# Patient Record
Sex: FEMALE | Race: BLACK OR AFRICAN AMERICAN | NOT HISPANIC OR LATINO | URBAN - METROPOLITAN AREA
[De-identification: names, ages, dates, MRNs, and addresses within clinical notes are randomized per-mention and may not be internally consistent; named-entity substitution may affect disease eponyms.]

---

## 2017-01-01 ENCOUNTER — OUTPATIENT (OUTPATIENT)
Dept: OUTPATIENT SERVICES | Facility: HOSPITAL | Age: 27
LOS: 1 days | End: 2017-01-01
Payer: MEDICAID

## 2017-01-01 DIAGNOSIS — O00.9 ECTOPIC PREGNANCY, UNSPECIFIED: Chronic | ICD-10-CM

## 2017-01-19 ENCOUNTER — APPOINTMENT (OUTPATIENT)
Dept: ENDOCRINOLOGY | Facility: CLINIC | Age: 27
End: 2017-01-19

## 2017-03-08 DIAGNOSIS — R69 ILLNESS, UNSPECIFIED: ICD-10-CM

## 2017-05-25 ENCOUNTER — APPOINTMENT (OUTPATIENT)
Dept: ENDOCRINOLOGY | Facility: CLINIC | Age: 27
End: 2017-05-25

## 2017-07-11 ENCOUNTER — EMERGENCY (EMERGENCY)
Facility: HOSPITAL | Age: 27
LOS: 0 days | Discharge: HOME | End: 2017-07-12
Attending: EMERGENCY MEDICINE
Payer: MEDICAID

## 2017-07-11 VITALS
DIASTOLIC BLOOD PRESSURE: 112 MMHG | HEIGHT: 66 IN | TEMPERATURE: 99 F | WEIGHT: 145.06 LBS | RESPIRATION RATE: 18 BRPM | OXYGEN SATURATION: 100 % | HEART RATE: 84 BPM | SYSTOLIC BLOOD PRESSURE: 189 MMHG

## 2017-07-11 DIAGNOSIS — O00.9 ECTOPIC PREGNANCY, UNSPECIFIED: Chronic | ICD-10-CM

## 2017-07-11 LAB
ALBUMIN SERPL ELPH-MCNC: 3.5 G/DL — SIGNIFICANT CHANGE UP (ref 3.3–5)
ALP SERPL-CCNC: 101 U/L — SIGNIFICANT CHANGE UP (ref 40–120)
ALT FLD-CCNC: 30 U/L — SIGNIFICANT CHANGE UP (ref 12–78)
ANION GAP SERPL CALC-SCNC: 15 MMOL/L — SIGNIFICANT CHANGE UP (ref 5–17)
AST SERPL-CCNC: 25 U/L — SIGNIFICANT CHANGE UP (ref 15–37)
BASE EXCESS BLDA CALC-SCNC: -2.1 MMOL/L — LOW (ref -2–2)
BILIRUB SERPL-MCNC: 0.8 MG/DL — SIGNIFICANT CHANGE UP (ref 0.2–1.2)
BLOOD GAS COMMENTS: SIGNIFICANT CHANGE UP
BLOOD GAS COMMENTS: SIGNIFICANT CHANGE UP
BLOOD GAS SOURCE: SIGNIFICANT CHANGE UP
BUN SERPL-MCNC: 17 MG/DL — SIGNIFICANT CHANGE UP (ref 7–23)
CALCIUM SERPL-MCNC: 8.9 MG/DL — SIGNIFICANT CHANGE UP (ref 8.5–10.1)
CHLORIDE SERPL-SCNC: 103 MMOL/L — SIGNIFICANT CHANGE UP (ref 96–108)
CO2 SERPL-SCNC: 20 MMOL/L — LOW (ref 22–31)
CREAT SERPL-MCNC: 0.75 MG/DL — SIGNIFICANT CHANGE UP (ref 0.5–1.3)
GLUCOSE SERPL-MCNC: 326 MG/DL — HIGH (ref 70–99)
HCG SERPL-ACNC: <1 MIU/ML — SIGNIFICANT CHANGE UP
HCO3 BLDA-SCNC: 21 MMOL/L — SIGNIFICANT CHANGE UP (ref 21–29)
HOROWITZ INDEX BLDA+IHG-RTO: 0.28 — SIGNIFICANT CHANGE UP
LIDOCAIN IGE QN: 60 U/L — LOW (ref 73–393)
PCO2 BLDA: 31 MMHG — LOW (ref 32–46)
PH BLD: 7.44 — SIGNIFICANT CHANGE UP (ref 7.35–7.45)
PO2 BLDA: 111 MMHG — HIGH (ref 74–108)
POTASSIUM SERPL-MCNC: 4.2 MMOL/L — SIGNIFICANT CHANGE UP (ref 3.5–5.3)
POTASSIUM SERPL-SCNC: 4.2 MMOL/L — SIGNIFICANT CHANGE UP (ref 3.5–5.3)
PROT SERPL-MCNC: 7.9 GM/DL — SIGNIFICANT CHANGE UP (ref 6–8.3)
SAO2 % BLDA: 98 % — HIGH (ref 92–96)
SODIUM SERPL-SCNC: 138 MMOL/L — SIGNIFICANT CHANGE UP (ref 135–145)

## 2017-07-11 PROCEDURE — 99285 EMERGENCY DEPT VISIT HI MDM: CPT

## 2017-07-11 RX ORDER — MORPHINE SULFATE 50 MG/1
4 CAPSULE, EXTENDED RELEASE ORAL ONCE
Qty: 0 | Refills: 0 | Status: DISCONTINUED | OUTPATIENT
Start: 2017-07-11 | End: 2017-07-11

## 2017-07-11 RX ORDER — MORPHINE SULFATE 50 MG/1
1 CAPSULE, EXTENDED RELEASE ORAL ONCE
Qty: 0 | Refills: 0 | Status: DISCONTINUED | OUTPATIENT
Start: 2017-07-11 | End: 2017-07-11

## 2017-07-11 RX ORDER — METOCLOPRAMIDE HCL 10 MG
10 TABLET ORAL ONCE
Qty: 0 | Refills: 0 | Status: COMPLETED | OUTPATIENT
Start: 2017-07-11 | End: 2017-07-11

## 2017-07-11 RX ORDER — LIDOCAINE 4 G/100G
10 CREAM TOPICAL ONCE
Qty: 0 | Refills: 0 | Status: COMPLETED | OUTPATIENT
Start: 2017-07-11 | End: 2017-07-11

## 2017-07-11 RX ORDER — ONDANSETRON 8 MG/1
4 TABLET, FILM COATED ORAL ONCE
Qty: 0 | Refills: 0 | Status: COMPLETED | OUTPATIENT
Start: 2017-07-11 | End: 2017-07-11

## 2017-07-11 RX ORDER — SODIUM CHLORIDE 9 MG/ML
1000 INJECTION INTRAMUSCULAR; INTRAVENOUS; SUBCUTANEOUS ONCE
Qty: 0 | Refills: 0 | Status: COMPLETED | OUTPATIENT
Start: 2017-07-11 | End: 2017-07-11

## 2017-07-11 RX ORDER — INSULIN HUMAN 100 [IU]/ML
10 INJECTION, SOLUTION SUBCUTANEOUS ONCE
Qty: 0 | Refills: 0 | Status: COMPLETED | OUTPATIENT
Start: 2017-07-11 | End: 2017-07-11

## 2017-07-11 RX ORDER — IOHEXOL 300 MG/ML
30 INJECTION, SOLUTION INTRAVENOUS ONCE
Qty: 0 | Refills: 0 | Status: COMPLETED | OUTPATIENT
Start: 2017-07-11 | End: 2017-07-12

## 2017-07-11 RX ORDER — DIPHENHYDRAMINE HCL 50 MG
25 CAPSULE ORAL ONCE
Qty: 0 | Refills: 0 | Status: COMPLETED | OUTPATIENT
Start: 2017-07-11 | End: 2017-07-11

## 2017-07-11 RX ORDER — SODIUM CHLORIDE 9 MG/ML
3 INJECTION INTRAMUSCULAR; INTRAVENOUS; SUBCUTANEOUS ONCE
Qty: 0 | Refills: 0 | Status: COMPLETED | OUTPATIENT
Start: 2017-07-11 | End: 2017-07-11

## 2017-07-11 RX ORDER — PANTOPRAZOLE SODIUM 20 MG/1
40 TABLET, DELAYED RELEASE ORAL ONCE
Qty: 0 | Refills: 0 | Status: COMPLETED | OUTPATIENT
Start: 2017-07-11 | End: 2017-07-11

## 2017-07-11 RX ADMIN — LIDOCAINE 10 MILLILITER(S): 4 CREAM TOPICAL at 19:42

## 2017-07-11 RX ADMIN — MORPHINE SULFATE 4 MILLIGRAM(S): 50 CAPSULE, EXTENDED RELEASE ORAL at 21:04

## 2017-07-11 RX ADMIN — Medication 10 MILLIGRAM(S): at 20:34

## 2017-07-11 RX ADMIN — PANTOPRAZOLE SODIUM 40 MILLIGRAM(S): 20 TABLET, DELAYED RELEASE ORAL at 20:34

## 2017-07-11 RX ADMIN — Medication 204 MILLIGRAM(S): at 21:04

## 2017-07-11 RX ADMIN — Medication 30 MILLILITER(S): at 19:43

## 2017-07-11 RX ADMIN — ONDANSETRON 4 MILLIGRAM(S): 8 TABLET, FILM COATED ORAL at 21:07

## 2017-07-11 RX ADMIN — Medication 25 MILLIGRAM(S): at 22:08

## 2017-07-11 RX ADMIN — SODIUM CHLORIDE 3 MILLILITER(S): 9 INJECTION INTRAMUSCULAR; INTRAVENOUS; SUBCUTANEOUS at 21:07

## 2017-07-11 NOTE — ED PROVIDER NOTE - OBJECTIVE STATEMENT
Pertinent PMH/PSH/FHx/SHx and Review of Systems contained within:  28 yo f with PMH of DM and gastroparesis presents in ED c/o abdominal pain associated with nbnb vomitus today. No aggravating or relieving factors, No fever/chills, No photophobia/eye pain/changes in vision, No ear pain/sore throat/dysphagia, No chest pain/palpitations, no SOB/cough/wheeze/stridor, No D, no dysuria/frequency/discharge, No neck/back pain, no rash, no changes in neurological status/function.

## 2017-07-11 NOTE — ED PROVIDER NOTE - MEDICAL DECISION MAKING DETAILS
s/p vomitus. labs and imaging reviewed. Patient received morphine, benadryl, ivfs, reglan, protonix. she currently feels well and wants to go home. patient is to follow up with pmd and gi. Discussed results and outcome of testing with the patient.  Patient advised to please follow up with their primary care doctor within the next 24 hours and return to the Emergency Department for worsening symptoms or any other concerns.  Patient advised that their doctor may call  to follow up on the specific results of the tests performed today in the emergency department.

## 2017-07-12 VITALS
RESPIRATION RATE: 18 BRPM | HEART RATE: 114 BPM | OXYGEN SATURATION: 100 % | TEMPERATURE: 99 F | SYSTOLIC BLOOD PRESSURE: 115 MMHG | DIASTOLIC BLOOD PRESSURE: 70 MMHG

## 2017-07-12 DIAGNOSIS — K31.84 GASTROPARESIS: ICD-10-CM

## 2017-07-12 DIAGNOSIS — R10.9 UNSPECIFIED ABDOMINAL PAIN: ICD-10-CM

## 2017-07-12 DIAGNOSIS — R11.10 VOMITING, UNSPECIFIED: ICD-10-CM

## 2017-07-12 DIAGNOSIS — G40.909 EPILEPSY, UNSPECIFIED, NOT INTRACTABLE, WITHOUT STATUS EPILEPTICUS: ICD-10-CM

## 2017-07-12 DIAGNOSIS — E11.9 TYPE 2 DIABETES MELLITUS WITHOUT COMPLICATIONS: ICD-10-CM

## 2017-07-12 LAB
ANISOCYTOSIS BLD QL: SLIGHT — SIGNIFICANT CHANGE UP
APPEARANCE UR: CLEAR — SIGNIFICANT CHANGE UP
APTT BLD: 29.3 SEC — SIGNIFICANT CHANGE UP (ref 27.5–37.4)
BACTERIA # UR AUTO: ABNORMAL
BASOPHILS NFR BLD AUTO: 1 % — SIGNIFICANT CHANGE UP (ref 0–2)
BILIRUB UR-MCNC: NEGATIVE — SIGNIFICANT CHANGE UP
COLOR SPEC: YELLOW — SIGNIFICANT CHANGE UP
DIFF PNL FLD: ABNORMAL
ELLIPTOCYTES BLD QL SMEAR: SLIGHT — SIGNIFICANT CHANGE UP
EPI CELLS # UR: SIGNIFICANT CHANGE UP
GLUCOSE UR QL: 1000 MG/DL
HCT VFR BLD CALC: 32.4 % — LOW (ref 34.5–45)
HGB BLD-MCNC: 10.6 G/DL — LOW (ref 11.5–15.5)
HYPOCHROMIA BLD QL: SLIGHT — SIGNIFICANT CHANGE UP
INR BLD: 1.01 RATIO — SIGNIFICANT CHANGE UP (ref 0.88–1.16)
KETONES UR-MCNC: ABNORMAL
LEUKOCYTE ESTERASE UR-ACNC: NEGATIVE — SIGNIFICANT CHANGE UP
LYMPHOCYTES # BLD AUTO: 12 % — LOW (ref 13–44)
MACROCYTES BLD QL: SLIGHT — SIGNIFICANT CHANGE UP
MCHC RBC-ENTMCNC: 20.2 PG — LOW (ref 27–34)
MCHC RBC-ENTMCNC: 32.7 GM/DL — SIGNIFICANT CHANGE UP (ref 32–36)
MCV RBC AUTO: 61.9 FL — LOW (ref 80–100)
MICROCYTES BLD QL: SLIGHT — SIGNIFICANT CHANGE UP
MONOCYTES NFR BLD AUTO: 6 % — SIGNIFICANT CHANGE UP (ref 2–14)
NEUTROPHILS NFR BLD AUTO: 80 % — HIGH (ref 43–77)
NEUTS BAND # BLD: 1 % — SIGNIFICANT CHANGE UP (ref 0–8)
NITRITE UR-MCNC: NEGATIVE — SIGNIFICANT CHANGE UP
OVALOCYTES BLD QL SMEAR: SLIGHT — SIGNIFICANT CHANGE UP
PH UR: 6 — SIGNIFICANT CHANGE UP (ref 5–8)
PLAT MORPH BLD: NORMAL — SIGNIFICANT CHANGE UP
PLATELET # BLD AUTO: 392 K/UL — SIGNIFICANT CHANGE UP (ref 150–400)
PROT UR-MCNC: 30 MG/DL
PROTHROM AB SERPL-ACNC: 11 SEC — SIGNIFICANT CHANGE UP (ref 9.8–12.7)
RBC # BLD: 5.24 M/UL — HIGH (ref 3.8–5.2)
RBC # FLD: 15.3 % — HIGH (ref 11–15)
RBC BLD AUTO: ABNORMAL
RBC CASTS # UR COMP ASSIST: ABNORMAL /HPF (ref 0–4)
SP GR SPEC: 1.01 — SIGNIFICANT CHANGE UP (ref 1.01–1.02)
TARGETS BLD QL SMEAR: SLIGHT — SIGNIFICANT CHANGE UP
UROBILINOGEN FLD QL: NEGATIVE MG/DL — SIGNIFICANT CHANGE UP
WBC # BLD: 16 K/UL — HIGH (ref 3.8–10.5)
WBC # FLD AUTO: 16 K/UL — HIGH (ref 3.8–10.5)
WBC UR QL: SIGNIFICANT CHANGE UP

## 2017-07-12 PROCEDURE — 74176 CT ABD & PELVIS W/O CONTRAST: CPT | Mod: 26

## 2017-07-12 RX ORDER — CIPROFLOXACIN LACTATE 400MG/40ML
1 VIAL (ML) INTRAVENOUS
Qty: 10 | Refills: 0
Start: 2017-07-12 | End: 2017-07-22

## 2017-07-12 RX ORDER — MORPHINE SULFATE 50 MG/1
1 CAPSULE, EXTENDED RELEASE ORAL ONCE
Qty: 0 | Refills: 0 | Status: DISCONTINUED | OUTPATIENT
Start: 2017-07-12 | End: 2017-07-12

## 2017-07-12 RX ORDER — DIPHENHYDRAMINE HCL 50 MG
25 CAPSULE ORAL ONCE
Qty: 0 | Refills: 0 | Status: COMPLETED | OUTPATIENT
Start: 2017-07-12 | End: 2017-07-12

## 2017-07-12 RX ADMIN — IOHEXOL 30 MILLILITER(S): 300 INJECTION, SOLUTION INTRAVENOUS at 01:10

## 2017-07-12 RX ADMIN — INSULIN HUMAN 10 UNIT(S): 100 INJECTION, SOLUTION SUBCUTANEOUS at 00:58

## 2017-07-12 RX ADMIN — MORPHINE SULFATE 1 MILLIGRAM(S): 50 CAPSULE, EXTENDED RELEASE ORAL at 00:55

## 2017-07-12 RX ADMIN — Medication 25 MILLIGRAM(S): at 04:09

## 2017-07-12 RX ADMIN — MORPHINE SULFATE 1 MILLIGRAM(S): 50 CAPSULE, EXTENDED RELEASE ORAL at 03:03

## 2017-07-12 RX ADMIN — MORPHINE SULFATE 4 MILLIGRAM(S): 50 CAPSULE, EXTENDED RELEASE ORAL at 01:10

## 2017-07-12 RX ADMIN — ONDANSETRON 4 MILLIGRAM(S): 8 TABLET, FILM COATED ORAL at 00:58

## 2017-07-12 RX ADMIN — MORPHINE SULFATE 1 MILLIGRAM(S): 50 CAPSULE, EXTENDED RELEASE ORAL at 01:44

## 2017-07-12 NOTE — ED ADULT NURSE REASSESSMENT NOTE - NS ED NURSE REASSESS COMMENT FT1
pt is calm and resting well after meds given. pt is now ready for d/c., pt says " I am much better."

## 2017-07-13 LAB
CULTURE RESULTS: SIGNIFICANT CHANGE UP
SPECIMEN SOURCE: SIGNIFICANT CHANGE UP

## 2017-07-21 ENCOUNTER — APPOINTMENT (OUTPATIENT)
Dept: ENDOCRINOLOGY | Facility: CLINIC | Age: 27
End: 2017-07-21

## 2017-07-21 LAB — HBA1C MFR BLD HPLC: 9.2

## 2017-07-21 RX ORDER — GLUCAGON 1 MG
1 KIT INJECTION
Qty: 1 | Refills: 1 | Status: ACTIVE | COMMUNITY
Start: 2017-07-21

## 2017-07-24 ENCOUNTER — APPOINTMENT (OUTPATIENT)
Dept: ENDOCRINOLOGY | Facility: CLINIC | Age: 27
End: 2017-07-24

## 2017-08-01 PROCEDURE — G9001: CPT

## 2017-09-15 ENCOUNTER — MEDICATION RENEWAL (OUTPATIENT)
Age: 27
End: 2017-09-15

## 2017-10-30 ENCOUNTER — RX RENEWAL (OUTPATIENT)
Age: 27
End: 2017-10-30

## 2017-11-17 ENCOUNTER — APPOINTMENT (OUTPATIENT)
Dept: ENDOCRINOLOGY | Facility: CLINIC | Age: 27
End: 2017-11-17

## 2018-01-09 ENCOUNTER — APPOINTMENT (OUTPATIENT)
Dept: ENDOCRINOLOGY | Facility: CLINIC | Age: 28
End: 2018-01-09
Payer: MEDICAID

## 2018-01-09 ENCOUNTER — APPOINTMENT (OUTPATIENT)
Dept: ENDOCRINOLOGY | Facility: CLINIC | Age: 28
End: 2018-01-09

## 2018-01-09 LAB
GLUCOSE BLDC GLUCOMTR-MCNC: 124
HBA1C MFR BLD HPLC: 9.5

## 2018-01-09 PROCEDURE — 83036 HEMOGLOBIN GLYCOSYLATED A1C: CPT | Mod: QW

## 2018-01-09 PROCEDURE — 82962 GLUCOSE BLOOD TEST: CPT

## 2018-01-09 PROCEDURE — G0108 DIAB MANAGE TRN  PER INDIV: CPT

## 2018-01-09 RX ORDER — BLOOD SUGAR DIAGNOSTIC
STRIP MISCELLANEOUS
Qty: 4 | Refills: 0 | Status: DISCONTINUED | COMMUNITY
Start: 2017-10-30 | End: 2018-01-09

## 2018-01-09 RX ORDER — LANCETS
EACH MISCELLANEOUS
Qty: 3 | Refills: 0 | Status: ACTIVE | COMMUNITY
Start: 2018-01-09

## 2018-01-09 RX ORDER — BLOOD-GLUCOSE METER
EACH MISCELLANEOUS
Qty: 1 | Refills: 0 | Status: ACTIVE | COMMUNITY
Start: 2018-01-09

## 2018-01-09 RX ORDER — BLOOD KETONE TEST, STRIPS
STRIP MISCELLANEOUS
Qty: 1 | Refills: 1 | Status: ACTIVE | COMMUNITY
Start: 2018-01-09

## 2018-01-11 ENCOUNTER — CLINICAL ADVICE (OUTPATIENT)
Age: 28
End: 2018-01-11

## 2018-02-16 ENCOUNTER — APPOINTMENT (OUTPATIENT)
Dept: ENDOCRINOLOGY | Facility: CLINIC | Age: 28
End: 2018-02-16

## 2018-03-08 ENCOUNTER — APPOINTMENT (OUTPATIENT)
Dept: ENDOCRINOLOGY | Facility: CLINIC | Age: 28
End: 2018-03-08

## 2018-04-17 ENCOUNTER — MEDICATION RENEWAL (OUTPATIENT)
Age: 28
End: 2018-04-17

## 2018-04-25 ENCOUNTER — MEDICATION RENEWAL (OUTPATIENT)
Age: 28
End: 2018-04-25

## 2018-04-29 ENCOUNTER — MEDICATION RENEWAL (OUTPATIENT)
Age: 28
End: 2018-04-29

## 2018-05-02 ENCOUNTER — APPOINTMENT (OUTPATIENT)
Dept: ENDOCRINOLOGY | Facility: CLINIC | Age: 28
End: 2018-05-02
Payer: MEDICAID

## 2018-05-02 VITALS
SYSTOLIC BLOOD PRESSURE: 120 MMHG | BODY MASS INDEX: 23.49 KG/M2 | WEIGHT: 141 LBS | HEIGHT: 65 IN | DIASTOLIC BLOOD PRESSURE: 70 MMHG

## 2018-05-02 LAB
GLUCOSE BLDC GLUCOMTR-MCNC: 293
HBA1C MFR BLD HPLC: 10

## 2018-05-02 PROCEDURE — 83036 HEMOGLOBIN GLYCOSYLATED A1C: CPT | Mod: QW

## 2018-05-02 PROCEDURE — 82962 GLUCOSE BLOOD TEST: CPT

## 2018-05-02 PROCEDURE — 99213 OFFICE O/P EST LOW 20 MIN: CPT | Mod: 25

## 2018-05-02 RX ORDER — OXYCODONE AND ACETAMINOPHEN 5; 325 MG/1; MG/1
5-325 TABLET ORAL
Qty: 15 | Refills: 0 | Status: ACTIVE | COMMUNITY
Start: 2017-09-18

## 2018-05-03 ENCOUNTER — MEDICATION RENEWAL (OUTPATIENT)
Age: 28
End: 2018-05-03

## 2018-05-03 LAB
25(OH)D3 SERPL-MCNC: 13.3 NG/ML
ALBUMIN SERPL ELPH-MCNC: 4.1 G/DL
ALP BLD-CCNC: 90 U/L
ALT SERPL-CCNC: 9 U/L
ANION GAP SERPL CALC-SCNC: 14 MMOL/L
AST SERPL-CCNC: 11 U/L
BILIRUB SERPL-MCNC: 0.4 MG/DL
BUN SERPL-MCNC: 18 MG/DL
CALCIUM SERPL-MCNC: 8.9 MG/DL
CHLORIDE SERPL-SCNC: 104 MMOL/L
CHOLEST SERPL-MCNC: 280 MG/DL
CHOLEST/HDLC SERPL: 2.6 RATIO
CO2 SERPL-SCNC: 21 MMOL/L
CREAT SERPL-MCNC: 0.79 MG/DL
CREAT SPEC-SCNC: 122 MG/DL
GLUCOSE SERPL-MCNC: 326 MG/DL
HDLC SERPL-MCNC: 109 MG/DL
LDLC SERPL CALC-MCNC: 161 MG/DL
MICROALBUMIN 24H UR DL<=1MG/L-MCNC: 11.4 MG/DL
MICROALBUMIN/CREAT 24H UR-RTO: 93 MG/G
POTASSIUM SERPL-SCNC: 4.7 MMOL/L
PROT SERPL-MCNC: 7.6 G/DL
SODIUM SERPL-SCNC: 139 MMOL/L
T4 FREE SERPL-MCNC: 1 NG/DL
TRIGL SERPL-MCNC: 52 MG/DL
TSH SERPL-ACNC: 0.59 UIU/ML

## 2018-05-10 ENCOUNTER — APPOINTMENT (OUTPATIENT)
Dept: ENDOCRINOLOGY | Facility: CLINIC | Age: 28
End: 2018-05-10
Payer: MEDICAID

## 2018-05-10 ENCOUNTER — MEDICATION RENEWAL (OUTPATIENT)
Age: 28
End: 2018-05-10

## 2018-05-10 PROCEDURE — 95249 CONT GLUC MNTR PT PROV EQP: CPT

## 2018-05-10 PROCEDURE — 95251 CONT GLUC MNTR ANALYSIS I&R: CPT

## 2018-05-17 ENCOUNTER — CLINICAL ADVICE (OUTPATIENT)
Age: 28
End: 2018-05-17

## 2018-05-25 ENCOUNTER — CLINICAL ADVICE (OUTPATIENT)
Age: 28
End: 2018-05-25

## 2018-07-02 ENCOUNTER — MEDICATION RENEWAL (OUTPATIENT)
Age: 28
End: 2018-07-02

## 2018-08-03 ENCOUNTER — RX RENEWAL (OUTPATIENT)
Age: 28
End: 2018-08-03

## 2018-08-04 ENCOUNTER — MEDICATION RENEWAL (OUTPATIENT)
Age: 28
End: 2018-08-04

## 2018-09-01 ENCOUNTER — OUTPATIENT (OUTPATIENT)
Dept: OUTPATIENT SERVICES | Facility: HOSPITAL | Age: 28
LOS: 1 days | End: 2018-09-01
Payer: COMMERCIAL

## 2018-09-01 DIAGNOSIS — O00.9 ECTOPIC PREGNANCY, UNSPECIFIED: Chronic | ICD-10-CM

## 2018-09-01 PROCEDURE — G9001: CPT

## 2018-09-19 DIAGNOSIS — Z71.89 OTHER SPECIFIED COUNSELING: ICD-10-CM

## 2018-11-08 ENCOUNTER — APPOINTMENT (OUTPATIENT)
Dept: ENDOCRINOLOGY | Facility: CLINIC | Age: 28
End: 2018-11-08
Payer: MEDICAID

## 2018-11-08 VITALS
OXYGEN SATURATION: 97 % | SYSTOLIC BLOOD PRESSURE: 118 MMHG | WEIGHT: 143 LBS | BODY MASS INDEX: 23.82 KG/M2 | HEIGHT: 65 IN | HEART RATE: 82 BPM | DIASTOLIC BLOOD PRESSURE: 72 MMHG

## 2018-11-08 PROCEDURE — 99215 OFFICE O/P EST HI 40 MIN: CPT

## 2018-11-08 PROCEDURE — 83036 HEMOGLOBIN GLYCOSYLATED A1C: CPT | Mod: QW

## 2018-11-08 RX ORDER — BLOOD SUGAR DIAGNOSTIC
STRIP MISCELLANEOUS 4 TIMES DAILY
Qty: 3 | Refills: 0 | Status: DISCONTINUED | COMMUNITY
Start: 2018-01-09 | End: 2018-11-08

## 2018-11-09 ENCOUNTER — RX RENEWAL (OUTPATIENT)
Age: 28
End: 2018-11-09

## 2018-11-14 LAB
25(OH)D3 SERPL-MCNC: 12 NG/ML
CHOLEST SERPL-MCNC: 254 MG/DL
CHOLEST/HDLC SERPL: 2.4 RATIO
CREAT SPEC-SCNC: 66 MG/DL
HBA1C MFR BLD HPLC: 10.1
HDLC SERPL-MCNC: 108 MG/DL
LDLC SERPL CALC-MCNC: 131 MG/DL
MICROALBUMIN 24H UR DL<=1MG/L-MCNC: 4.6 MG/DL
MICROALBUMIN/CREAT 24H UR-RTO: 70 MG/G
TRIGL SERPL-MCNC: 73 MG/DL

## 2018-11-26 ENCOUNTER — APPOINTMENT (OUTPATIENT)
Dept: ENDOCRINOLOGY | Facility: CLINIC | Age: 28
End: 2018-11-26

## 2019-02-27 ENCOUNTER — RX RENEWAL (OUTPATIENT)
Age: 29
End: 2019-02-27

## 2019-03-04 ENCOUNTER — APPOINTMENT (OUTPATIENT)
Dept: ENDOCRINOLOGY | Facility: CLINIC | Age: 29
End: 2019-03-04

## 2019-03-31 ENCOUNTER — MEDICATION RENEWAL (OUTPATIENT)
Age: 29
End: 2019-03-31

## 2019-08-15 ENCOUNTER — RX CHANGE (OUTPATIENT)
Age: 29
End: 2019-08-15

## 2019-08-16 ENCOUNTER — MOBILE ON CALL (OUTPATIENT)
Age: 29
End: 2019-08-16

## 2019-08-29 RX ORDER — INSULIN DETEMIR 100 [IU]/ML
100 INJECTION, SOLUTION SUBCUTANEOUS
Qty: 1 | Refills: 2 | Status: DISCONTINUED | COMMUNITY
Start: 2018-01-09 | End: 2019-08-29

## 2019-09-26 ENCOUNTER — APPOINTMENT (OUTPATIENT)
Dept: ENDOCRINOLOGY | Facility: CLINIC | Age: 29
End: 2019-09-26

## 2019-11-01 ENCOUNTER — RX RENEWAL (OUTPATIENT)
Age: 29
End: 2019-11-01

## 2019-12-16 ENCOUNTER — RX RENEWAL (OUTPATIENT)
Age: 29
End: 2019-12-16

## 2020-01-02 RX ORDER — INSULIN ASPART 100 [IU]/ML
100 INJECTION, SOLUTION INTRAVENOUS; SUBCUTANEOUS
Qty: 3 | Refills: 2 | Status: DISCONTINUED | COMMUNITY
Start: 2018-01-09 | End: 2020-01-02

## 2020-01-02 RX ORDER — INSULIN LISPRO 100 U/ML
100 INJECTION, SOLUTION INTRAVENOUS; SUBCUTANEOUS
Qty: 3 | Refills: 1 | Status: DISCONTINUED | COMMUNITY
Start: 2019-08-15 | End: 2020-01-02

## 2020-01-02 RX ORDER — INSULIN LISPRO 100 U/ML
100 INJECTION, SOLUTION SUBCUTANEOUS
Qty: 20 | Refills: 0 | Status: DISCONTINUED | COMMUNITY
Start: 2019-11-01 | End: 2020-01-02

## 2020-01-03 RX ORDER — INSULIN LISPRO 100 [IU]/ML
100 INJECTION, SOLUTION INTRAVENOUS; SUBCUTANEOUS
Qty: 1 | Refills: 1 | Status: DISCONTINUED | COMMUNITY
Start: 2020-01-02 | End: 2020-01-03

## 2020-02-18 ENCOUNTER — APPOINTMENT (OUTPATIENT)
Dept: ENDOCRINOLOGY | Facility: CLINIC | Age: 30
End: 2020-02-18

## 2020-03-03 ENCOUNTER — APPOINTMENT (OUTPATIENT)
Dept: ENDOCRINOLOGY | Facility: CLINIC | Age: 30
End: 2020-03-03
Payer: MEDICAID

## 2020-03-03 VITALS
BODY MASS INDEX: 24.66 KG/M2 | HEIGHT: 65 IN | DIASTOLIC BLOOD PRESSURE: 80 MMHG | WEIGHT: 148 LBS | HEART RATE: 84 BPM | SYSTOLIC BLOOD PRESSURE: 118 MMHG | OXYGEN SATURATION: 97 %

## 2020-03-03 DIAGNOSIS — N92.6 IRREGULAR MENSTRUATION, UNSPECIFIED: ICD-10-CM

## 2020-03-03 LAB
GLUCOSE BLDC GLUCOMTR-MCNC: 198
HBA1C MFR BLD HPLC: 12.3

## 2020-03-03 PROCEDURE — 99215 OFFICE O/P EST HI 40 MIN: CPT | Mod: 25

## 2020-03-03 PROCEDURE — 83036 HEMOGLOBIN GLYCOSYLATED A1C: CPT | Mod: QW

## 2020-03-03 PROCEDURE — 82962 GLUCOSE BLOOD TEST: CPT

## 2020-03-03 RX ORDER — URINE ACETONE TEST STRIPS
STRIP MISCELLANEOUS
Qty: 1 | Refills: 1 | Status: ACTIVE | COMMUNITY
Start: 2020-03-03 | End: 1900-01-01

## 2020-03-03 RX ORDER — GLUCAGON 1 MG
1 KIT INJECTION
Qty: 1 | Refills: 0 | Status: ACTIVE | COMMUNITY
Start: 2020-03-03 | End: 1900-01-01

## 2020-03-09 LAB
17OHP SERPL-MCNC: 47 NG/DL
25(OH)D3 SERPL-MCNC: 13.1 NG/ML
ALBUMIN SERPL ELPH-MCNC: 3.8 G/DL
ALP BLD-CCNC: 100 U/L
ALT SERPL-CCNC: 13 U/L
ANION GAP SERPL CALC-SCNC: 13 MMOL/L
AST SERPL-CCNC: 11 U/L
BASOPHILS # BLD AUTO: 0.07 K/UL
BASOPHILS NFR BLD AUTO: 0.8 %
BILIRUB SERPL-MCNC: 0.4 MG/DL
BUN SERPL-MCNC: 17 MG/DL
CALCIUM SERPL-MCNC: 9 MG/DL
CHLORIDE SERPL-SCNC: 105 MMOL/L
CHOLEST SERPL-MCNC: 262 MG/DL
CHOLEST/HDLC SERPL: 2.4 RATIO
CO2 SERPL-SCNC: 18 MMOL/L
CREAT SERPL-MCNC: 0.58 MG/DL
CREAT SPEC-SCNC: 191 MG/DL
DHEA-S SERPL-MCNC: 399 UG/DL
EOSINOPHIL # BLD AUTO: 0.24 K/UL
EOSINOPHIL NFR BLD AUTO: 2.8 %
ESTRADIOL SERPL-MCNC: 41 PG/ML
FSH SERPL-MCNC: 2.5 IU/L
GLUCOSE SERPL-MCNC: 219 MG/DL
HCT VFR BLD CALC: 37.9 %
HDLC SERPL-MCNC: 111 MG/DL
HGB BLD-MCNC: 11.9 G/DL
IMM GRANULOCYTES NFR BLD AUTO: 0.3 %
LDLC SERPL CALC-MCNC: 124 MG/DL
LH SERPL-ACNC: 2.7 IU/L
LYMPHOCYTES # BLD AUTO: 1.93 K/UL
LYMPHOCYTES NFR BLD AUTO: 22.3 %
MAN DIFF?: NORMAL
MCHC RBC-ENTMCNC: 22.2 PG
MCHC RBC-ENTMCNC: 31.4 GM/DL
MCV RBC AUTO: 70.7 FL
MICROALBUMIN 24H UR DL<=1MG/L-MCNC: 31.3 MG/DL
MICROALBUMIN/CREAT 24H UR-RTO: 164 MG/G
MONOCYTES # BLD AUTO: 0.66 K/UL
MONOCYTES NFR BLD AUTO: 7.6 %
NEUTROPHILS # BLD AUTO: 5.73 K/UL
NEUTROPHILS NFR BLD AUTO: 66.2 %
PLATELET # BLD AUTO: 398 K/UL
POTASSIUM SERPL-SCNC: 4.3 MMOL/L
PROLACTIN SERPL-MCNC: 7.2 NG/ML
PROT SERPL-MCNC: 6.1 G/DL
RBC # BLD: 5.36 M/UL
RBC # FLD: 15.2 %
SODIUM SERPL-SCNC: 137 MMOL/L
T4 FREE SERPL-MCNC: 0.9 NG/DL
TESTOST SERPL-MCNC: 34.8 NG/DL
TRIGL SERPL-MCNC: 139 MG/DL
TSH SERPL-ACNC: 0.33 UIU/ML
TTG IGA SER IA-ACNC: <1.2 U/ML
TTG IGA SER-ACNC: NEGATIVE
TTG IGG SER IA-ACNC: 1.2 U/ML
TTG IGG SER IA-ACNC: NEGATIVE
WBC # FLD AUTO: 8.66 K/UL

## 2020-04-24 ENCOUNTER — RX RENEWAL (OUTPATIENT)
Age: 30
End: 2020-04-24

## 2020-07-01 ENCOUNTER — APPOINTMENT (OUTPATIENT)
Dept: ENDOCRINOLOGY | Facility: CLINIC | Age: 30
End: 2020-07-01

## 2020-10-14 ENCOUNTER — RX RENEWAL (OUTPATIENT)
Age: 30
End: 2020-10-14

## 2020-12-08 ENCOUNTER — APPOINTMENT (OUTPATIENT)
Dept: ENDOCRINOLOGY | Facility: CLINIC | Age: 30
End: 2020-12-08

## 2021-03-22 ENCOUNTER — APPOINTMENT (OUTPATIENT)
Dept: ENDOCRINOLOGY | Facility: CLINIC | Age: 31
End: 2021-03-22
Payer: MEDICAID

## 2021-03-22 VITALS
BODY MASS INDEX: 25.29 KG/M2 | SYSTOLIC BLOOD PRESSURE: 115 MMHG | TEMPERATURE: 97.5 F | HEART RATE: 93 BPM | OXYGEN SATURATION: 99 % | DIASTOLIC BLOOD PRESSURE: 70 MMHG | WEIGHT: 152 LBS

## 2021-03-22 LAB
GLUCOSE BLDC GLUCOMTR-MCNC: 228
HBA1C MFR BLD HPLC: 10.3

## 2021-03-22 PROCEDURE — 99072 ADDL SUPL MATRL&STAF TM PHE: CPT

## 2021-03-22 PROCEDURE — 83036 HEMOGLOBIN GLYCOSYLATED A1C: CPT | Mod: QW

## 2021-03-22 PROCEDURE — 99214 OFFICE O/P EST MOD 30 MIN: CPT | Mod: 25

## 2021-03-22 PROCEDURE — 82962 GLUCOSE BLOOD TEST: CPT

## 2021-03-22 RX ORDER — BLOOD-GLUCOSE METER
EACH MISCELLANEOUS
Qty: 4 | Refills: 3 | Status: ACTIVE | COMMUNITY
Start: 2021-03-22 | End: 1900-01-01

## 2021-03-22 NOTE — REVIEW OF SYSTEMS
[Negative] : Heme/Lymph [All other systems negative] : All other systems negative [Fatigue] : fatigue

## 2021-03-22 NOTE — ASSESSMENT
[Diabetes Foot Care] : diabetes foot care [Long Term Vascular Complications] : long term vascular complications of diabetes [Carbohydrate Consistent Diet] : carbohydrate consistent diet [Importance of Diet and Exercise] : importance of diet and exercise to improve glycemic control, achieve weight loss and improve cardiovascular health [Exercise/Effect on Glucose] : exercise/effect on glucose [Self Monitoring of Blood Glucose] : self monitoring of blood glucose [Retinopathy Screening] : Patient was referred to ophthalmology for retinopathy screening [FreeTextEntry1] : 30-year-old woman with poorly controlled type 1 diabetes and gastroparesis \par \par Type 1 diabetes: HbA1c 10.3%\par - I had a lengthy discussion regarding healthy diet (consistent carbohydrates and low calorie content) with the patient. I also emphasized to maintain routine exercise activity (30 minutes daily or 150 minutes in the week).\par - Recommend reduce Levemir 14 units QHS\par - Recommend increase Admelog to 5/6/7 units TID AC and 2 units for every 50>150\par - Recommend continue Dexcom G6\par - I discussed the importance of avoiding mild hypoglycemia (FS<70 mg/dl) and severe hypoglycemia (FS<55 mg/dl) with the patient. I also discussed hypoglycemia correction with 4 oz of juice or 4 glucose tablets and rechecking FS in 15 minutes. If FS is still low, repeat 4oz juice or 4 glucose tablets and consume 12 grams of carbohydrates.\par - Her blood pressure was normal.\par - Check urine microalbumin today, start lisinopril 2.5 mg QD\par - Instructed to f/u w/ optho. \par - Has ketone strips, knows to use for FS>350\par - Pregnancy counselling done today, can get pregnant via IVF in the future\par \par HTN\par - Continue Amlodipine \par - Start Lisinopril 2.5 mg QD\par \par Vit D def: \par - Not taking Vit D, will recheck levels today.\par \par HLD: \par - Recheck fasting lipids.\par \par Followup in 3 months. \par Chrissy Bose DO\par \par

## 2021-03-22 NOTE — PHYSICAL EXAM
[Alert] : alert [Well Nourished] : well nourished [No Acute Distress] : no acute distress [Well Developed] : well developed [Normal Sclera/Conjunctiva] : normal sclera/conjunctiva [EOMI] : extra ocular movement intact [No Proptosis] : no proptosis [Normal Oropharynx] : the oropharynx was normal [Thyroid Not Enlarged] : the thyroid was not enlarged [No Thyroid Nodules] : no palpable thyroid nodules [No Respiratory Distress] : no respiratory distress [No Accessory Muscle Use] : no accessory muscle use [Clear to Auscultation] : lungs were clear to auscultation bilaterally [Normal S1, S2] : normal S1 and S2 [Normal Rate] : heart rate was normal [Regular Rhythm] : with a regular rhythm [No Edema] : no peripheral edema [Pedal Pulses Normal] : the pedal pulses are present [Normal Bowel Sounds] : normal bowel sounds [Not Tender] : non-tender [Not Distended] : not distended [Soft] : abdomen soft [Normal Posterior Cervical Nodes] : no posterior cervical lymphadenopathy [No Spinal Tenderness] : no spinal tenderness [Spine Straight] : spine straight [No Stigmata of Cushings Syndrome] : no stigmata of Cushings Syndrome [Normal Gait] : normal gait [Normal Strength/Tone] : muscle strength and tone were normal [No Rash] : no rash [No Tremors] : no tremors [Oriented x3] : oriented to person, place, and time [Normal Outer Ear/Nose] : the ears and nose were normal in appearance [Normal Hearing] : hearing was normal [Acanthosis Nigricans] : no acanthosis nigricans [Right Foot Was Examined] : right foot ~C was examined [Left Foot Was Examined] : left foot ~C was examined [Normal] : normal [Full ROM] : with full range of motion [2+] : 2+ in the dorsalis pedis [Diminished Throughout Both Feet] : normal tactile sensation with monofilament testing throughout both feet [Normal Sensation on Monofilament Testing] : normal sensation on monofilament testing of lower extremities [Normal Affect] : the affect was normal [Normal Mood] : the mood was normal

## 2021-03-22 NOTE — HISTORY OF PRESENT ILLNESS
[FreeTextEntry1] : 30 year-old woman here for followup of type 1 diabetes. A1c 10.3% today.  today\par \par She was diagnosed when she was 12 years old. \par Last ophtho visit was possibly in 2018 or 2019. No neuropathy. Admits to gastroparesis. Also has nephropathy. No h/o MI or CVA\par She has a history of opiate abuse and went to outpt facility in 2016.\par She was pregnant 5 times, 2 times low hormone, 1 time terminated, 2 ectopic pregnancies last surgery 2017 both fallopian tubes removed.\par \par She is taking Levemir 18 units qhs and Admelog 4/5/6 before meals and 2 units for 50>150\par She checks FS's BID: (from memory). Did not bring her meter\par AM: 30 or lower (2-3x/week) -- wakes her up at 6-7am and feels lethargic and sweats. Corrects with juice (1-2 cups) or bowl of cereal and FS initially will be 70-90 and then later will be in 100s\par Daytime can be 280-350\par Rarely checks before bedtime\par She had dexcom, last worn months ago. Currently with OneTouch Verio\par \par She doesn't eat 3 meals a day. 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 She lives alone

## 2021-03-24 RX ORDER — INSULIN DETEMIR 100 [IU]/ML
100 INJECTION, SOLUTION SUBCUTANEOUS
Qty: 5 | Refills: 5 | Status: DISCONTINUED | COMMUNITY
Start: 2021-03-22 | End: 2021-03-24

## 2021-03-24 RX ORDER — BLOOD SUGAR DIAGNOSTIC
STRIP MISCELLANEOUS 4 TIMES DAILY
Qty: 4 | Refills: 3 | Status: DISCONTINUED | COMMUNITY
Start: 2021-03-22 | End: 2021-03-24

## 2021-05-04 ENCOUNTER — APPOINTMENT (OUTPATIENT)
Dept: ENDOCRINOLOGY | Facility: CLINIC | Age: 31
End: 2021-05-04

## 2021-05-24 ENCOUNTER — APPOINTMENT (OUTPATIENT)
Dept: ENDOCRINOLOGY | Facility: CLINIC | Age: 31
End: 2021-05-24

## 2021-06-25 ENCOUNTER — APPOINTMENT (OUTPATIENT)
Dept: ENDOCRINOLOGY | Facility: CLINIC | Age: 31
End: 2021-06-25

## 2021-07-26 ENCOUNTER — APPOINTMENT (OUTPATIENT)
Dept: ENDOCRINOLOGY | Facility: CLINIC | Age: 31
End: 2021-07-26

## 2021-09-02 ENCOUNTER — NON-APPOINTMENT (OUTPATIENT)
Age: 31
End: 2021-09-02

## 2021-09-15 ENCOUNTER — APPOINTMENT (OUTPATIENT)
Dept: ENDOCRINOLOGY | Facility: CLINIC | Age: 31
End: 2021-09-15

## 2021-09-23 ENCOUNTER — APPOINTMENT (OUTPATIENT)
Dept: ENDOCRINOLOGY | Facility: CLINIC | Age: 31
End: 2021-09-23

## 2021-10-01 ENCOUNTER — OUTPATIENT (OUTPATIENT)
Dept: OUTPATIENT SERVICES | Facility: HOSPITAL | Age: 31
LOS: 1 days | End: 2021-10-01
Payer: MEDICAID

## 2021-10-01 DIAGNOSIS — O00.9 ECTOPIC PREGNANCY, UNSPECIFIED: Chronic | ICD-10-CM

## 2021-10-13 ENCOUNTER — APPOINTMENT (OUTPATIENT)
Dept: ENDOCRINOLOGY | Facility: CLINIC | Age: 31
End: 2021-10-13

## 2021-10-19 ENCOUNTER — APPOINTMENT (OUTPATIENT)
Dept: ENDOCRINOLOGY | Facility: CLINIC | Age: 31
End: 2021-10-19

## 2021-10-22 ENCOUNTER — EMERGENCY (EMERGENCY)
Facility: HOSPITAL | Age: 31
LOS: 0 days | Discharge: HOME | End: 2021-10-23
Attending: EMERGENCY MEDICINE
Payer: COMMERCIAL

## 2021-10-22 VITALS
TEMPERATURE: 98 F | RESPIRATION RATE: 20 BRPM | HEART RATE: 118 BPM | WEIGHT: 139.99 LBS | DIASTOLIC BLOOD PRESSURE: 95 MMHG | SYSTOLIC BLOOD PRESSURE: 138 MMHG | HEIGHT: 66 IN | OXYGEN SATURATION: 96 %

## 2021-10-22 DIAGNOSIS — Z79.4 LONG TERM (CURRENT) USE OF INSULIN: ICD-10-CM

## 2021-10-22 DIAGNOSIS — R10.9 UNSPECIFIED ABDOMINAL PAIN: ICD-10-CM

## 2021-10-22 DIAGNOSIS — R19.7 DIARRHEA, UNSPECIFIED: ICD-10-CM

## 2021-10-22 DIAGNOSIS — E10.43 TYPE 1 DIABETES MELLITUS WITH DIABETIC AUTONOMIC (POLY)NEUROPATHY: ICD-10-CM

## 2021-10-22 DIAGNOSIS — Z79.84 LONG TERM (CURRENT) USE OF ORAL HYPOGLYCEMIC DRUGS: ICD-10-CM

## 2021-10-22 DIAGNOSIS — R11.2 NAUSEA WITH VOMITING, UNSPECIFIED: ICD-10-CM

## 2021-10-22 DIAGNOSIS — O00.9 ECTOPIC PREGNANCY, UNSPECIFIED: Chronic | ICD-10-CM

## 2021-10-22 LAB
GLUCOSE BLDC GLUCOMTR-MCNC: 51 MG/DL — CRITICAL LOW (ref 70–99)
GLUCOSE BLDC GLUCOMTR-MCNC: 54 MG/DL — CRITICAL LOW (ref 70–99)
GLUCOSE BLDC GLUCOMTR-MCNC: 92 MG/DL — SIGNIFICANT CHANGE UP (ref 70–99)
HCT VFR BLD CALC: 31.3 % — LOW (ref 34.5–45)
HGB BLD-MCNC: 9.5 G/DL — LOW (ref 11.5–15.5)
MCHC RBC-ENTMCNC: 18.7 PG — LOW (ref 27–34)
MCHC RBC-ENTMCNC: 30.4 GM/DL — LOW (ref 32–36)
MCV RBC AUTO: 61.7 FL — LOW (ref 80–100)
PLATELET # BLD AUTO: 409 K/UL — HIGH (ref 150–400)
RBC # BLD: 5.07 M/UL — SIGNIFICANT CHANGE UP (ref 3.8–5.2)
RBC # FLD: 18.4 % — HIGH (ref 10.3–14.5)
WBC # BLD: 9.62 K/UL — SIGNIFICANT CHANGE UP (ref 3.8–10.5)
WBC # FLD AUTO: 9.62 K/UL — SIGNIFICANT CHANGE UP (ref 3.8–10.5)

## 2021-10-22 PROCEDURE — 93010 ELECTROCARDIOGRAM REPORT: CPT

## 2021-10-22 PROCEDURE — 99285 EMERGENCY DEPT VISIT HI MDM: CPT

## 2021-10-22 RX ORDER — MORPHINE SULFATE 50 MG/1
4 CAPSULE, EXTENDED RELEASE ORAL ONCE
Refills: 0 | Status: DISCONTINUED | OUTPATIENT
Start: 2021-10-22 | End: 2021-10-22

## 2021-10-22 RX ORDER — DIPHENHYDRAMINE HCL 50 MG
50 CAPSULE ORAL ONCE
Refills: 0 | Status: COMPLETED | OUTPATIENT
Start: 2021-10-22 | End: 2021-10-22

## 2021-10-22 RX ORDER — ONDANSETRON 8 MG/1
4 TABLET, FILM COATED ORAL ONCE
Refills: 0 | Status: COMPLETED | OUTPATIENT
Start: 2021-10-22 | End: 2021-10-22

## 2021-10-22 RX ORDER — SODIUM CHLORIDE 9 MG/ML
1000 INJECTION INTRAMUSCULAR; INTRAVENOUS; SUBCUTANEOUS ONCE
Refills: 0 | Status: COMPLETED | OUTPATIENT
Start: 2021-10-22 | End: 2021-10-22

## 2021-10-22 RX ADMIN — Medication 50 MILLIGRAM(S): at 23:19

## 2021-10-22 RX ADMIN — MORPHINE SULFATE 4 MILLIGRAM(S): 50 CAPSULE, EXTENDED RELEASE ORAL at 23:19

## 2021-10-22 RX ADMIN — MORPHINE SULFATE 4 MILLIGRAM(S): 50 CAPSULE, EXTENDED RELEASE ORAL at 23:35

## 2021-10-22 RX ADMIN — ONDANSETRON 4 MILLIGRAM(S): 8 TABLET, FILM COATED ORAL at 23:19

## 2021-10-22 NOTE — ED ADULT NURSE NOTE - NSIMPLEMENTINTERV_GEN_ALL_ED
Implemented All Universal Safety Interventions:  [place] to call system. Call bell, personal items and telephone within reach. Instruct patient to call for assistance. Room bathroom lighting operational. Non-slip footwear when patient is off stretcher. Physically safe environment: no spills, clutter or unnecessary equipment. Stretcher in lowest position, wheels locked, appropriate side rails in place.

## 2021-10-22 NOTE — ED ADULT NURSE REASSESSMENT NOTE - NS ED NURSE REASSESS COMMENT FT1
pt is alert and oriented x4. pt denies any chance of pregnancy. pt states both fallopian tubes taken out

## 2021-10-22 NOTE — ED ADULT TRIAGE NOTE - CHIEF COMPLAINT QUOTE
pt complain of abdominal pain LLQ 7/10, pt states she vomited 6x, nauseated. as per EMS pt was given Fentanyl 100 and zofran 4 mg.

## 2021-10-23 VITALS
SYSTOLIC BLOOD PRESSURE: 119 MMHG | OXYGEN SATURATION: 96 % | DIASTOLIC BLOOD PRESSURE: 71 MMHG | HEART RATE: 92 BPM | RESPIRATION RATE: 19 BRPM

## 2021-10-23 LAB
ALBUMIN SERPL ELPH-MCNC: 3.1 G/DL — LOW (ref 3.3–5)
ALP SERPL-CCNC: 82 U/L — SIGNIFICANT CHANGE UP (ref 40–120)
ALT FLD-CCNC: 27 U/L — SIGNIFICANT CHANGE UP (ref 12–78)
AMPHET UR-MCNC: NEGATIVE — SIGNIFICANT CHANGE UP
ANION GAP SERPL CALC-SCNC: 6 MMOL/L — SIGNIFICANT CHANGE UP (ref 5–17)
ANISOCYTOSIS BLD QL: SIGNIFICANT CHANGE UP
APPEARANCE UR: CLEAR — SIGNIFICANT CHANGE UP
AST SERPL-CCNC: 17 U/L — SIGNIFICANT CHANGE UP (ref 15–37)
BARBITURATES UR SCN-MCNC: NEGATIVE — SIGNIFICANT CHANGE UP
BASOPHILS # BLD AUTO: 0.03 K/UL — SIGNIFICANT CHANGE UP (ref 0–0.2)
BASOPHILS NFR BLD AUTO: 0.3 % — SIGNIFICANT CHANGE UP (ref 0–2)
BENZODIAZ UR-MCNC: NEGATIVE — SIGNIFICANT CHANGE UP
BILIRUB SERPL-MCNC: 0.7 MG/DL — SIGNIFICANT CHANGE UP (ref 0.2–1.2)
BILIRUB UR-MCNC: NEGATIVE — SIGNIFICANT CHANGE UP
BUN SERPL-MCNC: 12 MG/DL — SIGNIFICANT CHANGE UP (ref 7–23)
CALCIUM SERPL-MCNC: 9.2 MG/DL — SIGNIFICANT CHANGE UP (ref 8.5–10.1)
CHLORIDE SERPL-SCNC: 106 MMOL/L — SIGNIFICANT CHANGE UP (ref 96–108)
CO2 SERPL-SCNC: 28 MMOL/L — SIGNIFICANT CHANGE UP (ref 22–31)
COCAINE METAB.OTHER UR-MCNC: NEGATIVE — SIGNIFICANT CHANGE UP
COLOR SPEC: YELLOW — SIGNIFICANT CHANGE UP
CREAT SERPL-MCNC: 0.9 MG/DL — SIGNIFICANT CHANGE UP (ref 0.5–1.3)
DACRYOCYTES BLD QL SMEAR: SLIGHT — SIGNIFICANT CHANGE UP
DIFF PNL FLD: ABNORMAL
EOSINOPHIL # BLD AUTO: 0.14 K/UL — SIGNIFICANT CHANGE UP (ref 0–0.5)
EOSINOPHIL NFR BLD AUTO: 1.5 % — SIGNIFICANT CHANGE UP (ref 0–6)
EPI CELLS # UR: SIGNIFICANT CHANGE UP
GLUCOSE BLDC GLUCOMTR-MCNC: 312 MG/DL — HIGH (ref 70–99)
GLUCOSE BLDC GLUCOMTR-MCNC: 68 MG/DL — LOW (ref 70–99)
GLUCOSE SERPL-MCNC: 54 MG/DL — CRITICAL LOW (ref 70–99)
GLUCOSE UR QL: 1000 MG/DL
HCG SERPL-ACNC: <1 MIU/ML — SIGNIFICANT CHANGE UP
HYPOCHROMIA BLD QL: SLIGHT — SIGNIFICANT CHANGE UP
IMM GRANULOCYTES NFR BLD AUTO: 0.3 % — SIGNIFICANT CHANGE UP (ref 0–1.5)
KETONES UR-MCNC: ABNORMAL
LEUKOCYTE ESTERASE UR-ACNC: ABNORMAL
LIDOCAIN IGE QN: 28 U/L — LOW (ref 73–393)
LYMPHOCYTES # BLD AUTO: 1.87 K/UL — SIGNIFICANT CHANGE UP (ref 1–3.3)
LYMPHOCYTES # BLD AUTO: 19.4 % — SIGNIFICANT CHANGE UP (ref 13–44)
MACROCYTES BLD QL: SLIGHT — SIGNIFICANT CHANGE UP
MANUAL SMEAR VERIFICATION: SIGNIFICANT CHANGE UP
METHADONE UR-MCNC: NEGATIVE — SIGNIFICANT CHANGE UP
MICROCYTES BLD QL: SIGNIFICANT CHANGE UP
MONOCYTES # BLD AUTO: 1.2 K/UL — HIGH (ref 0–0.9)
MONOCYTES NFR BLD AUTO: 12.5 % — SIGNIFICANT CHANGE UP (ref 2–14)
NEUTROPHILS # BLD AUTO: 6.35 K/UL — SIGNIFICANT CHANGE UP (ref 1.8–7.4)
NEUTROPHILS NFR BLD AUTO: 66 % — SIGNIFICANT CHANGE UP (ref 43–77)
NITRITE UR-MCNC: NEGATIVE — SIGNIFICANT CHANGE UP
NRBC # BLD: 0 /100 WBCS — SIGNIFICANT CHANGE UP (ref 0–0)
OPIATES UR-MCNC: POSITIVE — SIGNIFICANT CHANGE UP
OVALOCYTES BLD QL SMEAR: SLIGHT — SIGNIFICANT CHANGE UP
PCP SPEC-MCNC: SIGNIFICANT CHANGE UP
PCP UR-MCNC: NEGATIVE — SIGNIFICANT CHANGE UP
PH UR: 7 — SIGNIFICANT CHANGE UP (ref 5–8)
PLAT MORPH BLD: NORMAL — SIGNIFICANT CHANGE UP
PLATELET COUNT - ESTIMATE: ABNORMAL
POIKILOCYTOSIS BLD QL AUTO: SLIGHT — SIGNIFICANT CHANGE UP
POLYCHROMASIA BLD QL SMEAR: SLIGHT — SIGNIFICANT CHANGE UP
POTASSIUM SERPL-MCNC: 3.3 MMOL/L — LOW (ref 3.5–5.3)
POTASSIUM SERPL-SCNC: 3.3 MMOL/L — LOW (ref 3.5–5.3)
PROT SERPL-MCNC: 7.1 GM/DL — SIGNIFICANT CHANGE UP (ref 6–8.3)
PROT UR-MCNC: 100 MG/DL
RBC BLD AUTO: ABNORMAL
RBC CASTS # UR COMP ASSIST: ABNORMAL /HPF (ref 0–4)
SODIUM SERPL-SCNC: 140 MMOL/L — SIGNIFICANT CHANGE UP (ref 135–145)
SP GR SPEC: 1.01 — SIGNIFICANT CHANGE UP (ref 1.01–1.02)
THC UR QL: POSITIVE — SIGNIFICANT CHANGE UP
UROBILINOGEN FLD QL: NEGATIVE MG/DL — SIGNIFICANT CHANGE UP
WBC UR QL: SIGNIFICANT CHANGE UP

## 2021-10-23 PROCEDURE — 74177 CT ABD & PELVIS W/CONTRAST: CPT | Mod: 26,MA

## 2021-10-23 RX ORDER — POTASSIUM CHLORIDE 20 MEQ
10 PACKET (EA) ORAL ONCE
Refills: 0 | Status: COMPLETED | OUTPATIENT
Start: 2021-10-23 | End: 2021-10-23

## 2021-10-23 RX ORDER — DIPHENHYDRAMINE HCL 50 MG
25 CAPSULE ORAL ONCE
Refills: 0 | Status: COMPLETED | OUTPATIENT
Start: 2021-10-23 | End: 2021-10-23

## 2021-10-23 RX ORDER — FAMOTIDINE 10 MG/ML
1 INJECTION INTRAVENOUS
Qty: 20 | Refills: 0
Start: 2021-10-23 | End: 2021-11-01

## 2021-10-23 RX ORDER — HALOPERIDOL DECANOATE 100 MG/ML
2.5 INJECTION INTRAMUSCULAR ONCE
Refills: 0 | Status: COMPLETED | OUTPATIENT
Start: 2021-10-23 | End: 2021-10-23

## 2021-10-23 RX ORDER — ONDANSETRON 8 MG/1
1 TABLET, FILM COATED ORAL
Qty: 6 | Refills: 0
Start: 2021-10-23 | End: 2021-10-25

## 2021-10-23 RX ORDER — HYDROMORPHONE HYDROCHLORIDE 2 MG/ML
0.5 INJECTION INTRAMUSCULAR; INTRAVENOUS; SUBCUTANEOUS ONCE
Refills: 0 | Status: DISCONTINUED | OUTPATIENT
Start: 2021-10-23 | End: 2021-10-23

## 2021-10-23 RX ORDER — DEXTROSE 50 % IN WATER 50 %
50 SYRINGE (ML) INTRAVENOUS ONCE
Refills: 0 | Status: DISCONTINUED | OUTPATIENT
Start: 2021-10-23 | End: 2021-10-23

## 2021-10-23 RX ORDER — POTASSIUM CHLORIDE 20 MEQ
40 PACKET (EA) ORAL ONCE
Refills: 0 | Status: COMPLETED | OUTPATIENT
Start: 2021-10-23 | End: 2021-10-23

## 2021-10-23 RX ORDER — HALOPERIDOL DECANOATE 100 MG/ML
5 INJECTION INTRAMUSCULAR ONCE
Refills: 0 | Status: DISCONTINUED | OUTPATIENT
Start: 2021-10-23 | End: 2021-10-23

## 2021-10-23 RX ORDER — HALOPERIDOL DECANOATE 100 MG/ML
2.5 INJECTION INTRAMUSCULAR ONCE
Refills: 0 | Status: DISCONTINUED | OUTPATIENT
Start: 2021-10-23 | End: 2021-10-23

## 2021-10-23 RX ADMIN — HYDROMORPHONE HYDROCHLORIDE 0.5 MILLIGRAM(S): 2 INJECTION INTRAMUSCULAR; INTRAVENOUS; SUBCUTANEOUS at 02:45

## 2021-10-23 RX ADMIN — Medication 40 MILLIEQUIVALENT(S): at 01:32

## 2021-10-23 RX ADMIN — Medication 100 MILLIEQUIVALENT(S): at 01:32

## 2021-10-23 RX ADMIN — SODIUM CHLORIDE 1000 MILLILITER(S): 9 INJECTION INTRAMUSCULAR; INTRAVENOUS; SUBCUTANEOUS at 00:54

## 2021-10-23 RX ADMIN — Medication 10 MILLIEQUIVALENT(S): at 02:32

## 2021-10-23 RX ADMIN — HYDROMORPHONE HYDROCHLORIDE 0.5 MILLIGRAM(S): 2 INJECTION INTRAMUSCULAR; INTRAVENOUS; SUBCUTANEOUS at 07:20

## 2021-10-23 RX ADMIN — SODIUM CHLORIDE 1000 MILLILITER(S): 9 INJECTION INTRAMUSCULAR; INTRAVENOUS; SUBCUTANEOUS at 02:00

## 2021-10-23 RX ADMIN — HALOPERIDOL DECANOATE 2.5 MILLIGRAM(S): 100 INJECTION INTRAMUSCULAR at 05:03

## 2021-10-23 RX ADMIN — Medication 25 MILLIGRAM(S): at 02:28

## 2021-10-23 RX ADMIN — Medication 25 MILLIGRAM(S): at 07:20

## 2021-10-23 RX ADMIN — HALOPERIDOL DECANOATE 2.5 MILLIGRAM(S): 100 INJECTION INTRAMUSCULAR at 07:20

## 2021-10-23 RX ADMIN — HYDROMORPHONE HYDROCHLORIDE 0.5 MILLIGRAM(S): 2 INJECTION INTRAMUSCULAR; INTRAVENOUS; SUBCUTANEOUS at 02:29

## 2021-10-23 NOTE — ED PROVIDER NOTE - OBJECTIVE STATEMENT
Pt is a 32 yo lady with a pmhx of DM1, gastroparesis who presents to the ED with n/v/d, and abdominal pain. It started today, has had several episodes of emesis and crampy abdominal pain similar to prior episodes. Says pain is improved after hot showers. Does smoke marijuana daily. Has had gallbladder removed. Has had 2 ectopic pregnancies, both fallopian tubes removed.

## 2021-10-23 NOTE — ED PROVIDER NOTE - NSFOLLOWUPINSTRUCTIONS_ED_ALL_ED_FT
Cyclic Vomiting Syndrome    WHAT YOU NEED TO KNOW:    Cyclic vomiting syndrome is a condition that causes you to vomit many times in a row for no known reason. It is important to prevent dehydration and other serious complications from repeated vomiting. Work with your healthcare provider to manage or prevent episodes.    DISCHARGE INSTRUCTIONS:    Return to the emergency department if:   •You see blood in your vomit or your bowel movements.      •You have sudden, severe pain in your chest and upper abdomen after hard vomiting or retching.      •You have swelling in your neck and chest.       •You are dizzy, cold, and thirsty, and your eyes and mouth are dry.      •You are urinating very little or not at all.      •You have muscle weakness, leg cramps, and trouble breathing.      •Your heart is beating much faster than normal.      •You continue to vomit for more than 48 hours.      Contact your healthcare provider if:   •You have frequent dry heaves (vomiting but nothing comes out).      •You have questions or concerns about your condition or care.      Medicines: You may need any of the following:   •Migraine medicine may be used to prevent or stop migraine headaches. This may be given if you have migraines or are at risk because of a family history of migraines. You may need to take this medicine to prevent migraines or to stop a migraine that has started.      •Antinausea medicine may be needed to control your nausea.      •Medicine may be given to control the amount of acid your stomach makes.      •Take your medicine as directed. Contact your healthcare provider if you think your medicine is not helping or if you have side effects. Tell him or her if you are allergic to any medicine. Keep a list of the medicines, vitamins, and herbs you take. Include the amounts, and when and why you take them. Bring the list or the pill bottles to follow-up visits. Carry your medicine list with you in case of an emergency.      Prevent or manage episodes:   •Avoid triggers. Certain foods can trigger episodes, such as chocolate, cheese, and monosodium glutamate (MSG). Caffeine can also trigger an episode. Your healthcare provider or dietitian can help you identify foods that trigger an episode. This will help you create meal plans to avoid those triggers. Other triggers include too much exercise, motion sickness, or being in hot weather too long.      •Drink more liquids as directed. Vomiting can lead to dehydration. It is important to drink more liquids to help replace lost body fluids. Ask your healthcare provider how much liquid to drink each day and which liquids are best for you. Your provider may recommend that you drink an oral rehydration solution (ORS). ORS contains water, salts, and sugar that are needed to replace the lost body fluids. Ask what kind of ORS to use, how much to drink, and where to get it.      •Eat smaller meals, more often. Eat small amounts of food every 2 to 3 hours, even if you are not hungry. Food in your stomach may decrease your nausea.      •Control stress. Stress or anxiety can trigger an episode. Find ways to relax and manage your stress. Get more rest and sleep.       •Do not drink alcohol. Alcohol may upset or irritate your stomach. Too much alcohol can also cause nausea and vomiting.      •Do not use marijuana (cannabis). Repeated use of marijuana over a long period of time (chronic use) can cause episodes. This is called cannabis hyperemesis syndrome. If you have an episode caused by marijuana use, a hot shower may relieve your symptoms. Ask your healthcare provider for information if want to quit using marijuana and need help quitting.      Follow up with your doctor as directed: Write down your questions so you remember to ask them during your visits.

## 2021-10-23 NOTE — ED PROVIDER NOTE - PATIENT PORTAL LINK FT
You can access the FollowMyHealth Patient Portal offered by [facility] by registering at the following website: [email or web address]. By joining [email or web address]’s FollowMyHealth portal, you will also be able to view your health information using other applications (apps) compatible with our system.

## 2021-10-23 NOTE — ED ADULT NURSE REASSESSMENT NOTE - NS ED NURSE REASSESS COMMENT FT1
Pt asleep in stretcher, on cardiac monitor. No acute distress. I have personally performed a face to face diagnostic evaluation on this patient. I have reviewed the ACP note and agree with the history, exam and plan of care, except as noted.

## 2021-10-23 NOTE — ED PROVIDER NOTE - CARE PROVIDERS DIRECT ADDRESSES
[email or web address]

## 2021-10-23 NOTE — ED PROVIDER NOTE - CLINICAL SUMMARY MEDICAL DECISION MAKING FREE TEXT BOX
Ddx: Gastroparesis/ cannabinoid hyperemesis/ no abdominal tenderness or guarding to suggest surgical abdomen.   Plan: Cbc, cmp, lipase, ua, ucx, pain control, ct abd, reassess Ddx: Gastroparesis/ cannabinoid hyperemesis/ no abdominal tenderness or guarding to suggest surgical abdomen.   Plan: Cbc, cmp, lipase, ua, ucx, pain control, ct abd, reassess    Pt doing better after 2nd round of meds - tolerating PO intake at discharge.

## 2021-10-23 NOTE — ED PROVIDER NOTE - CARE PROVIDER_API CALL
Jose J Black  [facility]  [address], [address]  [place] [address]  Phone: [phone]  Fax: [phone]  Follow Up Time: 1-3 Days

## 2021-10-24 ENCOUNTER — EMERGENCY (EMERGENCY)
Facility: HOSPITAL | Age: 31
LOS: 0 days | Discharge: HOME | End: 2021-10-24
Attending: EMERGENCY MEDICINE
Payer: COMMERCIAL

## 2021-10-24 VITALS
OXYGEN SATURATION: 98 % | TEMPERATURE: 98 F | SYSTOLIC BLOOD PRESSURE: 143 MMHG | RESPIRATION RATE: 17 BRPM | HEART RATE: 87 BPM | DIASTOLIC BLOOD PRESSURE: 75 MMHG

## 2021-10-24 VITALS
SYSTOLIC BLOOD PRESSURE: 131 MMHG | WEIGHT: 139.99 LBS | TEMPERATURE: 99 F | DIASTOLIC BLOOD PRESSURE: 70 MMHG | RESPIRATION RATE: 18 BRPM | HEIGHT: 66 IN | HEART RATE: 103 BPM | OXYGEN SATURATION: 98 %

## 2021-10-24 DIAGNOSIS — R11.0 NAUSEA: ICD-10-CM

## 2021-10-24 DIAGNOSIS — E10.10 TYPE 1 DIABETES MELLITUS WITH KETOACIDOSIS WITHOUT COMA: ICD-10-CM

## 2021-10-24 DIAGNOSIS — Z87.19 PERSONAL HISTORY OF OTHER DISEASES OF THE DIGESTIVE SYSTEM: ICD-10-CM

## 2021-10-24 DIAGNOSIS — K31.84 GASTROPARESIS: ICD-10-CM

## 2021-10-24 DIAGNOSIS — O00.9 ECTOPIC PREGNANCY, UNSPECIFIED: Chronic | ICD-10-CM

## 2021-10-24 DIAGNOSIS — R11.15 CYCLICAL VOMITING SYNDROME UNRELATED TO MIGRAINE: ICD-10-CM

## 2021-10-24 DIAGNOSIS — R10.9 UNSPECIFIED ABDOMINAL PAIN: ICD-10-CM

## 2021-10-24 LAB
ACETONE SERPL-MCNC: NEGATIVE — SIGNIFICANT CHANGE UP
ALBUMIN SERPL ELPH-MCNC: 2.8 G/DL — LOW (ref 3.3–5)
ALP SERPL-CCNC: 89 U/L — SIGNIFICANT CHANGE UP (ref 40–120)
ALT FLD-CCNC: 26 U/L — SIGNIFICANT CHANGE UP (ref 12–78)
ANION GAP SERPL CALC-SCNC: 7 MMOL/L — SIGNIFICANT CHANGE UP (ref 5–17)
AST SERPL-CCNC: 18 U/L — SIGNIFICANT CHANGE UP (ref 15–37)
BASOPHILS # BLD AUTO: 0.03 K/UL — SIGNIFICANT CHANGE UP (ref 0–0.2)
BASOPHILS NFR BLD AUTO: 0.3 % — SIGNIFICANT CHANGE UP (ref 0–2)
BILIRUB SERPL-MCNC: 0.4 MG/DL — SIGNIFICANT CHANGE UP (ref 0.2–1.2)
BUN SERPL-MCNC: 10 MG/DL — SIGNIFICANT CHANGE UP (ref 7–23)
CALCIUM SERPL-MCNC: 8.9 MG/DL — SIGNIFICANT CHANGE UP (ref 8.5–10.1)
CHLORIDE SERPL-SCNC: 108 MMOL/L — SIGNIFICANT CHANGE UP (ref 96–108)
CO2 SERPL-SCNC: 26 MMOL/L — SIGNIFICANT CHANGE UP (ref 22–31)
CREAT SERPL-MCNC: 0.7 MG/DL — SIGNIFICANT CHANGE UP (ref 0.5–1.3)
CULTURE RESULTS: SIGNIFICANT CHANGE UP
EOSINOPHIL # BLD AUTO: 0.08 K/UL — SIGNIFICANT CHANGE UP (ref 0–0.5)
EOSINOPHIL NFR BLD AUTO: 0.8 % — SIGNIFICANT CHANGE UP (ref 0–6)
FLUAV AG NPH QL: SIGNIFICANT CHANGE UP
FLUBV AG NPH QL: SIGNIFICANT CHANGE UP
GLUCOSE BLDC GLUCOMTR-MCNC: 125 MG/DL — HIGH (ref 70–99)
GLUCOSE SERPL-MCNC: 75 MG/DL — SIGNIFICANT CHANGE UP (ref 70–99)
HCG SERPL-ACNC: <1 MIU/ML — SIGNIFICANT CHANGE UP
HCT VFR BLD CALC: 31.5 % — LOW (ref 34.5–45)
HGB BLD-MCNC: 9.7 G/DL — LOW (ref 11.5–15.5)
HYPOCHROMIA BLD QL: SLIGHT — SIGNIFICANT CHANGE UP
IMM GRANULOCYTES NFR BLD AUTO: 0.6 % — SIGNIFICANT CHANGE UP (ref 0–1.5)
LACTATE SERPL-SCNC: 1.2 MMOL/L — SIGNIFICANT CHANGE UP (ref 0.7–2)
LIDOCAIN IGE QN: 64 U/L — LOW (ref 73–393)
LYMPHOCYTES # BLD AUTO: 1.71 K/UL — SIGNIFICANT CHANGE UP (ref 1–3.3)
LYMPHOCYTES # BLD AUTO: 18 % — SIGNIFICANT CHANGE UP (ref 13–44)
MACROCYTES BLD QL: SIGNIFICANT CHANGE UP
MANUAL SMEAR VERIFICATION: SIGNIFICANT CHANGE UP
MCHC RBC-ENTMCNC: 19.3 PG — LOW (ref 27–34)
MCHC RBC-ENTMCNC: 30.8 GM/DL — LOW (ref 32–36)
MCV RBC AUTO: 62.7 FL — LOW (ref 80–100)
MONOCYTES # BLD AUTO: 0.89 K/UL — SIGNIFICANT CHANGE UP (ref 0–0.9)
MONOCYTES NFR BLD AUTO: 9.3 % — SIGNIFICANT CHANGE UP (ref 2–14)
NEUTROPHILS # BLD AUTO: 6.75 K/UL — SIGNIFICANT CHANGE UP (ref 1.8–7.4)
NEUTROPHILS NFR BLD AUTO: 71 % — SIGNIFICANT CHANGE UP (ref 43–77)
NRBC # BLD: 0 /100 WBCS — SIGNIFICANT CHANGE UP (ref 0–0)
PLAT MORPH BLD: NORMAL — SIGNIFICANT CHANGE UP
PLATELET # BLD AUTO: 405 K/UL — HIGH (ref 150–400)
POTASSIUM SERPL-MCNC: 3.9 MMOL/L — SIGNIFICANT CHANGE UP (ref 3.5–5.3)
POTASSIUM SERPL-SCNC: 3.9 MMOL/L — SIGNIFICANT CHANGE UP (ref 3.5–5.3)
PROT SERPL-MCNC: 6.8 GM/DL — SIGNIFICANT CHANGE UP (ref 6–8.3)
RBC # BLD: 5.02 M/UL — SIGNIFICANT CHANGE UP (ref 3.8–5.2)
RBC # FLD: 18.6 % — HIGH (ref 10.3–14.5)
RBC BLD AUTO: ABNORMAL
ROULEAUX BLD QL SMEAR: PRESENT
SARS-COV-2 RNA SPEC QL NAA+PROBE: SIGNIFICANT CHANGE UP
SODIUM SERPL-SCNC: 141 MMOL/L — SIGNIFICANT CHANGE UP (ref 135–145)
SPECIMEN SOURCE: SIGNIFICANT CHANGE UP
WBC # BLD: 9.52 K/UL — SIGNIFICANT CHANGE UP (ref 3.8–10.5)
WBC # FLD AUTO: 9.52 K/UL — SIGNIFICANT CHANGE UP (ref 3.8–10.5)

## 2021-10-24 PROCEDURE — 93010 ELECTROCARDIOGRAM REPORT: CPT

## 2021-10-24 PROCEDURE — 99285 EMERGENCY DEPT VISIT HI MDM: CPT

## 2021-10-24 PROCEDURE — 71045 X-RAY EXAM CHEST 1 VIEW: CPT | Mod: 26

## 2021-10-24 RX ORDER — FAMOTIDINE 10 MG/ML
20 INJECTION INTRAVENOUS ONCE
Refills: 0 | Status: COMPLETED | OUTPATIENT
Start: 2021-10-24 | End: 2021-10-24

## 2021-10-24 RX ORDER — ACETAMINOPHEN 500 MG
1000 TABLET ORAL ONCE
Refills: 0 | Status: COMPLETED | OUTPATIENT
Start: 2021-10-24 | End: 2021-10-24

## 2021-10-24 RX ORDER — DIPHENHYDRAMINE HCL 50 MG
25 CAPSULE ORAL ONCE
Refills: 0 | Status: COMPLETED | OUTPATIENT
Start: 2021-10-24 | End: 2021-10-24

## 2021-10-24 RX ORDER — HYDROMORPHONE HYDROCHLORIDE 2 MG/ML
1 INJECTION INTRAMUSCULAR; INTRAVENOUS; SUBCUTANEOUS ONCE
Refills: 0 | Status: DISCONTINUED | OUTPATIENT
Start: 2021-10-24 | End: 2021-10-24

## 2021-10-24 RX ORDER — HALOPERIDOL DECANOATE 100 MG/ML
5 INJECTION INTRAMUSCULAR ONCE
Refills: 0 | Status: COMPLETED | OUTPATIENT
Start: 2021-10-24 | End: 2021-10-24

## 2021-10-24 RX ORDER — SODIUM CHLORIDE 9 MG/ML
2000 INJECTION INTRAMUSCULAR; INTRAVENOUS; SUBCUTANEOUS ONCE
Refills: 0 | Status: COMPLETED | OUTPATIENT
Start: 2021-10-24 | End: 2021-10-24

## 2021-10-24 RX ORDER — ONDANSETRON 8 MG/1
8 TABLET, FILM COATED ORAL ONCE
Refills: 0 | Status: COMPLETED | OUTPATIENT
Start: 2021-10-24 | End: 2021-10-24

## 2021-10-24 RX ADMIN — Medication 400 MILLIGRAM(S): at 13:09

## 2021-10-24 RX ADMIN — Medication 1000 MILLIGRAM(S): at 14:00

## 2021-10-24 RX ADMIN — HYDROMORPHONE HYDROCHLORIDE 1 MILLIGRAM(S): 2 INJECTION INTRAMUSCULAR; INTRAVENOUS; SUBCUTANEOUS at 12:19

## 2021-10-24 RX ADMIN — SODIUM CHLORIDE 2000 MILLILITER(S): 9 INJECTION INTRAMUSCULAR; INTRAVENOUS; SUBCUTANEOUS at 12:18

## 2021-10-24 RX ADMIN — FAMOTIDINE 20 MILLIGRAM(S): 10 INJECTION INTRAVENOUS at 12:23

## 2021-10-24 RX ADMIN — HALOPERIDOL DECANOATE 5 MILLIGRAM(S): 100 INJECTION INTRAMUSCULAR at 13:09

## 2021-10-24 RX ADMIN — Medication 25 MILLIGRAM(S): at 13:10

## 2021-10-24 RX ADMIN — Medication 25 MILLIGRAM(S): at 12:23

## 2021-10-24 RX ADMIN — Medication 25 MILLIGRAM(S): at 16:14

## 2021-10-24 RX ADMIN — HYDROMORPHONE HYDROCHLORIDE 1 MILLIGRAM(S): 2 INJECTION INTRAMUSCULAR; INTRAVENOUS; SUBCUTANEOUS at 16:52

## 2021-10-24 RX ADMIN — ONDANSETRON 8 MILLIGRAM(S): 8 TABLET, FILM COATED ORAL at 13:09

## 2021-10-24 RX ADMIN — HYDROMORPHONE HYDROCHLORIDE 1 MILLIGRAM(S): 2 INJECTION INTRAMUSCULAR; INTRAVENOUS; SUBCUTANEOUS at 13:00

## 2021-10-24 RX ADMIN — HYDROMORPHONE HYDROCHLORIDE 1 MILLIGRAM(S): 2 INJECTION INTRAMUSCULAR; INTRAVENOUS; SUBCUTANEOUS at 16:13

## 2021-10-24 NOTE — ED PROVIDER NOTE - ABDOMINAL EXAM
no specific tenderness noted some spastic abdominal muscles noted but no focal tenderness or distentions, normal bowel sounds

## 2021-10-24 NOTE — ED PROVIDER NOTE - OBJECTIVE STATEMENT
31 year old F with PMHx of Colitis, Gastroparesis, DM (type 1) presents to the ED for abdominal pain cyclic vomiting syndrome since this morning. Pt was recently in the ED for N/V and abdominal pain x2 days ago. Pt reports improvements of syndrome and states she was fine yesterday and when she woke up today she had abdominal discomfort with no change in location or quality from previous symptoms.

## 2021-10-24 NOTE — ED PROVIDER NOTE - NSFOLLOWUPINSTRUCTIONS_ED_ALL_ED_FT
Cannabinoid Hyperemesis Syndrome      Cannabinoid hyperemesis syndrome (CHS) is a condition that causes repeated nausea, vomiting, and abdominal pain after long-term (chronic) use of marijuana (cannabis). People with CHS typically use marijuana 3–5 times a day for many years before they have symptoms, although it is possible to develop CHS with far less daily use.    Symptoms of CHS may be mild at first but can get worse and more frequent. In some cases, CHS may cause severe daily vomiting, which can lead to weight loss and dehydration.      What are the causes?    The exact cause of this condition is not known. Long-term use of marijuana may over-stimulate certain proteins in the brain and gastrointestinal tract that react with chemicals in marijuana (cannabinoid receptors). This over-stimulation may cause CHS.      What are the signs or symptoms?  Symptoms of this condition are often mild during the first few episodes, but they can get worse over time. Symptoms may include:  •Frequent nausea, especially early in the morning.      •Vomiting. This can become severe.      •Abdominal pain.      •Feeling very tired (lethargic).      •Headaches.      CHS may go away and come back many times (recur). People may not have symptoms or may otherwise be healthy in between CHS episodes. Taking hot showers can relieve the symptoms of CHS, so feeling the need to take several hot showers throughout the day can be a sign of this condition.      How is this diagnosed?  This condition may be diagnosed based on:  •Your symptoms and medical history, including any drug use.      •A physical exam.    You may have tests done to rule out other problems that could cause your symptoms. These tests may include:  •Blood tests.      •Urine tests.      •Imaging tests, such as an X-ray or a CT scan.        How is this treated?  Treatment for this condition involves stopping marijuana use. Treatment may include:  •A drug rehabilitation program, if you have trouble stopping marijuana use.      •Medicines for nausea. These may be given at the hospital through an IV inserted into one of your veins, or they may be medicines that you take by mouth (orally).      •Certain creams that contain a substance called capsaicin. These may improve symptoms when applied to the abdomen.      •Hot showers to help relieve symptoms.      In severe cases, you may need treatment at a hospital. You may be given IV fluids to prevent or treat dehydration as well as medicines to treat nausea, vomiting, and pain.      Follow these instructions at home:      During an episode of CHS      •Stay in bed and rest in a dark, quiet room.      •Take anti-nausea medicine as told by your health care provider.      •Try taking hot showers to relieve your symptoms.      After an episode of CHS     •Drink small amounts of clear fluids slowly. Gradually add more if you can keep the fluids down without vomiting.      •Once you are able to eat without vomiting, eat soft foods in small amounts every 3–4 hours.        General instructions      • Do not use any products that contain marijuana.If you need help quitting, ask your health care provider for resources and treatment options.      •Drink enough fluid to keep your urine pale yellow. Avoid drinking fluids that have a lot of sugar or caffeine, such as coffee and soda.      •Take and apply over-the-counter and prescription medicines only as told by your health care provider. Ask your health care provider before starting any new medicines or treatments.      •Keep all follow-up visits as told by your health care provider. This is important. This includes any recommended substance abuse programs.        Contact a health care provider if:    •Your symptoms get worse.      •You cannot drink fluids without vomiting or severe pain.      •You have pain and trouble swallowing after an episode.        Get help right away if you:    •Cannot stop vomiting.      •Have blood in your vomit or your vomit looks like coffee grounds.      •Have severe abdominal pain.      •Have stools that are bloody or black, or stools that look like tar.    •Have symptoms of dehydration, such as:  •Sunken eyes.      •Inability to make tears.      •Cracked lips.      •Dry mouth.      •Decreased urine production.      •Weakness.      •Sleepiness.      •Dizziness, light-headedness, or fainting.          Summary    •Cannabinoid hyperemesis syndrome (CHS) is a condition that causes repeated nausea, vomiting, and abdominal pain after long-term use of marijuana.      •People with CHS typically use marijuana 3–5 times a day for many years before they have symptoms, although it is possible to develop CHS with much less daily use.      •Treatment for this condition involves stopping marijuana use. Hot showers and capsaicin creams may also help relieve symptoms. Ask your health care provider before starting any medicines or other treatments.      •Your health care provider may prescribe medicines to help with nausea.      •Get help right away if you have signs of dehydration, such as dry mouth, decreased urine production, weakness, dizziness, and light-headedness.      This information is not intended to replace advice given to you by your health care provider. Make sure you discuss any questions you have with your health care provider.

## 2021-10-24 NOTE — ED PROVIDER NOTE - PATIENT PORTAL LINK FT
You can access the FollowMyHealth Patient Portal offered by [facility] by registering at the following website: [email or web address]. By joining [employer]’s FollowMyHealth portal, you will also be able to view your health information using other applications (apps) compatible with our system.

## 2021-10-24 NOTE — ED ADULT TRIAGE NOTE - CHIEF COMPLAINT QUOTE
32 y/o female with PMH of T1DM, HTN. BIBA with c/c of LLQ pain ongoing  for the past 2 days, N & 4 episodes of vomiting this morning. pt denies any chest pain, lightheadedness, dizziness.  on personal monitor.

## 2021-10-24 NOTE — ED ADULT NURSE NOTE - NS ED PATIENT SAFETY CONCERN
Refill of gabapentin and lisinopril, Pancho Bahena says he is taking gabapentin 300mg 4x daily, He did try to wean down but the phantom pain was to severe.    No

## 2021-10-24 NOTE — PHARMACOTHERAPY INTERVENTION NOTE - COMMENTS
Spoke to MD regarding Haldol 5mg IM order. MD is treating Cyclic Vomiting Syndrome and will like to continue order as is.

## 2021-10-24 NOTE — ED ADULT NURSE NOTE - NSICDXPASTSURGICALHX_GEN_ALL_CORE_FT
PAST SURGICAL HISTORY:  Ectopic pregnancy 2013, s/p removal of right fallopian tube    No significant past surgical history

## 2021-10-24 NOTE — ED ADULT NURSE REASSESSMENT NOTE - NS ED NURSE REASSESS COMMENT FT1
pt complaining of 8/10 abdominal pain, meds administered. pt a&ox4, ambulatory self, recently ambulated to bathroom, VS stable.

## 2021-10-24 NOTE — ED ADULT NURSE NOTE - NSICDXFAMILYHX_GEN_ALL_CORE_FT
FAMILY HISTORY:  Grandparent  Still living? Unknown  Family history of diabetes mellitus, Age at diagnosis: Age Unknown  Family history of type 1 diabetes mellitus, Age at diagnosis: Age Unknown

## 2021-10-24 NOTE — ED PROVIDER NOTE - CLINICAL SUMMARY MEDICAL DECISION MAKING FREE TEXT BOX
31 year old F presents to the ED for cyclic vomiting syndrome, otherwise no change in quality of pain, will eval with labs and await improvement after medications for pain and nausea given. 31 year old F presents to the ED for cyclic vomiting syndrome, otherwise no change in quality of pain, will eval with labs and await improvement after medications for pain and nausea. After fluids, pain meds - improved statues now tolerating PO intake and will dc with GI follow up.

## 2021-10-24 NOTE — ED ADULT NURSE NOTE - OBJECTIVE STATEMENT
pt 32 y/o female with PMH of T1DM, HTN. BIBA with c/c of bilateral lower quadrant abdominal pain 10/10, constant pain started this morning w/ N & V. pt was here 2 days ago.  pt denies any SOB chest pain, lightheadedness, dizziness.  on personal monitor. pt 32 y/o female with PMH of T1DM, HTN. BIBA with c/c of bilateral lower quadrant abdominal pain 10/10, constant pain started this morning w/ N & V. pt was here 2 days ago.  pt denies any SOB chest pain, lightheadedness, dizziness.  dexcom machine RLQ.

## 2021-10-24 NOTE — ED ADULT NURSE NOTE - NSICDXPASTMEDICALHX_GEN_ALL_CORE_FT
PAST MEDICAL HISTORY:  Colitis     Diabetes mellitus type 1     Gastroparesis     Gastroparesis due to DM     Type 1 diabetes mellitus with ketoacidosis without coma, with long-term current use of insulin

## 2021-10-24 NOTE — ED PROVIDER NOTE - CARE PROVIDER_API CALL
Jonatan Devlin)  Internal Medicine  [address] [place] [place] [address]  Phone: [phone]  Fax: [phone]  Follow Up Time: 1-3 Days

## 2021-10-24 NOTE — ED PROVIDER NOTE - CONSTITUTIONAL, MLM
Well appearing, awake, alert, oriented to person, place, time/situation, moderate distress moderate to pain normal...

## 2021-10-27 ENCOUNTER — INPATIENT (INPATIENT)
Facility: HOSPITAL | Age: 31
LOS: 0 days | Discharge: LEFT BEFORE DISCHARGE | End: 2021-10-28
Attending: STUDENT IN AN ORGANIZED HEALTH CARE EDUCATION/TRAINING PROGRAM | Admitting: STUDENT IN AN ORGANIZED HEALTH CARE EDUCATION/TRAINING PROGRAM
Payer: COMMERCIAL

## 2021-10-27 VITALS
WEIGHT: 139.99 LBS | TEMPERATURE: 98 F | HEIGHT: 66 IN | RESPIRATION RATE: 20 BRPM | HEART RATE: 114 BPM | DIASTOLIC BLOOD PRESSURE: 75 MMHG | SYSTOLIC BLOOD PRESSURE: 147 MMHG | OXYGEN SATURATION: 97 %

## 2021-10-27 DIAGNOSIS — O00.9 ECTOPIC PREGNANCY, UNSPECIFIED: Chronic | ICD-10-CM

## 2021-10-27 DIAGNOSIS — D72.829 ELEVATED WHITE BLOOD CELL COUNT, UNSPECIFIED: ICD-10-CM

## 2021-10-27 DIAGNOSIS — K59.00 CONSTIPATION, UNSPECIFIED: ICD-10-CM

## 2021-10-27 DIAGNOSIS — F12.90 CANNABIS USE, UNSPECIFIED, UNCOMPLICATED: ICD-10-CM

## 2021-10-27 DIAGNOSIS — R11.2 NAUSEA WITH VOMITING, UNSPECIFIED: ICD-10-CM

## 2021-10-27 DIAGNOSIS — E10.43 TYPE 1 DIABETES MELLITUS WITH DIABETIC AUTONOMIC (POLY)NEUROPATHY: ICD-10-CM

## 2021-10-27 DIAGNOSIS — N76.4 ABSCESS OF VULVA: ICD-10-CM

## 2021-10-27 LAB
ALBUMIN SERPL ELPH-MCNC: 2.8 G/DL — LOW (ref 3.3–5)
ALP SERPL-CCNC: 105 U/L — SIGNIFICANT CHANGE UP (ref 40–120)
ALT FLD-CCNC: 20 U/L — SIGNIFICANT CHANGE UP (ref 12–78)
AMPHET UR-MCNC: NEGATIVE — SIGNIFICANT CHANGE UP
AMYLASE P1 CFR SERPL: 65 U/L — SIGNIFICANT CHANGE UP (ref 25–115)
ANION GAP SERPL CALC-SCNC: 9 MMOL/L — SIGNIFICANT CHANGE UP (ref 5–17)
AST SERPL-CCNC: 18 U/L — SIGNIFICANT CHANGE UP (ref 15–37)
BARBITURATES UR SCN-MCNC: NEGATIVE — SIGNIFICANT CHANGE UP
BASOPHILS # BLD AUTO: 0.03 K/UL — SIGNIFICANT CHANGE UP (ref 0–0.2)
BASOPHILS # BLD AUTO: 0.05 K/UL — SIGNIFICANT CHANGE UP (ref 0–0.2)
BASOPHILS NFR BLD AUTO: 0.2 % — SIGNIFICANT CHANGE UP (ref 0–2)
BASOPHILS NFR BLD AUTO: 0.2 % — SIGNIFICANT CHANGE UP (ref 0–2)
BENZODIAZ UR-MCNC: NEGATIVE — SIGNIFICANT CHANGE UP
BILIRUB SERPL-MCNC: 0.3 MG/DL — SIGNIFICANT CHANGE UP (ref 0.2–1.2)
BUN SERPL-MCNC: 17 MG/DL — SIGNIFICANT CHANGE UP (ref 7–23)
CALCIUM SERPL-MCNC: 9.1 MG/DL — SIGNIFICANT CHANGE UP (ref 8.5–10.1)
CHLORIDE SERPL-SCNC: 105 MMOL/L — SIGNIFICANT CHANGE UP (ref 96–108)
CO2 SERPL-SCNC: 24 MMOL/L — SIGNIFICANT CHANGE UP (ref 22–31)
COCAINE METAB.OTHER UR-MCNC: NEGATIVE — SIGNIFICANT CHANGE UP
CREAT SERPL-MCNC: 0.93 MG/DL — SIGNIFICANT CHANGE UP (ref 0.5–1.3)
EOSINOPHIL # BLD AUTO: 0.24 K/UL — SIGNIFICANT CHANGE UP (ref 0–0.5)
EOSINOPHIL # BLD AUTO: 0.32 K/UL — SIGNIFICANT CHANGE UP (ref 0–0.5)
EOSINOPHIL NFR BLD AUTO: 1.2 % — SIGNIFICANT CHANGE UP (ref 0–6)
EOSINOPHIL NFR BLD AUTO: 2.4 % — SIGNIFICANT CHANGE UP (ref 0–6)
FLUAV AG NPH QL: SIGNIFICANT CHANGE UP
FLUBV AG NPH QL: SIGNIFICANT CHANGE UP
GLUCOSE BLDC GLUCOMTR-MCNC: 118 MG/DL — HIGH (ref 70–99)
GLUCOSE BLDC GLUCOMTR-MCNC: 189 MG/DL — HIGH (ref 70–99)
GLUCOSE BLDC GLUCOMTR-MCNC: 242 MG/DL — HIGH (ref 70–99)
GLUCOSE BLDC GLUCOMTR-MCNC: 29 MG/DL — CRITICAL LOW (ref 70–99)
GLUCOSE BLDC GLUCOMTR-MCNC: 33 MG/DL — CRITICAL LOW (ref 70–99)
GLUCOSE BLDC GLUCOMTR-MCNC: 64 MG/DL — LOW (ref 70–99)
GLUCOSE BLDC GLUCOMTR-MCNC: 89 MG/DL — SIGNIFICANT CHANGE UP (ref 70–99)
GLUCOSE SERPL-MCNC: 290 MG/DL — HIGH (ref 70–99)
HCG SERPL-ACNC: <1 MIU/ML — SIGNIFICANT CHANGE UP
HCT VFR BLD CALC: 30.4 % — LOW (ref 34.5–45)
HCT VFR BLD CALC: 31.1 % — LOW (ref 34.5–45)
HGB BLD-MCNC: 9.1 G/DL — LOW (ref 11.5–15.5)
HGB BLD-MCNC: 9.3 G/DL — LOW (ref 11.5–15.5)
IMM GRANULOCYTES NFR BLD AUTO: 0.4 % — SIGNIFICANT CHANGE UP (ref 0–1.5)
IMM GRANULOCYTES NFR BLD AUTO: 0.5 % — SIGNIFICANT CHANGE UP (ref 0–1.5)
LIDOCAIN IGE QN: 59 U/L — LOW (ref 73–393)
LYMPHOCYTES # BLD AUTO: 1.04 K/UL — SIGNIFICANT CHANGE UP (ref 1–3.3)
LYMPHOCYTES # BLD AUTO: 1.53 K/UL — SIGNIFICANT CHANGE UP (ref 1–3.3)
LYMPHOCYTES # BLD AUTO: 7.4 % — LOW (ref 13–44)
LYMPHOCYTES # BLD AUTO: 7.7 % — LOW (ref 13–44)
MCHC RBC-ENTMCNC: 18.9 PG — LOW (ref 27–34)
MCHC RBC-ENTMCNC: 19.1 PG — LOW (ref 27–34)
MCHC RBC-ENTMCNC: 29.9 GM/DL — LOW (ref 32–36)
MCHC RBC-ENTMCNC: 29.9 GM/DL — LOW (ref 32–36)
MCV RBC AUTO: 63.1 FL — LOW (ref 80–100)
MCV RBC AUTO: 63.7 FL — LOW (ref 80–100)
METHADONE UR-MCNC: NEGATIVE — SIGNIFICANT CHANGE UP
MONOCYTES # BLD AUTO: 0.83 K/UL — SIGNIFICANT CHANGE UP (ref 0–0.9)
MONOCYTES # BLD AUTO: 1.15 K/UL — HIGH (ref 0–0.9)
MONOCYTES NFR BLD AUTO: 5.5 % — SIGNIFICANT CHANGE UP (ref 2–14)
MONOCYTES NFR BLD AUTO: 6.1 % — SIGNIFICANT CHANGE UP (ref 2–14)
NEUTROPHILS # BLD AUTO: 11.25 K/UL — HIGH (ref 1.8–7.4)
NEUTROPHILS # BLD AUTO: 17.66 K/UL — HIGH (ref 1.8–7.4)
NEUTROPHILS NFR BLD AUTO: 83.2 % — HIGH (ref 43–77)
NEUTROPHILS NFR BLD AUTO: 85.2 % — HIGH (ref 43–77)
NRBC # BLD: 0 /100 WBCS — SIGNIFICANT CHANGE UP (ref 0–0)
NRBC # BLD: 0 /100 WBCS — SIGNIFICANT CHANGE UP (ref 0–0)
OPIATES UR-MCNC: POSITIVE — SIGNIFICANT CHANGE UP
PCP SPEC-MCNC: SIGNIFICANT CHANGE UP
PCP UR-MCNC: NEGATIVE — SIGNIFICANT CHANGE UP
PLATELET # BLD AUTO: 351 K/UL — SIGNIFICANT CHANGE UP (ref 150–400)
PLATELET # BLD AUTO: 387 K/UL — SIGNIFICANT CHANGE UP (ref 150–400)
POTASSIUM SERPL-MCNC: 3.7 MMOL/L — SIGNIFICANT CHANGE UP (ref 3.5–5.3)
POTASSIUM SERPL-SCNC: 3.7 MMOL/L — SIGNIFICANT CHANGE UP (ref 3.5–5.3)
PROT SERPL-MCNC: 7 GM/DL — SIGNIFICANT CHANGE UP (ref 6–8.3)
RBC # BLD: 4.82 M/UL — SIGNIFICANT CHANGE UP (ref 3.8–5.2)
RBC # BLD: 4.88 M/UL — SIGNIFICANT CHANGE UP (ref 3.8–5.2)
RBC # FLD: 18.3 % — HIGH (ref 10.3–14.5)
RBC # FLD: 18.5 % — HIGH (ref 10.3–14.5)
SARS-COV-2 RNA SPEC QL NAA+PROBE: SIGNIFICANT CHANGE UP
SODIUM SERPL-SCNC: 138 MMOL/L — SIGNIFICANT CHANGE UP (ref 135–145)
THC UR QL: POSITIVE — SIGNIFICANT CHANGE UP
WBC # BLD: 13.52 K/UL — HIGH (ref 3.8–10.5)
WBC # BLD: 20.73 K/UL — HIGH (ref 3.8–10.5)
WBC # FLD AUTO: 13.52 K/UL — HIGH (ref 3.8–10.5)
WBC # FLD AUTO: 20.73 K/UL — HIGH (ref 3.8–10.5)

## 2021-10-27 PROCEDURE — 74177 CT ABD & PELVIS W/CONTRAST: CPT | Mod: 26,MA

## 2021-10-27 PROCEDURE — 56405 I&D VULVA/PERINEAL ABSCESS: CPT

## 2021-10-27 PROCEDURE — 99285 EMERGENCY DEPT VISIT HI MDM: CPT | Mod: 25

## 2021-10-27 PROCEDURE — 99223 1ST HOSP IP/OBS HIGH 75: CPT

## 2021-10-27 RX ORDER — DIPHENHYDRAMINE HCL 50 MG
25 CAPSULE ORAL EVERY 6 HOURS
Refills: 0 | Status: DISCONTINUED | OUTPATIENT
Start: 2021-10-27 | End: 2021-10-28

## 2021-10-27 RX ORDER — DIPHENHYDRAMINE HCL 50 MG
25 CAPSULE ORAL ONCE
Refills: 0 | Status: COMPLETED | OUTPATIENT
Start: 2021-10-27 | End: 2021-10-27

## 2021-10-27 RX ORDER — DEXTROSE 50 % IN WATER 50 %
25 SYRINGE (ML) INTRAVENOUS ONCE
Refills: 0 | Status: DISCONTINUED | OUTPATIENT
Start: 2021-10-27 | End: 2021-10-28

## 2021-10-27 RX ORDER — LANOLIN ALCOHOL/MO/W.PET/CERES
3 CREAM (GRAM) TOPICAL AT BEDTIME
Refills: 0 | Status: DISCONTINUED | OUTPATIENT
Start: 2021-10-27 | End: 2021-10-28

## 2021-10-27 RX ORDER — INSULIN GLARGINE 100 [IU]/ML
9 INJECTION, SOLUTION SUBCUTANEOUS AT BEDTIME
Refills: 0 | Status: DISCONTINUED | OUTPATIENT
Start: 2021-10-27 | End: 2021-10-28

## 2021-10-27 RX ORDER — HYDROMORPHONE HYDROCHLORIDE 2 MG/ML
1 INJECTION INTRAMUSCULAR; INTRAVENOUS; SUBCUTANEOUS ONCE
Refills: 0 | Status: DISCONTINUED | OUTPATIENT
Start: 2021-10-27 | End: 2021-10-27

## 2021-10-27 RX ORDER — LACTULOSE 10 G/15ML
15 SOLUTION ORAL DAILY
Refills: 0 | Status: DISCONTINUED | OUTPATIENT
Start: 2021-10-27 | End: 2021-10-28

## 2021-10-27 RX ORDER — ONDANSETRON 8 MG/1
8 TABLET, FILM COATED ORAL ONCE
Refills: 0 | Status: COMPLETED | OUTPATIENT
Start: 2021-10-27 | End: 2021-10-27

## 2021-10-27 RX ORDER — INSULIN LISPRO 100/ML
VIAL (ML) SUBCUTANEOUS AT BEDTIME
Refills: 0 | Status: DISCONTINUED | OUTPATIENT
Start: 2021-10-27 | End: 2021-10-28

## 2021-10-27 RX ORDER — ONDANSETRON 8 MG/1
4 TABLET, FILM COATED ORAL ONCE
Refills: 0 | Status: COMPLETED | OUTPATIENT
Start: 2021-10-27 | End: 2021-10-27

## 2021-10-27 RX ORDER — MORPHINE SULFATE 50 MG/1
2 CAPSULE, EXTENDED RELEASE ORAL ONCE
Refills: 0 | Status: DISCONTINUED | OUTPATIENT
Start: 2021-10-27 | End: 2021-10-27

## 2021-10-27 RX ORDER — SODIUM CHLORIDE 9 MG/ML
1000 INJECTION INTRAMUSCULAR; INTRAVENOUS; SUBCUTANEOUS ONCE
Refills: 0 | Status: COMPLETED | OUTPATIENT
Start: 2021-10-27 | End: 2021-10-27

## 2021-10-27 RX ORDER — DEXTROSE 50 % IN WATER 50 %
12.5 SYRINGE (ML) INTRAVENOUS ONCE
Refills: 0 | Status: DISCONTINUED | OUTPATIENT
Start: 2021-10-27 | End: 2021-10-28

## 2021-10-27 RX ORDER — INFLUENZA VIRUS VACCINE 15; 15; 15; 15 UG/.5ML; UG/.5ML; UG/.5ML; UG/.5ML
0.5 SUSPENSION INTRAMUSCULAR ONCE
Refills: 0 | Status: DISCONTINUED | OUTPATIENT
Start: 2021-10-27 | End: 2021-10-28

## 2021-10-27 RX ORDER — KETOROLAC TROMETHAMINE 30 MG/ML
15 SYRINGE (ML) INJECTION ONCE
Refills: 0 | Status: DISCONTINUED | OUTPATIENT
Start: 2021-10-27 | End: 2021-10-27

## 2021-10-27 RX ORDER — FAMOTIDINE 10 MG/ML
20 INJECTION INTRAVENOUS
Refills: 0 | Status: DISCONTINUED | OUTPATIENT
Start: 2021-10-27 | End: 2021-10-28

## 2021-10-27 RX ORDER — SODIUM CHLORIDE 9 MG/ML
1000 INJECTION, SOLUTION INTRAVENOUS
Refills: 0 | Status: COMPLETED | OUTPATIENT
Start: 2021-10-27 | End: 2021-10-27

## 2021-10-27 RX ORDER — SENNA PLUS 8.6 MG/1
2 TABLET ORAL AT BEDTIME
Refills: 0 | Status: DISCONTINUED | OUTPATIENT
Start: 2021-10-27 | End: 2021-10-28

## 2021-10-27 RX ORDER — GLUCAGON INJECTION, SOLUTION 0.5 MG/.1ML
1 INJECTION, SOLUTION SUBCUTANEOUS ONCE
Refills: 0 | Status: DISCONTINUED | OUTPATIENT
Start: 2021-10-27 | End: 2021-10-28

## 2021-10-27 RX ORDER — INSULIN GLARGINE 100 [IU]/ML
18 INJECTION, SOLUTION SUBCUTANEOUS AT BEDTIME
Refills: 0 | Status: DISCONTINUED | OUTPATIENT
Start: 2021-10-27 | End: 2021-10-27

## 2021-10-27 RX ORDER — SODIUM CHLORIDE 9 MG/ML
1000 INJECTION, SOLUTION INTRAVENOUS
Refills: 0 | Status: DISCONTINUED | OUTPATIENT
Start: 2021-10-27 | End: 2021-10-28

## 2021-10-27 RX ORDER — PANTOPRAZOLE SODIUM 20 MG/1
40 TABLET, DELAYED RELEASE ORAL ONCE
Refills: 0 | Status: COMPLETED | OUTPATIENT
Start: 2021-10-27 | End: 2021-10-27

## 2021-10-27 RX ORDER — MORPHINE SULFATE 50 MG/1
4 CAPSULE, EXTENDED RELEASE ORAL ONCE
Refills: 0 | Status: DISCONTINUED | OUTPATIENT
Start: 2021-10-27 | End: 2021-10-27

## 2021-10-27 RX ORDER — ACETAMINOPHEN 500 MG
650 TABLET ORAL EVERY 6 HOURS
Refills: 0 | Status: DISCONTINUED | OUTPATIENT
Start: 2021-10-27 | End: 2021-10-28

## 2021-10-27 RX ORDER — BUPIVACAINE HCL/PF 7.5 MG/ML
10 VIAL (ML) INJECTION ONCE
Refills: 0 | Status: COMPLETED | OUTPATIENT
Start: 2021-10-27 | End: 2021-10-27

## 2021-10-27 RX ORDER — METOCLOPRAMIDE HCL 10 MG
10 TABLET ORAL
Refills: 0 | Status: DISCONTINUED | OUTPATIENT
Start: 2021-10-27 | End: 2021-10-28

## 2021-10-27 RX ORDER — MORPHINE SULFATE 50 MG/1
0.5 CAPSULE, EXTENDED RELEASE ORAL ONCE
Refills: 0 | Status: DISCONTINUED | OUTPATIENT
Start: 2021-10-27 | End: 2021-10-27

## 2021-10-27 RX ORDER — INSULIN LISPRO 100/ML
VIAL (ML) SUBCUTANEOUS
Refills: 0 | Status: DISCONTINUED | OUTPATIENT
Start: 2021-10-27 | End: 2021-10-28

## 2021-10-27 RX ORDER — INSULIN HUMAN 100 [IU]/ML
10 INJECTION, SOLUTION SUBCUTANEOUS ONCE
Refills: 0 | Status: DISCONTINUED | OUTPATIENT
Start: 2021-10-27 | End: 2021-10-27

## 2021-10-27 RX ORDER — METOCLOPRAMIDE HCL 10 MG
10 TABLET ORAL ONCE
Refills: 0 | Status: COMPLETED | OUTPATIENT
Start: 2021-10-27 | End: 2021-10-27

## 2021-10-27 RX ORDER — MULTIVIT WITH MIN/MFOLATE/K2 340-15/3 G
1 POWDER (GRAM) ORAL ONCE
Refills: 0 | Status: COMPLETED | OUTPATIENT
Start: 2021-10-27 | End: 2021-10-27

## 2021-10-27 RX ORDER — INSULIN LISPRO 100/ML
5 VIAL (ML) SUBCUTANEOUS
Refills: 0 | Status: DISCONTINUED | OUTPATIENT
Start: 2021-10-27 | End: 2021-10-28

## 2021-10-27 RX ORDER — DEXTROSE 50 % IN WATER 50 %
15 SYRINGE (ML) INTRAVENOUS ONCE
Refills: 0 | Status: DISCONTINUED | OUTPATIENT
Start: 2021-10-27 | End: 2021-10-28

## 2021-10-27 RX ORDER — DIPHENHYDRAMINE HCL 50 MG
25 CAPSULE ORAL ONCE
Refills: 0 | Status: DISCONTINUED | OUTPATIENT
Start: 2021-10-27 | End: 2021-10-27

## 2021-10-27 RX ADMIN — MORPHINE SULFATE 4 MILLIGRAM(S): 50 CAPSULE, EXTENDED RELEASE ORAL at 06:35

## 2021-10-27 RX ADMIN — MORPHINE SULFATE 2 MILLIGRAM(S): 50 CAPSULE, EXTENDED RELEASE ORAL at 19:27

## 2021-10-27 RX ADMIN — ONDANSETRON 8 MILLIGRAM(S): 8 TABLET, FILM COATED ORAL at 08:24

## 2021-10-27 RX ADMIN — FAMOTIDINE 20 MILLIGRAM(S): 10 INJECTION INTRAVENOUS at 17:51

## 2021-10-27 RX ADMIN — MORPHINE SULFATE 2 MILLIGRAM(S): 50 CAPSULE, EXTENDED RELEASE ORAL at 21:20

## 2021-10-27 RX ADMIN — MORPHINE SULFATE 4 MILLIGRAM(S): 50 CAPSULE, EXTENDED RELEASE ORAL at 05:47

## 2021-10-27 RX ADMIN — PANTOPRAZOLE SODIUM 40 MILLIGRAM(S): 20 TABLET, DELAYED RELEASE ORAL at 06:58

## 2021-10-27 RX ADMIN — Medication 30 MILLILITER(S): at 05:47

## 2021-10-27 RX ADMIN — Medication 25 MILLIGRAM(S): at 06:35

## 2021-10-27 RX ADMIN — Medication 10 MILLIGRAM(S): at 17:51

## 2021-10-27 RX ADMIN — MORPHINE SULFATE 2 MILLIGRAM(S): 50 CAPSULE, EXTENDED RELEASE ORAL at 18:55

## 2021-10-27 RX ADMIN — ONDANSETRON 4 MILLIGRAM(S): 8 TABLET, FILM COATED ORAL at 15:12

## 2021-10-27 RX ADMIN — HYDROMORPHONE HYDROCHLORIDE 1 MILLIGRAM(S): 2 INJECTION INTRAMUSCULAR; INTRAVENOUS; SUBCUTANEOUS at 08:23

## 2021-10-27 RX ADMIN — Medication 25 MILLIGRAM(S): at 15:12

## 2021-10-27 RX ADMIN — Medication 20 MILLIGRAM(S): at 08:23

## 2021-10-27 RX ADMIN — MORPHINE SULFATE 2 MILLIGRAM(S): 50 CAPSULE, EXTENDED RELEASE ORAL at 21:05

## 2021-10-27 RX ADMIN — HYDROMORPHONE HYDROCHLORIDE 1 MILLIGRAM(S): 2 INJECTION INTRAMUSCULAR; INTRAVENOUS; SUBCUTANEOUS at 03:41

## 2021-10-27 RX ADMIN — Medication 10 MILLIGRAM(S): at 08:00

## 2021-10-27 RX ADMIN — INSULIN GLARGINE 9 UNIT(S): 100 INJECTION, SOLUTION SUBCUTANEOUS at 22:33

## 2021-10-27 RX ADMIN — Medication 2: at 12:39

## 2021-10-27 RX ADMIN — Medication 25 MILLIGRAM(S): at 21:06

## 2021-10-27 RX ADMIN — Medication 25 MILLIGRAM(S): at 03:26

## 2021-10-27 RX ADMIN — Medication 1 BOTTLE: at 08:23

## 2021-10-27 RX ADMIN — SODIUM CHLORIDE 75 MILLILITER(S): 9 INJECTION, SOLUTION INTRAVENOUS at 13:04

## 2021-10-27 RX ADMIN — SODIUM CHLORIDE 1000 MILLILITER(S): 9 INJECTION INTRAMUSCULAR; INTRAVENOUS; SUBCUTANEOUS at 03:26

## 2021-10-27 RX ADMIN — MORPHINE SULFATE 0.5 MILLIGRAM(S): 50 CAPSULE, EXTENDED RELEASE ORAL at 15:13

## 2021-10-27 RX ADMIN — Medication 2: at 22:34

## 2021-10-27 RX ADMIN — Medication 5 UNIT(S): at 12:39

## 2021-10-27 RX ADMIN — SODIUM CHLORIDE 1000 MILLILITER(S): 9 INJECTION INTRAMUSCULAR; INTRAVENOUS; SUBCUTANEOUS at 05:47

## 2021-10-27 RX ADMIN — Medication 25 MILLIGRAM(S): at 18:55

## 2021-10-27 RX ADMIN — MORPHINE SULFATE 0.5 MILLIGRAM(S): 50 CAPSULE, EXTENDED RELEASE ORAL at 15:29

## 2021-10-27 RX ADMIN — Medication 10 MILLILITER(S): at 08:00

## 2021-10-27 RX ADMIN — LACTULOSE 15 GRAM(S): 10 SOLUTION ORAL at 15:13

## 2021-10-27 RX ADMIN — Medication 10 MILLIGRAM(S): at 03:24

## 2021-10-27 RX ADMIN — Medication 1 TABLET(S): at 17:51

## 2021-10-27 RX ADMIN — Medication 10 MILLIGRAM(S): at 15:13

## 2021-10-27 NOTE — CONSULT NOTE ADULT - ASSESSMENT
31 years old female with type 1 DM with gastroparesis, marijuana use present to ED with complain of nausea, vomiting and abdominal pain for 1 day, noted to have left buttock abscess, I&D performed by ED, now packed. WBC 20.73, . Afebrile.    Continue antibiotics per medical team  Recommend Follow up WBC  Daily packing/wound care  Will discuss with Dr. Lewis

## 2021-10-27 NOTE — H&P ADULT - PROBLEM SELECTOR PLAN 2
While in ED, she was scratching her private area, she noted a small lumps with some tenderness on her private area  D performed I&D of small abscess near left labia majora region with some blood and exudate drainage. Culture sent  Has leukocytosis, afebrile  Will start bactrim DS 1 bid daily While in ED, she was scratching her private area, she noted a small lumps with some tenderness on her private area  D performed I&D of small abscess near left labia majora region with some blood and exudate drainage. Culture sent  Has leukocytosis, afebrile  Will start bactrim DS 1 bid  Trend CBC, if worsening leukocytosis or change in clinical status, will change to IV antibiotics  Follow culture result  ED consulted surgery

## 2021-10-27 NOTE — H&P ADULT - ASSESSMENT
31 years old female with type 1 DM with gastroparesis, marijuana use present to ED with complain of nausea, vomiting and abdominal pain for 1 day.  Tachycardic to 114, BP stable, afebrile and sat well at RA in ED. WBC 20.73, Hb 9.3, Plt 387, K 3.7, Cr 0.93, lipase normal. CT abd/pelvis noted large amount of stool throughout the colon. ED performed I&D of small abscess near left labia majora region with some blood and exudate drainage. Culture sent. Patient received 2L bolus, IV dilaudid, benadryl, oral MOM in ED 31 years old female with type 1 DM with gastroparesis, marijuana use present to ED with complain of nausea, vomiting and abdominal pain for 1 day.  Tachycardic to 114, BP stable, afebrile and sat well at RA in ED. WBC 20.73, Hb 9.3, Plt 387, K 3.7, Cr 0.93, lipase normal. CT abd/pelvis noted large amount of stool throughout the colon. ED performed I&D of small abscess near left labia majora region with some blood and exudate drainage. Culture sent. Patient received 2L bolus, IV dilaudid, benadryl, oral MOM in ED    Admitted with intractable nausea and vomiting

## 2021-10-27 NOTE — ED PROVIDER NOTE - OBJECTIVE STATEMENT
Pertinent PMH/PSH/FHx/SHx and Review of Systems contained within:  Patient presents to the ED for abdominal pain.  Patient vomiting, states that she's been in ER for hyperemesis related to cannabis use, but this time patient says her bf at home was smoking.  She c/o loose nonbloody BMs as well.  Denies any urinary issues.  Patient was given zofran and fentanyl by EMS, says that she feels itchy and would like some benadryl.

## 2021-10-27 NOTE — ED ADULT NURSE NOTE - OBJECTIVE STATEMENT
BIBA,  pt c/o LLQ abd pain N/V/D x 2 days.  (PMH- [id number])  EMS gave zofran 4mg IM and 50mcg Fentanyl, IM

## 2021-10-27 NOTE — PATIENT PROFILE ADULT - TOBACCO USE
Continuity of Care Form    Patient Name: Beulah Sanchez   :  1971  MRN:  [id number]    Admit date:  2019  Discharge date:  ***    Code Status Order: Prior   Advance Directives:     Admitting Physician:  No admitting provider for patient encounter. PCP: TITUS Cuellar CNP    Discharging Nurse: [facility] Unit/Room#: ED19/ED-19  Discharging Unit Phone Number: ***    Emergency Contact:   Extended Emergency Contact Information  Primary Emergency Contact: Oksana Arredondo  Address: [address] [place] [address], [address] [place] [address] Phone: [phone]  Relation: Parent    Past Surgical History:  Past Surgical History:   Procedure Laterality Date    SKIN BIOPSY         Immunization History: There is no immunization history on file for this patient.     Active Problems:  Patient Active Problem List   Diagnosis Code    Acute pancreatitis K85.90    Hypertension I10    Non compliance with medical treatment Z91.19    Tobacco abuse Z72.0    Alcohol abuse F10.10    Mass of skin of left shoulder R22.32    Dvt femoral (deep venous thrombosis) ([facility]) I82.419    PAD (peripheral artery disease) ([facility]) I73.9       Isolation/Infection:   Isolation          No Isolation            Nurse Assessment:  Last Vital Signs: BP (!) 144/108   Pulse 88   Temp 98.2 °F (36.8 °C) (Oral)   Resp 18   Ht 5' 10\" (1.778 m)   Wt 140 lb (63.5 kg)   SpO2 91%   BMI 20.09 kg/m²     Last documented pain score (0-10 scale): Pain Level: 10  Last Weight:   Wt Readings from Last 1 Encounters:   19 140 lb (63.5 kg)     Mental Status:  {IP PT MENTAL STATUS:62669}    IV Access:  { SONIA IV ACCESS:139933829}    Nursing Mobility/ADLs:  Walking   {CHP DME HPSZ:220895708}  Transfer  {CHP DME NKPK:337256023}  Bathing  {CHP DME HQJS:329648067}  Dressing  {CHP DME QQXU:451023492}  Toileting  {CHP DME UJFI:231285239}  Feeding  {CHP DME KRKI:002265487}  Med Admin  {P DME {Prognosis:2103823199}    Condition at Discharge: Major Zapata Patient Condition:466484300}    Rehab Potential (if transferring to Rehab): {Prognosis:2218387707}    Recommended Labs or Other Treatments After Discharge: ***    Physician Certification: I certify the above information and transfer of Catie Middleton  is necessary for the continuing treatment of the diagnosis listed and that he requires {Admit to Appropriate Level of Care:41602} for {GREATER/LESS:045522689} 30 days.      Update Admission H&P: {CHP DME Changes in DFK:512512813}    PHYSICIAN SIGNATURE:  {Esignature:363540088} Never smoker

## 2021-10-27 NOTE — SBIRT NOTE ADULT - NSSBIRTALCPOSREINDET_GEN_A_CORE
AUDIT negative- pt stated she does not drink.    Pt reports daily use of tobacco- 4 cigarettes. Not interested in cessation at this time. Aware that when she is ready she may speak to her PCP about NRT.

## 2021-10-27 NOTE — ED PROVIDER NOTE - PROGRESS NOTE DETAILS
kristine - sign out from dr garcia. intractable n,v. ct shows constipation. will treat. also vaginal abscess. s/p i&d. culture sent. hold abx for now as there is no cellulitis. admit

## 2021-10-27 NOTE — H&P ADULT - PROBLEM SELECTOR PLAN 1
Present with intractable nausea, vomiting and unable to tolerate oral intake for 1 day  Likely gastroparesis in the setting of cannabis use  CT abd/pelvis noted large amount of stool throughout the colon  Zofran prn  Reglan 10mg tid  IV hydration  Check Utox  Monitor and replace serum electrolytes

## 2021-10-27 NOTE — H&P ADULT - NSICDXFAMILYHX_GEN_ALL_CORE_FT
FAMILY HISTORY:  Father  Still living? Unknown  FH: HTN (hypertension), Age at diagnosis: Age Unknown    Grandparent  Still living? Unknown  Family history of diabetes mellitus, Age at diagnosis: Age Unknown  Family history of type 1 diabetes mellitus, Age at diagnosis: Age Unknown

## 2021-10-27 NOTE — H&P ADULT - NSHPPHYSICALEXAM_GEN_ALL_CORE
CONSTITUTIONAL: Well developed, well nourished, alert and cooperative, no acute distress. BP-158/87 , HR-90 , RR-16  EYES: PERRL, EOMI, no scleral icterus  ENT: Mucosa moist, tongue normal.   NECK: Neck supple, trachea midline, non-tender, no masses or thyromegaly.  CARDIAC: Normal S1 and S2. Regular rate and rhythms. No Pedal edema. Peripheral pulses intact  LUNGS: Clear to auscultation, equal air entry both lungs. No rales, rhonchi, wheezing. Normal respiratory effort.   ABDOMEN: Soft, nondistended, nontender. No guarding or rebound tenderness. No hepatomegaly or splenomegaly. Bowel sound normal.   MUSCULOSKELETAL: Normocephalic, atraumatic. Spine normal without deformity or tenderness, no CVA tenderness. No clubbing or cyanosis. No significant deformity or joint abnormality. No varicosities.  NEUROLOGICAL: No gross motor or sensory deficits. CN II-XII grossly intact. DTR normal  SKIN: no lesions or eruptions. Normal turgor  LYMPHATICS: No cervical, axillary, inguinal lymphadenopathy  PSYCHIATRIC: A&O x 3, appropriate mood and affect. Normal insight and judgement. CONSTITUTIONAL: Well developed, well nourished, alert and cooperative, no acute distress. BP-158/87 , HR-90 , RR-16  EYES: PERRL, EOMI, no scleral icterus  ENT: Mucosa moist, tongue normal.   NECK: Neck supple, trachea midline, non-tender, no masses or thyromegaly.  CARDIAC: Normal S1 and S2. Regular rate and rhythms. No Pedal edema. Peripheral pulses intact  LUNGS: Clear to auscultation, equal air entry both lungs. No rales, rhonchi, wheezing. Normal respiratory effort.   ABDOMEN: Soft, nondistended. Mild epigastric and lower abdomen tenderness. No guarding or rebound tenderness. No hepatomegaly or splenomegaly. Bowel sound normal.   MUSCULOSKELETAL: Normocephalic, atraumatic. Spine normal without deformity or tenderness. No clubbing or cyanosis  NEUROLOGICAL: No gross motor or sensory deficits  SKIN: no lesions or eruptions. Normal turgor  PSYCHIATRIC: A&O x 3, appropriate mood and affect. Normal insight and judgement.

## 2021-10-27 NOTE — PATIENT PROFILE ADULT - NSPROPATIENTLACTATING_GEN_A_NUR
no
Negative
Consent (Nose)/Introductory Paragraph: The rationale for Mohs was explained to the patient and consent was obtained. The risks, benefits and alternatives to therapy were discussed in detail. Specifically, the risks of nasal deformity, changes in the flow of air through the nose, infection, scarring, bleeding, prolonged wound healing, incomplete removal, allergy to anesthesia, nerve injury and recurrence were addressed. Prior to the procedure, the treatment site was clearly identified and confirmed by the patient. All components of Universal Protocol/PAUSE Rule completed.

## 2021-10-27 NOTE — H&P ADULT - HISTORY OF PRESENT ILLNESS
31 years old female with type 1 DM with gastroparesis, marijuana use present to ED with complain of nausea, vomiting and epigastric pain for 1 day. Reported multiple episode of non bloody vomiting and unable to tolerate oral intake. Reportedlart marijuana use was a few days back. No fever, chills or SOB. While in ED, she was scratching her private area, she noted a small lumps with some tenderness on her private area.  Tachycardic to 114, BP stable, afebrile and sat well at RA in ED. WBC 20.73, Hb 9.3, Plt 387, K 3.7, Cr 0.93, lipase normal. CT abd/pelvis noted large amount of stool throughout the colon. ED performed I&D of small abscess near left labia majora region with some blood and exudate drainage. Culture sent. Patient received 2L bolus, IV dilaudid, benadryl, oral MOM in ED      SH: marijuana occasionally, smoke 4 cigarettes daily  FH: father-HTN 31 years old female with type 1 DM with gastroparesis, marijuana use present to ED with complain of nausea, vomiting and abdominal pain for 1 day. Reported multiple episode of non bloody vomiting and unable to tolerate oral intake. Reportedlart marijuana use was a few days back. No fever, chills or SOB. While in ED, she was scratching her private area, she noted a small lumps with some tenderness on her private area.  Tachycardic to 114, BP stable, afebrile and sat well at RA in ED. WBC 20.73, Hb 9.3, Plt 387, K 3.7, Cr 0.93, lipase normal. CT abd/pelvis noted large amount of stool throughout the colon. ED performed I&D of small abscess near left labia majora region with some blood and exudate drainage. Culture sent. Patient received 2L bolus, IV dilaudid, benadryl, oral MOM in ED      SH: marijuana occasionally, smoke 4 cigarettes daily  FH: father-HTN

## 2021-10-27 NOTE — ED PROVIDER NOTE - PHYSICAL EXAMINATION
Gen: Alert, distressed, vomiting  Head: NC, AT, EOMI, normal lids/conjunctiva  ENT: normal hearing, patent oropharynx without erythema/exudate, uvula midline, dry mucosa  Neck: +supple, no tenderness, +Trachea midline  Pulm: Bilateral BS, normal resp effort, no wheeze/stridor/retractions  CV: RRR, no M/R/G, +dist pulses  Abd: soft, diffusely tender to palp, ND, Negative [place] signs, +BS, no palpable masses  Mskel: no edema/erythema/cyanosis  Skin: no rash, warm/dry  Neuro: AAOx3, no apparent sensory/motor deficits, coordination intact

## 2021-10-27 NOTE — ED ADULT TRIAGE NOTE - CHIEF COMPLAINT QUOTE
BIBA,  pt c/o LLQ abd pain N/V/D x 2 days.  (PMH- [id number])  EMS gave zofran 4mg IM and 50mcg Fentanyl, IM

## 2021-10-27 NOTE — H&P ADULT - PROBLEM SELECTOR PLAN 3
Likely reactive and in the setting of abscess in left labia major region  S/P I &D  Trend CBC  On bactrim

## 2021-10-27 NOTE — PROVIDER CONTACT NOTE (HYPOGLYCEMIA EVENT) - NS PROVIDER CONTACT RECOMMEND-HYPO
BG was 224 & pt had been eating. then experienced vomiting x3 and poor PO intake after hence hypoglycemia. regimen doesn't need to change. meds for nausea given to encourage > Po intake

## 2021-10-27 NOTE — H&P ADULT - NSHPREVIEWOFSYSTEMS_GEN_ALL_CORE
CONSTITUTIONAL: No fever, chills or weight loss. Fatigue+  EYES: No eye pain. No vision changes  ENT:  No hearing changes or pain, No nasal congestion.   Cardiovascular:  No Chest Pain, palpitation, SOB  Respiratory:  No cough, SOB or wheezing. No hemoptysis.   Gastrointestinal:  Nausea, vomiting and abdominal pain.   Genitourinary: No dysuria, frequency or hematuria  Musculoskeletal:  No myalgia or joint pain  Skin:  No skin lesion, rash or pruritis  Neuro:  No weakness, numbness, loss of consciousness, syncope, dizziness or seizures  Psych:  No anxiety, depression or insomnia. No hallucination. No recent changes in mood  Heme/Lymph:  No easy bruising or bleeding. No enlarged lymph nodes  Endocrine:  No Polyuria, polydipsia, No heat or cold intolerance. No loss of hair  ALLERGIC/IMMUNOlOGIC: No seasonal allergy, hives, hay fever symptoms CONSTITUTIONAL: No fever, chills or weight loss. Fatigue+  EYES: No eye pain. No vision changes  ENT:  No hearing changes or pain, No nasal congestion.   Cardiovascular:  No Chest Pain, palpitation, SOB  Respiratory:  No cough, SOB or wheezing. No hemoptysis.   Gastrointestinal:  Nausea, vomiting and abdominal pain.   Genitourinary: No dysuria, frequency or hematuria  Musculoskeletal:  No myalgia or joint pain  Skin: pruritis  Neuro:  No weakness, numbness, loss of consciousness, syncope  Psych:  No hallucination. No recent changes in mood  Heme/Lymph:  No easy bruising or bleeding.   Endocrine:  No heat or cold intolerance  ALLERGIC/IMMUNOlOGIC: No seasonal allergy, hives, hay fever symptoms

## 2021-10-27 NOTE — CONSULT NOTE ADULT - SUBJECTIVE AND OBJECTIVE BOX
GENERAL SURGERY CONSULT NOTE    Patient is a 31y old  Female who presents with a chief complaint of intractable nausea/vomiting, abscess in left perivaginal region (27 Oct 2021 10:28)    Patient seen and examined at bedside. Patient underwent I&D by ED for abscess on anterior portion of left buttock, Inferior to labia majora region. Per ED some exudate and blood was expelled. Wound was left open, and patient was then admitted to medical service for further management of sepsis with WBC of 20.7 and tachycardia 114. Per patient she noticed the bump today and it was tender to touch.      HPI:  31 years old female with type 1 DM with gastroparesis, marijuana use present to ED with complain of nausea, vomiting and abdominal pain for 1 day. Reported multiple episode of non bloody vomiting and unable to tolerate oral intake. Reportedlart marijuana use was a few days back. No fever, chills or SOB. While in ED, she was scratching her private area, she noted a small lumps with some tenderness on her private area.  Tachycardic to 114, BP stable, afebrile and sat well at RA in ED. WBC 20.73, Hb 9.3, Plt 387, K 3.7, Cr 0.93, lipase normal. CT abd/pelvis noted large amount of stool throughout the colon. ED performed I&D of small abscess near left labia majora region with some blood and exudate drainage. Culture sent. Patient received 2L bolus, IV dilaudid, benadryl, oral MOM in ED      SH: marijuana occasionally, smoke 4 cigarettes daily  FH: father-HTN (27 Oct 2021 10:28)      PAST MEDICAL & SURGICAL HISTORY:  Diabetes mellitus type 1    Gastroparesis    Type 1 diabetes mellitus with ketoacidosis without coma, with long-term current use of insulin    Gastroparesis due to DM    Colitis    Ectopic pregnancy  2013, s/p removal of right fallopian tube    No significant past surgical history        REVIEW OF SYSTEMS:  Constitutional: Denies fever, weight loss, fatigue  Eye: Denies eye pain, visual changes, discharge, blurred vision  ENT: Denies hearing changes, tinnitus, vertigo, sinus congestion, sore throat  Neck: Denies pain or stiffness  Respiratory: Denies cough, wheezing, chills, hemoptysis, shortness of breath, difficulty breathing  Cardiovascular: Denies chest pain, palpitations, dizziness, leg swelling  Gastrointestinal: Admits to nausea, vomiting. Denies nausea, vomiting, hematemesis, diarrhea, constipation, melena, hematochezia  Genitourinary: Denies dysuria, frequency, hematuria, retention, incontinence  Neurological: Denies headaches, memory loss, loss of strength, numbness, tremors  Skin: Denies itching, burning, rashes, lesions   Endocrine: Denies heat or cold intolerance, hair loss  Musculoskeletal: Denies joint pain or swelling, back, extremity pain  Psychiatric: Denies depression, anxiety, mood swings, difficulty sleeping, suicidal ideation  Hematology: Denies easy bruising, bleeding gums  Immunologic: Denies hives or eczema    MEDICATIONS  (STANDING):  dextrose 40% Gel 15 Gram(s) Oral once  dextrose 5%. 1000 milliLiter(s) (50 mL/Hr) IV Continuous <Continuous>  dextrose 5%. 1000 milliLiter(s) (100 mL/Hr) IV Continuous <Continuous>  dextrose 50% Injectable 25 Gram(s) IV Push once  dextrose 50% Injectable 12.5 Gram(s) IV Push once  dextrose 50% Injectable 25 Gram(s) IV Push once  famotidine    Tablet 20 milliGRAM(s) Oral two times a day  glucagon  Injectable 1 milliGRAM(s) IntraMuscular once  insulin glargine Injectable (LANTUS) 18 Unit(s) SubCutaneous at bedtime  insulin lispro (ADMELOG) corrective regimen sliding scale   SubCutaneous three times a day before meals  insulin lispro (ADMELOG) corrective regimen sliding scale   SubCutaneous at bedtime  insulin lispro Injectable (ADMELOG) 5 Unit(s) SubCutaneous three times a day before meals  lactated ringers. 1000 milliLiter(s) (75 mL/Hr) IV Continuous <Continuous>  lactulose Syrup 15 Gram(s) Oral daily  metoclopramide 10 milliGRAM(s) Oral three times a day before meals  senna 2 Tablet(s) Oral at bedtime  trimethoprim  160 mG/sulfamethoxazole 800 mG 1 Tablet(s) Oral two times a day    MEDICATIONS  (PRN):  acetaminophen     Tablet .. 650 milliGRAM(s) Oral every 6 hours PRN Temp greater or equal to 38C (100.4F), Mild Pain (1 - 3), Moderate Pain (4 - 6)  diphenhydrAMINE 25 milliGRAM(s) Oral every 6 hours PRN Rash and/or Itching  melatonin 3 milliGRAM(s) Oral at bedtime PRN Insomnia      Allergies    No Known Allergies      SOCIAL HISTORY          Smoking: Marijuana use occasional and cigarette use daily          ETOH  Yes [ ]  No [x]  Social [ ]              FAMILY HISTORY:  Family history of diabetes mellitus (Grandparent)    Family history of type 1 diabetes mellitus (Grandparent)    FH: HTN (hypertension) (Father)        Vital Signs Last 24 Hrs  T(C): 36.9 (27 Oct 2021 10:30), Max: 36.9 (27 Oct 2021 02:22)  T(F): 98.4 (27 Oct 2021 10:30), Max: 98.5 (27 Oct 2021 02:22)  HR: 104 (27 Oct 2021 10:30) (104 - 114)  BP: 143/78 (27 Oct 2021 10:30) (143/78 - 158/87)  RR: 19 (27 Oct 2021 10:30) (19 - 20)  SpO2: 98% (27 Oct 2021 10:30) (97% - 98%)    Physical Exam  EYES: PERRL, EOMI, no scleral icterus  ENT: Mucosa moist, tongue normal.   NECK: Neck supple, trachea midline, non-tender, no masses or thyromegaly.  CARDIAC: Normal S1 and S2. Regular rate and rhythms. No Pedal edema. Peripheral pulses intact  LUNGS: Clear to auscultation, equal air entry both lungs. No rales, rhonchi, wheezing. Normal respiratory effort.   ABDOMEN: Soft, nondistended. Mild epigastric and lower abdomen tenderness. No guarding or rebound tenderness. No hepatomegaly or splenomegaly. Bowel sound normal.   MUSCULOSKELETAL: Normocephalic, atraumatic. Spine normal without deformity or tenderness. No clubbing or cyanosis  NEUROLOGICAL: No gross motor or sensory deficits  SKIN: 2cm incision on anterior portion of left buttock, inferior to labia majora, incision cleansed with NS and packed with 1/4 inch iodoform. No exudate noted, minimal bleeding  PSYCHIATRIC: A&O x 3, appropriate mood and affect. Normal insight and judgement.    LABS:                        9.3    20.73 )-----------( 387      ( 27 Oct 2021 04:34 )             31.1     10-27    138  |  105  |  17  ----------------------------<  290<H>  3.7   |  24  |  0.93    Ca    9.1      27 Oct 2021 04:34    TPro  7.0  /  Alb  2.8<L>  /  TBili  0.3  /  DBili  x   /  AST  18  /  ALT  20  /  AlkPhos  105  10-27          RADIOLOGY & ADDITIONAL STUDIES:  < from: CT Abdomen and Pelvis w/ IV Cont (10.27.21 @ 06:22) >    EXAM:  CT ABDOMEN AND PELVIS IC                            PROCEDURE DATE:  10/27/2021          INTERPRETATION:  CLINICAL INFORMATION: Abdominal pain    COMPARISON: CT abdomen pelvis 10/23/2021    CONTRAST/COMPLICATIONS:  IV Contrast: Omnipaque 350 75 cc administered  Oral Contrast:  Complications: None reported at time of study completion    PROCEDURE:  CT of the Abdomen and Pelvis was performed.  Sagittal and coronal reformats were performed.    FINDINGS:  LOWER CHEST: Within normal limits.    LIVER: Within normal limits.  BILE DUCTS: Normal caliber.  GALLBLADDER: Cholecystectomy.  SPLEEN: Within normal limits.  PANCREAS: Within normal limits.  ADRENALS: Within normal limits.  KIDNEYS/URETERS: Within normal limits.    BLADDER: Within normal limits.  REPRODUCTIVE ORGANS: Uterus and adnexa within normal limits.    BOWEL: Large amount of stool throughout the colon. No bowel obstruction. Appendix is normal.  PERITONEUM: No ascites.  VESSELS: Within normal limits.  RETROPERITONEUM/LYMPH NODES: No lymphadenopathy.  ABDOMINAL WALL: Stable left inguinal lymph node measures 1.5 x 1.2 cm.  BONES: Within normal limits.    IMPRESSION:  Large amount stool throughout the colon. No bowel obstruction.

## 2021-10-27 NOTE — PROVIDER CONTACT NOTE (HYPOGLYCEMIA EVENT) - NS PROVIDER CONTACT BACKGROUND-HYPO
Age: 31y    Gender: Female    POCT Blood Glucose:  64 mg/dL (10-27-21 @ 16:37)  33 mg/dL (10-27-21 @ 16:19)  29 mg/dL (10-27-21 @ 16:17)  242 mg/dL (10-27-21 @ 12:11)  189 mg/dL (10-27-21 @ 08:58)  89 mg/dL (10-27-21 @ 05:32)      eMAR:  insulin lispro (ADMELOG) corrective regimen sliding scale   2  SubCutaneous (10-27-21 @ 12:39)    insulin lispro Injectable (ADMELOG)   5 Unit(s) SubCutaneous (10-27-21 @ 12:39)

## 2021-10-27 NOTE — ED PROVIDER NOTE - CLINICAL SUMMARY MEDICAL DECISION MAKING FREE TEXT BOX
Patient with recurrent abdominal pain and vomiting.  VSS.  Labs with marked leukocytosis, glucose corrected.  Patient continues to have abdominal discomfort and nausea after multiple treatments in ER.  Pending CT results and likely admission for IVF and ongoing management.  Patient signed out to incoming physician Dr. carmona.  All decisions regarding the progression of care will be made at their discretion.

## 2021-10-27 NOTE — SBIRT NOTE ADULT - NSSBIRTDRGACTIVEREFTXDET_GEN_A_CORE
Provided SBIRT services: Full screen positive. Referral to Treatment Performed. Screening results were reviewed with the patient and patient was provided information about healthy guidelines and potential negative consequences associated with level of risk. Motivation and readiness to reduce or stop use was discussed and goals and activities to make changes were suggested/offered.  Referral for complete assessment and level of care determination at a certified treatment facility was completed by contacting the treatment facility via phone. Intake call scheduled for tomorrow, Thursday 10/28/21.

## 2021-10-28 ENCOUNTER — TRANSCRIPTION ENCOUNTER (OUTPATIENT)
Age: 31
End: 2021-10-28

## 2021-10-28 VITALS
RESPIRATION RATE: 19 BRPM | SYSTOLIC BLOOD PRESSURE: 145 MMHG | TEMPERATURE: 99 F | HEART RATE: 90 BPM | OXYGEN SATURATION: 100 % | DIASTOLIC BLOOD PRESSURE: 82 MMHG

## 2021-10-28 LAB
A1C WITH ESTIMATED AVERAGE GLUCOSE RESULT: 9.6 % — HIGH (ref 4–5.6)
ALBUMIN SERPL ELPH-MCNC: 2.4 G/DL — LOW (ref 3.3–5)
ALP SERPL-CCNC: 110 U/L — SIGNIFICANT CHANGE UP (ref 40–120)
ALT FLD-CCNC: 36 U/L — SIGNIFICANT CHANGE UP (ref 12–78)
ANION GAP SERPL CALC-SCNC: 7 MMOL/L — SIGNIFICANT CHANGE UP (ref 5–17)
AST SERPL-CCNC: 57 U/L — HIGH (ref 15–37)
BILIRUB SERPL-MCNC: 0.6 MG/DL — SIGNIFICANT CHANGE UP (ref 0.2–1.2)
BUN SERPL-MCNC: 10 MG/DL — SIGNIFICANT CHANGE UP (ref 7–23)
CALCIUM SERPL-MCNC: 8.7 MG/DL — SIGNIFICANT CHANGE UP (ref 8.5–10.1)
CHLORIDE SERPL-SCNC: 102 MMOL/L — SIGNIFICANT CHANGE UP (ref 96–108)
CO2 SERPL-SCNC: 25 MMOL/L — SIGNIFICANT CHANGE UP (ref 22–31)
COVID-19 SPIKE DOMAIN AB INTERP: POSITIVE
COVID-19 SPIKE DOMAIN ANTIBODY RESULT: 44.6 U/ML — HIGH
CREAT SERPL-MCNC: 0.75 MG/DL — SIGNIFICANT CHANGE UP (ref 0.5–1.3)
ESTIMATED AVERAGE GLUCOSE: 229 MG/DL — HIGH (ref 68–114)
GLUCOSE BLDC GLUCOMTR-MCNC: 316 MG/DL — HIGH (ref 70–99)
GLUCOSE BLDC GLUCOMTR-MCNC: 342 MG/DL — HIGH (ref 70–99)
GLUCOSE SERPL-MCNC: 205 MG/DL — HIGH (ref 70–99)
HCT VFR BLD CALC: 30.8 % — LOW (ref 34.5–45)
HGB BLD-MCNC: 9.1 G/DL — LOW (ref 11.5–15.5)
MAGNESIUM SERPL-MCNC: 2.1 MG/DL — SIGNIFICANT CHANGE UP (ref 1.6–2.6)
MCHC RBC-ENTMCNC: 18.7 PG — LOW (ref 27–34)
MCHC RBC-ENTMCNC: 29.5 GM/DL — LOW (ref 32–36)
MCV RBC AUTO: 63.2 FL — LOW (ref 80–100)
NRBC # BLD: 0 /100 WBCS — SIGNIFICANT CHANGE UP (ref 0–0)
PHOSPHATE SERPL-MCNC: 3.3 MG/DL — SIGNIFICANT CHANGE UP (ref 2.5–4.5)
PLATELET # BLD AUTO: 299 K/UL — SIGNIFICANT CHANGE UP (ref 150–400)
POTASSIUM SERPL-MCNC: 4.3 MMOL/L — SIGNIFICANT CHANGE UP (ref 3.5–5.3)
POTASSIUM SERPL-SCNC: 4.3 MMOL/L — SIGNIFICANT CHANGE UP (ref 3.5–5.3)
PROT SERPL-MCNC: 6.3 GM/DL — SIGNIFICANT CHANGE UP (ref 6–8.3)
RBC # BLD: 4.87 M/UL — SIGNIFICANT CHANGE UP (ref 3.8–5.2)
RBC # FLD: 18.3 % — HIGH (ref 10.3–14.5)
SARS-COV-2 IGG+IGM SERPL QL IA: 44.6 U/ML — HIGH
SARS-COV-2 IGG+IGM SERPL QL IA: POSITIVE
SODIUM SERPL-SCNC: 134 MMOL/L — LOW (ref 135–145)
WBC # BLD: 11.43 K/UL — HIGH (ref 3.8–10.5)
WBC # FLD AUTO: 11.43 K/UL — HIGH (ref 3.8–10.5)

## 2021-10-28 PROCEDURE — 99239 HOSP IP/OBS DSCHRG MGMT >30: CPT

## 2021-10-28 RX ORDER — ONDANSETRON 8 MG/1
4 TABLET, FILM COATED ORAL EVERY 8 HOURS
Refills: 0 | Status: DISCONTINUED | OUTPATIENT
Start: 2021-10-28 | End: 2021-10-28

## 2021-10-28 RX ORDER — DIPHENHYDRAMINE HCL 50 MG
25 CAPSULE ORAL EVERY 8 HOURS
Refills: 0 | Status: DISCONTINUED | OUTPATIENT
Start: 2021-10-28 | End: 2021-10-28

## 2021-10-28 RX ORDER — DEXTROSE 50 % IN WATER 50 %
25 SYRINGE (ML) INTRAVENOUS ONCE
Refills: 0 | Status: COMPLETED | OUTPATIENT
Start: 2021-10-28 | End: 2021-10-28

## 2021-10-28 RX ORDER — MORPHINE SULFATE 50 MG/1
2 CAPSULE, EXTENDED RELEASE ORAL ONCE
Refills: 0 | Status: DISCONTINUED | OUTPATIENT
Start: 2021-10-28 | End: 2021-10-28

## 2021-10-28 RX ORDER — DEXTROSE 50 % IN WATER 50 %
15 SYRINGE (ML) INTRAVENOUS ONCE
Refills: 0 | Status: COMPLETED | OUTPATIENT
Start: 2021-10-28 | End: 2021-10-28

## 2021-10-28 RX ORDER — METOCLOPRAMIDE HCL 10 MG
10 TABLET ORAL ONCE
Refills: 0 | Status: COMPLETED | OUTPATIENT
Start: 2021-10-28 | End: 2021-10-28

## 2021-10-28 RX ADMIN — Medication 15 GRAM(S): at 01:11

## 2021-10-28 RX ADMIN — Medication 1 TABLET(S): at 08:44

## 2021-10-28 RX ADMIN — Medication 20 MILLIGRAM(S): at 06:43

## 2021-10-28 RX ADMIN — Medication 4: at 11:59

## 2021-10-28 RX ADMIN — ONDANSETRON 4 MILLIGRAM(S): 8 TABLET, FILM COATED ORAL at 09:50

## 2021-10-28 RX ADMIN — Medication 10 MILLIGRAM(S): at 12:01

## 2021-10-28 RX ADMIN — Medication 4: at 08:27

## 2021-10-28 RX ADMIN — Medication 10 MILLIGRAM(S): at 08:31

## 2021-10-28 RX ADMIN — Medication 25 MILLIGRAM(S): at 01:50

## 2021-10-28 RX ADMIN — ONDANSETRON 4 MILLIGRAM(S): 8 TABLET, FILM COATED ORAL at 01:50

## 2021-10-28 RX ADMIN — MORPHINE SULFATE 2 MILLIGRAM(S): 50 CAPSULE, EXTENDED RELEASE ORAL at 01:51

## 2021-10-28 RX ADMIN — Medication 10 MILLIGRAM(S): at 06:12

## 2021-10-28 RX ADMIN — Medication 25 MILLIGRAM(S): at 09:50

## 2021-10-28 RX ADMIN — FAMOTIDINE 20 MILLIGRAM(S): 10 INJECTION INTRAVENOUS at 08:31

## 2021-10-28 RX ADMIN — LACTULOSE 15 GRAM(S): 10 SOLUTION ORAL at 12:00

## 2021-10-28 RX ADMIN — MORPHINE SULFATE 2 MILLIGRAM(S): 50 CAPSULE, EXTENDED RELEASE ORAL at 02:10

## 2021-10-28 NOTE — DISCHARGE NOTE PROVIDER - NSDCCAREPROVSEEN_GEN_ALL_CORE_FT
Jasmine Hoover Previous MD mentioned need for repeat head CT in 6hrs, no order placed.     Paged Dr. Helms on call, ok to not repeat scan. Pt already had MRI and Ct done previously and pt is stable with his only deficit being R Leg numbness & tingling.     Will continue to monitor and go to CT if pt neuro status changes.

## 2021-10-28 NOTE — PROVIDER CONTACT NOTE (HYPOGLYCEMIA EVENT) - NS PROVIDER CONTACT NOTE-TREATMENT-HYPO
4 oz Fruit Juice (Specify quantity, date/time)/Glucose gel 1 tube
3 apple juices, 2 orange juices given/4 oz Fruit Juice (Specify quantity, date/time)

## 2021-10-28 NOTE — DISCHARGE NOTE PROVIDER - HOSPITAL COURSE
31 years old female with type 1 DM with gastroparesis, marijuana use present to ED with complain of nausea, vomiting and abdominal pain for 1 day. CT abd/pelvis noted large amount of stool throughout the colon. ED performed I&D of small abscess near left gluteal region with some blood and exudate drainage. Please continue to take bactrim for 7 days. Pt leaving AMA. Explained to pt the risks of leaving against medical advice. Pt aware of consequences of leaving against medical advice. Pt fully understands, all question have been answered. Explained to pt the importance of Following up with the following physician Dr. Ramirez at [facility] in [place] for her endocrinology appointment at 8: 30 am. Pt agrees to comply. pt should continue with her prescribed home medication.

## 2021-10-28 NOTE — DISCHARGE NOTE PROVIDER - PROVIDER TOKENS
FREE:[LAST:[Ashley],PHONE:[(   )    -],FAX:[(   )    -],ADDRESS:[Endocrinologist],FOLLOWUP:[1-3 days],ESTABLISHEDPATIENT:[T]]

## 2021-10-28 NOTE — CHART NOTE - NSCHARTNOTEFT_GEN_A_CORE
Medicine Hospitalist PA    Pt leaving AMA. Explained to pt the risks of leaving against medical advice. pt was originally admitted for her uncontrolled abdominal pain. pt was being evaluated and being treated for her uncontrolled diabetes. Many attempts have been made to reason with pt to remain in hospital however pt refusing to cooperate and wants to leave AMA. Pt aware of consequences of leaving against medical advice including harm, injury or death. Pt fully understands, all question have been answered. Explained to pt the importance of Following up with the following physician Dr. Ramirez at [facility] in [place] for her endocrinology appointment at 8: 30 am. Pt agrees to comply. pt should continue with her prescribed home medication. Pt signed AMA form, witnessed by ROXANA Velez at 14:51. D/w Dr. Hardy.

## 2021-10-28 NOTE — DISCHARGE NOTE PROVIDER - CARE PROVIDER_API CALL
Ashley   Endocrinologist  Phone: (   )    -  Fax: (   )    -  Established Patient  Follow Up Time: 1-3 days

## 2021-10-28 NOTE — PROVIDER CONTACT NOTE (HYPOGLYCEMIA EVENT) - NSPROVCONTCHANGEINDIABETESPLAN
Yes Topical Clindamycin Counseling: Patient counseled that this medication may cause skin irritation or allergic reactions.  In the event of skin irritation, the patient was advised to reduce the amount of the drug applied or use it less frequently.   The patient verbalized understanding of the proper use and possible adverse effects of clindamycin.  All of the patient's questions and concerns were addressed.

## 2021-10-28 NOTE — PROVIDER CONTACT NOTE (HYPOGLYCEMIA EVENT) - NS PROVIDER CONTACT BACKGROUND-HYPO
Age: 31y    Gender: Female    POCT Blood Glucose:  118 mg/dL (10-27-21 @ 17:20)  64 mg/dL (10-27-21 @ 16:37)  33 mg/dL (10-27-21 @ 16:19)  29 mg/dL (10-27-21 @ 16:17)  242 mg/dL (10-27-21 @ 12:11)  189 mg/dL (10-27-21 @ 08:58)  89 mg/dL (10-27-21 @ 05:32)      eMAR:dextrose 40% Gel   15 Gram(s) Oral (10-28-21 @ 01:11)    insulin glargine Injectable (LANTUS)   9 Unit(s) SubCutaneous (10-27-21 @ 22:33)    insulin lispro (ADMELOG) corrective regimen sliding scale   2  SubCutaneous (10-27-21 @ 12:39)    insulin lispro (ADMELOG) corrective regimen sliding scale   2  SubCutaneous (10-27-21 @ 22:34)    insulin lispro Injectable (ADMELOG)   5 Unit(s) SubCutaneous (10-27-21 @ 12:39)

## 2021-10-28 NOTE — PROVIDER CONTACT NOTE (HYPOGLYCEMIA EVENT) - NS PROVIDER CONTACT SITUATION-HYPO
Patient's BS is 41mg/dl, pat is asymptomatic. Patient is type 1 DM, and received 9 U lantus & 2U rapid acting insulin this night. BS was 322mg/dl earlier
came in for abdominal pain and intractable N/V  upon arriving on 2E pt was not tolerating any thing by mouth-had multiple episodes of vomiting. pt had been eating earlier. insulin given when BG was 224

## 2021-10-28 NOTE — DISCHARGE NOTE PROVIDER - NSDCMRMEDTOKEN_GEN_ALL_CORE_FT
famotidine 20 mg oral tablet: 1 tab(s) orally 2 times a day  HumaLOG 100 units/mL subcutaneous solution: 13 unit(s) subcutaneous 3 times a day (before meals)  Lantus 100 units/mL subcutaneous solution: 25 unit(s) subcutaneous once a day (at bedtime)  sulfamethoxazole-trimethoprim 800 mg-160 mg oral tablet: 1 tab(s) orally 2 times a day

## 2021-10-28 NOTE — DISCHARGE NOTE PROVIDER - NSDCCPCAREPLAN_GEN_ALL_CORE_FT
PRINCIPAL DISCHARGE DIAGNOSIS  Diagnosis: Gluteal abscess  Assessment and Plan of Treatment: continue to take bactrim for 7 days      SECONDARY DISCHARGE DIAGNOSES  Diagnosis: Intractable nausea and vomiting  Assessment and Plan of Treatment: Probably sec to Diabetic gastroparesis, Patient needs to follow up with Endocrinologist.

## 2021-10-29 LAB
CULTURE RESULTS: SIGNIFICANT CHANGE UP
SPECIMEN SOURCE: SIGNIFICANT CHANGE UP

## 2021-10-30 ENCOUNTER — EMERGENCY (EMERGENCY)
Facility: HOSPITAL | Age: 31
LOS: 0 days | Discharge: LEFT BEFORE DISCHARGE | End: 2021-10-30
Attending: EMERGENCY MEDICINE
Payer: COMMERCIAL

## 2021-10-30 VITALS
WEIGHT: 134.92 LBS | TEMPERATURE: 99 F | OXYGEN SATURATION: 98 % | SYSTOLIC BLOOD PRESSURE: 137 MMHG | DIASTOLIC BLOOD PRESSURE: 92 MMHG | HEIGHT: 66 IN | RESPIRATION RATE: 16 BRPM | HEART RATE: 109 BPM

## 2021-10-30 DIAGNOSIS — Z53.29 PROCEDURE AND TREATMENT NOT CARRIED OUT BECAUSE OF PATIENT'S DECISION FOR OTHER REASONS: ICD-10-CM

## 2021-10-30 DIAGNOSIS — R10.32 LEFT LOWER QUADRANT PAIN: ICD-10-CM

## 2021-10-30 DIAGNOSIS — F12.90 CANNABIS USE, UNSPECIFIED, UNCOMPLICATED: ICD-10-CM

## 2021-10-30 DIAGNOSIS — O00.9 ECTOPIC PREGNANCY, UNSPECIFIED: Chronic | ICD-10-CM

## 2021-10-30 DIAGNOSIS — R11.2 NAUSEA WITH VOMITING, UNSPECIFIED: ICD-10-CM

## 2021-10-30 LAB
ALBUMIN SERPL ELPH-MCNC: 2.6 G/DL — LOW (ref 3.3–5)
ALP SERPL-CCNC: 117 U/L — SIGNIFICANT CHANGE UP (ref 40–120)
ALT FLD-CCNC: 39 U/L — SIGNIFICANT CHANGE UP (ref 12–78)
ANION GAP SERPL CALC-SCNC: 8 MMOL/L — SIGNIFICANT CHANGE UP (ref 5–17)
ANISOCYTOSIS BLD QL: SLIGHT — SIGNIFICANT CHANGE UP
APPEARANCE UR: CLEAR — SIGNIFICANT CHANGE UP
AST SERPL-CCNC: 21 U/L — SIGNIFICANT CHANGE UP (ref 15–37)
BACTERIA # UR AUTO: ABNORMAL
BASOPHILS # BLD AUTO: 0.03 K/UL — SIGNIFICANT CHANGE UP (ref 0–0.2)
BASOPHILS NFR BLD AUTO: 0.4 % — SIGNIFICANT CHANGE UP (ref 0–2)
BILIRUB SERPL-MCNC: 0.3 MG/DL — SIGNIFICANT CHANGE UP (ref 0.2–1.2)
BILIRUB UR-MCNC: NEGATIVE — SIGNIFICANT CHANGE UP
BUN SERPL-MCNC: 21 MG/DL — SIGNIFICANT CHANGE UP (ref 7–23)
CALCIUM SERPL-MCNC: 9.2 MG/DL — SIGNIFICANT CHANGE UP (ref 8.5–10.1)
CHLORIDE SERPL-SCNC: 105 MMOL/L — SIGNIFICANT CHANGE UP (ref 96–108)
CO2 SERPL-SCNC: 26 MMOL/L — SIGNIFICANT CHANGE UP (ref 22–31)
COLOR SPEC: YELLOW — SIGNIFICANT CHANGE UP
CREAT SERPL-MCNC: 0.64 MG/DL — SIGNIFICANT CHANGE UP (ref 0.5–1.3)
DIFF PNL FLD: ABNORMAL
ELLIPTOCYTES BLD QL SMEAR: SLIGHT — SIGNIFICANT CHANGE UP
EOSINOPHIL # BLD AUTO: 0.17 K/UL — SIGNIFICANT CHANGE UP (ref 0–0.5)
EOSINOPHIL NFR BLD AUTO: 2.1 % — SIGNIFICANT CHANGE UP (ref 0–6)
EPI CELLS # UR: ABNORMAL
GLUCOSE BLDC GLUCOMTR-MCNC: 213 MG/DL — HIGH (ref 70–99)
GLUCOSE SERPL-MCNC: 108 MG/DL — HIGH (ref 70–99)
GLUCOSE UR QL: 250 MG/DL
HCG SERPL-ACNC: <1 MIU/ML — SIGNIFICANT CHANGE UP
HCG UR QL: NEGATIVE — SIGNIFICANT CHANGE UP
HCT VFR BLD CALC: 30 % — LOW (ref 34.5–45)
HGB BLD-MCNC: 9.1 G/DL — LOW (ref 11.5–15.5)
HYPOCHROMIA BLD QL: SLIGHT — SIGNIFICANT CHANGE UP
IMM GRANULOCYTES NFR BLD AUTO: 0.1 % — SIGNIFICANT CHANGE UP (ref 0–1.5)
KETONES UR-MCNC: ABNORMAL
LACTATE SERPL-SCNC: 1.5 MMOL/L — SIGNIFICANT CHANGE UP (ref 0.7–2)
LEUKOCYTE ESTERASE UR-ACNC: ABNORMAL
LIDOCAIN IGE QN: 44 U/L — LOW (ref 73–393)
LYMPHOCYTES # BLD AUTO: 1.62 K/UL — SIGNIFICANT CHANGE UP (ref 1–3.3)
LYMPHOCYTES # BLD AUTO: 20.4 % — SIGNIFICANT CHANGE UP (ref 13–44)
MACROCYTES BLD QL: SLIGHT — SIGNIFICANT CHANGE UP
MANUAL SMEAR VERIFICATION: SIGNIFICANT CHANGE UP
MCHC RBC-ENTMCNC: 18.8 PG — LOW (ref 27–34)
MCHC RBC-ENTMCNC: 30.3 GM/DL — LOW (ref 32–36)
MCV RBC AUTO: 62 FL — LOW (ref 80–100)
MICROCYTES BLD QL: SLIGHT — SIGNIFICANT CHANGE UP
MONOCYTES # BLD AUTO: 0.83 K/UL — SIGNIFICANT CHANGE UP (ref 0–0.9)
MONOCYTES NFR BLD AUTO: 10.5 % — SIGNIFICANT CHANGE UP (ref 2–14)
NEUTROPHILS # BLD AUTO: 5.27 K/UL — SIGNIFICANT CHANGE UP (ref 1.8–7.4)
NEUTROPHILS NFR BLD AUTO: 66.5 % — SIGNIFICANT CHANGE UP (ref 43–77)
NITRITE UR-MCNC: NEGATIVE — SIGNIFICANT CHANGE UP
NRBC # BLD: 0 /100 WBCS — SIGNIFICANT CHANGE UP (ref 0–0)
PH UR: 6.5 — SIGNIFICANT CHANGE UP (ref 5–8)
PLAT MORPH BLD: NORMAL — SIGNIFICANT CHANGE UP
PLATELET # BLD AUTO: 410 K/UL — HIGH (ref 150–400)
POLYCHROMASIA BLD QL SMEAR: SLIGHT — SIGNIFICANT CHANGE UP
POTASSIUM SERPL-MCNC: 3.9 MMOL/L — SIGNIFICANT CHANGE UP (ref 3.5–5.3)
POTASSIUM SERPL-SCNC: 3.9 MMOL/L — SIGNIFICANT CHANGE UP (ref 3.5–5.3)
PROT SERPL-MCNC: 6.9 GM/DL — SIGNIFICANT CHANGE UP (ref 6–8.3)
PROT UR-MCNC: 100 MG/DL
RBC # BLD: 4.84 M/UL — SIGNIFICANT CHANGE UP (ref 3.8–5.2)
RBC # FLD: 18 % — HIGH (ref 10.3–14.5)
RBC BLD AUTO: ABNORMAL
RBC CASTS # UR COMP ASSIST: ABNORMAL /HPF (ref 0–4)
ROULEAUX BLD QL SMEAR: PRESENT
SODIUM SERPL-SCNC: 139 MMOL/L — SIGNIFICANT CHANGE UP (ref 135–145)
SP GR SPEC: 1.01 — SIGNIFICANT CHANGE UP (ref 1.01–1.02)
UROBILINOGEN FLD QL: NEGATIVE MG/DL — SIGNIFICANT CHANGE UP
WBC # BLD: 7.93 K/UL — SIGNIFICANT CHANGE UP (ref 3.8–10.5)
WBC # FLD AUTO: 7.93 K/UL — SIGNIFICANT CHANGE UP (ref 3.8–10.5)
WBC UR QL: ABNORMAL

## 2021-10-30 PROCEDURE — 99284 EMERGENCY DEPT VISIT MOD MDM: CPT

## 2021-10-30 RX ORDER — METOCLOPRAMIDE HCL 10 MG
10 TABLET ORAL ONCE
Refills: 0 | Status: COMPLETED | OUTPATIENT
Start: 2021-10-30 | End: 2021-10-30

## 2021-10-30 RX ORDER — KETOROLAC TROMETHAMINE 30 MG/ML
15 SYRINGE (ML) INJECTION ONCE
Refills: 0 | Status: DISCONTINUED | OUTPATIENT
Start: 2021-10-30 | End: 2021-10-30

## 2021-10-30 RX ORDER — ACETAMINOPHEN 500 MG
1000 TABLET ORAL ONCE
Refills: 0 | Status: DISCONTINUED | OUTPATIENT
Start: 2021-10-30 | End: 2021-10-30

## 2021-10-30 RX ORDER — SODIUM CHLORIDE 9 MG/ML
1000 INJECTION INTRAMUSCULAR; INTRAVENOUS; SUBCUTANEOUS ONCE
Refills: 0 | Status: COMPLETED | OUTPATIENT
Start: 2021-10-30 | End: 2021-10-30

## 2021-10-30 RX ADMIN — SODIUM CHLORIDE 1000 MILLILITER(S): 9 INJECTION INTRAMUSCULAR; INTRAVENOUS; SUBCUTANEOUS at 16:27

## 2021-10-30 RX ADMIN — Medication 10 MILLIGRAM(S): at 16:45

## 2021-10-30 NOTE — ED ADULT NURSE NOTE - NSFALLRSKINDICATORS_ED_ALL_ED
Nani Rojas  : 1985  [facility]  Secondary:  [address]  at [place]  [address] [facility] , [address], [address] [facility]  [phone]   [phone]       Yannick Route  : 1985  [facility]  Secondary:  [address] at [place]  [address] [facility] [address], [address], [address] [facility]  [phone]   [phone]       OUTPATIENT PHYSICAL THERAPY: Daily Treatment Note 2019  MEDICAL/REFERRING DIAGNOSIS:  Pelvic and perineal pain [R10.2]   REFERRING PHYSICIAN: Ephraim Davis MD  Pre-treatment Symptoms/Complaints: Having some increased cramping on the left side of pelvis. Has been under more stress. Is still walking 2 miles per day and is doing her adduction exercises. Pain: Initial:  7 /10 vulvar pain   6/10 pelvic pain   Post Session:  7/10 vulva  6/10 pelvis       Medications Last Reviewed:  2019      Right Left   Bulbocavernosus 4/10 5/10   Ischocavernosus 6/10 6/10   Superficial Transverse Perineal 6/10 7/10   Sphincter Uhrovaginal     Compressor Urethra      Obturator Internus 7/10 8/10   Iliococcygeus 7/10 7/10   Coccygeus     Pubococcygeus 4/10 5/10    Levator Ani     Adductor Decreased tone noted Decreased tone noted                    * indicates pain / 10     TREATMENT:   THERAPEUTIC EXERCISE: (0 minutes):  Exercises per grid below to improve strength and coordination. Required moderate verbal and tactile cues to promote proper body breathing techniques. Progressed repetitions and complexity of movement as indicated. MANUAL THERAPY: (53   minutes): Soft tissue mobilization was utilized and necessary because of the patient's painful spasm and restricted motion of soft tissue.     (Used abbreviations: MET - muscle energy technique; SCS- Strain counter strain; CTM- Connective tissue mobilizations; CR- Contract/relax; SP- Sustained pressure, TrP- Trigger point release, IASTM- Instrument assisted soft tissue mobilizations, TDN- Trigger point dry needling). : MODALITIES: (8 minutes):      *  Cold Pack Therapy in order to reduce inflammation and edema. Cold pack applied to the vulva to decrease pain and inflammation. Gentle sinking, combining planes and tensioning into to abdomen to decrease tension at connective tissue and fascia  Gentle tension loading to scar of laparoscopy at abdomen  CTM to bilateral adductors with rolling and strumming.  (not performed)   Nerve flossing to pudendal n. SCS and Trigger point to bilateral deep transverse perineum and superficial transverse, levator ani and oI  perineum to reduce tone and improve  tissue elasticity  CTM to sacrotuberous ligament, piriformis, and multifidus in prone (not performed)  Right psoas release. (not performed)     Exercises:  Patient instructed in pelvic floor exercises listed below:    Date:  9/12/2019   Activity/Exercise     Prone extension    Multifidus     Left external rotation stretch     Adductor stetch    Cat cow    rena pose    Hip flexor stretch    rena pose hands to the right and left    PF drop in supine with biofeedback assist for visual feedback via external anal sensors      Lumbar hip distraction     Hip adduction         [employer] Portal  The following educational topics were reviewed with patient:  None today. Treatment/Session Summary:   · Response to Treatment: Pt continues to have increase pain at the abdomen and the vulvar. No significant change noted at this time. · Equipment provided today:  None today  · Recommendations/Intent for next treatment session: Next visit will focus on increase dilator if possible.     Treatment Plan of Care Effective Dates:  8/14/2019 to  11/12/2019   Total Treatment Billable Duration:  60   minutes     PT Patient Time In/Time Out  Time In: 1000  Time Out: [address] [facility], PT    Future Appointments   Date Time Provider Jose Cruz Maier   9/17/2019 9:00 AM Cherelle Goodness, PT [facility]   9/25/2019  9:00 AM Cherelle Goodness, PT [facility]   10/4/2019 12:00 PM Cherelle Goodness, PT [facility]   10/10/2019  9:00 AM Cherelle Goodness, PT [facility]   10/18/2019 12:00 PM Cherelle Goodness, PT [facility]   10/25/2019 12:00 PM Cherelle Goodness, PT [facility]   11/1/2019 12:00 PM Cherelle Goodness, PT [facility]   2/13/2020 10:00 AM MD Lisa Kimbrough no

## 2021-10-30 NOTE — ED PROVIDER NOTE - PROGRESS NOTE DETAILS
Patient refusing toradol The patient is alert and oriented x4 and asking to leave.  The patient does not want to be admitted  and understands the risks of leaving including permanent disability and death.  The risks, benefits, hazard, alternatives and precautions were reviewed with the patient and the patient voices understanding. The patient is aware that he/she  should return at any time and that close follow-up with the PMD is recommended ASAP.

## 2021-10-30 NOTE — ED PROVIDER NOTE - PATIENT PORTAL LINK FT
You can access the FollowMyHealth Patient Portal offered by [facility] by registering at the following website: [email or web address]. By joining [employer]’s FollowMyHealth portal, you will also be able to view your health information using other applications (apps) compatible with our system.

## 2021-10-30 NOTE — ED PROVIDER NOTE - CLINICAL SUMMARY MEDICAL DECISION MAKING FREE TEXT BOX
Patient with abdominal pain. AMA from hospital 2 days ago. Frequent visits in the past week. Will check XR to r/o constipation, give IV fluids, antiemetics, check electrolytes and reevaluate.

## 2021-10-30 NOTE — ED PROVIDER NOTE - OBJECTIVE STATEMENT
30 y/o F with a PMHx DM presents to the ED c/o LLQ abdominal pain associated with nausea and emesis that started yesterday. Patient describes pain as sharp, non-radiating and a 9/10 in severity. Denies fever, chills, hematuria, dysuria or vaginal bleeding.  This is the Patient 4th visit in 1 week. Patient was recently in the hospital 2 days ago and singed out AMA. Patient unable to give details as to why she signed out AMA. Patient's chart shows she has gastroparesis but Patient states she never had that. Last marijuana use was 1.5 weeks ago. Patient states her pain is the same as previous visits. Previous CT scan shows constipation but Patient reports that improved and had a normal BM today. LMP: 2.5 weeks ago.     Upon further attempts at questions and exam, Patient refused further evaluation.

## 2021-10-31 LAB
GLUCOSE BLDC GLUCOMTR-MCNC: 163 MG/DL — HIGH (ref 70–99)
GLUCOSE BLDC GLUCOMTR-MCNC: 263 MG/DL — HIGH (ref 70–99)
GLUCOSE BLDC GLUCOMTR-MCNC: 322 MG/DL — HIGH (ref 70–99)
GLUCOSE BLDC GLUCOMTR-MCNC: 41 MG/DL — CRITICAL LOW (ref 70–99)
GLUCOSE BLDC GLUCOMTR-MCNC: 41 MG/DL — CRITICAL LOW (ref 70–99)
GLUCOSE BLDC GLUCOMTR-MCNC: 96 MG/DL — SIGNIFICANT CHANGE UP (ref 70–99)

## 2021-11-01 DIAGNOSIS — Z71.89 OTHER SPECIFIED COUNSELING: ICD-10-CM

## 2021-11-01 LAB
CULTURE RESULTS: SIGNIFICANT CHANGE UP
CULTURE RESULTS: SIGNIFICANT CHANGE UP
SPECIMEN SOURCE: SIGNIFICANT CHANGE UP
SPECIMEN SOURCE: SIGNIFICANT CHANGE UP

## 2021-11-02 ENCOUNTER — EMERGENCY (EMERGENCY)
Facility: HOSPITAL | Age: 31
LOS: 0 days | Discharge: HOME | End: 2021-11-03
Attending: EMERGENCY MEDICINE
Payer: COMMERCIAL

## 2021-11-02 VITALS
HEIGHT: 66 IN | TEMPERATURE: 99 F | WEIGHT: 134.92 LBS | DIASTOLIC BLOOD PRESSURE: 95 MMHG | SYSTOLIC BLOOD PRESSURE: 144 MMHG | HEART RATE: 105 BPM | RESPIRATION RATE: 18 BRPM | OXYGEN SATURATION: 95 %

## 2021-11-02 DIAGNOSIS — Z79.4 LONG TERM (CURRENT) USE OF INSULIN: ICD-10-CM

## 2021-11-02 DIAGNOSIS — R10.32 LEFT LOWER QUADRANT PAIN: ICD-10-CM

## 2021-11-02 DIAGNOSIS — E10.65 TYPE 1 DIABETES MELLITUS WITH HYPERGLYCEMIA: ICD-10-CM

## 2021-11-02 DIAGNOSIS — K31.84 GASTROPARESIS: ICD-10-CM

## 2021-11-02 DIAGNOSIS — K59.00 CONSTIPATION, UNSPECIFIED: ICD-10-CM

## 2021-11-02 DIAGNOSIS — N76.4 ABSCESS OF VULVA: ICD-10-CM

## 2021-11-02 DIAGNOSIS — E86.0 DEHYDRATION: ICD-10-CM

## 2021-11-02 DIAGNOSIS — O00.9 ECTOPIC PREGNANCY, UNSPECIFIED: Chronic | ICD-10-CM

## 2021-11-02 DIAGNOSIS — E10.43 TYPE 1 DIABETES MELLITUS WITH DIABETIC AUTONOMIC (POLY)NEUROPATHY: ICD-10-CM

## 2021-11-02 DIAGNOSIS — R11.2 NAUSEA WITH VOMITING, UNSPECIFIED: ICD-10-CM

## 2021-11-02 DIAGNOSIS — F17.210 NICOTINE DEPENDENCE, CIGARETTES, UNCOMPLICATED: ICD-10-CM

## 2021-11-02 DIAGNOSIS — F12.99 CANNABIS USE, UNSPECIFIED WITH UNSPECIFIED CANNABIS-INDUCED DISORDER: ICD-10-CM

## 2021-11-02 LAB — GLUCOSE BLDC GLUCOMTR-MCNC: 198 MG/DL — HIGH (ref 70–99)

## 2021-11-02 PROCEDURE — 99285 EMERGENCY DEPT VISIT HI MDM: CPT

## 2021-11-02 RX ORDER — ACETAMINOPHEN 500 MG
975 TABLET ORAL ONCE
Refills: 0 | Status: COMPLETED | OUTPATIENT
Start: 2021-11-02 | End: 2021-11-02

## 2021-11-02 RX ORDER — IOHEXOL 300 MG/ML
30 INJECTION, SOLUTION INTRAVENOUS ONCE
Refills: 0 | Status: COMPLETED | OUTPATIENT
Start: 2021-11-02 | End: 2021-11-02

## 2021-11-02 RX ORDER — SODIUM CHLORIDE 9 MG/ML
1000 INJECTION INTRAMUSCULAR; INTRAVENOUS; SUBCUTANEOUS ONCE
Refills: 0 | Status: COMPLETED | OUTPATIENT
Start: 2021-11-02 | End: 2021-11-02

## 2021-11-02 RX ORDER — FAMOTIDINE 10 MG/ML
20 INJECTION INTRAVENOUS ONCE
Refills: 0 | Status: COMPLETED | OUTPATIENT
Start: 2021-11-02 | End: 2021-11-02

## 2021-11-02 RX ORDER — METOCLOPRAMIDE HCL 10 MG
10 TABLET ORAL ONCE
Refills: 0 | Status: COMPLETED | OUTPATIENT
Start: 2021-11-02 | End: 2021-11-02

## 2021-11-02 NOTE — ED ADULT TRIAGE NOTE - CHIEF COMPLAINT QUOTE
pt presents to the ED LLQ pain associated with n/v/d started today. LMP 10/12/21. PMH DM1. Denies: cp, sob, dizziness, urinary symptoms, constipation

## 2021-11-02 NOTE — ED ADULT NURSE REASSESSMENT NOTE - NS ED NURSE REASSESS COMMENT FT1
Patient refused medications because she said those medications do not work for her. She wants to speak with Dr. Bailey prior to getting medications because she has certain meds that work for her, but it is not what Dr. Bailey ordered. Patient in no acute distress.

## 2021-11-02 NOTE — ED ADULT NURSE NOTE - OBJECTIVE STATEMENT
Patient received at bed E. Patient A&Ox4. Patient Patient received at bed E. Patient A&Ox4. Patient reports that she is having lower left quadrant abdominal pain with n/v that started today. Patient reports that the pain feels like sharp stabbing pain that is unrelieved with Tylenol at home. Patient in no acute distress. Patient reports that the pain has happened before, patient

## 2021-11-03 VITALS
TEMPERATURE: 98 F | OXYGEN SATURATION: 99 % | HEART RATE: 95 BPM | DIASTOLIC BLOOD PRESSURE: 85 MMHG | SYSTOLIC BLOOD PRESSURE: 127 MMHG | RESPIRATION RATE: 17 BRPM

## 2021-11-03 LAB
ALBUMIN SERPL ELPH-MCNC: 2.8 G/DL — LOW (ref 3.3–5)
ALP SERPL-CCNC: 104 U/L — SIGNIFICANT CHANGE UP (ref 40–120)
ALT FLD-CCNC: 27 U/L — SIGNIFICANT CHANGE UP (ref 12–78)
ANION GAP SERPL CALC-SCNC: 7 MMOL/L — SIGNIFICANT CHANGE UP (ref 5–17)
ANISOCYTOSIS BLD QL: SLIGHT — SIGNIFICANT CHANGE UP
AST SERPL-CCNC: 21 U/L — SIGNIFICANT CHANGE UP (ref 15–37)
BASOPHILS # BLD AUTO: 0.04 K/UL — SIGNIFICANT CHANGE UP (ref 0–0.2)
BASOPHILS NFR BLD AUTO: 0.5 % — SIGNIFICANT CHANGE UP (ref 0–2)
BILIRUB SERPL-MCNC: 0.3 MG/DL — SIGNIFICANT CHANGE UP (ref 0.2–1.2)
BUN SERPL-MCNC: 14 MG/DL — SIGNIFICANT CHANGE UP (ref 7–23)
CALCIUM SERPL-MCNC: 9.2 MG/DL — SIGNIFICANT CHANGE UP (ref 8.5–10.1)
CHLORIDE SERPL-SCNC: 106 MMOL/L — SIGNIFICANT CHANGE UP (ref 96–108)
CO2 SERPL-SCNC: 27 MMOL/L — SIGNIFICANT CHANGE UP (ref 22–31)
CREAT SERPL-MCNC: 0.8 MG/DL — SIGNIFICANT CHANGE UP (ref 0.5–1.3)
ELLIPTOCYTES BLD QL SMEAR: SLIGHT — SIGNIFICANT CHANGE UP
EOSINOPHIL # BLD AUTO: 0.13 K/UL — SIGNIFICANT CHANGE UP (ref 0–0.5)
EOSINOPHIL NFR BLD AUTO: 1.5 % — SIGNIFICANT CHANGE UP (ref 0–6)
GLUCOSE BLDC GLUCOMTR-MCNC: 111 MG/DL — HIGH (ref 70–99)
GLUCOSE BLDC GLUCOMTR-MCNC: 97 MG/DL — SIGNIFICANT CHANGE UP (ref 70–99)
GLUCOSE SERPL-MCNC: 215 MG/DL — HIGH (ref 70–99)
HCG SERPL-ACNC: <1 MIU/ML — SIGNIFICANT CHANGE UP
HCT VFR BLD CALC: 30.2 % — LOW (ref 34.5–45)
HGB BLD-MCNC: 9 G/DL — LOW (ref 11.5–15.5)
HYPOCHROMIA BLD QL: SLIGHT — SIGNIFICANT CHANGE UP
IMM GRANULOCYTES NFR BLD AUTO: 0.3 % — SIGNIFICANT CHANGE UP (ref 0–1.5)
LACTATE SERPL-SCNC: 1.8 MMOL/L — SIGNIFICANT CHANGE UP (ref 0.7–2)
LIDOCAIN IGE QN: 56 U/L — LOW (ref 73–393)
LYMPHOCYTES # BLD AUTO: 1.9 K/UL — SIGNIFICANT CHANGE UP (ref 1–3.3)
LYMPHOCYTES # BLD AUTO: 21.4 % — SIGNIFICANT CHANGE UP (ref 13–44)
MACROCYTES BLD QL: SLIGHT — SIGNIFICANT CHANGE UP
MANUAL SMEAR VERIFICATION: SIGNIFICANT CHANGE UP
MCHC RBC-ENTMCNC: 18.6 PG — LOW (ref 27–34)
MCHC RBC-ENTMCNC: 29.8 GM/DL — LOW (ref 32–36)
MCV RBC AUTO: 62.5 FL — LOW (ref 80–100)
MICROCYTES BLD QL: SLIGHT — SIGNIFICANT CHANGE UP
MONOCYTES # BLD AUTO: 0.79 K/UL — SIGNIFICANT CHANGE UP (ref 0–0.9)
MONOCYTES NFR BLD AUTO: 8.9 % — SIGNIFICANT CHANGE UP (ref 2–14)
NEUTROPHILS # BLD AUTO: 5.98 K/UL — SIGNIFICANT CHANGE UP (ref 1.8–7.4)
NEUTROPHILS NFR BLD AUTO: 67.4 % — SIGNIFICANT CHANGE UP (ref 43–77)
NRBC # BLD: 0 /100 WBCS — SIGNIFICANT CHANGE UP (ref 0–0)
PLAT MORPH BLD: NORMAL — SIGNIFICANT CHANGE UP
PLATELET # BLD AUTO: 454 K/UL — HIGH (ref 150–400)
POIKILOCYTOSIS BLD QL AUTO: SLIGHT — SIGNIFICANT CHANGE UP
POLYCHROMASIA BLD QL SMEAR: SLIGHT — SIGNIFICANT CHANGE UP
POTASSIUM SERPL-MCNC: 4.4 MMOL/L — SIGNIFICANT CHANGE UP (ref 3.5–5.3)
POTASSIUM SERPL-SCNC: 4.4 MMOL/L — SIGNIFICANT CHANGE UP (ref 3.5–5.3)
PROT SERPL-MCNC: 7 GM/DL — SIGNIFICANT CHANGE UP (ref 6–8.3)
RBC # BLD: 4.83 M/UL — SIGNIFICANT CHANGE UP (ref 3.8–5.2)
RBC # FLD: 17.8 % — HIGH (ref 10.3–14.5)
RBC BLD AUTO: ABNORMAL
SCHISTOCYTES BLD QL AUTO: SLIGHT — SIGNIFICANT CHANGE UP
SMUDGE CELLS # BLD: PRESENT — SIGNIFICANT CHANGE UP
SODIUM SERPL-SCNC: 140 MMOL/L — SIGNIFICANT CHANGE UP (ref 135–145)
TARGETS BLD QL SMEAR: SIGNIFICANT CHANGE UP
WBC # BLD: 8.87 K/UL — SIGNIFICANT CHANGE UP (ref 3.8–10.5)
WBC # FLD AUTO: 8.87 K/UL — SIGNIFICANT CHANGE UP (ref 3.8–10.5)

## 2021-11-03 PROCEDURE — 76856 US EXAM PELVIC COMPLETE: CPT | Mod: 26,77

## 2021-11-03 PROCEDURE — 76856 US EXAM PELVIC COMPLETE: CPT | Mod: 26

## 2021-11-03 PROCEDURE — 74177 CT ABD & PELVIS W/CONTRAST: CPT | Mod: 26,MA

## 2021-11-03 RX ORDER — FAMOTIDINE 10 MG/ML
1 INJECTION INTRAVENOUS
Qty: 14 | Refills: 0
Start: 2021-11-03 | End: 2021-11-09

## 2021-11-03 RX ORDER — ACETAMINOPHEN 500 MG
2 TABLET ORAL
Qty: 40 | Refills: 0
Start: 2021-11-03 | End: 2021-11-07

## 2021-11-03 RX ORDER — METOCLOPRAMIDE HCL 10 MG
1 TABLET ORAL
Qty: 20 | Refills: 0
Start: 2021-11-03 | End: 2021-11-07

## 2021-11-03 RX ORDER — SODIUM CHLORIDE 9 MG/ML
1000 INJECTION INTRAMUSCULAR; INTRAVENOUS; SUBCUTANEOUS ONCE
Refills: 0 | Status: COMPLETED | OUTPATIENT
Start: 2021-11-03 | End: 2021-11-03

## 2021-11-03 RX ORDER — MORPHINE SULFATE 50 MG/1
2 CAPSULE, EXTENDED RELEASE ORAL ONCE
Refills: 0 | Status: DISCONTINUED | OUTPATIENT
Start: 2021-11-03 | End: 2021-11-03

## 2021-11-03 RX ORDER — DIPHENHYDRAMINE HCL 50 MG
50 CAPSULE ORAL ONCE
Refills: 0 | Status: COMPLETED | OUTPATIENT
Start: 2021-11-03 | End: 2021-11-03

## 2021-11-03 RX ORDER — HALOPERIDOL DECANOATE 100 MG/ML
2.5 INJECTION INTRAMUSCULAR ONCE
Refills: 0 | Status: COMPLETED | OUTPATIENT
Start: 2021-11-03 | End: 2021-11-03

## 2021-11-03 RX ORDER — FAMOTIDINE 10 MG/ML
1 INJECTION INTRAVENOUS
Qty: 0 | Refills: 0 | DISCHARGE
Start: 2021-11-03 | End: 2021-11-09

## 2021-11-03 RX ADMIN — SODIUM CHLORIDE 1000 MILLILITER(S): 9 INJECTION INTRAMUSCULAR; INTRAVENOUS; SUBCUTANEOUS at 02:23

## 2021-11-03 RX ADMIN — MORPHINE SULFATE 2 MILLIGRAM(S): 50 CAPSULE, EXTENDED RELEASE ORAL at 02:14

## 2021-11-03 RX ADMIN — HALOPERIDOL DECANOATE 2.5 MILLIGRAM(S): 100 INJECTION INTRAMUSCULAR at 02:12

## 2021-11-03 RX ADMIN — Medication 50 MILLIGRAM(S): at 02:13

## 2021-11-03 NOTE — ED PROVIDER NOTE - PROGRESS NOTE DETAILS
Results reported to patient--grossly benign, labs and imaging unremarkable   Pt. reports feeling better after meds, tolerating PO intake now   pt. agrees to f/u with primary care outpt., referred to GI for f/u   pt. understands to return to ED if symptoms worsen; will d/c with meds for symptoms

## 2021-11-03 NOTE — ED PROVIDER NOTE - CLINICAL SUMMARY MEDICAL DECISION MAKING FREE TEXT BOX
32 yo F with likely hyperemesis/gastroparesis, doubt ovarian torsion/ischemia, pancreatitis, obstruction, pregnancy  -us pelvis, CT ab/pel, basic labs, finger stick, ua, cx, hcg, lactate, lipase, iv, hydration, pepcid/reglan/benadryl/morphine/haldol  -f/u results, reeval

## 2021-11-03 NOTE — ED PROVIDER NOTE - PATIENT PORTAL LINK FT
You can access the FollowMyHealth Patient Portal offered by [facility] by registering at the following website: [email or web address]. By joining [employer]’s FollowMyHealth portal, you will also be able to view your health information using other applications (apps) compatible with our system.

## 2021-11-03 NOTE — ED PROVIDER NOTE - PHYSICAL EXAMINATION
Vitals: WNL  Gen: AAOx3, NAD, sitting uncomfortably in stretcher, yelling out loud, creating a scene in front of other patients in ER  Head: ncat, perrla, eomi b/l  Neck: supple, no lymphadenopathy, no midline deviation  Heart: rrr, no m/r/g  Lungs: CTA b/l, no rales/ronchi/wheezes  Abd: soft, generally tender, non-distended, no rebound or guarding  Ext: no clubbing/cyanosis/edema  Neuro: sensation and muscle strength intact b/l

## 2021-11-03 NOTE — ED PROVIDER NOTE - CARE PROVIDER_API CALL
[place] [address]  Phone: [phone]  Fax: [phone]  Follow Up Time: 1-3 Days

## 2021-11-03 NOTE — ED ADULT NURSE REASSESSMENT NOTE - NS ED NURSE REASSESS COMMENT FT1
Patient FS is 97- Dr. Bailey made aware and patient provided apple juice, will recheck FS. Patient denies pain and discomfort at this time. Patient in no acute distress.

## 2021-11-03 NOTE — ED PROVIDER NOTE - OBJECTIVE STATEMENT
32 yo F with LLQ abd pain, n/v/d for 1 day.  Feels like pt's gastritis/hyperemesis.  Pt. denies inciting event.  Pain is rated 10/10, severe, constant.    ROS: negative for fever, cough, headache, chest pain, shortness of breath, rash, paresthesia, and weakness--all other systems reviewed are negative.   PMH: IDDM, gastroparesis, gastritis, hyperemesis; Meds: Denies; SH: Denies smoking/drinking/drug use

## 2021-11-04 ENCOUNTER — OUTPATIENT (OUTPATIENT)
Dept: OUTPATIENT SERVICES | Facility: HOSPITAL | Age: 31
LOS: 1 days | Discharge: HOME | End: 2021-11-04

## 2021-11-04 DIAGNOSIS — O00.9 ECTOPIC PREGNANCY, UNSPECIFIED: Chronic | ICD-10-CM

## 2021-12-01 PROCEDURE — G9005: CPT

## 2021-12-06 ENCOUNTER — APPOINTMENT (OUTPATIENT)
Dept: ENDOCRINOLOGY | Facility: CLINIC | Age: 31
End: 2021-12-06

## 2021-12-08 ENCOUNTER — EMERGENCY (EMERGENCY)
Facility: HOSPITAL | Age: 31
LOS: 1 days | Discharge: HOME | End: 2021-12-08
Attending: EMERGENCY MEDICINE | Admitting: EMERGENCY MEDICINE
Payer: MEDICAID

## 2021-12-08 VITALS
HEART RATE: 92 BPM | TEMPERATURE: 99 F | SYSTOLIC BLOOD PRESSURE: 132 MMHG | RESPIRATION RATE: 20 BRPM | DIASTOLIC BLOOD PRESSURE: 85 MMHG | OXYGEN SATURATION: 99 % | HEIGHT: 66 IN

## 2021-12-08 VITALS
TEMPERATURE: 99 F | HEART RATE: 83 BPM | OXYGEN SATURATION: 99 % | RESPIRATION RATE: 18 BRPM | SYSTOLIC BLOOD PRESSURE: 111 MMHG | DIASTOLIC BLOOD PRESSURE: 73 MMHG

## 2021-12-08 DIAGNOSIS — O00.9 ECTOPIC PREGNANCY, UNSPECIFIED: Chronic | ICD-10-CM

## 2021-12-08 LAB
ALBUMIN SERPL ELPH-MCNC: 3 G/DL — LOW (ref 3.3–5)
ALP SERPL-CCNC: 87 U/L — SIGNIFICANT CHANGE UP (ref 40–120)
ALT FLD-CCNC: 11 U/L — SIGNIFICANT CHANGE UP (ref 4–33)
ANION GAP SERPL CALC-SCNC: 15 MMOL/L — HIGH (ref 7–14)
ANION GAP SERPL CALC-SCNC: 9 MMOL/L — SIGNIFICANT CHANGE UP (ref 7–14)
ANISOCYTOSIS BLD QL: SLIGHT — SIGNIFICANT CHANGE UP
APPEARANCE UR: CLEAR — SIGNIFICANT CHANGE UP
AST SERPL-CCNC: 18 U/L — SIGNIFICANT CHANGE UP (ref 4–32)
B-OH-BUTYR SERPL-SCNC: 2 MMOL/L — HIGH (ref 0–0.4)
B-OH-BUTYR SERPL-SCNC: <0 MMOL/L — SIGNIFICANT CHANGE UP (ref 0–0.4)
BACTERIA # UR AUTO: NEGATIVE — SIGNIFICANT CHANGE UP
BASE EXCESS BLDV CALC-SCNC: -5.7 MMOL/L — LOW (ref -2–3)
BASOPHILS # BLD AUTO: 0.09 K/UL — SIGNIFICANT CHANGE UP (ref 0–0.2)
BASOPHILS NFR BLD AUTO: 0.9 % — SIGNIFICANT CHANGE UP (ref 0–2)
BILIRUB SERPL-MCNC: 0.4 MG/DL — SIGNIFICANT CHANGE UP (ref 0.2–1.2)
BILIRUB UR-MCNC: NEGATIVE — SIGNIFICANT CHANGE UP
BLOOD GAS VENOUS COMPREHENSIVE RESULT: SIGNIFICANT CHANGE UP
BUN SERPL-MCNC: 12 MG/DL — SIGNIFICANT CHANGE UP (ref 7–23)
BUN SERPL-MCNC: 14 MG/DL — SIGNIFICANT CHANGE UP (ref 7–23)
CALCIUM SERPL-MCNC: 8.3 MG/DL — LOW (ref 8.4–10.5)
CALCIUM SERPL-MCNC: 8.6 MG/DL — SIGNIFICANT CHANGE UP (ref 8.4–10.5)
CHLORIDE BLDV-SCNC: 103 MMOL/L — SIGNIFICANT CHANGE UP (ref 96–108)
CHLORIDE SERPL-SCNC: 102 MMOL/L — SIGNIFICANT CHANGE UP (ref 98–107)
CHLORIDE SERPL-SCNC: 104 MMOL/L — SIGNIFICANT CHANGE UP (ref 98–107)
CO2 BLDV-SCNC: 21.3 MMOL/L — LOW (ref 22–26)
CO2 SERPL-SCNC: 16 MMOL/L — LOW (ref 22–31)
CO2 SERPL-SCNC: 22 MMOL/L — SIGNIFICANT CHANGE UP (ref 22–31)
COLOR SPEC: SIGNIFICANT CHANGE UP
CREAT SERPL-MCNC: 0.62 MG/DL — SIGNIFICANT CHANGE UP (ref 0.5–1.3)
CREAT SERPL-MCNC: 0.65 MG/DL — SIGNIFICANT CHANGE UP (ref 0.5–1.3)
DIFF PNL FLD: ABNORMAL
EOSINOPHIL # BLD AUTO: 0.16 K/UL — SIGNIFICANT CHANGE UP (ref 0–0.5)
EOSINOPHIL NFR BLD AUTO: 1.7 % — SIGNIFICANT CHANGE UP (ref 0–6)
EPI CELLS # UR: 0 /HPF — SIGNIFICANT CHANGE UP (ref 0–5)
GAS PNL BLDV: 133 MMOL/L — LOW (ref 136–145)
GAS PNL BLDV: SIGNIFICANT CHANGE UP
GIANT PLATELETS BLD QL SMEAR: PRESENT — SIGNIFICANT CHANGE UP
GLUCOSE BLDV-MCNC: 450 MG/DL — CRITICAL HIGH (ref 70–99)
GLUCOSE SERPL-MCNC: 149 MG/DL — HIGH (ref 70–99)
GLUCOSE SERPL-MCNC: 430 MG/DL — HIGH (ref 70–99)
GLUCOSE UR QL: ABNORMAL
HCG SERPL-ACNC: <5 MIU/ML — SIGNIFICANT CHANGE UP
HCO3 BLDV-SCNC: 20 MMOL/L — LOW (ref 22–29)
HCT VFR BLD CALC: 34.2 % — LOW (ref 34.5–45)
HCT VFR BLDA CALC: 29 % — LOW (ref 34.5–46.5)
HGB BLD CALC-MCNC: 9.8 G/DL — LOW (ref 11.5–15.5)
HGB BLD-MCNC: 9.9 G/DL — LOW (ref 11.5–15.5)
IANC: 6.89 K/UL — SIGNIFICANT CHANGE UP (ref 1.5–8.5)
KETONES UR-MCNC: ABNORMAL
LACTATE BLDV-MCNC: 1.7 MMOL/L — SIGNIFICANT CHANGE UP (ref 0.5–2)
LEUKOCYTE ESTERASE UR-ACNC: NEGATIVE — SIGNIFICANT CHANGE UP
LIDOCAIN IGE QN: 13 U/L — SIGNIFICANT CHANGE UP (ref 7–60)
LYMPHOCYTES # BLD AUTO: 1.74 K/UL — SIGNIFICANT CHANGE UP (ref 1–3.3)
LYMPHOCYTES # BLD AUTO: 18.4 % — SIGNIFICANT CHANGE UP (ref 13–44)
MAGNESIUM SERPL-MCNC: 1.7 MG/DL — SIGNIFICANT CHANGE UP (ref 1.6–2.6)
MCHC RBC-ENTMCNC: 18.2 PG — LOW (ref 27–34)
MCHC RBC-ENTMCNC: 28.9 GM/DL — LOW (ref 32–36)
MCV RBC AUTO: 62.9 FL — LOW (ref 80–100)
MICROCYTES BLD QL: SLIGHT — SIGNIFICANT CHANGE UP
MONOCYTES # BLD AUTO: 0.09 K/UL — SIGNIFICANT CHANGE UP (ref 0–0.9)
MONOCYTES NFR BLD AUTO: 0.9 % — LOW (ref 2–14)
NEUTROPHILS # BLD AUTO: 7.39 K/UL — SIGNIFICANT CHANGE UP (ref 1.8–7.4)
NEUTROPHILS NFR BLD AUTO: 78.1 % — HIGH (ref 43–77)
NITRITE UR-MCNC: NEGATIVE — SIGNIFICANT CHANGE UP
OVALOCYTES BLD QL SMEAR: SLIGHT — SIGNIFICANT CHANGE UP
PCO2 BLDV: 40 MMHG — SIGNIFICANT CHANGE UP (ref 39–42)
PH BLDV: 7.31 — LOW (ref 7.32–7.43)
PH UR: 6 — SIGNIFICANT CHANGE UP (ref 5–8)
PHOSPHATE SERPL-MCNC: 2.5 MG/DL — SIGNIFICANT CHANGE UP (ref 2.5–4.5)
PLAT MORPH BLD: NORMAL — SIGNIFICANT CHANGE UP
PLATELET # BLD AUTO: 407 K/UL — HIGH (ref 150–400)
PLATELET COUNT - ESTIMATE: NORMAL — SIGNIFICANT CHANGE UP
PO2 BLDV: 55 MMHG — SIGNIFICANT CHANGE UP
POIKILOCYTOSIS BLD QL AUTO: SIGNIFICANT CHANGE UP
POTASSIUM BLDV-SCNC: 4.7 MMOL/L — SIGNIFICANT CHANGE UP (ref 3.5–5.1)
POTASSIUM SERPL-MCNC: 4.4 MMOL/L — SIGNIFICANT CHANGE UP (ref 3.5–5.3)
POTASSIUM SERPL-MCNC: 4.9 MMOL/L — SIGNIFICANT CHANGE UP (ref 3.5–5.3)
POTASSIUM SERPL-SCNC: 4.4 MMOL/L — SIGNIFICANT CHANGE UP (ref 3.5–5.3)
POTASSIUM SERPL-SCNC: 4.9 MMOL/L — SIGNIFICANT CHANGE UP (ref 3.5–5.3)
PROT SERPL-MCNC: 6.1 G/DL — SIGNIFICANT CHANGE UP (ref 6–8.3)
PROT UR-MCNC: ABNORMAL
RBC # BLD: 5.44 M/UL — HIGH (ref 3.8–5.2)
RBC # FLD: 19.9 % — HIGH (ref 10.3–14.5)
RBC BLD AUTO: ABNORMAL
RBC CASTS # UR COMP ASSIST: 5 /HPF — HIGH (ref 0–4)
SAO2 % BLDV: 83.6 % — SIGNIFICANT CHANGE UP
SCHISTOCYTES BLD QL AUTO: SLIGHT — SIGNIFICANT CHANGE UP
SMUDGE CELLS # BLD: PRESENT — SIGNIFICANT CHANGE UP
SODIUM SERPL-SCNC: 133 MMOL/L — LOW (ref 135–145)
SODIUM SERPL-SCNC: 135 MMOL/L — SIGNIFICANT CHANGE UP (ref 135–145)
SP GR SPEC: 1.04 — SIGNIFICANT CHANGE UP (ref 1–1.05)
UROBILINOGEN FLD QL: SIGNIFICANT CHANGE UP
WBC # BLD: 9.46 K/UL — SIGNIFICANT CHANGE UP (ref 3.8–10.5)
WBC # FLD AUTO: 9.46 K/UL — SIGNIFICANT CHANGE UP (ref 3.8–10.5)
WBC UR QL: 1 /HPF — SIGNIFICANT CHANGE UP (ref 0–5)

## 2021-12-08 PROCEDURE — 99291 CRITICAL CARE FIRST HOUR: CPT

## 2021-12-08 PROCEDURE — 93010 ELECTROCARDIOGRAM REPORT: CPT

## 2021-12-08 PROCEDURE — 71046 X-RAY EXAM CHEST 2 VIEWS: CPT | Mod: 26,76

## 2021-12-08 PROCEDURE — 99285 EMERGENCY DEPT VISIT HI MDM: CPT | Mod: 25

## 2021-12-08 RX ORDER — DIPHENHYDRAMINE HCL 50 MG
25 CAPSULE ORAL ONCE
Refills: 0 | Status: COMPLETED | OUTPATIENT
Start: 2021-12-08 | End: 2021-12-08

## 2021-12-08 RX ORDER — SODIUM CHLORIDE 9 MG/ML
1000 INJECTION, SOLUTION INTRAVENOUS ONCE
Refills: 0 | Status: COMPLETED | OUTPATIENT
Start: 2021-12-08 | End: 2021-12-08

## 2021-12-08 RX ORDER — METOCLOPRAMIDE HCL 10 MG
10 TABLET ORAL ONCE
Refills: 0 | Status: COMPLETED | OUTPATIENT
Start: 2021-12-08 | End: 2021-12-08

## 2021-12-08 RX ORDER — MORPHINE SULFATE 50 MG/1
4 CAPSULE, EXTENDED RELEASE ORAL ONCE
Refills: 0 | Status: DISCONTINUED | OUTPATIENT
Start: 2021-12-08 | End: 2021-12-08

## 2021-12-08 RX ORDER — INSULIN HUMAN 100 [IU]/ML
3 INJECTION, SOLUTION SUBCUTANEOUS
Qty: 100 | Refills: 0 | Status: DISCONTINUED | OUTPATIENT
Start: 2021-12-08 | End: 2021-12-08

## 2021-12-08 RX ORDER — SODIUM CHLORIDE 9 MG/ML
1000 INJECTION INTRAMUSCULAR; INTRAVENOUS; SUBCUTANEOUS ONCE
Refills: 0 | Status: COMPLETED | OUTPATIENT
Start: 2021-12-08 | End: 2021-12-08

## 2021-12-08 RX ORDER — INSULIN LISPRO 100/ML
10 VIAL (ML) SUBCUTANEOUS ONCE
Refills: 0 | Status: COMPLETED | OUTPATIENT
Start: 2021-12-08 | End: 2021-12-08

## 2021-12-08 RX ORDER — INSULIN HUMAN 100 [IU]/ML
6 INJECTION, SOLUTION SUBCUTANEOUS
Qty: 100 | Refills: 0 | Status: DISCONTINUED | OUTPATIENT
Start: 2021-12-08 | End: 2021-12-08

## 2021-12-08 RX ORDER — ONDANSETRON 8 MG/1
4 TABLET, FILM COATED ORAL ONCE
Refills: 0 | Status: COMPLETED | OUTPATIENT
Start: 2021-12-08 | End: 2021-12-08

## 2021-12-08 RX ORDER — SODIUM CHLORIDE 9 MG/ML
1000 INJECTION, SOLUTION INTRAVENOUS
Refills: 0 | Status: DISCONTINUED | OUTPATIENT
Start: 2021-12-08 | End: 2021-12-12

## 2021-12-08 RX ADMIN — Medication 10 UNIT(S): at 19:43

## 2021-12-08 RX ADMIN — SODIUM CHLORIDE 2000 MILLILITER(S): 9 INJECTION INTRAMUSCULAR; INTRAVENOUS; SUBCUTANEOUS at 19:34

## 2021-12-08 RX ADMIN — SODIUM CHLORIDE 125 MILLILITER(S): 9 INJECTION, SOLUTION INTRAVENOUS at 23:19

## 2021-12-08 RX ADMIN — SODIUM CHLORIDE 2000 MILLILITER(S): 9 INJECTION INTRAMUSCULAR; INTRAVENOUS; SUBCUTANEOUS at 20:31

## 2021-12-08 RX ADMIN — ONDANSETRON 4 MILLIGRAM(S): 8 TABLET, FILM COATED ORAL at 19:34

## 2021-12-08 RX ADMIN — Medication 25 MILLIGRAM(S): at 19:34

## 2021-12-08 RX ADMIN — INSULIN HUMAN 6 UNIT(S)/HR: 100 INJECTION, SOLUTION SUBCUTANEOUS at 22:05

## 2021-12-08 RX ADMIN — Medication 10 MILLIGRAM(S): at 20:43

## 2021-12-08 RX ADMIN — MORPHINE SULFATE 4 MILLIGRAM(S): 50 CAPSULE, EXTENDED RELEASE ORAL at 19:34

## 2021-12-08 RX ADMIN — MORPHINE SULFATE 4 MILLIGRAM(S): 50 CAPSULE, EXTENDED RELEASE ORAL at 22:59

## 2021-12-08 NOTE — CONSULT NOTE ADULT - SUBJECTIVE AND OBJECTIVE BOX
CHIEF COMPLAINT: DKA    HPI:  "30 y/o F c/o BIBA with c/o hyperglycemia, abd pain, nausea, and vomiting since yesterday.  Pain in epigastric area but also diffuse throughout abdomen.  Hx T1DM on basaglar 18 units QPM and Admelog SS before meals. Took admelog 12 units before breakfast, but none since as not eating per patient.  No BM today but reports loose stools for past 3 days which she attributes to taking Senna BID, which she was advised to take 1 week ago for constipation.  Also reports was admitted to [facility] for 1 night approx 1 week ago for URI and possible pneumonia per patient.  Was taking an abx until 4 days ago - does not know name of antibiotic.  Denies fever, cough today.  Denies covid.  Noted hx gastroparesis.  [facility] records reviewed.  Received zofran, morphine by EMS.  Requesting morphine or dilaudid and IV benadryl now."    MICU consulted for DKA management      FAMILY HISTORY:  Family history of diabetes mellitus (Grandparent)    Family history of type 1 diabetes mellitus (Grandparent)    FH: HTN (hypertension) (Father)        OBJECTIVE:  ICU Vital Signs Last 24 Hrs  T(C): 36.8 (08 Dec 2021 17:58), Max: 37.1 (08 Dec 2021 16:55)  T(F): 98.2 (08 Dec 2021 17:58), Max: 98.8 (08 Dec 2021 16:55)  HR: 87 (08 Dec 2021 17:58) (87 - 92)  BP: 126/79 (08 Dec 2021 17:58) (126/79 - 132/85)  BP(mean): --  ABP: --  ABP(mean): --  RR: 15 (08 Dec 2021 17:58) (15 - 20)  SpO2: 98% (08 Dec 2021 17:58) (98% - 99%)        CAPILLARY BLOOD GLUCOSE      POCT Blood Glucose.: 95 mg/dL (08 Dec 2021 23:38)      PHYSICAL EXAM:  GENERAL: NAD, well-developed, somnolent  HEAD:  Atraumatic, Normocephalic  EYES: EOMI, PERRLA, conjunctiva and sclera clear  NECK: Supple, No JVD  CHEST/LUNG: Clear to auscultation bilaterally; No wheeze  HEART: Regular rate and rhythm; No murmurs, rubs, or gallops  ABDOMEN: Soft, TTP in LLQ, Nondistended; Bowel sounds present  EXTREMITIES:  2+ Peripheral Pulses, No clubbing, cyanosis, or edema  PSYCH: AAOx3  NEUROLOGY: non-focal  SKIN: No rashes or lesions      HOSPITAL MEDICATIONS:  MEDICATIONS  (STANDING):  dextrose 5% + lactated ringers. 1000 milliLiter(s) (125 mL/Hr) IV Continuous <Continuous>    MEDICATIONS  (PRN):      LABS:                        9.9    9.46  )-----------( 407      ( 08 Dec 2021 19:48 )             34.2         135  |  104  |  12  ----------------------------<  149<H>  4.4   |  22  |  0.65    Ca    8.6      08 Dec 2021 22:54  Phos  2.5       Mg     1.70         TPro  6.1  /  Alb  3.0<L>  /  TBili  0.4  /  DBili  x   /  AST  18  /  ALT  11  /  AlkPhos  87        Urinalysis Basic - ( 08 Dec 2021 23:06 )    Color: Light Yellow / Appearance: Clear / S.036 / pH: x  Gluc: x / Ketone: Small  / Bili: Negative / Urobili: <2 mg/dL   Blood: x / Protein: 30 mg/dL / Nitrite: Negative   Leuk Esterase: Negative / RBC: 5 /HPF / WBC 1 /HPF   Sq Epi: x / Non Sq Epi: 0 /HPF / Bacteria: Negative        Venous Blood Gas:   @ 22:54  7.35/45/44/25/67.0  VBG Lactate: 1.4  Venous Blood Gas:   @ 19:48  7.31/40/55/20/83.6  VBG Lactate: 1.7      MICROBIOLOGY:     RADIOLOGY:  [ ] Reviewed and interpreted by me    EKG:

## 2021-12-08 NOTE — ED PROVIDER NOTE - PHYSICAL EXAMINATION
ATTENDING PHYSICAL EXAM  GEN - NAD, A+O x3  HEAD - NC/AT; EYES/NOSE - PERRL, EOMI, mucous membranes dry, no discharge; THROAT: Oral cavity and pharynx normal. No inflammation, swelling, exudate, or lesions  NECK: Neck supple, non-tender without lymphadenopathy, no masses, no JVD  PULMONARY - CTA b/l, symmetric breath sounds, no w/r/r  CARDIAC -s1s2, RRR, no M,R,G  ABDOMEN - +NABS, ND, + mild diffuse TTP   BACK - no CVA tenderness, No vertebral or paravertebral tenderness  EXTREMITIES - symmetric pulses, 2+ dp, capillary refill < 2 seconds, no clubbing, no cyanosis, no edema

## 2021-12-08 NOTE — ED ADULT NURSE NOTE - OBJECTIVE STATEMENT
Pt aa&ox4 PMH DM1 (no insulin pump) presenting to ED for hyperglycemia, abdominal pain, nausea and vomiting. Pt states she took insulin today with no relief. Pt appears uncomfortable in ED. Mild tenderness noted to LLQ of abdomen. 22g IV placed in left hand by EMS, labs sent.

## 2021-12-08 NOTE — ED PROVIDER NOTE - OBJECTIVE STATEMENT
Cindi - 30 y/o F c/o BIBA with c/o hyperglycemia, abd pain, nausea, and vomiting since yesterday.  Pain in epigastric area but also diffuse throughout abdomen.  Hx T1DM on basaglar 18 units QPM and Admelog SS before meals. Took admelog 12 units before breakfast, but none since as not eating per patient.  No BM today but reports loose stools for past 3 days which she attributes to taking Senna BID, which she was advised to take 1 week ago for constipation.  Also reports was admitted to [facility] for 1 night approx 1 week ago for URI and possible pneumonia per patient.  Was taking an abx until 4 days ago - does not know name of antibiotic.  Denies fever, cough today.  Denies covid.  Noted hx gastroparesis.  Uncertain records reviewed.  Received zofran, morphine by EMS.  Requesting morphine or dilaudid and IV benadryl now.

## 2021-12-08 NOTE — ED ADULT TRIAGE NOTE - CHIEF COMPLAINT QUOTE
Pt is T1DM(does not have insulin pump on self) presents for abd pain, n/v, hyperglycemia, given 4mg zofran and 4mg morphine by EMS, EMS established 2 22g ivl to both hands. Pt recently getting treated over the past week with URI and started taking oral antibiotics. Pt afebrile.

## 2021-12-08 NOTE — ED PROVIDER NOTE - PROGRESS NOTE DETAILS
Labs indicative of an elevated anion gap and positive BHB. Patient resuscitated with IVF, MICU consulted for DKA, started on an insulin gtt. MICU saw patient would like to f/u labs including BMP q4, insulin gtt at 6 units initially then cut down to 3 units with FS from >400 to ~290s.     Patients labs after starting insulin gtt showed closing of gap and BHB resolved. Abdominal pain, n/v resolved. Patient given morphine for abdominal pain initially but endorsed itchiness with morphine and given benadryl.     Patient's FS off insulin gtt ~90 asymptomatic, given D5 + LR and given PO, tolerating both, will monitor FS and if stable will discharge as patient;s labs indicate DKA resolved.

## 2021-12-08 NOTE — ED PROVIDER NOTE - CLINICAL SUMMARY MEDICAL DECISION MAKING FREE TEXT BOX
32 y/o F w/ hx of TIDM (took basal insulin last night) presenting to the ED i/s/o unremitting left lower quadrant pain. Exam positive for ttp in LLQ, no evidence of guarding or rebound tenderness. Patient's FS concerning for hyperglycemia. Will get labs, abdominal pain, n/v may be i/s/o possible DKA. Will hydrate with IVF, get labs, give admelog, pain control and nausea control with meds PRN. CTM.

## 2021-12-08 NOTE — CONSULT NOTE ADULT - ASSESSMENT
31F PMH T1DM c/b diabetic gastroparesis, cyclic vomiting syndrome, HTN for nausea, vomiting and abdominal pain with high sugars found to be in DKA.     Recommendations:  - Patient's gap closed, bicarb resolved without use of insulin drip, therefore patient does not require ICU level management at this time  - Supportive care for abdominal pain  - Can send to CDU vs floor if serial abdominal exams worsen    Shmia DARLING PGY2  31F PMH T1DM c/b diabetic gastroparesis, cyclic vomiting syndrome, HTN for nausea, vomiting and abdominal pain with high sugars found to be in DKA.     Recommendations:  - Patient's gap closed, bicarb corrected without use of insulin drip, therefore patient does not require ICU level management at this time  - Supportive care for abdominal pain  - Can send to CDU vs floor with endo following if serial abdominal exams worsen/for further diabetic management     Shima DARLING PGY2

## 2021-12-08 NOTE — ED PROVIDER NOTE - PATIENT PORTAL LINK FT
You can access the FollowMyHealth Patient Portal offered by [facility] by registering at the following website: [email or web address]. By joining [employer]’s FollowMyHealth portal, you will also be able to view your health information using other applications (apps) compatible with our system.

## 2021-12-08 NOTE — CONSULT NOTE ADULT - ATTENDING COMMENTS
31F PMH T1DM c/b diabetic gastroparesis, cyclic vomiting syndrome, HTN for nausea, vomiting and abdominal pain with high sugars found to be in DKA. 31F PMH T1DM c/b diabetic gastroparesis, cyclic vomiting syndrome, HTN for nausea, vomiting and abdominal pain with high sugars found to be in DKA.  Pt s/p insulin sq, and IVF  repeat labs improved without insulin gtt  monitor FS off dextrose  not a micu candidate, reconsult as needed

## 2021-12-09 RX ORDER — DIPHENHYDRAMINE HCL 50 MG
25 CAPSULE ORAL ONCE
Refills: 0 | Status: COMPLETED | OUTPATIENT
Start: 2021-12-09 | End: 2021-12-09

## 2021-12-09 RX ADMIN — Medication 25 MILLIGRAM(S): at 01:08

## 2021-12-09 NOTE — ED ADULT NURSE REASSESSMENT NOTE - NS ED NURSE REASSESS COMMENT FT1
Break coverage RN. Patient A&Ox4, resting in stretcher, respirations even and unlabored. Patient endorsing itchiness, MD aware, medicated per MD orders. Patient . Fluids discontinued per MD. Break coverage RN. Patient A&Ox4, resting in stretcher, respirations even and unlabored. Patient endorsing itchiness, MD aware, medicated per MD orders at 0100 today. Patient . Fluids discontinued per MD at 0120.

## 2021-12-15 ENCOUNTER — APPOINTMENT (OUTPATIENT)
Dept: ENDOCRINOLOGY | Facility: CLINIC | Age: 31
End: 2021-12-15
Payer: MEDICAID

## 2021-12-15 ENCOUNTER — LABORATORY RESULT (OUTPATIENT)
Age: 31
End: 2021-12-15

## 2021-12-15 VITALS
SYSTOLIC BLOOD PRESSURE: 100 MMHG | BODY MASS INDEX: 23.32 KG/M2 | DIASTOLIC BLOOD PRESSURE: 70 MMHG | HEART RATE: 106 BPM | TEMPERATURE: 97.9 F | WEIGHT: 140 LBS | OXYGEN SATURATION: 99 % | HEIGHT: 65 IN

## 2021-12-15 DIAGNOSIS — K31.84 GASTROPARESIS: ICD-10-CM

## 2021-12-15 LAB
GLUCOSE BLDC GLUCOMTR-MCNC: 275
HBA1C MFR BLD HPLC: 8.1

## 2021-12-15 PROCEDURE — 82962 GLUCOSE BLOOD TEST: CPT

## 2021-12-15 PROCEDURE — 99214 OFFICE O/P EST MOD 30 MIN: CPT | Mod: 25

## 2021-12-15 PROCEDURE — 95251 CONT GLUC MNTR ANALYSIS I&R: CPT

## 2021-12-15 PROCEDURE — 83036 HEMOGLOBIN GLYCOSYLATED A1C: CPT | Mod: QW

## 2021-12-16 ENCOUNTER — INPATIENT (INPATIENT)
Facility: HOSPITAL | Age: 31
LOS: 0 days | Discharge: LEFT BEFORE DISCHARGE | End: 2021-12-17
Attending: STUDENT IN AN ORGANIZED HEALTH CARE EDUCATION/TRAINING PROGRAM | Admitting: STUDENT IN AN ORGANIZED HEALTH CARE EDUCATION/TRAINING PROGRAM
Payer: COMMERCIAL

## 2021-12-16 VITALS
HEART RATE: 103 BPM | TEMPERATURE: 99 F | HEIGHT: 66 IN | SYSTOLIC BLOOD PRESSURE: 116 MMHG | OXYGEN SATURATION: 99 % | RESPIRATION RATE: 20 BRPM | WEIGHT: 139.99 LBS | DIASTOLIC BLOOD PRESSURE: 72 MMHG

## 2021-12-16 DIAGNOSIS — K29.00 ACUTE GASTRITIS WITHOUT BLEEDING: ICD-10-CM

## 2021-12-16 DIAGNOSIS — R79.89 OTHER SPECIFIED ABNORMAL FINDINGS OF BLOOD CHEMISTRY: ICD-10-CM

## 2021-12-16 DIAGNOSIS — N17.9 ACUTE KIDNEY FAILURE, UNSPECIFIED: ICD-10-CM

## 2021-12-16 DIAGNOSIS — E10.65 TYPE 1 DIABETES MELLITUS WITH HYPERGLYCEMIA: ICD-10-CM

## 2021-12-16 DIAGNOSIS — K59.00 CONSTIPATION, UNSPECIFIED: ICD-10-CM

## 2021-12-16 DIAGNOSIS — O00.9 ECTOPIC PREGNANCY, UNSPECIFIED: Chronic | ICD-10-CM

## 2021-12-16 DIAGNOSIS — R11.2 NAUSEA WITH VOMITING, UNSPECIFIED: ICD-10-CM

## 2021-12-16 LAB
ACETONE SERPL-MCNC: ABNORMAL
ALBUMIN SERPL ELPH-MCNC: 2.7 G/DL — LOW (ref 3.3–5)
ALP SERPL-CCNC: 107 U/L — SIGNIFICANT CHANGE UP (ref 40–120)
ALT FLD-CCNC: 16 U/L — SIGNIFICANT CHANGE UP (ref 12–78)
ANION GAP SERPL CALC-SCNC: 15 MMOL/L — SIGNIFICANT CHANGE UP (ref 5–17)
ANION GAP SERPL CALC-SCNC: 15 MMOL/L — SIGNIFICANT CHANGE UP (ref 5–17)
ANISOCYTOSIS BLD QL: SIGNIFICANT CHANGE UP
AST SERPL-CCNC: 10 U/L — LOW (ref 15–37)
B-OH-BUTYR SERPL-SCNC: 3 MMOL/L — HIGH
BASOPHILS # BLD AUTO: 0.04 K/UL — SIGNIFICANT CHANGE UP (ref 0–0.2)
BASOPHILS NFR BLD AUTO: 0.4 % — SIGNIFICANT CHANGE UP (ref 0–2)
BILIRUB SERPL-MCNC: 0.5 MG/DL — SIGNIFICANT CHANGE UP (ref 0.2–1.2)
BUN SERPL-MCNC: 16 MG/DL — SIGNIFICANT CHANGE UP (ref 7–23)
BUN SERPL-MCNC: 21 MG/DL — SIGNIFICANT CHANGE UP (ref 7–23)
CALCIUM SERPL-MCNC: 8.2 MG/DL — LOW (ref 8.5–10.1)
CALCIUM SERPL-MCNC: 8.5 MG/DL — SIGNIFICANT CHANGE UP (ref 8.5–10.1)
CHLORIDE SERPL-SCNC: 100 MMOL/L — SIGNIFICANT CHANGE UP (ref 96–108)
CHLORIDE SERPL-SCNC: 102 MMOL/L — SIGNIFICANT CHANGE UP (ref 96–108)
CO2 SERPL-SCNC: 17 MMOL/L — LOW (ref 22–31)
CO2 SERPL-SCNC: 18 MMOL/L — LOW (ref 22–31)
CREAT SERPL-MCNC: 0.99 MG/DL — SIGNIFICANT CHANGE UP (ref 0.5–1.3)
CREAT SERPL-MCNC: 1.14 MG/DL — SIGNIFICANT CHANGE UP (ref 0.5–1.3)
EOSINOPHIL # BLD AUTO: 0.3 K/UL — SIGNIFICANT CHANGE UP (ref 0–0.5)
EOSINOPHIL NFR BLD AUTO: 3.2 % — SIGNIFICANT CHANGE UP (ref 0–6)
FLUAV AG NPH QL: SIGNIFICANT CHANGE UP
FLUBV AG NPH QL: SIGNIFICANT CHANGE UP
GAS PNL BLDA: SIGNIFICANT CHANGE UP
GLUCOSE BLDC GLUCOMTR-MCNC: 160 MG/DL — HIGH (ref 70–99)
GLUCOSE BLDC GLUCOMTR-MCNC: 313 MG/DL — HIGH (ref 70–99)
GLUCOSE BLDC GLUCOMTR-MCNC: 331 MG/DL — HIGH (ref 70–99)
GLUCOSE BLDC GLUCOMTR-MCNC: 448 MG/DL — HIGH (ref 70–99)
GLUCOSE BLDC GLUCOMTR-MCNC: 509 MG/DL — CRITICAL HIGH (ref 70–99)
GLUCOSE BLDC GLUCOMTR-MCNC: 534 MG/DL — CRITICAL HIGH (ref 70–99)
GLUCOSE BLDC GLUCOMTR-MCNC: 54 MG/DL — CRITICAL LOW (ref 70–99)
GLUCOSE BLDC GLUCOMTR-MCNC: 64 MG/DL — LOW (ref 70–99)
GLUCOSE BLDC GLUCOMTR-MCNC: 69 MG/DL — LOW (ref 70–99)
GLUCOSE SERPL-MCNC: 394 MG/DL — HIGH (ref 70–99)
GLUCOSE SERPL-MCNC: 602 MG/DL — CRITICAL HIGH (ref 70–99)
HCG SERPL-ACNC: <1 MIU/ML — SIGNIFICANT CHANGE UP
HCT VFR BLD CALC: 34.5 % — SIGNIFICANT CHANGE UP (ref 34.5–45)
HGB BLD-MCNC: 10.2 G/DL — LOW (ref 11.5–15.5)
HYPOCHROMIA BLD QL: SLIGHT — SIGNIFICANT CHANGE UP
IMM GRANULOCYTES NFR BLD AUTO: 0.3 % — SIGNIFICANT CHANGE UP (ref 0–1.5)
LACTATE SERPL-SCNC: 1.3 MMOL/L — SIGNIFICANT CHANGE UP (ref 0.7–2)
LIDOCAIN IGE QN: 50 U/L — LOW (ref 73–393)
LYMPHOCYTES # BLD AUTO: 1.88 K/UL — SIGNIFICANT CHANGE UP (ref 1–3.3)
LYMPHOCYTES # BLD AUTO: 20.1 % — SIGNIFICANT CHANGE UP (ref 13–44)
MACROCYTES BLD QL: SIGNIFICANT CHANGE UP
MANUAL SMEAR VERIFICATION: SIGNIFICANT CHANGE UP
MCHC RBC-ENTMCNC: 18 PG — LOW (ref 27–34)
MCHC RBC-ENTMCNC: 29.6 GM/DL — LOW (ref 32–36)
MCV RBC AUTO: 60.8 FL — LOW (ref 80–100)
MICROCYTES BLD QL: SLIGHT — SIGNIFICANT CHANGE UP
MONOCYTES # BLD AUTO: 0.75 K/UL — SIGNIFICANT CHANGE UP (ref 0–0.9)
MONOCYTES NFR BLD AUTO: 8 % — SIGNIFICANT CHANGE UP (ref 2–14)
NEUTROPHILS # BLD AUTO: 6.34 K/UL — SIGNIFICANT CHANGE UP (ref 1.8–7.4)
NEUTROPHILS NFR BLD AUTO: 68 % — SIGNIFICANT CHANGE UP (ref 43–77)
NRBC # BLD: 0 /100 WBCS — SIGNIFICANT CHANGE UP (ref 0–0)
PLAT MORPH BLD: NORMAL — SIGNIFICANT CHANGE UP
PLATELET # BLD AUTO: 403 K/UL — HIGH (ref 150–400)
PLATELET COUNT - ESTIMATE: NORMAL — SIGNIFICANT CHANGE UP
POIKILOCYTOSIS BLD QL AUTO: SLIGHT — SIGNIFICANT CHANGE UP
POLYCHROMASIA BLD QL SMEAR: SLIGHT — SIGNIFICANT CHANGE UP
POTASSIUM SERPL-MCNC: 4.4 MMOL/L — SIGNIFICANT CHANGE UP (ref 3.5–5.3)
POTASSIUM SERPL-MCNC: 4.7 MMOL/L — SIGNIFICANT CHANGE UP (ref 3.5–5.3)
POTASSIUM SERPL-SCNC: 4.4 MMOL/L — SIGNIFICANT CHANGE UP (ref 3.5–5.3)
POTASSIUM SERPL-SCNC: 4.7 MMOL/L — SIGNIFICANT CHANGE UP (ref 3.5–5.3)
PROCALCITONIN SERPL-MCNC: 0.1 NG/ML — SIGNIFICANT CHANGE UP (ref 0.02–0.1)
PROT SERPL-MCNC: 7.4 GM/DL — SIGNIFICANT CHANGE UP (ref 6–8.3)
RBC # BLD: 5.67 M/UL — HIGH (ref 3.8–5.2)
RBC # FLD: 20.1 % — HIGH (ref 10.3–14.5)
RBC BLD AUTO: ABNORMAL
SARS-COV-2 RNA SPEC QL NAA+PROBE: SIGNIFICANT CHANGE UP
SCHISTOCYTES BLD QL AUTO: SLIGHT — SIGNIFICANT CHANGE UP
SODIUM SERPL-SCNC: 133 MMOL/L — LOW (ref 135–145)
SODIUM SERPL-SCNC: 134 MMOL/L — LOW (ref 135–145)
TARGETS BLD QL SMEAR: SLIGHT — SIGNIFICANT CHANGE UP
WBC # BLD: 9.34 K/UL — SIGNIFICANT CHANGE UP (ref 3.8–10.5)
WBC # FLD AUTO: 9.34 K/UL — SIGNIFICANT CHANGE UP (ref 3.8–10.5)

## 2021-12-16 PROCEDURE — 99291 CRITICAL CARE FIRST HOUR: CPT

## 2021-12-16 PROCEDURE — 74177 CT ABD & PELVIS W/CONTRAST: CPT | Mod: 26,MA

## 2021-12-16 PROCEDURE — 71045 X-RAY EXAM CHEST 1 VIEW: CPT | Mod: 26

## 2021-12-16 PROCEDURE — 93010 ELECTROCARDIOGRAM REPORT: CPT

## 2021-12-16 PROCEDURE — 99223 1ST HOSP IP/OBS HIGH 75: CPT

## 2021-12-16 RX ORDER — MORPHINE SULFATE 50 MG/1
4 CAPSULE, EXTENDED RELEASE ORAL ONCE
Refills: 0 | Status: DISCONTINUED | OUTPATIENT
Start: 2021-12-16 | End: 2021-12-16

## 2021-12-16 RX ORDER — INSULIN LISPRO 100/ML
6 VIAL (ML) SUBCUTANEOUS
Refills: 0 | Status: DISCONTINUED | OUTPATIENT
Start: 2021-12-16 | End: 2021-12-17

## 2021-12-16 RX ORDER — ONDANSETRON 8 MG/1
4 TABLET, FILM COATED ORAL EVERY 6 HOURS
Refills: 0 | Status: DISCONTINUED | OUTPATIENT
Start: 2021-12-16 | End: 2021-12-17

## 2021-12-16 RX ORDER — ACETAMINOPHEN 500 MG
650 TABLET ORAL EVERY 6 HOURS
Refills: 0 | Status: DISCONTINUED | OUTPATIENT
Start: 2021-12-16 | End: 2021-12-16

## 2021-12-16 RX ORDER — SODIUM CHLORIDE 9 MG/ML
1000 INJECTION, SOLUTION INTRAVENOUS
Refills: 0 | Status: DISCONTINUED | OUTPATIENT
Start: 2021-12-16 | End: 2021-12-17

## 2021-12-16 RX ORDER — ONDANSETRON 8 MG/1
4 TABLET, FILM COATED ORAL ONCE
Refills: 0 | Status: COMPLETED | OUTPATIENT
Start: 2021-12-16 | End: 2021-12-16

## 2021-12-16 RX ORDER — INSULIN LISPRO 100/ML
7 VIAL (ML) SUBCUTANEOUS ONCE
Refills: 0 | Status: COMPLETED | OUTPATIENT
Start: 2021-12-16 | End: 2021-12-16

## 2021-12-16 RX ORDER — INSULIN GLARGINE 100 [IU]/ML
10 INJECTION, SOLUTION SUBCUTANEOUS AT BEDTIME
Refills: 0 | Status: DISCONTINUED | OUTPATIENT
Start: 2021-12-16 | End: 2021-12-17

## 2021-12-16 RX ORDER — INSULIN LISPRO 100/ML
VIAL (ML) SUBCUTANEOUS AT BEDTIME
Refills: 0 | Status: DISCONTINUED | OUTPATIENT
Start: 2021-12-16 | End: 2021-12-17

## 2021-12-16 RX ORDER — SODIUM CHLORIDE 9 MG/ML
2000 INJECTION, SOLUTION INTRAVENOUS ONCE
Refills: 0 | Status: COMPLETED | OUTPATIENT
Start: 2021-12-16 | End: 2021-12-16

## 2021-12-16 RX ORDER — PANTOPRAZOLE SODIUM 20 MG/1
40 TABLET, DELAYED RELEASE ORAL DAILY
Refills: 0 | Status: DISCONTINUED | OUTPATIENT
Start: 2021-12-16 | End: 2021-12-17

## 2021-12-16 RX ORDER — POLYETHYLENE GLYCOL 3350 17 G/17G
17 POWDER, FOR SOLUTION ORAL DAILY
Refills: 0 | Status: DISCONTINUED | OUTPATIENT
Start: 2021-12-16 | End: 2021-12-17

## 2021-12-16 RX ORDER — INSULIN LISPRO 100/ML
VIAL (ML) SUBCUTANEOUS
Refills: 0 | Status: DISCONTINUED | OUTPATIENT
Start: 2021-12-16 | End: 2021-12-17

## 2021-12-16 RX ORDER — DEXTROSE 50 % IN WATER 50 %
12.5 SYRINGE (ML) INTRAVENOUS ONCE
Refills: 0 | Status: DISCONTINUED | OUTPATIENT
Start: 2021-12-16 | End: 2021-12-17

## 2021-12-16 RX ORDER — LANOLIN ALCOHOL/MO/W.PET/CERES
3 CREAM (GRAM) TOPICAL AT BEDTIME
Refills: 0 | Status: DISCONTINUED | OUTPATIENT
Start: 2021-12-16 | End: 2021-12-17

## 2021-12-16 RX ORDER — DEXTROSE 50 % IN WATER 50 %
25 SYRINGE (ML) INTRAVENOUS ONCE
Refills: 0 | Status: DISCONTINUED | OUTPATIENT
Start: 2021-12-16 | End: 2021-12-17

## 2021-12-16 RX ORDER — DIPHENHYDRAMINE HCL 50 MG
50 CAPSULE ORAL ONCE
Refills: 0 | Status: COMPLETED | OUTPATIENT
Start: 2021-12-16 | End: 2021-12-16

## 2021-12-16 RX ORDER — DEXTROSE 50 % IN WATER 50 %
15 SYRINGE (ML) INTRAVENOUS ONCE
Refills: 0 | Status: DISCONTINUED | OUTPATIENT
Start: 2021-12-16 | End: 2021-12-17

## 2021-12-16 RX ORDER — INSULIN GLARGINE 100 [IU]/ML
10 INJECTION, SOLUTION SUBCUTANEOUS EVERY MORNING
Refills: 0 | Status: DISCONTINUED | OUTPATIENT
Start: 2021-12-16 | End: 2021-12-17

## 2021-12-16 RX ORDER — DEXTROSE 50 % IN WATER 50 %
15 SYRINGE (ML) INTRAVENOUS ONCE
Refills: 0 | Status: COMPLETED | OUTPATIENT
Start: 2021-12-16 | End: 2021-12-16

## 2021-12-16 RX ORDER — ACETAMINOPHEN 500 MG
650 TABLET ORAL EVERY 6 HOURS
Refills: 0 | Status: DISCONTINUED | OUTPATIENT
Start: 2021-12-16 | End: 2021-12-17

## 2021-12-16 RX ORDER — INFLUENZA VIRUS VACCINE 15; 15; 15; 15 UG/.5ML; UG/.5ML; UG/.5ML; UG/.5ML
0.5 SUSPENSION INTRAMUSCULAR ONCE
Refills: 0 | Status: DISCONTINUED | OUTPATIENT
Start: 2021-12-16 | End: 2021-12-17

## 2021-12-16 RX ORDER — METOCLOPRAMIDE HCL 10 MG
10 TABLET ORAL
Refills: 0 | Status: DISCONTINUED | OUTPATIENT
Start: 2021-12-16 | End: 2021-12-17

## 2021-12-16 RX ORDER — SENNA PLUS 8.6 MG/1
2 TABLET ORAL AT BEDTIME
Refills: 0 | Status: DISCONTINUED | OUTPATIENT
Start: 2021-12-16 | End: 2021-12-17

## 2021-12-16 RX ORDER — METOCLOPRAMIDE HCL 10 MG
10 TABLET ORAL ONCE
Refills: 0 | Status: DISCONTINUED | OUTPATIENT
Start: 2021-12-16 | End: 2021-12-16

## 2021-12-16 RX ORDER — GLUCAGON INJECTION, SOLUTION 0.5 MG/.1ML
1 INJECTION, SOLUTION SUBCUTANEOUS ONCE
Refills: 0 | Status: DISCONTINUED | OUTPATIENT
Start: 2021-12-16 | End: 2021-12-17

## 2021-12-16 RX ORDER — DIPHENHYDRAMINE HCL 50 MG
25 CAPSULE ORAL EVERY 4 HOURS
Refills: 0 | Status: DISCONTINUED | OUTPATIENT
Start: 2021-12-16 | End: 2021-12-17

## 2021-12-16 RX ORDER — OXYCODONE HYDROCHLORIDE 5 MG/1
5 TABLET ORAL EVERY 8 HOURS
Refills: 0 | Status: DISCONTINUED | OUTPATIENT
Start: 2021-12-16 | End: 2021-12-17

## 2021-12-16 RX ADMIN — Medication 25 MILLIGRAM(S): at 09:26

## 2021-12-16 RX ADMIN — Medication 10 MILLIGRAM(S): at 06:17

## 2021-12-16 RX ADMIN — MORPHINE SULFATE 4 MILLIGRAM(S): 50 CAPSULE, EXTENDED RELEASE ORAL at 06:31

## 2021-12-16 RX ADMIN — Medication 4: at 17:17

## 2021-12-16 RX ADMIN — Medication 15 GRAM(S): at 22:20

## 2021-12-16 RX ADMIN — ONDANSETRON 4 MILLIGRAM(S): 8 TABLET, FILM COATED ORAL at 16:36

## 2021-12-16 RX ADMIN — Medication 10 MILLIGRAM(S): at 17:29

## 2021-12-16 RX ADMIN — SODIUM CHLORIDE 2000 MILLILITER(S): 9 INJECTION, SOLUTION INTRAVENOUS at 06:13

## 2021-12-16 RX ADMIN — ONDANSETRON 4 MILLIGRAM(S): 8 TABLET, FILM COATED ORAL at 06:30

## 2021-12-16 RX ADMIN — SODIUM CHLORIDE 100 MILLILITER(S): 9 INJECTION, SOLUTION INTRAVENOUS at 19:47

## 2021-12-16 RX ADMIN — Medication 3 MILLIGRAM(S): at 22:33

## 2021-12-16 RX ADMIN — MORPHINE SULFATE 4 MILLIGRAM(S): 50 CAPSULE, EXTENDED RELEASE ORAL at 09:22

## 2021-12-16 RX ADMIN — Medication 2: at 22:51

## 2021-12-16 RX ADMIN — PANTOPRAZOLE SODIUM 40 MILLIGRAM(S): 20 TABLET, DELAYED RELEASE ORAL at 15:32

## 2021-12-16 RX ADMIN — Medication 50 MILLIGRAM(S): at 06:38

## 2021-12-16 RX ADMIN — OXYCODONE HYDROCHLORIDE 5 MILLIGRAM(S): 5 TABLET ORAL at 17:29

## 2021-12-16 RX ADMIN — Medication 7 UNIT(S): at 16:04

## 2021-12-16 RX ADMIN — MORPHINE SULFATE 4 MILLIGRAM(S): 50 CAPSULE, EXTENDED RELEASE ORAL at 10:45

## 2021-12-16 RX ADMIN — Medication 25 MILLIGRAM(S): at 18:20
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
## 2021-12-16 NOTE — H&P ADULT - HISTORY OF PRESENT ILLNESS
31 years old female with h/o DM1 with gastroparesis, cyclic vomiting syndrome present to ED with complain of intractable nausea, vomiting and abdominal pain for 1 day. Vomiting is nonbloody, associated with constant 10/10 upper abdominal pain with no radiation. Unable to tolerate oral intake due to vomiting. No fever,chills or Chest pain.  Tachycardic upon ED arrival. EKG with NSR, VR 87, QTc 438. CXR image reviewed, no focal consolidation. No leukocytosis, Hb 10.2, MCV 60.8, Plt 403. Initial glucose elevated at 602 which improve to 160, K 4.4, AGAP 15, bicarb 18, beta hydroxy-butyrate 3, pH 7.39. CT abd with Prominent fluid-filled small bowel loops possible mild nonspecific enteritis. No obstructive pathology.Moderate to large colonic stool burden    SH: smoke 6 cigarettes daily, use marijuana

## 2021-12-16 NOTE — PATIENT PROFILE ADULT - FALL HARM RISK - UNIVERSAL INTERVENTIONS
Bed in lowest position, wheels locked, appropriate side rails in place/Call bell, personal items and telephone in reach/Instruct patient to call for assistance before getting out of bed or chair/Non-slip footwear when patient is out of bed/[place] to call system/Physically safe environment - no spills, clutter or unnecessary equipment/Purposeful Proactive Rounding/Room/bathroom lighting operational, light cord in reach

## 2021-12-16 NOTE — H&P ADULT - PROBLEM SELECTOR PLAN 2
Serum ketone positive, mildly low bicarb of 18, AGAP 15 with normal pH.  received 2L IV fluid in ED  Not in DKA at this moment  Likely due to fasting ketoacidosis   IV fluid  Continue lantus 10unit bid, premeal 6 tid and ISS  Monitor for DKA, repeat BMP in PM  Encourage oral intake as tolerated

## 2021-12-16 NOTE — ED PROVIDER NOTE - PROGRESS NOTE DETAILS
requiring ultrasounded IV for difficult access, labs pending. pt signed out to me from dr reynoso, pt presented with abdominal pain and vomiting, labs/ct/pain control, pt given more medication for pain, currently c/o pain pt received more pain medications but still reports pain, pt admitted for pain control

## 2021-12-16 NOTE — H&P ADULT - PROBLEM SELECTOR PLAN 4
Cr 1.14, baseline around 0.6  Likely pre-renal   IV fluid  Monitor renal function  Avoid nephrotoxic substances

## 2021-12-16 NOTE — ED PROVIDER NOTE - PHYSICAL EXAMINATION
VITAL SIGNS: I have reviewed nursing notes and confirm.   GEN: Well-developed; well-nourished; in mild acute abdominal painful distress, (+) nausea/vomiting. Speaking full sentences.  SKIN: Warm, pink, no rash, no diaphoresis, no cyanosis, well perfused.   HEAD: Normocephalic; atraumatic. No scalp lacerations, no abrasions.  NECK: Supple; non tender.   EYES: Pupils 3mm equal, round, reactive to light and accomodation, conjunctiva and sclera clear. Extra-ocular movements intact bilaterally.  ENT: No nasal discharge; airway clear. Trachea is midline. ORAL: No oropharyngeal exudates or erythema. Normal dentition.  CV: (+) tachycardia, s1, S2 normal; no murmurs, gallops, or rubs. No lower extremity pitting edema bilaterally. Capillary refill < 2 seconds throughout. Distal pulses intact 2+ throughout.  RESP: CTA bilaterally. No wheezes, rales, or rhonchi.   ABD: Normal bowel sounds, soft, non-distended, non-tender, no hepatosplenomegaly. No CVA tenderness bilaterally.  MSK: Normal range of motion and movement of all 4 extremities. No joint or muscular pain throughout. No clubbing.   BACK: No thoracolumbar midline or paravertebral tenderness. No step-offs or obvious deformities.  NEURO: Alert & oriented x 3, Grossly unremarkable. Sensory and motor intact throughout. No focal deficits. Gait: Fluid. Normal speech and coordination.   PSYCH: Cooperative, appropriate.

## 2021-12-16 NOTE — H&P ADULT - NSHPPHYSICALEXAM_GEN_ALL_CORE
CONSTITUTIONAL: Well developed, well nourished, alert and cooperative, no acute distress. BP- 105/62, HR- 96, RR-18  EYES: PERRL, EOMI  ENT: Mucosa moist, tongue normal. No pharyngeal erythema or exudate.   NECK: Neck supple, trachea midline, non-tender, no masses or thyromegaly.  CARDIAC: Normal S1 and S2. Regular rate and rhythms. No murmurs. Peripheral pulses intact  LUNGS: Clear to auscultation, equal air entry both lungs. No rales, rhonchi, wheezing. Normal respiratory effort.   ABDOMEN: Soft, nondistended. Tenderness noted in epigastric and mid abd region. No guarding or rebound tenderness. No hepatomegaly or splenomegaly. Bowel sound normal.   MUSCULOSKELETAL: Normocephalic, atraumatic. Spine normal without deformity or tenderness. No clubbing or cyanosis.  NEUROLOGICAL: No gross motor or sensory deficits. CN II-XII grossly intact  SKIN: no lesions or eruptions. Normal turgor  PSYCHIATRIC: A&O x 3, appropriate mood and affect

## 2021-12-16 NOTE — ED PROVIDER NOTE - OBJECTIVE STATEMENT
31F PMHx of DM, gastroparesis, colitis, ectopic pregnancy presenting with abdominal pain x few days. Described as generalized diffuse moderate abdominal pain, nonradiating a/w nausea/vomiting, inability to tolerate PO intake. Ran out of her insulin. Denies any chest pain, shortness of breath, fevers, chills, headaches, syncope, lightheadedness, visual complaints, other surgical hx, trauma, falls, dysuria/hematuria.

## 2021-12-16 NOTE — H&P ADULT - PROBLEM SELECTOR PLAN 3
mid and upper abd pain  IV PPI for now, switch to oral once able to tolerate oral intake  Maalox prn

## 2021-12-16 NOTE — ED PROVIDER NOTE - CLINICAL SUMMARY MEDICAL DECISION MAKING FREE TEXT BOX
Patient w/ hx of DM presenting with abdominal pain, nausea/vomiting, ill-appearing, and hyperglycemia, likely DKA vs  HHS. Currently, hemodynamically stable. Given the history, presentation, physical exam, there is a low suspicion for emergent precipitating factor of this hyperglycemic state such as atypical MI/ACS/NSTEMI, CVA, acute abdomen, or other serious bacterial illness.  - EKG, CXR, - CBC, BMP, LFTs, lipase, Mg, Phos, serum ketones/beta-hydroxybutyrate, VBG w/ lactate, COVID-19.  - Repeat FSG, CMP, VBG q1-2 hr until stable  - IVF bolus LR, Replete K+ if < 5.5; will hold insulin if < 3.5. Replete Mg < 2.0 and Phos < 1.0 as needed.  - IV insulin 0.1U/kg + drip (uptitrated to goal dec glucose 50-70/hr. D/C drip once ketoacidosis/AG < 12, bicarb > 18, FSG < 250.   - ICU consult/admit.

## 2021-12-16 NOTE — H&P ADULT - PROBLEM SELECTOR PLAN 5
Initial glucose elevated at 602 which improve to 160  Last A1c 9.6  Resume lantus 10 unit bid, lispro 6 tid, ISS  Hypoglycemia protocol

## 2021-12-16 NOTE — H&P ADULT - NSHPREVIEWOFSYSTEMS_GEN_ALL_CORE
CONSTITUTIONAL: No fever, chills or weight loss. Fatigue +  EYES: No eye pain. No vision changes  ENT:  No hearing changes or pain, No nasal congestion.  Cardiovascular:  No Chest Pain, palpitation, SOB  Respiratory:  No cough, SOB or wheezing  Gastrointestinal:  Nausea, vomiting and abd pain.   Genitourinary: No dysuria, frequency or hematuria  Musculoskeletal:  No myalgia or joint pain  Skin:  No skin lesion, rash or pruritis  Neuro:  generalized weakness  Psych:  No hallucination. No recent changes in mood  Heme/Lymph:  No easy bruising or bleeding  Endocrine:  No heat or cold intolerance

## 2021-12-16 NOTE — H&P ADULT - ASSESSMENT
31 years old female with h/o DM1 with gastroparesis, cyclic vomiting syndrome present to ED with complain of intractable nausea, vomiting and abdominal pain for 1 day  Tachycardic upon ED arrival. EKG with NSR, VR 87, QTc 438. CXR image reviewed, no focal consolidation. No leukocytosis, Hb 10.2, MCV 60.8, Plt 403. Initial glucose elevated at 602 which improve to 160, K 4.4, AGAP 15, bicarb 18, beta hydroxy-butyrate 3, pH 7.39. CT abd with Prominent fluid-filled small bowel loops possible mild nonspecific enteritis. No obstructive pathology. Moderate to large colonic stool burden    Admitted with intractable nausea and vomiting, abdominal pain

## 2021-12-17 VITALS
HEART RATE: 80 BPM | OXYGEN SATURATION: 98 % | RESPIRATION RATE: 18 BRPM | TEMPERATURE: 99 F | SYSTOLIC BLOOD PRESSURE: 119 MMHG | DIASTOLIC BLOOD PRESSURE: 69 MMHG

## 2021-12-17 LAB
GLUCOSE BLDC GLUCOMTR-MCNC: 246 MG/DL — HIGH (ref 70–99)
GLUCOSE BLDC GLUCOMTR-MCNC: 448 MG/DL — HIGH (ref 70–99)
GLUCOSE BLDC GLUCOMTR-MCNC: 70 MG/DL — SIGNIFICANT CHANGE UP (ref 70–99)

## 2021-12-17 PROCEDURE — 99239 HOSP IP/OBS DSCHRG MGMT >30: CPT

## 2021-12-17 RX ORDER — KETOROLAC TROMETHAMINE 30 MG/ML
15 SYRINGE (ML) INJECTION ONCE
Refills: 0 | Status: DISCONTINUED | OUTPATIENT
Start: 2021-12-17 | End: 2021-12-17

## 2021-12-17 RX ORDER — ACETAMINOPHEN 500 MG
1000 TABLET ORAL ONCE
Refills: 0 | Status: COMPLETED | OUTPATIENT
Start: 2021-12-17 | End: 2021-12-17

## 2021-12-17 RX ORDER — SENNA PLUS 8.6 MG/1
2 TABLET ORAL ONCE
Refills: 0 | Status: COMPLETED | OUTPATIENT
Start: 2021-12-17 | End: 2021-12-17

## 2021-12-17 RX ADMIN — OXYCODONE HYDROCHLORIDE 5 MILLIGRAM(S): 5 TABLET ORAL at 01:27

## 2021-12-17 RX ADMIN — SENNA PLUS 2 TABLET(S): 8.6 TABLET ORAL at 11:27

## 2021-12-17 RX ADMIN — Medication 25 MILLIGRAM(S): at 01:29

## 2021-12-17 RX ADMIN — ONDANSETRON 4 MILLIGRAM(S): 8 TABLET, FILM COATED ORAL at 01:53

## 2021-12-17 RX ADMIN — Medication 20 MILLIGRAM(S): at 11:49

## 2021-12-17 RX ADMIN — Medication 6: at 06:40

## 2021-12-17 RX ADMIN — INSULIN GLARGINE 10 UNIT(S): 100 INJECTION, SOLUTION SUBCUTANEOUS at 07:56

## 2021-12-17 RX ADMIN — Medication 400 MILLIGRAM(S): at 03:03

## 2021-12-17 RX ADMIN — Medication 1000 MILLIGRAM(S): at 03:26

## 2021-12-17 RX ADMIN — PANTOPRAZOLE SODIUM 40 MILLIGRAM(S): 20 TABLET, DELAYED RELEASE ORAL at 11:49

## 2021-12-17 RX ADMIN — Medication 10 MILLIGRAM(S): at 07:58

## 2021-12-17 RX ADMIN — Medication 10 MILLIGRAM(S): at 11:27

## 2021-12-17 RX ADMIN — Medication 6 UNIT(S): at 07:57

## 2021-12-17 RX ADMIN — OXYCODONE HYDROCHLORIDE 5 MILLIGRAM(S): 5 TABLET ORAL at 01:29

## 2021-12-17 NOTE — PROGRESS NOTE ADULT - PROBLEM SELECTOR PLAN 2
IV fluid  Continue lantus 10unit bid, premeal 6 tid and ISS  - Patient educated on not taking own insulin without talking to nurse   Encourage oral intake as tolerated - changed to diabetic diet

## 2021-12-17 NOTE — PROGRESS NOTE ADULT - SUBJECTIVE AND OBJECTIVE BOX
Patient is a 31y old  Female who presents with a chief complaint of intractable nausea, vomiting, abd pain, (16 Dec 2021 14:17)    INTERVAL HPI/OVERNIGHT EVENTS: Patients seen and examined at bedside this morning. No acute events overnight. Pt reports    MEDICATIONS  (STANDING):  dextrose 40% Gel 15 Gram(s) Oral once  dextrose 5%. 1000 milliLiter(s) (50 mL/Hr) IV Continuous <Continuous>  dextrose 5%. 1000 milliLiter(s) (100 mL/Hr) IV Continuous <Continuous>  dextrose 50% Injectable 25 Gram(s) IV Push once  dextrose 50% Injectable 12.5 Gram(s) IV Push once  dextrose 50% Injectable 25 Gram(s) IV Push once  glucagon  Injectable 1 milliGRAM(s) IntraMuscular once  influenza   Vaccine 0.5 milliLiter(s) IntraMuscular once  insulin glargine Injectable (LANTUS) 10 Unit(s) SubCutaneous every morning  insulin glargine Injectable (LANTUS) 10 Unit(s) SubCutaneous at bedtime  insulin lispro (ADMELOG) corrective regimen sliding scale   SubCutaneous three times a day before meals  insulin lispro (ADMELOG) corrective regimen sliding scale   SubCutaneous at bedtime  insulin lispro Injectable (ADMELOG) 6 Unit(s) SubCutaneous three times a day before meals  lactated ringers. 1000 milliLiter(s) (100 mL/Hr) IV Continuous <Continuous>  metoclopramide 10 milliGRAM(s) Oral three times a day before meals  pantoprazole  Injectable 40 milliGRAM(s) IV Push daily  senna 2 Tablet(s) Oral at bedtime    MEDICATIONS  (PRN):  acetaminophen     Tablet .. 650 milliGRAM(s) Oral every 6 hours PRN Temp greater or equal to 38C (100.4F), Mild Pain (1 - 3), Moderate Pain (4 - 6)  aluminum hydroxide/magnesium hydroxide/simethicone Suspension 30 milliLiter(s) Oral every 4 hours PRN Dyspepsia  diphenhydrAMINE Injectable 25 milliGRAM(s) IntraMuscular every 4 hours PRN Rash and/or Itching  melatonin 3 milliGRAM(s) Oral at bedtime PRN Insomnia  ondansetron Injectable 4 milliGRAM(s) IV Push every 6 hours PRN Nausea and/or Vomiting  polyethylene glycol 3350 17 Gram(s) Oral daily PRN Constipation    Allergies    No Known Allergies    Intolerances      REVIEW OF SYSTEMS:  All other systems reviewed and are negative    Vital Signs Last 24 Hrs  T(C): 37.1 (17 Dec 2021 11:35), Max: 37.1 (16 Dec 2021 13:39)  T(F): 98.8 (17 Dec 2021 11:35), Max: 98.8 (17 Dec 2021 11:35)  HR: 80 (17 Dec 2021 11:35) (80 - 96)  BP: 119/69 (17 Dec 2021 11:35) (105/62 - 126/75)  BP(mean): --  RR: 18 (17 Dec 2021 11:35) (16 - 18)  SpO2: 98% (17 Dec 2021 11:35) (97% - 99%)  Daily     Daily Weight in k.5 (16 Dec 2021 18:15)  I&O's Summary    CAPILLARY BLOOD GLUCOSE      POCT Blood Glucose.: 70 mg/dL (17 Dec 2021 11:19)  POCT Blood Glucose.: 246 mg/dL (17 Dec 2021 07:51)  POCT Blood Glucose.: 448 mg/dL (17 Dec 2021 06:19)  POCT Blood Glucose.: 313 mg/dL (16 Dec 2021 22:41)  POCT Blood Glucose.: 64 mg/dL (16 Dec 2021 22:03)  POCT Blood Glucose.: 69 mg/dL (16 Dec 2021 21:59)  POCT Blood Glucose.: 54 mg/dL (16 Dec 2021 21:41)  POCT Blood Glucose.: 331 mg/dL (16 Dec 2021 17:20)  POCT Blood Glucose.: 448 mg/dL (16 Dec 2021 15:46)    PHYSICAL EXAM:  GENERAL: NAD, well-groomed, well-developed  HEAD:  Atraumatic, Normocephalic  EYES: EOMI, PERRLA, conjunctiva and sclera clear  ENMT: No tonsillar erythema, exudates, or enlargement; Moist mucous membranes, Good dentition, No lesions  NECK: Supple, No JVD, Normal thyroid  NERVOUS SYSTEM:  Alert & Oriented X3, Good concentration; Motor Strength 5/5 B/L upper and lower extremities; DTRs 2+ intact and symmetric  CHEST/LUNG: Clear to percussion bilaterally; No rales, rhonchi, wheezing, or rubs  HEART: Regular rate and rhythm; No murmurs, rubs, or gallops  ABDOMEN: Soft, Nontender, Nondistended; Bowel sounds present  EXTREMITIES:  2+ Peripheral Pulses, No clubbing, cyanosis, or edema  LYMPH: No lymphadenopathy noted  SKIN: No rashes or lesions    Labs                          10.2   9.34  )-----------( 403      ( 16 Dec 2021 06:16 )             34.5     12-16    134<L>  |  102  |  16  ----------------------------<  394<H>  4.7   |  17<L>  |  0.99    Ca    8.2<L>      16 Dec 2021 17:13    TPro  7.4  /  Alb  2.7<L>  /  TBili  0.5  /  DBili  x   /  AST  10<L>  /  ALT  16  /  AlkPhos  107  12-16                        DVT prophylaxis: > Lovenox 40mg SQ daily  > Heparin   > SCD's Patient is a 31y old  Female who presents with a chief complaint of intractable nausea, vomiting, abd pain, (16 Dec 2021 14:17)    INTERVAL HPI/OVERNIGHT EVENTS: Patients seen and examined at bedside this morning. No acute events overnight. Pt reports she took her own insulin last night. Most likely contributing to the hypoglycemia overnight. Nurse was not aware of taking own insulin. Pt stated to AM nurse she wants to sign out AMA. Pt did not have any further episodes of vomiting or nausea. This morning states her left lower abdominal pain is still present. Pt states she did not have bowel movement since prior to arrival. Aware of cyst of left ovary. Last menstrual cycle was 3 1/2 weeks prior. Refused to give specimen for Utox. Pt reports diarrhea non-bloody at home.     MEDICATIONS  (STANDING):  dextrose 40% Gel 15 Gram(s) Oral once  dextrose 5%. 1000 milliLiter(s) (50 mL/Hr) IV Continuous <Continuous>  dextrose 5%. 1000 milliLiter(s) (100 mL/Hr) IV Continuous <Continuous>  dextrose 50% Injectable 25 Gram(s) IV Push once  dextrose 50% Injectable 12.5 Gram(s) IV Push once  dextrose 50% Injectable 25 Gram(s) IV Push once  glucagon  Injectable 1 milliGRAM(s) IntraMuscular once  influenza   Vaccine 0.5 milliLiter(s) IntraMuscular once  insulin glargine Injectable (LANTUS) 10 Unit(s) SubCutaneous every morning  insulin glargine Injectable (LANTUS) 10 Unit(s) SubCutaneous at bedtime  insulin lispro (ADMELOG) corrective regimen sliding scale   SubCutaneous three times a day before meals  insulin lispro (ADMELOG) corrective regimen sliding scale   SubCutaneous at bedtime  insulin lispro Injectable (ADMELOG) 6 Unit(s) SubCutaneous three times a day before meals  lactated ringers. 1000 milliLiter(s) (100 mL/Hr) IV Continuous <Continuous>  metoclopramide 10 milliGRAM(s) Oral three times a day before meals  pantoprazole  Injectable 40 milliGRAM(s) IV Push daily  senna 2 Tablet(s) Oral at bedtime    MEDICATIONS  (PRN):  acetaminophen     Tablet .. 650 milliGRAM(s) Oral every 6 hours PRN Temp greater or equal to 38C (100.4F), Mild Pain (1 - 3), Moderate Pain (4 - 6)  aluminum hydroxide/magnesium hydroxide/simethicone Suspension 30 milliLiter(s) Oral every 4 hours PRN Dyspepsia  diphenhydrAMINE Injectable 25 milliGRAM(s) IntraMuscular every 4 hours PRN Rash and/or Itching  melatonin 3 milliGRAM(s) Oral at bedtime PRN Insomnia  ondansetron Injectable 4 milliGRAM(s) IV Push every 6 hours PRN Nausea and/or Vomiting  polyethylene glycol 3350 17 Gram(s) Oral daily PRN Constipation    Allergies    No Known Allergies    Intolerances      REVIEW OF SYSTEMS:  All other systems reviewed and are negative    Vital Signs Last 24 Hrs  T(C): 37.1 (17 Dec 2021 11:35), Max: 37.1 (16 Dec 2021 13:39)  T(F): 98.8 (17 Dec 2021 11:35), Max: 98.8 (17 Dec 2021 11:35)  HR: 80 (17 Dec 2021 11:35) (80 - 96)  BP: 119/69 (17 Dec 2021 11:35) (105/62 - 126/75)  BP(mean): --  RR: 18 (17 Dec 2021 11:35) (16 - 18)  SpO2: 98% (17 Dec 2021 11:35) (97% - 99%)  Daily     Daily Weight in k.5 (16 Dec 2021 18:15)  I&O's Summary    CAPILLARY BLOOD GLUCOSE      POCT Blood Glucose.: 70 mg/dL (17 Dec 2021 11:19)  POCT Blood Glucose.: 246 mg/dL (17 Dec 2021 07:51)  POCT Blood Glucose.: 448 mg/dL (17 Dec 2021 06:19)  POCT Blood Glucose.: 313 mg/dL (16 Dec 2021 22:41)  POCT Blood Glucose.: 64 mg/dL (16 Dec 2021 22:03)  POCT Blood Glucose.: 69 mg/dL (16 Dec 2021 21:59)  POCT Blood Glucose.: 54 mg/dL (16 Dec 2021 21:41)  POCT Blood Glucose.: 331 mg/dL (16 Dec 2021 17:20)  POCT Blood Glucose.: 448 mg/dL (16 Dec 2021 15:46)    PHYSICAL EXAM:  GENERAL: NAD, well-groomed, well-developed  HEAD:  Atraumatic, Normocephalic  EYES: EOMI, PERRLA, conjunctiva and sclera clear  ENMT: No tonsillar erythema, exudates, or enlargement; Moist mucous membranes, Good dentition, No lesions  NECK: Supple, No JVD, Normal thyroid  NERVOUS SYSTEM:  Alert & Oriented X3, Good concentration; Motor Strength 5/5 B/L upper and lower extremities; DTRs 2+ intact and symmetric  CHEST/LUNG: Clear to percussion bilaterally; No rales, rhonchi, wheezing, or rubs  HEART: Regular rate and rhythm; No murmurs, rubs, or gallops  ABDOMEN: Soft, Nontender, Nondistended; Bowel sounds present  EXTREMITIES:  2+ Peripheral Pulses, No clubbing, cyanosis, or edema  LYMPH: No lymphadenopathy noted  SKIN: No rashes or lesions    Labs                          10.2   9.34  )-----------( 403      ( 16 Dec 2021 06:16 )             34.5     12-    134<L>  |  102  |  16  ----------------------------<  394<H>  4.7   |  17<L>  |  0.99    Ca    8.2<L>      16 Dec 2021 17:13    TPro  7.4  /  Alb  2.7<L>  /  TBili  0.5  /  DBili  x   /  AST  10<L>  /  ALT  16  /  AlkPhos  107  12-16                        DVT prophylaxis: > Lovenox 40mg SQ daily  > Heparin   > SCD's

## 2021-12-17 NOTE — PROGRESS NOTE ADULT - PROBLEM SELECTOR PLAN 1
intractable nausea and vomiting, clinically appear dehydrated, has KERRI  Continue IV fluid  Full liquid diet and advance as tolerated  Continue reglan tid  IV zofran prn  CT abd with Prominent fluid-filled small bowel loops possible mild nonspecific enteritis. Monitor for now, if persistent symptoms despite treatment  Check Utox- report last marijuana use was 1 month ago  Avoid narcotic unless absolutely necessary given h/o gastroparesis

## 2021-12-20 ENCOUNTER — INPATIENT (INPATIENT)
Facility: HOSPITAL | Age: 31
LOS: 3 days | Discharge: HOME | End: 2021-12-24
Attending: GENERAL ACUTE CARE HOSPITAL | Admitting: GENERAL ACUTE CARE HOSPITAL
Payer: MEDICAID

## 2021-12-20 VITALS
TEMPERATURE: 98 F | DIASTOLIC BLOOD PRESSURE: 65 MMHG | RESPIRATION RATE: 18 BRPM | SYSTOLIC BLOOD PRESSURE: 132 MMHG | HEIGHT: 66 IN | OXYGEN SATURATION: 98 % | HEART RATE: 85 BPM

## 2021-12-20 DIAGNOSIS — O00.9 ECTOPIC PREGNANCY, UNSPECIFIED: Chronic | ICD-10-CM

## 2021-12-20 LAB
25(OH)D3 SERPL-MCNC: 11.2 NG/ML
ALBUMIN SERPL ELPH-MCNC: 4.4 G/DL — SIGNIFICANT CHANGE UP (ref 3.3–5)
ALP SERPL-CCNC: 117 U/L — SIGNIFICANT CHANGE UP (ref 40–120)
ALT FLD-CCNC: 21 U/L — SIGNIFICANT CHANGE UP (ref 4–33)
AMYLASE/CREAT SERPL: 95 U/L
ANION GAP SERPL CALC-SCNC: 15 MMOL/L
ANION GAP SERPL CALC-SCNC: 20 MMOL/L — HIGH (ref 7–14)
AST SERPL-CCNC: 34 U/L — HIGH (ref 4–32)
B-OH-BUTYR SERPL-SCNC: 1.9 MMOL/L — HIGH (ref 0–0.4)
BASE EXCESS BLDV CALC-SCNC: -6.3 MMOL/L — LOW (ref -2–3)
BASOPHILS # BLD AUTO: 0.04 K/UL
BASOPHILS NFR BLD AUTO: 0.4 %
BILIRUB SERPL-MCNC: 0.6 MG/DL — SIGNIFICANT CHANGE UP (ref 0.2–1.2)
BLOOD GAS VENOUS COMPREHENSIVE RESULT: SIGNIFICANT CHANGE UP
BUN SERPL-MCNC: 19 MG/DL
BUN SERPL-MCNC: 23 MG/DL — SIGNIFICANT CHANGE UP (ref 7–23)
CALCIUM SERPL-MCNC: 10.2 MG/DL — SIGNIFICANT CHANGE UP (ref 8.4–10.5)
CALCIUM SERPL-MCNC: 9.3 MG/DL
CHLORIDE BLDV-SCNC: 101 MMOL/L — SIGNIFICANT CHANGE UP (ref 96–108)
CHLORIDE SERPL-SCNC: 103 MMOL/L
CHLORIDE SERPL-SCNC: 95 MMOL/L — LOW (ref 98–107)
CHOLEST SERPL-MCNC: 280 MG/DL
CO2 BLDV-SCNC: 21.5 MMOL/L — LOW (ref 22–26)
CO2 SERPL-SCNC: 18 MMOL/L
CO2 SERPL-SCNC: 19 MMOL/L — LOW (ref 22–31)
CREAT SERPL-MCNC: 0.8 MG/DL
CREAT SERPL-MCNC: 1 MG/DL — SIGNIFICANT CHANGE UP (ref 0.5–1.3)
CREAT SPEC-SCNC: 331 MG/DL
EOSINOPHIL # BLD AUTO: 0.47 K/UL
EOSINOPHIL NFR BLD AUTO: 5.2 %
FERRITIN SERPL-MCNC: 10 NG/ML
FOLATE SERPL-MCNC: 4.8 NG/ML
GAS PNL BLDV: 135 MMOL/L — LOW (ref 136–145)
GLUCOSE BLDV-MCNC: 488 MG/DL — CRITICAL HIGH (ref 70–99)
GLUCOSE SERPL-MCNC: 195 MG/DL
GLUCOSE SERPL-MCNC: 468 MG/DL — CRITICAL HIGH (ref 70–99)
HCO3 BLDV-SCNC: 20 MMOL/L — LOW (ref 22–29)
HCT VFR BLD CALC: 37.1 %
HCT VFR BLD CALC: 41.4 % — SIGNIFICANT CHANGE UP (ref 34.5–45)
HCT VFR BLDA CALC: 35 % — SIGNIFICANT CHANGE UP (ref 34.5–46.5)
HDLC SERPL-MCNC: 114 MG/DL
HGB BLD CALC-MCNC: 11.6 G/DL — SIGNIFICANT CHANGE UP (ref 11.5–15.5)
HGB BLD-MCNC: 10.8 G/DL
HGB BLD-MCNC: 12.1 G/DL — SIGNIFICANT CHANGE UP (ref 11.5–15.5)
IANC: 21.36 K/UL — HIGH (ref 1.5–8.5)
IMM GRANULOCYTES NFR BLD AUTO: 0.2 %
IRON SATN MFR SERPL: 8 %
IRON SERPL-MCNC: 34 UG/DL
LACTATE BLDV-MCNC: 6 MMOL/L — CRITICAL HIGH (ref 0.5–2)
LDLC SERPL CALC-MCNC: 141 MG/DL
LIDOCAIN IGE QN: 10 U/L — SIGNIFICANT CHANGE UP (ref 7–60)
LPL SERPL-CCNC: 14 U/L
LYMPHOCYTES # BLD AUTO: 2.62 K/UL
LYMPHOCYTES NFR BLD AUTO: 28.9 %
MAN DIFF?: NORMAL
MCHC RBC-ENTMCNC: 18.2 PG
MCHC RBC-ENTMCNC: 18.2 PG — LOW (ref 27–34)
MCHC RBC-ENTMCNC: 29.1 GM/DL
MCHC RBC-ENTMCNC: 29.2 GM/DL — LOW (ref 32–36)
MCV RBC AUTO: 62.3 FL — LOW (ref 80–100)
MCV RBC AUTO: 62.5 FL
MICROALBUMIN 24H UR DL<=1MG/L-MCNC: 219.5 MG/DL
MICROALBUMIN/CREAT 24H UR-RTO: 664 MG/G
MONOCYTES # BLD AUTO: 0.88 K/UL
MONOCYTES NFR BLD AUTO: 9.7 %
NEUTROPHILS # BLD AUTO: 5.04 K/UL
NEUTROPHILS NFR BLD AUTO: 55.6 %
NONHDLC SERPL-MCNC: 166 MG/DL
PCO2 BLDV: 43 MMHG — HIGH (ref 39–42)
PH BLDV: 7.28 — LOW (ref 7.32–7.43)
PLATELET # BLD AUTO: 445 K/UL
PLATELET # BLD AUTO: 468 K/UL — HIGH (ref 150–400)
PO2 BLDV: 37 MMHG — SIGNIFICANT CHANGE UP
POTASSIUM BLDV-SCNC: 3.6 MMOL/L — SIGNIFICANT CHANGE UP (ref 3.5–5.1)
POTASSIUM SERPL-MCNC: 3.8 MMOL/L — SIGNIFICANT CHANGE UP (ref 3.5–5.3)
POTASSIUM SERPL-SCNC: 3.8 MMOL/L
POTASSIUM SERPL-SCNC: 3.8 MMOL/L — SIGNIFICANT CHANGE UP (ref 3.5–5.3)
PROT SERPL-MCNC: 8.5 G/DL — HIGH (ref 6–8.3)
RBC # BLD: 5.94 M/UL
RBC # BLD: 6.65 M/UL — HIGH (ref 3.8–5.2)
RBC # FLD: 19.8 %
RBC # FLD: 21.6 % — HIGH (ref 10.3–14.5)
SAO2 % BLDV: 58.4 % — SIGNIFICANT CHANGE UP
SODIUM SERPL-SCNC: 134 MMOL/L — LOW (ref 135–145)
SODIUM SERPL-SCNC: 135 MMOL/L
TIBC SERPL-MCNC: 415 UG/DL
TRIGL SERPL-MCNC: 128 MG/DL
TSH SERPL-ACNC: 0.4 UIU/ML
UIBC SERPL-MCNC: 381 UG/DL
VIT B12 SERPL-MCNC: 309 PG/ML
WBC # BLD: 25.35 K/UL — HIGH (ref 3.8–10.5)
WBC # FLD AUTO: 25.35 K/UL — HIGH (ref 3.8–10.5)
WBC # FLD AUTO: 9.07 K/UL

## 2021-12-20 PROCEDURE — 99285 EMERGENCY DEPT VISIT HI MDM: CPT | Mod: 25

## 2021-12-20 PROCEDURE — 36000 PLACE NEEDLE IN VEIN: CPT

## 2021-12-20 RX ORDER — POTASSIUM CHLORIDE 20 MEQ
10 PACKET (EA) ORAL
Refills: 0 | Status: COMPLETED | OUTPATIENT
Start: 2021-12-20 | End: 2021-12-21

## 2021-12-20 RX ORDER — SODIUM CHLORIDE 9 MG/ML
1000 INJECTION INTRAMUSCULAR; INTRAVENOUS; SUBCUTANEOUS ONCE
Refills: 0 | Status: COMPLETED | OUTPATIENT
Start: 2021-12-20 | End: 2021-12-20

## 2021-12-20 RX ORDER — ONDANSETRON 8 MG/1
4 TABLET, FILM COATED ORAL ONCE
Refills: 0 | Status: COMPLETED | OUTPATIENT
Start: 2021-12-20 | End: 2021-12-20

## 2021-12-20 RX ORDER — HALOPERIDOL DECANOATE 100 MG/ML
5 INJECTION INTRAMUSCULAR ONCE
Refills: 0 | Status: COMPLETED | OUTPATIENT
Start: 2021-12-20 | End: 2021-12-20

## 2021-12-20 RX ORDER — CHLORPROMAZINE HCL 10 MG
50 TABLET ORAL ONCE
Refills: 0 | Status: COMPLETED | OUTPATIENT
Start: 2021-12-20 | End: 2021-12-20

## 2021-12-20 RX ORDER — MORPHINE SULFATE 50 MG/1
4 CAPSULE, EXTENDED RELEASE ORAL ONCE
Refills: 0 | Status: DISCONTINUED | OUTPATIENT
Start: 2021-12-20 | End: 2021-12-20

## 2021-12-20 RX ORDER — DIPHENHYDRAMINE HCL 50 MG
50 CAPSULE ORAL ONCE
Refills: 0 | Status: COMPLETED | OUTPATIENT
Start: 2021-12-20 | End: 2021-12-20

## 2021-12-20 RX ORDER — METOCLOPRAMIDE HCL 10 MG
10 TABLET ORAL ONCE
Refills: 0 | Status: COMPLETED | OUTPATIENT
Start: 2021-12-20 | End: 2021-12-20

## 2021-12-20 RX ORDER — INSULIN LISPRO 100/ML
8 VIAL (ML) SUBCUTANEOUS ONCE
Refills: 0 | Status: COMPLETED | OUTPATIENT
Start: 2021-12-20 | End: 2021-12-20

## 2021-12-20 RX ADMIN — ONDANSETRON 4 MILLIGRAM(S): 8 TABLET, FILM COATED ORAL at 22:52

## 2021-12-20 RX ADMIN — Medication 50 MILLIGRAM(S): at 21:51

## 2021-12-20 RX ADMIN — HALOPERIDOL DECANOATE 5 MILLIGRAM(S): 100 INJECTION INTRAMUSCULAR at 21:51

## 2021-12-20 RX ADMIN — HALOPERIDOL DECANOATE 5 MILLIGRAM(S): 100 INJECTION INTRAMUSCULAR at 21:00

## 2021-12-20 RX ADMIN — Medication 2 MILLIGRAM(S): at 21:00

## 2021-12-20 NOTE — ED PROVIDER NOTE - PHYSICAL EXAMINATION
General: NAD  HEENT: NCAT, PERRL  Cardiac: RRR, no murmurs, 2+ peripheral pulses  Chest: CTA  Abdomen: soft, non-distended, bowel sounds present, no ttp, no rebound or guarding  Extremities: no peripheral edema, calf tenderness, or leg size discrepancies  Skin: no rashes  Neuro: AAOx3, motor and sensory grossly intact  Psych: +suicidal

## 2021-12-20 NOTE — ED PROVIDER NOTE - OBJECTIVE STATEMENT
30yo F hx of gastroparesis, ectopic s/p salpingectomy, DM1 comes to ED for abdominal pain. Chronic, says it has been happening for a month, vomiting, states "I just want to kill myself." Saying she does not want to live with this pain. Despite offering medications for pain, pt attempted strangulation w/ call bell.

## 2021-12-20 NOTE — ED ADULT TRIAGE NOTE - CHIEF COMPLAINT QUOTE
p/t with hx gastroparesis c/o of vomiting and severe abd pain for few days, p/t appears uncomfortable, asking for morphine injection, Zofran 4 mg given by EMS fs [id number]

## 2021-12-20 NOTE — ED PROVIDER NOTE - PROGRESS NOTE DETAILS
TESSA Vazquez (PGY-2) - pt attempted to strangle self x3 w/ call bell and computer wiring. Security, constant obs, IM benadryl, ativan, haldol given, wiring removed as much as possible. Bhanu Rutherford DO PGY-1: Spoke with MICU, they recommend more fluids and additional admelog and recheck labs. Hold off on insulin gtt at this point. Bhanu Rutherford DO PGY-1: Recevied sign out from night resident, pt pending rpt bmp/vbg. Rpt labs s/f worsening anion gap, beta hydroxy butyrate, lactate.  Spoke with MICU, they recommend more fluids and additional admelog and recheck labs. Hold off on insulin gtt at this point. Octavio Madsen DO - labs rechecked after 3rd admelog (insulin gtt was not started) and gap closed with normalized vbg. Rejected by MICU. Spoke to endocrine, rec subq lantus, can admit to floor

## 2021-12-20 NOTE — ED ADULT NURSE NOTE - OBJECTIVE STATEMENT
pt. received to room 24, A&Ox4 presented with cyclic vomiting. As pt. entered the room, pt. grabbed the call bell, and wrapped it around her neck in attempt to commit suicide, stating "I can't take this anymore". No blood was noted in vomit. Labs sent but no IV access obtained. pt. became aggressive, security was called and pt. was medicated as ordered. pt. started to calm down, when she grabbed another wire in the room and wrapped it around her neck again. pt. placed in 4 point restraints at this time. PCA at bedside and out of arms rest

## 2021-12-20 NOTE — ED PROVIDER NOTE - CLINICAL SUMMARY MEDICAL DECISION MAKING FREE TEXT BOX
Pt w/ known gastroparesis comes to ED for abdominal pain. VSS. Attempted to strangle self w/ call bell during initial evaluation. Abdomen soft, no guarding or distension. Rest of exam unable to be completed w/ reliability given current state. Will provide medications, antiemetics, analgesia, IM sedatives, labs to assess for DKA. Constant obs, restraints for attempted self strangulation.

## 2021-12-20 NOTE — ED PROVIDER NOTE - CARE PLAN
Principal Discharge DX:	Abdominal pain  Secondary Diagnosis:	Suicide attempt   1 Principal Discharge DX:	DKA (diabetic ketoacidosis)  Secondary Diagnosis:	Suicide attempt  Secondary Diagnosis:	Abdominal pain

## 2021-12-20 NOTE — ED PROVIDER NOTE - ATTENDING CONTRIBUTION TO CARE
agree with resident note    "32yo F hx of gastroparesis, ectopic s/p salpingectomy, DM1 comes to ED for abdominal pain. Chronic, says it has been happening for a month, vomiting, states "I just want to kill myself." Saying she does not want to live with this pain. Despite offering medications for pain, pt attempted strangulation w/ call bell."    multiple attempts made by her grabbing cords to attempt to strangulate; thrashing at times in bed and at times somewhat consolable; 5 security guards at bedside watching and holding pt for safety  given 2mg ativan/5 mg haldol/50 mg benadryl with no response  given 5mg haldol additionally; minimal response  50mg thorazine added and 4 pt restraints; pt placed on monitor  labs drawn    PE: highly agitated; tachycardic; dry heaving; normotensive; CTAB/L; rest of exam difficult to perform given agitation    1:1 order placed  restraint order placed  pt with suicidal gestures, has been in DKA in past and is here for gastroparesis; labs, zofran, IVF, psych consult and reassessment

## 2021-12-21 DIAGNOSIS — Z86.39 PERSONAL HISTORY OF OTHER ENDOCRINE, NUTRITIONAL AND METABOLIC DISEASE: ICD-10-CM

## 2021-12-21 DIAGNOSIS — Z29.9 ENCOUNTER FOR PROPHYLACTIC MEASURES, UNSPECIFIED: ICD-10-CM

## 2021-12-21 DIAGNOSIS — I10 ESSENTIAL (PRIMARY) HYPERTENSION: ICD-10-CM

## 2021-12-21 DIAGNOSIS — E10.10 TYPE 1 DIABETES MELLITUS WITH KETOACIDOSIS WITHOUT COMA: ICD-10-CM

## 2021-12-21 DIAGNOSIS — E78.49 OTHER HYPERLIPIDEMIA: ICD-10-CM

## 2021-12-21 DIAGNOSIS — E11.9 TYPE 2 DIABETES MELLITUS WITHOUT COMPLICATIONS: ICD-10-CM

## 2021-12-21 DIAGNOSIS — E11.10 TYPE 2 DIABETES MELLITUS WITH KETOACIDOSIS WITHOUT COMA: ICD-10-CM

## 2021-12-21 DIAGNOSIS — E11.43 TYPE 2 DIABETES MELLITUS WITH DIABETIC AUTONOMIC (POLY)NEUROPATHY: ICD-10-CM

## 2021-12-21 DIAGNOSIS — R45.851 SUICIDAL IDEATIONS: ICD-10-CM

## 2021-12-21 DIAGNOSIS — Z90.49 ACQUIRED ABSENCE OF OTHER SPECIFIED PARTS OF DIGESTIVE TRACT: Chronic | ICD-10-CM

## 2021-12-21 DIAGNOSIS — F17.200 NICOTINE DEPENDENCE, UNSPECIFIED, UNCOMPLICATED: ICD-10-CM

## 2021-12-21 LAB
ANION GAP SERPL CALC-SCNC: 14 MMOL/L — SIGNIFICANT CHANGE UP (ref 7–14)
ANION GAP SERPL CALC-SCNC: 20 MMOL/L — HIGH (ref 7–14)
ANION GAP SERPL CALC-SCNC: 24 MMOL/L — HIGH (ref 7–14)
ANISOCYTOSIS BLD QL: SLIGHT — SIGNIFICANT CHANGE UP
B PERT DNA SPEC QL NAA+PROBE: SIGNIFICANT CHANGE UP
B PERT+PARAPERT DNA PNL SPEC NAA+PROBE: SIGNIFICANT CHANGE UP
B-OH-BUTYR SERPL-SCNC: 2 MMOL/L — HIGH (ref 0–0.4)
B-OH-BUTYR SERPL-SCNC: 5.8 MMOL/L — HIGH (ref 0–0.4)
BASE EXCESS BLDV CALC-SCNC: -10.3 MMOL/L — LOW (ref -2–3)
BASE EXCESS BLDV CALC-SCNC: -3.2 MMOL/L — LOW (ref -2–3)
BASE EXCESS BLDV CALC-SCNC: -5.8 MMOL/L — LOW (ref -2–3)
BASOPHILS # BLD AUTO: 0 K/UL — SIGNIFICANT CHANGE UP (ref 0–0.2)
BASOPHILS NFR BLD AUTO: 0 % — SIGNIFICANT CHANGE UP (ref 0–2)
BLOOD GAS VENOUS COMPREHENSIVE RESULT: SIGNIFICANT CHANGE UP
BORDETELLA PARAPERTUSSIS (RAPRVP): SIGNIFICANT CHANGE UP
BUN SERPL-MCNC: 18 MG/DL — SIGNIFICANT CHANGE UP (ref 7–23)
BUN SERPL-MCNC: 19 MG/DL — SIGNIFICANT CHANGE UP (ref 7–23)
BUN SERPL-MCNC: 20 MG/DL — SIGNIFICANT CHANGE UP (ref 7–23)
C PNEUM DNA SPEC QL NAA+PROBE: SIGNIFICANT CHANGE UP
CALCIUM SERPL-MCNC: 8.9 MG/DL — SIGNIFICANT CHANGE UP (ref 8.4–10.5)
CALCIUM SERPL-MCNC: 9 MG/DL — SIGNIFICANT CHANGE UP (ref 8.4–10.5)
CALCIUM SERPL-MCNC: 9.2 MG/DL — SIGNIFICANT CHANGE UP (ref 8.4–10.5)
CHLORIDE BLDV-SCNC: 105 MMOL/L — SIGNIFICANT CHANGE UP (ref 96–108)
CHLORIDE BLDV-SCNC: 107 MMOL/L — SIGNIFICANT CHANGE UP (ref 96–108)
CHLORIDE BLDV-SCNC: 108 MMOL/L — SIGNIFICANT CHANGE UP (ref 96–108)
CHLORIDE SERPL-SCNC: 100 MMOL/L — SIGNIFICANT CHANGE UP (ref 98–107)
CHLORIDE SERPL-SCNC: 102 MMOL/L — SIGNIFICANT CHANGE UP (ref 98–107)
CHLORIDE SERPL-SCNC: 107 MMOL/L — SIGNIFICANT CHANGE UP (ref 98–107)
CO2 BLDV-SCNC: 16.5 MMOL/L — LOW (ref 22–26)
CO2 BLDV-SCNC: 20.5 MMOL/L — LOW (ref 22–26)
CO2 BLDV-SCNC: 22.1 MMOL/L — SIGNIFICANT CHANGE UP (ref 22–26)
CO2 SERPL-SCNC: 15 MMOL/L — LOW (ref 22–31)
CO2 SERPL-SCNC: 16 MMOL/L — LOW (ref 22–31)
CO2 SERPL-SCNC: 19 MMOL/L — LOW (ref 22–31)
CREAT SERPL-MCNC: 0.88 MG/DL — SIGNIFICANT CHANGE UP (ref 0.5–1.3)
CREAT SERPL-MCNC: 0.94 MG/DL — SIGNIFICANT CHANGE UP (ref 0.5–1.3)
CREAT SERPL-MCNC: 0.95 MG/DL — SIGNIFICANT CHANGE UP (ref 0.5–1.3)
EOSINOPHIL # BLD AUTO: 0.23 K/UL — SIGNIFICANT CHANGE UP (ref 0–0.5)
EOSINOPHIL NFR BLD AUTO: 0.9 % — SIGNIFICANT CHANGE UP (ref 0–6)
FLUAV SUBTYP SPEC NAA+PROBE: SIGNIFICANT CHANGE UP
FLUBV RNA SPEC QL NAA+PROBE: SIGNIFICANT CHANGE UP
GAS PNL BLDV: 138 MMOL/L — SIGNIFICANT CHANGE UP (ref 136–145)
GAS PNL BLDV: 139 MMOL/L — SIGNIFICANT CHANGE UP (ref 136–145)
GAS PNL BLDV: 140 MMOL/L — SIGNIFICANT CHANGE UP (ref 136–145)
GLUCOSE BLDC GLUCOMTR-MCNC: 296 MG/DL — HIGH (ref 70–99)
GLUCOSE BLDC GLUCOMTR-MCNC: 309 MG/DL — HIGH (ref 70–99)
GLUCOSE BLDC GLUCOMTR-MCNC: 424 MG/DL — HIGH (ref 70–99)
GLUCOSE BLDC GLUCOMTR-MCNC: 424 MG/DL — HIGH (ref 70–99)
GLUCOSE BLDV-MCNC: 253 MG/DL — HIGH (ref 70–99)
GLUCOSE BLDV-MCNC: 459 MG/DL — CRITICAL HIGH (ref 70–99)
GLUCOSE BLDV-MCNC: 490 MG/DL — CRITICAL HIGH (ref 70–99)
GLUCOSE SERPL-MCNC: 243 MG/DL — HIGH (ref 70–99)
GLUCOSE SERPL-MCNC: 411 MG/DL — HIGH (ref 70–99)
GLUCOSE SERPL-MCNC: 446 MG/DL — HIGH (ref 70–99)
HADV DNA SPEC QL NAA+PROBE: SIGNIFICANT CHANGE UP
HCO3 BLDV-SCNC: 16 MMOL/L — LOW (ref 22–29)
HCO3 BLDV-SCNC: 19 MMOL/L — LOW (ref 22–29)
HCO3 BLDV-SCNC: 21 MMOL/L — LOW (ref 22–29)
HCOV 229E RNA SPEC QL NAA+PROBE: SIGNIFICANT CHANGE UP
HCOV HKU1 RNA SPEC QL NAA+PROBE: SIGNIFICANT CHANGE UP
HCOV NL63 RNA SPEC QL NAA+PROBE: SIGNIFICANT CHANGE UP
HCOV OC43 RNA SPEC QL NAA+PROBE: SIGNIFICANT CHANGE UP
HCT VFR BLDA CALC: 28 % — LOW (ref 34.5–46.5)
HCT VFR BLDA CALC: 30 % — LOW (ref 34.5–46.5)
HCT VFR BLDA CALC: 30 % — LOW (ref 34.5–46.5)
HGB BLD CALC-MCNC: 10 G/DL — LOW (ref 11.5–15.5)
HGB BLD CALC-MCNC: 9.2 G/DL — LOW (ref 11.5–15.5)
HGB BLD CALC-MCNC: 9.9 G/DL — LOW (ref 11.5–15.5)
HMPV RNA SPEC QL NAA+PROBE: SIGNIFICANT CHANGE UP
HPIV1 RNA SPEC QL NAA+PROBE: SIGNIFICANT CHANGE UP
HPIV2 RNA SPEC QL NAA+PROBE: SIGNIFICANT CHANGE UP
HPIV3 RNA SPEC QL NAA+PROBE: SIGNIFICANT CHANGE UP
HPIV4 RNA SPEC QL NAA+PROBE: SIGNIFICANT CHANGE UP
LACTATE BLDV-MCNC: 2.3 MMOL/L — HIGH (ref 0.5–2)
LACTATE BLDV-MCNC: 2.3 MMOL/L — HIGH (ref 0.5–2)
LACTATE BLDV-MCNC: 3.2 MMOL/L — HIGH (ref 0.5–2)
LYMPHOCYTES # BLD AUTO: 0.66 K/UL — LOW (ref 1–3.3)
LYMPHOCYTES # BLD AUTO: 2.6 % — LOW (ref 13–44)
M PNEUMO DNA SPEC QL NAA+PROBE: SIGNIFICANT CHANGE UP
MAGNESIUM SERPL-MCNC: 1.8 MG/DL — SIGNIFICANT CHANGE UP (ref 1.6–2.6)
MANUAL SMEAR VERIFICATION: SIGNIFICANT CHANGE UP
MONOCYTES # BLD AUTO: 0.89 K/UL — SIGNIFICANT CHANGE UP (ref 0–0.9)
MONOCYTES NFR BLD AUTO: 3.5 % — SIGNIFICANT CHANGE UP (ref 2–14)
NEUTROPHILS # BLD AUTO: 22.89 K/UL — HIGH (ref 1.8–7.4)
NEUTROPHILS NFR BLD AUTO: 90.3 % — HIGH (ref 43–77)
OVALOCYTES BLD QL SMEAR: SLIGHT — SIGNIFICANT CHANGE UP
PCO2 BLDV: 33 MMHG — LOW (ref 39–42)
PCO2 BLDV: 34 MMHG — LOW (ref 39–42)
PCO2 BLDV: 36 MMHG — LOW (ref 39–42)
PH BLDV: 7.28 — LOW (ref 7.32–7.43)
PH BLDV: 7.34 — SIGNIFICANT CHANGE UP (ref 7.32–7.43)
PH BLDV: 7.4 — SIGNIFICANT CHANGE UP (ref 7.32–7.43)
PHOSPHATE SERPL-MCNC: 2.7 MG/DL — SIGNIFICANT CHANGE UP (ref 2.5–4.5)
PLAT MORPH BLD: NORMAL — SIGNIFICANT CHANGE UP
PLATELET COUNT - ESTIMATE: NORMAL — SIGNIFICANT CHANGE UP
PO2 BLDV: 44 MMHG — SIGNIFICANT CHANGE UP
PO2 BLDV: 56 MMHG — SIGNIFICANT CHANGE UP
PO2 BLDV: 60 MMHG — SIGNIFICANT CHANGE UP
POIKILOCYTOSIS BLD QL AUTO: SLIGHT — SIGNIFICANT CHANGE UP
POLYCHROMASIA BLD QL SMEAR: SLIGHT — SIGNIFICANT CHANGE UP
POTASSIUM BLDV-SCNC: 4.1 MMOL/L — SIGNIFICANT CHANGE UP (ref 3.5–5.1)
POTASSIUM BLDV-SCNC: 4.6 MMOL/L — SIGNIFICANT CHANGE UP (ref 3.5–5.1)
POTASSIUM BLDV-SCNC: 4.8 MMOL/L — SIGNIFICANT CHANGE UP (ref 3.5–5.1)
POTASSIUM SERPL-MCNC: 4 MMOL/L — SIGNIFICANT CHANGE UP (ref 3.5–5.3)
POTASSIUM SERPL-MCNC: 4.6 MMOL/L — SIGNIFICANT CHANGE UP (ref 3.5–5.3)
POTASSIUM SERPL-MCNC: 4.7 MMOL/L — SIGNIFICANT CHANGE UP (ref 3.5–5.3)
POTASSIUM SERPL-SCNC: 4 MMOL/L — SIGNIFICANT CHANGE UP (ref 3.5–5.3)
POTASSIUM SERPL-SCNC: 4.6 MMOL/L — SIGNIFICANT CHANGE UP (ref 3.5–5.3)
POTASSIUM SERPL-SCNC: 4.7 MMOL/L — SIGNIFICANT CHANGE UP (ref 3.5–5.3)
RAPID RVP RESULT: SIGNIFICANT CHANGE UP
RBC BLD AUTO: ABNORMAL
RSV RNA SPEC QL NAA+PROBE: SIGNIFICANT CHANGE UP
RV+EV RNA SPEC QL NAA+PROBE: SIGNIFICANT CHANGE UP
SAO2 % BLDV: 68.7 % — SIGNIFICANT CHANGE UP
SAO2 % BLDV: 84.4 % — SIGNIFICANT CHANGE UP
SAO2 % BLDV: 88.7 % — SIGNIFICANT CHANGE UP
SARS-COV-2 RNA SPEC QL NAA+PROBE: SIGNIFICANT CHANGE UP
SODIUM SERPL-SCNC: 138 MMOL/L — SIGNIFICANT CHANGE UP (ref 135–145)
SODIUM SERPL-SCNC: 139 MMOL/L — SIGNIFICANT CHANGE UP (ref 135–145)
SODIUM SERPL-SCNC: 140 MMOL/L — SIGNIFICANT CHANGE UP (ref 135–145)
TARGETS BLD QL SMEAR: SLIGHT — SIGNIFICANT CHANGE UP
VARIANT LYMPHS # BLD: 2.7 % — SIGNIFICANT CHANGE UP (ref 0–6)

## 2021-12-21 PROCEDURE — 93010 ELECTROCARDIOGRAM REPORT: CPT

## 2021-12-21 PROCEDURE — 76937 US GUIDE VASCULAR ACCESS: CPT | Mod: 26

## 2021-12-21 PROCEDURE — 99233 SBSQ HOSP IP/OBS HIGH 50: CPT | Mod: GC

## 2021-12-21 PROCEDURE — 99255 IP/OBS CONSLTJ NEW/EST HI 80: CPT | Mod: GC

## 2021-12-21 PROCEDURE — 99223 1ST HOSP IP/OBS HIGH 75: CPT

## 2021-12-21 RX ORDER — SODIUM CHLORIDE 9 MG/ML
1000 INJECTION INTRAMUSCULAR; INTRAVENOUS; SUBCUTANEOUS
Refills: 0 | Status: DISCONTINUED | OUTPATIENT
Start: 2021-12-21 | End: 2021-12-22

## 2021-12-21 RX ORDER — DIPHENHYDRAMINE HCL 50 MG
25 CAPSULE ORAL EVERY 4 HOURS
Refills: 0 | Status: COMPLETED | OUTPATIENT
Start: 2021-12-21 | End: 2021-12-23

## 2021-12-21 RX ORDER — INSULIN GLARGINE 100 [IU]/ML
18 INJECTION, SOLUTION SUBCUTANEOUS
Refills: 0 | Status: DISCONTINUED | OUTPATIENT
Start: 2021-12-22 | End: 2021-12-23

## 2021-12-21 RX ORDER — DEXTROSE 50 % IN WATER 50 %
15 SYRINGE (ML) INTRAVENOUS ONCE
Refills: 0 | Status: DISCONTINUED | OUTPATIENT
Start: 2021-12-21 | End: 2021-12-24

## 2021-12-21 RX ORDER — INSULIN LISPRO 100/ML
VIAL (ML) SUBCUTANEOUS AT BEDTIME
Refills: 0 | Status: DISCONTINUED | OUTPATIENT
Start: 2021-12-21 | End: 2021-12-24

## 2021-12-21 RX ORDER — INSULIN HUMAN 100 [IU]/ML
6 INJECTION, SOLUTION SUBCUTANEOUS
Qty: 100 | Refills: 0 | Status: DISCONTINUED | OUTPATIENT
Start: 2021-12-21 | End: 2021-12-21

## 2021-12-21 RX ORDER — DIPHENHYDRAMINE HCL 50 MG
25 CAPSULE ORAL ONCE
Refills: 0 | Status: COMPLETED | OUTPATIENT
Start: 2021-12-21 | End: 2021-12-21

## 2021-12-21 RX ORDER — SODIUM CHLORIDE 9 MG/ML
1000 INJECTION, SOLUTION INTRAVENOUS ONCE
Refills: 0 | Status: COMPLETED | OUTPATIENT
Start: 2021-12-21 | End: 2021-12-21

## 2021-12-21 RX ORDER — INSULIN LISPRO 100/ML
VIAL (ML) SUBCUTANEOUS
Refills: 0 | Status: DISCONTINUED | OUTPATIENT
Start: 2021-12-21 | End: 2021-12-22

## 2021-12-21 RX ORDER — DEXTROSE 50 % IN WATER 50 %
25 SYRINGE (ML) INTRAVENOUS ONCE
Refills: 0 | Status: DISCONTINUED | OUTPATIENT
Start: 2021-12-21 | End: 2021-12-24

## 2021-12-21 RX ORDER — MORPHINE SULFATE 50 MG/1
4 CAPSULE, EXTENDED RELEASE ORAL ONCE
Refills: 0 | Status: DISCONTINUED | OUTPATIENT
Start: 2021-12-21 | End: 2021-12-21

## 2021-12-21 RX ORDER — ONDANSETRON 8 MG/1
4 TABLET, FILM COATED ORAL ONCE
Refills: 0 | Status: COMPLETED | OUTPATIENT
Start: 2021-12-21 | End: 2021-12-21

## 2021-12-21 RX ORDER — HYDROMORPHONE HYDROCHLORIDE 2 MG/ML
0.25 INJECTION INTRAMUSCULAR; INTRAVENOUS; SUBCUTANEOUS EVERY 4 HOURS
Refills: 0 | Status: DISCONTINUED | OUTPATIENT
Start: 2021-12-21 | End: 2021-12-24

## 2021-12-21 RX ORDER — INSULIN LISPRO 100/ML
8 VIAL (ML) SUBCUTANEOUS ONCE
Refills: 0 | Status: COMPLETED | OUTPATIENT
Start: 2021-12-21 | End: 2021-12-21

## 2021-12-21 RX ORDER — SODIUM CHLORIDE 9 MG/ML
1000 INJECTION, SOLUTION INTRAVENOUS
Refills: 0 | Status: DISCONTINUED | OUTPATIENT
Start: 2021-12-21 | End: 2021-12-24

## 2021-12-21 RX ORDER — INSULIN HUMAN 100 [IU]/ML
8 INJECTION, SOLUTION SUBCUTANEOUS ONCE
Refills: 0 | Status: DISCONTINUED | OUTPATIENT
Start: 2021-12-21 | End: 2021-12-21

## 2021-12-21 RX ORDER — DEXTROSE 50 % IN WATER 50 %
12.5 SYRINGE (ML) INTRAVENOUS ONCE
Refills: 0 | Status: DISCONTINUED | OUTPATIENT
Start: 2021-12-21 | End: 2021-12-24

## 2021-12-21 RX ORDER — SODIUM CHLORIDE 9 MG/ML
1000 INJECTION, SOLUTION INTRAVENOUS
Refills: 0 | Status: DISCONTINUED | OUTPATIENT
Start: 2021-12-21 | End: 2021-12-21

## 2021-12-21 RX ORDER — KETOROLAC TROMETHAMINE 30 MG/ML
15 SYRINGE (ML) INJECTION ONCE
Refills: 0 | Status: DISCONTINUED | OUTPATIENT
Start: 2021-12-21 | End: 2021-12-21

## 2021-12-21 RX ORDER — PANTOPRAZOLE SODIUM 20 MG/1
40 TABLET, DELAYED RELEASE ORAL DAILY
Refills: 0 | Status: DISCONTINUED | OUTPATIENT
Start: 2021-12-21 | End: 2021-12-24

## 2021-12-21 RX ORDER — ONDANSETRON 8 MG/1
4 TABLET, FILM COATED ORAL EVERY 8 HOURS
Refills: 0 | Status: DISCONTINUED | OUTPATIENT
Start: 2021-12-21 | End: 2021-12-24

## 2021-12-21 RX ORDER — ACETAMINOPHEN 500 MG
650 TABLET ORAL EVERY 6 HOURS
Refills: 0 | Status: DISCONTINUED | OUTPATIENT
Start: 2021-12-21 | End: 2021-12-24

## 2021-12-21 RX ORDER — ENOXAPARIN SODIUM 100 MG/ML
40 INJECTION SUBCUTANEOUS DAILY
Refills: 0 | Status: DISCONTINUED | OUTPATIENT
Start: 2021-12-21 | End: 2021-12-24

## 2021-12-21 RX ORDER — INSULIN LISPRO 100/ML
4 VIAL (ML) SUBCUTANEOUS
Refills: 0 | Status: DISCONTINUED | OUTPATIENT
Start: 2021-12-21 | End: 2021-12-22

## 2021-12-21 RX ORDER — GLUCAGON INJECTION, SOLUTION 0.5 MG/.1ML
1 INJECTION, SOLUTION SUBCUTANEOUS ONCE
Refills: 0 | Status: DISCONTINUED | OUTPATIENT
Start: 2021-12-21 | End: 2021-12-24

## 2021-12-21 RX ORDER — NICOTINE POLACRILEX 2 MG
1 GUM BUCCAL DAILY
Refills: 0 | Status: DISCONTINUED | OUTPATIENT
Start: 2021-12-21 | End: 2021-12-24

## 2021-12-21 RX ORDER — INSULIN GLARGINE 100 [IU]/ML
18 INJECTION, SOLUTION SUBCUTANEOUS ONCE
Refills: 0 | Status: COMPLETED | OUTPATIENT
Start: 2021-12-21 | End: 2021-12-21

## 2021-12-21 RX ADMIN — Medication 4 UNIT(S): at 17:17

## 2021-12-21 RX ADMIN — MORPHINE SULFATE 4 MILLIGRAM(S): 50 CAPSULE, EXTENDED RELEASE ORAL at 07:04

## 2021-12-21 RX ADMIN — ONDANSETRON 4 MILLIGRAM(S): 8 TABLET, FILM COATED ORAL at 11:09

## 2021-12-21 RX ADMIN — SODIUM CHLORIDE 1000 MILLILITER(S): 9 INJECTION, SOLUTION INTRAVENOUS at 05:59

## 2021-12-21 RX ADMIN — Medication 8 UNIT(S): at 09:27

## 2021-12-21 RX ADMIN — Medication 25 MILLIGRAM(S): at 16:20

## 2021-12-21 RX ADMIN — HYDROMORPHONE HYDROCHLORIDE 0.25 MILLIGRAM(S): 2 INJECTION INTRAMUSCULAR; INTRAVENOUS; SUBCUTANEOUS at 18:45

## 2021-12-21 RX ADMIN — SODIUM CHLORIDE 80 MILLILITER(S): 9 INJECTION INTRAMUSCULAR; INTRAVENOUS; SUBCUTANEOUS at 18:10

## 2021-12-21 RX ADMIN — HYDROMORPHONE HYDROCHLORIDE 0.25 MILLIGRAM(S): 2 INJECTION INTRAMUSCULAR; INTRAVENOUS; SUBCUTANEOUS at 19:15

## 2021-12-21 RX ADMIN — SODIUM CHLORIDE 1000 MILLILITER(S): 9 INJECTION, SOLUTION INTRAVENOUS at 11:17

## 2021-12-21 RX ADMIN — Medication 100 MILLIEQUIVALENT(S): at 02:02

## 2021-12-21 RX ADMIN — INSULIN GLARGINE 18 UNIT(S): 100 INJECTION, SOLUTION SUBCUTANEOUS at 14:13

## 2021-12-21 RX ADMIN — MORPHINE SULFATE 4 MILLIGRAM(S): 50 CAPSULE, EXTENDED RELEASE ORAL at 16:19

## 2021-12-21 RX ADMIN — Medication 8 UNIT(S): at 05:59

## 2021-12-21 RX ADMIN — Medication 25 MILLIGRAM(S): at 11:14

## 2021-12-21 RX ADMIN — Medication 100 MILLIEQUIVALENT(S): at 00:54

## 2021-12-21 RX ADMIN — Medication 100 MILLIEQUIVALENT(S): at 00:07

## 2021-12-21 RX ADMIN — SODIUM CHLORIDE 125 MILLILITER(S): 9 INJECTION, SOLUTION INTRAVENOUS at 07:49

## 2021-12-21 RX ADMIN — MORPHINE SULFATE 4 MILLIGRAM(S): 50 CAPSULE, EXTENDED RELEASE ORAL at 07:34

## 2021-12-21 RX ADMIN — Medication 25 MILLIGRAM(S): at 07:04

## 2021-12-21 RX ADMIN — ONDANSETRON 4 MILLIGRAM(S): 8 TABLET, FILM COATED ORAL at 05:59

## 2021-12-21 RX ADMIN — MORPHINE SULFATE 4 MILLIGRAM(S): 50 CAPSULE, EXTENDED RELEASE ORAL at 14:53

## 2021-12-21 RX ADMIN — SODIUM CHLORIDE 1000 MILLILITER(S): 9 INJECTION INTRAMUSCULAR; INTRAVENOUS; SUBCUTANEOUS at 00:07

## 2021-12-21 RX ADMIN — Medication 6: at 17:18

## 2021-12-21 RX ADMIN — MORPHINE SULFATE 4 MILLIGRAM(S): 50 CAPSULE, EXTENDED RELEASE ORAL at 11:09

## 2021-12-21 RX ADMIN — Medication 25 MILLIGRAM(S): at 18:45

## 2021-12-21 RX ADMIN — PANTOPRAZOLE SODIUM 40 MILLIGRAM(S): 20 TABLET, DELAYED RELEASE ORAL at 18:10

## 2021-12-21 RX ADMIN — SODIUM CHLORIDE 1000 MILLILITER(S): 9 INJECTION, SOLUTION INTRAVENOUS at 09:30

## 2021-12-21 RX ADMIN — Medication 8 UNIT(S): at 00:07

## 2021-12-21 RX ADMIN — ONDANSETRON 4 MILLIGRAM(S): 8 TABLET, FILM COATED ORAL at 16:55

## 2021-12-21 NOTE — H&P ADULT - NSHPLABSRESULTS_GEN_ALL_CORE
LABS:                        12.1   25.35 )-----------( 468      ( 20 Dec 2021 22:55 )             41.4     12-21    140  |  107  |  18  ----------------------------<  243<H>  4.0   |  19<L>  |  0.95    Ca    9.0      21 Dec 2021 11:28  Phos  2.7     12-21  Mg     1.80     12-21    TPro  8.5<H>  /  Alb  4.4  /  TBili  0.6  /  DBili  x   /  AST  34<H>  /  ALT  21  /  AlkPhos  117  12-20      [facility] 12-21 @ 11:28  pH: 7.40/pCO2: 34 /pO2: 56/HCO3: 21/lactate: 2.3  [facility] 12-21 @ 07:51  pH: 7.28/pCO2: 33 /pO2: 44/HCO3: 16/lactate: 3.2  [facility] 12-21 @ 04:05  pH: 7.34/pCO2: 36 /pO2: 60/HCO3: 19/lactate: 2.3  [facility] 12-20 @ 22:55  pH: 7.28/pCO2: 43 /pO2: 37/HCO3: 20/lactate: 6.0    Microbiology     EKG:    RADIOLOGY & ADDITIONAL TESTS:

## 2021-12-21 NOTE — H&P ADULT - NSHPREVIEWOFSYSTEMS_GEN_ALL_CORE
CONSTITUTIONAL: No fever, weight loss, or fatigue  EYES: No eye pain, visual disturbances, or discharge  ENMT:  No difficulty hearing, tinnitus, vertigo; No sinus or throat pain  NECK: No pain or stiffness  BREASTS: No pain, masses, or nipple discharge  RESPIRATORY: No cough, wheezing, chills or hemoptysis; No shortness of breath  CARDIOVASCULAR: No chest pain, palpitations, dizziness, or leg swelling  GASTROINTESTINAL: POS abdominal - lower, no epigastric pain.   POS nausea, vomiting, - NO  hematemesis; No diarrhea or constipation. No melena or hematochezia.  GENITOURINARY: No dysuria, frequency, hematuria, or incontinence  NEUROLOGICAL: No headaches, memory loss, loss of strength, numbness, or tremors  SKIN: No itching, burning, rashes, or lesions   LYMPH NODES: No enlarged glands  ENDOCRINE: No heat or cold intolerance; No hair loss  MUSCULOSKELETAL: No muscle or back pain  PSYCHIATRIC: On 1:1 for suicide ideation/ ?attempt in ED ??  HEME/LYMPH: No easy bruising, or bleeding gums  ALLERGY AND IMMUNOLOGIC: No hives or eczema

## 2021-12-21 NOTE — BH CONSULTATION LIAISON ASSESSMENT NOTE - NSBHCHARTREVIEWLAB_PSY_A_CORE FT
12.1   25.35 )-----------( 468      ( 20 Dec 2021 22:55 )             41.4   12-21    140  |  107  |  18  ----------------------------<  243<H>  4.0   |  19<L>  |  0.95    Ca    9.0      21 Dec 2021 11:28  Phos  2.7     12-21  Mg     1.80     12-21    TPro  8.5<H>  /  Alb  4.4  /  TBili  0.6  /  DBili  x   /  AST  34<H>  /  ALT  21  /  AlkPhos  117  12-20

## 2021-12-21 NOTE — BH CONSULTATION LIAISON ASSESSMENT NOTE - CASE SUMMARY
Met with the patient on 12/22. Case discussed with Phoebe Yeager NP as well, impression and plan discussed and agreed upon. Patient today is calm, gets annoyed when MD tries to ask questions about substance abuse- ' are you insinuating that I am seeking Dilaudid?'. She states that she had called 911 to come to the ED for nausea, abdominal pain. States that she was waiting for about 2 hrs in ED, and was told she would have to wait longer to see a provider. That is when she tied the cord around her neck. She states that she did the act in front of a staff with the goal that ' the doctor will come'. She states that ' it worked. The doctor came immediately and my pain was treated'. She states that she did the behavior 'only to get attention that I needed'. She adamantly denies any SI or HI. Denies any ah or vh or paranoia. Denies any mood symptoms as well. Looks unwell during the interview, nauseous, and throwing up. She states that her pain is well controlled with Dilaudid.   Upon admission, collateral was obtained from mother needed for safety assessment. No concerns for SI per se, but mother concerned about substance abuse. Patient aware that  psychiatry called mother to gather information needed for assessment. She does not want any further communication with mother.   I have discussed the case with medicine attending Dr Naqvi, and ACP Brittany. Discussed that the behavior in ED was attention seeking and not with SI. Discussed recommendations as well in detail. Patient updated of plan as well. PLEASE SEE CHART NOTE

## 2021-12-21 NOTE — CONSULT NOTE ADULT - SUBJECTIVE AND OBJECTIVE BOX
NOTE INCOMPLETE/ IN PROGRESS  *Please wait for attending attestation for official recommendations.     HPI:      PAST MEDICAL & SURGICAL HISTORY:  Diabetes mellitus type 1    Gastroparesis    Type 1 diabetes mellitus with ketoacidosis without coma, with long-term current use of insulin    Gastroparesis due to DM    Colitis    Ectopic pregnancy  2013, s/p removal of right fallopian tube    No significant past surgical history        FAMILY HISTORY:  Family history of diabetes mellitus (Grandparent)    Family history of type 1 diabetes mellitus (Grandparent)    FH: HTN (hypertension) (Father)        Social History:    Outpatient Medications:    MEDICATIONS  (STANDING):  insulin lispro Injectable (ADMELOG) 8 Unit(s) SubCutaneous Once  ketorolac   Injectable 15 milliGRAM(s) IV Push Once  lactated ringers Bolus 1000 milliLiter(s) IV Bolus once  lactated ringers Bolus 1000 milliLiter(s) IV Bolus once    MEDICATIONS  (PRN):      Allergies    No Known Allergies    Intolerances      Review of Systems:  Constitutional: No fever  Eyes: No blurry vision  Neuro: No tremors  HEENT: No pain  Cardiovascular: No chest pain, palpitations  Respiratory: No SOB, no cough  GI: No nausea, vomiting, abdominal pain  : No dysuria  Skin: no rash  Psych: no depression  Endocrine: no polyuria, polydipsia  Hem/lymph: no swelling  Osteoporosis: no fractures    ALL OTHER SYSTEMS REVIEWED AND NEGATIVE    UNABLE TO OBTAIN    PHYSICAL EXAM:  VITALS: T(C): 36.9 (12-21-21 @ 07:09)  T(F): 98.5 (12-21-21 @ 07:09), Max: 98.6 (12-21-21 @ 03:41)  HR: 145 (12-21-21 @ 07:09) (85 - 145)  BP: 156/110 (12-21-21 @ 07:09) (107/67 - 175/110)  RR:  (16 - 20)  SpO2:  (96% - 100%)  Wt(kg): --  GENERAL: NAD, well-groomed, well-developed  EYES: No proptosis, no lid lag, anicteric  HEENT:  Atraumatic, Normocephalic, moist mucous membranes  THYROID: Normal size, no palpable nodules  RESPIRATORY: Clear to auscultation bilaterally; No rales, rhonchi, wheezing  CARDIOVASCULAR: Regular rate and rhythm; No murmurs; no peripheral edema  GI: Soft, nontender, non distended, normal bowel sounds  SKIN: Dry, intact, No rashes or lesions  MUSCULOSKELETAL: Full range of motion, normal strength  NEURO: sensation intact, extraocular movements intact, no tremor  PSYCH: Alert and oriented x 3, normal affect, normal mood  CUSHING'S SIGNS: no striae      CAPILLARY BLOOD GLUCOSE      POCT Blood Glucose.: 320 mg/dL (21 Dec 2021 09:07)  POCT Blood Glucose.: 375 mg/dL (21 Dec 2021 03:31)  POCT Blood Glucose.: 161 mg/dL (20 Dec 2021 18:57)  POCT Blood Glucose.: 73 mg/dL (20 Dec 2021 18:03)                            12.1   25.35 )-----------( 468      ( 20 Dec 2021 22:55 )             41.4       12-21    139  |  100  |  19  ----------------------------<  446<H>  4.7   |  15<L>  |  0.94    EGFR if : 94  EGFR if non : 81    Ca    9.2      12-21    TPro  8.5<H>  /  Alb  4.4  /  TBili  0.6  /  DBili  x   /  AST  34<H>  /  ALT  21  /  AlkPhos  117  12-20      Thyroid Function Tests:      A1C with Estimated Average Glucose Result: 9.6 % (10-28-21 @ 09:17)          Radiology:              NOTE INCOMPLETE/ IN PROGRESS  *Please wait for attending attestation for official recommendations.     HPI:  30yo F hx of T1DM c/b diabetic gastroparesis comes to ED for abdominal pain, acute on chronic. Says it has been happening for a month, nausea/vomiting.  Stated in ED "I just want to kill myself." Saying she does not want to live with this pain. Despite offering medications for pain, pt attempted strangulation w/ call bell.    Endocrine consulted for DKA.    History limited by patient's distress from abdominal pain and nausea.  Patient presented to the ED yesterday (12/20) afternoon, did not take any long acting insulin since presentation.    Diabetes History:   -Most recent A1c: 9.6% (10/28/21)  -Diagnosed at age 12  -Endocrinologist: Dr. Bose  -Current Regimen: Patient reports she takes basaglar 10 units at 2PM and 10PM and admelog premeals depending on what her FS is. Per Dr. Bose's outpatient note 12/15/21, regimen is basaglar 19 units QHS and admelog 6/7/6 + 1 unit for every 50>150.  -FS at home: Patient declined to answer  -Diet: Patient declined to answer  -Complications/Diabetes HCM: Diabetic gastroparesis  -Hospitalizations for DKA/Diabetes: Yes      PAST MEDICAL & SURGICAL HISTORY:  Diabetes mellitus type 1  Gastroparesis  Type 1 diabetes mellitus with ketoacidosis without coma, with long-term current use of insulin  Gastroparesis due to DM  Colitis    Ectopic pregnancy  2013, s/p removal of right fallopian tube      FAMILY HISTORY:  Family history of diabetes mellitus (Grandparent)  Family history of type 1 diabetes mellitus (Grandparent)  FH: HTN (hypertension) (Father)    Social History:  Smokes 2 pack over 1 week  No alcohol  Previously smoked marijuana, but stopped    Outpatient Medications:  basaglar 19 units QHS  admelog 6/7/6 + 1 unit for every 50>150.    MEDICATIONS  (STANDING):  insulin lispro Injectable (ADMELOG) 8 Unit(s) SubCutaneous Once  ketorolac   Injectable 15 milliGRAM(s) IV Push Once  lactated ringers Bolus 1000 milliLiter(s) IV Bolus once  lactated ringers Bolus 1000 milliLiter(s) IV Bolus once    MEDICATIONS  (PRN):      Allergies    No Known Allergies    Intolerances      Review of Systems:  Constitutional: No fever  Eyes: No blurry vision  Neuro: No tremors  HEENT: No pain  Cardiovascular: No chest pain, palpitations  Respiratory: No SOB, no cough  GI: + nausea, vomiting, abdominal pain  : No dysuria  Skin: no rash  Psych: no depression  Endocrine: no polyuria, polydipsia  Hem/lymph: no swelling  Osteoporosis: no fractures    ALL OTHER SYSTEMS REVIEWED AND NEGATIVE      PHYSICAL EXAM:  VITALS: T(C): 36.9 (12-21-21 @ 07:09)  T(F): 98.5 (12-21-21 @ 07:09), Max: 98.6 (12-21-21 @ 03:41)  HR: 145 (12-21-21 @ 07:09) (85 - 145)  BP: 156/110 (12-21-21 @ 07:09) (107/67 - 175/110)  RR:  (16 - 20)  SpO2:  (96% - 100%)  Wt(kg): --  GENERAL: Sleeping comfortably, when aroused, moderate-severe distress from abdominal pain and nausea  EYES: No proptosis, no lid lag, anicteric  HEENT:  Atraumatic, Normocephalic, dry mm  THYROID: Normal size, no palpable nodules  RESPIRATORY: Clear to auscultation bilaterally; No rales, rhonchi, wheezing  CARDIOVASCULAR: Tachycardic, regular rhythm; No murmurs; no peripheral edema  GI: Soft, diffusely tender, active guarding.  SKIN: Dry, intact, No rashes or lesions  MUSCULOSKELETAL: Full range of motion, normal strength  NEURO: sensation intact, extraocular movements intact, no tremor  PSYCH: Alert and oriented x 3, depressed mood  CUSHING'S SIGNS: no striae      CAPILLARY BLOOD GLUCOSE      POCT Blood Glucose.: 320 mg/dL (21 Dec 2021 09:07)  POCT Blood Glucose.: 375 mg/dL (21 Dec 2021 03:31)  POCT Blood Glucose.: 161 mg/dL (20 Dec 2021 18:57)  POCT Blood Glucose.: 73 mg/dL (20 Dec 2021 18:03)                            12.1   25.35 )-----------( 468      ( 20 Dec 2021 22:55 )             41.4       12-21    139  |  100  |  19  ----------------------------<  446<H>  4.7   |  15<L>  |  0.94    EGFR if : 94  EGFR if non : 81    Ca    9.2      12-21    TPro  8.5<H>  /  Alb  4.4  /  TBili  0.6  /  DBili  x   /  AST  34<H>  /  ALT  21  /  AlkPhos  117  12-20      Thyroid Function Tests:      A1C with Estimated Average Glucose Result: 9.6 % (10-28-21 @ 09:17)          Radiology:              HPI:  32yo F hx of T1DM c/b diabetic gastroparesis comes to ED for abdominal pain, acute on chronic. Says it has been happening for a month, nausea/vomiting.  Stated in ED "I just want to kill myself." Saying she does not want to live with this pain. Despite offering medications for pain, pt attempted strangulation w/ call bell.    Endocrine consulted for DKA.    History limited by patient's distress from abdominal pain and nausea.  Patient presented to the ED yesterday (12/20) afternoon, did not take any long acting insulin since presentation.    Diabetes History:   -Most recent A1c: 9.6% (10/28/21)  -Diagnosed at age 12  -Endocrinologist: Dr. Bose  -Current Regimen: Patient reports she takes basaglar 10 units at 2PM and 10PM and admelog premeals depending on what her FS is. Per Dr. Bose's outpatient note 12/15/21, regimen is basaglar 19 units QHS and admelog 6/7/6 + 1 unit for every 50>150.  -FS at home: Patient declined to answer  -Diet: Patient declined to answer  -Complications/Diabetes HCM: Diabetic gastroparesis  -Hospitalizations for DKA/Diabetes: Yes      PAST MEDICAL & SURGICAL HISTORY:  Diabetes mellitus type 1  Gastroparesis  Type 1 diabetes mellitus with ketoacidosis without coma, with long-term current use of insulin  Gastroparesis due to DM  Colitis    Ectopic pregnancy  2013, s/p removal of right fallopian tube      FAMILY HISTORY:  Family history of diabetes mellitus (Grandparent)  Family history of type 1 diabetes mellitus (Grandparent)  FH: HTN (hypertension) (Father)    Social History:  Smokes 2 pack over 1 week  No alcohol  Previously smoked marijuana, but stopped    Outpatient Medications:  basaglar 19 units QHS  admelog 6/7/6 + 1 unit for every 50>150.    MEDICATIONS  (STANDING):  insulin lispro Injectable (ADMELOG) 8 Unit(s) SubCutaneous Once  ketorolac   Injectable 15 milliGRAM(s) IV Push Once  lactated ringers Bolus 1000 milliLiter(s) IV Bolus once  lactated ringers Bolus 1000 milliLiter(s) IV Bolus once    MEDICATIONS  (PRN):      Allergies    No Known Allergies    Intolerances      Review of Systems:  Constitutional: No fever  Eyes: No blurry vision  Neuro: No tremors  HEENT: No pain  Cardiovascular: No chest pain, palpitations  Respiratory: No SOB, no cough  GI: + nausea, vomiting, abdominal pain  : No dysuria  Skin: no rash  Psych: no depression  Endocrine: no polyuria, polydipsia  Hem/lymph: no swelling  Osteoporosis: no fractures    ALL OTHER SYSTEMS REVIEWED AND NEGATIVE      PHYSICAL EXAM:  VITALS: T(C): 36.9 (12-21-21 @ 07:09)  T(F): 98.5 (12-21-21 @ 07:09), Max: 98.6 (12-21-21 @ 03:41)  HR: 145 (12-21-21 @ 07:09) (85 - 145)  BP: 156/110 (12-21-21 @ 07:09) (107/67 - 175/110)  RR:  (16 - 20)  SpO2:  (96% - 100%)  Wt(kg): --  GENERAL: Sleeping comfortably, when aroused, moderate-severe distress from abdominal pain and nausea  EYES: No proptosis, no lid lag, anicteric  HEENT:  Atraumatic, Normocephalic, dry mm  THYROID: Normal size, no palpable nodules  RESPIRATORY: Clear to auscultation bilaterally; No rales, rhonchi, wheezing  CARDIOVASCULAR: Tachycardic, regular rhythm; No murmurs; no peripheral edema  GI: Soft, diffusely tender, active guarding.  SKIN: Dry, intact, No rashes or lesions  MUSCULOSKELETAL: Full range of motion, normal strength  NEURO: sensation intact, extraocular movements intact, no tremor  PSYCH: Alert and oriented x 3, depressed mood  CUSHING'S SIGNS: no striae      CAPILLARY BLOOD GLUCOSE      POCT Blood Glucose.: 320 mg/dL (21 Dec 2021 09:07)  POCT Blood Glucose.: 375 mg/dL (21 Dec 2021 03:31)  POCT Blood Glucose.: 161 mg/dL (20 Dec 2021 18:57)  POCT Blood Glucose.: 73 mg/dL (20 Dec 2021 18:03)                            12.1   25.35 )-----------( 468      ( 20 Dec 2021 22:55 )             41.4       12-21    139  |  100  |  19  ----------------------------<  446<H>  4.7   |  15<L>  |  0.94    EGFR if : 94  EGFR if non : 81    Ca    9.2      12-21    TPro  8.5<H>  /  Alb  4.4  /  TBili  0.6  /  DBili  x   /  AST  34<H>  /  ALT  21  /  AlkPhos  117  12-20      Thyroid Function Tests:      A1C with Estimated Average Glucose Result: 9.6 % (10-28-21 @ 09:17)          Radiology:

## 2021-12-21 NOTE — BH CONSULTATION LIAISON ASSESSMENT NOTE - SUMMARY
Patient is a 31F w/ T1DM c/b gastroparesis who presents with acute on chronic abdominal pain x 1 month with n/v. Patient comes from home, lives alone. Patient reports no psychiatric hx including no inpt admissions or outpatient psychiatric care. Patient reports cigarette use as well as a hx of marijuana use - last used 1 month ago.  In Ed tied rope around neck. Patient reporting she did NOT want to die in the ED, but used her actions as a "cry for help". patient stated her pain was unbearable, she was not getting the help she wanted quick enough, she became tired of living with constant pain and wrapped the cord from the bed around her neck "to get attention".  patient states "I didn't tie it to the ceiling or anything, I held it in my hands. all it took was one pull from staff and it was off."  patient states if she "really wanted to die this is not the setting to do it in" as she is being watched. Patient has no current SI or HI.  She provides detail into her previous attempt at harming self at [place] and states this was also a cry for help as she was not being cared for well, and was fighting with her doctors.  Patient adamantly states this was also not a suicide attempt. Has no attempts at self harm at home.     PLAN  - no standing psychiatric medication at this time  - can continue with CO due to hx of as well as recent impulsive behaviors risking own safety  ( no SI or HI at this time )  - antipsychotics can only be given if qtc < 500 - please obtain EKG  - PRN for agitation: haldol 1mg q6hrs  - would discuss plan of care and daily treatment plans with patient to make sure patient is well informed Patient is a 31F w/ T1DM c/b gastroparesis who presents with acute on chronic abdominal pain x 1 month with n/v. Patient comes from home, lives alone. Patient reports no psychiatric hx including no inpt admissions or outpatient psychiatric care. Patient reports cigarette use as well as a hx of marijuana use - last used 1 month ago.  In Ed tied rope around neck. Patient reporting she did NOT want to die in the ED, but used her actions as a "cry for help". patient stated her pain was unbearable, she was not getting the help she wanted quick enough, she became tired of living with constant pain and wrapped the cord from the bed around her neck "to get attention".  patient states "I didn't tie it to the ceiling or anything, I held it in my hands. all it took was one pull from staff and it was off."  patient states if she "really wanted to die this is not the setting to do it in" as she is being watched. Patient has no current SI or HI.  She provides detail into her previous attempt at harming self at [place] and states this was also a cry for help as she was not being cared for well, and was fighting with her doctors.  Patient adamantly states this was also not a suicide attempt. Has no attempts at self harm at home.   mother reporting a hx of addiction to Dilaudid with seeking behaviors.     PLAN  - no standing psychiatric medication at this time  - can continue with CO due to hx of as well as recent impulsive behaviors risking own safety  ( no SI or HI at this time )  - antipsychotics can only be given if qtc < 500 - please obtain EKG  - PRN for agitation: haldol 1mg q6hrs  - would discuss plan of care and daily treatment plans with patient to make sure patient is well informed  - primary team to determine need for / appropriate pain medication   - SW / SBIRT consult for resources if patient interested in rehab  Patient is a 31F w/ T1DM c/b gastroparesis who presents with acute on chronic abdominal pain x 1 month with n/v. Patient comes from home, lives alone. Patient reports no psychiatric hx including no inpt admissions or outpatient psychiatric care. Patient reports cigarette use as well as a hx of marijuana use - last used 1 month ago.  In Ed tied rope around neck. Patient reporting she did NOT want to die in the ED, but used her actions as a "cry for help". patient stated her pain was unbearable, she was not getting the help she wanted quick enough, she became tired of living with constant pain and wrapped the cord from the bed around her neck "to get attention".  patient states "I didn't tie it to the ceiling or anything, I held it in my hands. all it took was one pull from staff and it was off."  patient states if she "really wanted to die this is not the setting to do it in" as she is being watched. Patient has no current SI or HI.  She provides detail into her previous attempt at harming self at [place] and states this was also a cry for help as she was not being cared for well, and was fighting with her doctors.  Patient adamantly states this was also not a suicide attempt. Has no attempts at self harm at home.   mother reporting a hx of addiction to Dilaudid with seeking behaviors.     PLAN  - no standing psychiatric medication at this time  - can continue with CO due to hx of as well as recent impulsive behaviors risking own safety  ( no SI or HI at this time )  - antipsychotics can only be given if qtc < 500 - please obtain EKG  - PRN for agitation: haldol 1mg q6hrs im/iv/po  - would discuss plan of care and daily treatment plans with patient to make sure patient is well informed  - primary team to determine need for / appropriate pain medication   - SW / SBIRT consult for resources if patient interested in rehab

## 2021-12-21 NOTE — H&P ADULT - ASSESSMENT
30yo F hx of T1DM c/b diabetic gastroparesis comes to ED for abdominal pain, acute on chronic. Says it has been happening for a month, nausea/vomiting.  Stated in ED "I just want to kill myself." Saying she does not want to live with this pain. Despite offering medications for pain, pt attempted strangulation w/ call bell.  ED Requested Psych consult.  Patient seen on medicine floor with N/V and c/o lower abd pain 8/10 typical for her DKA     Endocrine consult appreciate 30yo F hx of T1DM c/b diabetic gastroparesis comes to ED for abdominal pain, acute on chronic. Says it has been happening for a month, nausea/vomiting.  Stated in ED "I just want to kill myself." Saying she does not want to live with this pain. Despite offering medications for pain, pt attempted strangulation w/ call bell.  ED Requested Psych consult.  Patient seen on medicine floor with N/V and c/o lower abd pain 8/10 typical for her DKA     Endocrine consult appreciate    # abd pain sec to dka  # diabetic gastroparesis  #SI  #Tobacco

## 2021-12-21 NOTE — H&P ADULT - NSICDXPASTSURGICALHX_GEN_ALL_CORE_FT
PAST SURGICAL HISTORY:  Ectopic pregnancy 2013, s/p removal of right fallopian tube    No significant past surgical history      PAST SURGICAL HISTORY:  Ectopic pregnancy 2013, s/p removal of right fallopian tube    History of cholecystectomy

## 2021-12-21 NOTE — H&P ADULT - NSHPPHYSICALEXAM_GEN_ALL_CORE
CAPILLARY BLOOD GLUCOSE      POCT Blood Glucose.: 424 mg/dL (21 Dec 2021 16:48)  POCT Blood Glucose.: 424 mg/dL (21 Dec 2021 16:45)  POCT Blood Glucose.: 309 mg/dL (21 Dec 2021 13:55)  POCT Blood Glucose.: 233 mg/dL (21 Dec 2021 12:01)  POCT Blood Glucose.: 299 mg/dL (21 Dec 2021 10:20)  POCT Blood Glucose.: 320 mg/dL (21 Dec 2021 09:07)  POCT Blood Glucose.: 375 mg/dL (21 Dec 2021 03:31)  POCT Blood Glucose.: 161 mg/dL (20 Dec 2021 18:57)  POCT Blood Glucose.: 73 mg/dL (20 Dec 2021 18:03)    I&O's Summary  Vital Signs Last 24 Hrs  T(C): 37.1 (21 Dec 2021 16:37), Max: 37.1 (21 Dec 2021 16:37)  T(F): 98.8 (21 Dec 2021 16:37), Max: 98.8 (21 Dec 2021 16:37)  HR: 124 (21 Dec 2021 16:37) (85 - 145)  BP: 170/89 (21 Dec 2021 16:37) (107/67 - 175/110)  BP(mean): --  RR: 17 (21 Dec 2021 16:37) (16 - 20)  SpO2: 97% (21 Dec 2021 16:37) (96% - 100%)    PHYSICAL EXAM:  GENERAL: distress with n/v- no blood  , well-developed  HEAD:  Atraumatic, Normocephalic  EYES: EOMI, PERRLA, conjunctiva and sclera clear  NECK: Supple, no JVD  CHEST/LUNG: Clear to auscultation bilaterally; no wheeze  HEART: Regular rate and rhythm; no murmurs, rubs, or gallops  ABDOMEN: Soft, Nontender, Nondistended; pain on palpation of lower abd , no rebound or gaurding  Bowel sounds present  EXTREMITIES:  warm and well perfused, no clubbing, cyanosis, or edema  PSYCH: AAOx3  NEUROLOGY: non-focal  SKIN: no rashes or lesions

## 2021-12-21 NOTE — H&P ADULT - PROBLEM SELECTOR PLAN 2
IVF hydration   start Iv Dialaudid 0.25 mg q4 prn with iv benadryl 25 mg q4 prn n/v/ itching  Zofran prn n/v  protonox iv

## 2021-12-21 NOTE — BH CONSULTATION LIAISON ASSESSMENT NOTE - CURRENT MEDICATION
MEDICATIONS  (STANDING):  dextrose 40% Gel 15 Gram(s) Oral once  dextrose 5%. 1000 milliLiter(s) (50 mL/Hr) IV Continuous <Continuous>  dextrose 5%. 1000 milliLiter(s) (100 mL/Hr) IV Continuous <Continuous>  dextrose 50% Injectable 25 Gram(s) IV Push once  dextrose 50% Injectable 12.5 Gram(s) IV Push once  dextrose 50% Injectable 25 Gram(s) IV Push once  glucagon  Injectable 1 milliGRAM(s) IntraMuscular once  insulin lispro (ADMELOG) corrective regimen sliding scale   SubCutaneous three times a day before meals  insulin lispro (ADMELOG) corrective regimen sliding scale   SubCutaneous at bedtime  insulin lispro Injectable (ADMELOG) 4 Unit(s) SubCutaneous three times a day before meals    MEDICATIONS  (PRN):  acetaminophen     Tablet .. 650 milliGRAM(s) Oral every 6 hours PRN Mild Pain (1 - 3)

## 2021-12-21 NOTE — BH CONSULTATION LIAISON ASSESSMENT NOTE - NSBHCHARTREVIEWVS_PSY_A_CORE FT
Vital Signs Last 24 Hrs  T(C): 37.1 (21 Dec 2021 16:37), Max: 37.1 (21 Dec 2021 16:37)  T(F): 98.8 (21 Dec 2021 16:37), Max: 98.8 (21 Dec 2021 16:37)  HR: 124 (21 Dec 2021 16:37) (85 - 145)  BP: 170/89 (21 Dec 2021 16:37) (107/67 - 175/110)  BP(mean): --  RR: 17 (21 Dec 2021 16:37) (16 - 20)  SpO2: 97% (21 Dec 2021 16:37) (96% - 100%)

## 2021-12-21 NOTE — CONSULT NOTE ADULT - ASSESSMENT
31F w/ T1DM c/b gastroparesis who presents with acute on chronic abdominal pain x 1 month with n/v and SI/attempt in ED.  Endocrine consulted for DKA.    #DKA in T1DM  Found to have DKA with pH 7.28, serum BHB 1.9->5.8, serum HCO3 15, AG 20->24.  DKA likely secondary to hypovolemia from nausea/vomiting.  No long acting given since presentation to ED yesterday 12/20.  INSULIN:  - S/p 2 doses of Admelog 8 units (12/20 23:34, 12/21 05:57) without improvement in DKA. 3rd dose of Admelog 8 units being administered (09:06).    - Start insulin gtt. Discussed with ED.  - please follow DKA protocol for insulin drip. Add dextrose to fluids as per protocol once FSG <250.  - q1h FSG  - insulin drip must be titrated hourly per protocol  - Repeat BMP/Mg/Phos and VBG in 4 hours after starting Insulin gtt  - keep patient NPO until off the insulin drip  - will be ready to transition off of the insulin drip when anion gap < 12, HCO3- >18 mEq/L, Glucose < 200 mg/dL, and patient clinically stable / ready to eat.  - Basal dose to be determined based on insulin gtt rate    FLUIDS:  - S/p 2L IVF.  2 additional IVF boluses being administered.  - Afterwards, recommend maintenance  (or 150) mL/hr  - Fluid Goal: replace ~50% of estimated TBW deficit over 8 hours.  - CHANGE to D5 ½ NS @ 150 mL/hr when glucose <250 mg/dL.    POTASSIUM/ELECTROLYTES:  K+ > 5 mEq/L: No additional K+  K+ 4 – 5 mEq/L : Add 20 mEq KCl /L to fluids x 2 Liters OR IV + PO total ~40mEq  K+ 3.3 – 4 mEq/L: Add 40 mEq KCl /L to fluids x 2 Liters OR IV + PO total ~60mEq  K+ < 3.3 mEq/L: Give IV+ PO Potassium, total 60-80 mEq, check K+ level every 1-2hr  After initial repletion of K, follow K and replete as needed.  Mg++ <1.5 mg/dL: give 2 gms IV Magnesium Sulfate  Phos < 1 mg/dL: give 0.24 mmol/kg Potassium Phosphate in 250cc fluid over 6 hours  Bicarb, consider using bicarbonate ONLY if pH<7    CONVERSION TO SUBCUTANEOUS (SC) INSULIN:  when Anion gap < 12, HCO3- >18 mEq/L, Glucose < 200 mg/dL, and patient clinically stable / ready to eat.  Insulin Drip based: total insulin over last 6 hours x 4 = Total IV units/day  Caution: this method may overestimate the patient’s insulin needs.  Total IV units per day X 0.70 = Total Subcutaneous insulin  Total Subcut insulin ÷ 2 = Basal insulin dose  Total Subcut insulin ÷ 6 = Mealtime insulin dose. Use 1st meal rule.  Prior diagnosis of DM: start insulin as above OR restart home insulin basal/bolus insulin (if patient was adherent, controlled, and with no major hypoglycemia).  Administer SC basal insulin (Glargine) ->stop insulin drip 2 HOURS later.  Diet orders: consistent carb diet, no concentrated sweets      NOTE INCOMPLETE/ IN PROGRESS  *Please wait for attending attestation for official recommendations.  31F w/ T1DM c/b gastroparesis who presents with acute on chronic abdominal pain x 1 month with n/v and SI/attempt in ED.  Endocrine consulted for DKA.    #DKA in T1DM  Found to have DKA with pH 7.28, serum BHB 1.9->5.8, serum HCO3 15, AG 20->24.  DKA likely secondary to hypovolemia from nausea/vomiting.  No long acting given since presentation to ED yesterday 12/20.    Inpatient plan:  - S/p 3 doses of Admelog 8 units (12/20 23:34, 12/21 05:57, 12/21 09:06), s/p 4L IVF bolus. Never started on insulin gtt, but had improvement with AG 14, HCO3 19, Glc 233, pH 7.4  - Start Lantus 18 units (given at 14:13). Will continue daily Lantus dose at same time 14:00 for now.  - If eating, Admelog 4 units premeal TID.  If not eating, hold premeal Admelog.   - Trend BMP, Mg, Phos    Discharge plan:  - Basal lantus either BID vs. Daily dosing, TBD.  - Premeal admelog TID, dose TBD.  - Patient has follow up appointments with Diabetic Educator 2/15/22 and with Dr. Bose 7/11/22.    #Gastroparesis 2/2 T1DM:  - Zofran 4 to 8mg PRN up to 3 times daily  - Check EKG for Qtc    #HTN:  Likely elevated in the setting of distress/abdominal pain  - Monitor for now while treating for pain  - If persistently elevated, consider initiating ACEI/ARB for BP control    NOTE INCOMPLETE/ IN PROGRESS  *Please wait for attending attestation for official recommendations.  31F w/ T1DM c/b gastroparesis who presents with acute on chronic abdominal pain x 1 month with n/v and SI/attempt in ED.  Endocrine consulted for DKA.    #DKA in T1DM  Found to have DKA with pH 7.28, serum BHB 1.9->5.8, serum HCO3 15, AG 20->24.  DKA likely secondary to hypovolemia from nausea/vomiting.  No long acting given since presentation to ED yesterday 12/20.    Inpatient plan:  - S/p 3 doses of Admelog 8 units (12/20 23:34, 12/21 05:57, 12/21 09:06), s/p 4L IVF bolus. Never started on insulin gtt, but had improvement with AG 14, HCO3 19, Glc 233, pH 7.4  - Start Lantus 18 units (given at 14:13). Will continue daily Lantus dose at same time 14:00 for now.  - If eating, Admelog 4 units premeal TID.  If not eating, hold premeal Admelog.   - Start low dose correctional admelog.  - Trend BMP, Mg, Phos    Discharge plan:  - Basal lantus either BID vs. Daily dosing, TBD.  - Premeal admelog TID, dose TBD.  - Patient has follow up appointments with Diabetic Educator 2/15/22 and with Dr. Bose 7/11/22.    #Gastroparesis 2/2 T1DM:  - Zofran 4 to 8mg PRN up to 3 times daily  - Check EKG for Qtc    #HTN:  Likely elevated in the setting of distress/abdominal pain  - Monitor for now while treating for pain  - If persistently elevated, consider initiating ACEI/ARB for BP control    NOTE INCOMPLETE/ IN PROGRESS  *Please wait for attending attestation for official recommendations.  31F w/ T1DM c/b gastroparesis who presents with acute on chronic abdominal pain x 1 month with n/v and SI/attempt in ED.  Endocrine consulted for DKA.    #DKA in T1DM  Found to have DKA with pH 7.28, serum BHB 1.9->5.8, serum HCO3 15, AG 20->24.  DKA likely secondary to hypovolemia from nausea/vomiting.  No long acting given since presentation to ED yesterday 12/20.    Inpatient plan:  - S/p 3 doses of Admelog 8 units (12/20 23:34, 12/21 05:57, 12/21 09:06), s/p 4L IVF bolus. Never started on insulin gtt, but had improvement with AG 14, HCO3 19, Glc 233, pH 7.4  - Lantus 18 units (given at 14:13). Will continue daily Lantus 18 units at same time 14:00 for now.  - If eating, Admelog 4 units premeal TID.  If not eating, hold premeal Admelog.   - FS TID AC and QHS  - Low dose correctional admelog  - Trend BMP, Mg, Phos    Discharge plan:  - Basal lantus either BID vs. Daily dosing, TBD.  - Premeal admelog TID, dose TBD.  - Patient has follow up appointments with Diabetic Educator 2/15/22 and with Dr. Bose 7/11/22.    #Gastroparesis 2/2 T1DM:  - Zofran 4 to 8mg PRN up to 3 times daily  - Check EKG for Qtc    #HTN:  Likely elevated in the setting of distress/abdominal pain  - Monitor for now while treating for pain  - If persistently elevated, consider initiating ACEI/ARB for BP control    NOTE INCOMPLETE/ IN PROGRESS  *Please wait for attending attestation for official recommendations.  31F w/ T1DM c/b gastroparesis who presents with acute on chronic abdominal pain x 1 month with n/v and SI/attempt in ED.  Endocrine consulted for DKA.    #DKA in T1DM  Found to have DKA with pH 7.28, serum BHB 1.9->5.8, serum HCO3 15, AG 20->24.  DKA likely secondary to hypovolemia from nausea/vomiting.  No long acting given since presentation to ED yesterday 12/20.    Inpatient plan:  - S/p 3 doses of Admelog 8 units (12/20 23:34, 12/21 05:57, 12/21 09:06), s/p 4L IVF bolus. Never started on insulin gtt, but had improvement with AG 14, HCO3 19, Glc 233, pH 7.4  - Lantus 18 units (given at 14:13). Will continue daily Lantus 18 units at same time 14:00 for now.  - If eating, Admelog 4 units premeal TID.  If not eating, hold premeal Admelog.   - FS TID AC and QHS  - Low dose correctional admelog  - Trend BMP, Mg, Phos    Discharge plan:  - Basal lantus either BID vs. Daily dosing, TBD.  - Premeal admelog TID, dose TBD.  - Patient has follow up appointments with Diabetic Educator 2/15/22 and with Dr. Bose 7/11/22.    #Gastroparesis 2/2 T1DM:  - Zofran 4 to 8mg PRN up to 3 times daily  - Check EKG for Qtc    #HTN:  Likely elevated in the setting of distress/abdominal pain  - Monitor for now while treating for pain  - If persistently elevated, consider initiating ACEI/ARB for BP control    Discussed with Dr. Mag Leonard, PGY2  Internal Medicine  Pager [phone] ([facility])/[phone]([facility])

## 2021-12-21 NOTE — H&P ADULT - PROBLEM SELECTOR PLAN 1
Endocrine consulted for DKA.  S/p 3 doses of Admelog 8 units (12/20 23:34, 12/21 05:57, 12/21 09:06), s/p 4L IVF bolus. Never started on insulin gtt, but had improvement with AG 14, HCO3 19, Glc 233, pH 7.4  - Lantus 18 units (given at 14:13). Will continue daily Lantus 18 units at same time 14:00 for now.  - If eating, Admelog 4 units premeal TID.  If not eating, hold premeal Admelog.   - FS TID AC and QHS  - Low dose correctional admelog  - Trend BMP, Mg, Phos  Endo greatly appreciated

## 2021-12-21 NOTE — BH CONSULTATION LIAISON ASSESSMENT NOTE - RISK ASSESSMENT
risk: recent self harm behavior, chronic pain  Protective: no SI or HI, no hx of serious SA, domiciled, denies recent substance use, future oriented, help seeking

## 2021-12-21 NOTE — ED ADULT NURSE REASSESSMENT NOTE - NS ED NURSE REASSESS COMMENT FT1
pt. valuable belongings were brought down and secured in security. pt. non-valuables secured outside of room 21. US Guided 18g noted to the L Bicep, placed by MD Sylvester Vazquez. PCA at bedside within arms reach.
pt. remains A&Ox4, awake and resting. pt. offers no new complaints at this time. pt. in no acute distress. labs drawn as ordered. VS as noted. due meds given. MD Sylvester Vazquez made aware of HR and BP, no orders to follow. pending dispo.
pt. resting at this time. pt. in no acute distress. 1:1 maintained, PCA at bedside within arms reach. respirations even and unlabored. VS as noted. FS as noted. Labs drawn as sent. MD Vazquez made aware of pt. BP, no orders to follow.

## 2021-12-21 NOTE — H&P ADULT - NSHPADDITIONALINFOADULT_GEN_ALL_CORE
Seen by Endocrine  "30yo F hx of T1DM c/b diabetic gastroparesis comes to ED for abdominal pain, acute on chronic. Says it has been happening for a month, nausea/vomiting.  Stated in ED "I just want to kill myself." Saying she does not want to live with this pain. Despite offering medications for pain, pt attempted strangulation w/ call bell.    Endocrine consulted for DKA. Seen by Endocrine  "30yo F hx of T1DM c/b diabetic gastroparesis comes to ED for abdominal pain, acute on chronic. Says it has been happening for a month, nausea/vomiting.  Stated in ED "I just want to kill myself." Saying she does not want to live with this pain. Despite offering medications for pain, pt attempted strangulation w/ call bell.    Endocrine consulted for DKA.  S/p 3 doses of Admelog 8 units (12/20 23:34, 12/21 05:57, 12/21 09:06), s/p 4L IVF bolus. Never started on insulin gtt, but had improvement with AG 14, HCO3 19, Glc 233, pH 7.4  - Lantus 18 units (given at 14:13). Will continue daily Lantus 18 units at same time 14:00 for now.  - If eating, Admelog 4 units premeal TID.  If not eating, hold premeal Admelog.   - FS TID AC and QHS  - Low dose correctional admelog  - Trend BMP, Mg, Phos    Discharge plan:  - Basal lantus either BID vs. Daily dosing, TBD.  - Premeal admelog TID, dose TBD.  - Patient has follow up appointments with Diabetic Educator 2/15/22 and with Dr. Bose 7/11/22.    #Gastroparesis 2/2 T1DM:  - Zofran 4 to 8mg PRN up to 3 times daily  - Check EKG for Qtc Seen by Endocrine  "32yo F hx of T1DM c/b diabetic gastroparesis comes to ED for abdominal pain, acute on chronic. Says it has been happening for a month, nausea/vomiting.  Stated in ED "I just want to kill myself." Saying she does not want to live with this pain. Despite offering medications for pain, pt attempted strangulation w/ call bell.    Endocrine consulted for DKA.  S/p 3 doses of Admelog 8 units (12/20 23:34, 12/21 05:57, 12/21 09:06), s/p 4L IVF bolus. Never started on insulin gtt, but had improvement with AG 14, HCO3 19, Glc 233, pH 7.4  - Lantus 18 units (given at 14:13). Will continue daily Lantus 18 units at same time 14:00 for now.  - If eating, Admelog 4 units premeal TID.  If not eating, hold premeal Admelog.   - FS TID AC and QHS  - Low dose correctional admelog  - Trend BMP, Mg, Phos  Psychiatry consult called [phone]  Discharge plan:  - Basal lantus either BID vs. Daily dosing, TBD.  - Premeal admelog TID, dose TBD.  - Patient has follow up appointments with Diabetic Educator 2/15/22 and with Dr. Bose 7/11/22.    #Gastroparesis 2/2 T1DM:  - Zofran 4 to 8mg PRN up to 3 times daily  - Check EKG for Qtc

## 2021-12-21 NOTE — H&P ADULT - HISTORY OF PRESENT ILLNESS
32yo F hx of T1DM c/b diabetic gastroparesis comes to ED for abdominal pain, acute on chronic. Says it has been happening for a month, nausea/vomiting.  Stated in ED "I just want to kill myself." Saying she does not want to live with this pain. Despite offering medications for pain, pt attempted strangulation w/ call bell.  ED Requested Psych consult.  Patient seen on medicine floor with N/V and c/o lower abd pain 8/10 typical for her DKA

## 2021-12-21 NOTE — BH CONSULTATION LIAISON ASSESSMENT NOTE - HPI (INCLUDE ILLNESS QUALITY, SEVERITY, DURATION, TIMING, CONTEXT, MODIFYING FACTORS, ASSOCIATED SIGNS AND SYMPTOMS)
Patient is a 31F w/ T1DM c/b gastroparesis who presents with acute on chronic abdominal pain x 1 month with n/v. Patient comes from home, lives alone. Patient reports no psychiatric hx including no inpt admissions or outpatient psychiatric care. Patient reports cigarette use as well as a hx of marijuana use - last used 1 month ago.  Patient does reports 1 other incident of self harm in [facility]. States she wrapped a cord around her next here in the ED after wiating for 2 hrs in pain with little response to her complaints. psychiatry called for SA.    Patient was seen and assessed at bedside. patient is alert, oriented, calm, cooperative with provider, not surprised for psychiatry visit.  Patient reporting she did NOT want to die in the ED, but used her actions as a "cry for help". patient stated her pain was unbearable, she was not getting the help she wanted quick enough, she became tired of living with constant pain and wrapped the cord from the bed around her neck "to get attention".  patient states "I didn't tie it to the ceiling or anything, I held it in my hands. all it took was one pull from staff and it was off."  patient states if she "really wanted to die this is not the setting to do it in" as she is being watched. Patient has no current SI or HI.  She provides detail into her previous attempt at harming self at [place] and states this was also a cry for help as she was not being cared for well, and was fighting with her doctors.  Patient adamantly states this was also not a suicide attempt. Has no attempts at self harm at home. Patient does report feeling frustrated due to her chronic pain but denies depression, anxiety, pee or psychosis. Does not appear internally preoccupied. Patient is very focused on receiving proper pain medication as well as benadryl as she discussed w primary team.  Patient is a 31F w/ T1DM c/b gastroparesis who presents with acute on chronic abdominal pain x 1 month with n/v. Patient comes from home, lives alone. Patient reports no psychiatric hx including no inpt admissions or outpatient psychiatric care. Patient reports cigarette use as well as a hx of marijuana use - last used 1 month ago.  Patient does reports 1 other incident of self harm in [facility]. States she wrapped a cord around her next here in the ED after wiating for 2 hrs in pain with little response to her complaints. psychiatry called for SA.    Patient was seen and assessed at bedside. patient is alert, oriented, calm, cooperative with provider, not surprised for psychiatry visit.  Patient reporting she did NOT want to die in the ED, but used her actions as a "cry for help". patient stated her pain was unbearable, she was not getting the help she wanted quick enough, she became tired of living with constant pain and wrapped the cord from the bed around her neck "to get attention".  patient states "I didn't tie it to the ceiling or anything, I held it in my hands. all it took was one pull from staff and it was off."  patient states if she "really wanted to die this is not the setting to do it in" as she is being watched. Patient has no current SI or HI.  She provides detail into her previous attempt at harming self at [place] and states this was also a cry for help as she was not being cared for well, and was fighting with her doctors.  Patient adamantly states this was also not a suicide attempt. Has no attempts at self harm at home. Patient does report feeling frustrated due to her chronic pain but denies depression, anxiety, pee or psychosis. Does not appear internally preoccupied. Patient is very focused on receiving proper pain medication as well as benadryl as she discussed w primary team.     Spoke with mother: as per mother patient has a hx of addiction. Was in rehab at [facility] 7 years ago (mom reports only using marijuana and cigarettes at this time as well as hospital prescribed Dilaudid)- did well up until this year when she went to [facility] for pain r/t diabetes - they gave her Dilaudid and patient became addicted again.  States she has been in and out of the hospital this year seeking medication.  mom sent her to [place] 4 weeks ago however no improvement noted on dc. States patient wants "to be normal" and she does not think patient wants to die.  At home she will "push herself up on the wall hard to try and stop her addiction. she may have bruising from it." Mother wants substance rehab as dc plan.  Patient is a 31F w/ T1DM c/b gastroparesis who presents with acute on chronic abdominal pain x 1 month with n/v. Patient comes from home, lives alone. Patient reports no psychiatric hx including no inpt admissions or outpatient psychiatric care. Patient reports cigarette use as well as a hx of marijuana use - last used 1 month ago.  Patient does reports 1 other incident of self harm in [facility]. States she wrapped a cord around her next here in the ED after wiating for 2 hrs in pain with little response to her complaints. psychiatry called for SA.    Patient was seen and assessed at bedside. patient is alert, oriented, calm, cooperative with provider, not surprised for psychiatry visit.  Patient reporting she did NOT want to die in the ED, but used her actions as a "cry for help". patient stated her pain was unbearable, she was not getting the help she wanted quick enough, she became tired of living with constant pain and wrapped the cord from the bed around her neck "to get attention".  patient states "I didn't tie it to the ceiling or anything, I held it in my hands. all it took was one pull from staff and it was off."  patient states if she "really wanted to die this is not the setting to do it in" as she is being watched. Patient has no current SI or HI.  She provides detail into her previous attempt at harming self at [place] and states this was also a cry for help as she was not being cared for well, and was fighting with her doctors.  Patient adamantly states this was also not a suicide attempt. Has no attempts at self harm at home. Patient does report feeling frustrated due to her chronic pain but denies depression, anxiety, pee or psychosis. Does not appear internally preoccupied. Patient is very focused on receiving proper pain medication as well as benadryl as she discussed w primary team.     Spoke with mother to gather information needed for safety assessment: as per mother patient has a hx of addiction. Was in rehab at [facility] 7 years ago (mom reports only using marijuana and cigarettes at this time as well as hospital prescribed Dilaudid)- did well up until this year when she went to [facility] for pain r/t diabetes - they gave her Dilaudid and patient became addicted again.  States she has been in and out of the hospital this year seeking medication.  mom sent her to [facility] 4 weeks ago however no improvement noted on dc. States patient wants "to be normal" and she does not think patient wants to die.  At home she will "push herself up on the wall hard to try and stop her addiction. she may have bruising from it." Mother wants substance rehab as dc plan.

## 2021-12-22 LAB
ALBUMIN SERPL ELPH-MCNC: 3 G/DL — LOW (ref 3.3–5)
ALP SERPL-CCNC: 99 U/L — SIGNIFICANT CHANGE UP (ref 40–120)
ALT FLD-CCNC: 18 U/L — SIGNIFICANT CHANGE UP (ref 4–33)
ANION GAP SERPL CALC-SCNC: 12 MMOL/L — SIGNIFICANT CHANGE UP (ref 7–14)
ANION GAP SERPL CALC-SCNC: 20 MMOL/L — HIGH (ref 7–14)
ANION GAP SERPL CALC-SCNC: 23 MMOL/L — HIGH (ref 7–14)
APPEARANCE UR: CLEAR — SIGNIFICANT CHANGE UP
AST SERPL-CCNC: 32 U/L — SIGNIFICANT CHANGE UP (ref 4–32)
B-OH-BUTYR SERPL-SCNC: 0.5 MMOL/L — HIGH (ref 0–0.4)
B-OH-BUTYR SERPL-SCNC: 4.2 MMOL/L — HIGH (ref 0–0.4)
B-OH-BUTYR SERPL-SCNC: 5.9 MMOL/L — HIGH (ref 0–0.4)
BACTERIA # UR AUTO: NEGATIVE — SIGNIFICANT CHANGE UP
BASE EXCESS BLDV CALC-SCNC: -4.4 MMOL/L — LOW (ref -2–3)
BILIRUB SERPL-MCNC: 0.7 MG/DL — SIGNIFICANT CHANGE UP (ref 0.2–1.2)
BILIRUB UR-MCNC: NEGATIVE — SIGNIFICANT CHANGE UP
BUN SERPL-MCNC: 12 MG/DL — SIGNIFICANT CHANGE UP (ref 7–23)
BUN SERPL-MCNC: 7 MG/DL — SIGNIFICANT CHANGE UP (ref 7–23)
BUN SERPL-MCNC: 8 MG/DL — SIGNIFICANT CHANGE UP (ref 7–23)
CA-I SERPL-SCNC: 1.2 MMOL/L — SIGNIFICANT CHANGE UP (ref 1.15–1.33)
CALCIUM SERPL-MCNC: 8.1 MG/DL — LOW (ref 8.4–10.5)
CALCIUM SERPL-MCNC: 8.3 MG/DL — LOW (ref 8.4–10.5)
CALCIUM SERPL-MCNC: 8.5 MG/DL — SIGNIFICANT CHANGE UP (ref 8.4–10.5)
CHLORIDE BLDV-SCNC: 105 MMOL/L — SIGNIFICANT CHANGE UP (ref 96–108)
CHLORIDE SERPL-SCNC: 100 MMOL/L — SIGNIFICANT CHANGE UP (ref 98–107)
CHLORIDE SERPL-SCNC: 104 MMOL/L — SIGNIFICANT CHANGE UP (ref 98–107)
CHLORIDE SERPL-SCNC: 95 MMOL/L — LOW (ref 98–107)
CO2 BLDV-SCNC: 20.8 MMOL/L — LOW (ref 22–26)
CO2 SERPL-SCNC: 12 MMOL/L — LOW (ref 22–31)
CO2 SERPL-SCNC: 14 MMOL/L — LOW (ref 22–31)
CO2 SERPL-SCNC: 21 MMOL/L — LOW (ref 22–31)
COLOR SPEC: SIGNIFICANT CHANGE UP
CREAT SERPL-MCNC: 0.76 MG/DL — SIGNIFICANT CHANGE UP (ref 0.5–1.3)
CREAT SERPL-MCNC: 0.78 MG/DL — SIGNIFICANT CHANGE UP (ref 0.5–1.3)
CREAT SERPL-MCNC: 0.84 MG/DL — SIGNIFICANT CHANGE UP (ref 0.5–1.3)
DIFF PNL FLD: ABNORMAL
EPI CELLS # UR: 0 /HPF — SIGNIFICANT CHANGE UP (ref 0–5)
GAS PNL BLDA: SIGNIFICANT CHANGE UP
GAS PNL BLDV: 133 MMOL/L — LOW (ref 136–145)
GAS PNL BLDV: SIGNIFICANT CHANGE UP
GLUCOSE BLDC GLUCOMTR-MCNC: 343 MG/DL — HIGH (ref 70–99)
GLUCOSE BLDC GLUCOMTR-MCNC: 357 MG/DL — HIGH (ref 70–99)
GLUCOSE BLDC GLUCOMTR-MCNC: 370 MG/DL — HIGH (ref 70–99)
GLUCOSE BLDV-MCNC: 125 MG/DL — HIGH (ref 70–99)
GLUCOSE SERPL-MCNC: 116 MG/DL — HIGH (ref 70–99)
GLUCOSE SERPL-MCNC: 185 MG/DL — HIGH (ref 70–99)
GLUCOSE SERPL-MCNC: 364 MG/DL — HIGH (ref 70–99)
GLUCOSE UR QL: ABNORMAL
HCG SERPL-ACNC: <5 MIU/ML — SIGNIFICANT CHANGE UP
HCO3 BLDV-SCNC: 20 MMOL/L — LOW (ref 22–29)
HCT VFR BLD CALC: 30.6 % — LOW (ref 34.5–45)
HCT VFR BLDA CALC: 25 % — LOW (ref 34.5–46.5)
HGB BLD CALC-MCNC: 8.4 G/DL — LOW (ref 11.5–15.5)
HGB BLD-MCNC: 8.9 G/DL — LOW (ref 11.5–15.5)
HYALINE CASTS # UR AUTO: 1 /LPF — SIGNIFICANT CHANGE UP (ref 0–7)
KETONES UR-MCNC: ABNORMAL
LACTATE BLDV-MCNC: 1.2 MMOL/L — SIGNIFICANT CHANGE UP (ref 0.5–2)
LACTATE SERPL-SCNC: 1.2 MMOL/L — SIGNIFICANT CHANGE UP (ref 0.5–2)
LEUKOCYTE ESTERASE UR-ACNC: NEGATIVE — SIGNIFICANT CHANGE UP
MAGNESIUM SERPL-MCNC: 1.5 MG/DL — LOW (ref 1.6–2.6)
MAGNESIUM SERPL-MCNC: 1.6 MG/DL — SIGNIFICANT CHANGE UP (ref 1.6–2.6)
MAGNESIUM SERPL-MCNC: 1.7 MG/DL — SIGNIFICANT CHANGE UP (ref 1.6–2.6)
MCHC RBC-ENTMCNC: 18.5 PG — LOW (ref 27–34)
MCHC RBC-ENTMCNC: 29.1 GM/DL — LOW (ref 32–36)
MCV RBC AUTO: 63.5 FL — LOW (ref 80–100)
NITRITE UR-MCNC: NEGATIVE — SIGNIFICANT CHANGE UP
NRBC # BLD: 0 /100 WBCS — SIGNIFICANT CHANGE UP
NRBC # FLD: 0 K/UL — SIGNIFICANT CHANGE UP
PCO2 BLDV: 32 MMHG — LOW (ref 39–42)
PH BLDV: 7.4 — SIGNIFICANT CHANGE UP (ref 7.32–7.43)
PH UR: 6 — SIGNIFICANT CHANGE UP (ref 5–8)
PHOSPHATE SERPL-MCNC: 1.9 MG/DL — LOW (ref 2.5–4.5)
PHOSPHATE SERPL-MCNC: 2.3 MG/DL — LOW (ref 2.5–4.5)
PHOSPHATE SERPL-MCNC: 2.5 MG/DL — SIGNIFICANT CHANGE UP (ref 2.5–4.5)
PLATELET # BLD AUTO: 306 K/UL — SIGNIFICANT CHANGE UP (ref 150–400)
PO2 BLDV: 128 MMHG — SIGNIFICANT CHANGE UP
POTASSIUM BLDV-SCNC: 3.8 MMOL/L — SIGNIFICANT CHANGE UP (ref 3.5–5.1)
POTASSIUM SERPL-MCNC: 3.7 MMOL/L — SIGNIFICANT CHANGE UP (ref 3.5–5.3)
POTASSIUM SERPL-MCNC: 4.3 MMOL/L — SIGNIFICANT CHANGE UP (ref 3.5–5.3)
POTASSIUM SERPL-MCNC: 4.5 MMOL/L — SIGNIFICANT CHANGE UP (ref 3.5–5.3)
POTASSIUM SERPL-SCNC: 3.7 MMOL/L — SIGNIFICANT CHANGE UP (ref 3.5–5.3)
POTASSIUM SERPL-SCNC: 4.3 MMOL/L — SIGNIFICANT CHANGE UP (ref 3.5–5.3)
POTASSIUM SERPL-SCNC: 4.5 MMOL/L — SIGNIFICANT CHANGE UP (ref 3.5–5.3)
PROT SERPL-MCNC: 5.7 G/DL — LOW (ref 6–8.3)
PROT UR-MCNC: ABNORMAL
RBC # BLD: 4.82 M/UL — SIGNIFICANT CHANGE UP (ref 3.8–5.2)
RBC # FLD: 20.2 % — HIGH (ref 10.3–14.5)
RBC CASTS # UR COMP ASSIST: 46 /HPF — HIGH (ref 0–4)
SAO2 % BLDV: 98.5 % — SIGNIFICANT CHANGE UP
SODIUM SERPL-SCNC: 132 MMOL/L — LOW (ref 135–145)
SODIUM SERPL-SCNC: 132 MMOL/L — LOW (ref 135–145)
SODIUM SERPL-SCNC: 137 MMOL/L — SIGNIFICANT CHANGE UP (ref 135–145)
SP GR SPEC: 1.02 — SIGNIFICANT CHANGE UP (ref 1–1.05)
TROPONIN T, HIGH SENSITIVITY RESULT: 8 NG/L — SIGNIFICANT CHANGE UP
UROBILINOGEN FLD QL: SIGNIFICANT CHANGE UP
WBC # BLD: 20.56 K/UL — HIGH (ref 3.8–10.5)
WBC # FLD AUTO: 20.56 K/UL — HIGH (ref 3.8–10.5)
WBC UR QL: 3 /HPF — SIGNIFICANT CHANGE UP (ref 0–5)

## 2021-12-22 PROCEDURE — 99233 SBSQ HOSP IP/OBS HIGH 50: CPT

## 2021-12-22 PROCEDURE — 99223 1ST HOSP IP/OBS HIGH 75: CPT

## 2021-12-22 PROCEDURE — 99232 SBSQ HOSP IP/OBS MODERATE 35: CPT

## 2021-12-22 PROCEDURE — 99233 SBSQ HOSP IP/OBS HIGH 50: CPT | Mod: GC

## 2021-12-22 RX ORDER — INSULIN LISPRO 100/ML
VIAL (ML) SUBCUTANEOUS
Refills: 0 | Status: DISCONTINUED | OUTPATIENT
Start: 2021-12-22 | End: 2021-12-24

## 2021-12-22 RX ORDER — ACETAMINOPHEN 500 MG
1000 TABLET ORAL ONCE
Refills: 0 | Status: COMPLETED | OUTPATIENT
Start: 2021-12-22 | End: 2021-12-22

## 2021-12-22 RX ORDER — METOCLOPRAMIDE HCL 10 MG
5 TABLET ORAL ONCE
Refills: 0 | Status: COMPLETED | OUTPATIENT
Start: 2021-12-22 | End: 2021-12-22

## 2021-12-22 RX ORDER — SODIUM CHLORIDE 9 MG/ML
1000 INJECTION, SOLUTION INTRAVENOUS
Refills: 0 | Status: DISCONTINUED | OUTPATIENT
Start: 2021-12-22 | End: 2021-12-22

## 2021-12-22 RX ORDER — INSULIN LISPRO 100/ML
4 VIAL (ML) SUBCUTANEOUS
Refills: 0 | Status: DISCONTINUED | OUTPATIENT
Start: 2021-12-22 | End: 2021-12-24

## 2021-12-22 RX ORDER — SODIUM CHLORIDE 9 MG/ML
2000 INJECTION, SOLUTION INTRAVENOUS ONCE
Refills: 0 | Status: COMPLETED | OUTPATIENT
Start: 2021-12-22 | End: 2021-12-22

## 2021-12-22 RX ORDER — INSULIN LISPRO 100/ML
VIAL (ML) SUBCUTANEOUS
Refills: 0 | Status: DISCONTINUED | OUTPATIENT
Start: 2021-12-22 | End: 2021-12-22

## 2021-12-22 RX ORDER — SODIUM,POTASSIUM PHOSPHATES 278-250MG
1 POWDER IN PACKET (EA) ORAL
Refills: 0 | Status: COMPLETED | OUTPATIENT
Start: 2021-12-22 | End: 2021-12-23

## 2021-12-22 RX ORDER — POTASSIUM PHOSPHATE, MONOBASIC POTASSIUM PHOSPHATE, DIBASIC 236; 224 MG/ML; MG/ML
15 INJECTION, SOLUTION INTRAVENOUS ONCE
Refills: 0 | Status: COMPLETED | OUTPATIENT
Start: 2021-12-22 | End: 2021-12-22

## 2021-12-22 RX ORDER — MORPHINE SULFATE 50 MG/1
2 CAPSULE, EXTENDED RELEASE ORAL ONCE
Refills: 0 | Status: DISCONTINUED | OUTPATIENT
Start: 2021-12-22 | End: 2021-12-22

## 2021-12-22 RX ORDER — INSULIN LISPRO 100/ML
4 VIAL (ML) SUBCUTANEOUS
Refills: 0 | Status: DISCONTINUED | OUTPATIENT
Start: 2021-12-22 | End: 2021-12-22

## 2021-12-22 RX ORDER — MAGNESIUM SULFATE 500 MG/ML
2 VIAL (ML) INJECTION ONCE
Refills: 0 | Status: COMPLETED | OUTPATIENT
Start: 2021-12-22 | End: 2021-12-22

## 2021-12-22 RX ORDER — KETOROLAC TROMETHAMINE 30 MG/ML
15 SYRINGE (ML) INJECTION ONCE
Refills: 0 | Status: DISCONTINUED | OUTPATIENT
Start: 2021-12-22 | End: 2021-12-22

## 2021-12-22 RX ADMIN — ONDANSETRON 4 MILLIGRAM(S): 8 TABLET, FILM COATED ORAL at 09:18

## 2021-12-22 RX ADMIN — HYDROMORPHONE HYDROCHLORIDE 0.25 MILLIGRAM(S): 2 INJECTION INTRAMUSCULAR; INTRAVENOUS; SUBCUTANEOUS at 01:23

## 2021-12-22 RX ADMIN — Medication 1 PACKET(S): at 17:42

## 2021-12-22 RX ADMIN — Medication 3: at 17:05

## 2021-12-22 RX ADMIN — ONDANSETRON 4 MILLIGRAM(S): 8 TABLET, FILM COATED ORAL at 17:41

## 2021-12-22 RX ADMIN — Medication 25 MILLIGRAM(S): at 09:18

## 2021-12-22 RX ADMIN — ONDANSETRON 4 MILLIGRAM(S): 8 TABLET, FILM COATED ORAL at 00:47

## 2021-12-22 RX ADMIN — MORPHINE SULFATE 2 MILLIGRAM(S): 50 CAPSULE, EXTENDED RELEASE ORAL at 02:02

## 2021-12-22 RX ADMIN — INSULIN GLARGINE 18 UNIT(S): 100 INJECTION, SOLUTION SUBCUTANEOUS at 17:04

## 2021-12-22 RX ADMIN — HYDROMORPHONE HYDROCHLORIDE 0.25 MILLIGRAM(S): 2 INJECTION INTRAMUSCULAR; INTRAVENOUS; SUBCUTANEOUS at 05:45

## 2021-12-22 RX ADMIN — Medication 2: at 01:14

## 2021-12-22 RX ADMIN — HYDROMORPHONE HYDROCHLORIDE 0.25 MILLIGRAM(S): 2 INJECTION INTRAMUSCULAR; INTRAVENOUS; SUBCUTANEOUS at 13:32

## 2021-12-22 RX ADMIN — Medication 25 MILLIGRAM(S): at 04:11

## 2021-12-22 RX ADMIN — Medication 5: at 07:16

## 2021-12-22 RX ADMIN — HYDROMORPHONE HYDROCHLORIDE 0.25 MILLIGRAM(S): 2 INJECTION INTRAMUSCULAR; INTRAVENOUS; SUBCUTANEOUS at 21:52

## 2021-12-22 RX ADMIN — HYDROMORPHONE HYDROCHLORIDE 0.25 MILLIGRAM(S): 2 INJECTION INTRAMUSCULAR; INTRAVENOUS; SUBCUTANEOUS at 22:22

## 2021-12-22 RX ADMIN — Medication 4 UNIT(S): at 17:04

## 2021-12-22 RX ADMIN — Medication 25 MILLIGRAM(S): at 00:45

## 2021-12-22 RX ADMIN — PANTOPRAZOLE SODIUM 40 MILLIGRAM(S): 20 TABLET, DELAYED RELEASE ORAL at 12:31

## 2021-12-22 RX ADMIN — HYDROMORPHONE HYDROCHLORIDE 0.25 MILLIGRAM(S): 2 INJECTION INTRAMUSCULAR; INTRAVENOUS; SUBCUTANEOUS at 17:41

## 2021-12-22 RX ADMIN — Medication 25 MILLIGRAM(S): at 17:41

## 2021-12-22 RX ADMIN — Medication 25 GRAM(S): at 21:08

## 2021-12-22 RX ADMIN — Medication 25 MILLIGRAM(S): at 13:32

## 2021-12-22 RX ADMIN — SODIUM CHLORIDE 75 MILLILITER(S): 9 INJECTION, SOLUTION INTRAVENOUS at 19:02

## 2021-12-22 RX ADMIN — Medication 4 UNIT(S): at 11:45

## 2021-12-22 RX ADMIN — Medication 4 UNIT(S): at 07:15

## 2021-12-22 RX ADMIN — MORPHINE SULFATE 2 MILLIGRAM(S): 50 CAPSULE, EXTENDED RELEASE ORAL at 02:45

## 2021-12-22 RX ADMIN — Medication 25 MILLIGRAM(S): at 21:52

## 2021-12-22 RX ADMIN — HYDROMORPHONE HYDROCHLORIDE 0.25 MILLIGRAM(S): 2 INJECTION INTRAMUSCULAR; INTRAVENOUS; SUBCUTANEOUS at 05:16

## 2021-12-22 RX ADMIN — POTASSIUM PHOSPHATE, MONOBASIC POTASSIUM PHOSPHATE, DIBASIC 62.5 MILLIMOLE(S): 236; 224 INJECTION, SOLUTION INTRAVENOUS at 21:08

## 2021-12-22 RX ADMIN — SODIUM CHLORIDE 2000 MILLILITER(S): 9 INJECTION, SOLUTION INTRAVENOUS at 12:32

## 2021-12-22 RX ADMIN — HYDROMORPHONE HYDROCHLORIDE 0.25 MILLIGRAM(S): 2 INJECTION INTRAMUSCULAR; INTRAVENOUS; SUBCUTANEOUS at 13:47

## 2021-12-22 RX ADMIN — HYDROMORPHONE HYDROCHLORIDE 0.25 MILLIGRAM(S): 2 INJECTION INTRAMUSCULAR; INTRAVENOUS; SUBCUTANEOUS at 00:45

## 2021-12-22 RX ADMIN — HYDROMORPHONE HYDROCHLORIDE 0.25 MILLIGRAM(S): 2 INJECTION INTRAMUSCULAR; INTRAVENOUS; SUBCUTANEOUS at 09:18

## 2021-12-22 RX ADMIN — HYDROMORPHONE HYDROCHLORIDE 0.25 MILLIGRAM(S): 2 INJECTION INTRAMUSCULAR; INTRAVENOUS; SUBCUTANEOUS at 09:33

## 2021-12-22 RX ADMIN — Medication 1 PACKET(S): at 12:31

## 2021-12-22 RX ADMIN — Medication 5 MILLIGRAM(S): at 01:48

## 2021-12-22 RX ADMIN — ENOXAPARIN SODIUM 40 MILLIGRAM(S): 100 INJECTION SUBCUTANEOUS at 12:31

## 2021-12-22 NOTE — CHART NOTE - NSCHARTNOTEFT_GEN_A_CORE
Spoke with Endocrinology on-call service ([phone]) regarding patient's BMP and Beta-hydroxybutyrate results. Patient now with normal BG levels, normal AG and Beta-hydroxybutyrate of 0.5 (down from 4.2).   There is no longer a clinical indication for BHB/BMP/VBG q 4 hours. Recommends to DC IVF D5 1/2 NS and DC Admelog 4 units q 4 hours. Restart pre-meal as previously recommended and continue with low correctional sliding scale, FS before meals and at bedtime. Next lab draws to be in am.  Will continue to monitor.   12-22    137  |  104  |  7   ----------------------------<  116<H>  3.7   |  21<L>  |  0.78    Ca    8.1<L>      22 Dec 2021 19:20  Phos  1.9     12-22  Mg     1.50     12-22    TPro  5.7<L>  /  Alb  3.0<L>  /  TBili  0.7  /  DBili  x   /  AST  32  /  ALT  18  /  AlkPhos  99  12-22    Beta Hydroxy-Butyrate (12.22.21 @ 19:20)    Beta Hydroxy-Butyrate: 0.5 mmol/L    Blood Gas Profile - Venous (12.22.21 @ 11:22)    pH, Venous: 7.40    pCO2, Venous: 32 mmHg    pO2, Venous: 128 mmHg    HCO3, Venous: 20 mmol/L    Base Excess, Venous: -4.4 mmol/L    Oxygen Saturation, Venous: 98.5 %    Total CO2, Venous: 20.8 mmol/L      ROXANA Carver  Department of Medicine   In House # [id number]

## 2021-12-22 NOTE — CHART NOTE - NSCHARTNOTEFT_GEN_A_CORE
Provider discussed case with Dr. Naqvi and Dr. Cotton (endocrine) - both feel patient requiring MICU admission for insulin gtt due to DKA. MICU consult called for second time today. Patient BG still elevated, BHB elevated, bicarb worsening. Case discussed with MICU, who is still stating patient does not require MICU admission. MICU resident states night attending will call Dr. Cotton to discuss. Provider discussed case with Dr. Naqvi and Dr. Cotton (endocrine) - both feel patient requiring MICU admission for insulin gtt due to DKA. MICU consult called for second time today. Patient BG still elevated, BHB elevated, bicarb worsening. Case discussed with MICU, who is still stating patient does not require MICU admission. MICU resident states night attending will call Dr. Cotton to discuss. MICU recommends changing IVF from NS to LR - orders changed.

## 2021-12-22 NOTE — CHART NOTE - NSCHARTNOTEFT_GEN_A_CORE
Met with the patient on 12/22. Case discussed with Phoebe Yeager NP as well, impression and plan discussed and agreed upon. Patient today is calm, gets annoyed when MD tries to ask questions about substance abuse- ' are you insinuating that I am seeking Dilaudid?'. She states that she had called 911 to come to the ED for nausea, abdominal pain. States that she was waiting for about 2 hrs in ED, and was told she would have to wait longer to see a provider. That is when she tied the cord around her neck. She states that she did the act in front of a staff with the goal that ' the doctor will come'. She states that ' it worked. The doctor came immediately and my pain was treated'. She states that she did the behavior 'only to get attention that I needed'. She adamantly denies any SI or HI. Denies any ah or vh or paranoia. Denies any mood symptoms as well. Looks unwell during the interview, nauseous, and throwing up. She states that her pain is well controlled with Dilaudid.   Upon admission, collateral was obtained from mother needed for safety assessment. No concerns for SI per se, but mother concerned about substance abuse. Patient aware that  psychiatry called mother to gather information needed for assessment. She does not want any further communication with mother.   I have discussed the case with medicine attending Dr Naqvi, and EUGENIA Soto. Discussed that the behavior in ED was attention seeking and not with SI. Discussed recommendations as well in detail. Patient updated of plan as well.     RECOMMENDATIONS  - Continue 1:1 CO till tomorrow 12/23 to ensure behaviors under control. No concerns for true SI. Patient can get her phone back. Do ensure that  is in RN station n not in room.   - Consistent, clear communication between team members and patient, to avoid chance of splitting.   - Treat pain, but limit setting in terms of pain management.   - AGGRESSION/AGITATION---- Haldol 1mg q 6hrs prn IM/IV/PO.     Discussed above with Dr Naqvi, EUGENIA Soto and patient aware.     Marbella Ortega MD  Attending psychiatrist

## 2021-12-22 NOTE — PROGRESS NOTE ADULT - SUBJECTIVE AND OBJECTIVE BOX
NOTE INCOMPLETE/ IN PROGRESS  *Please wait for attending attestation for official recommendations.     HPI:  32yo F hx of T1DM c/b diabetic gastroparesis comes to ED for abdominal pain, acute on chronic. Says it has been happening for a month, nausea/vomiting.  Stated in ED "I just want to kill myself." Saying she does not want to live with this pain. Despite offering medications for pain, pt attempted strangulation w/ call bell.  ED Requested Psych consult.  Patient seen on medicine floor with N/V and c/o lower abd pain 8/10 typical for her DKA    (21 Dec 2021 17:10)      PAST MEDICAL & SURGICAL HISTORY:  Diabetes mellitus type 1    Gastroparesis    Type 1 diabetes mellitus with ketoacidosis without coma, with long-term current use of insulin    Gastroparesis due to DM    Colitis    Ectopic pregnancy  2013, s/p removal of right fallopian tube    History of cholecystectomy        FAMILY HISTORY:  Family history of diabetes mellitus (Grandparent)    Family history of type 1 diabetes mellitus (Grandparent)    FH: HTN (hypertension) (Father)        Social History:    Outpatient Medications:    MEDICATIONS  (STANDING):  dextrose 40% Gel 15 Gram(s) Oral once  dextrose 5%. 1000 milliLiter(s) (50 mL/Hr) IV Continuous <Continuous>  dextrose 5%. 1000 milliLiter(s) (100 mL/Hr) IV Continuous <Continuous>  dextrose 50% Injectable 25 Gram(s) IV Push once  dextrose 50% Injectable 12.5 Gram(s) IV Push once  dextrose 50% Injectable 25 Gram(s) IV Push once  enoxaparin Injectable 40 milliGRAM(s) SubCutaneous daily  glucagon  Injectable 1 milliGRAM(s) IntraMuscular once  insulin glargine Injectable (LANTUS) 18 Unit(s) SubCutaneous <User Schedule>  insulin lispro (ADMELOG) corrective regimen sliding scale   SubCutaneous three times a day before meals  insulin lispro (ADMELOG) corrective regimen sliding scale   SubCutaneous at bedtime  insulin lispro Injectable (ADMELOG) 4 Unit(s) SubCutaneous three times a day before meals  nicotine -  14 mG/24Hr(s) Patch 1 patch Transdermal daily  pantoprazole  Injectable 40 milliGRAM(s) IV Push daily  sodium chloride 0.9%. 1000 milliLiter(s) (80 mL/Hr) IV Continuous <Continuous>    MEDICATIONS  (PRN):  acetaminophen     Tablet .. 650 milliGRAM(s) Oral every 6 hours PRN Mild Pain (1 - 3)  diphenhydrAMINE Injectable 25 milliGRAM(s) IV Push every 4 hours PRN Rash and/or Itching  HYDROmorphone  Injectable 0.25 milliGRAM(s) IV Push every 4 hours PRN Moderate Pain (4 - 6) or severe pain  ondansetron Injectable 4 milliGRAM(s) IV Push every 8 hours PRN Nausea and/or Vomiting      Allergies    No Known Allergies    Intolerances      Review of Systems:  Constitutional: No fever  Eyes: No blurry vision  Neuro: No tremors  HEENT: No pain  Cardiovascular: No chest pain, palpitations  Respiratory: No SOB, no cough  GI: No nausea, vomiting, abdominal pain  : No dysuria  Skin: no rash  Psych: no depression  Endocrine: no polyuria, polydipsia  Hem/lymph: no swelling  Osteoporosis: no fractures    ALL OTHER SYSTEMS REVIEWED AND NEGATIVE    UNABLE TO OBTAIN    PHYSICAL EXAM:  VITALS: T(C): 37.3 (12-22-21 @ 08:05)  T(F): 99.1 (12-22-21 @ 08:05), Max: 99.1 (12-21-21 @ 22:35)  HR: 126 (12-22-21 @ 08:05) (110 - 126)  BP: 153/75 (12-22-21 @ 08:05) (107/49 - 170/89)  RR:  (16 - 20)  SpO2:  (95% - 100%)  Wt(kg): --  GENERAL: NAD, well-groomed, well-developed  EYES: No proptosis, no lid lag, anicteric  HEENT:  Atraumatic, Normocephalic, moist mucous membranes  THYROID: Normal size, no palpable nodules  RESPIRATORY: Clear to auscultation bilaterally; No rales, rhonchi, wheezing  CARDIOVASCULAR: Regular rate and rhythm; No murmurs; no peripheral edema  GI: Soft, nontender, non distended, normal bowel sounds  SKIN: Dry, intact, No rashes or lesions  MUSCULOSKELETAL: Full range of motion, normal strength  NEURO: sensation intact, extraocular movements intact, no tremor  PSYCH: Alert and oriented x 3, normal affect, normal mood  CUSHING'S SIGNS: no striae      CAPILLARY BLOOD GLUCOSE      POCT Blood Glucose.: 357 mg/dL (22 Dec 2021 07:14)  POCT Blood Glucose.: 370 mg/dL (22 Dec 2021 06:00)  POCT Blood Glucose.: 343 mg/dL (22 Dec 2021 01:00)  POCT Blood Glucose.: 296 mg/dL (21 Dec 2021 18:42)  POCT Blood Glucose.: 424 mg/dL (21 Dec 2021 16:48)  POCT Blood Glucose.: 424 mg/dL (21 Dec 2021 16:45)  POCT Blood Glucose.: 309 mg/dL (21 Dec 2021 13:55)  POCT Blood Glucose.: 233 mg/dL (21 Dec 2021 12:01)  POCT Blood Glucose.: 299 mg/dL (21 Dec 2021 10:20)  POCT Blood Glucose.: 320 mg/dL (21 Dec 2021 09:07)                            8.9    20.56 )-----------( 306      ( 22 Dec 2021 08:00 )             30.6       12-22    132<L>  |  95<L>  |  12  ----------------------------<  364<H>  4.3   |  14<L>  |  0.84    EGFR if : 107  EGFR if non : 93    Ca    8.5      12-22  Mg     1.70     12-22  Phos  2.3     12-22    TPro  8.5<H>  /  Alb  4.4  /  TBili  0.6  /  DBili  x   /  AST  34<H>  /  ALT  21  /  AlkPhos  117  12-20      Thyroid Function Tests:      A1C with Estimated Average Glucose Result: 9.6 % (10-28-21 @ 09:17)          Radiology:              HPI:  32yo F hx of T1DM c/b diabetic gastroparesis comes to ED for abdominal pain, acute on chronic. Says it has been happening for a month, nausea/vomiting.  Stated in ED "I just want to kill myself." Saying she does not want to live with this pain. Despite offering medications for pain, pt attempted strangulation w/ call bell.  ED Requested Psych consult.  Patient seen on medicine floor with N/V and c/o lower abd pain 8/10 typical for her DKA    (21 Dec 2021 17:10)      INTERVAL EVENTS:  - Per AM labs, in DKA again.  - Patient reports she vomited CLD last night and feels dehydrated  - Nausea at this time improved after receiving zofran      PAST MEDICAL & SURGICAL HISTORY:  Diabetes mellitus type 1  Gastroparesis  Type 1 diabetes mellitus with ketoacidosis without coma, with long-term current use of insulin  Gastroparesis due to DM  Colitis    Ectopic pregnancy  2013, s/p removal of right fallopian tube    History of cholecystectomy        FAMILY HISTORY:  Family history of diabetes mellitus (Grandparent)    Family history of type 1 diabetes mellitus (Grandparent)    FH: HTN (hypertension) (Father)        Social History:  Smoker  Previous marijuana use      MEDICATIONS  (STANDING):  dextrose 40% Gel 15 Gram(s) Oral once  dextrose 5%. 1000 milliLiter(s) (50 mL/Hr) IV Continuous <Continuous>  dextrose 5%. 1000 milliLiter(s) (100 mL/Hr) IV Continuous <Continuous>  dextrose 50% Injectable 25 Gram(s) IV Push once  dextrose 50% Injectable 12.5 Gram(s) IV Push once  dextrose 50% Injectable 25 Gram(s) IV Push once  enoxaparin Injectable 40 milliGRAM(s) SubCutaneous daily  glucagon  Injectable 1 milliGRAM(s) IntraMuscular once  insulin glargine Injectable (LANTUS) 18 Unit(s) SubCutaneous <User Schedule>  insulin lispro (ADMELOG) corrective regimen sliding scale   SubCutaneous three times a day before meals  insulin lispro (ADMELOG) corrective regimen sliding scale   SubCutaneous at bedtime  insulin lispro Injectable (ADMELOG) 4 Unit(s) SubCutaneous three times a day before meals  nicotine -  14 mG/24Hr(s) Patch 1 patch Transdermal daily  pantoprazole  Injectable 40 milliGRAM(s) IV Push daily  sodium chloride 0.9%. 1000 milliLiter(s) (80 mL/Hr) IV Continuous <Continuous>    MEDICATIONS  (PRN):  acetaminophen     Tablet .. 650 milliGRAM(s) Oral every 6 hours PRN Mild Pain (1 - 3)  diphenhydrAMINE Injectable 25 milliGRAM(s) IV Push every 4 hours PRN Rash and/or Itching  HYDROmorphone  Injectable 0.25 milliGRAM(s) IV Push every 4 hours PRN Moderate Pain (4 - 6) or severe pain  ondansetron Injectable 4 milliGRAM(s) IV Push every 8 hours PRN Nausea and/or Vomiting      Allergies    No Known Allergies    Intolerances      Review of Systems:  Constitutional: No fever  Eyes: No blurry vision  Neuro: No tremors  HEENT: No pain  Cardiovascular: No chest pain, palpitations  Respiratory: No SOB, no cough  GI: + nausea, vomiting, abdominal pain  : No dysuria  Skin: no rash  Psych: no depression  Endocrine: no polyuria, polydipsia  Hem/lymph: no swelling  Osteoporosis: no fractures    ALL OTHER SYSTEMS REVIEWED AND NEGATIVE    UNABLE TO OBTAIN    PHYSICAL EXAM:  VITALS: T(C): 37.3 (12-22-21 @ 08:05)  T(F): 99.1 (12-22-21 @ 08:05), Max: 99.1 (12-21-21 @ 22:35)  HR: 126 (12-22-21 @ 08:05) (110 - 126)  BP: 153/75 (12-22-21 @ 08:05) (107/49 - 170/89)  RR:  (16 - 20)  SpO2:  (95% - 100%)  Wt(kg): --  GENERAL: NAD  EYES: No proptosis, no lid lag, anicteric  HEENT:  Atraumatic, Normocephalic, dry mm  THYROID: Normal size, no palpable nodules  RESPIRATORY: Clear to auscultation bilaterally; No rales, rhonchi, wheezing  CARDIOVASCULAR: Tachycardic, regular rhythm; No murmurs; no peripheral edema  GI: Soft, diffusely tender, active guarding.  SKIN: Dry, intact, No rashes or lesions  MUSCULOSKELETAL: Full range of motion, normal strength  NEURO: sensation intact, extraocular movements intact, no tremor  PSYCH: Alert and oriented x 3, normal affect  CUSHING'S SIGNS: no striae      CAPILLARY BLOOD GLUCOSE      POCT Blood Glucose.: 357 mg/dL (22 Dec 2021 07:14)  POCT Blood Glucose.: 370 mg/dL (22 Dec 2021 06:00)  POCT Blood Glucose.: 343 mg/dL (22 Dec 2021 01:00)  POCT Blood Glucose.: 296 mg/dL (21 Dec 2021 18:42)  POCT Blood Glucose.: 424 mg/dL (21 Dec 2021 16:48)  POCT Blood Glucose.: 424 mg/dL (21 Dec 2021 16:45)  POCT Blood Glucose.: 309 mg/dL (21 Dec 2021 13:55)  POCT Blood Glucose.: 233 mg/dL (21 Dec 2021 12:01)  POCT Blood Glucose.: 299 mg/dL (21 Dec 2021 10:20)  POCT Blood Glucose.: 320 mg/dL (21 Dec 2021 09:07)                            8.9    20.56 )-----------( 306      ( 22 Dec 2021 08:00 )             30.6       12-22    132<L>  |  95<L>  |  12  ----------------------------<  364<H>  4.3   |  14<L>  |  0.84    EGFR if : 107  EGFR if non : 93    Ca    8.5      12-22  Mg     1.70     12-22  Phos  2.3     12-22    TPro  8.5<H>  /  Alb  4.4  /  TBili  0.6  /  DBili  x   /  AST  34<H>  /  ALT  21  /  AlkPhos  117  12-20      Thyroid Function Tests:      A1C with Estimated Average Glucose Result: 9.6 % (10-28-21 @ 09:17)          Radiology:

## 2021-12-22 NOTE — PROGRESS NOTE ADULT - ASSESSMENT
31F w/ T1DM c/b gastroparesis who presents with acute on chronic abdominal pain x 1 month with n/v and SI/attempt in ED.  Endocrine consulted for DKA.    #DKA in T1DM  Developed DKA after presentation, closed s/p fluids and 3 doses of admelog 8 units on 12/21/21 without insulin gtt. Started on Lantus 18 units 2PM QD starting 12/21/21.  This AM, back in DKA with HCO3 14, BHB 5.9, AG 23, and serum glc 364.  Suspect that DKA is secondary to vomiting and hypovolemia. S/p 2L IVF bolus in AM, admelog in AM, FS downtrended to 132 and ABG pH 7.41.  Appears to be improving.    Inpatient plan:  - S/p 2L IVF bolus this AM, as recommended. Has been receiving NS 80cc/hr since, recommend continued maintenance IVF with rate to 125-150cc/hr to maintain euvolemia.  - c/w Lantus 18 units 2pm daily.  - c/w Admelog 4 units with meals TID.  Assess that patient is able to tolerate PO prior to administration. If unable to tolerate PO, hold premeal.  - c/w low dose correctional insulin  - Carb-Controlled diet  - If eating, Admelog 4 units premeal TID.  If not eating, hold premeal Admelog.   - FS TID AC and QHS  - Trend BMP/Mg/Phos, BHB, and blood gas q4h to monitor for DKA recurrence  - Discussed with MICU    Discharge plan:  - Basal lantus either BID vs. Daily dosing, TBD.  - Premeal admelog TID, dose TBD.  - Patient has follow up appointments with Diabetic Educator 2/15/22 and with Dr. Bose 7/11/22.    #Gastroparesis 2/2 T1DM:  - Zofran 4 to 8mg PRN up to 3 times daily  - Check EKG for QTc    #HTN:  Likely elevated in the setting of distress/abdominal pain  - Monitor for now while treating for pain  - If persistently elevated, consider initiating ACEI/ARB for BP control    Discussed with Dr. Cotton  Discussed with primary team    Rohit Leonard, PGY2  Internal Medicine  Pager [phone] ([facility])/[phone]([facility]) 31F w/ T1DM c/b gastroparesis who presents with acute on chronic abdominal pain x 1 month with n/v and SI/attempt in ED.  Endocrine consulted for DKA.    #DKA in T1DM  Developed DKA after presentation, closed s/p fluids and 3 doses of admelog 8 units on 12/21/21 without insulin gtt. Started on Lantus 18 units 2PM QD starting 12/21/21.  This AM, back in DKA with HCO3 14, BHB 5.9, AG 23, and serum glc 364.  Suspect that DKA is secondary to vomiting and hypovolemia. S/p 2L IVF bolus in AM, admelog in AM, FS downtrended to 132 and ABG pH 7.41.  Appears to be improving.    Inpatient plan:  - S/p 2L IVF bolus this AM, as recommended. Has been receiving NS 80cc/hr since, recommend continued maintenance IVF with rate to 125-150cc/hr to maintain euvolemia.  - c/w Lantus 18 units 2pm daily.  - c/w Admelog 4 units with meals TID.  Assess that patient is able to tolerate PO prior to administration. If unable to tolerate PO, hold premeal.  - c/w low dose correctional insulin  - Carb-Controlled diet  - FS TID AC and QHS  - Trend BMP/Mg/Phos, BHB, and blood gas q4h to monitor for DKA recurrence  - Discussed with MICU    Discharge plan:  - Basal lantus either BID vs. Daily dosing, TBD.  - Premeal admelog TID, dose TBD.  - Patient has follow up appointments with Diabetic Educator 2/15/22 and with Dr. Bose 7/11/22.    #Gastroparesis 2/2 T1DM:  - Zofran 4 to 8mg PRN up to 3 times daily  - Check EKG for QTc    #HTN:  Likely elevated in the setting of distress/abdominal pain  - Monitor for now while treating for pain  - If persistently elevated, consider initiating ACEI/ARB for BP control    Discussed with Dr. Cotton  Discussed with primary team    Rohit Leonard, PGY2  Internal Medicine  Pager [phone] ([facility])/[phone]([facility]) 31F w/ T1DM c/b gastroparesis who presents with acute on chronic abdominal pain x 1 month with n/v and SI/attempt in ED.  Endocrine consulted for DKA.    #DKA in T1DM  Developed DKA after presentation, closed s/p fluids and 3 doses of admelog 8 units on 12/21/21 without insulin gtt. Started on Lantus 18 units 2PM QD starting 12/21/21.  This AM, back in DKA with HCO3 14, BHB 5.9, AG 23, and serum glc 364.  Suspect that DKA is secondary to vomiting and hypovolemia. S/p 2L IVF bolus in AM, admelog in AM, FS downtrended to 132 and ABG pH 7.41.  Appears to be improving.    Inpatient plan:  - S/p 2L IVF bolus this AM, as recommended. Has been receiving NS 80cc/hr since, recommend continued maintenance IVF with rate to 125-150cc/hr to maintain euvolemia.  - c/w Lantus 18 units 2pm daily.  - c/w Admelog 4 units with meals TID.  Assess that patient is able to tolerate PO prior to administration. If unable to tolerate PO, hold premeal.  - c/w low dose correctional insulin  - Carb-Controlled diet  - FS TID AC and QHS  - Trend BMP/Mg/Phos, BHB, and blood gas q4h to monitor for DKA recurrence  - Discussed with MICU    Discharge plan:  - Basal lantus either BID vs. Daily dosing, TBD.  - Premeal admelog TID, dose TBD.  - Patient has follow up appointments with Diabetic Educator 2/15/22 and with Dr. Bose 7/11/22.    #Gastroparesis 2/2 T1DM:  - Zofran 4 to 8mg PRN up to 3 times daily  - Check EKG for QTc  - GI follow up     #HTN:  Likely elevated in the setting of distress/abdominal pain  - Monitor for now while treating for pain  - If persistently elevated, consider initiating ACEI/ARB for BP control    Discussed with Dr. Cotton  Discussed with primary team    Rohit Leonard, PGY2  Internal Medicine  Pager [phone] ([facility])/[phone]([facility])

## 2021-12-22 NOTE — PROGRESS NOTE ADULT - ASSESSMENT
Relevant Problems   No relevant active problems       Anesthetic History   No history of anesthetic complications            Review of Systems / Medical History  Patient summary reviewed, nursing notes reviewed and pertinent labs reviewed    Pulmonary  Within defined limits        Smoker         Neuro/Psych   Within defined limits           Cardiovascular  Within defined limits                     GI/Hepatic/Renal  Within defined limits              Endo/Other  Within defined limits           Other Findings              Physical Exam    Airway  Mallampati: II  TM Distance: > 6 cm  Neck ROM: normal range of motion   Mouth opening: Normal     Cardiovascular  Regular rate and rhythm,  S1 and S2 normal,  no murmur, click, rub, or gallop             Dental  No notable dental hx       Pulmonary  Breath sounds clear to auscultation               Abdominal  GI exam deferred       Other Findings            Anesthetic Plan    ASA: 2  Anesthesia type: general      Post-op pain plan if not by surgeon: peripheral nerve block single    Induction: Intravenous  Anesthetic plan and risks discussed with: Patient hp31yo F hx of T1DM c/b diabetic gastroparesis comes to ED for abdominal pain, acute on chronic. Says it has been happening for a month, nausea/vomiting.  Stated in ED "I just want to kill myself." Saying she does not want to live with this pain. Despite offering medications for pain, pt attempted strangulation w/ call bell.  ED Requested Psych consult.  Patient seen on medicine floor with N/V and c/o lower abd pain 8/10 typical for her DKA     Endocrine consult appreciate    # abd pain sec to dka  # diabetic gastroparesis  #SI  #Tobacco    Problem/Plan - 1:  ·  Problem: Diabetic ketoacidosis, type I.   ·  Plan: Endocrine consulted for DKA appreciate input   discussed at length at bedside with icu resident and expressed my concerns that pt does need to be in MICU for insulin drip and intensive monitoring .. micu rejected pt .. i again called later in day and discussed with MICU attending especially that on repeat blood work , AG and Bhydroxybuterate   still elevated ..  though PH is : " normal " however pt with severe contraction alkalosis sec to severe dehydration , acid loss from vomitting.   Per MICU attending , pt  will not be able to transfer to MICU given "PH " and bed shortage in MICU. I asked to reconsider and re-evaluate during  the hours of night .    in the meantime : cont insulin long acting and short acting per endo insturctions   cont close monitoring of FS   cont IVF   monitor lytes and AG       Problem/Plan - 2:  ·  Problem: Diabetic gastroparesis.   ·  Plan: IVF hydration   start Iv Dialaudid 0.25 mg q4 prn with iv benadryl 25 mg q4 prn n/v/ itching  Zofran prn n/v  protonox iv.    Problem/Plan - 3:  ·  Problem: Tobacco use disorder.   ·  Plan: nicodrom patch.    Problem/Plan - 4:  ·  Problem: Prophylactic measure.   ·  Plan: lovenox sq.    Problem/Plan - 5:  ·  Problem: Suicidal" attempt " : appreciate psych input   pt is more likley to be manipulative and " needing pain meds " and less a harm to herself .  cont to monitor     plan d/w patient / psych/ ACP./MICU

## 2021-12-22 NOTE — CONSULT NOTE ADULT - ASSESSMENT
31F DM1 presented to hospital with DKA s/p gap closure in ED now with reopened gap. Possibly due to dehydration, non consistent carb diet, and relatively lower dose of lantus compared to home dose. While gap is open, the blood gas (?arterial with sat of 98%) has pH of 7.4 so patient is not currently acidotic.    Recs:  - agree with endo recs of fluid bolus, additional lantus, and check BMP at noon to see if gap re-closed.  - would workup potential causes of DKA: blood cx, UA +/- Cx, CXR, EKG, troponin   - agree with continuation of 1:1 for suicidal ideation      INCOMPLETE

## 2021-12-22 NOTE — PROGRESS NOTE ADULT - SUBJECTIVE AND OBJECTIVE BOX
Date of service: 21 @ 23:47      Patient is a 31y old  Female who presents with a chief complaint of n/v/abd pain (22 Dec 2021 11:36)                                                               INTERVAL HPI/OVERNIGHT EVENTS:    REVIEW OF SYSTEMS:     CONSTITUTIONAL: No weakness, fevers or chills  EYES/ENT: No visual changes , no ear ache   NECK: No pain or stiffness  RESPIRATORY: No cough, wheezing,  No shortness of breath  CARDIOVASCULAR: No chest pain or palpitations  GASTROINTESTINAL: bad pain L sided /flank pain   N/V   GENITOURINARY: No dysuria, frequency or hematuria  NEUROLOGICAL: No numbness or weakness  SKIN: No itching, burning, rashes, or lesions                                                                                                                                                                                                                                                                                 Medications:  MEDICATIONS  (STANDING):  dextrose 40% Gel 15 Gram(s) Oral once  dextrose 5%. 1000 milliLiter(s) (50 mL/Hr) IV Continuous <Continuous>  dextrose 5%. 1000 milliLiter(s) (100 mL/Hr) IV Continuous <Continuous>  dextrose 50% Injectable 25 Gram(s) IV Push once  dextrose 50% Injectable 12.5 Gram(s) IV Push once  dextrose 50% Injectable 25 Gram(s) IV Push once  enoxaparin Injectable 40 milliGRAM(s) SubCutaneous daily  glucagon  Injectable 1 milliGRAM(s) IntraMuscular once  insulin glargine Injectable (LANTUS) 18 Unit(s) SubCutaneous <User Schedule>  insulin lispro (ADMELOG) corrective regimen sliding scale   SubCutaneous at bedtime  insulin lispro (ADMELOG) corrective regimen sliding scale   SubCutaneous three times a day with meals  insulin lispro Injectable (ADMELOG) 4 Unit(s) SubCutaneous three times a day before meals  nicotine -  14 mG/24Hr(s) Patch 1 patch Transdermal daily  pantoprazole  Injectable 40 milliGRAM(s) IV Push daily  potassium phosphate / sodium phosphate Powder (PHOS-NaK) 1 Packet(s) Oral three times a day with meals    MEDICATIONS  (PRN):  acetaminophen     Tablet .. 650 milliGRAM(s) Oral every 6 hours PRN Mild Pain (1 - 3)  diphenhydrAMINE Injectable 25 milliGRAM(s) IV Push every 4 hours PRN Rash and/or Itching  HYDROmorphone  Injectable 0.25 milliGRAM(s) IV Push every 4 hours PRN Moderate Pain (4 - 6) or severe pain  ondansetron Injectable 4 milliGRAM(s) IV Push every 8 hours PRN Nausea and/or Vomiting       Allergies    No Known Allergies    Intolerances      Vital Signs Last 24 Hrs  T(C): 36.8 (22 Dec 2021 21:), Max: 37.3 (22 Dec 2021 00:50)  T(F): 98.3 (22 Dec 2021 21:), Max: 99.1 (22 Dec 2021 00:50)  HR: 101 (22 Dec 2021 21:) (92 - 126)  BP: 159/84 (22 Dec 2021 21:) (119/73 - 167/91)  BP(mean): --  RR: 18 (22 Dec 2021 21:) (17 - 18)  SpO2: 97% (22 Dec 2021 21:) (96% - 99%)  CAPILLARY BLOOD GLUCOSE      POCT Blood Glucose.: 109 mg/dL (22 Dec 2021 22:00)  POCT Blood Glucose.: 258 mg/dL (22 Dec 2021 17:01)  POCT Blood Glucose.: 132 mg/dL (22 Dec 2021 11:24)  POCT Blood Glucose.: 357 mg/dL (22 Dec 2021 07:14)  POCT Blood Glucose.: 370 mg/dL (22 Dec 2021 06:00)  POCT Blood Glucose.: 343 mg/dL (22 Dec 2021 01:00)       @ 07:01  -   @ 23:47  --------------------------------------------------------  IN: 240 mL / OUT: 1300 mL / NET: -1060 mL      Physical Exam:    Daily     Daily Weight in k.4 (22 Dec 2021 00:50)  General:  Well appearing, NAD, not cachetic  HEENT:  Nonicteric, PERRLA  CV:  RRR, S1S2   Lungs:  CTA B/L, no wheezes, rales, rhonchi  Abdomen:  Soft, non-tender, no distended, positive BS  Extremities:  2+ pulses, no c/c, no edema  Skin:  Warm and dry, no rashes  :  No gonzalez  Neuro:  AAOx3, non-focal, grossly intact                                                                                                                                                                                                                                                                                                LABS:                               8.9    20.56 )-----------( 306      ( 22 Dec 2021 08:00 )             30.6                          137  |  104  |  7   ----------------------------<  116<H>  3.7   |  21<L>  |  0.78    Ca    8.1<L>      22 Dec 2021 19:20  Phos  1.9       Mg     1.50         TPro  5.7<L>  /  Alb  3.0<L>  /  TBili  0.7  /  DBili  x   /  AST  32  /  ALT  18  /  AlkPhos  99                         RADIOLOGY & ADDITIONAL TESTS         I personally reviewed: [  ]EKG   [  ]CXR    [  ] CT      A/P:         Discussed with :     Melody consultants' Notes   Time spent :

## 2021-12-22 NOTE — CONSULT NOTE ADULT - SUBJECTIVE AND OBJECTIVE BOX
CHIEF COMPLAINT: Lance    HPI:  HPI:  30yo F hx of T1DM c/b diabetic gastroparesis comes to ED for abdominal pain, acute on chronic. Says it has been happening for a month, nausea/vomiting.  Stated in ED "I just want to kill myself." Saying she does not want to live with this pain. Despite offering medications for pain, pt attempted strangulation w/ call bell.  ED Requested Psych consult.  Patient seen on medicine floor with N/V and c/o lower abd pain 8/10 typical for her DKA    (21 Dec 2021 17:10)      Patient transitioned off insulin gtt in ED and was given 18U lantus last evening (usually takes 10 AM and 10 PM). She has been receiving maintenance fluids but has had poor PO intake and vomited this AM. On BMP her anion gap has reopened and on blood gas her pH is 7.4.      PAST MEDICAL & SURGICAL HISTORY:  Diabetes mellitus type 1    Gastroparesis    Type 1 diabetes mellitus with ketoacidosis without coma, with long-term current use of insulin    Gastroparesis due to DM    Colitis    Ectopic pregnancy  2013, s/p removal of right fallopian tube    History of cholecystectomy        FAMILY HISTORY:  Family history of diabetes mellitus (Grandparent)    Family history of type 1 diabetes mellitus (Grandparent)    FH: HTN (hypertension) (Father)        SOCIAL HISTORY:  Smoking: __ packs x ___ years  EtOH Use:  Marital Status:  Occupation:  Recent Travel:  Country of Birth:  Advance Directives:    Allergies    No Known Allergies    Intolerances        HOME MEDICATIONS:    REVIEW OF SYSTEMS:  Constitutional:   Eyes:  ENT:  CV:  Resp:  GI:  :  MSK:  Integumentary:  Neurological:  Psychiatric:  Endocrine:  Hematologic/Lymphatic:  Allergic/Immunologic:  [ ] All other systems negative  [ ] Unable to assess ROS because ________    OBJECTIVE:  ICU Vital Signs Last 24 Hrs  T(C): 37.3 (22 Dec 2021 08:05), Max: 37.3 (21 Dec 2021 22:35)  T(F): 99.1 (22 Dec 2021 08:05), Max: 99.1 (21 Dec 2021 22:35)  HR: 126 (22 Dec 2021 08:05) (110 - 126)  BP: 153/75 (22 Dec 2021 08:05) (107/49 - 170/89)  BP(mean): --  ABP: --  ABP(mean): --  RR: 18 (22 Dec 2021 08:05) (16 - 20)  SpO2: 98% (22 Dec 2021 08:05) (95% - 100%)        CAPILLARY BLOOD GLUCOSE      POCT Blood Glucose.: 132 mg/dL (22 Dec 2021 11:24)      PHYSICAL EXAM:  General:   HEENT:   Lymph Nodes:  Neck:   Respiratory:   Cardiovascular:   Abdomen:   Extremities:   Skin:   Neurological:  Psychiatry:    HOSPITAL MEDICATIONS:  MEDICATIONS  (STANDING):  dextrose 40% Gel 15 Gram(s) Oral once  dextrose 5%. 1000 milliLiter(s) (50 mL/Hr) IV Continuous <Continuous>  dextrose 5%. 1000 milliLiter(s) (100 mL/Hr) IV Continuous <Continuous>  dextrose 50% Injectable 25 Gram(s) IV Push once  dextrose 50% Injectable 12.5 Gram(s) IV Push once  dextrose 50% Injectable 25 Gram(s) IV Push once  enoxaparin Injectable 40 milliGRAM(s) SubCutaneous daily  glucagon  Injectable 1 milliGRAM(s) IntraMuscular once  insulin glargine Injectable (LANTUS) 18 Unit(s) SubCutaneous <User Schedule>  insulin lispro (ADMELOG) corrective regimen sliding scale   SubCutaneous three times a day before meals  insulin lispro (ADMELOG) corrective regimen sliding scale   SubCutaneous at bedtime  insulin lispro Injectable (ADMELOG) 4 Unit(s) SubCutaneous three times a day before meals  lactated ringers Bolus 2000 milliLiter(s) IV Bolus once  nicotine -  14 mG/24Hr(s) Patch 1 patch Transdermal daily  pantoprazole  Injectable 40 milliGRAM(s) IV Push daily  potassium phosphate / sodium phosphate Powder (PHOS-NaK) 1 Packet(s) Oral three times a day with meals  sodium chloride 0.9%. 1000 milliLiter(s) (80 mL/Hr) IV Continuous <Continuous>    MEDICATIONS  (PRN):  acetaminophen     Tablet .. 650 milliGRAM(s) Oral every 6 hours PRN Mild Pain (1 - 3)  diphenhydrAMINE Injectable 25 milliGRAM(s) IV Push every 4 hours PRN Rash and/or Itching  HYDROmorphone  Injectable 0.25 milliGRAM(s) IV Push every 4 hours PRN Moderate Pain (4 - 6) or severe pain  ondansetron Injectable 4 milliGRAM(s) IV Push every 8 hours PRN Nausea and/or Vomiting      LABS:                        8.9    20.56 )-----------( 306      ( 22 Dec 2021 08:00 )             30.6     12-22    132<L>  |  95<L>  |  12  ----------------------------<  364<H>  4.3   |  14<L>  |  0.84    Ca    8.5      22 Dec 2021 08:00  Phos  2.3     12-22  Mg     1.70     12-22    TPro  8.5<H>  /  Alb  4.4  /  TBili  0.6  /  DBili  x   /  AST  34<H>  /  ALT  21  /  AlkPhos  117  12-20          Venous Blood Gas:  12-22 @ 11:22  7.40/32/128/20/98.5  VBG Lactate: 1.2  Venous Blood Gas:  12-21 @ 11:28  7.40/34/56/21/84.4  VBG Lactate: 2.3  Venous Blood Gas:  12-21 @ 07:51  7.28/33/44/16/68.7  VBG Lactate: 3.2  Venous Blood Gas:  12-21 @ 04:05  7.34/36/60/19/88.7  VBG Lactate: 2.3  Venous Blood Gas:  12-20 @ 22:55  7.28/43/37/20/58.4  VBG Lactate: 6.0      MICROBIOLOGY:     RADIOLOGY:  [ ] Reviewed and interpreted by me    EKG: CHIEF COMPLAINT: Lance    HPI:  HPI:  30yo F hx of T1DM c/b diabetic gastroparesis comes to ED for abdominal pain, acute on chronic. Says it has been happening for a month, nausea/vomiting.  Stated in ED "I just want to kill myself." Saying she does not want to live with this pain. Despite offering medications for pain, pt attempted strangulation w/ call bell.  ED Requested Psych consult.  Patient seen on medicine floor with N/V and c/o lower abd pain 8/10 typical for her DKA    (21 Dec 2021 17:10)      Patient transitioned off insulin gtt in ED and was given 18U lantus last evening (usually takes 10 AM and 10 PM). She has been receiving maintenance fluids but has had poor PO intake and vomited this AM. On BMP her anion gap has reopened and on blood gas her pH is 7.4.      PAST MEDICAL & SURGICAL HISTORY:  Diabetes mellitus type 1    Gastroparesis    Type 1 diabetes mellitus with ketoacidosis without coma, with long-term current use of insulin    Gastroparesis due to DM    Colitis    Ectopic pregnancy  2013, s/p removal of right fallopian tube    History of cholecystectomy        FAMILY HISTORY:  Family history of diabetes mellitus (Grandparent)    Family history of type 1 diabetes mellitus (Grandparent)    FH: HTN (hypertension) (Father)        SOCIAL HISTORY:  Smoking: __ packs x ___ years  EtOH Use:  Marital Status:  Occupation:  Recent Travel:  Country of Birth:  Advance Directives:    Allergies    No Known Allergies    Intolerances        HOME MEDICATIONS:    REVIEW OF SYSTEMS:  Constitutional: no fevers, chills  Eyes: no vision changes  ENT: no sore throat, + dry mucus membranes  CV: no CP, +substernal burning with breathing  Resp: no SOB, no cough  GI: +N/V, +abd pain  : +L flank pain + urinary frequency  MSK: no muscle pain  Integumentary: no rash  Neurological: no weakness or numbness  [ ] All other systems negative  [ ] Unable to assess ROS because ________    OBJECTIVE:  ICU Vital Signs Last 24 Hrs  T(C): 37.3 (22 Dec 2021 08:05), Max: 37.3 (21 Dec 2021 22:35)  T(F): 99.1 (22 Dec 2021 08:05), Max: 99.1 (21 Dec 2021 22:35)  HR: 126 (22 Dec 2021 08:05) (110 - 126)  BP: 153/75 (22 Dec 2021 08:05) (107/49 - 170/89)  BP(mean): --  ABP: --  ABP(mean): --  RR: 18 (22 Dec 2021 08:05) (16 - 20)  SpO2: 98% (22 Dec 2021 08:05) (95% - 100%)        CAPILLARY BLOOD GLUCOSE      POCT Blood Glucose.: 132 mg/dL (22 Dec 2021 11:24)      PHYSICAL EXAM:  General: well appearing, NAD  HEENT: PERRL, dry mucus membranes  Respiratory: CTABL  Cardiovascular: tachycardic, regular, no murmur  Abdomen: +left sided pain, +mild CVAT, soft  Extremities: no edema, 2+ pulses  Skin: no rash  Neurological: LA A&O3    [facility] MEDICATIONS:  MEDICATIONS  (STANDING):  dextrose 40% Gel 15 Gram(s) Oral once  dextrose 5%. 1000 milliLiter(s) (50 mL/Hr) IV Continuous <Continuous>  dextrose 5%. 1000 milliLiter(s) (100 mL/Hr) IV Continuous <Continuous>  dextrose 50% Injectable 25 Gram(s) IV Push once  dextrose 50% Injectable 12.5 Gram(s) IV Push once  dextrose 50% Injectable 25 Gram(s) IV Push once  enoxaparin Injectable 40 milliGRAM(s) SubCutaneous daily  glucagon  Injectable 1 milliGRAM(s) IntraMuscular once  insulin glargine Injectable (LANTUS) 18 Unit(s) SubCutaneous <User Schedule>  insulin lispro (ADMELOG) corrective regimen sliding scale   SubCutaneous three times a day before meals  insulin lispro (ADMELOG) corrective regimen sliding scale   SubCutaneous at bedtime  insulin lispro Injectable (ADMELOG) 4 Unit(s) SubCutaneous three times a day before meals  lactated ringers Bolus 2000 milliLiter(s) IV Bolus once  nicotine -  14 mG/24Hr(s) Patch 1 patch Transdermal daily  pantoprazole  Injectable 40 milliGRAM(s) IV Push daily  potassium phosphate / sodium phosphate Powder (PHOS-NaK) 1 Packet(s) Oral three times a day with meals  sodium chloride 0.9%. 1000 milliLiter(s) (80 mL/Hr) IV Continuous <Continuous>    MEDICATIONS  (PRN):  acetaminophen     Tablet .. 650 milliGRAM(s) Oral every 6 hours PRN Mild Pain (1 - 3)  diphenhydrAMINE Injectable 25 milliGRAM(s) IV Push every 4 hours PRN Rash and/or Itching  HYDROmorphone  Injectable 0.25 milliGRAM(s) IV Push every 4 hours PRN Moderate Pain (4 - 6) or severe pain  ondansetron Injectable 4 milliGRAM(s) IV Push every 8 hours PRN Nausea and/or Vomiting      LABS:                        8.9    20.56 )-----------( 306      ( 22 Dec 2021 08:00 )             30.6     12-22    132<L>  |  95<L>  |  12  ----------------------------<  364<H>  4.3   |  14<L>  |  0.84    Ca    8.5      22 Dec 2021 08:00  Phos  2.3     12-22  Mg     1.70     12-22    TPro  8.5<H>  /  Alb  4.4  /  TBili  0.6  /  DBili  x   /  AST  34<H>  /  ALT  21  /  AlkPhos  117  12-20          Venous Blood Gas:  12-22 @ 11:22  7.40/32/128/20/98.5  VBG Lactate: 1.2  Venous Blood Gas:  12-21 @ 11:28  7.40/34/56/21/84.4  VBG Lactate: 2.3  Venous Blood Gas:  12-21 @ 07:51  7.28/33/44/16/68.7  VBG Lactate: 3.2  Venous Blood Gas:  12-21 @ 04:05  7.34/36/60/19/88.7  VBG Lactate: 2.3  Venous Blood Gas:  12-20 @ 22:55  7.28/43/37/20/58.4  VBG Lactate: 6.0      MICROBIOLOGY:     RADIOLOGY:  [ ] Reviewed and interpreted by me    EKG:

## 2021-12-23 PROCEDURE — 99232 SBSQ HOSP IP/OBS MODERATE 35: CPT | Mod: GC

## 2021-12-23 PROCEDURE — 99232 SBSQ HOSP IP/OBS MODERATE 35: CPT

## 2021-12-23 RX ORDER — DEXTROSE 50 % IN WATER 50 %
12.5 SYRINGE (ML) INTRAVENOUS ONCE
Refills: 0 | Status: COMPLETED | OUTPATIENT
Start: 2021-12-23 | End: 2021-12-23

## 2021-12-23 RX ORDER — SODIUM CHLORIDE 9 MG/ML
1000 INJECTION, SOLUTION INTRAVENOUS
Refills: 0 | Status: DISCONTINUED | OUTPATIENT
Start: 2021-12-23 | End: 2021-12-23

## 2021-12-23 RX ORDER — DIPHENHYDRAMINE HCL 50 MG
25 CAPSULE ORAL EVERY 4 HOURS
Refills: 0 | Status: COMPLETED | OUTPATIENT
Start: 2021-12-23 | End: 2021-12-24

## 2021-12-23 RX ORDER — INSULIN GLARGINE 100 [IU]/ML
17 INJECTION, SOLUTION SUBCUTANEOUS
Refills: 0 | Status: DISCONTINUED | OUTPATIENT
Start: 2021-12-23 | End: 2021-12-24

## 2021-12-23 RX ADMIN — HYDROMORPHONE HYDROCHLORIDE 0.25 MILLIGRAM(S): 2 INJECTION INTRAMUSCULAR; INTRAVENOUS; SUBCUTANEOUS at 06:43

## 2021-12-23 RX ADMIN — HYDROMORPHONE HYDROCHLORIDE 0.25 MILLIGRAM(S): 2 INJECTION INTRAMUSCULAR; INTRAVENOUS; SUBCUTANEOUS at 02:23

## 2021-12-23 RX ADMIN — HYDROMORPHONE HYDROCHLORIDE 0.25 MILLIGRAM(S): 2 INJECTION INTRAMUSCULAR; INTRAVENOUS; SUBCUTANEOUS at 01:53

## 2021-12-23 RX ADMIN — HYDROMORPHONE HYDROCHLORIDE 0.25 MILLIGRAM(S): 2 INJECTION INTRAMUSCULAR; INTRAVENOUS; SUBCUTANEOUS at 23:20

## 2021-12-23 RX ADMIN — Medication 25 MILLIGRAM(S): at 10:24

## 2021-12-23 RX ADMIN — HYDROMORPHONE HYDROCHLORIDE 0.25 MILLIGRAM(S): 2 INJECTION INTRAMUSCULAR; INTRAVENOUS; SUBCUTANEOUS at 14:52

## 2021-12-23 RX ADMIN — Medication 25 MILLIGRAM(S): at 18:42

## 2021-12-23 RX ADMIN — Medication 25 MILLIGRAM(S): at 22:47

## 2021-12-23 RX ADMIN — Medication 25 MILLIGRAM(S): at 06:14

## 2021-12-23 RX ADMIN — PANTOPRAZOLE SODIUM 40 MILLIGRAM(S): 20 TABLET, DELAYED RELEASE ORAL at 13:32

## 2021-12-23 RX ADMIN — Medication 12.5 GRAM(S): at 05:34

## 2021-12-23 RX ADMIN — HYDROMORPHONE HYDROCHLORIDE 0.25 MILLIGRAM(S): 2 INJECTION INTRAMUSCULAR; INTRAVENOUS; SUBCUTANEOUS at 10:39

## 2021-12-23 RX ADMIN — Medication 12.5 GRAM(S): at 08:02

## 2021-12-23 RX ADMIN — HYDROMORPHONE HYDROCHLORIDE 0.25 MILLIGRAM(S): 2 INJECTION INTRAMUSCULAR; INTRAVENOUS; SUBCUTANEOUS at 10:24

## 2021-12-23 RX ADMIN — HYDROMORPHONE HYDROCHLORIDE 0.25 MILLIGRAM(S): 2 INJECTION INTRAMUSCULAR; INTRAVENOUS; SUBCUTANEOUS at 14:37

## 2021-12-23 RX ADMIN — Medication 4 UNIT(S): at 18:18

## 2021-12-23 RX ADMIN — Medication 3: at 18:18

## 2021-12-23 RX ADMIN — Medication 25 MILLIGRAM(S): at 01:53

## 2021-12-23 RX ADMIN — HYDROMORPHONE HYDROCHLORIDE 0.25 MILLIGRAM(S): 2 INJECTION INTRAMUSCULAR; INTRAVENOUS; SUBCUTANEOUS at 22:47

## 2021-12-23 RX ADMIN — Medication 1 PACKET(S): at 10:33

## 2021-12-23 RX ADMIN — HYDROMORPHONE HYDROCHLORIDE 0.25 MILLIGRAM(S): 2 INJECTION INTRAMUSCULAR; INTRAVENOUS; SUBCUTANEOUS at 06:13

## 2021-12-23 RX ADMIN — HYDROMORPHONE HYDROCHLORIDE 0.25 MILLIGRAM(S): 2 INJECTION INTRAMUSCULAR; INTRAVENOUS; SUBCUTANEOUS at 18:42

## 2021-12-23 RX ADMIN — INSULIN GLARGINE 17 UNIT(S): 100 INJECTION, SOLUTION SUBCUTANEOUS at 18:17

## 2021-12-23 RX ADMIN — Medication 25 MILLIGRAM(S): at 14:37

## 2021-12-23 RX ADMIN — Medication 4 UNIT(S): at 13:32

## 2021-12-23 NOTE — PROGRESS NOTE ADULT - PROBLEM SELECTOR PROBLEM 1
Type 1 diabetes mellitus with ketoacidosis without coma, with long-term current use of insulin
Type 1 diabetes mellitus with ketoacidosis without coma, with long-term current use of insulin

## 2021-12-23 NOTE — BH CONSULTATION LIAISON PROGRESS NOTE - NSBHCONSULTFOLLOWAFTERCARE_PSY_A_CORE FT
SW / SBIRT consult for resources if patient interested in outpatient substance abuse services--- project outreach, DAEHRS

## 2021-12-23 NOTE — PROGRESS NOTE ADULT - ATTENDING COMMENTS
Type 1 DM complicated by gastropresis, DKA initially now improved. Hypoglycemia overnight noted. PO intake improving today.  Sees Dr. Bose for endocrine as outpatient.  Endocrine team consulted for uncontrolled diabetes. Patient is high risk with high level decision making due to uncontrolled diabetes which places patient at high risk for cardiovascular and cerebrovascular events. Patient with lability of glucose requiring close monitoring and insulin adjustments.    Shanna Hathaway MD  Division of Endocrinology  Pager: [phone]    If after 6PM or before 9AM, or on weekends/holidays, please call endocrine answering service for assistance ([phone]).  For nonurgent matters email [email or web address] for assistance.
Intensive discussion with team. Patient was noted to be in DKA, repeat FS in the day improved. However, BG increased later again.   AG of 23 and now at 20. Elevated BHB and bicarb of 14.   MICU consult was denied  Will be treated as DKA on the floor. Spoke to Apolonia ( EUGENIA) in detail.   Will be starting her on D51/2 NS   Will be receiving Humalog 4 units every 2-3 hours and will check BMP every 4 hours until patient has AG gap closed.   Advised to call endocrine team if any issues.

## 2021-12-23 NOTE — BH CONSULTATION LIAISON PROGRESS NOTE - NSBHASSESSMENTFT_PSY_ALL_CORE
Patient is a 31F w/ T1DM c/b gastroparesis who presents with acute on chronic abdominal pain x 1 month with n/v. Patient comes from home, lives alone. Patient reports no psychiatric hx including no inpt admissions or outpatient psychiatric care. Patient reports cigarette use as well as a hx of marijuana use - last used 1 month ago.  In Ed tied rope around neck. Patient reporting she did NOT want to die in the ED, but used her actions as a "cry for help". patient stated her pain was unbearable, she was not getting the help she wanted quick enough, she became tired of living with constant pain and wrapped the cord from the bed around her neck "to get attention".  patient states "I didn't tie it to the ceiling or anything, I held it in my hands. all it took was one pull from staff and it was off."  patient states if she "really wanted to die this is not the setting to do it in" as she is being watched. Patient has no current SI or HI.  She provides detail into her previous attempt at harming self at [place] and states this was also a cry for help as she was not being cared for well, and was fighting with her doctors.  Patient adamantly states this was also not a suicide attempt. Has no attempts at self harm at home.   mother reporting a hx of addiction to Dilaudid with seeking behaviors.     RECOMMENDATIONS  - No need for a 1:1 CO at this time. Discussed in detail with Dr Naqvi and also with EUGENIA Bryant today  - Consistent, clear communication between team members and patient, to avoid chance of splitting.   - Treat pain, but limit setting in terms of pain management.   - AGGRESSION/AGITATION---- Haldol 1mg q 6hrs prn IM/IV/PO.   - SW / SBIRT consult for resources if patient interested in outpatient substance abuse services--- project outreach, DARS

## 2021-12-23 NOTE — PROGRESS NOTE ADULT - SUBJECTIVE AND OBJECTIVE BOX
Date of service: 21 @ 23:44      Patient is a 31y old  Female who presents with a chief complaint of n/v/abd pain (23 Dec 2021 12:57)                                                               INTERVAL HPI/OVERNIGHT EVENTS:    REVIEW OF SYSTEMS:     CONSTITUTIONAL: No weakness, fevers or chills  EYES/ENT: No visual changes , no ear ache   NECK: No pain or stiffness  RESPIRATORY: No cough, wheezing,  No shortness of breath  CARDIOVASCULAR: No chest pain or palpitations  GASTROINTESTINAL: No abdominal pain  . No nausea, vomiting, or hematemesis; No diarrhea or constipation. No melena or hematochezia.  GENITOURINARY: No dysuria, frequency or hematuria  NEUROLOGICAL: No numbness or weakness  SKIN: No itching, burning, rashes, or lesions                                                                                                                                                                                                                                                                                 Medications:  MEDICATIONS  (STANDING):  dextrose 40% Gel 15 Gram(s) Oral once  dextrose 5%. 1000 milliLiter(s) (50 mL/Hr) IV Continuous <Continuous>  dextrose 5%. 1000 milliLiter(s) (100 mL/Hr) IV Continuous <Continuous>  dextrose 50% Injectable 25 Gram(s) IV Push once  dextrose 50% Injectable 12.5 Gram(s) IV Push once  dextrose 50% Injectable 25 Gram(s) IV Push once  enoxaparin Injectable 40 milliGRAM(s) SubCutaneous daily  glucagon  Injectable 1 milliGRAM(s) IntraMuscular once  insulin glargine Injectable (LANTUS) 17 Unit(s) SubCutaneous <User Schedule>  insulin lispro (ADMELOG) corrective regimen sliding scale   SubCutaneous three times a day with meals  insulin lispro (ADMELOG) corrective regimen sliding scale   SubCutaneous at bedtime  insulin lispro Injectable (ADMELOG) 4 Unit(s) SubCutaneous three times a day before meals  nicotine -  14 mG/24Hr(s) Patch 1 patch Transdermal daily  pantoprazole  Injectable 40 milliGRAM(s) IV Push daily    MEDICATIONS  (PRN):  acetaminophen     Tablet .. 650 milliGRAM(s) Oral every 6 hours PRN Mild Pain (1 - 3)  diphenhydrAMINE Injectable 25 milliGRAM(s) IV Push every 4 hours PRN Itching  HYDROmorphone  Injectable 0.25 milliGRAM(s) IV Push every 4 hours PRN Moderate Pain (4 - 6) or severe pain  ondansetron Injectable 4 milliGRAM(s) IV Push every 8 hours PRN Nausea and/or Vomiting       Allergies    No Known Allergies    Intolerances      Vital Signs Last 24 Hrs  T(C): 36.8 (23 Dec 2021 21:56), Max: 37.1 (23 Dec 2021 05:47)  T(F): 98.3 (23 Dec 2021 21:56), Max: 98.8 (23 Dec 2021 05:47)  HR: 84 (23 Dec 2021 21:56) (81 - 86)  BP: 133/80 (23 Dec 2021 21:56) (120/67 - 133/80)  BP(mean): --  RR: 17 (23 Dec 2021 21:56) (16 - 18)  SpO2: 99% (23 Dec 2021 21:56) (99% - 100%)  CAPILLARY BLOOD GLUCOSE      POCT Blood Glucose.: 99 mg/dL (23 Dec 2021 22:19)  POCT Blood Glucose.: 262 mg/dL (23 Dec 2021 18:15)  POCT Blood Glucose.: 157 mg/dL (23 Dec 2021 16:31)  POCT Blood Glucose.: 160 mg/dL (23 Dec 2021 13:22)  POCT Blood Glucose.: 137 mg/dL (23 Dec 2021 11:34)  POCT Blood Glucose.: 170 mg/dL (23 Dec 2021 08:41)  POCT Blood Glucose.: 91 mg/dL (23 Dec 2021 06:50)  POCT Blood Glucose.: 98 mg/dL (23 Dec 2021 06:19)  POCT Blood Glucose.: 119 mg/dL (23 Dec 2021 05:58)  POCT Blood Glucose.: 54 mg/dL (23 Dec 2021 05:30)  POCT Blood Glucose.: 52 mg/dL (23 Dec 2021 05:20)       @ 07:  -   @ 07:00  --------------------------------------------------------  IN: 240 mL / OUT: 1500 mL / NET: -1260 mL     @ 07:  -   @ 23:44  --------------------------------------------------------  IN: 730 mL / OUT: 0 mL / NET: 730 mL      Physical Exam:    Daily     Daily Weight in k.4 (23 Dec 2021 05:47)  General:  Well appearing, NAD, not cachetic  HEENT:  Nonicteric, PERRLA  CV:  RRR, S1S2   Lungs:  CTA B/L, no wheezes, rales, rhonchi  Abdomen:  Soft, mild LUW tendderness /flank tenderness   Extremities:  2+ pulses, no c/c, no edema  Skin:  Warm and dry, no rashes  :  No gonzalez  Neuro:  AAOx3, non-focal, grossly intact                                                                                                                                                                                                                                                                                                LABS:                               8.9    20.56 )-----------( 306      ( 22 Dec 2021 08:00 )             30.6                      12    137  |  104  |  7   ----------------------------<  116<H>  3.7   |  21<L>  |  0.78    Ca    8.1<L>      22 Dec 2021 19:20  Phos  1.9       Mg     1.50         TPro  5.7<L>  /  Alb  3.0<L>  /  TBili  0.7  /  DBili  x   /  AST  32  /  ALT  18  /  AlkPhos  99                         RADIOLOGY & ADDITIONAL TESTS         I personally reviewed: [  ]EKG   [  ]CXR    [  ] CT      A/P:         Discussed with :     Melody consultants' Notes   Time spent :

## 2021-12-23 NOTE — BH CONSULTATION LIAISON PROGRESS NOTE - NSBHFUPINTERVALCCFT_PSY_A_CORE
No further episodes of agitation, aggression or impulsivity. No prn needs. Pain is better controlled, she has been compliant. Sleep and appetite has been fair on the unit.   Care coordinated at length with RN--- no concerns for her safety, no behavioral issues.

## 2021-12-23 NOTE — DIETITIAN INITIAL EVALUATION ADULT. - OTHER INFO
Pt has a history of Type 1 DM, Gastroparesis and Colitis. Pt presented with acute on chronic abdominal pain, nausea and vomiting.   Pt states she was diagnosed with T1 DM at age 12. She states she knows her diet and dries to follow it but often does not. she states she does not carb count. Pt reports her blood sugars usually run between 100 and 200 at home. She states that she was told by her MD on 12/14 that her A1c was 8.1 and did not understand how it was now 9.6.  Pt deferred a diet instruction but did accept a written copy of the diet.   Pt had been on a clear liquid diet was to be advanced today. Pt had no complaints of GI distress nor of difficult y chewing or swallowing. Pt has no known food allergies or intolerances.  Pt made aware that RDN remains available.

## 2021-12-23 NOTE — PROGRESS NOTE ADULT - ASSESSMENT
hp31yo F hx of T1DM c/b diabetic gastroparesis comes to ED for abdominal pain, acute on chronic. Says it has been happening for a month, nausea/vomiting.  Stated in ED "I just want to kill myself." Saying she does not want to live with this pain. Despite offering medications for pain, pt attempted strangulation w/ call bell.  ED Requested Psych consult.  Patient seen on medicine floor with N/V and c/o lower abd pain 8/10 typical for her DKA     Endocrine consult appreciate    # abd pain sec to dka  # diabetic gastroparesis  #SI  #Tobacco    Problem/Plan - 1:  ·  Problem: Diabetic ketoacidosis, type I.   ·  Plan: Endocrine consulted for DKA appreciate input   discussed at length at bedside with icu resident and expressed my concerns that pt does need to be in MICU for insulin drip and intensive monitoring .. micu rejected pt .. i again called later in day and discussed with MICU attending especially that on repeat blood work , AG and Bhydroxybuterate   still elevated ..  though PH is : " normal " however pt with severe contraction alkalosis sec to severe dehydration , acid loss from vomitting.   Per MICU attending , pt  will not be able to transfer to MICU given "PH " and bed shortage in MICU. I asked to reconsider and re-evaluate during  the hours of night .    pt  was kept on medical floor and now with improved gap..todya;s labs pending .. seem to be refusing   in the meantime : cont insulin long acting and short acting per endo insturctions   cont close monitoring of FS     monitor lytes and AG       Problem/Plan - 2:  ·  Problem: Diabetic gastroparesis.   ·  Plan: IVF hydration   start Iv Dialaudid 0.25 mg q4 prn with iv benadryl 25 mg q4 prn n/v/ itching  Zofran prn n/v  protonox iv.    Problem/Plan - 3:  ·  Problem: Tobacco use disorder.   ·  Plan: nicodrom patch.    Problem/Plan - 4:  ·  Problem: Prophylactic measure.   ·  Plan: lovenox sq.    Problem/Plan - 5:  ·  Problem: Suicidal" attempt " : appreciate psych input   pt is more likley to be manipulative and " needing pain meds " and less a harm to herself .  can dc 1:1 per psych .appreciate input     plan d/w patient / psych/

## 2021-12-23 NOTE — PROGRESS NOTE ADULT - SUBJECTIVE AND OBJECTIVE BOX
NOTE INCOMPLETE/ IN PROGRESS  *Please wait for attending attestation for official recommendations.     HPI:  32yo F hx of T1DM c/b diabetic gastroparesis comes to ED for abdominal pain, acute on chronic. Says it has been happening for a month, nausea/vomiting.  Stated in ED "I just want to kill myself." Saying she does not want to live with this pain. Despite offering medications for pain, pt attempted strangulation w/ call bell.  ED Requested Psych consult.  Patient seen on medicine floor with N/V and c/o lower abd pain 8/10 typical for her DKA    (21 Dec 2021 17:10)      PAST MEDICAL & SURGICAL HISTORY:  Diabetes mellitus type 1    Gastroparesis    Type 1 diabetes mellitus with ketoacidosis without coma, with long-term current use of insulin    Gastroparesis due to DM    Colitis    Ectopic pregnancy  2013, s/p removal of right fallopian tube    History of cholecystectomy        FAMILY HISTORY:  Family history of diabetes mellitus (Grandparent)    Family history of type 1 diabetes mellitus (Grandparent)    FH: HTN (hypertension) (Father)        Social History:    Outpatient Medications:    MEDICATIONS  (STANDING):  dextrose 40% Gel 15 Gram(s) Oral once  dextrose 5%. 1000 milliLiter(s) (50 mL/Hr) IV Continuous <Continuous>  dextrose 5%. 1000 milliLiter(s) (50 mL/Hr) IV Continuous <Continuous>  dextrose 5%. 1000 milliLiter(s) (100 mL/Hr) IV Continuous <Continuous>  dextrose 50% Injectable 25 Gram(s) IV Push once  dextrose 50% Injectable 12.5 Gram(s) IV Push once  dextrose 50% Injectable 25 Gram(s) IV Push once  enoxaparin Injectable 40 milliGRAM(s) SubCutaneous daily  glucagon  Injectable 1 milliGRAM(s) IntraMuscular once  insulin glargine Injectable (LANTUS) 18 Unit(s) SubCutaneous <User Schedule>  insulin lispro (ADMELOG) corrective regimen sliding scale   SubCutaneous at bedtime  insulin lispro (ADMELOG) corrective regimen sliding scale   SubCutaneous three times a day with meals  insulin lispro Injectable (ADMELOG) 4 Unit(s) SubCutaneous three times a day before meals  nicotine -  14 mG/24Hr(s) Patch 1 patch Transdermal daily  pantoprazole  Injectable 40 milliGRAM(s) IV Push daily  potassium phosphate / sodium phosphate Powder (PHOS-NaK) 1 Packet(s) Oral three times a day with meals    MEDICATIONS  (PRN):  acetaminophen     Tablet .. 650 milliGRAM(s) Oral every 6 hours PRN Mild Pain (1 - 3)  diphenhydrAMINE Injectable 25 milliGRAM(s) IV Push every 4 hours PRN Rash and/or Itching  HYDROmorphone  Injectable 0.25 milliGRAM(s) IV Push every 4 hours PRN Moderate Pain (4 - 6) or severe pain  ondansetron Injectable 4 milliGRAM(s) IV Push every 8 hours PRN Nausea and/or Vomiting      Allergies    No Known Allergies    Intolerances      Review of Systems:  Constitutional: No fever  Eyes: No blurry vision  Neuro: No tremors  HEENT: No pain  Cardiovascular: No chest pain, palpitations  Respiratory: No SOB, no cough  GI: No nausea, vomiting, abdominal pain  : No dysuria  Skin: no rash  Psych: no depression  Endocrine: no polyuria, polydipsia  Hem/lymph: no swelling  Osteoporosis: no fractures    ALL OTHER SYSTEMS REVIEWED AND NEGATIVE    UNABLE TO OBTAIN    PHYSICAL EXAM:  VITALS: T(C): 37.1 (12-23-21 @ 05:47)  T(F): 98.8 (12-23-21 @ 05:47), Max: 98.8 (12-23-21 @ 05:47)  HR: 86 (12-23-21 @ 05:47) (86 - 101)  BP: 130/83 (12-23-21 @ 05:47) (119/73 - 159/84)  RR:  (17 - 18)  SpO2:  (97% - 99%)  Wt(kg): --  GENERAL: NAD, well-groomed, well-developed  EYES: No proptosis, no lid lag, anicteric  HEENT:  Atraumatic, Normocephalic, moist mucous membranes  THYROID: Normal size, no palpable nodules  RESPIRATORY: Clear to auscultation bilaterally; No rales, rhonchi, wheezing  CARDIOVASCULAR: Regular rate and rhythm; No murmurs; no peripheral edema  GI: Soft, nontender, non distended, normal bowel sounds  SKIN: Dry, intact, No rashes or lesions  MUSCULOSKELETAL: Full range of motion, normal strength  NEURO: sensation intact, extraocular movements intact, no tremor  PSYCH: Alert and oriented x 3, normal affect, normal mood  CUSHING'S SIGNS: no striae      CAPILLARY BLOOD GLUCOSE      POCT Blood Glucose.: 91 mg/dL (23 Dec 2021 06:50)  POCT Blood Glucose.: 98 mg/dL (23 Dec 2021 06:19)  POCT Blood Glucose.: 119 mg/dL (23 Dec 2021 05:58)  POCT Blood Glucose.: 54 mg/dL (23 Dec 2021 05:30)  POCT Blood Glucose.: 52 mg/dL (23 Dec 2021 05:20)  POCT Blood Glucose.: 109 mg/dL (22 Dec 2021 22:00)  POCT Blood Glucose.: 258 mg/dL (22 Dec 2021 17:01)  POCT Blood Glucose.: 132 mg/dL (22 Dec 2021 11:24)                            8.9    20.56 )-----------( 306      ( 22 Dec 2021 08:00 )             30.6       12-22    137  |  104  |  7   ----------------------------<  116<H>  3.7   |  21<L>  |  0.78    EGFR if : 117  EGFR if non : 101    Ca    8.1<L>      12-22  Mg     1.50     12-22  Phos  1.9     12-22    TPro  5.7<L>  /  Alb  3.0<L>  /  TBili  0.7  /  DBili  x   /  AST  32  /  ALT  18  /  AlkPhos  99  12-22      Thyroid Function Tests:      A1C with Estimated Average Glucose Result: 9.6 % (10-28-21 @ 09:17)          Radiology:              HPI:  32yo F hx of T1DM c/b diabetic gastroparesis comes to ED for abdominal pain, acute on chronic. Says it has been happening for a month, nausea/vomiting.  Stated in ED "I just want to kill myself." Saying she does not want to live with this pain. Despite offering medications for pain, pt attempted strangulation w/ call bell.  ED Requested Psych consult.  Patient seen on medicine floor with N/V and c/o lower abd pain 8/10 typical for her DKA    (21 Dec 2021 17:10)    INTERVAL EVENTS:  - no more vomiting, nausea improved, abd pain improved. Feels less dehydrated.  - Yesterday lunch and dinner were clear liquid diet. Did not have breakfast today.  Wants to try solid food.      PAST MEDICAL & SURGICAL HISTORY:  Diabetes mellitus type 1  Gastroparesis  Type 1 diabetes mellitus with ketoacidosis without coma, with long-term current use of insulin  Gastroparesis due to DM  Colitis  Ectopic pregnancy  2013, s/p removal of right fallopian tube  History of cholecystectomy      FAMILY HISTORY:  Family history of diabetes mellitus (Grandparent)  Family history of type 1 diabetes mellitus (Grandparent)  FH: HTN (hypertension) (Father)      MEDICATIONS  (STANDING):  dextrose 40% Gel 15 Gram(s) Oral once  dextrose 5%. 1000 milliLiter(s) (50 mL/Hr) IV Continuous <Continuous>  dextrose 5%. 1000 milliLiter(s) (50 mL/Hr) IV Continuous <Continuous>  dextrose 5%. 1000 milliLiter(s) (100 mL/Hr) IV Continuous <Continuous>  dextrose 50% Injectable 25 Gram(s) IV Push once  dextrose 50% Injectable 12.5 Gram(s) IV Push once  dextrose 50% Injectable 25 Gram(s) IV Push once  enoxaparin Injectable 40 milliGRAM(s) SubCutaneous daily  glucagon  Injectable 1 milliGRAM(s) IntraMuscular once  insulin glargine Injectable (LANTUS) 18 Unit(s) SubCutaneous <User Schedule>  insulin lispro (ADMELOG) corrective regimen sliding scale   SubCutaneous at bedtime  insulin lispro (ADMELOG) corrective regimen sliding scale   SubCutaneous three times a day with meals  insulin lispro Injectable (ADMELOG) 4 Unit(s) SubCutaneous three times a day before meals  nicotine -  14 mG/24Hr(s) Patch 1 patch Transdermal daily  pantoprazole  Injectable 40 milliGRAM(s) IV Push daily  potassium phosphate / sodium phosphate Powder (PHOS-NaK) 1 Packet(s) Oral three times a day with meals    MEDICATIONS  (PRN):  acetaminophen     Tablet .. 650 milliGRAM(s) Oral every 6 hours PRN Mild Pain (1 - 3)  diphenhydrAMINE Injectable 25 milliGRAM(s) IV Push every 4 hours PRN Rash and/or Itching  HYDROmorphone  Injectable 0.25 milliGRAM(s) IV Push every 4 hours PRN Moderate Pain (4 - 6) or severe pain  ondansetron Injectable 4 milliGRAM(s) IV Push every 8 hours PRN Nausea and/or Vomiting      Allergies  No Known Allergies    Intolerances      Review of Systems:  Constitutional: No fever  Eyes: No blurry vision  Neuro: No tremors  HEENT: No pain  Cardiovascular: No chest pain, palpitations  Respiratory: No SOB, no cough  GI: + mild abd pain.  No nausea, vomiting  : No dysuria  Skin: no rash  Psych: no depression  Endocrine: no polyuria, polydipsia  Hem/lymph: no swelling  Osteoporosis: no fractures    ALL OTHER SYSTEMS REVIEWED AND NEGATIVE    PHYSICAL EXAM:  VITALS: T(C): 37.1 (12-23-21 @ 05:47)  T(F): 98.8 (12-23-21 @ 05:47), Max: 98.8 (12-23-21 @ 05:47)  HR: 86 (12-23-21 @ 05:47) (86 - 101)  BP: 130/83 (12-23-21 @ 05:47) (119/73 - 159/84)  RR:  (17 - 18)  SpO2:  (97% - 99%)  Wt(kg): --  GENERAL: NAD  EYES: No proptosis, no lid lag, anicteric  HEENT:  Atraumatic, Normocephalic, dry mm  THYROID: Normal size, no palpable nodules  RESPIRATORY: Clear to auscultation bilaterally; No rales, rhonchi, wheezing  CARDIOVASCULAR: Tachycardic, regular rhythm; No murmurs; no peripheral edema  GI: Soft, mild TTP, active guarding.  SKIN: Dry, intact, No rashes or lesions  MUSCULOSKELETAL: Full range of motion, normal strength  NEURO: sensation intact, extraocular movements intact, no tremor  PSYCH: Alert and oriented x 3, normal affect  CUSHING'S SIGNS: no striae      CAPILLARY BLOOD GLUCOSE      POCT Blood Glucose.: 91 mg/dL (23 Dec 2021 06:50)  POCT Blood Glucose.: 98 mg/dL (23 Dec 2021 06:19)  POCT Blood Glucose.: 119 mg/dL (23 Dec 2021 05:58)  POCT Blood Glucose.: 54 mg/dL (23 Dec 2021 05:30)  POCT Blood Glucose.: 52 mg/dL (23 Dec 2021 05:20)  POCT Blood Glucose.: 109 mg/dL (22 Dec 2021 22:00)  POCT Blood Glucose.: 258 mg/dL (22 Dec 2021 17:01)  POCT Blood Glucose.: 132 mg/dL (22 Dec 2021 11:24)                            8.9    20.56 )-----------( 306      ( 22 Dec 2021 08:00 )             30.6       12-22    137  |  104  |  7   ----------------------------<  116<H>  3.7   |  21<L>  |  0.78    EGFR if : 117  EGFR if non : 101    Ca    8.1<L>      12-22  Mg     1.50     12-22  Phos  1.9     12-22    TPro  5.7<L>  /  Alb  3.0<L>  /  TBili  0.7  /  DBili  x   /  AST  32  /  ALT  18  /  AlkPhos  99  12-22      Thyroid Function Tests:      A1C with Estimated Average Glucose Result: 9.6 % (10-28-21 @ 09:17)          Radiology:

## 2021-12-23 NOTE — BH CONSULTATION LIAISON PROGRESS NOTE - NSBHCHARTREVIEWLAB_PSY_A_CORE FT
CBC Full  -  ( 22 Dec 2021 08:00 )  WBC Count : 20.56 K/uL  RBC Count : 4.82 M/uL  Hemoglobin : 8.9 g/dL  Hematocrit : 30.6 %  Platelet Count - Automated : 306 K/uL  Mean Cell Volume : 63.5 fL  Mean Cell Hemoglobin : 18.5 pg  Mean Cell Hemoglobin Concentration : 29.1 gm/dL  Auto Neutrophil # : x  Auto Lymphocyte # : x  Auto Monocyte # : x  Auto Eosinophil # : x  Auto Basophil # : x  Auto Neutrophil % : x  Auto Lymphocyte % : x  Auto Monocyte % : x  Auto Eosinophil % : x  Auto Basophil % : x  12-22    137  |  104  |  7   ----------------------------<  116<H>  3.7   |  21<L>  |  0.78    Ca    8.1<L>      22 Dec 2021 19:20  Phos  1.9     12-22  Mg     1.50     12-22    TPro  5.7<L>  /  Alb  3.0<L>  /  TBili  0.7  /  DBili  x   /  AST  32  /  ALT  18  /  AlkPhos  99  12-22

## 2021-12-23 NOTE — BH CONSULTATION LIAISON PROGRESS NOTE - CURRENT MEDICATION
MEDICATIONS  (STANDING):  dextrose 40% Gel 15 Gram(s) Oral once  dextrose 5%. 1000 milliLiter(s) (50 mL/Hr) IV Continuous <Continuous>  dextrose 5%. 1000 milliLiter(s) (50 mL/Hr) IV Continuous <Continuous>  dextrose 5%. 1000 milliLiter(s) (100 mL/Hr) IV Continuous <Continuous>  dextrose 50% Injectable 25 Gram(s) IV Push once  dextrose 50% Injectable 12.5 Gram(s) IV Push once  dextrose 50% Injectable 25 Gram(s) IV Push once  enoxaparin Injectable 40 milliGRAM(s) SubCutaneous daily  glucagon  Injectable 1 milliGRAM(s) IntraMuscular once  insulin glargine Injectable (LANTUS) 18 Unit(s) SubCutaneous <User Schedule>  insulin lispro (ADMELOG) corrective regimen sliding scale   SubCutaneous at bedtime  insulin lispro (ADMELOG) corrective regimen sliding scale   SubCutaneous three times a day with meals  insulin lispro Injectable (ADMELOG) 4 Unit(s) SubCutaneous three times a day before meals  nicotine -  14 mG/24Hr(s) Patch 1 patch Transdermal daily  pantoprazole  Injectable 40 milliGRAM(s) IV Push daily    MEDICATIONS  (PRN):  acetaminophen     Tablet .. 650 milliGRAM(s) Oral every 6 hours PRN Mild Pain (1 - 3)  diphenhydrAMINE Injectable 25 milliGRAM(s) IV Push every 4 hours PRN Rash and/or Itching  HYDROmorphone  Injectable 0.25 milliGRAM(s) IV Push every 4 hours PRN Moderate Pain (4 - 6) or severe pain  ondansetron Injectable 4 milliGRAM(s) IV Push every 8 hours PRN Nausea and/or Vomiting

## 2021-12-23 NOTE — PROGRESS NOTE ADULT - ATTENDING SUPERVISION STATEMENT
Patient down to [place] transported on a zoll with ACLS RN. Chart with patient. Monitor room notified.    Resident

## 2021-12-23 NOTE — PROGRESS NOTE ADULT - ASSESSMENT
31F w/ T1DM c/b gastroparesis who presents with acute on chronic abdominal pain x 1 month with n/v and SI/attempt in ED.  Endocrine consulted for DKA.    #T1DM  Initially had DKA in ED, resolved s/p IVF and admelog 8 units x3 without insulin gtt. Recurrent DKA yesterday, but resolved with IV fluids and admelog, lantus.  Hypoglycemic this AM to 50s, but has been on clear liquid diet, now starting solid diet.    Inpatient plan:  - decrease Lantus from 18 to 17 units 5pm daily.  - c/w Admelog 4 units with meals TID.  Assess that patient is able to tolerate PO prior to administration. If unable to tolerate PO, hold premeal.  - Low dose correctional insulin  - FS TID AC and QHS  - Carb-Controlled diet  - Please check BMP at least once a day    Discharge plan:  - Basal lantus likely daily dosing, TBD.  - Premeal admelog TID, dose TBD.  - Patient has follow up appointments with Diabetic Educator 2/15/22 and with Dr. Bose 7/11/22.    #Gastroparesis 2/2 T1DM:  - Zofran 4 to 8mg PRN up to 3 times daily  - Check EKG for QTc  - GI follow up     #HTN:  110-150s/70-80s. Suboptimal.  - Goal BP <130/80  - Consider initiating ACEI/ARB for BP control    Discussed with Dr. Mag Leonard, PGY2  Internal Medicine  Pager [phone] ([facility])/[phone]([facility])

## 2021-12-23 NOTE — BH CONSULTATION LIAISON PROGRESS NOTE - NSBHCHARTREVIEWVS_PSY_A_CORE FT
Vital Signs Last 24 Hrs  T(C): 36.9 (23 Dec 2021 09:46), Max: 37.1 (23 Dec 2021 05:47)  T(F): 98.4 (23 Dec 2021 09:46), Max: 98.8 (23 Dec 2021 05:47)  HR: 84 (23 Dec 2021 09:46) (84 - 101)  BP: 128/75 (23 Dec 2021 09:46) (119/73 - 159/84)  BP(mean): --  RR: 18 (23 Dec 2021 09:46) (17 - 18)  SpO2: 99% (23 Dec 2021 09:46) (97% - 99%)

## 2021-12-23 NOTE — CHART NOTE - NSCHARTNOTEFT_GEN_A_CORE
Patient hypoglycemic to 52 this am. RN activated hypoglycemia protocol. Patient Alert and A&O x 4; in no distress. However, feels hypoglycemic. Will recheck FS post D50 x 1 administered and ensure FS is above 100 x 2 readings.     Will continue to monitor.     ROXANA Carver  Department of Medicine   In House # [id number]

## 2021-12-23 NOTE — BH CONSULTATION LIAISON PROGRESS NOTE - NSBHFUPINTERVALHXFT_PSY_A_CORE
Met with patient. She has been eagerly awaiting psychiatry f/u today so that she can 'get my tv remote'. She wonders why she had to be on a 1:1 and why she could not have access to TV. Explained to her about 1:1 status etc. She understands.   Mood stable- denies any depressive or hypomanic or manic symptoms, adamantly denies any si or hi when asked, denies any psychosis. She feels staff here at taking good care of her. Oriented x 3.

## 2021-12-24 ENCOUNTER — TRANSCRIPTION ENCOUNTER (OUTPATIENT)
Age: 31
End: 2021-12-24

## 2021-12-24 ENCOUNTER — INPATIENT (INPATIENT)
Facility: HOSPITAL | Age: 31
LOS: 2 days | Discharge: LEFT BEFORE DISCHARGE | DRG: 74 | End: 2021-12-27
Attending: INTERNAL MEDICINE | Admitting: STUDENT IN AN ORGANIZED HEALTH CARE EDUCATION/TRAINING PROGRAM
Payer: MEDICAID

## 2021-12-24 VITALS
RESPIRATION RATE: 17 BRPM | SYSTOLIC BLOOD PRESSURE: 153 MMHG | DIASTOLIC BLOOD PRESSURE: 98 MMHG | TEMPERATURE: 98 F | OXYGEN SATURATION: 100 % | HEART RATE: 96 BPM

## 2021-12-24 VITALS — HEIGHT: 66 IN

## 2021-12-24 DIAGNOSIS — O00.9 ECTOPIC PREGNANCY, UNSPECIFIED: Chronic | ICD-10-CM

## 2021-12-24 DIAGNOSIS — Z90.49 ACQUIRED ABSENCE OF OTHER SPECIFIED PARTS OF DIGESTIVE TRACT: Chronic | ICD-10-CM

## 2021-12-24 LAB
ALBUMIN SERPL ELPH-MCNC: 3.1 G/DL — LOW (ref 3.3–5)
ALBUMIN SERPL ELPH-MCNC: 3.4 G/DL — SIGNIFICANT CHANGE UP (ref 3.3–5)
ALP SERPL-CCNC: 104 U/L — SIGNIFICANT CHANGE UP (ref 40–120)
ALP SERPL-CCNC: 93 U/L — SIGNIFICANT CHANGE UP (ref 40–120)
ALT FLD-CCNC: 13 U/L — SIGNIFICANT CHANGE UP (ref 4–33)
ALT FLD-CCNC: 16 U/L — SIGNIFICANT CHANGE UP (ref 10–45)
ANION GAP SERPL CALC-SCNC: 19 MMOL/L — HIGH (ref 5–17)
ANION GAP SERPL CALC-SCNC: 9 MMOL/L — SIGNIFICANT CHANGE UP (ref 7–14)
ANISOCYTOSIS BLD QL: SLIGHT — SIGNIFICANT CHANGE UP
AST SERPL-CCNC: 18 U/L — SIGNIFICANT CHANGE UP (ref 10–40)
AST SERPL-CCNC: 19 U/L — SIGNIFICANT CHANGE UP (ref 4–32)
BASE EXCESS BLDV CALC-SCNC: -2.3 MMOL/L — LOW (ref -2–2)
BASOPHILS # BLD AUTO: 0 K/UL — SIGNIFICANT CHANGE UP (ref 0–0.2)
BASOPHILS NFR BLD AUTO: 0 % — SIGNIFICANT CHANGE UP (ref 0–2)
BILIRUB SERPL-MCNC: 0.3 MG/DL — SIGNIFICANT CHANGE UP (ref 0.2–1.2)
BILIRUB SERPL-MCNC: 0.4 MG/DL — SIGNIFICANT CHANGE UP (ref 0.2–1.2)
BLOOD GAS VENOUS - CREATININE: SIGNIFICANT CHANGE UP MG/DL (ref 0.5–1.3)
BUN SERPL-MCNC: 10 MG/DL — SIGNIFICANT CHANGE UP (ref 7–23)
BUN SERPL-MCNC: 4 MG/DL — LOW (ref 7–23)
CA-I SERPL-SCNC: 1.19 MMOL/L — SIGNIFICANT CHANGE UP (ref 1.15–1.33)
CALCIUM SERPL-MCNC: 8.7 MG/DL — SIGNIFICANT CHANGE UP (ref 8.4–10.5)
CALCIUM SERPL-MCNC: 9.2 MG/DL — SIGNIFICANT CHANGE UP (ref 8.4–10.5)
CHLORIDE BLDV-SCNC: 102 MMOL/L — SIGNIFICANT CHANGE UP (ref 96–108)
CHLORIDE SERPL-SCNC: 101 MMOL/L — SIGNIFICANT CHANGE UP (ref 96–108)
CHLORIDE SERPL-SCNC: 102 MMOL/L — SIGNIFICANT CHANGE UP (ref 98–107)
CO2 BLDV-SCNC: 24 MMOL/L — SIGNIFICANT CHANGE UP (ref 22–26)
CO2 SERPL-SCNC: 19 MMOL/L — LOW (ref 22–31)
CO2 SERPL-SCNC: 24 MMOL/L — SIGNIFICANT CHANGE UP (ref 22–31)
CREAT SERPL-MCNC: 0.69 MG/DL — SIGNIFICANT CHANGE UP (ref 0.5–1.3)
CREAT SERPL-MCNC: 0.83 MG/DL — SIGNIFICANT CHANGE UP (ref 0.5–1.3)
DACRYOCYTES BLD QL SMEAR: SLIGHT — SIGNIFICANT CHANGE UP
ELLIPTOCYTES BLD QL SMEAR: SLIGHT — SIGNIFICANT CHANGE UP
EOSINOPHIL # BLD AUTO: 0.07 K/UL — SIGNIFICANT CHANGE UP (ref 0–0.5)
EOSINOPHIL NFR BLD AUTO: 0.9 % — SIGNIFICANT CHANGE UP (ref 0–6)
GAS PNL BLDV: 137 MMOL/L — SIGNIFICANT CHANGE UP (ref 136–145)
GAS PNL BLDV: SIGNIFICANT CHANGE UP
GAS PNL BLDV: SIGNIFICANT CHANGE UP
GLUCOSE BLDV-MCNC: 258 MG/DL — HIGH (ref 70–99)
GLUCOSE SERPL-MCNC: 150 MG/DL — HIGH (ref 70–99)
GLUCOSE SERPL-MCNC: 233 MG/DL — HIGH (ref 70–99)
HCG SERPL-ACNC: <2 MIU/ML — SIGNIFICANT CHANGE UP
HCO3 BLDV-SCNC: 23 MMOL/L — SIGNIFICANT CHANGE UP (ref 22–29)
HCT VFR BLD CALC: 30.8 % — LOW (ref 34.5–45)
HCT VFR BLD CALC: 33.7 % — LOW (ref 34.5–45)
HCT VFR BLDA CALC: 30 % — LOW (ref 34.5–46.5)
HGB BLD CALC-MCNC: 9.9 G/DL — LOW (ref 11.7–16.1)
HGB BLD-MCNC: 9.3 G/DL — LOW (ref 11.5–15.5)
HGB BLD-MCNC: 9.8 G/DL — LOW (ref 11.5–15.5)
IANC: 4.51 K/UL — SIGNIFICANT CHANGE UP (ref 1.5–8.5)
LACTATE BLDV-MCNC: 5.5 MMOL/L — CRITICAL HIGH (ref 0.7–2)
LYMPHOCYTES # BLD AUTO: 1.68 K/UL — SIGNIFICANT CHANGE UP (ref 1–3.3)
LYMPHOCYTES # BLD AUTO: 21.6 % — SIGNIFICANT CHANGE UP (ref 13–44)
MAGNESIUM SERPL-MCNC: 1.8 MG/DL — SIGNIFICANT CHANGE UP (ref 1.6–2.6)
MANUAL SMEAR VERIFICATION: SIGNIFICANT CHANGE UP
MCHC RBC-ENTMCNC: 18.2 PG — LOW (ref 27–34)
MCHC RBC-ENTMCNC: 18.4 PG — LOW (ref 27–34)
MCHC RBC-ENTMCNC: 29.1 GM/DL — LOW (ref 32–36)
MCHC RBC-ENTMCNC: 30.2 GM/DL — LOW (ref 32–36)
MCV RBC AUTO: 61 FL — LOW (ref 80–100)
MCV RBC AUTO: 62.5 FL — LOW (ref 80–100)
MICROCYTES BLD QL: SLIGHT — SIGNIFICANT CHANGE UP
MONOCYTES # BLD AUTO: 0.91 K/UL — HIGH (ref 0–0.9)
MONOCYTES NFR BLD AUTO: 11.7 % — SIGNIFICANT CHANGE UP (ref 2–14)
NEUTROPHILS # BLD AUTO: 4.92 K/UL — SIGNIFICANT CHANGE UP (ref 1.8–7.4)
NEUTROPHILS NFR BLD AUTO: 63.1 % — SIGNIFICANT CHANGE UP (ref 43–77)
PCO2 BLDV: 42 MMHG — SIGNIFICANT CHANGE UP (ref 39–42)
PH BLDV: 7.35 — SIGNIFICANT CHANGE UP (ref 7.32–7.43)
PHOSPHATE SERPL-MCNC: 3.7 MG/DL — SIGNIFICANT CHANGE UP (ref 2.5–4.5)
PLAT MORPH BLD: ABNORMAL
PLATELET # BLD AUTO: 253 K/UL — SIGNIFICANT CHANGE UP (ref 150–400)
PLATELET # BLD AUTO: 308 K/UL — SIGNIFICANT CHANGE UP (ref 150–400)
PLATELET COUNT - ESTIMATE: NORMAL — SIGNIFICANT CHANGE UP
PO2 BLDV: 29 MMHG — SIGNIFICANT CHANGE UP (ref 25–45)
POIKILOCYTOSIS BLD QL AUTO: SLIGHT — SIGNIFICANT CHANGE UP
POTASSIUM BLDV-SCNC: 3.3 MMOL/L — LOW (ref 3.5–5.1)
POTASSIUM SERPL-MCNC: 3.3 MMOL/L — LOW (ref 3.5–5.3)
POTASSIUM SERPL-MCNC: 4.1 MMOL/L — SIGNIFICANT CHANGE UP (ref 3.5–5.3)
POTASSIUM SERPL-SCNC: 3.3 MMOL/L — LOW (ref 3.5–5.3)
POTASSIUM SERPL-SCNC: 4.1 MMOL/L — SIGNIFICANT CHANGE UP (ref 3.5–5.3)
PROT SERPL-MCNC: 6 G/DL — SIGNIFICANT CHANGE UP (ref 6–8.3)
PROT SERPL-MCNC: 6.7 G/DL — SIGNIFICANT CHANGE UP (ref 6–8.3)
RBC # BLD: 5.05 M/UL — SIGNIFICANT CHANGE UP (ref 3.8–5.2)
RBC # BLD: 5.39 M/UL — HIGH (ref 3.8–5.2)
RBC # FLD: 20.2 % — HIGH (ref 10.3–14.5)
RBC # FLD: 20.2 % — HIGH (ref 10.3–14.5)
RBC BLD AUTO: ABNORMAL
SAO2 % BLDV: 45.5 % — LOW (ref 67–88)
SODIUM SERPL-SCNC: 135 MMOL/L — SIGNIFICANT CHANGE UP (ref 135–145)
SODIUM SERPL-SCNC: 139 MMOL/L — SIGNIFICANT CHANGE UP (ref 135–145)
VARIANT LYMPHS # BLD: 2.7 % — SIGNIFICANT CHANGE UP (ref 0–6)
WBC # BLD: 10.94 K/UL — HIGH (ref 3.8–10.5)
WBC # BLD: 7.79 K/UL — SIGNIFICANT CHANGE UP (ref 3.8–10.5)
WBC # FLD AUTO: 10.94 K/UL — HIGH (ref 3.8–10.5)
WBC # FLD AUTO: 7.79 K/UL — SIGNIFICANT CHANGE UP (ref 3.8–10.5)

## 2021-12-24 PROCEDURE — 99285 EMERGENCY DEPT VISIT HI MDM: CPT

## 2021-12-24 RX ORDER — SODIUM CHLORIDE 9 MG/ML
1000 INJECTION INTRAMUSCULAR; INTRAVENOUS; SUBCUTANEOUS ONCE
Refills: 0 | Status: COMPLETED | OUTPATIENT
Start: 2021-12-24 | End: 2021-12-24

## 2021-12-24 RX ORDER — ONDANSETRON 8 MG/1
1 TABLET, FILM COATED ORAL
Qty: 15 | Refills: 0
Start: 2021-12-24 | End: 2021-12-28

## 2021-12-24 RX ORDER — INSULIN LISPRO 100/ML
12 VIAL (ML) SUBCUTANEOUS
Qty: 0 | Refills: 0 | DISCHARGE

## 2021-12-24 RX ORDER — INSULIN LISPRO 100/ML
5 VIAL (ML) SUBCUTANEOUS
Qty: 10 | Refills: 0
Start: 2021-12-24 | End: 2022-01-22

## 2021-12-24 RX ORDER — POLYETHYLENE GLYCOL 3350 17 G/17G
17 POWDER, FOR SOLUTION ORAL
Refills: 0 | Status: DISCONTINUED | OUTPATIENT
Start: 2021-12-24 | End: 2021-12-24

## 2021-12-24 RX ORDER — INSULIN GLARGINE 100 [IU]/ML
18 INJECTION, SOLUTION SUBCUTANEOUS
Qty: 0 | Refills: 0 | DISCHARGE
Start: 2021-12-24 | End: 2022-01-22

## 2021-12-24 RX ORDER — INSULIN GLARGINE 100 [IU]/ML
17 INJECTION, SOLUTION SUBCUTANEOUS
Qty: 10 | Refills: 0
Start: 2021-12-24 | End: 2022-01-22

## 2021-12-24 RX ORDER — SENNA PLUS 8.6 MG/1
2 TABLET ORAL
Qty: 60 | Refills: 0
Start: 2021-12-24 | End: 2022-01-22

## 2021-12-24 RX ORDER — HALOPERIDOL DECANOATE 100 MG/ML
5 INJECTION INTRAMUSCULAR ONCE
Refills: 0 | Status: COMPLETED | OUTPATIENT
Start: 2021-12-24 | End: 2021-12-24

## 2021-12-24 RX ORDER — POLYETHYLENE GLYCOL 3350 17 G/17G
17 POWDER, FOR SOLUTION ORAL
Qty: 1020 | Refills: 0
Start: 2021-12-24 | End: 2022-01-22

## 2021-12-24 RX ORDER — INSULIN LISPRO 100/ML
8 VIAL (ML) SUBCUTANEOUS
Qty: 0 | Refills: 0 | DISCHARGE
Start: 2021-12-24 | End: 2022-01-22

## 2021-12-24 RX ORDER — HYDROMORPHONE HYDROCHLORIDE 2 MG/ML
0.5 INJECTION INTRAMUSCULAR; INTRAVENOUS; SUBCUTANEOUS EVERY 4 HOURS
Refills: 0 | Status: DISCONTINUED | OUTPATIENT
Start: 2021-12-24 | End: 2021-12-24

## 2021-12-24 RX ORDER — ACETAMINOPHEN 500 MG
1000 TABLET ORAL ONCE
Refills: 0 | Status: COMPLETED | OUTPATIENT
Start: 2021-12-24 | End: 2021-12-24

## 2021-12-24 RX ORDER — HALOPERIDOL DECANOATE 100 MG/ML
2 INJECTION INTRAMUSCULAR ONCE
Refills: 0 | Status: DISCONTINUED | OUTPATIENT
Start: 2021-12-24 | End: 2021-12-24

## 2021-12-24 RX ORDER — SENNA PLUS 8.6 MG/1
2 TABLET ORAL AT BEDTIME
Refills: 0 | Status: DISCONTINUED | OUTPATIENT
Start: 2021-12-24 | End: 2021-12-24

## 2021-12-24 RX ORDER — NICOTINE POLACRILEX 2 MG
1 GUM BUCCAL
Qty: 30 | Refills: 0
Start: 2021-12-24 | End: 2022-01-22

## 2021-12-24 RX ADMIN — POLYETHYLENE GLYCOL 3350 17 GRAM(S): 17 POWDER, FOR SOLUTION ORAL at 16:52

## 2021-12-24 RX ADMIN — Medication 25 MILLIGRAM(S): at 07:10

## 2021-12-24 RX ADMIN — Medication 1000 MILLIGRAM(S): at 19:09

## 2021-12-24 RX ADMIN — HYDROMORPHONE HYDROCHLORIDE 0.25 MILLIGRAM(S): 2 INJECTION INTRAMUSCULAR; INTRAVENOUS; SUBCUTANEOUS at 11:31

## 2021-12-24 RX ADMIN — SODIUM CHLORIDE 1000 MILLILITER(S): 9 INJECTION INTRAMUSCULAR; INTRAVENOUS; SUBCUTANEOUS at 23:49

## 2021-12-24 RX ADMIN — Medication 1: at 16:49

## 2021-12-24 RX ADMIN — Medication 25 MILLIGRAM(S): at 15:38

## 2021-12-24 RX ADMIN — HYDROMORPHONE HYDROCHLORIDE 0.25 MILLIGRAM(S): 2 INJECTION INTRAMUSCULAR; INTRAVENOUS; SUBCUTANEOUS at 16:51

## 2021-12-24 RX ADMIN — HYDROMORPHONE HYDROCHLORIDE 0.25 MILLIGRAM(S): 2 INJECTION INTRAMUSCULAR; INTRAVENOUS; SUBCUTANEOUS at 12:33

## 2021-12-24 RX ADMIN — HYDROMORPHONE HYDROCHLORIDE 0.25 MILLIGRAM(S): 2 INJECTION INTRAMUSCULAR; INTRAVENOUS; SUBCUTANEOUS at 07:10

## 2021-12-24 RX ADMIN — HYDROMORPHONE HYDROCHLORIDE 0.25 MILLIGRAM(S): 2 INJECTION INTRAMUSCULAR; INTRAVENOUS; SUBCUTANEOUS at 03:05

## 2021-12-24 RX ADMIN — HYDROMORPHONE HYDROCHLORIDE 0.25 MILLIGRAM(S): 2 INJECTION INTRAMUSCULAR; INTRAVENOUS; SUBCUTANEOUS at 15:38

## 2021-12-24 RX ADMIN — Medication 1: at 09:06

## 2021-12-24 RX ADMIN — Medication 400 MILLIGRAM(S): at 18:51

## 2021-12-24 RX ADMIN — HALOPERIDOL DECANOATE 5 MILLIGRAM(S): 100 INJECTION INTRAMUSCULAR at 22:05

## 2021-12-24 RX ADMIN — Medication 25 MILLIGRAM(S): at 03:05

## 2021-12-24 RX ADMIN — ONDANSETRON 4 MILLIGRAM(S): 8 TABLET, FILM COATED ORAL at 18:16

## 2021-12-24 RX ADMIN — Medication 4 UNIT(S): at 09:05

## 2021-12-24 RX ADMIN — PANTOPRAZOLE SODIUM 40 MILLIGRAM(S): 20 TABLET, DELAYED RELEASE ORAL at 12:02

## 2021-12-24 RX ADMIN — HYDROMORPHONE HYDROCHLORIDE 0.25 MILLIGRAM(S): 2 INJECTION INTRAMUSCULAR; INTRAVENOUS; SUBCUTANEOUS at 03:50

## 2021-12-24 RX ADMIN — Medication 25 MILLIGRAM(S): at 11:31

## 2021-12-24 RX ADMIN — Medication 1: at 12:02

## 2021-12-24 RX ADMIN — Medication 4 UNIT(S): at 12:02

## 2021-12-24 RX ADMIN — Medication 2 MILLIGRAM(S): at 22:05

## 2021-12-24 RX ADMIN — INSULIN GLARGINE 17 UNIT(S): 100 INJECTION, SOLUTION SUBCUTANEOUS at 16:54

## 2021-12-24 RX ADMIN — Medication 4 UNIT(S): at 16:48

## 2021-12-24 NOTE — ED ADULT NURSE NOTE - OBJECTIVE STATEMENT
31 y female presents from home with abdominal pain a/w n/v. patient brought back from triage screaming; stated in triage, "I want to kill myself, I cannot take this pain anymore." reports to pain worsening over the last month. Patient attempting to leave, swinging arms running out of the door. 5mg haldol, 2 mg ativan given. placed on constant obs for risk of harm to self. a&ox3. skin warm and dry. abdomen soft- reports to generalized discomfort. safety maintained- bed locked in lowest position. 31 y female presents from home with abdominal pain a/w n/v. patient brought back from triage screaming; stated in triage, "I want to kill myself, I cannot take this pain anymore." reports to pain worsening over the last month. Patient attempting to leave, swinging arms running out of the door. 5mg haldol, 2 mg ativan given. placed on constant obs for risk of harm to self. a&ox3. skin warm and dry. abdomen soft- reports to generalized discomfort. safety maintained- bed locked in lowest position. awaiting labs and dispo.

## 2021-12-24 NOTE — DISCHARGE NOTE NURSING/CASE MANAGEMENT/SOCIAL WORK - NSDCPECAREGIVERED_GEN_ALL_CORE
Medline and carenotes Diabetes for ketoacidosis, Diabetic Gastroparesis, Smoking cessation, as well as DC Medications and side effects literature for patient reference.

## 2021-12-24 NOTE — ED PROVIDER NOTE - PROGRESS NOTE DETAILS
Joseph Frankel PGY3: Patient with recurrent vomiting despite medication. Will admit. Patient otherwise stable.

## 2021-12-24 NOTE — ED ADULT TRIAGE NOTE - CHIEF COMPLAINT QUOTE
Complaining of N/V/D, abdominal pain. Pt walked in triage best, took thermometer wire, wrapped wire around her neck, starts choking self and screams "I don't want to be here!".

## 2021-12-24 NOTE — DISCHARGE NOTE PROVIDER - NSDCMRMEDTOKEN_GEN_ALL_CORE_FT
Admelog SoloStar 100 units/mL injectable solution: 5 unit(s) injectable 3 times a day  before each meal   Basaglar KwikPen 100 units/mL subcutaneous solution: 17 unit(s) subcutaneous once a day  famotidine 20 mg oral tablet: 1 tab(s) orally 2 times a day, as needed for acid reflux  nicotine 14 mg/24 hr transdermal film, extended release: 1 patch transdermal once a day  polyethylene glycol 3350 oral powder for reconstitution: 17 gram(s) orally 2 times a day  senna oral tablet: 2 tab(s) orally once a day (at bedtime)  Tylenol Extra Strength 500 mg oral tablet: 2 tab(s) orally every 6 hours prn pain  Zofran 4 mg oral tablet: 1 tab(s) orally every 8 hours, As Needed -for nausea

## 2021-12-24 NOTE — ED PROVIDER NOTE - PHYSICAL EXAMINATION
I have reviewed the triage vital signs.  Const: awake, alert, agitated & uncooperative with exam  Eyes: no conjunctival injection  HENT: NCAT  CV: RRR, +S1, S2  Resp: CTAB, no respiratory distress  GI: Abdomen soft  Extremities: No peripheral edema  Skin: Warm, well perfused, no rash  MSK: No gross deformities appreciated  Neuro: Unable to fully assess, was moving all extremities prior to sedation  Psych: Agitated

## 2021-12-24 NOTE — DISCHARGE NOTE NURSING/CASE MANAGEMENT/SOCIAL WORK - NSDCPEFALRISK_GEN_ALL_CORE
For information on Fall & Injury Prevention, visit: [email or web address] OR  [email or web address] OR  https://www.cdc.gov/steadi/patient.html

## 2021-12-24 NOTE — ED PROVIDER NOTE - NS ED ROS FT
Pt agitated & danger to self/staff so required Haldol/Ativan. Prior to receiving medications, was c/o abd pain, n/v.

## 2021-12-24 NOTE — CONSULT NOTE ADULT - ASSESSMENT
Impression:  30 yo F w/ PMHx T1DM c/b gastroparesis p/w DKA and report of SI, GI consulted for abd pain    # abd pain - unclear etiology; in setting of DKA the most likely etiology is 2/2 DKA. However her prolonged symptoms predate her current admission. Other GI potential etiologies include dysmotility (gastroparesis), constipation (only 1 bowel movement every 2 days), IBS (though pain not a/w bowel movements so does not meat Kelvin criteria), colitis (infectious vs ischemic) unlikely as no change in bowel movements. CT A/P from 12/16/21 showing nonsepecific small bowel dilation and fluid filled, may be enteritis (infectious vs inflammatory). Vascular phenomenon (chronic mesenteric ischemia) unlikely. Non-GI etiologies, such as MSK of Gyn related are possible as well.     Plan:   - glycemic control  - improve bowel habits with miralax TID, senna qHS  - minimize opioids  - can trial reglan 5-10mg q 8 hours PRN  - if diarrhea would check for infection (stool PCR)  - fecal calprotectin and CRP to rule out IBD given high negative predictive value  - rest of care per primary team    GI will continue to follow.     Antonio Pitt, PGY5  Gastroenterology/Hepatology Fellow  Available on [employer] Teams  [phone] ([employer] Short Range Pager)  [phone] (Long Range Pager)    After 5pm, please contact the on-call GI fellow. [phone]

## 2021-12-24 NOTE — ED PROVIDER NOTE - OBJECTIVE STATEMENT
31F hx of T1DM c/b diabetic gastroparesis p/w abd pain, N/V and self-injurious behavior. Pt reporting L-abd pain, n/v for 1 week, then in triage wrapped thermoteter wire around her neck, choking herself screaming "I don't want to be here!" Upon my evaluation, pt agitated, uncooperative with exam and required Haldol/Ativan for safety concerns.    Pt was just discharged from [facility] today for DKA and abdominal pain (12/21-12/24), was ultimately managed with ivf/admelog/lantus on the floors (was rejected by MICU and never started on insulin gtt). During that admission pt also attempted to strangulate herself with the call bell and was seen by psych.    mother Bebeto  [phone]- for emergency  Endocrine dr Mota [address] [facility]

## 2021-12-24 NOTE — DISCHARGE NOTE NURSING/CASE MANAGEMENT/SOCIAL WORK - PATIENT PORTAL LINK FT
You can access the FollowMyHealth Patient Portal offered by [facility] by registering at the following website: [email or web address]. By joining [facility]’s FollowMyHealth portal, you will also be able to view your health information using other applications (apps) compatible with our system.

## 2021-12-24 NOTE — PROGRESS NOTE ADULT - SUBJECTIVE AND OBJECTIVE BOX
Date of service: 12-24-21 @ 14:42      Patient is a 31y old  Female who presents with a chief complaint of n/v/abd pain (24 Dec 2021 13:45)                                                               INTERVAL HPI/OVERNIGHT EVENTS:    REVIEW OF SYSTEMS:     CONSTITUTIONAL: No weakness, fevers or chills    RESPIRATORY: No cough, wheezing,  No shortness of breath  CARDIOVASCULAR: No chest pain or palpitations  GASTROINTESTINAL: constipated   abd discofort   GENITOURINARY: No dysuria, frequency or hematuria  NEUROLOGICAL: No numbness or weakness  SKIN: No itching, burning, rashes, or lesions                                                                                                                                                                                                                                                                                 Medications:  MEDICATIONS  (STANDING):  dextrose 40% Gel 15 Gram(s) Oral once  dextrose 5%. 1000 milliLiter(s) (50 mL/Hr) IV Continuous <Continuous>  dextrose 5%. 1000 milliLiter(s) (100 mL/Hr) IV Continuous <Continuous>  dextrose 50% Injectable 25 Gram(s) IV Push once  dextrose 50% Injectable 12.5 Gram(s) IV Push once  dextrose 50% Injectable 25 Gram(s) IV Push once  enoxaparin Injectable 40 milliGRAM(s) SubCutaneous daily  glucagon  Injectable 1 milliGRAM(s) IntraMuscular once  insulin glargine Injectable (LANTUS) 17 Unit(s) SubCutaneous <User Schedule>  insulin lispro (ADMELOG) corrective regimen sliding scale   SubCutaneous three times a day with meals  insulin lispro (ADMELOG) corrective regimen sliding scale   SubCutaneous at bedtime  insulin lispro Injectable (ADMELOG) 4 Unit(s) SubCutaneous three times a day before meals  nicotine -  14 mG/24Hr(s) Patch 1 patch Transdermal daily  pantoprazole  Injectable 40 milliGRAM(s) IV Push daily  polyethylene glycol 3350 17 Gram(s) Oral two times a day  senna 2 Tablet(s) Oral at bedtime    MEDICATIONS  (PRN):  acetaminophen     Tablet .. 650 milliGRAM(s) Oral every 6 hours PRN Mild Pain (1 - 3)  diphenhydrAMINE Injectable 25 milliGRAM(s) IV Push every 4 hours PRN Itching  HYDROmorphone  Injectable 0.25 milliGRAM(s) IV Push every 4 hours PRN Moderate Pain (4 - 6) or severe pain  ondansetron Injectable 4 milliGRAM(s) IV Push every 8 hours PRN Nausea and/or Vomiting       Allergies    No Known Allergies    Intolerances      Vital Signs Last 24 Hrs  T(C): 36.6 (24 Dec 2021 09:40), Max: 36.8 (23 Dec 2021 18:05)  T(F): 97.9 (24 Dec 2021 09:40), Max: 98.3 (23 Dec 2021 21:56)  HR: 88 (24 Dec 2021 09:40) (84 - 88)  BP: 137/99 (24 Dec 2021 09:40) (129/69 - 154/98)  BP(mean): --  RR: 16 (24 Dec 2021 09:40) (16 - 17)  SpO2: 98% (24 Dec 2021 09:40) (98% - 100%)  CAPILLARY BLOOD GLUCOSE      POCT Blood Glucose.: 168 mg/dL (24 Dec 2021 11:38)  POCT Blood Glucose.: 196 mg/dL (24 Dec 2021 09:01)  POCT Blood Glucose.: 174 mg/dL (24 Dec 2021 03:11)  POCT Blood Glucose.: 99 mg/dL (24 Dec 2021 02:55)  POCT Blood Glucose.: 70 mg/dL (24 Dec 2021 02:31)  POCT Blood Glucose.: 99 mg/dL (23 Dec 2021 22:19)  POCT Blood Glucose.: 262 mg/dL (23 Dec 2021 18:15)  POCT Blood Glucose.: 157 mg/dL (23 Dec 2021 16:31)      12-23 @ 07:01  -  12-24 @ 07:00  --------------------------------------------------------  IN: 1580 mL / OUT: 0 mL / NET: 1580 mL    12-24 @ 07:01  -  12-24 @ 14:42  --------------------------------------------------------  IN: 350 mL / OUT: 0 mL / NET: 350 mL      Physical Exam:    Daily     Daily   General:  NAD   HEENT:  Nonicteric, PERRLA  CV:  RRR, S1S2   Lungs:  CTA B/L, no wheezes, rales, rhonchi  Abdomen:  Soft, non-tender, no distended, positive BS  Extremities:  2+ pulses, no c/c, no edema  Skin:  Warm and dry, no rashes  :  No gonzalez  Neuro:  AAOx3, non-focal, grossly intact                                                                                                                                                                                                                                                                                                LABS:                               9.3    7.79  )-----------( 253      ( 24 Dec 2021 06:43 )             30.8                      12-24    135  |  102  |  4<L>  ----------------------------<  150<H>  4.1   |  24  |  0.69    Ca    8.7      24 Dec 2021 06:43  Phos  3.7     12-24  Mg     1.80     12-24    TPro  6.0  /  Alb  3.1<L>  /  TBili  0.4  /  DBili  x   /  AST  19  /  ALT  13  /  AlkPhos  93  12-24                       RADIOLOGY & ADDITIONAL TESTS         I personally reviewed: [  ]EKG   [  ]CXR    [  ] CT      A/P:         Discussed with :     Melody consultants' Notes   Time spent :

## 2021-12-24 NOTE — PROVIDER CONTACT NOTE (OTHER) - SITUATION
Patient cleared for d/c, multiple PAs came and explained situation to patient including attending MD in person and via telephone. Pt reuqested to leave AMA, no refusing to be d/c, cleared for d/c
Pt noted to e hypoglycemic. BS 54
Patient hypoglycemic to 70

## 2021-12-24 NOTE — CHART NOTE - NSCHARTNOTEFT_GEN_A_CORE
Chart reviewed, Type 1 DM  Glucose improved  Anion gap normal  DC plan: Basaglar 17 units once daily, Admelog 5/5/5 units premeal  Discussed with ACP    Shanna Hathaway MD  Division of Endocrinology  Pager: [phone]    If after 6PM or before 9AM, or on weekends/holidays, please call endocrine answering service for assistance ([phone]).  For nonurgent matters email [email or web address] for assistance.

## 2021-12-24 NOTE — PROVIDER CONTACT NOTE (OTHER) - RECOMMENDATIONS
Dextrose 12.5G IV Push given will recheck blood glucose in 15 min
Stat haldol ordered, per ACP PA if mother wants to speak to attending we can connect her, no pain meds to be given, pt cleared for d/c once ride set up, call code grey if needed
hypoglycemia protocol

## 2021-12-24 NOTE — CONSULT NOTE ADULT - SUBJECTIVE AND OBJECTIVE BOX
HPI:  32 yo F w/ PMHx T1DM c/b gastroparesis, GERD on pepcid, presenting w/ abd pain. patient found to be in DKA, treated w/ insulin gtt w/ improvement in anion gap. Patient also reported suicidal ideation on presentation, so placed on 1:1 and psych consulted.     GI now consulted for abd pain. Patient reports she has LLQ abd pain that radiates up back for past month. Patient not able to fully describe pain, no temporal relationship to PO intake or bowel mvoements. Patient has one bowel movement every 2 days, no melena/hematochezia, though did have several loose stools last week. No nausea/vomiting/bloating except for 2 days prior to presentation. Patient states current pain is different from her epigastric gastroparesis bloating. No FHx of IBD, no prior colonoscopy, had EGD  for hematemsis (Shani Witt tear). No weight loss. Currently having her menstrual period, though pain not related to menstrual period.   Regarding gastroparesis - diagnosed many years ago at [facility] w/ gastric emptying study, briefly on reglan, not taking anymore.     Allergies:  No Known Allergies      Home Medications:    Hospital Medications:  acetaminophen     Tablet .. 650 milliGRAM(s) Oral every 6 hours PRN  dextrose 40% Gel 15 Gram(s) Oral once  dextrose 5%. 1000 milliLiter(s) IV Continuous <Continuous>  dextrose 5%. 1000 milliLiter(s) IV Continuous <Continuous>  dextrose 50% Injectable 25 Gram(s) IV Push once  dextrose 50% Injectable 12.5 Gram(s) IV Push once  dextrose 50% Injectable 25 Gram(s) IV Push once  diphenhydrAMINE Injectable 25 milliGRAM(s) IV Push every 4 hours PRN  enoxaparin Injectable 40 milliGRAM(s) SubCutaneous daily  glucagon  Injectable 1 milliGRAM(s) IntraMuscular once  HYDROmorphone  Injectable 0.25 milliGRAM(s) IV Push every 4 hours PRN  insulin glargine Injectable (LANTUS) 17 Unit(s) SubCutaneous <User Schedule>  insulin lispro (ADMELOG) corrective regimen sliding scale   SubCutaneous three times a day with meals  insulin lispro (ADMELOG) corrective regimen sliding scale   SubCutaneous at bedtime  insulin lispro Injectable (ADMELOG) 4 Unit(s) SubCutaneous three times a day before meals  nicotine -  14 mG/24Hr(s) Patch 1 patch Transdermal daily  ondansetron Injectable 4 milliGRAM(s) IV Push every 8 hours PRN  pantoprazole  Injectable 40 milliGRAM(s) IV Push daily  polyethylene glycol 3350 17 Gram(s) Oral two times a day  senna 2 Tablet(s) Oral at bedtime      PMHX/PSHX:  Diabetes mellitus type 1    Gastroparesis    No pertinent past medical history    Type 1 diabetes mellitus with ketoacidosis without coma, with long-term current use of insulin    Gastroparesis due to DM    Colitis    Ectopic pregnancy    No significant past surgical history    History of cholecystectomy        Family history:  Family history of diabetes mellitus (Grandparent)    Family history of type 1 diabetes mellitus (Grandparent)    FH: HTN (hypertension) (Father)        Denies family history of colon cancer/polyps, stomach cancer/polyps, pancreatic cancer/masses, liver cancer/disease, ovarian cancer and endometrial cancer.    Social History:   Tob: Denies  EtOH: Denies  Illicit Drugs: Denies    ROS:     General:  No wt loss, fevers, chills, night sweats, fatigue  Eyes:  Good vision, no reported pain  ENT:  No sore throat, pain, runny nose, dysphagia  CV:  No pain, palpitations, hypo/hypertension  Pulm:  No dyspnea, cough, tachypnea, wheezing  GI:  see HPI  :  No pain, bleeding, incontinence, nocturia  Muscle:  No pain, weakness  Neuro:  No weakness, tingling, memory problems  Psych:  No fatigue, insomnia, mood problems, depression  Endocrine:  No polyuria, polydipsia, cold/heat intolerance  Heme:  No petechiae, ecchymosis, easy bruisability  Skin:  No rash, tattoos, scars, edema    PHYSICAL EXAM:     GENERAL:  No acute distress  HEENT:  NCAT, no scleral icterus   CHEST:  no respiratory distress  HEART:  Regular rate and rhythm  ABDOMEN:  TTP in LLQ/LUQ, no r/g, no masses  EXTREMITIES: No edema  SKIN:  No rash/erythema/ecchymoses/petechiae/wounds/abscess/warm/dry  NEURO:  Alert and oriented x 3, no asterixis    Vital Signs:  Vital Signs Last 24 Hrs  T(C): 36.6 (24 Dec 2021 09:40), Max: 36.9 (23 Dec 2021 14:35)  T(F): 97.9 (24 Dec 2021 09:40), Max: 98.5 (23 Dec 2021 14:35)  HR: 88 (24 Dec 2021 09:40) (81 - 88)  BP: 137/99 (24 Dec 2021 09:40) (120/67 - 154/98)  BP(mean): --  RR: 16 (24 Dec 2021 09:40) (16 - 17)  SpO2: 98% (24 Dec 2021 09:40) (98% - 100%)  Daily     Daily     LABS:                        9.3    7.79  )-----------( 253      ( 24 Dec 2021 06:43 )             30.8     Mean Cell Volume: 61.0 fL (-21 @ 06:43)    12    135  |  102  |  4<L>  ----------------------------<  150<H>  4.1   |  24  |  0.69    Ca    8.7      24 Dec 2021 06:43  Phos  3.7       Mg     1.80         TPro  6.0  /  Alb  3.1<L>  /  TBili  0.4  /  DBili  x   /  AST  19  /  ALT  13  /  AlkPhos  93  12-24    LIVER FUNCTIONS - ( 24 Dec 2021 06:43 )  Alb: 3.1 g/dL / Pro: 6.0 g/dL / ALK PHOS: 93 U/L / ALT: 13 U/L / AST: 19 U/L / GGT: x             Urinalysis Basic - ( 22 Dec 2021 15:33 )    Color: Light Yellow / Appearance: Clear / S.022 / pH: x  Gluc: x / Ketone: Moderate  / Bili: Negative / Urobili: <2 mg/dL   Blood: x / Protein: 100 mg/dL / Nitrite: Negative   Leuk Esterase: Negative / RBC: 46 /HPF / WBC 3 /HPF   Sq Epi: x / Non Sq Epi: 0 /HPF / Bacteria: Negative                              9.3    7.79  )-----------( 253      ( 24 Dec 2021 06:43 )             30.8                         8.9    20.56 )-----------( 306      ( 22 Dec 2021 08:00 )             30.6       Imaging:

## 2021-12-24 NOTE — ED PROVIDER NOTE - NSICDXFAMILYHX_GEN_ALL_CORE_FT
FAMILY HISTORY:  Father  Still living? Unknown  FH: HTN (hypertension), Age at diagnosis: Age Unknown    Grandparent  Still living? Unknown  Family history of diabetes mellitus, Age at diagnosis: Age Unknown  Family history of type 1 diabetes mellitus, Age at diagnosis: Age Unknown    
none known

## 2021-12-24 NOTE — DISCHARGE NOTE PROVIDER - CARE PROVIDER_API CALL
Your primary care physician,   Phone: (   )    -  Fax: (   )    -  Follow Up Time:     Chrissy Bose (DO)  EndocrinologyMetabDiabetes; Internal Medicine  [address] [place] [address] [address]  [place] [address]  Phone: [phone]  Fax: [phone]  Scheduled Appointment: 07/11/2022

## 2021-12-24 NOTE — ED PROVIDER NOTE - ATTENDING CONTRIBUTION TO CARE
32yo F hx of T1DM c/b diabetic gastroparesis comes to ED for abdominal pain, acute on chronic just dc today with SI as well saw psych now in triage reported that she tried to strangle herself. pt combative, agitated, requiring haldol and ativan, psych labs, dka work up ordered.

## 2021-12-24 NOTE — CONSULT NOTE ADULT - ATTENDING COMMENTS
Patient examined and case reviewed in detail on bedside rounds  Pt with mild DKA Endocrine will guide therapy out of MICU If gap worsens, pt may need MICU level care
Pt. d/w fellow but was subsequently discharged before I could see her. She is now admitted to [facility] where I may see her if needed. We were asked to see her for abdominal pain in setting of DKA. If her primary team wants further evaluation at [facility], they should consult GI after they address her hyperglycemia and opioid induced constipation. See above note for details.    Nestor Majano MD  [facility]
Type 1 DM complicated by gastropresis. Did not receive basal insulin in ED with worsened anion gap, BHB concerning for DKA.  Discussed with ED this AM recommended insulin drip.  MICU declined patient. DKA labs improved with 3 Admelog doses.  Transition with Lantus 18 units daily.  GI eval for gastroparesis.  Sees Dr. Bose for endocrine as outpatient.  Endocrine team consulted for uncontrolled diabetes. Patient is high risk with high level decision making due to uncontrolled diabetes which places patient at high risk for cardiovascular and cerebrovascular events. Patient with lability of glucose requiring close monitoring and insulin adjustments.    Shanna Hathaway MD  Division of Endocrinology  Pager: [phone]    If after 6PM or before 9AM, or on weekends/holidays, please call endocrine answering service for assistance ([phone]).  For nonurgent matters email [email or web address] for assistance.

## 2021-12-24 NOTE — PROVIDER CONTACT NOTE (OTHER) - BACKGROUND
patient hx of type 1 diabetes
admit for n/v, dka, abd pain, possible "drug seeking" per md and psych
Pt admitted for DKA with gastroparesis

## 2021-12-24 NOTE — ED PROVIDER NOTE - NSICDXPASTSURGICALHX_GEN_ALL_CORE_FT
PAST SURGICAL HISTORY:  Ectopic pregnancy 2013, s/p removal of right fallopian tube    History of cholecystectomy

## 2021-12-24 NOTE — DISCHARGE NOTE PROVIDER - PROVIDER TOKENS
FREE:[LAST:[Your primary care physician],PHONE:[(   )    -],FAX:[(   )    -]],PROVIDER:[TOKEN:[[id number]],SCHEDULEDAPPT:[07/11/2022]]

## 2021-12-24 NOTE — CHART NOTE - NSCHARTNOTEFT_GEN_A_CORE
Notified by RN that pt now agreeable to being discharged. Plan was for discharge earlier however pt stated that she was nauseous and throwing up. Per RN, pt ate dinner and tolerated it. Writer examined bucket at bedside, no vomitus noted. Reinforced with patient that she will not be receiving any further Dilaudid IVP if she stays tonight. Pt does not appear in any acute distress. Pt currently stable. S/w medical attg Dr. Naqvi, will discharge patient now. Notified by RN that pt now agreeable to being discharged. Plan was for discharge earlier however pt stated that she was nauseous and throwing up. Per RN, pt ate dinner and tolerated it. Writer examined bucket at bedside, no vomitus noted. Reinforced with patient that she will not be receiving any further Dilaudid IVP if she stays tonight. Pt does not appear in any acute distress and is currently stable. S/w medical attg Dr. Naqvi, will discharge patient now. Notified by RN that pt now agreeable to being discharged. Plan was for discharge earlier however pt stated that she was nauseous and throwing up when discharge order was placed. Per RN, pt ate dinner and tolerated it. Writer examined bucket at bedside, no vomitus noted. Reinforced with patient that she will not be receiving any further Dilaudid IVP if she stays tonight. Pt does not appear in any acute distress and is currently stable. S/w medical attg Dr. Naqvi, will discharge patient now.

## 2021-12-24 NOTE — DISCHARGE NOTE PROVIDER - NSDCCPCAREPLAN_GEN_ALL_CORE_FT
PRINCIPAL DISCHARGE DIAGNOSIS  Diagnosis: DKA (diabetic ketoacidosis)  Assessment and Plan of Treatment: You were admitted in the hospital for diabetic ketoacidosis. You were evaluated by endocrine team. Your symptoms have improved. You will be discharged on basaglar 17 units daily and admelog 5 units three times a day before meals. Follow up with endocrinologist outpatient for further management.        SECONDARY DISCHARGE DIAGNOSES  Diagnosis: Abdominal pain  Assessment and Plan of Treatment: You were seen by the gastroenterology team.  Continue miralax and senna for constipation. Continue taking reglan as needed for nausea and/or vomiting.

## 2021-12-24 NOTE — DISCHARGE NOTE NURSING/CASE MANAGEMENT/SOCIAL WORK - NSDPDISTO_GEN_ALL_CORE
Pt VS stable Afebrile. Linda po diet well, Voiding without difficulty. Seen by MD, Endo/GI cleared to discharge to home as per safe DC plan./Home

## 2021-12-24 NOTE — PROVIDER CONTACT NOTE (OTHER) - ASSESSMENT
Vss Pt complaint of abdominal pain 7/10, Bs 54.
0230 blood glucose 70. patient responsive and denies s/s of hypoglycemia. vital signs stable. pt alert and oriented. no nausea or vomiting reported. pt drank 1 cup of apple juice. blood glucose repeated 15min later, results were 99. 1 additional cup of apple juice given, acp notified. blood glucose repeated 15min later, results were 174.
pt screaming, uncooperative, crying, manipulative behavior keeps changing story, Mother of pt calling saying "will felix if you discharge her", plan explained to patient and family as discussed past 3 hours.

## 2021-12-24 NOTE — DISCHARGE NOTE PROVIDER - HOSPITAL COURSE
30yo F hx of T1DM c/b diabetic gastroparesis comes to ED for abdominal pain, acute on chronic. Says it has been happening for a month, nausea/vomiting.  Stated in ED "I just want to kill myself." Saying she does not want to live with this pain. Despite offering medications for pain, pt attempted strangulation w/ call bell.  ED Requested Psych consult.  Patient seen on medicine floor with N/V and c/o lower abd pain 8/10 typical for her DKA       Diabetic ketoacidosis, type I.   Endocrine consulted for DKA appreciate input   discussed at length at bedside with icu resident and expressed my concerns that pt does need to be in MICU for insulin drip and intensive monitoring .. micu rejected pt .. i again called later in day and discussed with MICU attending especially that on repeat blood work , AG and Bhydroxybuterate   still elevated ..  though PH is : " normal " however pt with severe contraction alkalosis sec to severe dehydration , acid loss from vomitting.   Per MICU attending , pt  will not be able to transfer to MICU given "PH " and bed shortage in MICU. I asked to reconsider and re-evaluate during  the hours of night .    pt  was kept on medical floor and now with improved gap..todya;s labs pending .. seem to be refusing   in the meantime : cont insulin long acting and short acting per endo insturctions   cont close monitoring of FS   AG today is closed and FS under good control   toleratig po   cleared by endo for dc     Diabetic gastroparesis.   IVF hydration   start Iv Dialaudid 0.25 mg q4 prn with iv benadryl 25 mg q4 prn n/v/ itching  appreciate GI input     Tobacco use disorder.   nicodrom patch.    Suicidal" attempt " : appreciate psych input   pt is more likley to be manipulative and " needing pain meds " and less a harm to herself .  can dc 1:1 per psych .appreciate input     Patient is medically stable for discharge on 12/24/2021 per attending Dr. Naqvi.

## 2021-12-24 NOTE — PROGRESS NOTE ADULT - ASSESSMENT
hp31yo F hx of T1DM c/b diabetic gastroparesis comes to ED for abdominal pain, acute on chronic. Says it has been happening for a month, nausea/vomiting.  Stated in ED "I just want to kill myself." Saying she does not want to live with this pain. Despite offering medications for pain, pt attempted strangulation w/ call bell.  ED Requested Psych consult.  Patient seen on medicine floor with N/V and c/o lower abd pain 8/10 typical for her DKA     Endocrine consult appreciate    # abd pain sec to dka  # diabetic gastroparesis  #SI  #Tobacco    Problem/Plan - 1:  ·  Problem: Diabetic ketoacidosis, type I.   ·  Plan: Endocrine consulted for DKA appreciate input   discussed at length at bedside with icu resident and expressed my concerns that pt does need to be in MICU for insulin drip and intensive monitoring .. micu rejected pt .. i again called later in day and discussed with MICU attending especially that on repeat blood work , AG and Bhydroxybuterate   still elevated ..  though PH is : " normal " however pt with severe contraction alkalosis sec to severe dehydration , acid loss from vomitting.   Per MICU attending , pt  will not be able to transfer to MICU given "PH " and bed shortage in MICU. I asked to reconsider and re-evaluate during  the hours of night .    pt  was kept on medical floor and now with improved gap..todya;s labs pending .. seem to be refusing   in the meantime : cont insulin long acting and short acting per endo insturctions   cont close monitoring of FS     AG today is closed and FS under good control   toleratig po   cleared by endo for dc         Problem/Plan - 2:  ·  Problem: Diabetic gastroparesis.   ·  Plan: IVF hydration   start Iv Dialaudid 0.25 mg q4 prn with iv benadryl 25 mg q4 prn n/v/ itching  appreciate GI input     Problem/Plan - 3:  ·  Problem: Tobacco use disorder.   ·  Plan: nicodrom patch.    Problem/Plan - 4:  ·  Problem: Prophylactic measure.   ·  Plan: lovenox sq.    Problem/Plan - 5:  ·  Problem: Suicidal" attempt " : appreciate psych input   pt is more likley to be manipulative and " needing pain meds " and less a harm to herself .  can dc 1:1 per psych .appreciate input     d/c to fu as o p

## 2021-12-24 NOTE — PROVIDER CONTACT NOTE (OTHER) - ACTION/TREATMENT ORDERED:
as per ACP, no further interventions are required. patient stable, no acute distress noted. repeat fingerstick before breakfast as ordered. will continue to monitor.
haldol ordered, report given to night RN to continue to monitor and follow through with plan
continue to monitor

## 2021-12-25 DIAGNOSIS — D50.9 IRON DEFICIENCY ANEMIA, UNSPECIFIED: ICD-10-CM

## 2021-12-25 DIAGNOSIS — R11.2 NAUSEA WITH VOMITING, UNSPECIFIED: ICD-10-CM

## 2021-12-25 DIAGNOSIS — I10 ESSENTIAL (PRIMARY) HYPERTENSION: ICD-10-CM

## 2021-12-25 DIAGNOSIS — E10.65 TYPE 1 DIABETES MELLITUS WITH HYPERGLYCEMIA: ICD-10-CM

## 2021-12-25 DIAGNOSIS — R10.84 GENERALIZED ABDOMINAL PAIN: ICD-10-CM

## 2021-12-25 DIAGNOSIS — Z29.9 ENCOUNTER FOR PROPHYLACTIC MEASURES, UNSPECIFIED: ICD-10-CM

## 2021-12-25 DIAGNOSIS — R10.9 UNSPECIFIED ABDOMINAL PAIN: ICD-10-CM

## 2021-12-25 DIAGNOSIS — F43.22 ADJUSTMENT DISORDER WITH ANXIETY: ICD-10-CM

## 2021-12-25 DIAGNOSIS — F19.10 OTHER PSYCHOACTIVE SUBSTANCE ABUSE, UNCOMPLICATED: ICD-10-CM

## 2021-12-25 DIAGNOSIS — K31.84 GASTROPARESIS: ICD-10-CM

## 2021-12-25 DIAGNOSIS — T14.91XA SUICIDE ATTEMPT, INITIAL ENCOUNTER: ICD-10-CM

## 2021-12-25 DIAGNOSIS — E10.69 TYPE 1 DIABETES MELLITUS WITH OTHER SPECIFIED COMPLICATION: ICD-10-CM

## 2021-12-25 LAB
A1C WITH ESTIMATED AVERAGE GLUCOSE RESULT: 8.9 % — HIGH (ref 4–5.6)
ALBUMIN SERPL ELPH-MCNC: 3.1 G/DL — LOW (ref 3.3–5)
ALBUMIN SERPL ELPH-MCNC: 3.1 G/DL — LOW (ref 3.3–5)
ALP SERPL-CCNC: 101 U/L — SIGNIFICANT CHANGE UP (ref 40–120)
ALP SERPL-CCNC: 96 U/L — SIGNIFICANT CHANGE UP (ref 40–120)
ALT FLD-CCNC: 14 U/L — SIGNIFICANT CHANGE UP (ref 10–45)
ALT FLD-CCNC: 15 U/L — SIGNIFICANT CHANGE UP (ref 10–45)
ANION GAP SERPL CALC-SCNC: 15 MMOL/L — SIGNIFICANT CHANGE UP (ref 5–17)
ANION GAP SERPL CALC-SCNC: 17 MMOL/L — SIGNIFICANT CHANGE UP (ref 5–17)
ANISOCYTOSIS BLD QL: SLIGHT — SIGNIFICANT CHANGE UP
APAP SERPL-MCNC: <15 UG/ML — SIGNIFICANT CHANGE UP (ref 10–30)
APPEARANCE UR: CLEAR — SIGNIFICANT CHANGE UP
APTT BLD: 29.8 SEC — SIGNIFICANT CHANGE UP (ref 27.5–35.5)
AST SERPL-CCNC: 12 U/L — SIGNIFICANT CHANGE UP (ref 10–40)
AST SERPL-CCNC: 12 U/L — SIGNIFICANT CHANGE UP (ref 10–40)
B-OH-BUTYR SERPL-SCNC: 2.7 MMOL/L — HIGH
B-OH-BUTYR SERPL-SCNC: 3.5 MMOL/L — HIGH
BACTERIA # UR AUTO: NEGATIVE — SIGNIFICANT CHANGE UP
BASE EXCESS BLDV CALC-SCNC: -2.7 MMOL/L — LOW (ref -2–2)
BASE EXCESS BLDV CALC-SCNC: -2.7 MMOL/L — LOW (ref -2–2)
BASOPHILS # BLD AUTO: 0.2 K/UL — SIGNIFICANT CHANGE UP (ref 0–0.2)
BASOPHILS NFR BLD AUTO: 1.8 % — SIGNIFICANT CHANGE UP (ref 0–2)
BILIRUB SERPL-MCNC: 0.4 MG/DL — SIGNIFICANT CHANGE UP (ref 0.2–1.2)
BILIRUB SERPL-MCNC: 0.4 MG/DL — SIGNIFICANT CHANGE UP (ref 0.2–1.2)
BILIRUB UR-MCNC: NEGATIVE — SIGNIFICANT CHANGE UP
BLOOD GAS VENOUS - CREATININE: SIGNIFICANT CHANGE UP MG/DL (ref 0.5–1.3)
BUN SERPL-MCNC: 10 MG/DL — SIGNIFICANT CHANGE UP (ref 7–23)
BUN SERPL-MCNC: 9 MG/DL — SIGNIFICANT CHANGE UP (ref 7–23)
CA-I SERPL-SCNC: 1.16 MMOL/L — SIGNIFICANT CHANGE UP (ref 1.15–1.33)
CA-I SERPL-SCNC: 1.2 MMOL/L — SIGNIFICANT CHANGE UP (ref 1.15–1.33)
CALCIUM SERPL-MCNC: 8.3 MG/DL — LOW (ref 8.4–10.5)
CALCIUM SERPL-MCNC: 8.5 MG/DL — SIGNIFICANT CHANGE UP (ref 8.4–10.5)
CHLORIDE BLDV-SCNC: 102 MMOL/L — SIGNIFICANT CHANGE UP (ref 96–108)
CHLORIDE BLDV-SCNC: 98 MMOL/L — SIGNIFICANT CHANGE UP (ref 96–108)
CHLORIDE SERPL-SCNC: 100 MMOL/L — SIGNIFICANT CHANGE UP (ref 96–108)
CHLORIDE SERPL-SCNC: 95 MMOL/L — LOW (ref 96–108)
CO2 BLDV-SCNC: 23 MMOL/L — SIGNIFICANT CHANGE UP (ref 22–26)
CO2 BLDV-SCNC: 24 MMOL/L — SIGNIFICANT CHANGE UP (ref 22–26)
CO2 SERPL-SCNC: 19 MMOL/L — LOW (ref 22–31)
CO2 SERPL-SCNC: 21 MMOL/L — LOW (ref 22–31)
COLOR SPEC: COLORLESS — SIGNIFICANT CHANGE UP
CREAT SERPL-MCNC: 0.64 MG/DL — SIGNIFICANT CHANGE UP (ref 0.5–1.3)
CREAT SERPL-MCNC: 0.75 MG/DL — SIGNIFICANT CHANGE UP (ref 0.5–1.3)
CRP SERPL-MCNC: 16 MG/L — HIGH (ref 0–4)
DACRYOCYTES BLD QL SMEAR: SLIGHT — SIGNIFICANT CHANGE UP
DIFF PNL FLD: ABNORMAL
ELLIPTOCYTES BLD QL SMEAR: SIGNIFICANT CHANGE UP
EOSINOPHIL # BLD AUTO: 0.2 K/UL — SIGNIFICANT CHANGE UP (ref 0–0.5)
EOSINOPHIL NFR BLD AUTO: 1.8 % — SIGNIFICANT CHANGE UP (ref 0–6)
EPI CELLS # UR: 1 /HPF — SIGNIFICANT CHANGE UP
ESTIMATED AVERAGE GLUCOSE: 209 MG/DL — HIGH (ref 68–114)
ETHANOL SERPL-MCNC: SIGNIFICANT CHANGE UP MG/DL (ref 0–10)
FERRITIN SERPL-MCNC: 32 NG/ML — SIGNIFICANT CHANGE UP (ref 15–150)
GAS PNL BLDV: 130 MMOL/L — LOW (ref 136–145)
GAS PNL BLDV: 135 MMOL/L — LOW (ref 136–145)
GAS PNL BLDV: SIGNIFICANT CHANGE UP
GIANT PLATELETS BLD QL SMEAR: PRESENT — SIGNIFICANT CHANGE UP
GLUCOSE BLDC GLUCOMTR-MCNC: 182 MG/DL — HIGH (ref 70–99)
GLUCOSE BLDC GLUCOMTR-MCNC: 272 MG/DL — HIGH (ref 70–99)
GLUCOSE BLDC GLUCOMTR-MCNC: 280 MG/DL — HIGH (ref 70–99)
GLUCOSE BLDC GLUCOMTR-MCNC: 324 MG/DL — HIGH (ref 70–99)
GLUCOSE BLDC GLUCOMTR-MCNC: 37 MG/DL — CRITICAL LOW (ref 70–99)
GLUCOSE BLDC GLUCOMTR-MCNC: 43 MG/DL — CRITICAL LOW (ref 70–99)
GLUCOSE BLDV-MCNC: 338 MG/DL — HIGH (ref 70–99)
GLUCOSE BLDV-MCNC: 421 MG/DL — HIGH (ref 70–99)
GLUCOSE SERPL-MCNC: 198 MG/DL — HIGH (ref 70–99)
GLUCOSE SERPL-MCNC: 367 MG/DL — HIGH (ref 70–99)
GLUCOSE UR QL: SIGNIFICANT CHANGE UP
HCO3 BLDV-SCNC: 22 MMOL/L — SIGNIFICANT CHANGE UP (ref 22–29)
HCO3 BLDV-SCNC: 23 MMOL/L — SIGNIFICANT CHANGE UP (ref 22–29)
HCT VFR BLD CALC: 30 % — LOW (ref 34.5–45)
HCT VFR BLD CALC: 31.3 % — LOW (ref 34.5–45)
HCT VFR BLDA CALC: 27 % — LOW (ref 34.5–46.5)
HCT VFR BLDA CALC: 32 % — LOW (ref 34.5–46.5)
HGB BLD CALC-MCNC: 10.7 G/DL — LOW (ref 11.7–16.1)
HGB BLD CALC-MCNC: 9 G/DL — LOW (ref 11.7–16.1)
HGB BLD-MCNC: 8.9 G/DL — LOW (ref 11.5–15.5)
HGB BLD-MCNC: 9.3 G/DL — LOW (ref 11.5–15.5)
HYALINE CASTS # UR AUTO: 0 /LPF — SIGNIFICANT CHANGE UP (ref 0–2)
HYPOCHROMIA BLD QL: SIGNIFICANT CHANGE UP
INR BLD: 1.01 RATIO — SIGNIFICANT CHANGE UP (ref 0.88–1.16)
IRON SATN MFR SERPL: 17 UG/DL — LOW (ref 30–160)
IRON SATN MFR SERPL: 5 % — LOW (ref 14–50)
KETONES UR-MCNC: ABNORMAL
LACTATE BLDV-MCNC: 1.1 MMOL/L — SIGNIFICANT CHANGE UP (ref 0.7–2)
LACTATE BLDV-MCNC: 1.5 MMOL/L — SIGNIFICANT CHANGE UP (ref 0.7–2)
LEUKOCYTE ESTERASE UR-ACNC: NEGATIVE — SIGNIFICANT CHANGE UP
LYMPHOCYTES # BLD AUTO: 0.77 K/UL — LOW (ref 1–3.3)
LYMPHOCYTES # BLD AUTO: 7 % — LOW (ref 13–44)
MACROCYTES BLD QL: SLIGHT — SIGNIFICANT CHANGE UP
MAGNESIUM SERPL-MCNC: 1.6 MG/DL — SIGNIFICANT CHANGE UP (ref 1.6–2.6)
MANUAL SMEAR VERIFICATION: SIGNIFICANT CHANGE UP
MCHC RBC-ENTMCNC: 18.2 PG — LOW (ref 27–34)
MCHC RBC-ENTMCNC: 18.2 PG — LOW (ref 27–34)
MCHC RBC-ENTMCNC: 29.7 GM/DL — LOW (ref 32–36)
MCHC RBC-ENTMCNC: 29.7 GM/DL — LOW (ref 32–36)
MCV RBC AUTO: 61.2 FL — LOW (ref 80–100)
MCV RBC AUTO: 61.4 FL — LOW (ref 80–100)
MICROCYTES BLD QL: SLIGHT — SIGNIFICANT CHANGE UP
MONOCYTES # BLD AUTO: 1.05 K/UL — HIGH (ref 0–0.9)
MONOCYTES NFR BLD AUTO: 9.6 % — SIGNIFICANT CHANGE UP (ref 2–14)
NEUTROPHILS # BLD AUTO: 8.73 K/UL — HIGH (ref 1.8–7.4)
NEUTROPHILS NFR BLD AUTO: 79.8 % — HIGH (ref 43–77)
NITRITE UR-MCNC: NEGATIVE — SIGNIFICANT CHANGE UP
NRBC # BLD: 0 /100 WBCS — SIGNIFICANT CHANGE UP (ref 0–0)
NRBC # BLD: 0 /100 WBCS — SIGNIFICANT CHANGE UP (ref 0–0)
PCO2 BLDV: 35 MMHG — LOW (ref 39–42)
PCO2 BLDV: 40 MMHG — SIGNIFICANT CHANGE UP (ref 39–42)
PCP SPEC-MCNC: SIGNIFICANT CHANGE UP
PH BLDV: 7.36 — SIGNIFICANT CHANGE UP (ref 7.32–7.43)
PH BLDV: 7.4 — SIGNIFICANT CHANGE UP (ref 7.32–7.43)
PH UR: 7 — SIGNIFICANT CHANGE UP (ref 5–8)
PHOSPHATE SERPL-MCNC: 3.2 MG/DL — SIGNIFICANT CHANGE UP (ref 2.5–4.5)
PLAT MORPH BLD: ABNORMAL
PLATELET # BLD AUTO: 314 K/UL — SIGNIFICANT CHANGE UP (ref 150–400)
PLATELET # BLD AUTO: 340 K/UL — SIGNIFICANT CHANGE UP (ref 150–400)
PO2 BLDV: 48 MMHG — HIGH (ref 25–45)
PO2 BLDV: 75 MMHG — HIGH (ref 25–45)
POIKILOCYTOSIS BLD QL AUTO: SIGNIFICANT CHANGE UP
POLYCHROMASIA BLD QL SMEAR: SLIGHT — SIGNIFICANT CHANGE UP
POTASSIUM BLDV-SCNC: 3.9 MMOL/L — SIGNIFICANT CHANGE UP (ref 3.5–5.1)
POTASSIUM BLDV-SCNC: 4 MMOL/L — SIGNIFICANT CHANGE UP (ref 3.5–5.1)
POTASSIUM SERPL-MCNC: 3.1 MMOL/L — LOW (ref 3.5–5.3)
POTASSIUM SERPL-MCNC: 3.9 MMOL/L — SIGNIFICANT CHANGE UP (ref 3.5–5.3)
POTASSIUM SERPL-SCNC: 3.1 MMOL/L — LOW (ref 3.5–5.3)
POTASSIUM SERPL-SCNC: 3.9 MMOL/L — SIGNIFICANT CHANGE UP (ref 3.5–5.3)
PROT SERPL-MCNC: 6.1 G/DL — SIGNIFICANT CHANGE UP (ref 6–8.3)
PROT SERPL-MCNC: 6.2 G/DL — SIGNIFICANT CHANGE UP (ref 6–8.3)
PROT UR-MCNC: ABNORMAL
PROTHROM AB SERPL-ACNC: 12.1 SEC — SIGNIFICANT CHANGE UP (ref 10.6–13.6)
RBC # BLD: 4.9 M/UL — SIGNIFICANT CHANGE UP (ref 3.8–5.2)
RBC # BLD: 4.9 M/UL — SIGNIFICANT CHANGE UP (ref 3.8–5.2)
RBC # BLD: 5.1 M/UL — SIGNIFICANT CHANGE UP (ref 3.8–5.2)
RBC # FLD: 19.4 % — HIGH (ref 10.3–14.5)
RBC # FLD: 19.4 % — HIGH (ref 10.3–14.5)
RBC BLD AUTO: ABNORMAL
RBC CASTS # UR COMP ASSIST: 13 /HPF — HIGH (ref 0–4)
RETICS #: 53.6 K/UL — SIGNIFICANT CHANGE UP (ref 25–125)
RETICS/RBC NFR: 1.1 % — SIGNIFICANT CHANGE UP (ref 0.5–2.5)
SALICYLATES SERPL-MCNC: <2 MG/DL — LOW (ref 15–30)
SAO2 % BLDV: 77.6 % — SIGNIFICANT CHANGE UP (ref 67–88)
SAO2 % BLDV: 95.6 % — HIGH (ref 67–88)
SARS-COV-2 RNA SPEC QL NAA+PROBE: SIGNIFICANT CHANGE UP
SODIUM SERPL-SCNC: 131 MMOL/L — LOW (ref 135–145)
SODIUM SERPL-SCNC: 136 MMOL/L — SIGNIFICANT CHANGE UP (ref 135–145)
SP GR SPEC: 1.01 — SIGNIFICANT CHANGE UP (ref 1.01–1.02)
TARGETS BLD QL SMEAR: SLIGHT — SIGNIFICANT CHANGE UP
TIBC SERPL-MCNC: 341 UG/DL — SIGNIFICANT CHANGE UP (ref 220–430)
TSH SERPL-MCNC: 1.08 UIU/ML — SIGNIFICANT CHANGE UP (ref 0.27–4.2)
TSH SERPL-MCNC: 1.22 UIU/ML — SIGNIFICANT CHANGE UP (ref 0.27–4.2)
UIBC SERPL-MCNC: 325 UG/DL — SIGNIFICANT CHANGE UP (ref 110–370)
UROBILINOGEN FLD QL: NEGATIVE — SIGNIFICANT CHANGE UP
WBC # BLD: 11.81 K/UL — HIGH (ref 3.8–10.5)
WBC # BLD: 8.75 K/UL — SIGNIFICANT CHANGE UP (ref 3.8–10.5)
WBC # FLD AUTO: 11.81 K/UL — HIGH (ref 3.8–10.5)
WBC # FLD AUTO: 8.75 K/UL — SIGNIFICANT CHANGE UP (ref 3.8–10.5)
WBC UR QL: 1 /HPF — SIGNIFICANT CHANGE UP (ref 0–5)

## 2021-12-25 PROCEDURE — 99223 1ST HOSP IP/OBS HIGH 75: CPT

## 2021-12-25 PROCEDURE — 99222 1ST HOSP IP/OBS MODERATE 55: CPT | Mod: GC

## 2021-12-25 PROCEDURE — 12345: CPT | Mod: NC

## 2021-12-25 PROCEDURE — 99222 1ST HOSP IP/OBS MODERATE 55: CPT

## 2021-12-25 RX ORDER — DIPHENHYDRAMINE HCL 50 MG
25 CAPSULE ORAL ONCE
Refills: 0 | Status: COMPLETED | OUTPATIENT
Start: 2021-12-25 | End: 2021-12-25

## 2021-12-25 RX ORDER — HYDROMORPHONE HYDROCHLORIDE 2 MG/ML
0.5 INJECTION INTRAMUSCULAR; INTRAVENOUS; SUBCUTANEOUS ONCE
Refills: 0 | Status: DISCONTINUED | OUTPATIENT
Start: 2021-12-25 | End: 2021-12-25

## 2021-12-25 RX ORDER — MIRTAZAPINE 45 MG/1
7.5 TABLET, ORALLY DISINTEGRATING ORAL AT BEDTIME
Refills: 0 | Status: DISCONTINUED | OUTPATIENT
Start: 2021-12-25 | End: 2021-12-27

## 2021-12-25 RX ORDER — INSULIN LISPRO 100/ML
4 VIAL (ML) SUBCUTANEOUS
Refills: 0 | Status: DISCONTINUED | OUTPATIENT
Start: 2021-12-25 | End: 2021-12-27

## 2021-12-25 RX ORDER — METOCLOPRAMIDE HCL 10 MG
10 TABLET ORAL EVERY 8 HOURS
Refills: 0 | Status: DISCONTINUED | OUTPATIENT
Start: 2021-12-25 | End: 2021-12-27

## 2021-12-25 RX ORDER — HYDROMORPHONE HYDROCHLORIDE 2 MG/ML
0.25 INJECTION INTRAMUSCULAR; INTRAVENOUS; SUBCUTANEOUS EVERY 6 HOURS
Refills: 0 | Status: DISCONTINUED | OUTPATIENT
Start: 2021-12-25 | End: 2021-12-27

## 2021-12-25 RX ORDER — SODIUM CHLORIDE 9 MG/ML
1000 INJECTION, SOLUTION INTRAVENOUS
Refills: 0 | Status: DISCONTINUED | OUTPATIENT
Start: 2021-12-25 | End: 2021-12-27

## 2021-12-25 RX ORDER — ONDANSETRON 8 MG/1
4 TABLET, FILM COATED ORAL ONCE
Refills: 0 | Status: COMPLETED | OUTPATIENT
Start: 2021-12-25 | End: 2021-12-25

## 2021-12-25 RX ORDER — HALOPERIDOL DECANOATE 100 MG/ML
1 INJECTION INTRAMUSCULAR EVERY 6 HOURS
Refills: 0 | Status: DISCONTINUED | OUTPATIENT
Start: 2021-12-25 | End: 2021-12-27

## 2021-12-25 RX ORDER — SODIUM CHLORIDE 9 MG/ML
1000 INJECTION INTRAMUSCULAR; INTRAVENOUS; SUBCUTANEOUS
Refills: 0 | Status: DISCONTINUED | OUTPATIENT
Start: 2021-12-25 | End: 2021-12-27

## 2021-12-25 RX ORDER — GLUCAGON INJECTION, SOLUTION 0.5 MG/.1ML
1 INJECTION, SOLUTION SUBCUTANEOUS ONCE
Refills: 0 | Status: DISCONTINUED | OUTPATIENT
Start: 2021-12-25 | End: 2021-12-27

## 2021-12-25 RX ORDER — HYDROMORPHONE HYDROCHLORIDE 2 MG/ML
0.5 INJECTION INTRAMUSCULAR; INTRAVENOUS; SUBCUTANEOUS EVERY 4 HOURS
Refills: 0 | Status: DISCONTINUED | OUTPATIENT
Start: 2021-12-25 | End: 2021-12-25

## 2021-12-25 RX ORDER — INSULIN LISPRO 100/ML
VIAL (ML) SUBCUTANEOUS AT BEDTIME
Refills: 0 | Status: DISCONTINUED | OUTPATIENT
Start: 2021-12-25 | End: 2021-12-27

## 2021-12-25 RX ORDER — HYDROMORPHONE HYDROCHLORIDE 2 MG/ML
0.5 INJECTION INTRAMUSCULAR; INTRAVENOUS; SUBCUTANEOUS EVERY 6 HOURS
Refills: 0 | Status: DISCONTINUED | OUTPATIENT
Start: 2021-12-25 | End: 2021-12-26

## 2021-12-25 RX ORDER — DEXTROSE 50 % IN WATER 50 %
50 SYRINGE (ML) INTRAVENOUS ONCE
Refills: 0 | Status: COMPLETED | OUTPATIENT
Start: 2021-12-25 | End: 2021-12-25

## 2021-12-25 RX ORDER — DEXTROSE 50 % IN WATER 50 %
15 SYRINGE (ML) INTRAVENOUS ONCE
Refills: 0 | Status: DISCONTINUED | OUTPATIENT
Start: 2021-12-25 | End: 2021-12-27

## 2021-12-25 RX ORDER — NICOTINE POLACRILEX 2 MG
1 GUM BUCCAL DAILY
Refills: 0 | Status: DISCONTINUED | OUTPATIENT
Start: 2021-12-25 | End: 2021-12-27

## 2021-12-25 RX ORDER — POLYETHYLENE GLYCOL 3350 17 G/17G
17 POWDER, FOR SOLUTION ORAL
Refills: 0 | Status: DISCONTINUED | OUTPATIENT
Start: 2021-12-25 | End: 2021-12-27

## 2021-12-25 RX ORDER — INSULIN GLARGINE 100 [IU]/ML
10 INJECTION, SOLUTION SUBCUTANEOUS AT BEDTIME
Refills: 0 | Status: DISCONTINUED | OUTPATIENT
Start: 2021-12-25 | End: 2021-12-27

## 2021-12-25 RX ORDER — INSULIN GLARGINE 100 [IU]/ML
17 INJECTION, SOLUTION SUBCUTANEOUS EVERY MORNING
Refills: 0 | Status: DISCONTINUED | OUTPATIENT
Start: 2021-12-26 | End: 2021-12-27

## 2021-12-25 RX ORDER — DIPHENHYDRAMINE HCL 50 MG
50 CAPSULE ORAL EVERY 4 HOURS
Refills: 0 | Status: DISCONTINUED | OUTPATIENT
Start: 2021-12-25 | End: 2021-12-25

## 2021-12-25 RX ORDER — PANTOPRAZOLE SODIUM 20 MG/1
40 TABLET, DELAYED RELEASE ORAL DAILY
Refills: 0 | Status: DISCONTINUED | OUTPATIENT
Start: 2021-12-25 | End: 2021-12-27

## 2021-12-25 RX ORDER — DEXTROSE 50 % IN WATER 50 %
25 SYRINGE (ML) INTRAVENOUS ONCE
Refills: 0 | Status: DISCONTINUED | OUTPATIENT
Start: 2021-12-25 | End: 2021-12-27

## 2021-12-25 RX ORDER — DEXTROSE 50 % IN WATER 50 %
12.5 SYRINGE (ML) INTRAVENOUS ONCE
Refills: 0 | Status: DISCONTINUED | OUTPATIENT
Start: 2021-12-25 | End: 2021-12-27

## 2021-12-25 RX ORDER — HYDROMORPHONE HYDROCHLORIDE 2 MG/ML
0.25 INJECTION INTRAMUSCULAR; INTRAVENOUS; SUBCUTANEOUS EVERY 4 HOURS
Refills: 0 | Status: DISCONTINUED | OUTPATIENT
Start: 2021-12-25 | End: 2021-12-25

## 2021-12-25 RX ORDER — INSULIN LISPRO 100/ML
VIAL (ML) SUBCUTANEOUS
Refills: 0 | Status: DISCONTINUED | OUTPATIENT
Start: 2021-12-25 | End: 2021-12-27

## 2021-12-25 RX ORDER — SENNA PLUS 8.6 MG/1
2 TABLET ORAL AT BEDTIME
Refills: 0 | Status: DISCONTINUED | OUTPATIENT
Start: 2021-12-25 | End: 2021-12-27

## 2021-12-25 RX ORDER — HUMAN INSULIN 100 [IU]/ML
6 INJECTION, SUSPENSION SUBCUTANEOUS ONCE
Refills: 0 | Status: COMPLETED | OUTPATIENT
Start: 2021-12-25 | End: 2021-12-25

## 2021-12-25 RX ORDER — ONDANSETRON 8 MG/1
4 TABLET, FILM COATED ORAL EVERY 6 HOURS
Refills: 0 | Status: DISCONTINUED | OUTPATIENT
Start: 2021-12-25 | End: 2021-12-25

## 2021-12-25 RX ORDER — POTASSIUM CHLORIDE 20 MEQ
10 PACKET (EA) ORAL
Refills: 0 | Status: COMPLETED | OUTPATIENT
Start: 2021-12-25 | End: 2021-12-25

## 2021-12-25 RX ADMIN — HYDROMORPHONE HYDROCHLORIDE 0.5 MILLIGRAM(S): 2 INJECTION INTRAMUSCULAR; INTRAVENOUS; SUBCUTANEOUS at 01:21

## 2021-12-25 RX ADMIN — Medication 100 MILLIEQUIVALENT(S): at 00:17

## 2021-12-25 RX ADMIN — SENNA PLUS 2 TABLET(S): 8.6 TABLET ORAL at 21:46

## 2021-12-25 RX ADMIN — Medication 10 MILLIGRAM(S): at 03:54

## 2021-12-25 RX ADMIN — HYDROMORPHONE HYDROCHLORIDE 0.5 MILLIGRAM(S): 2 INJECTION INTRAMUSCULAR; INTRAVENOUS; SUBCUTANEOUS at 19:23

## 2021-12-25 RX ADMIN — ONDANSETRON 4 MILLIGRAM(S): 8 TABLET, FILM COATED ORAL at 01:10

## 2021-12-25 RX ADMIN — INSULIN GLARGINE 10 UNIT(S): 100 INJECTION, SOLUTION SUBCUTANEOUS at 04:00

## 2021-12-25 RX ADMIN — INSULIN GLARGINE 10 UNIT(S): 100 INJECTION, SOLUTION SUBCUTANEOUS at 21:46

## 2021-12-25 RX ADMIN — PANTOPRAZOLE SODIUM 40 MILLIGRAM(S): 20 TABLET, DELAYED RELEASE ORAL at 11:06

## 2021-12-25 RX ADMIN — HYDROMORPHONE HYDROCHLORIDE 0.5 MILLIGRAM(S): 2 INJECTION INTRAMUSCULAR; INTRAVENOUS; SUBCUTANEOUS at 05:04

## 2021-12-25 RX ADMIN — HYDROMORPHONE HYDROCHLORIDE 0.25 MILLIGRAM(S): 2 INJECTION INTRAMUSCULAR; INTRAVENOUS; SUBCUTANEOUS at 05:04

## 2021-12-25 RX ADMIN — HYDROMORPHONE HYDROCHLORIDE 0.5 MILLIGRAM(S): 2 INJECTION INTRAMUSCULAR; INTRAVENOUS; SUBCUTANEOUS at 18:51

## 2021-12-25 RX ADMIN — HUMAN INSULIN 6 UNIT(S): 100 INJECTION, SUSPENSION SUBCUTANEOUS at 11:03

## 2021-12-25 RX ADMIN — HYDROMORPHONE HYDROCHLORIDE 0.5 MILLIGRAM(S): 2 INJECTION INTRAMUSCULAR; INTRAVENOUS; SUBCUTANEOUS at 04:47

## 2021-12-25 RX ADMIN — HALOPERIDOL DECANOATE 1 MILLIGRAM(S): 100 INJECTION INTRAMUSCULAR at 10:25

## 2021-12-25 RX ADMIN — HYDROMORPHONE HYDROCHLORIDE 0.5 MILLIGRAM(S): 2 INJECTION INTRAMUSCULAR; INTRAVENOUS; SUBCUTANEOUS at 10:30

## 2021-12-25 RX ADMIN — HYDROMORPHONE HYDROCHLORIDE 0.25 MILLIGRAM(S): 2 INJECTION INTRAMUSCULAR; INTRAVENOUS; SUBCUTANEOUS at 03:54

## 2021-12-25 RX ADMIN — Medication 1: at 21:45

## 2021-12-25 RX ADMIN — Medication 3: at 12:45

## 2021-12-25 RX ADMIN — HYDROMORPHONE HYDROCHLORIDE 0.5 MILLIGRAM(S): 2 INJECTION INTRAMUSCULAR; INTRAVENOUS; SUBCUTANEOUS at 09:43

## 2021-12-25 RX ADMIN — Medication 1 PATCH: at 12:46

## 2021-12-25 RX ADMIN — Medication 50 MILLILITER(S): at 18:58

## 2021-12-25 RX ADMIN — Medication 1 PATCH: at 18:18

## 2021-12-25 RX ADMIN — Medication 25 MILLIGRAM(S): at 03:54

## 2021-12-25 RX ADMIN — Medication 4 UNIT(S): at 12:45

## 2021-12-25 RX ADMIN — Medication 25 MILLIGRAM(S): at 01:23

## 2021-12-25 RX ADMIN — MIRTAZAPINE 7.5 MILLIGRAM(S): 45 TABLET, ORALLY DISINTEGRATING ORAL at 21:45

## 2021-12-25 NOTE — BH CONSULTATION LIAISON ASSESSMENT NOTE - NSBHSACONSEQUENCE_PSY_A_CORE FT
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

## 2021-12-25 NOTE — BH CONSULTATION LIAISON ASSESSMENT NOTE - DIFFERENTIAL
adjustment d/o  r/o personality do   addiction  Adjustment d/o  R/o personality do   Substance use disorder

## 2021-12-25 NOTE — BH CONSULTATION LIAISON ASSESSMENT NOTE - HPI (INCLUDE ILLNESS QUALITY, SEVERITY, DURATION, TIMING, CONTEXT, MODIFYING FACTORS, ASSOCIATED SIGNS AND SYMPTOMS)
Patient is a 31F, single, recently broke up with boyfriend, domiciled alone at home, on unemployment, PPH Dilaudid addiction, history of rehab 7 yrs ago, history of suicidal gestures in hospital setting, history of NSSIB, no psych hospitalization, no SAs with intent, no outpatient psychiatric care, PMH T1DM c/b gastroparesis with recent admission at [facility]  (    -    ) who presents with acute abdominal pain x ______ with n/v. Psychiatry was called after pt wrapped cord around neck in triage area of ED, started choking herself, and shouting, "I don't want to be here!"    Patient was seen and assessed at bedside. Patient is drowsy s/p Ativan PRN, calm, cooperative with provider. Patient reports she did NOT want to die in the ED, but wrapped the cord around her neck because she knew it was going to take a long time to "get medications and be seen." States that she just wanted the pain to stop. Patient has no current SI or HI. Patient states she has never had a suicide attempt or intended to kill herself in earnest. Does not believe she needs a 1:1. No urges for self harm in the hospital. Patient does report feeling frustrated due to her chronic pain but denies depression, anxiety, pee or psychosis. Patient states she "might" have an addiction to pain medicines. Does not know if she wants help with this addiction. Patient very reluctant to have team speak with her mother although does ultimately give consent. Patient explains that she pushes her back up against the bedrail because it helps relieve her abdominal pain.     D/w 1:1 at bedside and nurse. They recount that around 10am, pt was trying to push her back up against the rail of the bed and would not stop when asked. Pt was medicated with Ativan due concern for the bedrail breaking and patient falling.     Spoke with mother to gather information needed for safety assessment: as per mother patient has a hx of addiction. Was in rehab at [facility] 7 years ago (mom reports only using marijuana and cigarettes at this time as well as hospital prescribed Dilaudid)- did well up until this year when she went to [facility] for pain r/t diabetes - they gave her Dilaudid and patient became addicted again.  States she has been in and out of the hospital this year seeking medication.  mom sent her to [facility] 4 weeks ago however no improvement noted on dc. States patient wants "to be normal" and she does not think patient wants to die.  At home she will "push herself up on the wall hard to try and stop her addiction. she may have bruising from it." Mother wants substance rehab as dc plan.  Patient is a 31F, single, recently broke up with boyfriend, domiciled alone at home, on unemployment, PPH Dilaudid addiction, history of rehab 7 yrs ago, history of suicidal gestures in hospital setting, history of NSSIB, no psych hospitalization, no SAs with intent, no outpatient psychiatric care, PMH T1DM c/b gastroparesis with multiple recent admissions to [facility] with similar presentation(last 12/21 - 12/23) who presents with acute abdominal pain n/v. Psychiatry was called after pt wrapped cord around neck in triage area of ED, started choking herself, and shouting, "I don't want to be here!"    Patient was seen and assessed at bedside. Patient is drowsy s/p Ativan PRN, calm, cooperative with provider. Patient reports she did NOT want to die in the ED, but wrapped the cord around her neck because she knew it was going to take a long time to "get medications and be seen." States that she just wanted the pain to stop. States making suicidal gestures is a way to receive attention and "be heard." Patient has no current SI or HI. Patient states she has never had a suicide attempt or intended to kill herself in earnest. Does not believe she needs a 1:1. No urges for self harm in the hospital. Patient does report feeling frustrated due to her chronic pain but denies depression, anxiety, pee or psychosis. Patient states she "might" have an addiction to pain medicines. Does not know if she wants help with this addiction. Patient very reluctant to have team speak with her mother although does ultimately give consent. Patient explains that she pushes her back up against the bedrail because it helps relieve her abdominal pain. Patient reports poor sleep in hospital and open to trying medication to help with sleep.    D/w 1:1 at bedside and nurse. They recount that around 10am, pt was trying to push her back up against the rail of the bed and would not stop when asked. Pt was medicated with Ativan due concern for the bedrail breaking and patient falling.     Spoke with mother to gather information needed for safety assessment: States patient wants "to be normal" and she does not think patient wants to die. Denies history of SA in past or NSSIB. However mother sees patient's inconsistent insulin use as form of self-harm with the goal of being hospitalized and receiving more pain medications. Patient has a hx of addiction to dilaudid for the past 8 or so years. Mother states she has been in and out of the hospital this year seeking medication (has been in around 10 hospitals in the past two weeks. Mom sent her to [facility] 4 weeks ago however no improvement noted on dc, pt did not stay the full 30 days. Mother plans to take patient straight to rehab on d/c in  with eventual plan to go to longer term rehab in [place].  Patient is a 31F, single, recently broke up with boyfriend, domiciled alone at home, on unemployment, PPH opioid dependence (Dilaudid), history of rehab 7 yrs ago, +history of suicidal gestures in hospital setting in setting of wanting pain meds, no psychiatric hospitalizations, no SAs with intent, no outpatient psychiatric care, PMH T1DM c/b gastroparesis with multiple recent admissions, recently at [facility] with similar presentation (last 12/21 - 12/23) who presents with acute abdominal pain n/v. Psychiatry was consulted to evaluate for SI after pt wrapped cord around neck in triage area of ED, started choking herself yelling, "I want to kill myself, I cannot take this pain anymore."    Patient was seen and assessed at bedside. Patient is drowsy s/p Ativan PRN, calm, cooperative with provider. Patient reports she did NOT want to die in the ED, but wrapped the cord around her neck because she knew it was going to take a long time to "get medications and be seen," states she "did it for attention."  States making suicidal gestures is a way to receive attention and "be heard." Patient has no current SI or HI. Patient states she has never had a suicide attempt or intended to kill herself in earnest. No urges for self harm in the hospital. Patient does report feeling frustrated due to her chronic pain but denies depression, anxiety, pee or psychosis. Patient states she "might" have an addiction to pain medicines. Does not know if she wants help with this addiction, but willing to consider it. Patient explains that she pushes her back up against the bedrail because it helps relieve her abdominal pain. Patient reports poor sleep in hospital and open to trying medication to help with sleep.    D/w 1:1 at bedside and nurse. They recount that around 10am, pt was trying to push her back up against the rail of the bed and would not stop when asked. Pt was medicated with Ativan due concern for the bedrail breaking and patient falling.     Spoke with mother with pt's permssion, pt provided phone number: Mother states patient wants "to be normal" and she does not think patient wants to die, not concerned for suicidality. Denies history of SA in past or NSSIB; states pt's gestures by wrapping cords are a means of getting attention and pain meds.  Suspects patient's inconsistent insulin use is a way of getting hospitalized and receiving more pain medications. Patient has a hx of addiction to dilaudid for the past 8 or so years. Mother states she has been in and out of the hospital this year seeking medication (has been in around 10 hospitals in the past two weeks. Mom sent her to [facility] rehab 4 weeks ago however no improvement noted on dc, pt did not stay the full 30 days. Mother plans to take patient straight to rehab on d/c in  with eventual plan to go to longer term rehab in [place].

## 2021-12-25 NOTE — H&P ADULT - NSHPLABSRESULTS_GEN_ALL_CORE
Personally reviewed old records.  Personally reviewed labs.  Personally reviewed imaging.                        9.8    10.94 )-----------( 308      ( 24 Dec 2021 22:53 )             33.7       12-    139  |  101  |  10  ----------------------------<  233<H>  3.3<L>   |  19<L>  |  0.83    Ca    9.2      24 Dec 2021 22:53  Phos  3.7       Mg     1.80     -    TPro  6.7  /  Alb  3.4  /  TBili  0.3  /  DBili  x   /  AST  18  /  ALT  16  /  AlkPhos  104              LIVER FUNCTIONS - ( 24 Dec 2021 22:53 )  Alb: 3.4 g/dL / Pro: 6.7 g/dL / ALK PHOS: 104 U/L / ALT: 16 U/L / AST: 18 U/L / GGT: x                 Urinalysis Basic - ( 25 Dec 2021 02:11 )    Color: Colorless / Appearance: Clear / S.014 / pH: x  Gluc: x / Ketone: Small  / Bili: Negative / Urobili: Negative   Blood: x / Protein: 30 mg/dL / Nitrite: Negative   Leuk Esterase: Negative / RBC: 13 /hpf / WBC 1 /HPF   Sq Epi: x / Non Sq Epi: 1 /hpf / Bacteria: Negative Personally reviewed old records.  Personally reviewed labs.  Personally reviewed imaging.  Personally reviewed EKG. Rate 106, . Q wave in V1-V3. No ST or TW changes.                        9.8    10.94 )-----------( 308      ( 24 Dec 2021 22:53 )             33.7       12-24    139  |  101  |  10  ----------------------------<  233<H>  3.3<L>   |  19<L>  |  0.83    Ca    9.2      24 Dec 2021 22:53  Phos  3.7     12-24  Mg     1.80     12-24    TPro  6.7  /  Alb  3.4  /  TBili  0.3  /  DBili  x   /  AST  18  /  ALT  16  /  AlkPhos  104  12-24            LIVER FUNCTIONS - ( 24 Dec 2021 22:53 )  Alb: 3.4 g/dL / Pro: 6.7 g/dL / ALK PHOS: 104 U/L / ALT: 16 U/L / AST: 18 U/L / GGT: x                 Urinalysis Basic - ( 25 Dec 2021 02:11 )    Color: Colorless / Appearance: Clear / S.014 / pH: x  Gluc: x / Ketone: Small  / Bili: Negative / Urobili: Negative   Blood: x / Protein: 30 mg/dL / Nitrite: Negative   Leuk Esterase: Negative / RBC: 13 /hpf / WBC 1 /HPF   Sq Epi: x / Non Sq Epi: 1 /hpf / Bacteria: Negative

## 2021-12-25 NOTE — BH CONSULTATION LIAISON ASSESSMENT NOTE - OTHER
varies  Lying in bed with feet toward the head of the bed Medical illness, recent breakup with boyfriend

## 2021-12-25 NOTE — BH CONSULTATION LIAISON ASSESSMENT NOTE - NSBHCHARTREVIEWVS_PSY_A_CORE FT
Vital Signs Last 24 Hrs  T(C): 37.1 (25 Dec 2021 05:09), Max: 37.1 (25 Dec 2021 04:31)  T(F): 98.8 (25 Dec 2021 05:09), Max: 98.8 (25 Dec 2021 04:31)  HR: 123 (25 Dec 2021 05:09) (85 - 123)  BP: 176/109 (25 Dec 2021 05:09) (139/87 - 177/101)  BP(mean): --  RR: 19 (25 Dec 2021 05:09) (17 - 20)  SpO2: 98% (25 Dec 2021 05:09) (98% - 100%)

## 2021-12-25 NOTE — BH CONSULTATION LIAISON ASSESSMENT NOTE - SUMMARY
Patient is a 31F w/ T1DM c/b gastroparesis who presents with acute on chronic abdominal pain x 1 month with n/v. Patient comes from home, lives alone. Patient reports no psychiatric hx including no inpt admissions or outpatient psychiatric care. Patient reports cigarette use as well as a hx of marijuana use - last used 1 month ago.  In Ed tied rope around neck. Patient reporting she did NOT want to die in the ED, but used her actions as a "cry for help". patient stated her pain was unbearable, she was not getting the help she wanted quick enough, she became tired of living with constant pain and wrapped the cord from the bed around her neck "to get attention".  patient states "I didn't tie it to the ceiling or anything, I held it in my hands. all it took was one pull from staff and it was off."  patient states if she "really wanted to die this is not the setting to do it in" as she is being watched. Patient has no current SI or HI.  She provides detail into her previous attempt at harming self at [facility] and states this was also a cry for help as she was not being cared for well, and was fighting with her doctors.  Patient adamantly states this was also not a suicide attempt. Has no attempts at self harm at home.   mother reporting a hx of addiction to Dilaudid with seeking behaviors.     PLAN  - no standing psychiatric medication at this time  - can continue with CO due to hx of as well as recent impulsive behaviors risking own safety  ( no SI or HI at this time )  - antipsychotics can only be given if qtc < 500 - please obtain EKG  - PRN for agitation: haldol 1mg q6hrs im/iv/po  - would discuss plan of care and daily treatment plans with patient to make sure patient is well informed  - primary team to determine need for / appropriate pain medication   - SW / SBIRT consult for resources if patient interested in rehab  Patient is a 31F, single, recently broke up with boyfriend, domiciled alone at home, on unemployment, PPH Dilaudid addiction, history of rehab 7 yrs ago, history of suicidal gestures in hospital setting, history of NSSIB, no psych hospitalization, no SAs with intent, no outpatient psychiatric care, PMH T1DM c/b gastroparesis with multiple recent admissions to [facility] with similar presentation(last 12/21 - 12/23) who presents with acute abdominal pain n/v. Psychiatry was called after pt wrapped cord around neck in triage area of ED, started choking herself, and shouting, "I don't want to be here!" On exam, patient is drowsy s/p Ativan PRN, calm, cooperative with provider. Patient reports she did NOT want to die in the ED, but wrapped the cord around her neck because she knew it was going to take a long time to "get medications and be seen." States that she just wanted the pain to stop. States making suicidal gestures is a way to receive attention and "be heard." Patient has no current SI or HI. Patient states she has never had a suicide attempt or intended to kill herself in earnest. Does not believe she needs a 1:1. No urges for self harm in the hospital. Patient does report feeling frustrated due to her chronic pain but denies depression, anxiety, pee or psychosis. Endorses poor sleep and will benefit from medication for sleep and mood. Per mother, patient wants "to be normal" and does not want to die. Denies history of SA in past or NSSIB. Patient has a hx of addiction to dilaudid for the past 8 or so years. Mother states she has been in and out of the hospital this year seeking medication (has been in around 10 hospitals in the past two weeks).  Patient is a 31F, single, recently broke up with boyfriend, domiciled alone at home, on unemployment, PPH Dilaudid addiction, history of rehab 7 yrs ago, history of suicidal gestures in hospital setting, no psych hospitalization, no SAs with intent, no outpatient psychiatric care, PMH T1DM c/b gastroparesis with multiple recent admissions to [facility] with similar presentation(last 12/21 - 12/23) who presents with acute abdominal pain n/v. Psychiatry was called after pt wrapped cord around neck in triage area of ED, eval for SI.  On exam, patient is drowsy s/p Ativan PRN, calm, cooperative with provider. Patient reports she did NOT want to die in the ED, but wrapped the cord around her neck because she knew it was going to take a long time to "get medications and be seen."  States making suicidal gestures is a way to receive attention and "be heard." Patient has no current SI or HI. Patient states she has never had a suicide attempt or intended to kill herself in earnest. No urges for self harm in the hospital. Patient does report feeling frustrated due to her chronic pain, anxious, but denies depression, pee or psychosis. Endorses poor sleep and will benefit from medication for sleep and anxiety. Per mother, patient wants "to be normal" and does not want to die, denies concern that pt is truly suicidal, thinks pt's gestures are a way of getting pain meds in the hospital setting. Denies history of SA in past or NSSIB. Patient has a hx of addiction to dilaudid for the past 8 or so years. Mother states she has been in and out of the hospital this year seeking medication (has been in around 10 hospitals in the past two weeks).

## 2021-12-25 NOTE — BH CONSULTATION LIAISON ASSESSMENT NOTE - NSSUICPROTFACT_PSY_ALL_CORE
Fear of death or the actual act of killing self Supportive social network of family or friends/Fear of death or the actual act of killing self/Other

## 2021-12-25 NOTE — H&P ADULT - HISTORY OF PRESENT ILLNESS
31F DM1 c/b gastroparesis, SI, "dilaudid addiction" per mother, current smoker, recent admit to [facility] (12/21-12/24) for mild DKA, abd pain, n/v, SI, pw abd pain, nausea, vomiting, suicide attempt.    Pt states she was feeling better while at [facility] until shortly before her discharge. Pt states that she started to get nausea, vomiting again. Pt's father picked her up from [facility] and brought her to [facility] for a "second opinion". Dr. Naqvi who saw pt at [facility] deferred accepting pt here at [facility]. While in the ED triage area, pt reportedly had thermometer wire wrapped around her neck.    Pt reports BM yesterday. Pt states she gets dilaudid 0.25iv q4h with benadryl IV. Dilaudid helps with the pain. Reglan helps with the nausea and vomiting. Pt reports vomiting 10 times yesterday. Pt states the last time she received long acting insulin was the evening 2 days ago. Denies fevers, chills. Denies current active SI.

## 2021-12-25 NOTE — H&P ADULT - ASSESSMENT
31F DM1 c/b gastroparesis, SI, "dilaudid addiction" per mother, current smoker, recent admit to [facility] (12/21-12/24) for mild DKA, abd pain, n/v, SI, pw abd pain, nausea, vomiting, 2/2 likely gastroparesis and suicide attempt.

## 2021-12-25 NOTE — H&P ADULT - NSHPSOCIALHISTORY_GEN_ALL_CORE
Social History:    Marital Status: (  ) , ( x ) Single, (  ) , (  ) , (  )   # of Children: 0  Lives with: ( x ) alone, (  ) children, (  ) spouse, (  ) parents, (  ) siblings, (  ) friends, (  ) other:   Occupation:     Substance Use/Illicit Drugs: marijuana use 1 month ago  Tobacco Usage: (  ) never smoked, (  ) former smoker, ( x ) current smoker and Total Pack-Years:   Last Alcohol Usage/Frequency/Amount/Withdrawal/Hx of Abuse:  none  Foreign travel:   Animal exposure:

## 2021-12-25 NOTE — BH CONSULTATION LIAISON ASSESSMENT NOTE - RISK ASSESSMENT
risk: recent self harm behavior, chronic pain  Protective: no SI or HI, no hx of serious SA, domiciled, denies recent substance use, future oriented, help seeking Risk: recent self harm behavior, chronic pain, cluster B traits  Protective: no SI or HI, no hx of serious SA, stably domiciled, denies recent substance use, future oriented, help seeking, has supportive family in area Risk: impulsivity, self harm gestures in hospital settings, chronic pain, substance abuse  Protective: no current SI or HI, no hx of serious SA, stably domiciled, denies recent substance use, future oriented, help seeking, has supportive family in area

## 2021-12-25 NOTE — BH CONSULTATION LIAISON ASSESSMENT NOTE - NSBHCHARTREVIEWINVESTIGATE_PSY_A_CORE FT
< from: 12 Lead ECG (12.25.21 @ 02:53) >      Ventricular Rate 106 BPM    Atrial Rate 106 BPM    P-R Interval 158 ms    QRS Duration 62 ms    Q-T Interval 326 ms    QTC Calculation(Bazett) 433 ms    P Axis 57 degrees    R Axis 74 degrees    T Axis 66 degrees    Diagnosis Line SINUS TACHYCARDIA  SEPTAL INFARCT , AGE UNDETERMINED  ABNORMAL ECG  WHEN COMPARED WITH ECG OF 15-APR-2015 15:22,  SIGNIFICANT CHANGES HAVE OCCURRED  Confirmed by MD CHIQUITA, ERICKA ([id number]) on 12/25/2021 3:04:25 PM    < end of copied text >

## 2021-12-25 NOTE — PROGRESS NOTE ADULT - PROBLEM SELECTOR PLAN 3
- psych c/s   - cont 1:1  - haldol prn as per ED note--> then in triage wrapped thermometer wire around her neck, choking herself screaming "I don't want to be here!  - psych c/s   - cont 1:1  - haldol 1mg q6hr prn

## 2021-12-25 NOTE — BH CONSULTATION LIAISON ASSESSMENT NOTE - CASE SUMMARY
31F single, living alone, noncaregiver, on unemployment (previously working as a ), with PPHx opioid dependence with prior rehab, no prior SA or psych admissions, h/o multiple suicidal gestures in hospital settings as a way of obtaining medical attention/pain meds, with PMH significant for DM1 c/b gastroparesis,  recently admitted to [facility] (12/21-12/24) for mild DKA, abd pain, d/c yesterday and immediately presented to [facility] c/o abd pain, nausea, vomiting, psychiatry consulted after suicidal gesture in ED by placing a cord around her neck yelling "I want to kill myself, I cannot take this pain anymore."  Of note, pt behaved in a similar manner at [facility] on 12/21 by wrapping a cord around her neck while asking for pain medication, was seen and cleared by [facility] psychiatry at [facility].  Presently pt lethargic s/p Ativan PRN given earlier today for impulsive behavior (pushing on her bed rails to ease her back pain).  Pt states she was not truly making a suicide attempt, placed the cord around her neck "for attention" and so that she could be given pain medication for her excruciating pain because she knew it would otherwise be a long wait.  States her mood has been neutral, but anxious due to having increased pain of late and multiple hospital and ER visits.  States an additional stressor was her boyfriend of 1 year breaking up with her last month.  Denies current SI/HI.  Some limited insight into opioid dependence; states she would be willing to consider substance abuse tx. Mother contacted with pt's permission who also denies concerns for suicidality, states incidents with cords are a way of pt negotiating her needs in the hospital, believes pt is addicted to Dilaudid and intends to take her straight to rehab after d/c from here.  Dx: Opioid dependence, adjustment d/o with anxiety.  Recs: Pt agreeable to start Remeron 7.5mg PO qHS to help with anxiety/sleep.  Consider enhanced observation for impulsivity.  [facility] psych will f/u, agree with resident's assessment and plan as above.

## 2021-12-25 NOTE — BH CONSULTATION LIAISON ASSESSMENT NOTE - NSBHCONSULTRECOMMENDOTHER_PSY_A_CORE FT
- Start Remeron 7.5mg QHS for sleep (may also help with nausea), hold for QTc >500  - Recommend enhanced observation for impulsivity ( no SI or HI at this time )  - SW / SBIRT consult for resources if patient interested in rehab  - Start Remeron 7.5mg QHS for anxiety/sleep (may also help with nausea), hold for QTc >500  - Recommend enhanced observation for impulsivity ( no SI or HI at this time )  - SW / SBIRT consult for resources if patient interested in rehab     melatonin 3mg PO qHS PRN insomnia

## 2021-12-25 NOTE — BH CONSULTATION LIAISON ASSESSMENT NOTE - CURRENT MEDICATION
MEDICATIONS  (STANDING):  dextrose 40% Gel 15 Gram(s) Oral once  dextrose 5%. 1000 milliLiter(s) (50 mL/Hr) IV Continuous <Continuous>  dextrose 5%. 1000 milliLiter(s) (100 mL/Hr) IV Continuous <Continuous>  dextrose 50% Injectable 25 Gram(s) IV Push once  dextrose 50% Injectable 12.5 Gram(s) IV Push once  dextrose 50% Injectable 25 Gram(s) IV Push once  glucagon  Injectable 1 milliGRAM(s) IntraMuscular once  insulin glargine Injectable (LANTUS) 10 Unit(s) SubCutaneous at bedtime  insulin lispro (ADMELOG) corrective regimen sliding scale   SubCutaneous three times a day before meals  insulin lispro (ADMELOG) corrective regimen sliding scale   SubCutaneous at bedtime  insulin lispro Injectable (ADMELOG) 4 Unit(s) SubCutaneous before breakfast  insulin lispro Injectable (ADMELOG) 4 Unit(s) SubCutaneous before lunch  insulin lispro Injectable (ADMELOG) 4 Unit(s) SubCutaneous before dinner  nicotine - 21 mG/24Hr(s) Patch 1 patch Transdermal daily  pantoprazole  Injectable 40 milliGRAM(s) IV Push daily  polyethylene glycol 3350 17 Gram(s) Oral two times a day  senna 2 Tablet(s) Oral at bedtime  sodium chloride 0.9%. 1000 milliLiter(s) (200 mL/Hr) IV Continuous <Continuous>    MEDICATIONS  (PRN):  diphenhydrAMINE 50 milliGRAM(s) Oral every 4 hours PRN Rash and/or Itching  haloperidol    Injectable 1 milliGRAM(s) IntraMuscular every 6 hours PRN agitation, combative, self-harm  HYDROmorphone  Injectable 0.5 milliGRAM(s) IV Push every 4 hours PRN Severe Pain (7 - 10)  HYDROmorphone  Injectable 0.25 milliGRAM(s) IV Push every 4 hours PRN Moderate Pain (4 - 6)  LORazepam   Injectable 2 milliGRAM(s) IV Push every 4 hours PRN Combative behavior  metoclopramide Injectable 10 milliGRAM(s) IV Push every 8 hours PRN nausea, vomiting  ondansetron Injectable 4 milliGRAM(s) IV Push every 6 hours PRN Nausea and/or Vomiting

## 2021-12-25 NOTE — PROVIDER CONTACT NOTE (OTHER) - BACKGROUND
Dx: Abdominal pain with vomiting. At ER, pt. harm to herself (self injurious behavior) noted. Pt. attempted to strangle self on thermometer wire on vital sign machine on her neck.

## 2021-12-25 NOTE — H&P ADULT - NSHPREVIEWOFSYSTEMS_GEN_ALL_CORE
REVIEW OF SYSTEMS:  CONSTITUTIONAL: No weakness. No fevers. No chills. No rigors. No weight loss. No night sweats. +poor appetite.  EYES: No blurry or double vision. No eye pain.  ENT: No hearing difficulty. No vertigo. No dysphagia. No sore throat. No Sinusitis/rhinorrhea.   NECK: No pain. No stiffness/rigidity.  CARDIAC: No chest pain. No palpitations. No lightheadedness. No syncope.  RESPIRATORY: No cough. No SOB. No hemoptysis.  GASTROINTESTINAL: +abdominal pain. +nausea. +vomiting. No hematemesis. No diarrhea. No constipation.   GENITOURINARY: No dysuria. No frequency. No hesitancy. No hematuria. No oliguria.  NEUROLOGICAL: No numbness/tingling. No focal weakness. No urinary or fecal incontinence. No headache. No unsteady gait.  BACK: No back pain. No flank pain.  EXTREMITIES: No lower extremity edema. Full ROM. No joint pain.  SKIN: No rashes. No itching. No other lesions.  PSYCHIATRIC: +depression. +SI. No HI.  ALLERGIC: No lip swelling. No hives.  All other review of systems is negative unless indicated above.  Unless indicated above, unable to assess ROS 2/2

## 2021-12-25 NOTE — BH CONSULTATION LIAISON ASSESSMENT NOTE - DESCRIPTION
Pt lives alone. Her mother is nearby but most of other family lives in [place]. Patient states her boyfriend broke up with her 1 month ago. States she likes to spend time with her friends (however mother states most of her friends are "not around" when patient is ill). Patient used to work as  prior to medical illness. Pt his history of cannabis and tobacco use. Denies other drugs, IVDU.

## 2021-12-25 NOTE — H&P ADULT - PATIENT'S SEXUAL ORIENTATION
Final time out, anesthesia agrees, patient adequately sedated, view anesthesia for vital signs, medication/fluid documentation.   Heterosexual

## 2021-12-25 NOTE — H&P ADULT - NSHPPHYSICALEXAM_GEN_ALL_CORE
PHYSICAL EXAM:   GENERAL: Alert. Not confused. +hunched over in discomfort/pain. +thin. Not cachectic. Not obese.  HEAD:  Atraumatic. Normocephalic.  EYES: EOMI. PERRLA. Normal conjunctiva/sclera.  ENT: Neck supple. No JVD. Moist oral mucosa. Not edentulous. No thrush.  LYMPH: Normal supraclavicular/cervical lymph nodes.   CARDIAC: Not tachy, Not deanna. Regular rhythm. Not irregularly irregular. S1. S2.   LUNG/CHEST: CTAB. BS equal bilaterally. No wheezes. No rales. No rhonchi.  ABDOMEN: Soft. +mild tenderness. No distension. No fluid wave. Normal bowel sounds.  BACK: No midline/vertebral tenderness. No flank tenderness.  VASCULAR: +2 b/l radial or ulnar pulses. Palpable DP pulses.  EXTREMITIES:  No clubbing. No cyanosis. No edema. Moving all 4.  NEUROLOGY: A&Ox3. Non-focal exam. Cranial nerves intact. Normal speech. Sensation intact.  PSYCH: Normal behavior. Anxious affect.  SKIN: No jaundice. No erythema. No rash/lesion.  Vascular Access:     ICU Vital Signs Last 24 Hrs  T(C): 36.8 (24 Dec 2021 23:52), Max: 36.8 (24 Dec 2021 18:10)  T(F): 98.2 (24 Dec 2021 23:52), Max: 98.2 (24 Dec 2021 18:10)  HR: 85 (24 Dec 2021 23:52) (84 - 96)  BP: 139/87 (24 Dec 2021 23:52) (137/99 - 154/98)  BP(mean): --  ABP: --  ABP(mean): --  RR: 18 (24 Dec 2021 23:52) (16 - 18)  SpO2: 98% (24 Dec 2021 23:52) (98% - 100%)      I&O's Summary

## 2021-12-25 NOTE — BH CONSULTATION LIAISON ASSESSMENT NOTE - NSBHCHARTREVIEWLAB_PSY_A_CORE FT
12-25    131<L>  |  95<L>  |  10  ----------------------------<  367<H>  3.9   |  19<L>  |  0.75    Ca    8.3<L>      25 Dec 2021 11:02  Phos  3.2     12-25  Mg     1.6     12-25    TPro  6.2  /  Alb  3.1<L>  /  TBili  0.4  /  DBili  x   /  AST  12  /  ALT  15  /  AlkPhos  101  12-25                        8.9    11.81 )-----------( 314      ( 25 Dec 2021 11:02 )             30.0

## 2021-12-25 NOTE — H&P ADULT - PROBLEM SELECTOR PLAN 2
- likely 2/2 gastroparesis  - zofran, reglan prn  - stool softeners  - f/u gi as outpt - likely 2/2 gastroparesis  - zofran, reglan prn  - stool softeners  - f/u gi as outpt  - qtc 433. replete k

## 2021-12-25 NOTE — BH CONSULTATION LIAISON ASSESSMENT NOTE - ADDITIONAL DETAILS ALL
Patient reports putting cord around her neck was to get more rapid pain management and treatment in ED

## 2021-12-26 LAB
ANION GAP SERPL CALC-SCNC: 11 MMOL/L — SIGNIFICANT CHANGE UP (ref 5–17)
BUN SERPL-MCNC: 12 MG/DL — SIGNIFICANT CHANGE UP (ref 7–23)
CALCIUM SERPL-MCNC: 9.2 MG/DL — SIGNIFICANT CHANGE UP (ref 8.4–10.5)
CHLORIDE SERPL-SCNC: 99 MMOL/L — SIGNIFICANT CHANGE UP (ref 96–108)
CO2 SERPL-SCNC: 23 MMOL/L — SIGNIFICANT CHANGE UP (ref 22–31)
CREAT SERPL-MCNC: 0.64 MG/DL — SIGNIFICANT CHANGE UP (ref 0.5–1.3)
CULTURE RESULTS: SIGNIFICANT CHANGE UP
GLUCOSE BLDC GLUCOMTR-MCNC: 123 MG/DL — HIGH (ref 70–99)
GLUCOSE BLDC GLUCOMTR-MCNC: 240 MG/DL — HIGH (ref 70–99)
GLUCOSE BLDC GLUCOMTR-MCNC: 249 MG/DL — HIGH (ref 70–99)
GLUCOSE BLDC GLUCOMTR-MCNC: 304 MG/DL — HIGH (ref 70–99)
GLUCOSE BLDC GLUCOMTR-MCNC: 94 MG/DL — SIGNIFICANT CHANGE UP (ref 70–99)
GLUCOSE SERPL-MCNC: 196 MG/DL — HIGH (ref 70–99)
HCG UR QL: NEGATIVE — SIGNIFICANT CHANGE UP
HCT VFR BLD CALC: 32.4 % — LOW (ref 34.5–45)
HGB BLD-MCNC: 9.6 G/DL — LOW (ref 11.5–15.5)
MCHC RBC-ENTMCNC: 18.1 PG — LOW (ref 27–34)
MCHC RBC-ENTMCNC: 29.6 GM/DL — LOW (ref 32–36)
MCV RBC AUTO: 61.1 FL — LOW (ref 80–100)
NRBC # BLD: 0 /100 WBCS — SIGNIFICANT CHANGE UP (ref 0–0)
PLATELET # BLD AUTO: 373 K/UL — SIGNIFICANT CHANGE UP (ref 150–400)
POTASSIUM SERPL-MCNC: 3.7 MMOL/L — SIGNIFICANT CHANGE UP (ref 3.5–5.3)
POTASSIUM SERPL-SCNC: 3.7 MMOL/L — SIGNIFICANT CHANGE UP (ref 3.5–5.3)
RBC # BLD: 5.3 M/UL — HIGH (ref 3.8–5.2)
RBC # FLD: 20 % — HIGH (ref 10.3–14.5)
SODIUM SERPL-SCNC: 133 MMOL/L — LOW (ref 135–145)
SPECIMEN SOURCE: SIGNIFICANT CHANGE UP
WBC # BLD: 7.02 K/UL — SIGNIFICANT CHANGE UP (ref 3.8–10.5)
WBC # FLD AUTO: 7.02 K/UL — SIGNIFICANT CHANGE UP (ref 3.8–10.5)

## 2021-12-26 PROCEDURE — 99232 SBSQ HOSP IP/OBS MODERATE 35: CPT

## 2021-12-26 PROCEDURE — 99231 SBSQ HOSP IP/OBS SF/LOW 25: CPT

## 2021-12-26 RX ORDER — HYDROMORPHONE HYDROCHLORIDE 2 MG/ML
0.5 INJECTION INTRAMUSCULAR; INTRAVENOUS; SUBCUTANEOUS EVERY 4 HOURS
Refills: 0 | Status: DISCONTINUED | OUTPATIENT
Start: 2021-12-26 | End: 2021-12-27

## 2021-12-26 RX ORDER — DIPHENHYDRAMINE HCL 50 MG
25 CAPSULE ORAL ONCE
Refills: 0 | Status: COMPLETED | OUTPATIENT
Start: 2021-12-26 | End: 2021-12-26

## 2021-12-26 RX ADMIN — HYDROMORPHONE HYDROCHLORIDE 0.25 MILLIGRAM(S): 2 INJECTION INTRAMUSCULAR; INTRAVENOUS; SUBCUTANEOUS at 10:34

## 2021-12-26 RX ADMIN — HALOPERIDOL DECANOATE 1 MILLIGRAM(S): 100 INJECTION INTRAMUSCULAR at 10:42

## 2021-12-26 RX ADMIN — HYDROMORPHONE HYDROCHLORIDE 0.5 MILLIGRAM(S): 2 INJECTION INTRAMUSCULAR; INTRAVENOUS; SUBCUTANEOUS at 06:34

## 2021-12-26 RX ADMIN — Medication 4 UNIT(S): at 09:34

## 2021-12-26 RX ADMIN — HYDROMORPHONE HYDROCHLORIDE 0.5 MILLIGRAM(S): 2 INJECTION INTRAMUSCULAR; INTRAVENOUS; SUBCUTANEOUS at 06:05

## 2021-12-26 RX ADMIN — Medication 25 MILLIGRAM(S): at 18:37

## 2021-12-26 RX ADMIN — HYDROMORPHONE HYDROCHLORIDE 0.5 MILLIGRAM(S): 2 INJECTION INTRAMUSCULAR; INTRAVENOUS; SUBCUTANEOUS at 16:38

## 2021-12-26 RX ADMIN — MIRTAZAPINE 7.5 MILLIGRAM(S): 45 TABLET, ORALLY DISINTEGRATING ORAL at 20:47

## 2021-12-26 RX ADMIN — HYDROMORPHONE HYDROCHLORIDE 0.5 MILLIGRAM(S): 2 INJECTION INTRAMUSCULAR; INTRAVENOUS; SUBCUTANEOUS at 21:30

## 2021-12-26 RX ADMIN — Medication 2: at 17:36

## 2021-12-26 RX ADMIN — Medication 4: at 09:34

## 2021-12-26 RX ADMIN — HYDROMORPHONE HYDROCHLORIDE 0.5 MILLIGRAM(S): 2 INJECTION INTRAMUSCULAR; INTRAVENOUS; SUBCUTANEOUS at 20:46

## 2021-12-26 RX ADMIN — Medication 10 MILLIGRAM(S): at 10:34

## 2021-12-26 RX ADMIN — HYDROMORPHONE HYDROCHLORIDE 0.25 MILLIGRAM(S): 2 INJECTION INTRAMUSCULAR; INTRAVENOUS; SUBCUTANEOUS at 11:00

## 2021-12-26 RX ADMIN — Medication 4 UNIT(S): at 12:45

## 2021-12-26 RX ADMIN — HYDROMORPHONE HYDROCHLORIDE 0.5 MILLIGRAM(S): 2 INJECTION INTRAMUSCULAR; INTRAVENOUS; SUBCUTANEOUS at 13:30

## 2021-12-26 RX ADMIN — POLYETHYLENE GLYCOL 3350 17 GRAM(S): 17 POWDER, FOR SOLUTION ORAL at 17:41

## 2021-12-26 RX ADMIN — HYDROMORPHONE HYDROCHLORIDE 0.5 MILLIGRAM(S): 2 INJECTION INTRAMUSCULAR; INTRAVENOUS; SUBCUTANEOUS at 19:02

## 2021-12-26 RX ADMIN — Medication 1 PATCH: at 09:38

## 2021-12-26 RX ADMIN — PANTOPRAZOLE SODIUM 40 MILLIGRAM(S): 20 TABLET, DELAYED RELEASE ORAL at 12:38

## 2021-12-26 RX ADMIN — HYDROMORPHONE HYDROCHLORIDE 0.5 MILLIGRAM(S): 2 INJECTION INTRAMUSCULAR; INTRAVENOUS; SUBCUTANEOUS at 00:07

## 2021-12-26 RX ADMIN — HYDROMORPHONE HYDROCHLORIDE 0.5 MILLIGRAM(S): 2 INJECTION INTRAMUSCULAR; INTRAVENOUS; SUBCUTANEOUS at 01:29

## 2021-12-26 RX ADMIN — Medication 1 PATCH: at 12:37

## 2021-12-26 RX ADMIN — INSULIN GLARGINE 10 UNIT(S): 100 INJECTION, SOLUTION SUBCUTANEOUS at 20:47

## 2021-12-26 RX ADMIN — Medication 1 PATCH: at 17:38

## 2021-12-26 RX ADMIN — Medication 4 UNIT(S): at 17:37

## 2021-12-26 RX ADMIN — HYDROMORPHONE HYDROCHLORIDE 0.5 MILLIGRAM(S): 2 INJECTION INTRAMUSCULAR; INTRAVENOUS; SUBCUTANEOUS at 12:42

## 2021-12-26 RX ADMIN — INSULIN GLARGINE 17 UNIT(S): 100 INJECTION, SOLUTION SUBCUTANEOUS at 09:30

## 2021-12-26 RX ADMIN — Medication 1 PATCH: at 12:00

## 2021-12-26 NOTE — PROGRESS NOTE ADULT - ATTENDING COMMENTS
Type 1 DM complicated by gastroparesis. Difficult to attempt tight control due to risk of hypoglycemia.
Poorly controlled DM, missed insulin doses post discharge [facility]. Agree with insulin dosages.

## 2021-12-26 NOTE — PROGRESS NOTE ADULT - PROBLEM SELECTOR PLAN 3
as per ED note--> then in triage wrapped thermometer wire around her neck, choking herself screaming "I don't want to be here!  - psych consulted, needs further assessment prior to d/c  - cont 1:1  - haldol 1mg q6hr prn

## 2021-12-27 ENCOUNTER — TRANSCRIPTION ENCOUNTER (OUTPATIENT)
Age: 31
End: 2021-12-27

## 2021-12-27 VITALS
TEMPERATURE: 98 F | SYSTOLIC BLOOD PRESSURE: 113 MMHG | HEART RATE: 96 BPM | OXYGEN SATURATION: 98 % | RESPIRATION RATE: 18 BRPM | DIASTOLIC BLOOD PRESSURE: 78 MMHG

## 2021-12-27 LAB
CRP SERPL-MCNC: 6 MG/L — HIGH (ref 0–4)
CULTURE RESULTS: SIGNIFICANT CHANGE UP
GLUCOSE BLDC GLUCOMTR-MCNC: 171 MG/DL — HIGH (ref 70–99)
GLUCOSE BLDC GLUCOMTR-MCNC: 307 MG/DL — HIGH (ref 70–99)
GLUCOSE BLDC GLUCOMTR-MCNC: 340 MG/DL — HIGH (ref 70–99)
SPECIMEN SOURCE: SIGNIFICANT CHANGE UP

## 2021-12-27 PROCEDURE — 82435 ASSAY OF BLOOD CHLORIDE: CPT

## 2021-12-27 PROCEDURE — 96372 THER/PROPH/DIAG INJ SC/IM: CPT | Mod: XU

## 2021-12-27 PROCEDURE — 96376 TX/PRO/DX INJ SAME DRUG ADON: CPT

## 2021-12-27 PROCEDURE — 99232 SBSQ HOSP IP/OBS MODERATE 35: CPT

## 2021-12-27 PROCEDURE — 83735 ASSAY OF MAGNESIUM: CPT

## 2021-12-27 PROCEDURE — 85014 HEMATOCRIT: CPT

## 2021-12-27 PROCEDURE — 84132 ASSAY OF SERUM POTASSIUM: CPT

## 2021-12-27 PROCEDURE — 82947 ASSAY GLUCOSE BLOOD QUANT: CPT

## 2021-12-27 PROCEDURE — 80307 DRUG TEST PRSMV CHEM ANLYZR: CPT

## 2021-12-27 PROCEDURE — 36415 COLL VENOUS BLD VENIPUNCTURE: CPT

## 2021-12-27 PROCEDURE — 84295 ASSAY OF SERUM SODIUM: CPT

## 2021-12-27 PROCEDURE — 82010 KETONE BODYS QUAN: CPT

## 2021-12-27 PROCEDURE — 82728 ASSAY OF FERRITIN: CPT

## 2021-12-27 PROCEDURE — 83540 ASSAY OF IRON: CPT

## 2021-12-27 PROCEDURE — 85045 AUTOMATED RETICULOCYTE COUNT: CPT

## 2021-12-27 PROCEDURE — 85025 COMPLETE CBC W/AUTO DIFF WBC: CPT

## 2021-12-27 PROCEDURE — 81025 URINE PREGNANCY TEST: CPT

## 2021-12-27 PROCEDURE — 84443 ASSAY THYROID STIM HORMONE: CPT

## 2021-12-27 PROCEDURE — 99285 EMERGENCY DEPT VISIT HI MDM: CPT

## 2021-12-27 PROCEDURE — 82330 ASSAY OF CALCIUM: CPT

## 2021-12-27 PROCEDURE — 96375 TX/PRO/DX INJ NEW DRUG ADDON: CPT

## 2021-12-27 PROCEDURE — 85027 COMPLETE CBC AUTOMATED: CPT

## 2021-12-27 PROCEDURE — 84100 ASSAY OF PHOSPHORUS: CPT

## 2021-12-27 PROCEDURE — 99233 SBSQ HOSP IP/OBS HIGH 50: CPT

## 2021-12-27 PROCEDURE — 82962 GLUCOSE BLOOD TEST: CPT

## 2021-12-27 PROCEDURE — 87086 URINE CULTURE/COLONY COUNT: CPT

## 2021-12-27 PROCEDURE — 83690 ASSAY OF LIPASE: CPT

## 2021-12-27 PROCEDURE — 87635 SARS-COV-2 COVID-19 AMP PRB: CPT

## 2021-12-27 PROCEDURE — 83550 IRON BINDING TEST: CPT

## 2021-12-27 PROCEDURE — 81001 URINALYSIS AUTO W/SCOPE: CPT

## 2021-12-27 PROCEDURE — 80048 BASIC METABOLIC PNL TOTAL CA: CPT

## 2021-12-27 PROCEDURE — 84702 CHORIONIC GONADOTROPIN TEST: CPT

## 2021-12-27 PROCEDURE — 86140 C-REACTIVE PROTEIN: CPT

## 2021-12-27 PROCEDURE — 85610 PROTHROMBIN TIME: CPT

## 2021-12-27 PROCEDURE — 80053 COMPREHEN METABOLIC PANEL: CPT

## 2021-12-27 PROCEDURE — 82803 BLOOD GASES ANY COMBINATION: CPT

## 2021-12-27 PROCEDURE — 96374 THER/PROPH/DIAG INJ IV PUSH: CPT

## 2021-12-27 PROCEDURE — 85730 THROMBOPLASTIN TIME PARTIAL: CPT

## 2021-12-27 PROCEDURE — 83605 ASSAY OF LACTIC ACID: CPT

## 2021-12-27 PROCEDURE — 82565 ASSAY OF CREATININE: CPT

## 2021-12-27 PROCEDURE — 83036 HEMOGLOBIN GLYCOSYLATED A1C: CPT

## 2021-12-27 PROCEDURE — 85018 HEMOGLOBIN: CPT

## 2021-12-27 RX ORDER — IBUPROFEN 200 MG
400 TABLET ORAL EVERY 6 HOURS
Refills: 0 | Status: DISCONTINUED | OUTPATIENT
Start: 2021-12-27 | End: 2021-12-27

## 2021-12-27 RX ORDER — METOCLOPRAMIDE HCL 10 MG
5 TABLET ORAL EVERY 8 HOURS
Refills: 0 | Status: DISCONTINUED | OUTPATIENT
Start: 2021-12-27 | End: 2021-12-27

## 2021-12-27 RX ORDER — PANTOPRAZOLE SODIUM 20 MG/1
1 TABLET, DELAYED RELEASE ORAL
Qty: 0 | Refills: 0 | DISCHARGE
Start: 2021-12-27

## 2021-12-27 RX ORDER — TRAMADOL HYDROCHLORIDE 50 MG/1
25 TABLET ORAL
Refills: 0 | Status: DISCONTINUED | OUTPATIENT
Start: 2021-12-27 | End: 2021-12-27

## 2021-12-27 RX ORDER — INSULIN GLARGINE 100 [IU]/ML
12 INJECTION, SOLUTION SUBCUTANEOUS AT BEDTIME
Refills: 0 | Status: DISCONTINUED | OUTPATIENT
Start: 2021-12-27 | End: 2021-12-27

## 2021-12-27 RX ORDER — PANTOPRAZOLE SODIUM 20 MG/1
40 TABLET, DELAYED RELEASE ORAL
Refills: 0 | Status: DISCONTINUED | OUTPATIENT
Start: 2021-12-28 | End: 2021-12-27

## 2021-12-27 RX ORDER — INSULIN LISPRO 100/ML
VIAL (ML) SUBCUTANEOUS
Refills: 0 | Status: DISCONTINUED | OUTPATIENT
Start: 2021-12-27 | End: 2021-12-27

## 2021-12-27 RX ORDER — MIRTAZAPINE 45 MG/1
1 TABLET, ORALLY DISINTEGRATING ORAL
Qty: 0 | Refills: 0 | DISCHARGE
Start: 2021-12-27

## 2021-12-27 RX ADMIN — Medication 1 PATCH: at 12:01

## 2021-12-27 RX ADMIN — Medication 4 UNIT(S): at 08:31

## 2021-12-27 RX ADMIN — HYDROMORPHONE HYDROCHLORIDE 0.5 MILLIGRAM(S): 2 INJECTION INTRAMUSCULAR; INTRAVENOUS; SUBCUTANEOUS at 10:35

## 2021-12-27 RX ADMIN — Medication 1 PATCH: at 07:34

## 2021-12-27 RX ADMIN — Medication 1: at 12:01

## 2021-12-27 RX ADMIN — HYDROMORPHONE HYDROCHLORIDE 0.5 MILLIGRAM(S): 2 INJECTION INTRAMUSCULAR; INTRAVENOUS; SUBCUTANEOUS at 09:36

## 2021-12-27 RX ADMIN — Medication 4 UNIT(S): at 12:02

## 2021-12-27 RX ADMIN — HYDROMORPHONE HYDROCHLORIDE 0.5 MILLIGRAM(S): 2 INJECTION INTRAMUSCULAR; INTRAVENOUS; SUBCUTANEOUS at 06:00

## 2021-12-27 RX ADMIN — PANTOPRAZOLE SODIUM 40 MILLIGRAM(S): 20 TABLET, DELAYED RELEASE ORAL at 12:02

## 2021-12-27 RX ADMIN — Medication 4: at 08:31

## 2021-12-27 RX ADMIN — HYDROMORPHONE HYDROCHLORIDE 0.5 MILLIGRAM(S): 2 INJECTION INTRAMUSCULAR; INTRAVENOUS; SUBCUTANEOUS at 01:05

## 2021-12-27 RX ADMIN — INSULIN GLARGINE 17 UNIT(S): 100 INJECTION, SOLUTION SUBCUTANEOUS at 08:30

## 2021-12-27 RX ADMIN — HYDROMORPHONE HYDROCHLORIDE 0.5 MILLIGRAM(S): 2 INJECTION INTRAMUSCULAR; INTRAVENOUS; SUBCUTANEOUS at 01:45

## 2021-12-27 RX ADMIN — Medication 1 PATCH: at 11:57

## 2021-12-27 RX ADMIN — HYDROMORPHONE HYDROCHLORIDE 0.5 MILLIGRAM(S): 2 INJECTION INTRAMUSCULAR; INTRAVENOUS; SUBCUTANEOUS at 05:31

## 2021-12-27 NOTE — PATIENT PROFILE ADULT - FALL HARM RISK - UNIVERSAL INTERVENTIONS
Bed in lowest position, wheels locked, appropriate side rails in place/Call bell, personal items and telephone in reach/Instruct patient to call for assistance before getting out of bed or chair/Non-slip footwear when patient is out of bed/[place] to call system/Physically safe environment - no spills, clutter or unnecessary equipment/Purposeful Proactive Rounding/Room/bathroom lighting operational, light cord in reach

## 2021-12-27 NOTE — BH CONSULTATION LIAISON PROGRESS NOTE - NSBHASSESSMENTFT_PSY_ALL_CORE
31F single, living alone, noncaregiver, on unemployment (previously working as a ), with PPHx opioid dependence with prior rehab, no prior SA or psych admissions, h/o multiple suicidal gestures in hospital settings as a way of obtaining medical attention/pain meds, with PMH significant for DM1 c/b gastroparesis,  recently admitted to [facility] (12/21-12/24) for mild DKA, abd pain, d/c yesterday and immediately presented to [facility] c/o abd pain, nausea, vomiting, psychiatry consulted after suicidal gesture in ED by placing a cord around her neck yelling "I want to kill myself, I cannot take this pain anymore."  Of note, pt behaved in a similar manner at [facility] on 12/21 by wrapping a cord around her neck while asking for pain medication, was seen and cleared by [facility] psychiatry at [facility].  Presently pt lethargic s/p Ativan PRN given earlier today for impulsive behavior (pushing on her bed rails to ease her back pain).  Pt states she was not truly making a suicide attempt, placed the cord around her neck "for attention" and so that she could be given pain medication for her excruciating pain because she knew it would otherwise be a long wait.  States her mood has been neutral, but anxious due to having increased pain of late and multiple hospital and ER visits.  States an additional stressor was her boyfriend of 1 year breaking up with her last month.  Denies current SI/HI.  Some limited insight into opioid dependence; states she would be willing to consider substance abuse tx. Mother contacted with pt's permission who also denies concerns for suicidality, states incidents with cords are a way of pt negotiating her needs in the hospital, believes pt is addicted to Dilaudid and intends to take her straight to rehab after d/c from here.  Dx: Opioid dependence, adjustment d/o with anxiety.  
Psychiatry consulted after patient made suicidal gesture in ED that she currently explains as attention seeking for pain medication. Currently calm and denies and Suicidal ideation, intent, or plan. Rachna yesterday noted with Haldol PRN use.

## 2021-12-27 NOTE — PROGRESS NOTE ADULT - PROBLEM SELECTOR PLAN 1
- likely 2/2 gastroparesis, recently seen at [facility]  by GI--> recommended glycemic control, Miralax tid, senna qhs, Reglan 5-10mg q8h prn  - hold off CT scan as patient had recent CT 12/16 showing nonspecific small bowel dilatation  could also be 2/2 to menses  - had recent pelvic US 11/2021 with no torsion patient declined TVUS at that time  - DC IV Dilaudid .5mg q4hr prn severe pain, .25mg IVP q6hr prn mod pain
- likely 2/2 gastroparesis, recently seen at [facility]  by GI--> recommended glycemic control, Miralax tid, senna qhs, Reglan 5-10mg q8h prn, and to check fecal calprotectin and CRP to r.o IBD, currently ordered  - hold off CT scan as patient had recent CT 12/16 showing nonspecific small bowel dilatation  could also be 2/2 to menses  - had recent pelvic US 11/2021 with no torsion patient declined TVUS at that time  - if pain persists can consider re ordering TVUS  - trial of PO if no n/v can DC IV Dilaudid  - IV Dilaudid .5mg q4hr prn severe pain, .25mg IVP q6hr prn mod pain
- likely 2/2 gastroparesis, recently seen at [facility]  by GI--> recommended glycemic control, Miralax tid, senna qhs, Reglan 5-10mg q8h prn, and to check fecal calprotectin and CRP to r.o IBD which I ordered  - hold off CT scan as patient had recent CT 12/16 showing nonspecific small bowel dilatation  could also be 2/2 to menses  - had recent pelvic US 11/2021 with no torsion patient declined TVUS at that time  - if pain persists can consider re ordering TVUS  - trial of PO if no n/v can DC IV Dilaudid  - IV Dilaudid .5mg q6hr prn severe pain, .25mg IVP q6hr prn mod pain

## 2021-12-27 NOTE — PROGRESS NOTE ADULT - PROBLEM SELECTOR PROBLEM 1
Generalized abdominal pain
Uncontrolled type 1 diabetes mellitus with hyperglycemia
Generalized abdominal pain
Generalized abdominal pain

## 2021-12-27 NOTE — BH CONSULTATION LIAISON PROGRESS NOTE - NSBHFUPINTERVALHXFT_PSY_A_CORE
Pt states she is feeling well today, slept well overnight and has no SE to Remeron.  States she is not anxious today, feels anxiety is better.  Denies depression, denies active or passive SI/HI or AVH/paranoia.  States she is willing to consider substance abuse tx.  Per nursing pt has been calm and cooperative, no acute events ON, no behavioral issues noted, no further self-harm gestures or statements made.
Patient reports that she is "Fine" In no apparent distress and currently no agitation noted. Mild frustration that she does not have her cell phone and requests phone. Agitation noted yesterday with Haldol PRN - nursing stated worked well. Currently denies any SI and states that she made the gesture in the ED to obtain pain medication. Patient also reports that she slept well last night after taking the Remeron.

## 2021-12-27 NOTE — DISCHARGE NOTE PROVIDER - HOSPITAL COURSE
AMA. obtained AMA form and left in chart. pt verbalized understandings of risks and consequences of her action. Dr. Camacho called psych for capacity and pt has it per psych. currently pt refused to take all medical managements and pt walked away. unable to provide new medications or psych resources.  Dr. Camacho made aware.

## 2021-12-27 NOTE — PROGRESS NOTE ADULT - PROBLEM SELECTOR PLAN 6
eventual home  pending tolerating po  psych clearance
- early ambulation
eventual home  pending tolerating po  psych clearance

## 2021-12-27 NOTE — PROGRESS NOTE ADULT - PROBLEM SELECTOR PLAN 3
as per ED note--> then in triage wrapped thermometer wire around her neck, choking herself screaming "I don't want to be here!  - psych following-- okay with regular observation. recommend c/w remeron at bedtime and haldol prn q6h

## 2021-12-27 NOTE — PROGRESS NOTE ADULT - PROBLEM SELECTOR PROBLEM 3
Suicidal behavior with attempted self-injury
HTN (hypertension)
Suicidal behavior with attempted self-injury
Suicidal behavior with attempted self-injury

## 2021-12-27 NOTE — PROGRESS NOTE ADULT - PROBLEM SELECTOR PLAN 5
- anemia labs  - outpt GI f/u for poss colonoscopy
no

## 2021-12-27 NOTE — BH CONSULTATION LIAISON PROGRESS NOTE - NSBHCHARTREVIEWLAB_PSY_A_CORE FT
9.6    7.02  )-----------( 373      ( 26 Dec 2021 06:18 )             32.4   12-26    133<L>  |  99  |  12  ----------------------------<  196<H>  3.7   |  23  |  0.64    Ca    9.2      26 Dec 2021 06:18  Phos  3.2     12-25  Mg     1.6     12-25    TPro  6.1  /  Alb  3.1<L>  /  TBili  0.4  /  DBili  x   /  AST  12  /  ALT  14  /  AlkPhos  96  12-25  
                      9.6    7.02  )-----------( 373      ( 26 Dec 2021 06:18 )             32.4     12-26    133<L>  |  99  |  12  ----------------------------<  196<H>  3.7   |  23  |  0.64    Ca    9.2      26 Dec 2021 06:18    TPro  6.1  /  Alb  3.1<L>  /  TBili  0.4  /  DBili  x   /  AST  12  /  ALT  14  /  AlkPhos  96  12-25

## 2021-12-27 NOTE — PROGRESS NOTE ADULT - ASSESSMENT
31F w/ T1DM (A1C 8.9% poorly controlled) w/ hyperglycemia  c/b gastroparesis who presents with acute on chronic abdominal pain x 1 month with n/v and SI/attempt in ED.  Endocrine at [facility] consulted for DKA.  Initially had DKA in [facility] ED, resolved s/p IVF and admelog 8 units x3 without insulin gtt. Recurrent DKA but resolved with IV fluids and admelog, lantus.  Had hypoglycemia at [facility].    Pt received 27 units lantus in past 24 hours ,would stop bedtime lantus and optimize once daily lantus dose while inpt, can resume BID regimen as outpatient per pt request     Inpatient plan:  - Increase  Lantus 20 units daily at 6am. ( at home she takes 10 units BID)   - c/w Admelog 4 units with meals TID.  Assess that patient is able to tolerate PO prior to administration. If unable to tolerate PO, hold pre-meal insulin.  - Patient is extremely brittle so tolerating slightly higher BG to avoid hypoglycemia  - Continue low dose correctional insulin qac and qhs  - FS TID AC and QHS  - Carb-Controlled diet    Discharge plan:  spoke with team pt signed out AMA this afternoon, team had pt continue home medication regimen (basal bolus)  - Patient has follow up appointments with Diabetic Educator 2/15/22 and with Dr. Bose 7/11/22.    #Gastroparesis 2/2 T1DM:  - GI follow up     #HTN:  - Outpatient goal BP <130/80. Management per primary team.    Discussed with patient and primary  team Aisha MAN   Contact via "[employer]" Teams Tues/Thurs/Friday during business hours  On evenings and weekends, please call [phone] or page endocrine fellow on call.   Please note that this patient may be followed by different provider tomorrow. If no answer, contact endocrine fellow on call [phone] M-F 9a-5pm  [employer] password: [email or web address]      Time spent on encounter: 30 minutes spent on total encounter; The necessity of the time spent during the encounter on this date of service was due to development of plan of care/coordination of care/glycemic control through review of labs, blood glucose values and vital signs. 
31F w/ T1DM c/b gastroparesis who presents with acute on chronic abdominal pain x 1 month with n/v and SI/attempt in ED.  Endocrine at [facility] consulted for DKA.    #Poorly controlled T1DM with hyperglycemia  Initially had DKA in [facility] ED, resolved s/p IVF and admelog 8 units x3 without insulin gtt. Recurrent DKA but resolved with IV fluids and admelog, lantus.  Had hypoglycemia at [facility].    A1c 8.9%.  Inpatient plan:  - Continue Lantus 17 units daily at 6am.   - c/w Admelog 4 units with meals TID.  Assess that patient is able to tolerate PO prior to administration. If unable to tolerate PO, hold pre-meal insulin.  - Patient is extremely brittle so tolerating slightly higher BG to avoid hypoglycemia  - Continue low dose correctional insulin qac and qhs  - FS TID AC and QHS  - Carb-Controlled diet    Discharge plan:  - Basal/bolus, doses to be determined  - Patient has follow up appointments with Diabetic Educator 2/15/22 and with Dr. Bose 7/11/22.    #Gastroparesis 2/2 T1DM:  - GI follow up     #HTN:  - Outpatient goal BP <130/80. Management per primary team.    Mikel Daigle DO, Endocrinology Fellow  Pager [phone] from 9am to 5pm. After hours and on weekends, please call [phone].  
31F DM1 c/b gastroparesis, SI, "dilaudid addiction" per mother, current smoker, recent admit to [facility] (12/21-12/24) for mild DKA, abd pain, n/v, SI, pw abd pain, nausea, vomiting, 2/2 likely gastroparesis and suicide attempt.
31F w/ T1DM c/b gastroparesis who presents with acute on chronic abdominal pain x 1 month with n/v and SI/attempt in ED.  Endocrine at [facility] consulted for DKA.    #Poorly controlled T1DM with hyperglycemia  Initially had DKA in [facility] ED, resolved s/p IVF and admelog 8 units x3 without insulin gtt. Recurrent DKA but resolved with IV fluids and admelog, lantus.  Had hypoglycemia at [facility].    A1c 8.9%.  Inpatient plan:  - Resume Lantus 17 units at 6am. Received 10 Lantus at 4am this morning so NPH 6 given once this morning to hold her over to tomorrow.  - c/w Admelog 4 units with meals TID.  Assess that patient is able to tolerate PO prior to administration. If unable to tolerate PO, hold pre-meal insulin.  - Low dose correctional insulin qac and qhs  - FS TID AC and QHS  - Carb-Controlled diet    Discharge plan:  - Basal/bolus, doses to be determined  - Patient has follow up appointments with Diabetic Educator 2/15/22 and with Dr. Bose 7/11/22.    #Gastroparesis 2/2 T1DM:  - GI follow up     #HTN:  - Outpatient goal BP <130/80. Management per primary team.    Mikel Daigle DO, Endocrinology Fellow  Pager [phone] from 9am to 5pm. After hours and on weekends, please call [phone].  
31F DM1 c/b gastroparesis, SI, "dilaudid addiction" per mother, current smoker, recent admit to [facility] (12/21-12/24) for mild DKA, abd pain, n/v, SI, pw abd pain, nausea, vomiting, 2/2 likely gastroparesis and suicide attempt.
31F DM1 c/b gastroparesis, SI, "dilaudid addiction" per mother, current smoker, recent admit to [facility] (12/21-12/24) for mild DKA, abd pain, n/v, SI, pw abd pain, nausea, vomiting, 2/2 likely gastroparesis and suicide attempt.

## 2021-12-27 NOTE — DISCHARGE NOTE PROVIDER - NSDCMRMEDTOKEN_GEN_ALL_CORE_FT
Admelog SoloStar 100 units/mL injectable solution: 5 unit(s) injectable 3 times a day  before each meal   Basaglar KwikPen 100 units/mL subcutaneous solution: 17 unit(s) subcutaneous once a day  famotidine 20 mg oral tablet: 1 tab(s) orally 2 times a day, as needed for acid reflux  mirtazapine 7.5 mg oral tablet: 1 tab(s) orally once a day (at bedtime)  nicotine 14 mg/24 hr transdermal film, extended release: 1 patch transdermal once a day  pantoprazole 40 mg oral delayed release tablet: 1 tab(s) orally once a day (before a meal)  senna oral tablet: 2 tab(s) orally once a day (at bedtime)  Zofran 4 mg oral tablet: 1 tab(s) orally every 8 hours, As Needed -for nausea

## 2021-12-27 NOTE — PROGRESS NOTE ADULT - PROBLEM/PLAN-4
DISPLAY PLAN FREE TEXT
Detail Level: Detailed
Quality 226: Preventive Care And Screening: Tobacco Use: Screening And Cessation Intervention: Patient screened for tobacco use and is an ex/non-smoker
Quality 110: Preventive Care And Screening: Influenza Immunization: Influenza Immunization not Administered because Patient Refused.

## 2021-12-27 NOTE — BH CONSULTATION LIAISON PROGRESS NOTE - CURRENT MEDICATION
MEDICATIONS  (STANDING):  dextrose 40% Gel 15 Gram(s) Oral once  dextrose 5%. 1000 milliLiter(s) (50 mL/Hr) IV Continuous <Continuous>  dextrose 5%. 1000 milliLiter(s) (100 mL/Hr) IV Continuous <Continuous>  dextrose 50% Injectable 25 Gram(s) IV Push once  dextrose 50% Injectable 12.5 Gram(s) IV Push once  dextrose 50% Injectable 25 Gram(s) IV Push once  glucagon  Injectable 1 milliGRAM(s) IntraMuscular once  insulin glargine Injectable (LANTUS) 17 Unit(s) SubCutaneous every morning  insulin glargine Injectable (LANTUS) 10 Unit(s) SubCutaneous at bedtime  insulin lispro (ADMELOG) corrective regimen sliding scale   SubCutaneous three times a day before meals  insulin lispro (ADMELOG) corrective regimen sliding scale   SubCutaneous at bedtime  insulin lispro Injectable (ADMELOG) 4 Unit(s) SubCutaneous before breakfast  insulin lispro Injectable (ADMELOG) 4 Unit(s) SubCutaneous before lunch  insulin lispro Injectable (ADMELOG) 4 Unit(s) SubCutaneous before dinner  mirtazapine 7.5 milliGRAM(s) Oral at bedtime  nicotine - 21 mG/24Hr(s) Patch 1 patch Transdermal daily  pantoprazole  Injectable 40 milliGRAM(s) IV Push daily  polyethylene glycol 3350 17 Gram(s) Oral two times a day  senna 2 Tablet(s) Oral at bedtime  sodium chloride 0.9%. 1000 milliLiter(s) (200 mL/Hr) IV Continuous <Continuous>    MEDICATIONS  (PRN):  haloperidol    Injectable 1 milliGRAM(s) IntraMuscular every 6 hours PRN agitation, combative, self-harm  HYDROmorphone  Injectable 0.25 milliGRAM(s) IV Push every 6 hours PRN Moderate Pain (4 - 6)  HYDROmorphone  Injectable 0.5 milliGRAM(s) IV Push every 6 hours PRN Severe Pain (7 - 10)  LORazepam   Injectable 2 milliGRAM(s) IV Push every 4 hours PRN Combative behavior  metoclopramide Injectable 10 milliGRAM(s) IV Push every 8 hours PRN nausea, vomiting  
MEDICATIONS  (STANDING):  dextrose 40% Gel 15 Gram(s) Oral once  dextrose 5%. 1000 milliLiter(s) (50 mL/Hr) IV Continuous <Continuous>  dextrose 5%. 1000 milliLiter(s) (100 mL/Hr) IV Continuous <Continuous>  dextrose 50% Injectable 25 Gram(s) IV Push once  dextrose 50% Injectable 12.5 Gram(s) IV Push once  dextrose 50% Injectable 25 Gram(s) IV Push once  glucagon  Injectable 1 milliGRAM(s) IntraMuscular once  insulin glargine Injectable (LANTUS) 17 Unit(s) SubCutaneous every morning  insulin glargine Injectable (LANTUS) 12 Unit(s) SubCutaneous at bedtime  insulin lispro (ADMELOG) corrective regimen sliding scale   SubCutaneous <User Schedule>  insulin lispro (ADMELOG) corrective regimen sliding scale   SubCutaneous three times a day before meals  insulin lispro Injectable (ADMELOG) 4 Unit(s) SubCutaneous before breakfast  insulin lispro Injectable (ADMELOG) 4 Unit(s) SubCutaneous before lunch  insulin lispro Injectable (ADMELOG) 4 Unit(s) SubCutaneous before dinner  mirtazapine 7.5 milliGRAM(s) Oral at bedtime  nicotine - 21 mG/24Hr(s) Patch 1 patch Transdermal daily  pantoprazole  Injectable 40 milliGRAM(s) IV Push daily  polyethylene glycol 3350 17 Gram(s) Oral two times a day  senna 2 Tablet(s) Oral at bedtime  sodium chloride 0.9%. 1000 milliLiter(s) (200 mL/Hr) IV Continuous <Continuous>    MEDICATIONS  (PRN):  haloperidol    Injectable 1 milliGRAM(s) IntraMuscular every 6 hours PRN agitation, combative, self-harm  HYDROmorphone  Injectable 0.5 milliGRAM(s) IV Push every 4 hours PRN Severe Pain (7 - 10)  HYDROmorphone  Injectable 0.25 milliGRAM(s) IV Push every 6 hours PRN Moderate Pain (4 - 6)  LORazepam   Injectable 2 milliGRAM(s) IV Push every 4 hours PRN Combative behavior  metoclopramide Injectable 10 milliGRAM(s) IV Push every 8 hours PRN nausea, vomiting

## 2021-12-27 NOTE — PROGRESS NOTE ADULT - PROBLEM SELECTOR PROBLEM 4
Type 1 diabetes mellitus with other manifestations

## 2021-12-27 NOTE — PROGRESS NOTE ADULT - SUBJECTIVE AND OBJECTIVE BOX
Chief Complaint: T1DM    History: Ate breakfast but vomited after. Ate lunch without vomiting.    MEDICATIONS  (STANDING):  dextrose 40% Gel 15 Gram(s) Oral once  dextrose 5%. 1000 milliLiter(s) (50 mL/Hr) IV Continuous <Continuous>  dextrose 5%. 1000 milliLiter(s) (100 mL/Hr) IV Continuous <Continuous>  dextrose 50% Injectable 25 Gram(s) IV Push once  dextrose 50% Injectable 12.5 Gram(s) IV Push once  dextrose 50% Injectable 25 Gram(s) IV Push once  glucagon  Injectable 1 milliGRAM(s) IntraMuscular once  insulin glargine Injectable (LANTUS) 17 Unit(s) SubCutaneous every morning  insulin glargine Injectable (LANTUS) 10 Unit(s) SubCutaneous at bedtime  insulin lispro (ADMELOG) corrective regimen sliding scale   SubCutaneous three times a day before meals  insulin lispro (ADMELOG) corrective regimen sliding scale   SubCutaneous at bedtime  insulin lispro Injectable (ADMELOG) 4 Unit(s) SubCutaneous before breakfast  insulin lispro Injectable (ADMELOG) 4 Unit(s) SubCutaneous before lunch  insulin lispro Injectable (ADMELOG) 4 Unit(s) SubCutaneous before dinner  mirtazapine 7.5 milliGRAM(s) Oral at bedtime  nicotine - 21 mG/24Hr(s) Patch 1 patch Transdermal daily  pantoprazole  Injectable 40 milliGRAM(s) IV Push daily  polyethylene glycol 3350 17 Gram(s) Oral two times a day  senna 2 Tablet(s) Oral at bedtime  sodium chloride 0.9%. 1000 milliLiter(s) (200 mL/Hr) IV Continuous <Continuous>    MEDICATIONS  (PRN):  haloperidol    Injectable 1 milliGRAM(s) IntraMuscular every 6 hours PRN agitation, combative, self-harm  HYDROmorphone  Injectable 0.5 milliGRAM(s) IV Push every 4 hours PRN Severe Pain (7 - 10)  HYDROmorphone  Injectable 0.25 milliGRAM(s) IV Push every 6 hours PRN Moderate Pain (4 - 6)  LORazepam   Injectable 2 milliGRAM(s) IV Push every 4 hours PRN Combative behavior  metoclopramide Injectable 10 milliGRAM(s) IV Push every 8 hours PRN nausea, vomiting      PHYSICAL EXAM:  VITALS: T(C): 36.7 (12-26-21 @ 04:55)  T(F): 98 (12-26-21 @ 04:55), Max: 98.1 (12-25-21 @ 21:32)  HR: 105 (12-26-21 @ 06:05) (87 - 105)  BP: 126/94 (12-26-21 @ 06:05) (126/94 - 167/95)  RR:  (18 - 19)  SpO2:  (97% - 100%)  Wt(kg): --  General: Well-developed female, No acute distress, Speaking full sentences.   Eye:  Extraocular movements are intact, No proptosis or lid lag.   Respiratory:  Respirations are non-labored, Symmetric chest wall expansion.  Cardiovascular:  Normal rate, Regular rhythm, No edema.  Gastrointestinal:  Soft, Non-tender, Non-distended.   Integumentary:  Warm, dry.    POCT Blood Glucose.: 240 mg/dL (12-26-21 @ 17:05)  POCT Blood Glucose.: 94 mg/dL (12-26-21 @ 12:30)  POCT Blood Glucose.: 304 mg/dL (12-26-21 @ 09:12)  POCT Blood Glucose.: 249 mg/dL (12-26-21 @ 00:03)  POCT Blood Glucose.: 272 mg/dL (12-25-21 @ 21:21)  POCT Blood Glucose.: 182 mg/dL (12-25-21 @ 18:50)  POCT Blood Glucose.: 43 mg/dL (12-25-21 @ 18:26)  POCT Blood Glucose.: 37 mg/dL (12-25-21 @ 18:25)  POCT Blood Glucose.: 280 mg/dL (12-25-21 @ 12:18)  POCT Blood Glucose.: 324 mg/dL (12-25-21 @ 09:48)  POCT Blood Glucose.: 206 mg/dL (12-24-21 @ 22:17)  POCT Blood Glucose.: 170 mg/dL (12-24-21 @ 16:38)  POCT Blood Glucose.: 168 mg/dL (12-24-21 @ 11:38)  POCT Blood Glucose.: 196 mg/dL (12-24-21 @ 09:01)  POCT Blood Glucose.: 174 mg/dL (12-24-21 @ 03:11)  POCT Blood Glucose.: 99 mg/dL (12-24-21 @ 02:55)  POCT Blood Glucose.: 70 mg/dL (12-24-21 @ 02:31)  POCT Blood Glucose.: 99 mg/dL (12-23-21 @ 22:19)  POCT Blood Glucose.: 262 mg/dL (12-23-21 @ 18:15)      12-26    133<L>  |  99  |  12  ----------------------------<  196<H>  3.7   |  23  |  0.64    EGFR if : 138  EGFR if non : 119    Ca    9.2      12-26  Mg     1.6     12-25  Phos  3.2     12-25    TPro  6.1  /  Alb  3.1<L>  /  TBili  0.4  /  DBili  x   /  AST  12  /  ALT  14  /  AlkPhos  96  12-25          Thyroid Function Tests:  12-25 @ 12:43 TSH 1.08 FreeT4 -- T3 -- Anti TPO -- Anti Thyroglobulin Ab -- TSI --  12-25 @ 00:27 TSH 1.22 FreeT4 -- T3 -- Anti TPO -- Anti Thyroglobulin Ab -- TSI --                    
DIABETES FOLLOW UP : Seen earlier today    INTERVAL HX: pt reports nausea improved today, tolerating meals, no hypoglycemia, noted Fasting hyperglycemia to 300s past two days .there is no pattern available for prandial dose adjustment ., follows w/ her op gi for gastroparesis never had a gastric emptying study but has had endoscopy in past . at home she was taking lantus in the afternoon and at night bc she noticed when she was taking it once a day her BG would run higher during the day, counseled pt to take lantus in the morning and at night instead of afternoon and night to prevent hypoglycemia, also would like to optimize dose qHS pt prefers to keep BID dosing  . Noted pt received total of 27 units lantus since last night .         Review of Systems:  General: As above  Cardiovascular: No chest pain, palpitations  Respiratory: No SOB, no cough  GI: No nausea, vomiting, abdominal pain  Endocrine: no polyuria, polydipsia or S&Sx of hypoglycemia    Allergies    No Known Allergies    Intolerances      MEDICATIONS  (STANDING):  dextrose 40% Gel 15 Gram(s) Oral once  dextrose 5%. 1000 milliLiter(s) (50 mL/Hr) IV Continuous <Continuous>  dextrose 5%. 1000 milliLiter(s) (100 mL/Hr) IV Continuous <Continuous>  dextrose 50% Injectable 25 Gram(s) IV Push once  dextrose 50% Injectable 12.5 Gram(s) IV Push once  dextrose 50% Injectable 25 Gram(s) IV Push once  glucagon  Injectable 1 milliGRAM(s) IntraMuscular once  insulin glargine Injectable (LANTUS) 12 Unit(s) SubCutaneous at bedtime  insulin glargine Injectable (LANTUS) 17 Unit(s) SubCutaneous every morning  insulin lispro (ADMELOG) corrective regimen sliding scale   SubCutaneous three times a day before meals  insulin lispro (ADMELOG) corrective regimen sliding scale   SubCutaneous <User Schedule>  insulin lispro Injectable (ADMELOG) 4 Unit(s) SubCutaneous before breakfast  insulin lispro Injectable (ADMELOG) 4 Unit(s) SubCutaneous before lunch  insulin lispro Injectable (ADMELOG) 4 Unit(s) SubCutaneous before dinner  mirtazapine 7.5 milliGRAM(s) Oral at bedtime  nicotine - 21 mG/24Hr(s) Patch 1 patch Transdermal daily  polyethylene glycol 3350 17 Gram(s) Oral two times a day  senna 2 Tablet(s) Oral at bedtime  sodium chloride 0.9%. 1000 milliLiter(s) (200 mL/Hr) IV Continuous <Continuous>      PHYSICAL EXAM:  VITALS: T(C): 36.8 (12-27-21 @ 12:30)  T(F): 98.3 (12-27-21 @ 12:30), Max: 98.3 (12-27-21 @ 01:02)  HR: 96 (12-27-21 @ 12:30) (82 - 96)  BP: 113/78 (12-27-21 @ 12:30) (113/78 - 128/77)  RR:  (18 - 18)  SpO2:  (98% - 99%)  Wt(kg): --  GENERAL: female laying in bed in NAD, nicotine patch right arm   Abdomen: Soft, nontender, non distended  Extremities: Warm, no edema in all 4 exts  NEURO: A&O X3 calm     LABS:  POCT Blood Glucose.: 171 mg/dL (12-27-21 @ 11:56)  POCT Blood Glucose.: 340 mg/dL (12-27-21 @ 08:26)  POCT Blood Glucose.: 123 mg/dL (12-26-21 @ 20:42)  POCT Blood Glucose.: 240 mg/dL (12-26-21 @ 17:05)  POCT Blood Glucose.: 94 mg/dL (12-26-21 @ 12:30)  POCT Blood Glucose.: 304 mg/dL (12-26-21 @ 09:12)  POCT Blood Glucose.: 249 mg/dL (12-26-21 @ 00:03)  POCT Blood Glucose.: 272 mg/dL (12-25-21 @ 21:21)  POCT Blood Glucose.: 182 mg/dL (12-25-21 @ 18:50)  POCT Blood Glucose.: 43 mg/dL (12-25-21 @ 18:26)  POCT Blood Glucose.: 37 mg/dL (12-25-21 @ 18:25)  POCT Blood Glucose.: 280 mg/dL (12-25-21 @ 12:18)  POCT Blood Glucose.: 324 mg/dL (12-25-21 @ 09:48)  POCT Blood Glucose.: 307 mg/dL (12-25-21 @ 03:33)  POCT Blood Glucose.: 206 mg/dL (12-24-21 @ 22:17)                            9.6    7.02  )-----------( 373      ( 26 Dec 2021 06:18 )             32.4       12-26    133<L>  |  99  |  12  ----------------------------<  196<H>  3.7   |  23  |  0.64    EGFR if : 138  EGFR if non : 119    Ca    9.2      12-26  Mg     1.6     12-25  Phos  3.2     12-25    TPro  6.1  /  Alb  3.1<L>  /  TBili  0.4  /  DBili  x   /  AST  12  /  ALT  14  /  AlkPhos  96  12-25          Thyroid Function Tests:  12-25 @ 12:43 TSH 1.08 FreeT4 -- T3 -- Anti TPO -- Anti Thyroglobulin Ab -- TSI --  12-25 @ 00:27 TSH 1.22 FreeT4 -- T3 -- Anti TPO -- Anti Thyroglobulin Ab -- TSI --          A1C with Estimated Average Glucose Result: 8.9 % (12-25-21 @ 12:41)  A1C with Estimated Average Glucose Result: 9.6 % (10-28-21 @ 09:17)      Estimated Average Glucose: 209 mg/dL (12-25-21 @ 12:41)  Estimated Average Glucose: 229 mg/dL (10-28-21 @ 09:17)                        
Patient denies suicidal thoughts/ plans. She remarks that her nausea has improved.     GENERAL: No fevers, no chills.  EYES: No blurry vision,  No photophobia  ENT: No sore throat.  No dysphagia  Cardiovascular: No chest pain, palpitations, orthopnea  Pulmonary: No cough, no wheezing. No shortness of breath  Gastrointestinal: No abdominal pain, no diarrhea, no constipation.   Musculoskeletal: No weakness.  No myalgias.  Dermatology:  No rashes.  Neuro: No Headache.  No vertigo.  No dizziness.  Psych: No anxiety, no depression.  Denies suicidal thoughts.    MEDICATIONS  (STANDING):  dextrose 40% Gel 15 Gram(s) Oral once  dextrose 5%. 1000 milliLiter(s) (50 mL/Hr) IV Continuous <Continuous>  dextrose 5%. 1000 milliLiter(s) (100 mL/Hr) IV Continuous <Continuous>  dextrose 50% Injectable 25 Gram(s) IV Push once  dextrose 50% Injectable 12.5 Gram(s) IV Push once  dextrose 50% Injectable 25 Gram(s) IV Push once  glucagon  Injectable 1 milliGRAM(s) IntraMuscular once  insulin glargine Injectable (LANTUS) 17 Unit(s) SubCutaneous every morning  insulin glargine Injectable (LANTUS) 12 Unit(s) SubCutaneous at bedtime  insulin lispro (ADMELOG) corrective regimen sliding scale   SubCutaneous <User Schedule>  insulin lispro (ADMELOG) corrective regimen sliding scale   SubCutaneous three times a day before meals  insulin lispro Injectable (ADMELOG) 4 Unit(s) SubCutaneous before breakfast  insulin lispro Injectable (ADMELOG) 4 Unit(s) SubCutaneous before lunch  insulin lispro Injectable (ADMELOG) 4 Unit(s) SubCutaneous before dinner  mirtazapine 7.5 milliGRAM(s) Oral at bedtime  nicotine - 21 mG/24Hr(s) Patch 1 patch Transdermal daily  polyethylene glycol 3350 17 Gram(s) Oral two times a day  senna 2 Tablet(s) Oral at bedtime  sodium chloride 0.9%. 1000 milliLiter(s) (200 mL/Hr) IV Continuous <Continuous>    MEDICATIONS  (PRN):  haloperidol    Injectable 1 milliGRAM(s) IntraMuscular every 6 hours PRN agitation, combative, self-harm    Vital Signs Last 24 Hrs  T(C): 36.8 (27 Dec 2021 12:30), Max: 36.8 (27 Dec 2021 01:02)  T(F): 98.3 (27 Dec 2021 12:30), Max: 98.3 (27 Dec 2021 01:02)  HR: 96 (27 Dec 2021 12:30) (82 - 96)  BP: 113/78 (27 Dec 2021 12:30) (113/78 - 128/77)  BP(mean): --  RR: 18 (27 Dec 2021 12:30) (18 - 18)  SpO2: 98% (27 Dec 2021 12:30) (98% - 99%)    PHYSICAL EXAM:  GENERAL: Alert. +thin.   CARDIAC:  S1 S2 Regular rhythm no murmurs/rubs or gallops  LUNG/CHEST: CTA b/l .  No wheezes. No rales. No rhonchi.  ABDOMEN: Soft.  nontender. No distension. No fluid wave. Normal bowel sounds.  VASCULAR: +2 b/l radial or ulnar pulses. Palpable DP pulses.  EXTREMITIES:  No clubbing. No cyanosis. No edema. Moving all 4.  NEUROLOGY: A&Ox3. Non-focal exam. Cranial nerves intact. Normal speech. Sensation intact.    .  LABS:                         9.6    7.02  )-----------( 373      ( 26 Dec 2021 06:18 )             32.4     12-26    133<L>  |  99  |  12  ----------------------------<  196<H>  3.7   |  23  |  0.64    Ca    9.2      26 Dec 2021 06:18    TPro  6.1  /  Alb  3.1<L>  /  TBili  0.4  /  DBili  x   /  AST  12  /  ALT  14  /  AlkPhos  96  12-25              RADIOLOGY, EKG & ADDITIONAL TESTS: Reviewed. 
    Chief Complaint: T1DM    History: Pt returned to the hospital with BG in the 400s. States she forgot to take her insulin. Was just discharged from [facility] yesterday. Please see consult and progress notes from the past several days from [facility] for full history. Patient denies N/V at this time, states she is eating "a little bit."    MEDICATIONS  (STANDING):  dextrose 40% Gel 15 Gram(s) Oral once  dextrose 5%. 1000 milliLiter(s) (50 mL/Hr) IV Continuous <Continuous>  dextrose 5%. 1000 milliLiter(s) (100 mL/Hr) IV Continuous <Continuous>  dextrose 50% Injectable 25 Gram(s) IV Push once  dextrose 50% Injectable 12.5 Gram(s) IV Push once  dextrose 50% Injectable 25 Gram(s) IV Push once  glucagon  Injectable 1 milliGRAM(s) IntraMuscular once  insulin glargine Injectable (LANTUS) 10 Unit(s) SubCutaneous at bedtime  insulin lispro (ADMELOG) corrective regimen sliding scale   SubCutaneous three times a day before meals  insulin lispro (ADMELOG) corrective regimen sliding scale   SubCutaneous at bedtime  insulin lispro Injectable (ADMELOG) 4 Unit(s) SubCutaneous before breakfast  insulin lispro Injectable (ADMELOG) 4 Unit(s) SubCutaneous before lunch  insulin lispro Injectable (ADMELOG) 4 Unit(s) SubCutaneous before dinner  mirtazapine 7.5 milliGRAM(s) Oral at bedtime  nicotine - 21 mG/24Hr(s) Patch 1 patch Transdermal daily  pantoprazole  Injectable 40 milliGRAM(s) IV Push daily  polyethylene glycol 3350 17 Gram(s) Oral two times a day  senna 2 Tablet(s) Oral at bedtime  sodium chloride 0.9%. 1000 milliLiter(s) (200 mL/Hr) IV Continuous <Continuous>    MEDICATIONS  (PRN):  haloperidol    Injectable 1 milliGRAM(s) IntraMuscular every 6 hours PRN agitation, combative, self-harm  HYDROmorphone  Injectable 0.25 milliGRAM(s) IV Push every 6 hours PRN Moderate Pain (4 - 6)  HYDROmorphone  Injectable 0.5 milliGRAM(s) IV Push every 6 hours PRN Severe Pain (7 - 10)  LORazepam   Injectable 2 milliGRAM(s) IV Push every 4 hours PRN Combative behavior  metoclopramide Injectable 10 milliGRAM(s) IV Push every 8 hours PRN nausea, vomiting      PHYSICAL EXAM:  VITALS: T(C): 36.8 (12-25-21 @ 12:24)  T(F): 98.2 (12-25-21 @ 12:24), Max: 98.8 (12-25-21 @ 04:31)  HR: 88 (12-25-21 @ 12:24) (85 - 123)  BP: 123/80 (12-25-21 @ 12:24) (123/80 - 177/101)  RR:  (17 - 20)  SpO2:  (98% - 100%)  Wt(kg): --  General: Well-developed female, No acute distress, Speaking full sentences.   Eye:  Extraocular movements are intact, No proptosis or lid lag.   Respiratory:  Respirations are non-labored, Symmetric chest wall expansion.  Cardiovascular:  Normal rate, Regular rhythm, No edema.  Gastrointestinal:  Soft, Non-tender, Non-distended.   Integumentary:  Warm, dry.    POCT Blood Glucose.: 280 mg/dL (12-25-21 @ 12:18)  POCT Blood Glucose.: 324 mg/dL (12-25-21 @ 09:48)  POCT Blood Glucose.: 206 mg/dL (12-24-21 @ 22:17)  POCT Blood Glucose.: 170 mg/dL (12-24-21 @ 16:38)  POCT Blood Glucose.: 168 mg/dL (12-24-21 @ 11:38)  POCT Blood Glucose.: 196 mg/dL (12-24-21 @ 09:01)  POCT Blood Glucose.: 174 mg/dL (12-24-21 @ 03:11)  POCT Blood Glucose.: 99 mg/dL (12-24-21 @ 02:55)  POCT Blood Glucose.: 70 mg/dL (12-24-21 @ 02:31)  POCT Blood Glucose.: 99 mg/dL (12-23-21 @ 22:19)  POCT Blood Glucose.: 262 mg/dL (12-23-21 @ 18:15)  POCT Blood Glucose.: 157 mg/dL (12-23-21 @ 16:31)  POCT Blood Glucose.: 160 mg/dL (12-23-21 @ 13:22)  POCT Blood Glucose.: 137 mg/dL (12-23-21 @ 11:34)  POCT Blood Glucose.: 170 mg/dL (12-23-21 @ 08:41)  POCT Blood Glucose.: 91 mg/dL (12-23-21 @ 06:50)  POCT Blood Glucose.: 98 mg/dL (12-23-21 @ 06:19)  POCT Blood Glucose.: 119 mg/dL (12-23-21 @ 05:58)  POCT Blood Glucose.: 54 mg/dL (12-23-21 @ 05:30)  POCT Blood Glucose.: 52 mg/dL (12-23-21 @ 05:20)  POCT Blood Glucose.: 109 mg/dL (12-22-21 @ 22:00)  POCT Blood Glucose.: 258 mg/dL (12-22-21 @ 17:01)      12-25    131<L>  |  95<L>  |  10  ----------------------------<  367<H>  3.9   |  19<L>  |  0.75    EGFR if : 123  EGFR if non : 106    Ca    8.3<L>      12-25  Mg     1.6     12-25  Phos  3.2     12-25    TPro  6.2  /  Alb  3.1<L>  /  TBili  0.4  /  DBili  x   /  AST  12  /  ALT  15  /  AlkPhos  101  12-25          Thyroid Function Tests:  12-25 @ 12:43 TSH 1.08 FreeT4 -- T3 -- Anti TPO -- Anti Thyroglobulin Ab -- TSI --  12-25 @ 00:27 TSH 1.22 FreeT4 -- T3 -- Anti TPO -- Anti Thyroglobulin Ab -- TSI --                    
[facility] Division of Hospital Medicine  Victoriano Light DO  Pager (RAUL-F, 8H-8L): [phone]  Other Times:  [phone]    Patient is a 31y old  Female who presents with a chief complaint of abd pain, n/v (25 Dec 2021 03:17)      SUBJECTIVE / OVERNIGHT EVENTS:    patient states she has been vomiting.  also states she is on her menses day .  periods are normal  and both her fallopian tubes have been removed due to ectopic pregnancies     MEDICATIONS  (STANDING):  dextrose 40% Gel 15 Gram(s) Oral once  dextrose 5%. 1000 milliLiter(s) (50 mL/Hr) IV Continuous <Continuous>  dextrose 5%. 1000 milliLiter(s) (100 mL/Hr) IV Continuous <Continuous>  dextrose 50% Injectable 25 Gram(s) IV Push once  dextrose 50% Injectable 12.5 Gram(s) IV Push once  dextrose 50% Injectable 25 Gram(s) IV Push once  glucagon  Injectable 1 milliGRAM(s) IntraMuscular once  insulin glargine Injectable (LANTUS) 10 Unit(s) SubCutaneous at bedtime  insulin lispro (ADMELOG) corrective regimen sliding scale   SubCutaneous three times a day before meals  insulin lispro (ADMELOG) corrective regimen sliding scale   SubCutaneous at bedtime  insulin lispro Injectable (ADMELOG) 4 Unit(s) SubCutaneous before breakfast  insulin lispro Injectable (ADMELOG) 4 Unit(s) SubCutaneous before lunch  insulin lispro Injectable (ADMELOG) 4 Unit(s) SubCutaneous before dinner  nicotine - 21 mG/24Hr(s) Patch 1 patch Transdermal daily  pantoprazole  Injectable 40 milliGRAM(s) IV Push daily  polyethylene glycol 3350 17 Gram(s) Oral two times a day  senna 2 Tablet(s) Oral at bedtime  sodium chloride 0.9%. 1000 milliLiter(s) (200 mL/Hr) IV Continuous <Continuous>    MEDICATIONS  (PRN):  diphenhydrAMINE 50 milliGRAM(s) Oral every 4 hours PRN Rash and/or Itching  haloperidol    Injectable 1 milliGRAM(s) IntraMuscular every 6 hours PRN agitation, combative, self-harm  HYDROmorphone  Injectable 0.5 milliGRAM(s) IV Push every 4 hours PRN Severe Pain (7 - 10)  HYDROmorphone  Injectable 0.25 milliGRAM(s) IV Push every 4 hours PRN Moderate Pain (4 - 6)  LORazepam   Injectable 2 milliGRAM(s) IV Push every 4 hours PRN Combative behavior  metoclopramide Injectable 10 milliGRAM(s) IV Push every 8 hours PRN nausea, vomiting  ondansetron Injectable 4 milliGRAM(s) IV Push every 6 hours PRN Nausea and/or Vomiting      CAPILLARY BLOOD GLUCOSE      POCT Blood Glucose.: 280 mg/dL (25 Dec 2021 12:18)  POCT Blood Glucose.: 324 mg/dL (25 Dec 2021 09:48)  POCT Blood Glucose.: 206 mg/dL (24 Dec 2021 22:17)  POCT Blood Glucose.: 170 mg/dL (24 Dec 2021 16:38)    I&O's Summary    24 Dec 2021 07:01  -  25 Dec 2021 07:00  --------------------------------------------------------  IN: 280 mL / OUT: 0 mL / NET: 280 mL        PHYSICAL EXAM:  Vital Signs Last 24 Hrs  T(C): 36.8 (25 Dec 2021 12:24), Max: 37.1 (25 Dec 2021 04:31)  T(F): 98.2 (25 Dec 2021 12:24), Max: 98.8 (25 Dec 2021 04:31)  HR: 88 (25 Dec 2021 12:24) (85 - 123)  BP: 123/80 (25 Dec 2021 12:24) (123/80 - 177/101)  BP(mean): --  RR: 19 (25 Dec 2021 12:24) (17 - 20)  SpO2: 100% (25 Dec 2021 12:24) (98% - 100%)      GENERAL: Alert. Not confused. +hunched over in discomfort/pain. +thin.   HEAD:  Atraumatic. Normocephalic.  EYES: EOMI. PERRL. Normal conjunctiva/sclera.  ENT: Neck supple. No JVD. Moist oral mucosa. Not edentulous. No thrush.  LYMPH: Normal supraclavicular/cervical lymph nodes.   CARDIAC:  S1 S2 Regular rhythm no murmurs/rubs or gallops  LUNG/CHEST: CTA b/l .  No wheezes. No rales. No rhonchi.  ABDOMEN: Soft.  nontender. No distension. No fluid wave. Normal bowel sounds.  BACK: No midline/vertebral tenderness. No flank tenderness.  VASCULAR: +2 b/l radial or ulnar pulses. Palpable DP pulses.  EXTREMITIES:  No clubbing. No cyanosis. No edema. Moving all 4.  NEUROLOGY: A&Ox3. Non-focal exam. Cranial nerves intact. Normal speech. Sensation intact.  PSYCH: Normal behavior. Anxious affect.    LABS:                        8.9    11.81 )-----------( 314      ( 25 Dec 2021 11:02 )             30.0     12-25    131<L>  |  95<L>  |  10  ----------------------------<  367<H>  3.9   |  19<L>  |  0.75    Ca    8.3<L>      25 Dec 2021 11:02  Phos  3.2     12-25  Mg     1.6     12-25    TPro  6.2  /  Alb  3.1<L>  /  TBili  0.4  /  DBili  x   /  AST  12  /  ALT  15  /  AlkPhos  101  12-25    PT/INR - ( 25 Dec 2021 11:02 )   PT: 12.1 sec;   INR: 1.01 ratio         PTT - ( 25 Dec 2021 11:02 )  PTT:29.8 sec      Urinalysis Basic - ( 25 Dec 2021 02:11 )    Color: Colorless / Appearance: Clear / S.014 / pH: x  Gluc: x / Ketone: Small  / Bili: Negative / Urobili: Negative   Blood: x / Protein: 30 mg/dL / Nitrite: Negative   Leuk Esterase: Negative / RBC: 13 /hpf / WBC 1 /HPF   Sq Epi: x / Non Sq Epi: 1 /hpf / Bacteria: Negative        Culture - Blood (collected 22 Dec 2021 18:49)  Source: .Blood Blood  Preliminary Report (23 Dec 2021 19:02):    No growth to date.        RADIOLOGY & ADDITIONAL TESTS:  Results Reviewed:   Imaging Personally Reviewed:  Electrocardiogram Personally Reviewed:    COORDINATION OF CARE:  Care Discussed with Consultants/Other Providers [Y/N]:  Prior or Outpatient Records Reviewed [Y/N]:  med NP PK
Austin Fields MD  Division of Hospital Medicine  Pager [phone]  If no response or off hours page: [phone]  ---------------------------------------------------------    DOMINIQUE VALENCIA  31y  Female      Patient is a 31y old  Female who presents with a chief complaint of abd pain, n/v (25 Dec 2021 16:53)      INTERVAL HPI/OVERNIGHT EVENTS:  Seen at bedside. Still has abdominal pain. Vomited breakfast this am      REVIEW OF SYSTEMS: 10 point ROS negative unless listed above    T(C): 36.7 (21 @ 04:55), Max: 36.7 (21 @ 20:41)  HR: 105 (21 @ 06:05) (87 - 105)  BP: 126/94 (21 @ 06:05) (126/94 - 167/95)  RR: 18 (21 @ 06:05) (18 - 19)  SpO2: 100% (21 @ 06:05) (97% - 100%)  Wt(kg): --Vital Signs Last 24 Hrs  T(C): 36.7 (26 Dec 2021 04:55), Max: 36.7 (25 Dec 2021 20:41)  T(F): 98 (26 Dec 2021 04:55), Max: 98.1 (25 Dec 2021 21:32)  HR: 105 (26 Dec 2021 06:05) (87 - 105)  BP: 126/94 (26 Dec 2021 06:05) (126/94 - 167/95)  BP(mean): --  RR: 18 (26 Dec 2021 06:05) (18 - 19)  SpO2: 100% (26 Dec 2021 06:05) (97% - 100%)    PHYSICAL EXAM:  GENERAL: Alert. +thin.   CARDIAC:  S1 S2 Regular rhythm no murmurs/rubs or gallops  LUNG/CHEST: CTA b/l .  No wheezes. No rales. No rhonchi.  ABDOMEN: Soft.  nontender. No distension. No fluid wave. Normal bowel sounds.  VASCULAR: +2 b/l radial or ulnar pulses. Palpable DP pulses.  EXTREMITIES:  No clubbing. No cyanosis. No edema. Moving all 4.  NEUROLOGY: A&Ox3. Non-focal exam. Cranial nerves intact. Normal speech. Sensation intact.    Consultant(s) Notes Reviewed:  [x ] YES  [ ] NO  Care Discussed with Consultants/Other Providers [ x] YES  [ ] NO    LABS:                        9.6    7.02  )-----------( 373      ( 26 Dec 2021 06:18 )             32.4     12-    133<L>  |  99  |  12  ----------------------------<  196<H>  3.7   |  23  |  0.64    Ca    9.2      26 Dec 2021 06:18  Phos  3.2     12-25  Mg     1.6         TPro  6.1  /  Alb  3.1<L>  /  TBili  0.4  /  DBili  x   /  AST  12  /  ALT  14  /  AlkPhos  96  12    PT/INR - ( 25 Dec 2021 11:02 )   PT: 12.1 sec;   INR: 1.01 ratio         PTT - ( 25 Dec 2021 11:02 )  PTT:29.8 sec  Urinalysis Basic - ( 25 Dec 2021 02:11 )    Color: Colorless / Appearance: Clear / S.014 / pH: x  Gluc: x / Ketone: Small  / Bili: Negative / Urobili: Negative   Blood: x / Protein: 30 mg/dL / Nitrite: Negative   Leuk Esterase: Negative / RBC: 13 /hpf / WBC 1 /HPF   Sq Epi: x / Non Sq Epi: 1 /hpf / Bacteria: Negative      CAPILLARY BLOOD GLUCOSE      POCT Blood Glucose.: 94 mg/dL (26 Dec 2021 12:30)  POCT Blood Glucose.: 304 mg/dL (26 Dec 2021 09:12)  POCT Blood Glucose.: 249 mg/dL (26 Dec 2021 00:03)  POCT Blood Glucose.: 272 mg/dL (25 Dec 2021 21:21)  POCT Blood Glucose.: 182 mg/dL (25 Dec 2021 18:50)  POCT Blood Glucose.: 43 mg/dL (25 Dec 2021 18:26)  POCT Blood Glucose.: 37 mg/dL (25 Dec 2021 18:25)        Urinalysis Basic - ( 25 Dec 2021 02:11 )    Color: Colorless / Appearance: Clear / S.014 / pH: x  Gluc: x / Ketone: Small  / Bili: Negative / Urobili: Negative   Blood: x / Protein: 30 mg/dL / Nitrite: Negative   Leuk Esterase: Negative / RBC: 13 /hpf / WBC 1 /HPF   Sq Epi: x / Non Sq Epi: 1 /hpf / Bacteria: Negative        RADIOLOGY & ADDITIONAL TESTS:    Imaging Personally Reviewed:  [ ] YES  [ ] NO

## 2021-12-27 NOTE — BH CONSULTATION LIAISON PROGRESS NOTE - NSBHCHARTREVIEWVS_PSY_A_CORE FT
Vital Signs Last 24 Hrs  T(C): 36.7 (26 Dec 2021 04:55), Max: 36.8 (25 Dec 2021 12:24)  T(F): 98 (26 Dec 2021 04:55), Max: 98.2 (25 Dec 2021 12:24)  HR: 105 (26 Dec 2021 06:05) (87 - 105)  BP: 126/94 (26 Dec 2021 06:05) (123/80 - 167/95)  BP(mean): --  RR: 18 (26 Dec 2021 06:05) (18 - 19)  SpO2: 100% (26 Dec 2021 06:05) (97% - 100%)
Vital Signs Last 24 Hrs  T(C): 36.7 (27 Dec 2021 05:16), Max: 36.8 (27 Dec 2021 01:02)  T(F): 98.1 (27 Dec 2021 05:16), Max: 98.3 (27 Dec 2021 01:02)  HR: 94 (27 Dec 2021 05:16) (82 - 94)  BP: 128/77 (27 Dec 2021 05:16) (116/75 - 128/77)  BP(mean): --  RR: 18 (27 Dec 2021 05:16) (18 - 18)  SpO2: 99% (27 Dec 2021 05:16) (99% - 99%)

## 2021-12-27 NOTE — PROGRESS NOTE ADULT - PROBLEM SELECTOR PLAN 4
-  lantus to 10U qPM and lantus 17u qAM+ ademlog 4u before meals  - endocrine c/s
-  lantus to 10U qPM and lantus 17u qAM+ ademlog 4u before meals  - endocrine cnsulted
- fs fair control  - lantus to 10U qPM and lantus 17u qAM+ ademlog 4u before meals  - endocrine following

## 2021-12-27 NOTE — BH CONSULTATION LIAISON PROGRESS NOTE - NSBHCHARTREVIEWINVESTIGATE_PSY_A_CORE FT
< from: 12 Lead ECG (12.25.21 @ 02:53) >      Ventricular Rate 106 BPM    Atrial Rate 106 BPM    P-R Interval 158 ms    QRS Duration 62 ms    Q-T Interval 326 ms    QTC Calculation(Bazett) 433 ms    P Axis 57 degrees    R Axis 74 degrees    T Axis 66 degrees    Diagnosis Line SINUS TACHYCARDIA  SEPTAL INFARCT , AGE UNDETERMINED  ABNORMAL ECG  WHEN COMPARED WITH ECG OF 15-APR-2015 15:22,  SIGNIFICANT CHANGES HAVE OCCURRED  Confirmed by MD CHIQUITA, ERICKA ([id number]) on 12/25/2021 3:04:25 PM    < end of copied text >

## 2021-12-27 NOTE — BH CONSULTATION LIAISON PROGRESS NOTE - NSBHCONSULTRECOMMENDOTHER_PSY_A_CORE FT
- C/w Remeron 7.5mg QHS     - SW / SBIRT consult for resources if patient interested in rehab     - melatonin 3mg PO qHS PRN insomnia    PRN: Haldol 1-4mg q6hrs im/iv/po, hold for QTc >500

## 2021-12-28 DIAGNOSIS — R11.2 NAUSEA WITH VOMITING, UNSPECIFIED: ICD-10-CM

## 2021-12-28 DIAGNOSIS — K52.9 NONINFECTIVE GASTROENTERITIS AND COLITIS, UNSPECIFIED: ICD-10-CM

## 2021-12-28 DIAGNOSIS — E10.43 TYPE 1 DIABETES MELLITUS WITH DIABETIC AUTONOMIC (POLY)NEUROPATHY: ICD-10-CM

## 2021-12-28 DIAGNOSIS — K31.84 GASTROPARESIS: ICD-10-CM

## 2021-12-28 DIAGNOSIS — E86.0 DEHYDRATION: ICD-10-CM

## 2021-12-28 DIAGNOSIS — F17.210 NICOTINE DEPENDENCE, CIGARETTES, UNCOMPLICATED: ICD-10-CM

## 2021-12-28 DIAGNOSIS — R11.15 CYCLICAL VOMITING SYNDROME UNRELATED TO MIGRAINE: ICD-10-CM

## 2021-12-28 DIAGNOSIS — R00.0 TACHYCARDIA, UNSPECIFIED: ICD-10-CM

## 2021-12-28 DIAGNOSIS — K59.00 CONSTIPATION, UNSPECIFIED: ICD-10-CM

## 2021-12-28 DIAGNOSIS — N17.9 ACUTE KIDNEY FAILURE, UNSPECIFIED: ICD-10-CM

## 2021-12-28 DIAGNOSIS — Z79.4 LONG TERM (CURRENT) USE OF INSULIN: ICD-10-CM

## 2021-12-28 DIAGNOSIS — E10.65 TYPE 1 DIABETES MELLITUS WITH HYPERGLYCEMIA: ICD-10-CM

## 2021-12-28 DIAGNOSIS — E87.2 ACIDOSIS: ICD-10-CM

## 2021-12-28 DIAGNOSIS — K29.00 ACUTE GASTRITIS WITHOUT BLEEDING: ICD-10-CM

## 2021-12-28 DIAGNOSIS — F12.10 CANNABIS ABUSE, UNCOMPLICATED: ICD-10-CM

## 2022-02-15 ENCOUNTER — APPOINTMENT (OUTPATIENT)
Dept: ENDOCRINOLOGY | Facility: CLINIC | Age: 32
End: 2022-02-15

## 2022-02-16 ENCOUNTER — INPATIENT (INPATIENT)
Facility: HOSPITAL | Age: 32
LOS: 1 days | Discharge: LEFT BEFORE DISCHARGE | End: 2022-02-18
Attending: STUDENT IN AN ORGANIZED HEALTH CARE EDUCATION/TRAINING PROGRAM | Admitting: STUDENT IN AN ORGANIZED HEALTH CARE EDUCATION/TRAINING PROGRAM
Payer: MEDICAID

## 2022-02-16 VITALS
TEMPERATURE: 98 F | OXYGEN SATURATION: 99 % | DIASTOLIC BLOOD PRESSURE: 85 MMHG | HEART RATE: 98 BPM | RESPIRATION RATE: 16 BRPM | SYSTOLIC BLOOD PRESSURE: 152 MMHG | HEIGHT: 66 IN

## 2022-02-16 DIAGNOSIS — Z90.49 ACQUIRED ABSENCE OF OTHER SPECIFIED PARTS OF DIGESTIVE TRACT: Chronic | ICD-10-CM

## 2022-02-16 DIAGNOSIS — O00.9 ECTOPIC PREGNANCY, UNSPECIFIED: Chronic | ICD-10-CM

## 2022-02-16 LAB
ALBUMIN SERPL ELPH-MCNC: 3.8 G/DL — SIGNIFICANT CHANGE UP (ref 3.3–5)
ALP SERPL-CCNC: 97 U/L — SIGNIFICANT CHANGE UP (ref 40–120)
ALT FLD-CCNC: 18 U/L — SIGNIFICANT CHANGE UP (ref 4–33)
ANION GAP SERPL CALC-SCNC: 21 MMOL/L — HIGH (ref 7–14)
APPEARANCE UR: CLEAR — SIGNIFICANT CHANGE UP
AST SERPL-CCNC: 16 U/L — SIGNIFICANT CHANGE UP (ref 4–32)
B-OH-BUTYR SERPL-SCNC: 4.9 MMOL/L — HIGH (ref 0–0.4)
BACTERIA # UR AUTO: NEGATIVE — SIGNIFICANT CHANGE UP
BASE EXCESS BLDV CALC-SCNC: -7.1 MMOL/L — LOW (ref -2–3)
BASOPHILS # BLD AUTO: 0.04 K/UL — SIGNIFICANT CHANGE UP (ref 0–0.2)
BASOPHILS NFR BLD AUTO: 0.4 % — SIGNIFICANT CHANGE UP (ref 0–2)
BILIRUB SERPL-MCNC: 0.6 MG/DL — SIGNIFICANT CHANGE UP (ref 0.2–1.2)
BILIRUB UR-MCNC: NEGATIVE — SIGNIFICANT CHANGE UP
BLOOD GAS VENOUS COMPREHENSIVE RESULT: SIGNIFICANT CHANGE UP
BUN SERPL-MCNC: 14 MG/DL — SIGNIFICANT CHANGE UP (ref 7–23)
CALCIUM SERPL-MCNC: 9.3 MG/DL — SIGNIFICANT CHANGE UP (ref 8.4–10.5)
CHLORIDE BLDV-SCNC: 96 MMOL/L — SIGNIFICANT CHANGE UP (ref 96–108)
CHLORIDE SERPL-SCNC: 93 MMOL/L — LOW (ref 98–107)
CO2 BLDV-SCNC: 19.9 MMOL/L — LOW (ref 22–26)
CO2 SERPL-SCNC: 16 MMOL/L — LOW (ref 22–31)
COLOR SPEC: SIGNIFICANT CHANGE UP
CREAT SERPL-MCNC: 0.73 MG/DL — SIGNIFICANT CHANGE UP (ref 0.5–1.3)
DIFF PNL FLD: ABNORMAL
EOSINOPHIL # BLD AUTO: 0.12 K/UL — SIGNIFICANT CHANGE UP (ref 0–0.5)
EOSINOPHIL NFR BLD AUTO: 1.3 % — SIGNIFICANT CHANGE UP (ref 0–6)
EPI CELLS # UR: 2 /HPF — SIGNIFICANT CHANGE UP (ref 0–5)
GAS PNL BLDV: 129 MMOL/L — LOW (ref 136–145)
GLUCOSE BLDV-MCNC: 532 MG/DL — CRITICAL HIGH (ref 70–99)
GLUCOSE SERPL-MCNC: 482 MG/DL — CRITICAL HIGH (ref 70–99)
GLUCOSE UR QL: ABNORMAL
HCO3 BLDV-SCNC: 19 MMOL/L — LOW (ref 22–29)
HCT VFR BLD CALC: 32.4 % — LOW (ref 34.5–45)
HCT VFR BLDA CALC: 29 % — LOW (ref 34.5–46.5)
HGB BLD CALC-MCNC: 9.8 G/DL — LOW (ref 11.5–15.5)
HGB BLD-MCNC: 9.8 G/DL — LOW (ref 11.5–15.5)
HYALINE CASTS # UR AUTO: 1 /LPF — SIGNIFICANT CHANGE UP (ref 0–7)
IANC: 6.99 K/UL — SIGNIFICANT CHANGE UP (ref 1.5–8.5)
IMM GRANULOCYTES NFR BLD AUTO: 0.4 % — SIGNIFICANT CHANGE UP (ref 0–1.5)
KETONES UR-MCNC: ABNORMAL
LACTATE BLDV-MCNC: 1.7 MMOL/L — SIGNIFICANT CHANGE UP (ref 0.5–2)
LEUKOCYTE ESTERASE UR-ACNC: NEGATIVE — SIGNIFICANT CHANGE UP
LYMPHOCYTES # BLD AUTO: 1.38 K/UL — SIGNIFICANT CHANGE UP (ref 1–3.3)
LYMPHOCYTES # BLD AUTO: 15.2 % — SIGNIFICANT CHANGE UP (ref 13–44)
MAGNESIUM SERPL-MCNC: 1.6 MG/DL — SIGNIFICANT CHANGE UP (ref 1.6–2.6)
MCHC RBC-ENTMCNC: 18.5 PG — LOW (ref 27–34)
MCHC RBC-ENTMCNC: 30.2 GM/DL — LOW (ref 32–36)
MCV RBC AUTO: 61.2 FL — LOW (ref 80–100)
MONOCYTES # BLD AUTO: 0.52 K/UL — SIGNIFICANT CHANGE UP (ref 0–0.9)
MONOCYTES NFR BLD AUTO: 5.7 % — SIGNIFICANT CHANGE UP (ref 2–14)
NEUTROPHILS # BLD AUTO: 6.99 K/UL — SIGNIFICANT CHANGE UP (ref 1.8–7.4)
NEUTROPHILS NFR BLD AUTO: 77 % — SIGNIFICANT CHANGE UP (ref 43–77)
NITRITE UR-MCNC: NEGATIVE — SIGNIFICANT CHANGE UP
NRBC # BLD: 0 /100 WBCS — SIGNIFICANT CHANGE UP
NRBC # FLD: 0 K/UL — SIGNIFICANT CHANGE UP
PCO2 BLDV: 38 MMHG — LOW (ref 39–42)
PH BLDV: 7.3 — LOW (ref 7.32–7.43)
PH UR: 6 — SIGNIFICANT CHANGE UP (ref 5–8)
PHOSPHATE SERPL-MCNC: 3.6 MG/DL — SIGNIFICANT CHANGE UP (ref 2.5–4.5)
PLATELET # BLD AUTO: 409 K/UL — HIGH (ref 150–400)
PO2 BLDV: 37 MMHG — SIGNIFICANT CHANGE UP
POTASSIUM BLDV-SCNC: 4.9 MMOL/L — SIGNIFICANT CHANGE UP (ref 3.5–5.1)
POTASSIUM SERPL-MCNC: 4.6 MMOL/L — SIGNIFICANT CHANGE UP (ref 3.5–5.3)
POTASSIUM SERPL-SCNC: 4.6 MMOL/L — SIGNIFICANT CHANGE UP (ref 3.5–5.3)
PROT SERPL-MCNC: 6.9 G/DL — SIGNIFICANT CHANGE UP (ref 6–8.3)
PROT UR-MCNC: ABNORMAL
RBC # BLD: 5.29 M/UL — HIGH (ref 3.8–5.2)
RBC # FLD: 20.8 % — HIGH (ref 10.3–14.5)
RBC CASTS # UR COMP ASSIST: 5 /HPF — HIGH (ref 0–4)
SAO2 % BLDV: 54.9 % — SIGNIFICANT CHANGE UP
SODIUM SERPL-SCNC: 130 MMOL/L — LOW (ref 135–145)
SP GR SPEC: 1.03 — SIGNIFICANT CHANGE UP (ref 1–1.05)
UROBILINOGEN FLD QL: SIGNIFICANT CHANGE UP
WBC # BLD: 9.09 K/UL — SIGNIFICANT CHANGE UP (ref 3.8–10.5)
WBC # FLD AUTO: 9.09 K/UL — SIGNIFICANT CHANGE UP (ref 3.8–10.5)
WBC UR QL: 1 /HPF — SIGNIFICANT CHANGE UP (ref 0–5)

## 2022-02-16 PROCEDURE — 99291 CRITICAL CARE FIRST HOUR: CPT

## 2022-02-16 RX ORDER — HYDROMORPHONE HYDROCHLORIDE 2 MG/ML
1 INJECTION INTRAMUSCULAR; INTRAVENOUS; SUBCUTANEOUS ONCE
Refills: 0 | Status: DISCONTINUED | OUTPATIENT
Start: 2022-02-16 | End: 2022-02-16

## 2022-02-16 RX ORDER — POTASSIUM CHLORIDE 20 MEQ
10 PACKET (EA) ORAL
Refills: 0 | Status: COMPLETED | OUTPATIENT
Start: 2022-02-16 | End: 2022-02-17

## 2022-02-16 RX ORDER — INSULIN HUMAN 100 [IU]/ML
3 INJECTION, SOLUTION SUBCUTANEOUS
Qty: 100 | Refills: 0 | Status: DISCONTINUED | OUTPATIENT
Start: 2022-02-16 | End: 2022-02-17

## 2022-02-16 RX ORDER — SODIUM CHLORIDE 9 MG/ML
1000 INJECTION INTRAMUSCULAR; INTRAVENOUS; SUBCUTANEOUS ONCE
Refills: 0 | Status: COMPLETED | OUTPATIENT
Start: 2022-02-16 | End: 2022-02-16

## 2022-02-16 RX ORDER — HALOPERIDOL DECANOATE 100 MG/ML
5 INJECTION INTRAMUSCULAR ONCE
Refills: 0 | Status: COMPLETED | OUTPATIENT
Start: 2022-02-16 | End: 2022-02-16

## 2022-02-16 RX ORDER — DIPHENHYDRAMINE HCL 50 MG
50 CAPSULE ORAL ONCE
Refills: 0 | Status: COMPLETED | OUTPATIENT
Start: 2022-02-16 | End: 2022-02-16

## 2022-02-16 RX ORDER — DIPHENHYDRAMINE HCL 50 MG
25 CAPSULE ORAL ONCE
Refills: 0 | Status: COMPLETED | OUTPATIENT
Start: 2022-02-16 | End: 2022-02-16

## 2022-02-16 RX ADMIN — Medication 100 MILLIEQUIVALENT(S): at 22:23

## 2022-02-16 RX ADMIN — HYDROMORPHONE HYDROCHLORIDE 1 MILLIGRAM(S): 2 INJECTION INTRAMUSCULAR; INTRAVENOUS; SUBCUTANEOUS at 21:11

## 2022-02-16 RX ADMIN — HALOPERIDOL DECANOATE 5 MILLIGRAM(S): 100 INJECTION INTRAMUSCULAR at 22:30

## 2022-02-16 RX ADMIN — Medication 100 MILLIEQUIVALENT(S): at 23:32

## 2022-02-16 RX ADMIN — INSULIN HUMAN 6.5 UNIT(S)/HR: 100 INJECTION, SOLUTION SUBCUTANEOUS at 22:22

## 2022-02-16 RX ADMIN — SODIUM CHLORIDE 1000 MILLILITER(S): 9 INJECTION INTRAMUSCULAR; INTRAVENOUS; SUBCUTANEOUS at 22:23

## 2022-02-16 RX ADMIN — HYDROMORPHONE HYDROCHLORIDE 1 MILLIGRAM(S): 2 INJECTION INTRAMUSCULAR; INTRAVENOUS; SUBCUTANEOUS at 21:38

## 2022-02-16 RX ADMIN — SODIUM CHLORIDE 1000 MILLILITER(S): 9 INJECTION INTRAMUSCULAR; INTRAVENOUS; SUBCUTANEOUS at 21:11

## 2022-02-16 RX ADMIN — Medication 50 MILLIGRAM(S): at 21:33

## 2022-02-16 NOTE — ED ADULT NURSE NOTE - OBJECTIVE STATEMENT
Pt received to room 29. A&Ox4. Ambulatory at baseline. Pmh of DM type 1. Pt c/o high blood sugars for 1x week with generalized abd pain 9/10 nonradiating associated with nausea and vomiting occurring since 2pm 2/16. Pt states compliance with insulin as well. Pt endorses being seen at [place] r/t DKA diagnosis. Pt vitally stable. Resp even unlabored, abd soft nontender, bowel sounds heard in all four quadrants, pedal pulses 2+ bilaterally. Pt denies chest pain, SOB, diarrhea, fevers, chills, headache, dizziness, numbness/tingling to hands/feet. Awaiting further orders from MD.

## 2022-02-16 NOTE — ED ADULT NURSE NOTE - CHIEF COMPLAINT QUOTE
Pt presents to ED via EMS from home with c/o worsening lower middle abdominal pain over past 5 hours prior to arrival. Pt reports multiple recent hospitalizations for uncontrolled diabetes. Per EMS upon arrival . Pt received IM Zofran prior to arrival. Pt reports taking 4 units admelog prior to arrival.

## 2022-02-16 NOTE — ED ADULT NURSE NOTE - PAIN: PRESENCE, MLM
Dr. Palumbo is currently on the phone with Dr. Scott, neurology with [facility]   complains of pain/discomfort

## 2022-02-16 NOTE — ED ADULT TRIAGE NOTE - CHIEF COMPLAINT QUOTE
Pt presents to ED via EMS from home with c/o worsening lower middle abdominal pain over past 5 hours prior to arrival. Pt reports multiple recent hospitalizations for uncontrolled diabetes. Per EMS upon arrival . Pt received IM Zofran prior to arrival. Pt presents to ED via EMS from home with c/o worsening lower middle abdominal pain over past 5 hours prior to arrival. Pt reports multiple recent hospitalizations for uncontrolled diabetes. Per EMS upon arrival . Pt received IM Zofran prior to arrival. Pt reports taking 4 units admelog prior to arrival.

## 2022-02-16 NOTE — DISCHARGE NOTE NURSING/CASE MANAGEMENT/SOCIAL WORK - NSDCPNINST_GEN_ALL_CORE
---------------------  From: Aaron Gutiérrez MD   To: [facility] Message Pool ([id number]);     Sent: 3/19/2020 9:16:43 AM CDT  Subject: General Message     I have sent in referral request and labs orders for patient, he has been notifiedNoted.   Continue Diabetes management, diet and glucose monitoring before meals and at bedtime, or as your PMD prescribed. know your A1C blood level and follow up with PMD for continuity of care. Remember hand hygiene, skin inspection for prevention of infection.

## 2022-02-17 ENCOUNTER — TRANSCRIPTION ENCOUNTER (OUTPATIENT)
Age: 32
End: 2022-02-17

## 2022-02-17 VITALS
OXYGEN SATURATION: 97 % | DIASTOLIC BLOOD PRESSURE: 76 MMHG | RESPIRATION RATE: 16 BRPM | TEMPERATURE: 98 F | HEART RATE: 73 BPM | SYSTOLIC BLOOD PRESSURE: 122 MMHG

## 2022-02-17 DIAGNOSIS — E78.5 HYPERLIPIDEMIA, UNSPECIFIED: ICD-10-CM

## 2022-02-17 DIAGNOSIS — E11.43 TYPE 2 DIABETES MELLITUS WITH DIABETIC AUTONOMIC (POLY)NEUROPATHY: ICD-10-CM

## 2022-02-17 DIAGNOSIS — I10 ESSENTIAL (PRIMARY) HYPERTENSION: ICD-10-CM

## 2022-02-17 DIAGNOSIS — F17.200 NICOTINE DEPENDENCE, UNSPECIFIED, UNCOMPLICATED: ICD-10-CM

## 2022-02-17 DIAGNOSIS — E10.10 TYPE 1 DIABETES MELLITUS WITH KETOACIDOSIS WITHOUT COMA: ICD-10-CM

## 2022-02-17 DIAGNOSIS — R01.1 CARDIAC MURMUR, UNSPECIFIED: ICD-10-CM

## 2022-02-17 DIAGNOSIS — E11.10 TYPE 2 DIABETES MELLITUS WITH KETOACIDOSIS WITHOUT COMA: ICD-10-CM

## 2022-02-17 DIAGNOSIS — Z29.9 ENCOUNTER FOR PROPHYLACTIC MEASURES, UNSPECIFIED: ICD-10-CM

## 2022-02-17 LAB
A1C WITH ESTIMATED AVERAGE GLUCOSE RESULT: 9.7 % — HIGH (ref 4–5.6)
ALBUMIN SERPL ELPH-MCNC: 3.2 G/DL — LOW (ref 3.3–5)
ALP SERPL-CCNC: 76 U/L — SIGNIFICANT CHANGE UP (ref 40–120)
ALT FLD-CCNC: 14 U/L — SIGNIFICANT CHANGE UP (ref 4–33)
ANION GAP SERPL CALC-SCNC: 11 MMOL/L — SIGNIFICANT CHANGE UP (ref 7–14)
ANION GAP SERPL CALC-SCNC: 13 MMOL/L — SIGNIFICANT CHANGE UP (ref 7–14)
AST SERPL-CCNC: 22 U/L — SIGNIFICANT CHANGE UP (ref 4–32)
B-OH-BUTYR SERPL-SCNC: 2.3 MMOL/L — HIGH (ref 0–0.4)
BASE EXCESS BLDV CALC-SCNC: -4.1 MMOL/L — LOW (ref -2–3)
BASOPHILS # BLD AUTO: 0.04 K/UL — SIGNIFICANT CHANGE UP (ref 0–0.2)
BASOPHILS NFR BLD AUTO: 0.4 % — SIGNIFICANT CHANGE UP (ref 0–2)
BILIRUB SERPL-MCNC: 0.4 MG/DL — SIGNIFICANT CHANGE UP (ref 0.2–1.2)
BLOOD GAS VENOUS COMPREHENSIVE RESULT: SIGNIFICANT CHANGE UP
BUN SERPL-MCNC: 13 MG/DL — SIGNIFICANT CHANGE UP (ref 7–23)
BUN SERPL-MCNC: 16 MG/DL — SIGNIFICANT CHANGE UP (ref 7–23)
CALCIUM SERPL-MCNC: 8.3 MG/DL — LOW (ref 8.4–10.5)
CALCIUM SERPL-MCNC: 8.7 MG/DL — SIGNIFICANT CHANGE UP (ref 8.4–10.5)
CHLORIDE BLDV-SCNC: 102 MMOL/L — SIGNIFICANT CHANGE UP (ref 96–108)
CHLORIDE SERPL-SCNC: 101 MMOL/L — SIGNIFICANT CHANGE UP (ref 98–107)
CHLORIDE SERPL-SCNC: 102 MMOL/L — SIGNIFICANT CHANGE UP (ref 98–107)
CHOLEST SERPL-MCNC: 226 MG/DL — HIGH
CO2 BLDV-SCNC: 22.9 MMOL/L — SIGNIFICANT CHANGE UP (ref 22–26)
CO2 SERPL-SCNC: 19 MMOL/L — LOW (ref 22–31)
CO2 SERPL-SCNC: 20 MMOL/L — LOW (ref 22–31)
CREAT SERPL-MCNC: 0.64 MG/DL — SIGNIFICANT CHANGE UP (ref 0.5–1.3)
CREAT SERPL-MCNC: 0.66 MG/DL — SIGNIFICANT CHANGE UP (ref 0.5–1.3)
EOSINOPHIL # BLD AUTO: 0.22 K/UL — SIGNIFICANT CHANGE UP (ref 0–0.5)
EOSINOPHIL NFR BLD AUTO: 1.9 % — SIGNIFICANT CHANGE UP (ref 0–6)
ESTIMATED AVERAGE GLUCOSE: 232 — SIGNIFICANT CHANGE UP
GAS PNL BLDV: 134 MMOL/L — LOW (ref 136–145)
GLUCOSE BLDC GLUCOMTR-MCNC: 101 MG/DL — HIGH (ref 70–99)
GLUCOSE BLDC GLUCOMTR-MCNC: 108 MG/DL — HIGH (ref 70–99)
GLUCOSE BLDC GLUCOMTR-MCNC: 126 MG/DL — HIGH (ref 70–99)
GLUCOSE BLDC GLUCOMTR-MCNC: 234 MG/DL — HIGH (ref 70–99)
GLUCOSE BLDC GLUCOMTR-MCNC: 272 MG/DL — HIGH (ref 70–99)
GLUCOSE BLDV-MCNC: 294 MG/DL — HIGH (ref 70–99)
GLUCOSE SERPL-MCNC: 106 MG/DL — HIGH (ref 70–99)
GLUCOSE SERPL-MCNC: 153 MG/DL — HIGH (ref 70–99)
HCO3 BLDV-SCNC: 22 MMOL/L — SIGNIFICANT CHANGE UP (ref 22–29)
HCT VFR BLD CALC: 28.5 % — LOW (ref 34.5–45)
HCT VFR BLDA CALC: 27 % — LOW (ref 34.5–46.5)
HDLC SERPL-MCNC: 100 MG/DL — SIGNIFICANT CHANGE UP
HGB BLD CALC-MCNC: 9.1 G/DL — LOW (ref 11.5–15.5)
HGB BLD-MCNC: 8.7 G/DL — LOW (ref 11.5–15.5)
IANC: 6.78 K/UL — SIGNIFICANT CHANGE UP (ref 1.5–8.5)
IMM GRANULOCYTES NFR BLD AUTO: 0.4 % — SIGNIFICANT CHANGE UP (ref 0–1.5)
LACTATE BLDV-MCNC: 1.5 MMOL/L — SIGNIFICANT CHANGE UP (ref 0.5–2)
LIPID PNL WITH DIRECT LDL SERPL: 114 MG/DL — HIGH
LYMPHOCYTES # BLD AUTO: 2.97 K/UL — SIGNIFICANT CHANGE UP (ref 1–3.3)
LYMPHOCYTES # BLD AUTO: 26.2 % — SIGNIFICANT CHANGE UP (ref 13–44)
MAGNESIUM SERPL-MCNC: 1.8 MG/DL — SIGNIFICANT CHANGE UP (ref 1.6–2.6)
MCHC RBC-ENTMCNC: 18.7 PG — LOW (ref 27–34)
MCHC RBC-ENTMCNC: 30.5 GM/DL — LOW (ref 32–36)
MCV RBC AUTO: 61.2 FL — LOW (ref 80–100)
MONOCYTES # BLD AUTO: 1.27 K/UL — HIGH (ref 0–0.9)
MONOCYTES NFR BLD AUTO: 11.2 % — SIGNIFICANT CHANGE UP (ref 2–14)
NEUTROPHILS # BLD AUTO: 6.78 K/UL — SIGNIFICANT CHANGE UP (ref 1.8–7.4)
NEUTROPHILS NFR BLD AUTO: 59.9 % — SIGNIFICANT CHANGE UP (ref 43–77)
NON HDL CHOLESTEROL: 126 MG/DL — SIGNIFICANT CHANGE UP
NRBC # BLD: 0 /100 WBCS — SIGNIFICANT CHANGE UP
NRBC # FLD: 0 K/UL — SIGNIFICANT CHANGE UP
PCO2 BLDV: 41 MMHG — SIGNIFICANT CHANGE UP (ref 39–42)
PH BLDV: 7.33 — SIGNIFICANT CHANGE UP (ref 7.32–7.43)
PHOSPHATE SERPL-MCNC: 3.3 MG/DL — SIGNIFICANT CHANGE UP (ref 2.5–4.5)
PLATELET # BLD AUTO: 377 K/UL — SIGNIFICANT CHANGE UP (ref 150–400)
PO2 BLDV: 39 MMHG — SIGNIFICANT CHANGE UP
POTASSIUM BLDV-SCNC: 4.5 MMOL/L — SIGNIFICANT CHANGE UP (ref 3.5–5.1)
POTASSIUM SERPL-MCNC: 3.9 MMOL/L — SIGNIFICANT CHANGE UP (ref 3.5–5.3)
POTASSIUM SERPL-MCNC: 4.1 MMOL/L — SIGNIFICANT CHANGE UP (ref 3.5–5.3)
POTASSIUM SERPL-SCNC: 3.9 MMOL/L — SIGNIFICANT CHANGE UP (ref 3.5–5.3)
POTASSIUM SERPL-SCNC: 4.1 MMOL/L — SIGNIFICANT CHANGE UP (ref 3.5–5.3)
PROT SERPL-MCNC: 5.7 G/DL — LOW (ref 6–8.3)
RBC # BLD: 4.66 M/UL — SIGNIFICANT CHANGE UP (ref 3.8–5.2)
RBC # FLD: 20.1 % — HIGH (ref 10.3–14.5)
SAO2 % BLDV: 57.8 % — SIGNIFICANT CHANGE UP
SARS-COV-2 RNA SPEC QL NAA+PROBE: SIGNIFICANT CHANGE UP
SODIUM SERPL-SCNC: 132 MMOL/L — LOW (ref 135–145)
SODIUM SERPL-SCNC: 134 MMOL/L — LOW (ref 135–145)
TRIGL SERPL-MCNC: 61 MG/DL — SIGNIFICANT CHANGE UP
TSH SERPL-MCNC: 0.72 UIU/ML — SIGNIFICANT CHANGE UP (ref 0.27–4.2)
WBC # BLD: 11.32 K/UL — HIGH (ref 3.8–10.5)
WBC # FLD AUTO: 11.32 K/UL — HIGH (ref 3.8–10.5)

## 2022-02-17 PROCEDURE — 99233 SBSQ HOSP IP/OBS HIGH 50: CPT | Mod: GC

## 2022-02-17 PROCEDURE — 99223 1ST HOSP IP/OBS HIGH 75: CPT | Mod: GC

## 2022-02-17 PROCEDURE — 99255 IP/OBS CONSLTJ NEW/EST HI 80: CPT | Mod: GC

## 2022-02-17 RX ORDER — SODIUM CHLORIDE 9 MG/ML
1000 INJECTION, SOLUTION INTRAVENOUS
Refills: 0 | Status: DISCONTINUED | OUTPATIENT
Start: 2022-02-17 | End: 2022-02-18

## 2022-02-17 RX ORDER — INSULIN LISPRO 100/ML
4 VIAL (ML) SUBCUTANEOUS
Refills: 0 | Status: DISCONTINUED | OUTPATIENT
Start: 2022-02-17 | End: 2022-02-18

## 2022-02-17 RX ORDER — INSULIN LISPRO 100/ML
VIAL (ML) SUBCUTANEOUS
Refills: 0 | Status: DISCONTINUED | OUTPATIENT
Start: 2022-02-17 | End: 2022-02-18

## 2022-02-17 RX ORDER — DEXTROSE 50 % IN WATER 50 %
15 SYRINGE (ML) INTRAVENOUS ONCE
Refills: 0 | Status: DISCONTINUED | OUTPATIENT
Start: 2022-02-17 | End: 2022-02-18

## 2022-02-17 RX ORDER — METOCLOPRAMIDE HCL 10 MG
5 TABLET ORAL ONCE
Refills: 0 | Status: COMPLETED | OUTPATIENT
Start: 2022-02-17 | End: 2022-02-17

## 2022-02-17 RX ORDER — DIPHENHYDRAMINE HCL 50 MG
25 CAPSULE ORAL ONCE
Refills: 0 | Status: COMPLETED | OUTPATIENT
Start: 2022-02-17 | End: 2022-02-17

## 2022-02-17 RX ORDER — KETOROLAC TROMETHAMINE 30 MG/ML
15 SYRINGE (ML) INJECTION ONCE
Refills: 0 | Status: DISCONTINUED | OUTPATIENT
Start: 2022-02-17 | End: 2022-02-18

## 2022-02-17 RX ORDER — HYDROMORPHONE HYDROCHLORIDE 2 MG/ML
1 INJECTION INTRAMUSCULAR; INTRAVENOUS; SUBCUTANEOUS ONCE
Refills: 0 | Status: DISCONTINUED | OUTPATIENT
Start: 2022-02-17 | End: 2022-02-17

## 2022-02-17 RX ORDER — ACETAMINOPHEN 500 MG
975 TABLET ORAL EVERY 8 HOURS
Refills: 0 | Status: DISCONTINUED | OUTPATIENT
Start: 2022-02-17 | End: 2022-02-18

## 2022-02-17 RX ORDER — SENNA PLUS 8.6 MG/1
2 TABLET ORAL AT BEDTIME
Refills: 0 | Status: DISCONTINUED | OUTPATIENT
Start: 2022-02-17 | End: 2022-02-18

## 2022-02-17 RX ORDER — INSULIN LISPRO 100/ML
VIAL (ML) SUBCUTANEOUS AT BEDTIME
Refills: 0 | Status: DISCONTINUED | OUTPATIENT
Start: 2022-02-17 | End: 2022-02-17

## 2022-02-17 RX ORDER — HYDROMORPHONE HYDROCHLORIDE 2 MG/ML
0.5 INJECTION INTRAMUSCULAR; INTRAVENOUS; SUBCUTANEOUS ONCE
Refills: 0 | Status: DISCONTINUED | OUTPATIENT
Start: 2022-02-17 | End: 2022-02-17

## 2022-02-17 RX ORDER — METOCLOPRAMIDE HCL 10 MG
10 TABLET ORAL
Refills: 0 | Status: DISCONTINUED | OUTPATIENT
Start: 2022-02-17 | End: 2022-02-18

## 2022-02-17 RX ORDER — INSULIN GLARGINE 100 [IU]/ML
17 INJECTION, SOLUTION SUBCUTANEOUS AT BEDTIME
Refills: 0 | Status: DISCONTINUED | OUTPATIENT
Start: 2022-02-17 | End: 2022-02-17

## 2022-02-17 RX ORDER — INSULIN LISPRO 100/ML
VIAL (ML) SUBCUTANEOUS
Refills: 0 | Status: DISCONTINUED | OUTPATIENT
Start: 2022-02-17 | End: 2022-02-17

## 2022-02-17 RX ORDER — INFLUENZA VIRUS VACCINE 15; 15; 15; 15 UG/.5ML; UG/.5ML; UG/.5ML; UG/.5ML
0.5 SUSPENSION INTRAMUSCULAR ONCE
Refills: 0 | Status: DISCONTINUED | OUTPATIENT
Start: 2022-02-17 | End: 2022-02-18

## 2022-02-17 RX ORDER — KETOROLAC TROMETHAMINE 30 MG/ML
15 SYRINGE (ML) INJECTION ONCE
Refills: 0 | Status: DISCONTINUED | OUTPATIENT
Start: 2022-02-17 | End: 2022-02-17

## 2022-02-17 RX ORDER — ACETAMINOPHEN 500 MG
650 TABLET ORAL EVERY 6 HOURS
Refills: 0 | Status: DISCONTINUED | OUTPATIENT
Start: 2022-02-17 | End: 2022-02-17

## 2022-02-17 RX ORDER — NICOTINE POLACRILEX 2 MG
1 GUM BUCCAL DAILY
Refills: 0 | Status: DISCONTINUED | OUTPATIENT
Start: 2022-02-17 | End: 2022-02-18

## 2022-02-17 RX ORDER — DEXTROSE 50 % IN WATER 50 %
25 SYRINGE (ML) INTRAVENOUS ONCE
Refills: 0 | Status: DISCONTINUED | OUTPATIENT
Start: 2022-02-17 | End: 2022-02-18

## 2022-02-17 RX ORDER — SODIUM CHLORIDE 9 MG/ML
1000 INJECTION, SOLUTION INTRAVENOUS
Refills: 0 | Status: DISCONTINUED | OUTPATIENT
Start: 2022-02-17 | End: 2022-02-17

## 2022-02-17 RX ORDER — ACETAMINOPHEN 500 MG
1000 TABLET ORAL ONCE
Refills: 0 | Status: COMPLETED | OUTPATIENT
Start: 2022-02-17 | End: 2022-02-17

## 2022-02-17 RX ORDER — FAMOTIDINE 10 MG/ML
20 INJECTION INTRAVENOUS DAILY
Refills: 0 | Status: DISCONTINUED | OUTPATIENT
Start: 2022-02-17 | End: 2022-02-18

## 2022-02-17 RX ORDER — INSULIN GLARGINE 100 [IU]/ML
18 INJECTION, SOLUTION SUBCUTANEOUS AT BEDTIME
Refills: 0 | Status: DISCONTINUED | OUTPATIENT
Start: 2022-02-17 | End: 2022-02-18

## 2022-02-17 RX ORDER — METOCLOPRAMIDE HCL 10 MG
10 TABLET ORAL
Refills: 0 | Status: DISCONTINUED | OUTPATIENT
Start: 2022-02-17 | End: 2022-02-17

## 2022-02-17 RX ORDER — POLYETHYLENE GLYCOL 3350 17 G/17G
17 POWDER, FOR SOLUTION ORAL DAILY
Refills: 0 | Status: DISCONTINUED | OUTPATIENT
Start: 2022-02-17 | End: 2022-02-18

## 2022-02-17 RX ORDER — GLUCAGON INJECTION, SOLUTION 0.5 MG/.1ML
1 INJECTION, SOLUTION SUBCUTANEOUS ONCE
Refills: 0 | Status: DISCONTINUED | OUTPATIENT
Start: 2022-02-17 | End: 2022-02-18

## 2022-02-17 RX ORDER — MIRTAZAPINE 45 MG/1
7.5 TABLET, ORALLY DISINTEGRATING ORAL DAILY
Refills: 0 | Status: DISCONTINUED | OUTPATIENT
Start: 2022-02-17 | End: 2022-02-18

## 2022-02-17 RX ORDER — INSULIN LISPRO 100/ML
VIAL (ML) SUBCUTANEOUS AT BEDTIME
Refills: 0 | Status: DISCONTINUED | OUTPATIENT
Start: 2022-02-17 | End: 2022-02-18

## 2022-02-17 RX ORDER — INSULIN LISPRO 100/ML
8 VIAL (ML) SUBCUTANEOUS
Refills: 0 | Status: DISCONTINUED | OUTPATIENT
Start: 2022-02-17 | End: 2022-02-17

## 2022-02-17 RX ORDER — INSULIN LISPRO 100/ML
5 VIAL (ML) SUBCUTANEOUS
Refills: 0 | Status: DISCONTINUED | OUTPATIENT
Start: 2022-02-17 | End: 2022-02-17

## 2022-02-17 RX ORDER — INSULIN GLARGINE 100 [IU]/ML
20 INJECTION, SOLUTION SUBCUTANEOUS ONCE
Refills: 0 | Status: COMPLETED | OUTPATIENT
Start: 2022-02-17 | End: 2022-02-17

## 2022-02-17 RX ORDER — DEXTROSE 50 % IN WATER 50 %
12.5 SYRINGE (ML) INTRAVENOUS ONCE
Refills: 0 | Status: DISCONTINUED | OUTPATIENT
Start: 2022-02-17 | End: 2022-02-18

## 2022-02-17 RX ADMIN — HYDROMORPHONE HYDROCHLORIDE 0.5 MILLIGRAM(S): 2 INJECTION INTRAMUSCULAR; INTRAVENOUS; SUBCUTANEOUS at 12:35

## 2022-02-17 RX ADMIN — Medication 3: at 17:48

## 2022-02-17 RX ADMIN — Medication 5 MILLIGRAM(S): at 12:20

## 2022-02-17 RX ADMIN — Medication 100 MILLIEQUIVALENT(S): at 00:48

## 2022-02-17 RX ADMIN — SODIUM CHLORIDE 500 MILLILITER(S): 9 INJECTION, SOLUTION INTRAVENOUS at 03:01

## 2022-02-17 RX ADMIN — INSULIN GLARGINE 20 UNIT(S): 100 INJECTION, SOLUTION SUBCUTANEOUS at 03:01

## 2022-02-17 RX ADMIN — HYDROMORPHONE HYDROCHLORIDE 0.5 MILLIGRAM(S): 2 INJECTION INTRAMUSCULAR; INTRAVENOUS; SUBCUTANEOUS at 23:26

## 2022-02-17 RX ADMIN — HYDROMORPHONE HYDROCHLORIDE 1 MILLIGRAM(S): 2 INJECTION INTRAMUSCULAR; INTRAVENOUS; SUBCUTANEOUS at 04:03

## 2022-02-17 RX ADMIN — Medication 400 MILLIGRAM(S): at 20:56

## 2022-02-17 RX ADMIN — Medication 1000 MILLIGRAM(S): at 21:26

## 2022-02-17 RX ADMIN — SODIUM CHLORIDE 125 MILLILITER(S): 9 INJECTION, SOLUTION INTRAVENOUS at 07:30

## 2022-02-17 RX ADMIN — SODIUM CHLORIDE 100 MILLILITER(S): 9 INJECTION, SOLUTION INTRAVENOUS at 05:29

## 2022-02-17 RX ADMIN — HYDROMORPHONE HYDROCHLORIDE 1 MILLIGRAM(S): 2 INJECTION INTRAMUSCULAR; INTRAVENOUS; SUBCUTANEOUS at 03:46

## 2022-02-17 RX ADMIN — Medication 25 MILLIGRAM(S): at 08:16

## 2022-02-17 RX ADMIN — Medication 4 UNIT(S): at 17:48

## 2022-02-17 RX ADMIN — HYDROMORPHONE HYDROCHLORIDE 0.5 MILLIGRAM(S): 2 INJECTION INTRAMUSCULAR; INTRAVENOUS; SUBCUTANEOUS at 23:56

## 2022-02-17 RX ADMIN — HYDROMORPHONE HYDROCHLORIDE 0.5 MILLIGRAM(S): 2 INJECTION INTRAMUSCULAR; INTRAVENOUS; SUBCUTANEOUS at 12:20

## 2022-02-17 RX ADMIN — Medication 5 MILLIGRAM(S): at 20:12

## 2022-02-17 RX ADMIN — INSULIN HUMAN 3 UNIT(S)/HR: 100 INJECTION, SOLUTION SUBCUTANEOUS at 00:48

## 2022-02-17 NOTE — H&P ADULT - PROBLEM/PLAN-6
[facility]    If you have any questions regarding to your visit please contact your care team:       Team Red:   Clinic Hours Telephone Number   Dr. Paloma Covarrubias, NP   7am-7pm  Monday - Thursday   7am-5pm  Fridays  [phone]  (Appointment scheduling available 24/7)    Questions about your recent visit?   Team Line  [phone]   Urgent Care - [place] and [facility] - 11am-9pm Monday-Friday Saturday-Sunday- 9am-5pm   [place] - 5pm-9pm Monday-Friday Saturday-Sunday- 9am-5pm  [phone] - [place]  [phone] - [place]       What options do I have for a visit other than an office visit? We offer electronic visits (e-visits) and telephone visits, when medically appropriate.  Please check with your medical insurance to see if these types of visits are covered, as you will be responsible for any charges that are not paid by your insurance.      You can use [employer] (secure electronic communication) to access to your chart, send your primary care provider a message, or make an appointment. Ask a team member how to get started.     For a price quote for your services, please call our Consumer Price Line at [phone] or our Imaging Cost estimation line at [phone] (for imaging tests).    [place]     DISPLAY PLAN FREE TEXT

## 2022-02-17 NOTE — H&P ADULT - NSHPPHYSICALEXAM_GEN_ALL_CORE
Vital Signs Last 24 Hrs  T(C): 36.8 (17 Feb 2022 07:16), Max: 36.9 (16 Feb 2022 19:41)  T(F): 98.3 (17 Feb 2022 07:16), Max: 98.5 (16 Feb 2022 19:41)  HR: 91 (17 Feb 2022 07:16) (91 - 101)  BP: 132/84 (17 Feb 2022 07:16) (132/84 - 152/85)  BP(mean): --  RR: 16 (17 Feb 2022 07:16) (16 - 16)  SpO2: 99% (17 Feb 2022 07:16) (99% - 99%)

## 2022-02-17 NOTE — ED PROVIDER NOTE - OBJECTIVE STATEMENT
31F pmh T1DM w/ hx gastroparesis, SI, multiple admissions for DKA, 31F pmh T1DM w/ hx gastroparesis, SI, multiple admissions for DKA presnts to the ED for abdominal pain, pt states pain is consistent w/ prior DKA/abd pain, pt took 4u admelog prior to arriving to ED, given 4mg zofran enroute, pt endorses n/v no hematemesis no diarrhea, no chest pain/sob, pt states she usually gets pain medication when she has similar symptoms. As per chart review pt wrapped thermometer cord around neck last time, became extremely agitated, required multiple rounds of sedation. Pt denies fevers/chills/infectious symptoms.

## 2022-02-17 NOTE — H&P ADULT - NSICDXPASTSURGICALHX_GEN_ALL_CORE_FT
PAST SURGICAL HISTORY:  Ectopic pregnancy 2009 and 2013, s/p removal of right and left fallopian tubes    History of cholecystectomy

## 2022-02-17 NOTE — H&P ADULT - ASSESSMENT
30 y/o female, with a PmHx of DM with h/o gastroparesis, SI, HTN, multiple admissions for DKA, Cholecystectomy, Colitis, presented to the [facility] ED with abdominal pain consistent with prior DKA/abd.  admissions. Admitted to medicine for DKA and abd pain.

## 2022-02-17 NOTE — ED PROVIDER NOTE - PHYSICAL EXAMINATION
CONSTITUTIONAL: uncomfortable appearing, vomiting  SKIN: Warm dry, normal skin turgor  HEAD: NCAT  CARD: RRR, no murmurs.  RESP: clear to ausculation b/l. No crackles or wheezing.  ABD: diffuse tenderness to palpation, non-distended, no rebound or guarding, vomiting  MSK: no pedal edema, no calf tenderness  PSYCH: Cooperative, appropriate.

## 2022-02-17 NOTE — CONSULT NOTE ADULT - SUBJECTIVE AND OBJECTIVE BOX
ENDOCRINE INITIAL CONSULT - DM1, DKA   HPI:  32 y/o female, with a PmHx of T1DM with h/o gastroparesis, SI, HTN, multiple admissions for DKA, Cholecystectomy, Colitis, presented to the [facility] ED with abdominal pain consistent with prior DKA/abd.  admissions. Pt states yesterday 2/16 at around 1400hrs she started to get an 9/10, non-radiating, sharp, stabbing, abd pain located in the center of her stomach right above her bladder region. She states she was having associated symptoms of HA, nausea and vomiting but denies any diarrhea, blurred vision, fever, chills, sob, chest pain, dizziness. Pt states she thought the pain would go away so waited to come to the hospital. She also states prior to her arrival to the ED she had taken her blood sugar and it was high so she gave herself 4u of admelog. She states these symptoms are similar to when she gets dka and admits to being recently admitted to [facility] x 1 week ago in their ICU on an insulin gtt for 2 days.  Today, in the [facility] ED, she was found to be initially in DKA with a BHB of 4.9 and a positive anion gap. She was started on an insulin gtt and was given Lantus 20u x 1. Her gap has now closed and she feels much better. She also received several doses of Dilaudid for abd pain (was also given benadryl for itching associated with the Dilaudid). She appears comfortable at this time and her abd pain is about a 3/10. She is now being admitted to medicine for management of her uncontrolled diabetes and abdominal pain.    (17 Feb 2022 08:19)    ENDOCRINE HISTORY:   Dx at age 12, Follows w/ Dr. Bose - last seen 12/2021  Home regimen   BG levels/Dexcom   Hypoglycemia   Complications +Nephropathy, +gastroparesis  Diet   Ex     ACEI?   Statin?   Vitamin D?     PAST MEDICAL & SURGICAL HISTORY:  Diabetes mellitus type 1    Gastroparesis due to DM    Colitis    Hypertension    Ectopic pregnancy  2009 and 2013, s/p removal of right and left fallopian tubes    History of cholecystectomy        FAMILY HISTORY:  Family history of type 1 diabetes mellitus (Grandparent)    FH: HTN (hypertension) (Father)    FHx: asthma  Brother    Social History:  Marital Status: Single, lives alone    Occupation: Unemployed, was a pharmacy tech    Tobacco Use: Smokes 2 pks/week    ETOH Use: denies    Drug Use: smokes marijuana 3 times per week    Flu Vaccine:  denies                Pneumonia Vaccine:  denies                     COVID Vaccine: denies (17 Feb 2022 08:19)    Home Medications:  Admelog SoloStar 100 units/mL injectable solution: 8 unit(s) injectable 3 times a day (17 Feb 2022 07:56)  Basaglar KwikPen 100 units/mL subcutaneous solution: 18 unit(s) subcutaneous once a day (at bedtime) (17 Feb 2022 07:56)  enalapril 5 mg oral tablet: 1 tab(s) orally once a day, As Needed (17 Feb 2022 07:56)  famotidine 20 mg oral tablet: 1 tab(s) orally once a day (17 Feb 2022 07:56)  metoclopramide 10 mg oral tablet: 1 tab(s) orally 4 times a day (before meals and at bedtime), As Needed (17 Feb 2022 07:56)      MEDICATIONS  (STANDING):  dextrose 40% Gel 15 Gram(s) Oral once  dextrose 5%. 1000 milliLiter(s) (50 mL/Hr) IV Continuous <Continuous>  dextrose 5%. 1000 milliLiter(s) (100 mL/Hr) IV Continuous <Continuous>  dextrose 50% Injectable 25 Gram(s) IV Push once  dextrose 50% Injectable 12.5 Gram(s) IV Push once  dextrose 50% Injectable 25 Gram(s) IV Push once  enalapril 5 milliGRAM(s) Oral daily  famotidine    Tablet 20 milliGRAM(s) Oral daily  glucagon  Injectable 1 milliGRAM(s) IntraMuscular once  influenza   Vaccine 0.5 milliLiter(s) IntraMuscular once  insulin glargine Injectable (LANTUS) 18 Unit(s) SubCutaneous at bedtime  insulin lispro (ADMELOG) corrective regimen sliding scale   SubCutaneous three times a day before meals  insulin lispro (ADMELOG) corrective regimen sliding scale   SubCutaneous at bedtime  insulin lispro Injectable (ADMELOG) 8 Unit(s) SubCutaneous three times a day before meals  lactated ringers. 1000 milliLiter(s) (125 mL/Hr) IV Continuous <Continuous>  metoclopramide 10 milliGRAM(s) Oral Before meals and at bedtime  mirtazapine 7.5 milliGRAM(s) Oral daily  nicotine -  14 mG/24Hr(s) Patch 1 patch Transdermal daily  polyethylene glycol 3350 17 Gram(s) Oral daily  senna 2 Tablet(s) Oral at bedtime    MEDICATIONS  (PRN):  acetaminophen     Tablet .. 975 milliGRAM(s) Oral every 8 hours PRN Temp greater or equal to 38C (100.4F), Mild Pain (1 - 3), Moderate Pain (4 - 6), Severe Pain (7 - 10)      Allergies    No Known Allergies    Intolerances      Review of Systems:  Constitutional: No fever  Eyes: No blurry vision  Neuro: No tremors  HEENT: No pain  Cardiovascular: No chest pain, palpitations  Respiratory: No SOB, no cough  GI: No nausea, vomiting, abdominal pain  : No dysuria  Skin: no rash  Psych: no depression  Endocrine: no polyuria, polydipsia  Hem/lymph: no swelling  Osteoporosis: no fractures    ALL OTHER SYSTEMS REVIEWED AND NEGATIVE    UNABLE TO OBTAIN    PHYSICAL EXAM:  VITALS: T(C): 36.6 (02-17-22 @ 12:15)  T(F): 97.8 (02-17-22 @ 12:15), Max: 98.5 (02-16-22 @ 19:41)  HR: 87 (02-17-22 @ 12:25) (84 - 101)  BP: 121/82 (02-17-22 @ 12:25) (121/82 - 152/85)  RR:  (16 - 17)  SpO2:  (96% - 100%)  Wt(kg): --  GENERAL: NAD, well-groomed, well-developed  EYES: No proptosis, no lid lag, anicteric  HEENT:  Atraumatic, Normocephalic, moist mucous membranes  THYROID: Normal size, no palpable nodules  RESPIRATORY: Clear to auscultation bilaterally; No rales, rhonchi, wheezing  CARDIOVASCULAR: Regular rate and rhythm; No murmurs; no peripheral edema  GI: Soft, nontender, non distended, normal bowel sounds  SKIN: Dry, intact, No rashes or lesions  MUSCULOSKELETAL: Full range of motion, normal strength  NEURO: sensation intact, extraocular movements intact, no tremor  PSYCH: Alert and oriented x 3, normal affect, normal mood  CUSHING'S SIGNS: no striae    POCT Blood Glucose.: 126 mg/dL (02-17-22 @ 12:13)  POCT Blood Glucose.: 101 mg/dL (02-17-22 @ 09:10)  POCT Blood Glucose.: 108 mg/dL (02-17-22 @ 07:28)  POCT Blood Glucose.: 167 mg/dL (02-17-22 @ 04:29)  POCT Blood Glucose.: 207 mg/dL (02-17-22 @ 03:29)  POCT Blood Glucose.: 175 mg/dL (02-17-22 @ 02:32)  POCT Blood Glucose.: 158 mg/dL (02-17-22 @ 01:22)  POCT Blood Glucose.: 282 mg/dL (02-17-22 @ 00:21)  POCT Blood Glucose.: 415 mg/dL (02-16-22 @ 23:21)  POCT Blood Glucose.: 574 mg/dL (02-16-22 @ 22:19)  POCT Blood Glucose.: 415 mg/dL (02-16-22 @ 19:46)                            8.7    11.32 )-----------( 377      ( 17 Feb 2022 07:34 )             28.5       02-17    134<L>  |  102  |  13  ----------------------------<  106<H>  3.9   |  19<L>  |  0.64    EGFR if : 138  EGFR if non : 119    Ca    8.3<L>      02-17  Mg     1.80     02-17  Phos  3.3     02-17    TPro  5.7<L>  /  Alb  3.2<L>  /  TBili  0.4  /  DBili  x   /  AST  22  /  ALT  14  /  AlkPhos  76  02-17      Thyroid Function Tests:  02-17 @ 07:34 TSH 0.72 FreeT4 -- T3 -- Anti TPO -- Anti Thyroglobulin Ab -- TSI --          02-17 Chol 226<H> Direct LDL -- LDL calculated 114<H>  Trig 61      Radiology:              ENDOCRINE INITIAL CONSULT - DM1, DKA   HPI:  32 y/o female, with a PmHx of T1DM with h/o gastroparesis, SI, HTN, multiple admissions for DKA, Cholecystectomy, Colitis, presented to the [facility] ED with abdominal pain consistent with prior DKA/abd.  admissions. Pt states yesterday 2/16 at around 1400hrs she started to get an 9/10, non-radiating, sharp, stabbing, abd pain located in the center of her stomach right above her bladder region. She states she was having associated symptoms of HA, nausea and vomiting but denies any diarrhea, blurred vision, fever, chills, sob, chest pain, dizziness. Pt states she thought the pain would go away so waited to come to the hospital. She also states prior to her arrival to the ED she had taken her blood sugar and it was high so she gave herself 4u of admelog. She states these symptoms are similar to when she gets dka and admits to being recently admitted to [facility] x 1 week ago in their ICU on an insulin gtt for 2 days.  Today, in the [facility] ED, she was found to be initially in DKA with a BHB of 4.9 and a positive anion gap. She was started on an insulin gtt and was given Lantus 20u x 1. Her gap has now closed and she feels much better. She also received several doses of Dilaudid for abd pain (was also given benadryl for itching associated with the Dilaudid). She appears comfortable at this time and her abd pain is about a 3/10. She is now being admitted to medicine for management of her uncontrolled diabetes and abdominal pain.    (17 Feb 2022 08:19)    ENDOCRINE HISTORY:   Dx at age 12, Follows w/ Dr. Bose - last seen 12/2021  Home regimen: Basaglar 18, Admelog 8-10units on sliding scale   BG levels/Dexcom (currently on LLQ abdomen) recently all over the place per pt, ranging between 46-380s. Reports that hypoglycemia was in the afternoon after patient gave herself a bolus but did not end up eating as much as she had initially planned to due to GI symptoms.   Was recently admitted to [place] and on an insulin ggt about 5 days ago per patient. Admitted here in 12/2021 for DKA as well. Per outpatient records, patient w/ >5-6 hospitalizations within the past year for DKA.   Dexcom data was reviewed by Dr. Bose in 12/2021 - 13% hypoglycemia, 54% hyperglycemia and in target 35% of the time   Complications +Nephropathy, +gastroparesis  Diet - reports limited po intake recently due to GI symptoms. Has not gone to see a GI doctor although patient was given a GI referral by Dr. Bose in 12/2021.   +Gastroparesis, +nephropathy  Reports intermittent compliance w/ enalapril - only if BP is elevated at home per pt   Not on statin.   Unclear if patient is taking ergocalciferol which was also prescribed per outpt records    Reports that she was resumed on po diet, did not eat lunch though but plans to eat dinner. Currently only feeling itchy from pain medications.     PAST MEDICAL & SURGICAL HISTORY:  Diabetes mellitus type 1    Gastroparesis due to DM    Colitis    Hypertension    Ectopic pregnancy  2009 and 2013, s/p removal of right and left fallopian tubes    History of cholecystectomy        FAMILY HISTORY:  Family history of type 1 diabetes mellitus (Grandparent)    FH: HTN (hypertension) (Father)    FHx: asthma  Brother    Social History:  Marital Status: Single, lives alone    Occupation: Unemployed, was a pharmacy tech    Tobacco Use: Smokes 2 pks/week    ETOH Use: denies    Drug Use: smokes marijuana 3 times per week    Flu Vaccine:  denies                Pneumonia Vaccine:  denies                     COVID Vaccine: denies (17 Feb 2022 08:19)    Home Medications:  Admelog SoloStar 100 units/mL injectable solution: 8 unit(s) injectable 3 times a day (17 Feb 2022 07:56)  Basaglar KwikPen 100 units/mL subcutaneous solution: 18 unit(s) subcutaneous once a day (at bedtime) (17 Feb 2022 07:56)  enalapril 5 mg oral tablet: 1 tab(s) orally once a day, As Needed (17 Feb 2022 07:56)  famotidine 20 mg oral tablet: 1 tab(s) orally once a day (17 Feb 2022 07:56)  metoclopramide 10 mg oral tablet: 1 tab(s) orally 4 times a day (before meals and at bedtime), As Needed (17 Feb 2022 07:56)      MEDICATIONS  (STANDING):  dextrose 40% Gel 15 Gram(s) Oral once  dextrose 5%. 1000 milliLiter(s) (50 mL/Hr) IV Continuous <Continuous>  dextrose 5%. 1000 milliLiter(s) (100 mL/Hr) IV Continuous <Continuous>  dextrose 50% Injectable 25 Gram(s) IV Push once  dextrose 50% Injectable 12.5 Gram(s) IV Push once  dextrose 50% Injectable 25 Gram(s) IV Push once  enalapril 5 milliGRAM(s) Oral daily  famotidine    Tablet 20 milliGRAM(s) Oral daily  glucagon  Injectable 1 milliGRAM(s) IntraMuscular once  influenza   Vaccine 0.5 milliLiter(s) IntraMuscular once  insulin glargine Injectable (LANTUS) 18 Unit(s) SubCutaneous at bedtime  insulin lispro (ADMELOG) corrective regimen sliding scale   SubCutaneous three times a day before meals  insulin lispro (ADMELOG) corrective regimen sliding scale   SubCutaneous at bedtime  insulin lispro Injectable (ADMELOG) 8 Unit(s) SubCutaneous three times a day before meals  lactated ringers. 1000 milliLiter(s) (125 mL/Hr) IV Continuous <Continuous>  metoclopramide 10 milliGRAM(s) Oral Before meals and at bedtime  mirtazapine 7.5 milliGRAM(s) Oral daily  nicotine -  14 mG/24Hr(s) Patch 1 patch Transdermal daily  polyethylene glycol 3350 17 Gram(s) Oral daily  senna 2 Tablet(s) Oral at bedtime    MEDICATIONS  (PRN):  acetaminophen     Tablet .. 975 milliGRAM(s) Oral every 8 hours PRN Temp greater or equal to 38C (100.4F), Mild Pain (1 - 3), Moderate Pain (4 - 6), Severe Pain (7 - 10)      Allergies    No Known Allergies    Intolerances      Review of Systems:  Constitutional: No fever, +itching   Eyes: No blurry vision  Neuro: No tremors  HEENT: No pain  Cardiovascular: No chest pain, palpitations  Respiratory: No SOB, no cough  GI: No nausea, vomiting, abdominal pain  : No dysuria  Skin: no rash  Psych: no depression  Endocrine: no polyuria, polydipsia  Hem/lymph: no swelling  Osteoporosis: no fractures  ALL OTHER SYSTEMS REVIEWED AND NEGATIVE    PHYSICAL EXAM:  VITALS: T(C): 36.6 (02-17-22 @ 12:15)  T(F): 97.8 (02-17-22 @ 12:15), Max: 98.5 (02-16-22 @ 19:41)  HR: 87 (02-17-22 @ 12:25) (84 - 101)  BP: 121/82 (02-17-22 @ 12:25) (121/82 - 152/85)  RR:  (16 - 17)  SpO2:  (96% - 100%)  Wt(kg): --  GENERAL: NAD, well-groomed, well-developed, young female   EYES: No proptosis, no lid lag, anicteric  HEENT:  Atraumatic, Normocephalic, moist mucous membranes  THYROID: Normal size, no palpable nodules  RESPIRATORY: Clear to auscultation bilaterally; No rales, rhonchi, wheezing  CARDIOVASCULAR: Regular rate and rhythm; No murmurs; no peripheral edema  GI: Soft, nontender, non distended, normal bowel sounds, +LLQ dexcom   SKIN: Dry, intact, No rashes or lesions  MUSCULOSKELETAL: Full range of motion, normal strength  NEURO: sensation intact, extraocular movements intact, no tremor  PSYCH: Alert and oriented x 3, normal affect, normal mood  CUSHING'S SIGNS: no striae    POCT Blood Glucose.: 126 mg/dL (02-17-22 @ 12:13)  POCT Blood Glucose.: 101 mg/dL (02-17-22 @ 09:10)  POCT Blood Glucose.: 108 mg/dL (02-17-22 @ 07:28)  POCT Blood Glucose.: 167 mg/dL (02-17-22 @ 04:29)  POCT Blood Glucose.: 207 mg/dL (02-17-22 @ 03:29)  POCT Blood Glucose.: 175 mg/dL (02-17-22 @ 02:32)  POCT Blood Glucose.: 158 mg/dL (02-17-22 @ 01:22)  POCT Blood Glucose.: 282 mg/dL (02-17-22 @ 00:21)  POCT Blood Glucose.: 415 mg/dL (02-16-22 @ 23:21)  POCT Blood Glucose.: 574 mg/dL (02-16-22 @ 22:19)  POCT Blood Glucose.: 415 mg/dL (02-16-22 @ 19:46)                            8.7    11.32 )-----------( 377      ( 17 Feb 2022 07:34 )             28.5       02-17    134<L>  |  102  |  13  ----------------------------<  106<H>  3.9   |  19<L>  |  0.64    EGFR if : 138  EGFR if non : 119    Ca    8.3<L>      02-17  Mg     1.80     02-17  Phos  3.3     02-17    TPro  5.7<L>  /  Alb  3.2<L>  /  TBili  0.4  /  DBili  x   /  AST  22  /  ALT  14  /  AlkPhos  76  02-17      Thyroid Function Tests:  02-17 @ 07:34 TSH 0.72 FreeT4 -- T3 -- Anti TPO -- Anti Thyroglobulin Ab -- TSI --          02-17 Chol 226<H> Direct LDL -- LDL calculated 114<H>  Trig 61      Radiology:

## 2022-02-17 NOTE — CONSULT NOTE ADULT - ATTENDING COMMENTS
Type 1 DM complicated by gastroparesis, nephropathy, multiple DKA, here with DKA, HbA1c 9.7%. DKA resolved, patient starting to eat, agree with basal bolus plan as outlined. Uses Dexcom. Outpatient follow up with Dr. Bose.  Endocrine team consulted for uncontrolled diabetes. Patient is high risk with high level decision making due to uncontrolled diabetes which places patient at high risk for cardiovascular and cerebrovascular events. Patient with lability of glucose requiring close monitoring and insulin adjustments.    Shanna Hathaway MD  Division of Endocrinology  Pager: [phone]    If after 6PM or before 9AM, or on weekends/holidays, please call endocrine answering service for assistance ([phone]).  For nonurgent matters email [email or web address] for assistance.

## 2022-02-17 NOTE — ED PROVIDER NOTE - PROGRESS NOTE DETAILS
Chance Conway, DO PGY-2: DKA present, started on insulin drip. Chance Conway, DO PGY-2: BG decreased to 150, decreased insulin rate, started d5ns, gave subq lantus, will dc drip after 2 hours

## 2022-02-17 NOTE — CHART NOTE - NSCHARTNOTEFT_GEN_A_CORE
Called by Cassidy LEYVA pt was c/o abd pain, nausea, and had episodes of vomiting post prandially. Patient refused her Tylenol & Reglan PO as ordered and was asking for Dilaudid IV for pain.   Patient was given Tylenol 1 gm IVPB for abd pain & Reglan 5 mg IVP for nausea, vomiting / gastroparesis.           Addendum note: 2/17/22 23:40  Notified by Cassidy LEYVA pt was c/o abd pain and requesting Dilaudid IV.   Per RN pt's nausea, vomiting had resolved - pt was given Toradol 15 mg IVP   Received a call from RN pt was refusing Toradol IV     Patient was seen and evaluated.  w/d w/n young [place] female found sitting comfortably in bed using her cell phone A & O x 4 in NAD  She stated that Tylenol does not work for her pain and stated only Dilaudid IV was effective for her pain as she received this medication earlier. I spoke with pt at length regarding the side effects of Dilaudid and narcotics causing Nausea, vomiting, constipation she stated she was free of those symptoms at this time and had a large bowel movement few minutes ago. Patient stated Benadryl causes her nausea and vomiting.   Patient stated that her abd pain was still a 9 /10 in severity and was offered Toradol - she said she had itching from that so was not going to take it and was then offered Tramadol pt refused.   Dilaudid 0.5 mg IVP x 1 dose given for pain       Addendum Note: 2/18/22   Received a call from Cassidy LEYVA pt now wanted Benadryl IV. RN reminded pt of her statement earlier to us that Benadryl causes Nausea & vomiting.  Per RN pt stated those symptoms only occur with Benadryl PO  & not with IV version.    Patient then left against medical advise and refused to sign the form.

## 2022-02-17 NOTE — ED PROVIDER NOTE - CLINICAL SUMMARY MEDICAL DECISION MAKING FREE TEXT BOX
Likely DKA vs gastroparesis ISO extensive history no indication to CTAP at this time, fluids, insulin therapy when labs return, haldol /zofran for vomiting, follow dka protocol, admission

## 2022-02-17 NOTE — H&P ADULT - NSICDXFAMILYHX_GEN_ALL_CORE_FT
FAMILY HISTORY:  FHx: asthma, Brother    Father  Still living? Unknown  FH: HTN (hypertension), Age at diagnosis: Age Unknown    Grandparent  Still living? Unknown  Family history of type 1 diabetes mellitus, Age at diagnosis: Age Unknown

## 2022-02-17 NOTE — H&P ADULT - NEGATIVE PSYCHIATRIC SYMPTOMS
no suicidal ideation/no depression/no memory loss/no visual hallucinations/no auditory hallucinations

## 2022-02-17 NOTE — H&P ADULT - NSICDXPASTMEDICALHX_GEN_ALL_CORE_FT
PAST MEDICAL HISTORY:  Colitis     Diabetes mellitus type 1     Gastroparesis due to DM     Hypertension

## 2022-02-17 NOTE — H&P ADULT - PROBLEM SELECTOR PLAN 1
Glucose: 482-->153          UA - mod ketones, Glucose > 1000           COVID neg  BHB: 4.9           HgbA1-c: 9.7  - monitor fs, moderate insulin ssc  - cont pre-meal and basal insulin  - on IV fluids  - House Endo c/s (emailed)

## 2022-02-17 NOTE — CHART NOTE - NSCHARTNOTEFT_GEN_A_CORE
Medicine PA Note - ISTOP    Search Terms: Nissa Rodriges, 1990Search Date: 02/17/2022 09:28:12 AM  The Drug Utilization Report below displays all of the controlled substance prescriptions, if any, that your patient has filled in the last twelve months. The information displayed on this report is compiled from pharmacy submissions to the Department, and accurately reflects the information as submitted by the pharmacies.    This report was requested by: Grover Rivera | Reference #: [id number]    Others' Prescriptions  Patient Name: Nissa RodrigesBirth Date: 1990  Address: [address] [place] [id number]: Female  Rx Written	Rx Dispensed	Drug	Quantity	Days Supply	Prescriber Name	Prescriber Loren #	Payment Method	Dispenser  11/09/2021	11/09/2021	buprenorphine-naloxone 2-0.5 mg sl tablet	36	9	Nedrow, Katie M	[id number]	Medicaid	[facility]    Patient Name: Nissa RodrigesBirth Date: 1990  Address: [address] [place] [id number]: Female  Rx Written	Rx Dispensed	Drug	Quantity	Days Supply	Prescriber Name	Prescriber Loren #	Payment Method	Dispenser  09/06/2021	09/06/2021	pregabalin 25 mg capsule	90	30	[id number]	Medicaid	[id number]  05/14/2021	05/14/2021	pregabalin 25 mg capsule	90	30	[facility]	[id number]	Medicaid	[id number]  * - Drugs marked with an asterisk are compound drugs. If the compound drug is made up of more than one controlled substance, then each controlled substance will be a separate row in the table.    Grover Rivera PA-C  x [phone]

## 2022-02-17 NOTE — ED PROVIDER NOTE - ATTENDING CONTRIBUTION TO CARE
agree with resident note    "31F pmh T1DM w/ hx gastroparesis, SI, multiple admissions for DKA presnts to the ED for abdominal pain, pt states pain is consistent w/ prior DKA/abd pain, pt took 4u admelog prior to arriving to ED, given 4mg zofran enroute, pt endorses n/v no hematemesis no diarrhea, no chest pain/sob, pt states she usually gets pain medication when she has similar symptoms. As per chart review pt wrapped thermometer cord around neck last time, became extremely agitated, required multiple rounds of sedation. Pt denies fevers/chills/infectious symptoms."    PE: highly uncomfortable, dry heaving, afebrile, normotensive; CTAB/L; s1 s2 no m/r/g abd soft/minimally tender ext: no edema    Imp: likely DKA, IVF, labs, w/u for infectious causes

## 2022-02-17 NOTE — H&P ADULT - NSHPSOCIALHISTORY_GEN_ALL_CORE
Marital Status: Single, lives alone    Occupation: Unemployed, was a pharmacy tech    Tobacco Use: Smokes 2 pks/week    ETOH Use: denies    Drug Use: smokes marijuana 3 times per week    Flu Vaccine:  denies                Pneumonia Vaccine:  denies                     COVID Vaccine: denies

## 2022-02-17 NOTE — H&P ADULT - ATTENDING COMMENTS
30 y/o female, with a PmHx of T1DM with h/o gastroparesis, SI, HTN, multiple admissions for DKA, Cholecystectomy, Colitis, recent admission to [facility] 1 week ago for DKA p/w abd pain, nausea, emesis admitted for mild DKA likely 2/2 non adherence (although reports compliance), briefly on insulin gtt in ED now AG closed, transitioned to home dose basal/bolus. A1c 9.3, endo consulted. Patient requesting IV Dilaudid specifically for severe pain, reports hives with toradol and morphine (no documentation of intolerance in prior notes). Advised will only administer one additional dose for this admission, encourage PO tylenol 975 mg TID and PO reglan premeal for gastroparesis. patient has hx of wrapping cord around neck to get attention, per previous  evaluation started on remeron 7.5 mg qhs, pt reports never received rx on discharge, agreeable to restarting Remeron this admission.      Remaining management as mentioned above 30 y/o female, with a PmHx of T1DM with h/o gastroparesis, SI, HTN, multiple admissions for DKA, Cholecystectomy, Colitis, recent admission to [facility] 1 week ago for DKA p/w abd pain, nausea, emesis admitted for mild DKA likely 2/2 non adherence (although reports compliance), briefly on insulin gtt in ED now AG closed, transitioned to home dose basal/bolus and MIVF. A1c 9.3, endo consulted. Patient requesting IV Dilaudid specifically for severe pain, reports hives with toradol and morphine (no documentation of intolerance in prior notes). Advised will only administer one additional dose for this admission, encourage PO tylenol 975 mg TID and PO reglan premeal for gastroparesis. patient has hx of wrapping cord around neck to get attention, per previous  evaluation started on remeron 7.5 mg qhs, pt reports never received rx on discharge, agreeable to restarting Remeron this admission. Currently denies SI/HI.      Remaining management as mentioned above

## 2022-02-17 NOTE — CONSULT NOTE ADULT - ASSESSMENT
DKA, now resolved   Uncontrolled DM Type 1 w/ complications   A1C 9.7% (02.17.22 @ 07:34)    HTN  Recommendations:   - BP goal < 130/80   - on Enlapril at home     HLD   in 12/2021  Recommendations:   - LDL goal < 70   - defer to primary team     Gastroparesis   - currently on reglan   - monitor QTc     Discussed w/ Dr. Nia Elliott, DO   Endocrinology Fellow   Can be reached on [employer] Teams  Please call [phone] after hours and on weekends/holidays.  32 y/o female, with a PmHx of T1DM with h/o gastroparesis, SI, HTN, multiple admissions for DKA, Cholecystectomy, Colitis, presented to the [facility] ED with abdominal pain, admitted for DKA, managed on insulin ggt in ED & transitioned to basal/bolus earlier today AM. Endocrine consulted for management of uncontrolled DM1.     DKA, now resolved   Uncontrolled DM Type 1 w/ complications   A1C 9.7% (02.17.22 @ 07:34)  s/p Lantus 20 units SC 1am  Recommendations:   - Lantus 18units SC QHS   - Admelog 4units SC Premeal/TIDAC   - Admelog LOW Correction Scale Premeal & LOW Correction Scale Bedtime   - Goal -180  - Blood glucose monitoring Premeal/Bedtime   - Hypoglycemia protocol   - Carb Consistent Diet   DC Planning: on Lantus/Humalog insulin pens. Doses to be determined pending clinical course. Outpatient follow up with Dr. Bose @ [address] [place] [address]. Patient s/p removal of b/l fallopian tubes; prior to planning any future pregnancies, will need better glycemic control    HTN  Recommendations:   - BP goal < 130/80   - on Enlapril 5mg daily   - defer to primary team     HLD   in 12/2021  Recommendations:   - LDL goal < 70   - defer to primary team   - consider addition of statin if no other contraindications     Gastroparesis   - currently on reglan   - monitor QTc   - better glycemic control advised     Discussed w/ Dr. Potts.     Nia Elliott DO   Endocrinology Fellow   Can be reached on [employer] Teams  Please call [phone] after hours and on weekends/holidays.  32 y/o female, with a PmHx of T1DM with h/o gastroparesis, SI, HTN, multiple admissions for DKA, Cholecystectomy, Colitis, presented to the [facility] ED with abdominal pain, admitted for DKA, managed on insulin ggt in ED & transitioned to basal/bolus earlier today AM. Endocrine consulted for management of uncontrolled DM1.     DKA, now resolved   Uncontrolled DM Type 1 w/ complications   A1C 9.7% (02.17.22 @ 07:34)  Was on insulin ggt in ED, unknown ggt rates & unclear when ggt was d/c   s/p Lantus 20 units SC 1am  Recommendations:   - Lantus 18units SC QHS   - Admelog 4units SC Premeal/TIDAC   - Admelog LOW Correction Scale Premeal & LOW Correction Scale Bedtime   - Goal -180  - Blood glucose monitoring Premeal/Bedtime   - Hypoglycemia protocol   - Carb Consistent Diet   DC Planning: on Lantus/Humalog insulin pens. Doses to be determined pending clinical course. Outpatient follow up with Dr. Bose @ [address] [place] [address]. Patient s/p removal of b/l fallopian tubes; prior to planning any future pregnancies, will need better glycemic control    HTN  Recommendations:   - BP goal < 130/80   - on Enlapril 5mg daily   - defer to primary team     HLD    Recommendations:   - LDL goal < 70   - defer to primary team   - consider addition of statin if no other contraindications     Gastroparesis   - currently on reglan   - monitor QTc   - better glycemic control advised     Discussed w/ Dr. Potts.     Nia Elliott DO   Endocrinology Fellow   Can be reached on [employer] Teams  Please call [phone] after hours and on weekends/holidays.

## 2022-02-17 NOTE — PROVIDER CONTACT NOTE (OTHER) - BACKGROUND
pt is 30 y/o female admit diagnosis type 2 DM with ketoacidosis without coma  pmh includes type 1dm, gastroparesis, colitis

## 2022-02-17 NOTE — H&P ADULT - PROBLEM SELECTOR PLAN 2
- consistent with prior DKA episodes  - n/v resolved  - on IV fluids  - cont metoclopramide, senna, miralax

## 2022-02-17 NOTE — PATIENT PROFILE ADULT - FALL HARM RISK - HARM RISK INTERVENTIONS

## 2022-02-18 RX ORDER — POLYETHYLENE GLYCOL 3350 17 G/17G
17 POWDER, FOR SOLUTION ORAL
Qty: 0 | Refills: 0 | DISCHARGE
Start: 2022-02-18

## 2022-02-18 NOTE — DISCHARGE NOTE PROVIDER - NSDCMRMEDTOKEN_GEN_ALL_CORE_FT
Admelog SoloStar 100 units/mL injectable solution: 8 unit(s) injectable 3 times a day  Basaglar KwikPen 100 units/mL subcutaneous solution: 18 unit(s) subcutaneous once a day (at bedtime)  enalapril 5 mg oral tablet: 1 tab(s) orally once a day, As Needed  famotidine 20 mg oral tablet: 1 tab(s) orally once a day  metoclopramide 10 mg oral tablet: 1 tab(s) orally 4 times a day (before meals and at bedtime), As Needed  polyethylene glycol 3350 oral powder for reconstitution: 17 gram(s) orally once a day

## 2022-02-18 NOTE — DISCHARGE NOTE PROVIDER - HOSPITAL COURSE
32 y/o female, with a PmHx of DM with h/o gastroparesis, SI, HTN, multiple admissions for DKA, Cholecystectomy, Colitis, presented to the [facility] ED with abdominal pain consistent with prior DKA/abd.  admissions. Admitted to medicine for DKA and abd pain.     Problem/Plan - 1:  ·  Problem: DKA (diabetic ketoacidosis).   ·  Plan: Glucose: 482-->153          UA - mod ketones, Glucose > 1000           COVID neg  BHB: 4.9           HgbA1-c: 9.7  - monitor fs, moderate insulin ssc  - cont pre-meal and basal insulin  - on IV fluids  - House Endo c/s (emailed).     Problem/Plan - 2:  ·  Problem: Gastroparesis due to DM.   ·  Plan: - consistent with prior DKA episodes  - n/v resolved  - on IV fluids  - cont metoclopramide, senna, miralax.     Problem/Plan - 3:  ·  Problem: Newly recognized heart murmur.   ·  Plan: - echocardiogram ordered.     Problem/Plan - 4:  ·  Problem: Smoker.   ·  Plan: - smoking cessation  - pt accepted a nicotine patch.     Problem/Plan - 5:  ·  Problem: Hypertension.   ·  Plan: - monitor bp, ordered enalapril standing instead of prn.     Problem/Plan - 6:  ·  Problem: Need for prophylactic measure.   ·  Plan: Low vte risk, not needed.    Called by nurse to evaluate pt who wishes to leave against medical advice. Pt is A and O x3 and is of sound mind and has full capacity to make medical decisions. Pt was informed of their medical condition and the seriousness of leaveing AMA- the risks of leaving AMA such as the risk of heart attack, stroke, seizure, and even death were fully explained to the patient and pt states that they are fully aware of their conditions and the risks leaving AMA. Attending made aware.

## 2022-02-18 NOTE — DISCHARGE NOTE PROVIDER - NSDCCPCAREPLAN_GEN_ALL_CORE_FT
PRINCIPAL DISCHARGE DIAGNOSIS  Diagnosis: DKA (diabetic ketoacidosis)  Assessment and Plan of Treatment: Outpatient follow up with Endocrinologist

## 2022-02-18 NOTE — DISCHARGE NOTE PROVIDER - CARE PROVIDER_API CALL
Chrissy Bose (DO)  EndocrinologyMetabDiabetes; Internal Medicine  [address]  [place] [address]  Phone: [phone]  Fax: [phone]  Follow Up Time:

## 2022-02-18 NOTE — DISCHARGE NOTE PROVIDER - NSDCFUSCHEDAPPT_GEN_ALL_CORE_FT
DOMINIQUE VALENCIA ; 02/28/2022 ; [facility] [address] [facility]  DOMINIQUE VALENCIA ; 03/17/2022 ; [facility] [address] [facility] [facility]

## 2022-02-21 ENCOUNTER — INPATIENT (INPATIENT)
Facility: HOSPITAL | Age: 32
LOS: 3 days | Discharge: LEFT BEFORE DISCHARGE | End: 2022-02-25
Attending: INTERNAL MEDICINE | Admitting: INTERNAL MEDICINE
Payer: COMMERCIAL

## 2022-02-21 VITALS
HEART RATE: 142 BPM | SYSTOLIC BLOOD PRESSURE: 179 MMHG | OXYGEN SATURATION: 97 % | HEIGHT: 66 IN | TEMPERATURE: 100 F | WEIGHT: 141.98 LBS | DIASTOLIC BLOOD PRESSURE: 106 MMHG | RESPIRATION RATE: 20 BRPM

## 2022-02-21 DIAGNOSIS — O00.9 ECTOPIC PREGNANCY, UNSPECIFIED: Chronic | ICD-10-CM

## 2022-02-21 DIAGNOSIS — Z90.49 ACQUIRED ABSENCE OF OTHER SPECIFIED PARTS OF DIGESTIVE TRACT: Chronic | ICD-10-CM

## 2022-02-21 PROBLEM — I10 ESSENTIAL (PRIMARY) HYPERTENSION: Chronic | Status: ACTIVE | Noted: 2022-02-17

## 2022-02-21 LAB
ACETONE SERPL-MCNC: NEGATIVE — SIGNIFICANT CHANGE UP
ALBUMIN SERPL ELPH-MCNC: 3 G/DL — LOW (ref 3.3–5)
ALP SERPL-CCNC: 105 U/L — SIGNIFICANT CHANGE UP (ref 40–120)
ALT FLD-CCNC: 26 U/L — SIGNIFICANT CHANGE UP (ref 12–78)
ANION GAP SERPL CALC-SCNC: 12 MMOL/L — SIGNIFICANT CHANGE UP (ref 5–17)
ANION GAP SERPL CALC-SCNC: 7 MMOL/L — SIGNIFICANT CHANGE UP (ref 5–17)
ANISOCYTOSIS BLD QL: SLIGHT — SIGNIFICANT CHANGE UP
APPEARANCE UR: CLEAR — SIGNIFICANT CHANGE UP
APTT BLD: 29.9 SEC — SIGNIFICANT CHANGE UP (ref 27.5–35.5)
AST SERPL-CCNC: 19 U/L — SIGNIFICANT CHANGE UP (ref 15–37)
BACTERIA # UR AUTO: ABNORMAL
BASE EXCESS BLDV CALC-SCNC: -3.4 MMOL/L — LOW (ref -2–3)
BASOPHILS # BLD AUTO: 0 K/UL — SIGNIFICANT CHANGE UP (ref 0–0.2)
BASOPHILS NFR BLD AUTO: 0 % — SIGNIFICANT CHANGE UP (ref 0–2)
BILIRUB SERPL-MCNC: 0.5 MG/DL — SIGNIFICANT CHANGE UP (ref 0.2–1.2)
BILIRUB UR-MCNC: NEGATIVE — SIGNIFICANT CHANGE UP
BLOOD GAS COMMENTS, VENOUS: SIGNIFICANT CHANGE UP
BUN SERPL-MCNC: 15 MG/DL — SIGNIFICANT CHANGE UP (ref 7–23)
BUN SERPL-MCNC: 16 MG/DL — SIGNIFICANT CHANGE UP (ref 7–23)
CALCIUM SERPL-MCNC: 8 MG/DL — LOW (ref 8.5–10.1)
CALCIUM SERPL-MCNC: 8.7 MG/DL — SIGNIFICANT CHANGE UP (ref 8.5–10.1)
CHLORIDE SERPL-SCNC: 102 MMOL/L — SIGNIFICANT CHANGE UP (ref 96–108)
CHLORIDE SERPL-SCNC: 106 MMOL/L — SIGNIFICANT CHANGE UP (ref 96–108)
CO2 BLDV-SCNC: 22 MMOL/L — SIGNIFICANT CHANGE UP (ref 22–26)
CO2 SERPL-SCNC: 20 MMOL/L — LOW (ref 22–31)
CO2 SERPL-SCNC: 23 MMOL/L — SIGNIFICANT CHANGE UP (ref 22–31)
COLOR SPEC: YELLOW — SIGNIFICANT CHANGE UP
CREAT SERPL-MCNC: 0.93 MG/DL — SIGNIFICANT CHANGE UP (ref 0.5–1.3)
CREAT SERPL-MCNC: 1.02 MG/DL — SIGNIFICANT CHANGE UP (ref 0.5–1.3)
DIFF PNL FLD: ABNORMAL
EOSINOPHIL # BLD AUTO: 0.3 K/UL — SIGNIFICANT CHANGE UP (ref 0–0.5)
EOSINOPHIL NFR BLD AUTO: 3 % — SIGNIFICANT CHANGE UP (ref 0–6)
EPI CELLS # UR: SIGNIFICANT CHANGE UP
FLUAV AG NPH QL: SIGNIFICANT CHANGE UP
FLUBV AG NPH QL: SIGNIFICANT CHANGE UP
GAS PNL BLDV: SIGNIFICANT CHANGE UP
GLUCOSE BLDC GLUCOMTR-MCNC: 407 MG/DL — HIGH (ref 70–99)
GLUCOSE BLDC GLUCOMTR-MCNC: 421 MG/DL — HIGH (ref 70–99)
GLUCOSE BLDC GLUCOMTR-MCNC: 427 MG/DL — HIGH (ref 70–99)
GLUCOSE BLDC GLUCOMTR-MCNC: 448 MG/DL — HIGH (ref 70–99)
GLUCOSE BLDC GLUCOMTR-MCNC: >600 MG/DL — CRITICAL HIGH (ref 70–99)
GLUCOSE BLDC GLUCOMTR-MCNC: >600 MG/DL — CRITICAL HIGH (ref 70–99)
GLUCOSE SERPL-MCNC: 394 MG/DL — HIGH (ref 70–99)
GLUCOSE SERPL-MCNC: 634 MG/DL — CRITICAL HIGH (ref 70–99)
GLUCOSE UR QL: 1000 MG/DL
HCG SERPL-ACNC: <1 MIU/ML — SIGNIFICANT CHANGE UP
HCO3 BLDV-SCNC: 21 MMOL/L — LOW (ref 22–28)
HCT VFR BLD CALC: 31.7 % — LOW (ref 34.5–45)
HGB BLD-MCNC: 9.4 G/DL — LOW (ref 11.5–15.5)
HOROWITZ INDEX BLDV+IHG-RTO: 21 — SIGNIFICANT CHANGE UP
HYPOCHROMIA BLD QL: SLIGHT — SIGNIFICANT CHANGE UP
INR BLD: 0.98 RATIO — SIGNIFICANT CHANGE UP (ref 0.88–1.16)
KETONES UR-MCNC: ABNORMAL
LACTATE SERPL-SCNC: 3.1 MMOL/L — HIGH (ref 0.7–2)
LACTATE SERPL-SCNC: 3.2 MMOL/L — HIGH (ref 0.7–2)
LEUKOCYTE ESTERASE UR-ACNC: NEGATIVE — SIGNIFICANT CHANGE UP
LYMPHOCYTES # BLD AUTO: 2.79 K/UL — SIGNIFICANT CHANGE UP (ref 1–3.3)
LYMPHOCYTES # BLD AUTO: 28 % — SIGNIFICANT CHANGE UP (ref 13–44)
MANUAL SMEAR VERIFICATION: SIGNIFICANT CHANGE UP
MCHC RBC-ENTMCNC: 18.3 PG — LOW (ref 27–34)
MCHC RBC-ENTMCNC: 29.7 G/DL — LOW (ref 32–36)
MCV RBC AUTO: 61.6 FL — LOW (ref 80–100)
MICROCYTES BLD QL: SLIGHT — SIGNIFICANT CHANGE UP
MONOCYTES # BLD AUTO: 0.7 K/UL — SIGNIFICANT CHANGE UP (ref 0–0.9)
MONOCYTES NFR BLD AUTO: 7 % — SIGNIFICANT CHANGE UP (ref 2–14)
NEUTROPHILS # BLD AUTO: 6.18 K/UL — SIGNIFICANT CHANGE UP (ref 1.8–7.4)
NEUTROPHILS NFR BLD AUTO: 61 % — SIGNIFICANT CHANGE UP (ref 43–77)
NEUTS BAND # BLD: 1 % — SIGNIFICANT CHANGE UP (ref 0–8)
NITRITE UR-MCNC: NEGATIVE — SIGNIFICANT CHANGE UP
NRBC # BLD: 0 /100 — SIGNIFICANT CHANGE UP (ref 0–0)
NRBC # BLD: SIGNIFICANT CHANGE UP /100 WBCS (ref 0–0)
PCO2 BLDV: 37 MMHG — LOW (ref 42–55)
PH BLDV: 7.37 — SIGNIFICANT CHANGE UP (ref 7.32–7.43)
PH UR: 6 — SIGNIFICANT CHANGE UP (ref 5–8)
PLAT MORPH BLD: NORMAL — SIGNIFICANT CHANGE UP
PLATELET # BLD AUTO: 416 K/UL — HIGH (ref 150–400)
PO2 BLDV: 38 MMHG — SIGNIFICANT CHANGE UP (ref 25–45)
POLYCHROMASIA BLD QL SMEAR: SLIGHT — SIGNIFICANT CHANGE UP
POTASSIUM SERPL-MCNC: 3.6 MMOL/L — SIGNIFICANT CHANGE UP (ref 3.5–5.3)
POTASSIUM SERPL-MCNC: 3.6 MMOL/L — SIGNIFICANT CHANGE UP (ref 3.5–5.3)
POTASSIUM SERPL-SCNC: 3.6 MMOL/L — SIGNIFICANT CHANGE UP (ref 3.5–5.3)
POTASSIUM SERPL-SCNC: 3.6 MMOL/L — SIGNIFICANT CHANGE UP (ref 3.5–5.3)
PROT SERPL-MCNC: 7.1 GM/DL — SIGNIFICANT CHANGE UP (ref 6–8.3)
PROT UR-MCNC: 100 MG/DL
PROTHROM AB SERPL-ACNC: 11.4 SEC — SIGNIFICANT CHANGE UP (ref 10.6–13.6)
RBC # BLD: 5.15 M/UL — SIGNIFICANT CHANGE UP (ref 3.8–5.2)
RBC # FLD: 20.8 % — HIGH (ref 10.3–14.5)
RBC BLD AUTO: ABNORMAL
RBC CASTS # UR COMP ASSIST: ABNORMAL /HPF (ref 0–4)
SAO2 % BLDV: 65.6 % — LOW (ref 94–98)
SARS-COV-2 RNA SPEC QL NAA+PROBE: SIGNIFICANT CHANGE UP
SCHISTOCYTES BLD QL AUTO: SLIGHT — SIGNIFICANT CHANGE UP
SODIUM SERPL-SCNC: 134 MMOL/L — LOW (ref 135–145)
SODIUM SERPL-SCNC: 136 MMOL/L — SIGNIFICANT CHANGE UP (ref 135–145)
SP GR SPEC: 1.01 — SIGNIFICANT CHANGE UP (ref 1.01–1.02)
UROBILINOGEN FLD QL: NEGATIVE MG/DL — SIGNIFICANT CHANGE UP
WBC # BLD: 9.96 K/UL — SIGNIFICANT CHANGE UP (ref 3.8–10.5)
WBC # FLD AUTO: 9.96 K/UL — SIGNIFICANT CHANGE UP (ref 3.8–10.5)
WBC UR QL: SIGNIFICANT CHANGE UP

## 2022-02-21 PROCEDURE — 99252 IP/OBS CONSLTJ NEW/EST SF 35: CPT

## 2022-02-21 PROCEDURE — 74177 CT ABD & PELVIS W/CONTRAST: CPT | Mod: 26,MA

## 2022-02-21 PROCEDURE — 93010 ELECTROCARDIOGRAM REPORT: CPT

## 2022-02-21 PROCEDURE — 71045 X-RAY EXAM CHEST 1 VIEW: CPT | Mod: 26

## 2022-02-21 PROCEDURE — 99291 CRITICAL CARE FIRST HOUR: CPT

## 2022-02-21 PROCEDURE — 99221 1ST HOSP IP/OBS SF/LOW 40: CPT

## 2022-02-21 RX ORDER — CEFTRIAXONE 500 MG/1
1000 INJECTION, POWDER, FOR SOLUTION INTRAMUSCULAR; INTRAVENOUS EVERY 24 HOURS
Refills: 0 | Status: DISCONTINUED | OUTPATIENT
Start: 2022-02-22 | End: 2022-02-22

## 2022-02-21 RX ORDER — SODIUM CHLORIDE 9 MG/ML
2000 INJECTION INTRAMUSCULAR; INTRAVENOUS; SUBCUTANEOUS ONCE
Refills: 0 | Status: COMPLETED | OUTPATIENT
Start: 2022-02-21 | End: 2022-02-21

## 2022-02-21 RX ORDER — SODIUM CHLORIDE 9 MG/ML
1000 INJECTION, SOLUTION INTRAVENOUS
Refills: 0 | Status: DISCONTINUED | OUTPATIENT
Start: 2022-02-21 | End: 2022-02-25

## 2022-02-21 RX ORDER — HYDROMORPHONE HYDROCHLORIDE 2 MG/ML
0.5 INJECTION INTRAMUSCULAR; INTRAVENOUS; SUBCUTANEOUS THREE TIMES A DAY
Refills: 0 | Status: DISCONTINUED | OUTPATIENT
Start: 2022-02-21 | End: 2022-02-22

## 2022-02-21 RX ORDER — INSULIN LISPRO 100/ML
8 VIAL (ML) SUBCUTANEOUS
Refills: 0 | Status: DISCONTINUED | OUTPATIENT
Start: 2022-02-21 | End: 2022-02-22

## 2022-02-21 RX ORDER — ATORVASTATIN CALCIUM 80 MG/1
10 TABLET, FILM COATED ORAL AT BEDTIME
Refills: 0 | Status: DISCONTINUED | OUTPATIENT
Start: 2022-02-21 | End: 2022-02-22

## 2022-02-21 RX ORDER — INSULIN LISPRO 100/ML
8 VIAL (ML) SUBCUTANEOUS ONCE
Refills: 0 | Status: COMPLETED | OUTPATIENT
Start: 2022-02-21 | End: 2022-02-21

## 2022-02-21 RX ORDER — CEFTRIAXONE 500 MG/1
1000 INJECTION, POWDER, FOR SOLUTION INTRAMUSCULAR; INTRAVENOUS ONCE
Refills: 0 | Status: COMPLETED | OUTPATIENT
Start: 2022-02-21 | End: 2022-02-21

## 2022-02-21 RX ORDER — INSULIN HUMAN 100 [IU]/ML
10 INJECTION, SOLUTION SUBCUTANEOUS ONCE
Refills: 0 | Status: COMPLETED | OUTPATIENT
Start: 2022-02-21 | End: 2022-02-21

## 2022-02-21 RX ORDER — DIPHENHYDRAMINE HCL 50 MG
25 CAPSULE ORAL ONCE
Refills: 0 | Status: COMPLETED | OUTPATIENT
Start: 2022-02-21 | End: 2022-02-21

## 2022-02-21 RX ORDER — ACETAMINOPHEN 500 MG
975 TABLET ORAL ONCE
Refills: 0 | Status: COMPLETED | OUTPATIENT
Start: 2022-02-21 | End: 2022-02-21

## 2022-02-21 RX ORDER — POLYETHYLENE GLYCOL 3350 17 G/17G
17 POWDER, FOR SOLUTION ORAL DAILY
Refills: 0 | Status: DISCONTINUED | OUTPATIENT
Start: 2022-02-21 | End: 2022-02-25

## 2022-02-21 RX ORDER — HYDROMORPHONE HYDROCHLORIDE 2 MG/ML
1 INJECTION INTRAMUSCULAR; INTRAVENOUS; SUBCUTANEOUS ONCE
Refills: 0 | Status: DISCONTINUED | OUTPATIENT
Start: 2022-02-21 | End: 2022-02-21

## 2022-02-21 RX ORDER — INSULIN GLARGINE 100 [IU]/ML
18 INJECTION, SOLUTION SUBCUTANEOUS AT BEDTIME
Refills: 0 | Status: DISCONTINUED | OUTPATIENT
Start: 2022-02-21 | End: 2022-02-25

## 2022-02-21 RX ORDER — FAMOTIDINE 10 MG/ML
20 INJECTION INTRAVENOUS DAILY
Refills: 0 | Status: DISCONTINUED | OUTPATIENT
Start: 2022-02-21 | End: 2022-02-22

## 2022-02-21 RX ORDER — SENNA PLUS 8.6 MG/1
2 TABLET ORAL AT BEDTIME
Refills: 0 | Status: DISCONTINUED | OUTPATIENT
Start: 2022-02-21 | End: 2022-02-22

## 2022-02-21 RX ORDER — DEXTROSE 50 % IN WATER 50 %
15 SYRINGE (ML) INTRAVENOUS ONCE
Refills: 0 | Status: DISCONTINUED | OUTPATIENT
Start: 2022-02-21 | End: 2022-02-25

## 2022-02-21 RX ORDER — DEXTROSE 50 % IN WATER 50 %
25 SYRINGE (ML) INTRAVENOUS ONCE
Refills: 0 | Status: DISCONTINUED | OUTPATIENT
Start: 2022-02-21 | End: 2022-02-25

## 2022-02-21 RX ORDER — GLUCAGON INJECTION, SOLUTION 0.5 MG/.1ML
1 INJECTION, SOLUTION SUBCUTANEOUS ONCE
Refills: 0 | Status: DISCONTINUED | OUTPATIENT
Start: 2022-02-21 | End: 2022-02-25

## 2022-02-21 RX ORDER — SODIUM CHLORIDE 9 MG/ML
1000 INJECTION INTRAMUSCULAR; INTRAVENOUS; SUBCUTANEOUS
Refills: 0 | Status: DISCONTINUED | OUTPATIENT
Start: 2022-02-21 | End: 2022-02-25

## 2022-02-21 RX ORDER — METOCLOPRAMIDE HCL 10 MG
10 TABLET ORAL ONCE
Refills: 0 | Status: COMPLETED | OUTPATIENT
Start: 2022-02-21 | End: 2022-02-21

## 2022-02-21 RX ORDER — INSULIN LISPRO 100/ML
VIAL (ML) SUBCUTANEOUS
Refills: 0 | Status: DISCONTINUED | OUTPATIENT
Start: 2022-02-21 | End: 2022-02-25

## 2022-02-21 RX ORDER — METOCLOPRAMIDE HCL 10 MG
10 TABLET ORAL
Refills: 0 | Status: DISCONTINUED | OUTPATIENT
Start: 2022-02-21 | End: 2022-02-22

## 2022-02-21 RX ORDER — DEXTROSE 50 % IN WATER 50 %
12.5 SYRINGE (ML) INTRAVENOUS ONCE
Refills: 0 | Status: DISCONTINUED | OUTPATIENT
Start: 2022-02-21 | End: 2022-02-25

## 2022-02-21 RX ORDER — ONDANSETRON 8 MG/1
4 TABLET, FILM COATED ORAL THREE TIMES A DAY
Refills: 0 | Status: DISCONTINUED | OUTPATIENT
Start: 2022-02-21 | End: 2022-02-22

## 2022-02-21 RX ORDER — HYDROCORTISONE 1 %
1 OINTMENT (GRAM) TOPICAL ONCE
Refills: 0 | Status: DISCONTINUED | OUTPATIENT
Start: 2022-02-21 | End: 2022-02-25

## 2022-02-21 RX ORDER — INFLUENZA VIRUS VACCINE 15; 15; 15; 15 UG/.5ML; UG/.5ML; UG/.5ML; UG/.5ML
0.5 SUSPENSION INTRAMUSCULAR ONCE
Refills: 0 | Status: DISCONTINUED | OUTPATIENT
Start: 2022-02-21 | End: 2022-02-25

## 2022-02-21 RX ORDER — MORPHINE SULFATE 50 MG/1
6 CAPSULE, EXTENDED RELEASE ORAL ONCE
Refills: 0 | Status: DISCONTINUED | OUTPATIENT
Start: 2022-02-21 | End: 2022-02-21

## 2022-02-21 RX ADMIN — INSULIN GLARGINE 18 UNIT(S): 100 INJECTION, SOLUTION SUBCUTANEOUS at 21:33

## 2022-02-21 RX ADMIN — HYDROMORPHONE HYDROCHLORIDE 0.5 MILLIGRAM(S): 2 INJECTION INTRAMUSCULAR; INTRAVENOUS; SUBCUTANEOUS at 23:08

## 2022-02-21 RX ADMIN — Medication 25 MILLIGRAM(S): at 10:59

## 2022-02-21 RX ADMIN — SODIUM CHLORIDE 2000 MILLILITER(S): 9 INJECTION INTRAMUSCULAR; INTRAVENOUS; SUBCUTANEOUS at 13:24

## 2022-02-21 RX ADMIN — SODIUM CHLORIDE 2000 MILLILITER(S): 9 INJECTION INTRAMUSCULAR; INTRAVENOUS; SUBCUTANEOUS at 09:52

## 2022-02-21 RX ADMIN — MORPHINE SULFATE 6 MILLIGRAM(S): 50 CAPSULE, EXTENDED RELEASE ORAL at 07:47

## 2022-02-21 RX ADMIN — HYDROMORPHONE HYDROCHLORIDE 1 MILLIGRAM(S): 2 INJECTION INTRAMUSCULAR; INTRAVENOUS; SUBCUTANEOUS at 10:15

## 2022-02-21 RX ADMIN — HYDROMORPHONE HYDROCHLORIDE 0.5 MILLIGRAM(S): 2 INJECTION INTRAMUSCULAR; INTRAVENOUS; SUBCUTANEOUS at 15:34

## 2022-02-21 RX ADMIN — Medication 10 MILLIGRAM(S): at 07:48

## 2022-02-21 RX ADMIN — Medication 6: at 16:04

## 2022-02-21 RX ADMIN — CEFTRIAXONE 100 MILLIGRAM(S): 500 INJECTION, POWDER, FOR SOLUTION INTRAMUSCULAR; INTRAVENOUS at 07:48

## 2022-02-21 RX ADMIN — Medication 975 MILLIGRAM(S): at 08:30

## 2022-02-21 RX ADMIN — Medication 8 UNIT(S): at 14:13

## 2022-02-21 RX ADMIN — ONDANSETRON 4 MILLIGRAM(S): 8 TABLET, FILM COATED ORAL at 21:34

## 2022-02-21 RX ADMIN — ONDANSETRON 4 MILLIGRAM(S): 8 TABLET, FILM COATED ORAL at 15:07

## 2022-02-21 RX ADMIN — Medication 25 MILLIGRAM(S): at 07:47

## 2022-02-21 RX ADMIN — HYDROMORPHONE HYDROCHLORIDE 0.5 MILLIGRAM(S): 2 INJECTION INTRAMUSCULAR; INTRAVENOUS; SUBCUTANEOUS at 23:38

## 2022-02-21 RX ADMIN — HYDROMORPHONE HYDROCHLORIDE 1 MILLIGRAM(S): 2 INJECTION INTRAMUSCULAR; INTRAVENOUS; SUBCUTANEOUS at 09:55

## 2022-02-21 RX ADMIN — Medication 975 MILLIGRAM(S): at 07:47

## 2022-02-21 RX ADMIN — CEFTRIAXONE 1000 MILLIGRAM(S): 500 INJECTION, POWDER, FOR SOLUTION INTRAMUSCULAR; INTRAVENOUS at 09:52

## 2022-02-21 RX ADMIN — HYDROMORPHONE HYDROCHLORIDE 0.5 MILLIGRAM(S): 2 INJECTION INTRAMUSCULAR; INTRAVENOUS; SUBCUTANEOUS at 15:07

## 2022-02-21 RX ADMIN — INSULIN HUMAN 10 UNIT(S): 100 INJECTION, SOLUTION SUBCUTANEOUS at 09:13

## 2022-02-21 RX ADMIN — SODIUM CHLORIDE 80 MILLILITER(S): 9 INJECTION INTRAMUSCULAR; INTRAVENOUS; SUBCUTANEOUS at 14:33

## 2022-02-21 RX ADMIN — MORPHINE SULFATE 6 MILLIGRAM(S): 50 CAPSULE, EXTENDED RELEASE ORAL at 08:30

## 2022-02-21 RX ADMIN — SODIUM CHLORIDE 2000 MILLILITER(S): 9 INJECTION INTRAMUSCULAR; INTRAVENOUS; SUBCUTANEOUS at 07:49

## 2022-02-21 NOTE — ED ADULT NURSE REASSESSMENT NOTE - NS ED NURSE REASSESS COMMENT FT1
pt pushing against stretcher side rails, putting stress on them.  pt advised that doing so may break rails and cause her to fall off stretcher.

## 2022-02-21 NOTE — ED PROVIDER NOTE - OBJECTIVE STATEMENT
31 year old female with PMH of HTN, Gastroparesis, and T1 DM presents to ED for abdominal pain, nausea, and vomiting that began today. Pt was recently admitted at [facility] x3 days ago and signed out AMA. Pt was previously admitted for abdominal pain and DKA. Pt denies sick contacts, recent travel, fever, and diarrhea. Pt reports pain is mostly in lower abdomen and rates pain 10/10 severity, she is unable to tolerate liquid and food intake.

## 2022-02-21 NOTE — ED PROVIDER NOTE - NSICDXPASTSURGICALHX_GEN_ALL_CORE_FT
PAST SURGICAL HISTORY:  Ectopic pregnancy 2009 and 2013, s/p removal of right and left fallopian tubes    History of cholecystectomy      PAST SURGICAL HISTORY:  Ectopic pregnancy 2009 and 2013, s/p removal of right and left fallopian tubes    History of cholecystectomy

## 2022-02-21 NOTE — H&P ADULT - NSHPLABSRESULTS_GEN_ALL_CORE
LABS:                        9.4    9.96  )-----------( 416      ( 2022 08:03 )             31.7         136  |  106  |  15  ----------------------------<  394<H>  3.6   |  23  |  0.93    Ca    8.0<L>      2022 11:35    TPro  7.1  /  Alb  3.0<L>  /  TBili  0.5  /  DBili  x   /  AST  19  /  ALT  26  /  AlkPhos  105      PT/INR - ( 2022 08:03 )   PT: 11.4 sec;   INR: 0.98 ratio         PTT - ( 2022 08:03 )  PTT:29.9 sec      Urinalysis Basic - ( 2022 08:28 )    Color: Yellow / Appearance: Clear / S.010 / pH: x  Gluc: x / Ketone: Small  / Bili: Negative / Urobili: Negative mg/dL   Blood: x / Protein: 100 mg/dL / Nitrite: Negative   Leuk Esterase: Negative / RBC: 3-5 /HPF / WBC 0-2   Sq Epi: x / Non Sq Epi: Occasional / Bacteria: Few      Lactate, Blood: 3.2 mmol/L ( @ 11:11)  Lactate, Blood: 3.1 mmol/L ( @ 08:03)       @ 08:40  -3.4  38      Thyroid Stimulating Hormone, Serum: 0.72 uIU/mL ( @ 07:34)

## 2022-02-21 NOTE — ED PROVIDER NOTE - CARE PLAN
1 Principal Discharge DX:	Hyperglycemia   Principal Discharge DX:	Hyperglycemia  Secondary Diagnosis:	Sepsis, unspecified organism  Secondary Diagnosis:	Abdominal pain

## 2022-02-21 NOTE — H&P ADULT - ASSESSMENT
32 y/o female,    with a PmHx of T1DM with h/o gastroparesis, SI, HTN,    multiple admissions for DKA, Cholecystectomy, Colitis,     kae   out ama  from  [facility] ED ,  2  days ago        *  admitted  with abd pain/  N/ vomiting   normal wbc, mild elevation in lactate noted  no  infectious source found  CT A/p,  stool.  c/c fibrosis lung  CT chest  ordered   *  type 1 DM.    glucose  is  394   Hco3  of 23, not in dka, on iv fluids  will continue  regimen prescribe d  recently at [facility],  Lantus 18units SC QHS   and  Admelog 4units    * HTN   Enlapril   10 mg daily    * HLD, on statin   * c/c  Anemia  *  Gastroparesis , on reglan   *  c/c  constipation, on laxatives  dvt ppx/ teds     Outpatient follow up with Dr. Bose./  maico, was  arrange d by [facility]       < from: CT Abdomen and Pelvis w/ IV Cont (02.21.22 @ 10:42) >  IMPRESSION:  No acute bowel pathology. Moderate to large amount of stool in the colon  --- End of Report ---  < end of copied text >         30 y/o female,    with a PmHx of T1DM with h/o gastroparesis, SI, HTN,    multiple admissions for DKA, Cholecystectomy, Colitis,     kae   out ama  from  [facility] ED ,  2  days ago        *  admitted  with abd pain/  N/ vomiting     temp of 101,4,  in er, normal wbc, tachycardia ,  mild elevation in lactate noted. ?  sepsis on arrival  no  infectious source found  on empiric iv Rocephin  follow cx  pt  wants pain meds  and Benadryl , per  er  dr/ has  no abd guarding/ tenderness  CT A/p,  stool.  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 Bose./  endo, was  arrange d by [facility]       < from: CT Abdomen and Pelvis w/ IV Cont (02.21.22 @ 10:42) >  IMPRESSION:  No acute bowel pathology. Moderate to large amount of stool in the colon  --- End of Report ---  < end of copied text >         32 y/o female,    with a PmHx of T1DM with h/o gastroparesis, SI, HTN,    multiple admissions for DKA, Cholecystectomy, Colitis,     kae   out ama  from  [facility] ED ,  2  days ago /  and  2  days prior to that was  at [place]/  [facility]  h/o +  urine  tox. opiate  and  THC, on 10/2021       *  admitted  with abd pain/  N/ vomiting     temp of 101,4,  in er, normal wbc, tachycardia ,  mild elevation in lactate noted. ?  sepsis on arrival  no  infectious source found  on empiric iv Rocephin  follow cx  pt  wants pain meds  and Benadryl , per  er  dr/ has  no abd guarding/ tenderness  CT A/p,  stool.  c/c fibrosis lung  CT chest  ordered   *  type 1 DM   glucose  is  394/   pt claims  she is complaint   states  she is  motivated   to  achieve  a  low    HbA1c , , so she can get breast implants    Hco3  of 23, not in dka, on iv fluids  will continue  regimen prescribe d  recently at [facility],  Lantus 18units SC QHS   and  Admelog 4units    * HTN   Enlapril   10 mg daily    * HLD, on statin   * c/c  Anemia  *  Gastroparesis , on reglan   *  c/c  constipation, on laxatives  *  h/o prior  tox  screen +  for  THC and opiate on 10/22  urine tox  ordered  dvt ppx/ teds     Outpatient follow up with Dr. Bose./  maico, was  arrange d by [facility]     < from: CT Abdomen and Pelvis w/ IV Cont (02.21.22 @ 10:42) >  IMPRESSION:  No acute bowel pathology. Moderate to large amount of stool in the colon  --- End of Report ---  < end of copied text >       see  below note  copied  from prior  visit :    32 y/o female, with a PmHx of T1DM with h/o gastroparesis, SI, HTN, multiple admissions for DKA, Cholecystectomy, Colitis, recent admission to [place] 1 week ago for DKA p/w abd pain, nausea, emesis admitted for mild DKA likely 2/2 non adherence (although reports compliance), briefly on insulin gtt in ED now AG closed, transitioned to home dose basal/bolus and MIVF. A1c 9.3, endo consulted. Patient requesting IV Dilaudid specifically for severe pain, reports hives with toradol and morphine (no documentation of intolerance in prior notes). Advised will only administer one additional dose for this admission, encourage PO tylenol 975 mg TID and PO reglan premeal for gastroparesis. patient has hx of wrapping cord around neck to get attention, per previous  evaluation started on remeron 7.5 mg qhs, pt reports never received rx on discharge, agreeable to restarting Remeron this admission. Currently denies SI/HI.  Remaining management as mentioned abov  Electronic Signatures:  Grover Rivera (PA)   (Signed 17-Feb-2022 09:25)  	Authored: History and Physical, History of Present Illness, Allergies/Medications, Patient History, Risk Assessment, Physical Exam, Labs and Results, Assessment and Plan  Tiana Goode)   (Signed 17-Feb-2022 12:45)  	Authored: History of Present Illness, Attestation Statements               30 y/o female,    with a PmHx of T1DM with h/o gastroparesis, SI, HTN,    multiple admissions for DKA, Cholecystectomy, Colitis,     kae   out ama  from  [facility] ED ,  2  days ago /  and  2  days prior to that was  at [place]/  [facility]  has   a h/o leaving , when she does not get pain meds  h/o +  urine  tox. opiate  and  THC, on 10/2021       *  admitted  with abd pain/  N/ vomiting     temp of 101,4,  in er, normal wbc, tachycardia ,  mild elevation in lactate noted. ?  sepsis on arrival  no  infectious source found  on empiric iv Rocephin  follow cx  pt  wants pain meds  and Benadryl , per  er  dr/ has  no abd guarding/ tenderness  CT A/p,  stool.  c/c fibrosis lung  CT chest  ordered   *  type 1 DM   glucose  is  394/   pt  with probable  non compliance   states  she is  motivated   to  achieve  a  low    HbA1c ,, so she can get breast implants    Hco3  of 23, not in dka, on iv fluids  will continue  regimen prescribed  recently at [facility],  Lantus 18units SC QHS   and  Admelog   * HTN   Enlapril   10 mg daily    * HLD, on statin   * c/c  Anemia  *  Gastroparesis , on reglan   *  c/c  constipation, on laxatives  *  h/o prior  tox  screen +  for  THC and opiate on 10/22  pt  not very forthcoming regarding drug abuse/now states she  was  at  a party yesterday   and had  some marijuana  urine tox  ordered  dvt ppx/ teds    pt  insisting  on iv Dilaudid  and iv Benadryl.  RN present in room   discussed  with pt/ will   give    her  dilaudid  for now     Outpatient follow up with Dr. Bose./  endo, was  arrange d by [facility]     < from: CT Abdomen and Pelvis w/ IV Cont (02.21.22 @ 10:42) >  IMPRESSION:  No acute bowel pathology. Moderate to large amount of stool in the colon  --- End of Report ---  < end of copied text >     see  below note  copied  from prior  visit :    30 y/o female, with a PmHx of T1DM with h/o gastroparesis, SI, HTN, multiple admissions for DKA, Cholecystectomy, Colitis, recent admission to [place] 1 week ago for DKA p/w abd pain, nausea, emesis admitted for mild DKA likely 2/2 non adherence (although reports compliance), briefly on insulin gtt in ED now AG closed, transitioned to home dose basal/bolus and MIVF. A1c 9.3, endo consulted. Patient requesting IV Dilaudid specifically for severe pain, reports hives with toradol and morphine (no documentation of intolerance in prior notes). Advised will only administer one additional dose for this admission, encourage PO tylenol 975 mg TID and PO reglan premeal for gastroparesis. patient has hx of wrapping cord around neck to get attention, per previous  evaluation started on remeron 7.5 mg qhs, pt reports never received rx on discharge, agreeable to restarting Remeron this admission. Currently denies SI/HI.  Remaining management as mentioned abov  Electronic Signatures:  Grover Rivera (PA)   (Signed 17-Feb-2022 09:25)  	Authored: History and Physical, History of Present Illness, Allergies/Medications, Patient History, Risk Assessment, Physical Exam, Labs and Results, Assessment and Plan  Tiana Goode)   (Signed 17-Feb-2022 12:45)  	Authored: History of Present Illness, Attestation Statements               32 y/o female,    with a PmHx of T1DM with h/o gastroparesis, SI, HTN,    multiple admissions for DKA, Cholecystectomy, Colitis,     kae   out ama  from  [facility] ED ,  2  days ago /  and  2  days prior to that was  at [place]/  [facility]  has   a h/o leaving , when she does not get pain meds  h/o +  urine  tox. opiate  and  THC, on 10/2021       *  admitted  with abd pain/  N/ vomiting     temp of 101,4,  in er, normal wbc, tachycardia ,  mild elevation in lactate noted. ?  sepsis on arrival  no  infectious source found  on empiric iv Rocephin  follow cx  ID  dr morel called  pt  wants pain meds  and Benadryl , per  er  dr/ has  no abd guarding/ tenderness  CT A/p,  stool.  c/c fibrosis lung  CT chest  ordered   *  type 1 DM   glucose  is  394/   pt  with probable  non compliance   states  she is  motivated   to  achieve  a  low    HbA1c ,, so she can get breast implants    Hco3  of 23, not in dka, on iv fluids  will continue  regimen prescribed  recently at [facility],  Jesseniaus 18units SC QHS   and  Admelog   * HTN   Enlapril   10 mg daily    * HLD, on statin   * c/c  Anemia  *  Gastroparesis , on reglan   *  c/c  constipation, on laxatives  *  h/o prior  tox  screen +  for  THC and opiate on 10/22  pt  not very forthcoming regarding drug abuse/now states she  was  at  a party yesterday   and had  some marijuana  urine tox  ordered  dvt ppx/ teds    pt  insisting  on iv Dilaudid  and iv Benadryl.  RN present in room   discussed  with pt/ will   give    her  dilaudid  for now     Outpatient follow up with Dr. Bose./  endo, was  arrange d by [facility]     < from: CT Abdomen and Pelvis w/ IV Cont (02.21.22 @ 10:42) >  IMPRESSION:  No acute bowel pathology. Moderate to large amount of stool in the colon  --- End of Report ---  < end of copied text >     see  below note  copied  from prior  visit :    32 y/o female, with a PmHx of T1DM with h/o gastroparesis, SI, HTN, multiple admissions for DKA, Cholecystectomy, Colitis, recent admission to [place] 1 week ago for DKA p/w abd pain, nausea, emesis admitted for mild DKA likely 2/2 non adherence (although reports compliance), briefly on insulin gtt in ED now AG closed, transitioned to home dose basal/bolus and MIVF. A1c 9.3, endo consulted. Patient requesting IV Dilaudid specifically for severe pain, reports hives with toradol and morphine (no documentation of intolerance in prior notes). Advised will only administer one additional dose for this admission, encourage PO tylenol 975 mg TID and PO reglan premeal for gastroparesis. patient has hx of wrapping cord around neck to get attention, per previous  evaluation started on remeron 7.5 mg qhs, pt reports never received rx on discharge, agreeable to restarting Remeron this admission. Currently denies SI/HI.  Remaining management as mentioned abov  Electronic Signatures:  Grover Rivera)   (Signed 17-Feb-2022 09:25)  	Authored: History and Physical, History of Present Illness, Allergies/Medications, Patient History, Risk Assessment, Physical Exam, Labs and Results, Assessment and Plan  Tiana Goode)   (Signed 17-Feb-2022 12:45)  	Authored: History of Present Illness, Attestation Statements               32 y/o female,    with a PmHx of T1DM with h/o gastroparesis,  HTN,   multiple admissions for DKA, Cholecystectomy, Colitis,    signed  out ama  from  [facility]  on 2/18/22,   and  prior to that was  at [facility]  has  a h/o leaving , when she does not get pain meds  h/o +  urine  tox. opiate  and  THC, on 10/2021       *  admitted  with abd pain/  N/ vomiting    temp of 101,4,  in er, normal wbc, tachycardia ,  mild elevation in lactate noted. ?  sepsis on arrival  no  infectious source found  on empiric iv Rocephin  follow cx  ID/  dr morel called  pt  wants pain meds  and Benadryl , per  er  dr  pt  has  no abd guarding/ tenderness  CT A/p,  stool.  c/c fibrosis lung  CT chest  ordered/ pulm dr cameron   *  type 1 DM   glucose  is  394  on arrival  pt  with probable  ? non compliance  states  she is  motivated   to  achieve  a  low    HbA1c ,, so she can get breast implants   Hco3  of 23, not in dka,,  on iv fluids  will continue  regimen prescribed  recently at [facility],  Sundeep 18units SC QHS   and  Admelog  endo dr mandel called   * HTN   Enalapril    10 mg daily    * HLD, on statin   * c/c  Anemia  *  Gastroparesis , on reglan   *  c/c  constipation, on laxatives  *  h/o prior  tox  screen +  for  THC and opiate on 10/22  pt  not very forthcoming regarding drug abuse/now states she  was  at  a party yesterday   and had  some marijuana  urine tox  ordered  dvt ppx/ teds    pt  insisting  on iv Dilaudid  and iv Benadryl.  RN present in room   discussed  with pt/ will   give    her  dilaudid  for now     Outpatient follow up with Dr. Bose./  endo, was  arrange d by [facility]   < from: CT Abdomen and Pelvis w/ IV Cont (02.21.22 @ 10:42) >  IMPRESSION:  No acute bowel pathology. Moderate to large amount of stool in the colon  --- End of Report ---  < end of copied text >     see  below note  copied  from  recent   [facility] visit :    32 y/o female, with a PmHx of T1DM with h/o gastroparesis, SI, HTN, multiple admissions for DKA, Cholecystectomy, Colitis, recent admission to [place] 1 week ago for DKA p/w abd pain, nausea, emesis admitted for mild DKA likely 2/2 non adherence (although reports compliance), briefly on insulin gtt in ED now AG closed, transitioned to home dose basal/bolus and MIVF. A1c 9.3, endo consulted. Patient requesting IV Dilaudid specifically for severe pain, reports hives with toradol and morphine (no documentation of intolerance in prior notes). Advised will only administer one additional dose for this admission, encourage PO tylenol 975 mg TID and PO reglan premeal for gastroparesis. patient has hx of wrapping cord around neck to get attention, per previous [facility] evaluation started on remeron 7.5 mg qhs, pt reports never received rx on discharge, agreeable to restarting Remeron this admission. Currently denies SI/HI.  Remaining management as mentioned abov  Electronic Signatures:  Grover Rivera (PA)   (Signed 17-Feb-2022 09:25)  	Authored: History and Physical, History of Present Illness, Allergies/Medications, Patient History, Risk Assessment, Physical Exam, Labs and Results, Assessment and Plan  Tiana Goode)   (Signed 17-Feb-2022 12:45)  	Authored: History of Present Illness, Attestation Statements

## 2022-02-21 NOTE — ED ADULT TRIAGE NOTE - CHIEF COMPLAINT QUOTE
pt c/o abd pain, N/V.  pt states she is a type 1 diabetic and her blood sugar is high.   and Hi in triage.

## 2022-02-21 NOTE — ED PROVIDER NOTE - NSICDXPASTMEDICALHX_GEN_ALL_CORE_FT
PAST MEDICAL HISTORY:  Colitis     Diabetes mellitus type 1     Gastroparesis due to DM     Hypertension      PAST MEDICAL HISTORY:  Colitis     Diabetes mellitus type 1     Gastroparesis due to DM     Hypertension

## 2022-02-21 NOTE — CONSULT NOTE ADULT - SUBJECTIVE AND OBJECTIVE BOX
[facility]    [facility] MED 01    Allergies    No Known Allergies    Intolerances        PAST MEDICAL & SURGICAL HISTORY:  Diabetes mellitus type 1    Gastroparesis due to DM    Colitis    Hypertension    Ectopic pregnancy   and , s/p removal of right and left fallopian tubes    History of cholecystectomy        FAMILY HISTORY:  Family history of type 1 diabetes mellitus (Grandparent)    FH: HTN (hypertension) (Father)    FHx: asthma  Brother        Home Medications:  Admelog SoloStar 100 units/mL injectable solution: 8 unit(s) injectable 3 times a day (2022 07:56)  Basaglar KwikPen 100 units/mL subcutaneous solution: 18 unit(s) subcutaneous once a day (at bedtime) (2022 07:56)  enalapril 5 mg oral tablet: 1 tab(s) orally once a day, As Needed (2022 07:56)  famotidine 20 mg oral tablet: 1 tab(s) orally once a day (2022 07:56)  metoclopramide 10 mg oral tablet: 1 tab(s) orally 4 times a day (before meals and at bedtime), As Needed (2022 07:56)  polyethylene glycol 3350 oral powder for reconstitution: 17 gram(s) orally once a day (2022 01:28)      MEDICATIONS  (STANDING):  atorvastatin 10 milliGRAM(s) Oral at bedtime  dextrose 40% Gel 15 Gram(s) Oral once  dextrose 5%. 1000 milliLiter(s) (50 mL/Hr) IV Continuous <Continuous>  dextrose 5%. 1000 milliLiter(s) (100 mL/Hr) IV Continuous <Continuous>  dextrose 50% Injectable 25 Gram(s) IV Push once  dextrose 50% Injectable 12.5 Gram(s) IV Push once  dextrose 50% Injectable 25 Gram(s) IV Push once  enalapril 10 milliGRAM(s) Oral daily  famotidine    Tablet 20 milliGRAM(s) Oral daily  glucagon  Injectable 1 milliGRAM(s) IntraMuscular once  hydrocortisone 1% Cream 1 Application(s) Topical once  insulin glargine Injectable (LANTUS) 18 Unit(s) SubCutaneous at bedtime  insulin lispro (ADMELOG) corrective regimen sliding scale   SubCutaneous three times a day before meals  insulin lispro Injectable (ADMELOG) 8 Unit(s) SubCutaneous three times a day before meals  metoclopramide 10 milliGRAM(s) Oral Before meals and at bedtime  polyethylene glycol 3350 17 Gram(s) Oral daily  saline laxative (FLEET) Rectal Enema 1 Enema Rectal once  senna 2 Tablet(s) Oral at bedtime  sodium chloride 0.9%. 1000 milliLiter(s) (80 mL/Hr) IV Continuous <Continuous>    MEDICATIONS  (PRN):  HYDROmorphone  Injectable 0.5 milliGRAM(s) IV Push three times a day PRN Moderate Pain (4 - 6)  ondansetron Injectable 4 milliGRAM(s) IV Push three times a day PRN Nausea and/or Vomiting      Diet, Consistent Carbohydrate w/Evening Snack (22 @ 13:46) [Active]          Vital Signs Last 24 Hrs  T(C): 36.9 (2022 11:05), Max: 38.6 (2022 07:29)  T(F): 98.4 (2022 11:05), Max: 101.4 (2022 07:29)  HR: 97 (2022 11:05) (97 - 142)  BP: 140/89 (2022 11:05) (118/74 - 192/92)  BP(mean): 93 (2022 08:26) (93 - 111)  RR: 20 (2022 11:05) (19 - 22)  SpO2: 97% (2022 11:05) (97% - 98%)      22 @ 07:01  -  22 @ 15:13  --------------------------------------------------------  IN: 0 mL / OUT: 5 mL / NET: -5 mL              LABS:                        9.4    9.96  )-----------( 416      ( 2022 08:03 )             31.7         136  |  106  |  15  ----------------------------<  394<H>  3.6   |  23  |  0.93    Ca    8.0<L>      2022 11:35    TPro  7.1  /  Alb  3.0<L>  /  TBili  0.5  /  DBili  x   /  AST  19  /  ALT  26  /  AlkPhos  105  -    PT/INR - ( 2022 08:03 )   PT: 11.4 sec;   INR: 0.98 ratio         PTT - ( 2022 08:03 )  PTT:29.9 sec  Urinalysis Basic - ( 2022 08:28 )    Color: Yellow / Appearance: Clear / S.010 / pH: x  Gluc: x / Ketone: Small  / Bili: Negative / Urobili: Negative mg/dL   Blood: x / Protein: 100 mg/dL / Nitrite: Negative   Leuk Esterase: Negative / RBC: 3-5 /HPF / WBC 0-2   Sq Epi: x / Non Sq Epi: Occasional / Bacteria: Few            WBC:  WBC Count: 9.96 K/uL ( @ 08:03)      MICROBIOLOGY:  RECENT CULTURES:              PT/INR - ( 2022 08:03 )   PT: 11.4 sec;   INR: 0.98 ratio         PTT - ( 2022 08:03 )  PTT:29.9 sec    Sodium:  Sodium, Serum: 136 mmol/L ( @ 11:35)  Sodium, Serum: 134 mmol/L ( @ 08:03)      0.93 mg/dL  @ 11:35  1.02 mg/dL  @ 08:03      Hemoglobin:  Hemoglobin: 9.4 g/dL ( @ 08:03)      Platelets: Platelet Count - Automated: 416 K/uL ( @ 08:03)      LIVER FUNCTIONS - ( 2022 08:03 )  Alb: 3.0 g/dL / Pro: 7.1 gm/dL / ALK PHOS: 105 U/L / ALT: 26 U/L / AST: 19 U/L / GGT: x             Urinalysis Basic - ( 2022 08:28 )    Color: Yellow / Appearance: Clear / S.010 / pH: x  Gluc: x / Ketone: Small  / Bili: Negative / Urobili: Negative mg/dL   Blood: x / Protein: 100 mg/dL / Nitrite: Negative   Leuk Esterase: Negative / RBC: 3-5 /HPF / WBC 0-2   Sq Epi: x / Non Sq Epi: Occasional / Bacteria: Few        RADIOLOGY & ADDITIONAL STUDIES:      MICROBIOLOGY:  RECENT CULTURES:

## 2022-02-21 NOTE — H&P ADULT - HISTORY OF PRESENT ILLNESS
31  yr    f   PmHx of T1DM with h/o gastroparesis,  HTN,    multiple admissions for DKA, Cholecystectomy, Colitis,     kae   out ama  from  [facility] ED ,  2  days ago      here  with abd pain/ n/ vomiting for 1  day    denies  fevers  at home/   cp/sob/ dysuria   was  febrile  in  er                c/p abd pain/ n/ vomiting     pt had  signed  out ama , 2  days  ago from [facility]    31  yr    f   PmHx of T1DM with h/o gastroparesis,  HTN,    multiple admissions for DKA, Cholecystectomy, Colitis,     kae   out ama  from  [facility] ED ,  2  days ago      here  with abd pain/ n/ vomiting for 1  day    denies  fevers  at home/   cp/sob/ dysuria   was  febrile  in  er  given pain meds  by er                  c/p abd pain/ n/ vomiting     pt had  signed  out ama , 2  days  ago from [facility]

## 2022-02-21 NOTE — ED ADULT NURSE NOTE - NSIMPLEMENTINTERV_GEN_ALL_ED
Implemented All Fall Risk Interventions:  [place] to call system. Call bell, personal items and telephone within reach. Instruct patient to call for assistance. Room bathroom lighting operational. Non-slip footwear when patient is off stretcher. Physically safe environment: no spills, clutter or unnecessary equipment. Stretcher in lowest position, wheels locked, appropriate side rails in place. Provide visual cue, wrist band, yellow gown, etc. Monitor gait and stability. Monitor for mental status changes and reorient to person, place, and time. Review medications for side effects contributing to fall risk. Reinforce activity limits and safety measures with patient and family.

## 2022-02-21 NOTE — ED PROVIDER NOTE - CHIEF COMPLAINT
Outpatient Physical Therapy Peds Treatment Note NICOLE Coyle     Patient Name: Los Thomas  : 2017  MRN: [id number]  Today's Date: 2018       Visit Date: 2018    Patient Active Problem List   Diagnosis   • Prematurity     No past medical history on file.  No past surgical history on file.    Visit Dx:    ICD-10-CM ICD-9-CM   1. Prematurity P07.30 765.10     765.20   2. Gross motor delay F82 315.4                         PT Assessment/Plan     Row Name 18 1622          PT Assessment    Assessment Comments  Pt seen for LE stretching followed by neuromuscular re education activities to increase gross motor skills, transitions, creeping in quadruped, decrease extensor tone, improve UE/LE strength and coordination.  She practiced creeping with assist to perform cross lateral pattern and to decrease scooting on bottom for locomotion.  She also performed creeping in crash pit, tall kneeling at stairs, and weight bearing activities.  She filiberto session well today.   -AT        PT Plan    PT Plan Comments  cont poc  -AT       User Key  (r) = Recorded By, (t) = Taken By, (c) = Cosigned By    Initials Name Provider Type    AT Chiara Castaneda, PT Physical Therapist              Exercises     Row Name 18 1600             Subjective Comments    Subjective Comments  Pt arrives today with mother who states that she has been crawling in quadruped a few feet over the weekend however cont to prefer scootin neel bottom.   -AT         Subjective Pain    Able to rate subjective pain?  no  -AT         Exercise 1    Exercise Name 1  neuro:  prone activities with reaching with right UE, sitting balance activities, rolling, quadruped assisted, tall kneel at step with UE play, pull to sit, weight bearing activities at activity table, bolster swing, peanut ball sitting, prone, sidelying, and supine, activities in supine to bring both hands to midline with play, reaching with right UE and stretching to  right hand, activities to encourage flexion and decrease extensor tone. , UT and SCM stretching, crash pit play   -AT         Exercise 2    Exercise Name 2  hip helpers  -AT        User Key  (r) = Recorded By, (t) = Taken By, (c) = Cosigned By    Initials Name Provider Type    AT Chiara Castaneda, PT Physical Therapist                                           Time Calculation:   Start Time: 1500  Stop Time: 1530  Time Calculation (min): 30 min  Therapy Suggested Charges     Code   Minutes Charges    None           Therapy Charges for Today     Code Description Service Date Service Provider Modifiers Qty    [id number] HC PT NEUROMUSC RE EDUCATION EA 15 MIN 11/27/2018 Chiara Castaneda, PT 59, GP 2                Chiara Castaneda, PT  11/27/2018      The patient is a 31y Female complaining of abdominal pain.

## 2022-02-21 NOTE — ED PROVIDER NOTE - CLINICAL SUMMARY MEDICAL DECISION MAKING FREE TEXT BOX
Pt with tachycardia, 99.6 oral temp, concerned for sepsis. Check rectal temp, if elevated do sepsis orders. Pt with abdominal tenderness and elevated sugar, will check labs to r/o DKA, send pt to CT to r/o surgical or infectious pathology.

## 2022-02-21 NOTE — ED ADULT NURSE REASSESSMENT NOTE - NS ED NURSE REASSESS COMMENT FT1
pt resting on stretcher, no longer vomiting. pt does not appear to be in any pain at this time. pt remains on cardiac monitor.

## 2022-02-21 NOTE — H&P ADULT - NSHPREVIEWOFSYSTEMS_GEN_ALL_CORE
REVIEW OF SYSTEMS:  GEN: no fever,    no chills  RESP: no SOB,   no cough  CVS: no chest pain,   no palpitations  GI:   s/p  abdominal pain,    nausea,   vomiting,   no constipation,   no diarrhea  : no dysuria,   no frequency  NEURO: no headache,   no dizziness  PSYCH: no depression,   not anxious  Derm : no rash

## 2022-02-21 NOTE — H&P ADULT - NSHPPHYSICALEXAM_GEN_ALL_CORE
PHYSICAL EXAMINATION:  Vital Signs Last 24 Hrs  T(C): 36.9 (21 Feb 2022 11:05), Max: 38.6 (21 Feb 2022 07:29)  T(F): 98.4 (21 Feb 2022 11:05), Max: 101.4 (21 Feb 2022 07:29)  HR: 97 (21 Feb 2022 11:05) (97 - 142)  BP: 140/89 (21 Feb 2022 11:05) (118/74 - 192/92)  BP(mean): 93 (21 Feb 2022 08:26) (93 - 111)  RR: 20 (21 Feb 2022 11:05) (19 - 22)  SpO2: 97% (21 Feb 2022 11:05) (97% - 98%)  CAPILLARY BLOOD GLUCOSE      POCT Blood Glucose.: 448 mg/dL (21 Feb 2022 11:21)  POCT Blood Glucose.: 421 mg/dL (21 Feb 2022 11:20)  POCT Blood Glucose.: >600 mg/dL (21 Feb 2022 08:24)      02-21 @ 07:01  -  02-21 @ 13:33  --------------------------------------------------------  IN: 0 mL / OUT: 5 mL / NET: -5 mL        GENERAL: NAD, well-groomed,  HEAD:  atraumatic, normocephalic  EYES: sclera anicteric  ENMT: mucous membranes moist  NECK: supple, No JVD  CHEST/LUNG: clear to auscultation bilaterally;    no      rales   ,   no rhonchi,   HEART: normal S1, S2  ABDOMEN: BS+, soft, ND, NT , no guarding  EXTREMITIES:    no    edema    b/l LEs  NEURO: awake, ,     moves all extremities  SKIN: no     rash

## 2022-02-21 NOTE — PATIENT PROFILE ADULT - NSPROGENBLOODRESTRICT_GEN_A_NUR
Pt in no acute signs of distress. Full secondary bag of cefepime hanging on IV pole at change of shift on 12/5. Dose marked as done in MAR. none

## 2022-02-21 NOTE — ED ADULT NURSE NOTE - OBJECTIVE STATEMENT
pt is 30 y/o female c/c of abdominal pain, nausea, vomiting. pt states she went to party 2 days ago where people were smoking marijuana and since then abd pain started.  pt hyperglycemic. PMH of diabetes 1. pt states she is compliant with her DM medications.

## 2022-02-21 NOTE — ED PROVIDER NOTE - CONSTITUTIONAL, MLM
normal... appearing uncomfortable arriving in pain, awake, alert, oriented to person, place, time/situation and in no apparent distress.

## 2022-02-22 DIAGNOSIS — K04.7 PERIAPICAL ABSCESS WITHOUT SINUS: ICD-10-CM

## 2022-02-22 DIAGNOSIS — E11.43 TYPE 2 DIABETES MELLITUS WITH DIABETIC AUTONOMIC (POLY)NEUROPATHY: ICD-10-CM

## 2022-02-22 DIAGNOSIS — R50.9 FEVER, UNSPECIFIED: ICD-10-CM

## 2022-02-22 DIAGNOSIS — E10.9 TYPE 1 DIABETES MELLITUS WITHOUT COMPLICATIONS: ICD-10-CM

## 2022-02-22 LAB
ANION GAP SERPL CALC-SCNC: 8 MMOL/L — SIGNIFICANT CHANGE UP (ref 5–17)
BUN SERPL-MCNC: 17 MG/DL — SIGNIFICANT CHANGE UP (ref 7–23)
CALCIUM SERPL-MCNC: 9.9 MG/DL — SIGNIFICANT CHANGE UP (ref 8.5–10.1)
CHLORIDE SERPL-SCNC: 106 MMOL/L — SIGNIFICANT CHANGE UP (ref 96–108)
CO2 SERPL-SCNC: 23 MMOL/L — SIGNIFICANT CHANGE UP (ref 22–31)
CREAT SERPL-MCNC: 0.92 MG/DL — SIGNIFICANT CHANGE UP (ref 0.5–1.3)
CULTURE RESULTS: SIGNIFICANT CHANGE UP
GLUCOSE BLDC GLUCOMTR-MCNC: 174 MG/DL — HIGH (ref 70–99)
GLUCOSE BLDC GLUCOMTR-MCNC: 256 MG/DL — HIGH (ref 70–99)
GLUCOSE BLDC GLUCOMTR-MCNC: 281 MG/DL — HIGH (ref 70–99)
GLUCOSE BLDC GLUCOMTR-MCNC: 466 MG/DL — CRITICAL HIGH (ref 70–99)
GLUCOSE BLDC GLUCOMTR-MCNC: 506 MG/DL — CRITICAL HIGH (ref 70–99)
GLUCOSE BLDC GLUCOMTR-MCNC: 84 MG/DL — SIGNIFICANT CHANGE UP (ref 70–99)
GLUCOSE SERPL-MCNC: 70 MG/DL — SIGNIFICANT CHANGE UP (ref 70–99)
HCT VFR BLD CALC: 29.4 % — LOW (ref 34.5–45)
HGB BLD-MCNC: 8.9 G/DL — LOW (ref 11.5–15.5)
MCHC RBC-ENTMCNC: 18.4 PG — LOW (ref 27–34)
MCHC RBC-ENTMCNC: 30.3 G/DL — LOW (ref 32–36)
MCV RBC AUTO: 60.6 FL — LOW (ref 80–100)
NRBC # BLD: 0 /100 WBCS — SIGNIFICANT CHANGE UP (ref 0–0)
PLATELET # BLD AUTO: 393 K/UL — SIGNIFICANT CHANGE UP (ref 150–400)
POTASSIUM SERPL-MCNC: 3.8 MMOL/L — SIGNIFICANT CHANGE UP (ref 3.5–5.3)
POTASSIUM SERPL-SCNC: 3.8 MMOL/L — SIGNIFICANT CHANGE UP (ref 3.5–5.3)
RBC # BLD: 4.85 M/UL — SIGNIFICANT CHANGE UP (ref 3.8–5.2)
RBC # FLD: 20 % — HIGH (ref 10.3–14.5)
SODIUM SERPL-SCNC: 137 MMOL/L — SIGNIFICANT CHANGE UP (ref 135–145)
SPECIMEN SOURCE: SIGNIFICANT CHANGE UP
WBC # BLD: 12.64 K/UL — HIGH (ref 3.8–10.5)
WBC # FLD AUTO: 12.64 K/UL — HIGH (ref 3.8–10.5)

## 2022-02-22 PROCEDURE — 99233 SBSQ HOSP IP/OBS HIGH 50: CPT

## 2022-02-22 PROCEDURE — 99232 SBSQ HOSP IP/OBS MODERATE 35: CPT

## 2022-02-22 PROCEDURE — 99254 IP/OBS CNSLTJ NEW/EST MOD 60: CPT

## 2022-02-22 RX ORDER — MORPHINE SULFATE 50 MG/1
2 CAPSULE, EXTENDED RELEASE ORAL ONCE
Refills: 0 | Status: DISCONTINUED | OUTPATIENT
Start: 2022-02-22 | End: 2022-02-22

## 2022-02-22 RX ORDER — AMPICILLIN SODIUM AND SULBACTAM SODIUM 250; 125 MG/ML; MG/ML
3 INJECTION, POWDER, FOR SUSPENSION INTRAMUSCULAR; INTRAVENOUS EVERY 6 HOURS
Refills: 0 | Status: DISCONTINUED | OUTPATIENT
Start: 2022-02-22 | End: 2022-02-25

## 2022-02-22 RX ORDER — ACETAMINOPHEN 500 MG
1000 TABLET ORAL ONCE
Refills: 0 | Status: DISCONTINUED | OUTPATIENT
Start: 2022-02-22 | End: 2022-02-22

## 2022-02-22 RX ORDER — ACETAMINOPHEN 500 MG
1000 TABLET ORAL EVERY 6 HOURS
Refills: 0 | Status: DISCONTINUED | OUTPATIENT
Start: 2022-02-22 | End: 2022-02-24

## 2022-02-22 RX ORDER — INSULIN HUMAN 100 [IU]/ML
6 INJECTION, SOLUTION SUBCUTANEOUS ONCE
Refills: 0 | Status: COMPLETED | OUTPATIENT
Start: 2022-02-22 | End: 2022-02-22

## 2022-02-22 RX ORDER — AMPICILLIN SODIUM AND SULBACTAM SODIUM 250; 125 MG/ML; MG/ML
3 INJECTION, POWDER, FOR SUSPENSION INTRAMUSCULAR; INTRAVENOUS ONCE
Refills: 0 | Status: COMPLETED | OUTPATIENT
Start: 2022-02-22 | End: 2022-02-22

## 2022-02-22 RX ORDER — DIPHENHYDRAMINE HCL 50 MG
25 CAPSULE ORAL ONCE
Refills: 0 | Status: COMPLETED | OUTPATIENT
Start: 2022-02-22 | End: 2022-02-22

## 2022-02-22 RX ORDER — DIAZEPAM 5 MG
2 TABLET ORAL ONCE
Refills: 0 | Status: DISCONTINUED | OUTPATIENT
Start: 2022-02-22 | End: 2022-02-22

## 2022-02-22 RX ORDER — ONDANSETRON 8 MG/1
4 TABLET, FILM COATED ORAL EVERY 4 HOURS
Refills: 0 | Status: DISCONTINUED | OUTPATIENT
Start: 2022-02-22 | End: 2022-02-25

## 2022-02-22 RX ORDER — METOCLOPRAMIDE HCL 10 MG
10 TABLET ORAL THREE TIMES A DAY
Refills: 0 | Status: DISCONTINUED | OUTPATIENT
Start: 2022-02-22 | End: 2022-02-24

## 2022-02-22 RX ORDER — HYDROMORPHONE HYDROCHLORIDE 2 MG/ML
0.5 INJECTION INTRAMUSCULAR; INTRAVENOUS; SUBCUTANEOUS EVERY 8 HOURS
Refills: 0 | Status: DISCONTINUED | OUTPATIENT
Start: 2022-02-22 | End: 2022-02-24

## 2022-02-22 RX ORDER — INSULIN LISPRO 100/ML
6 VIAL (ML) SUBCUTANEOUS
Refills: 0 | Status: DISCONTINUED | OUTPATIENT
Start: 2022-02-22 | End: 2022-02-25

## 2022-02-22 RX ORDER — PANTOPRAZOLE SODIUM 20 MG/1
40 TABLET, DELAYED RELEASE ORAL DAILY
Refills: 0 | Status: DISCONTINUED | OUTPATIENT
Start: 2022-02-22 | End: 2022-02-22

## 2022-02-22 RX ORDER — DIPHENHYDRAMINE HCL 50 MG
12.5 CAPSULE ORAL EVERY 8 HOURS
Refills: 0 | Status: DISCONTINUED | OUTPATIENT
Start: 2022-02-22 | End: 2022-02-24

## 2022-02-22 RX ORDER — AMPICILLIN SODIUM AND SULBACTAM SODIUM 250; 125 MG/ML; MG/ML
INJECTION, POWDER, FOR SUSPENSION INTRAMUSCULAR; INTRAVENOUS
Refills: 0 | Status: DISCONTINUED | OUTPATIENT
Start: 2022-02-22 | End: 2022-02-25

## 2022-02-22 RX ADMIN — MORPHINE SULFATE 2 MILLIGRAM(S): 50 CAPSULE, EXTENDED RELEASE ORAL at 03:43

## 2022-02-22 RX ADMIN — HYDROMORPHONE HYDROCHLORIDE 0.5 MILLIGRAM(S): 2 INJECTION INTRAMUSCULAR; INTRAVENOUS; SUBCUTANEOUS at 16:16

## 2022-02-22 RX ADMIN — Medication 10 MILLIGRAM(S): at 21:29

## 2022-02-22 RX ADMIN — Medication 25 MILLIGRAM(S): at 03:42

## 2022-02-22 RX ADMIN — ONDANSETRON 4 MILLIGRAM(S): 8 TABLET, FILM COATED ORAL at 03:42

## 2022-02-22 RX ADMIN — Medication 10 MILLIGRAM(S): at 05:13

## 2022-02-22 RX ADMIN — Medication 10 MILLIGRAM(S): at 13:35

## 2022-02-22 RX ADMIN — HYDROMORPHONE HYDROCHLORIDE 0.5 MILLIGRAM(S): 2 INJECTION INTRAMUSCULAR; INTRAVENOUS; SUBCUTANEOUS at 07:47

## 2022-02-22 RX ADMIN — SODIUM CHLORIDE 80 MILLILITER(S): 9 INJECTION INTRAMUSCULAR; INTRAVENOUS; SUBCUTANEOUS at 21:30

## 2022-02-22 RX ADMIN — Medication 25 MILLIGRAM(S): at 08:30

## 2022-02-22 RX ADMIN — Medication 12.5 MILLIGRAM(S): at 16:50

## 2022-02-22 RX ADMIN — PANTOPRAZOLE SODIUM 40 MILLIGRAM(S): 20 TABLET, DELAYED RELEASE ORAL at 03:42

## 2022-02-22 RX ADMIN — Medication 8 UNIT(S): at 07:47

## 2022-02-22 RX ADMIN — INSULIN GLARGINE 18 UNIT(S): 100 INJECTION, SOLUTION SUBCUTANEOUS at 21:29

## 2022-02-22 RX ADMIN — Medication 2 MILLIGRAM(S): at 04:07

## 2022-02-22 RX ADMIN — CEFTRIAXONE 100 MILLIGRAM(S): 500 INJECTION, POWDER, FOR SOLUTION INTRAMUSCULAR; INTRAVENOUS at 08:35

## 2022-02-22 RX ADMIN — MORPHINE SULFATE 2 MILLIGRAM(S): 50 CAPSULE, EXTENDED RELEASE ORAL at 04:25

## 2022-02-22 RX ADMIN — Medication 1: at 16:16

## 2022-02-22 RX ADMIN — AMPICILLIN SODIUM AND SULBACTAM SODIUM 200 GRAM(S): 250; 125 INJECTION, POWDER, FOR SUSPENSION INTRAMUSCULAR; INTRAVENOUS at 17:56

## 2022-02-22 RX ADMIN — Medication 3: at 07:47

## 2022-02-22 RX ADMIN — Medication 400 MILLIGRAM(S): at 12:50

## 2022-02-22 RX ADMIN — Medication 400 MILLIGRAM(S): at 17:56

## 2022-02-22 RX ADMIN — ONDANSETRON 4 MILLIGRAM(S): 8 TABLET, FILM COATED ORAL at 11:59

## 2022-02-22 RX ADMIN — ONDANSETRON 4 MILLIGRAM(S): 8 TABLET, FILM COATED ORAL at 07:58

## 2022-02-22 RX ADMIN — INSULIN HUMAN 6 UNIT(S): 100 INJECTION, SOLUTION SUBCUTANEOUS at 04:14

## 2022-02-22 RX ADMIN — Medication 6 UNIT(S): at 16:53

## 2022-02-22 RX ADMIN — Medication 25 MILLIGRAM(S): at 00:21

## 2022-02-22 RX ADMIN — AMPICILLIN SODIUM AND SULBACTAM SODIUM 200 GRAM(S): 250; 125 INJECTION, POWDER, FOR SUSPENSION INTRAMUSCULAR; INTRAVENOUS at 11:53

## 2022-02-22 RX ADMIN — HYDROMORPHONE HYDROCHLORIDE 0.5 MILLIGRAM(S): 2 INJECTION INTRAMUSCULAR; INTRAVENOUS; SUBCUTANEOUS at 08:15

## 2022-02-22 RX ADMIN — Medication 1000 MILLIGRAM(S): at 13:20

## 2022-02-22 RX ADMIN — Medication 400 MILLIGRAM(S): at 23:40

## 2022-02-22 RX ADMIN — HYDROMORPHONE HYDROCHLORIDE 0.5 MILLIGRAM(S): 2 INJECTION INTRAMUSCULAR; INTRAVENOUS; SUBCUTANEOUS at 17:00

## 2022-02-22 NOTE — PROGRESS NOTE ADULT - SUBJECTIVE AND OBJECTIVE BOX
DOMINIQUE VALENCIA    LVS 1E 183 D    Allergies    No Known Allergies    Intolerances        PAST MEDICAL & SURGICAL HISTORY:  Diabetes mellitus type 1    Gastroparesis due to DM    Colitis    Hypertension    Ectopic pregnancy   and , s/p removal of right and left fallopian tubes    History of cholecystectomy        FAMILY HISTORY:  Family history of type 1 diabetes mellitus (Grandparent)    FH: HTN (hypertension) (Father)    FHx: asthma  Brother        Home Medications:  Admelog SoloStar 100 units/mL injectable solution: 8 unit(s) injectable 3 times a day (2022 07:56)  Basaglar KwikPen 100 units/mL subcutaneous solution: 18 unit(s) subcutaneous once a day (at bedtime) (2022 07:56)  enalapril 5 mg oral tablet: 1 tab(s) orally once a day, As Needed (2022 07:56)  famotidine 20 mg oral tablet: 1 tab(s) orally once a day (2022 07:56)  metoclopramide 10 mg oral tablet: 1 tab(s) orally 4 times a day (before meals and at bedtime), As Needed (2022 07:56)  polyethylene glycol 3350 oral powder for reconstitution: 17 gram(s) orally once a day (2022 01:28)      MEDICATIONS  (STANDING):  acetaminophen   IVPB .. 1000 milliGRAM(s) IV Intermittent every 6 hours  ampicillin/sulbactam  IVPB      ampicillin/sulbactam  IVPB 3 Gram(s) IV Intermittent once  ampicillin/sulbactam  IVPB 3 Gram(s) IV Intermittent every 6 hours  dextrose 40% Gel 15 Gram(s) Oral once  dextrose 5%. 1000 milliLiter(s) (50 mL/Hr) IV Continuous <Continuous>  dextrose 5%. 1000 milliLiter(s) (100 mL/Hr) IV Continuous <Continuous>  dextrose 50% Injectable 25 Gram(s) IV Push once  dextrose 50% Injectable 12.5 Gram(s) IV Push once  dextrose 50% Injectable 25 Gram(s) IV Push once  glucagon  Injectable 1 milliGRAM(s) IntraMuscular once  hydrocortisone 1% Cream 1 Application(s) Topical once  influenza   Vaccine 0.5 milliLiter(s) IntraMuscular once  insulin glargine Injectable (LANTUS) 18 Unit(s) SubCutaneous at bedtime  insulin lispro (ADMELOG) corrective regimen sliding scale   SubCutaneous three times a day before meals  insulin lispro Injectable (ADMELOG) 8 Unit(s) SubCutaneous three times a day before meals  metoclopramide Injectable 10 milliGRAM(s) IV Push three times a day  polyethylene glycol 3350 17 Gram(s) Oral daily  sodium chloride 0.9%. 1000 milliLiter(s) (80 mL/Hr) IV Continuous <Continuous>    MEDICATIONS  (PRN):  ondansetron Injectable 4 milliGRAM(s) IV Push every 4 hours PRN Nausea and/or Vomiting      Diet, Consistent Carbohydrate w/Evening Snack (22 @ 13:46) [Active]          Vital Signs Last 24 Hrs  T(C): 36.7 (2022 11:05), Max: 37.2 (2022 05:03)  T(F): 98.1 (2022 11:05), Max: 98.9 (2022 05:03)  HR: 101 (2022 11:05) (91 - 123)  BP: 118/69 (2022 11:05) (118/69 - 153/95)  BP(mean): --  RR: 18 (2022 11:05) (18 - 19)  SpO2: 97% (2022 11:05) (96% - 98%)      22 @ 07:01  -  22 @ 07:00  --------------------------------------------------------  IN: 960 mL / OUT: 5 mL / NET: 955 mL              LABS:                        9.4    9.96  )-----------( 416      ( 2022 08:03 )             31.7         136  |  106  |  15  ----------------------------<  394<H>  3.6   |  23  |  0.93    Ca    8.0<L>      2022 11:35    TPro  7.1  /  Alb  3.0<L>  /  TBili  0.5  /  DBili  x   /  AST  19  /  ALT  26  /  AlkPhos  105      PT/INR - ( 2022 08:03 )   PT: 11.4 sec;   INR: 0.98 ratio         PTT - ( 2022 08:03 )  PTT:29.9 sec  Urinalysis Basic - ( 2022 08:28 )    Color: Yellow / Appearance: Clear / S.010 / pH: x  Gluc: x / Ketone: Small  / Bili: Negative / Urobili: Negative mg/dL   Blood: x / Protein: 100 mg/dL / Nitrite: Negative   Leuk Esterase: Negative / RBC: 3-5 /HPF / WBC 0-2   Sq Epi: x / Non Sq Epi: Occasional / Bacteria: Few            WBC:  WBC Count: 9.96 K/uL ( @ 08:03)      MICROBIOLOGY:  RECENT CULTURES:   Clean Catch Clean Catch (Midstream) XXXX XXXX   <10,000 CFU/mL Normal Urogenital Jessica                PT/INR - ( 2022 08:03 )   PT: 11.4 sec;   INR: 0.98 ratio         PTT - ( 2022 08:03 )  PTT:29.9 sec    Sodium:  Sodium, Serum: 136 mmol/L ( @ 11:35)  Sodium, Serum: 134 mmol/L ( @ 08:03)      0.93 mg/dL  @ 11:35  1.02 mg/dL  @ 08:03      Hemoglobin:  Hemoglobin: 9.4 g/dL ( @ 08:03)      Platelets: Platelet Count - Automated: 416 K/uL ( @ 08:03)      LIVER FUNCTIONS - ( 2022 08:03 )  Alb: 3.0 g/dL / Pro: 7.1 gm/dL / ALK PHOS: 105 U/L / ALT: 26 U/L / AST: 19 U/L / GGT: x             Urinalysis Basic - ( 2022 08:28 )    Color: Yellow / Appearance: Clear / S.010 / pH: x  Gluc: x / Ketone: Small  / Bili: Negative / Urobili: Negative mg/dL   Blood: x / Protein: 100 mg/dL / Nitrite: Negative   Leuk Esterase: Negative / RBC: 3-5 /HPF / WBC 0-2   Sq Epi: x / Non Sq Epi: Occasional / Bacteria: Few        RADIOLOGY & ADDITIONAL STUDIES:      MICROBIOLOGY:  RECENT CULTURES:   Clean Catch Clean Catch (Midstream) XXXX XXXX   <10,000 CFU/mL Normal Urogenital Jessica

## 2022-02-22 NOTE — PROGRESS NOTE ADULT - SUBJECTIVE AND OBJECTIVE BOX
s. pt notes improvement but still ongoing abdominal pain.  with nausea she has not yet tried to eat.  She denies further fevers.  notes dental pain.    o.  Vital Signs Last 24 Hrs  T(C): 36.7 (22 Feb 2022 11:05), Max: 37.2 (22 Feb 2022 05:03)  T(F): 98.1 (22 Feb 2022 11:05), Max: 98.9 (22 Feb 2022 05:03)  HR: 101 (22 Feb 2022 11:05) (91 - 123)  BP: 118/69 (22 Feb 2022 11:05) (118/69 - 153/95)  BP(mean): --  RR: 18 (22 Feb 2022 11:05) (18 - 19)  SpO2: 97% (22 Feb 2022 11:05) (96% - 98%)    gen lying in bed, appears uncomfortable  heent poor dentition, no facial asymmetry  lungs clear, no ronchi  cv tachy, has systolic murmur RUSB, no LE edema  skin has numerous scratches and other small lesions on arms  psyc aaox3    labs reviewed, sugars quite high initially, now down to 83 after receiving bolus meal dose of insulin this AM without any PO intake.  no AG on chemistry.  has microcytic anemia.  no leukocytosis nor L shift    CXR shows interstitial changes recs CT chest    reviewed previous records,  psyc evaluation in 12/2021 after concern for suicide attempt.  Was call for attention as patient did not believe pain was adequately treated in [facility] ED.  Of note communication from mother at that time showed concern for opiate dependence relapse.  She has had numerous ED presentations and discharges against medical advice over the past ~2 months

## 2022-02-22 NOTE — PROGRESS NOTE ADULT - ASSESSMENT
31 YOF PMHx DMI with episodes of DKA, chronic abd pain ? gastroparesis, ? opiate dependence, presenting with abdominal pain and fever, hyperglycemic without DKA.  Concern for dental infection.    Possible systemic dental infection.  Appreciate ID consult.  Unasyn, echo, blood cx pending.    interstitial lung changes. appreciate pulm consult.  noncon CT chest ordered and pending    DMI, uncontrolled: continue lantus 18U, decrease admelog to 6U to be given if eats >50% of meal only.    chronic abdominal pain: likely multifactorial related to uncontrolled DM and possible hyperalgesia from narcotics.  For now can continue with IV dilaudid 0.5mg every 8 hours, added standing tylenol IV while not tolerating PO, with IV reglan and zofran.  She was given clear education that once she is tolerating PO she will be changed off of the IV narcotics.  we also need to add low dose benadryl per pateints repeated reuests due to itching with the narcotics.  dosing instructions to minimize side effects written.  -utox ordered, of note was negative 12/25    no medical VTE prophylaxis for now, start tomorrow if condition not further improving.  She is quite ambulatory right now so relatively low risk.    pt high risk, due to IV narcotic administration

## 2022-02-22 NOTE — CONSULT NOTE ADULT - SUBJECTIVE AND OBJECTIVE BOX
Full note to follow  Fever x1  Uncontrolled DM  2 recent hospitalizations in last month ([facility], [facility])  Has dental pain R maxilla and exceedingly poor dentition  Murmur, possibly new  Unrevealing exam otherwise  Will change Rx to Unasyn  ECHO  F/U Blood cx  Check full RVP  OPD dental eval depending on clinical course  Good but not overly tight diabetic control to avoid iatrogenic hypoglycemia. Uncontrolled hyperglycemia makes control of infections potentially problematic.   Earl Turpin MD  Attending Physician  [facility]  Division of Infectious Diseases  [phone]  ---------------  [facility]  Division of Infectious Diseases  [phone]    DOMINIQUE VALENCIA  31y, Female  [id number]    HPI--      PMH/PSH--  Diabetes mellitus type 1    Gastroparesis    No pertinent past medical history    Type 1 diabetes mellitus with ketoacidosis without coma, with long-term current use of insulin    Gastroparesis due to DM    Colitis    Hypertension    Ectopic pregnancy    No significant past surgical history    History of cholecystectomy        Allergies--  No Known Allergies      Medications--  Antibiotics: cefTRIAXone   IVPB 1000 milliGRAM(s) IV Intermittent every 24 hours    Immunologic: influenza   Vaccine 0.5 milliLiter(s) IntraMuscular once    Other: acetaminophen   IVPB ..  atorvastatin  dextrose 40% Gel  dextrose 5%.  dextrose 5%.  dextrose 50% Injectable  dextrose 50% Injectable  dextrose 50% Injectable  enalapril  glucagon  Injectable  hydrocortisone 1% Cream  insulin glargine Injectable (LANTUS)  insulin lispro (ADMELOG) corrective regimen sliding scale  insulin lispro Injectable (ADMELOG)  metoclopramide  ondansetron Injectable PRN  pantoprazole  Injectable  polyethylene glycol 3350  senna  sodium chloride 0.9%.    Antimicrobials last 90 days per EMR: MEDICATIONS  (STANDING):  cefTRIAXone   IVPB   100 mL/Hr IV Intermittent (02-22-22 @ 08:35)    cefTRIAXone   IVPB   100 mL/Hr IV Intermittent (02-21-22 @ 07:48)        Social History--  EtOH: denies   Tobacco: denies   Drug Use: denies     Family/Marital History--  Family history of diabetes mellitus (Grandparent)    Family history of type 1 diabetes mellitus (Grandparent)    FH: HTN (hypertension) (Father)    FHx: asthma          Travel/Environmental/Occupational History:      Review of Systems:  A >=10-point review of systems was obtained.     Pertinent positives and negatives--  Constitutional: No fevers. No Chills. No Rigors.   Eyes:  ENMT:  Cardiovascular: No chest pain. No palpitations.  Respiratory: No shortness of breath. No cough.  Gastrointestinal: No nausea or vomiting. No diarrhea or constipation.   Genitourinary:  Musculoskeletal:  Skin:  Neurologic:  Psychiatric: Pleasant. Appropriate affect.  Endocrine:  Heme/Lymphatic:  Allergy/Immunologic:    Review of systems otherwise negative except as previously noted.    Physical Exam--  Vital Signs: T(F): 98.9 (02-22-22 @ 05:03), Max: 98.9 (02-22-22 @ 05:03)  HR: 108 (02-22-22 @ 00:13)  BP: 153/95 (02-22-22 @ 05:03)  RR: 18 (02-22-22 @ 05:03)  SpO2: 98% (02-22-22 @ 05:03)  Wt(kg): --  General: Nontoxic-appearing Female in no acute distress.  HEENT: AT/NC. PERRL. EOMI. Anicteric. Conjunctiva pink and moist. Oropharynx clear. Dentition fair.  Neck: Not rigid. No sense of mass.  Nodes: None palpable.  Lungs: Clear bilaterally without rales, wheezing or rhonchi  Heart: Regular rate and rhythm. No Murmur. No rub. No gallop. No palpable thrill.  Abdomen: Bowel sounds present and normoactive. Soft. Nondistended. Nontender. No sense of mass. No organomegaly.  Back: No spinal tenderness. No costovertebral angle tenderness.   Extremities: No cyanosis or clubbing. No edema.   Skin: Warm. Dry. Good turgor. No rash. No vasculitic stigmata.  Psychiatric: Appropriate affect and mood for situation.         Laboratory & Imaging Data--  CBC                        9.4    9.96  )-----------( 416      ( 21 Feb 2022 08:03 )             31.7       Chemistries  02-21    136  |  106  |  15  ----------------------------<  394<H>  3.6   |  23  |  0.93    Ca    8.0<L>      21 Feb 2022 11:35    TPro  7.1  /  Alb  3.0<L>  /  TBili  0.5  /  DBili  x   /  AST  19  /  ALT  26  /  AlkPhos  105  02-21      Culture Data    Culture - Urine (collected 21 Feb 2022 10:52)  Source: Clean Catch Clean Catch (Midstream)  Final Report (22 Feb 2022 07:27):    <10,000 CFU/mL Normal Urogenital Jessica           Full note to follow  Fever x1  Uncontrolled DM  2 recent hospitalizations in last month ([facility], [facility])  Has dental pain R maxilla and exceedingly poor dentition  Murmur, possibly new  Unrevealing exam otherwise  Will change Rx to Unasyn  ECHO  F/U Blood cx  Check full RVP  OPD dental eval depending on clinical course  Good but not overly tight diabetic control to avoid iatrogenic hypoglycemia. Uncontrolled hyperglycemia makes control of infections potentially problematic.   Earl Turpin MD  Attending Physician  [facility]  Division of Infectious Diseases  [phone]  ---------------  [facility]  Division of Infectious Diseases  [phone]    DOMINIQUE VALENCIA  31y, Female  [id number]    HPI--  Is a 31-year-old woman with a history of diabetes mellitus type 1, diagnosed at age 12, diabetic gastroparesis, diabetic ketoacidosis, poor compliance, high blood pressure, prior psychiatric/behavioral issues, prior likely drug-seeking behavior, who in the last month has been at [facility] with abdominal pain, and diabetic ketoacidosis.  The patient signed out from the latter AGAINST MEDICAL ADVICE.  She now presents again here with abdominal pain.  She was incidentally found to be febrile in the emergency room.  Her Covid test is negative.  CT scan of the abdomen pelvis was performed which revealed only increased stool burden, no acute findings.      Incidentally noted was fibrotic changes at the bases bilaterally.  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  Is not clear what treatment she received.    On extensive review of systems she has only complaints of right maxillary dental pain which has been going on for about 3 weeks.  Her other complaint is gastroesophageal reflux symptoms, for which she takes famotidine as needed.  She has no headache or visual changes.  There is no shortness of breath or chest pain.  She denies any cough.  She denies any nausea or vomiting.  She denies any urinary symptoms.    PMH/PSH--  Diabetes mellitus type 1    Gastroparesis    No pertinent past medical history    Type 1 diabetes mellitus with ketoacidosis without coma, with long-term current use of insulin    Gastroparesis due to DM    Colitis    Hypertension    Ectopic pregnancy    No significant past surgical history    History of cholecystectomy        Allergies--  No Known Allergies      Medications--  Antibiotics: cefTRIAXone   IVPB 1000 milliGRAM(s) IV Intermittent every 24 hours    Immunologic: influenza   Vaccine 0.5 milliLiter(s) IntraMuscular once    Other: acetaminophen   IVPB ..  atorvastatin  dextrose 40% Gel  dextrose 5%.  dextrose 5%.  dextrose 50% Injectable  dextrose 50% Injectable  dextrose 50% Injectable  enalapril  glucagon  Injectable  hydrocortisone 1% Cream  insulin glargine Injectable (LANTUS)  insulin lispro (ADMELOG) corrective regimen sliding scale  insulin lispro Injectable (ADMELOG)  metoclopramide  ondansetron Injectable PRN  pantoprazole  Injectable  polyethylene glycol 3350  senna  sodium chloride 0.9%.    Antimicrobials last 90 days per EMR: MEDICATIONS  (STANDING):  cefTRIAXone   IVPB   100 mL/Hr IV Intermittent (02-22-22 @ 08:35)    cefTRIAXone   IVPB   100 mL/Hr IV Intermittent (02-21-22 @ 07:48)        Social History--  EtOH: denies   Tobacco: 2PPW  Drug Use: polysubstance use/abuse    Family/Marital History--  Family history of diabetes mellitus (Grandparent)    Family history of type 1 diabetes mellitus (Grandparent)    FH: HTN (hypertension) (Father)    FHx: asthma          Travel/Environmental/Occupational History:  She is presently not working.  There is no travel.    Review of Systems:  A >=10-point review of systems was obtained.   Review of systems otherwise negative except as previously noted.    Physical Exam--  Vital Signs: T(F): 98.9 (02-22-22 @ 05:03), Max: 98.9 (02-22-22 @ 05:03)  HR: 108 (02-22-22 @ 00:13)  BP: 153/95 (02-22-22 @ 05:03)  RR: 18 (02-22-22 @ 05:03)  SpO2: 98% (02-22-22 @ 05:03)  Wt(kg): --  General: Nontoxic-appearing Female in no acute distress.  HEENT: AT/NC. PERRL. EOMI. Anicteric. Conjunctiva pink and moist. Oropharynx clear. Dentition Exceedingly poor, with gross decay, exposed roots, and multiple missing teeth.  In the right maxillary area there is a tooth with a large cavity, though there is no surrounding gingival inflammation or purulence..  Neck: Not rigid. No sense of mass.  Nodes: None palpable.  Lungs: Clear bilaterally without rales, wheezing or rhonchi  Heart: Regular rate and rhythm. 2-3/6 Murmur. No rub. No gallop. No palpable thrill.  Abdomen: Bowel sounds present and normoactive. Soft. Nondistended. Nontender. No sense of mass. No organomegaly.  Back: No spinal tenderness. No costovertebral angle tenderness.   Extremities: No cyanosis or clubbing. No edema. Possible IDU stigmata.  Skin: Warm. Dry. Good turgor. No rash. No vasculitic stigmata.  Psychiatric: grossly appropriate affect and mood for situation.         Laboratory & Imaging Data--  CBC                        9.4    9.96  )-----------( 416      ( 21 Feb 2022 08:03 )             31.7       Chemistries  02-21    136  |  106  |  15  ----------------------------<  394<H>  3.6   |  23  |  0.93    Ca    8.0<L>      21 Feb 2022 11:35    TPro  7.1  /  Alb  3.0<L>  /  TBili  0.5  /  DBili  x   /  AST  19  /  ALT  26  /  AlkPhos  105  02-21    A1C with Estimated Average Glucose Result: 9.7: High Risk (prediabetic)    5.7 - 6.4 %  Diabetic, diagnostic           > 6.5 %  ADA diabetic treatment goal    < 7.0 %  HbA1C values may not accurately reflect mean blood glucose in patients  with Hb variants.  Suggest clinical correlation. % (02.17.22 @ 07:34)        Culture Data    Culture - Urine (collected 21 Feb 2022 10:52)  Source: Clean Catch Clean Catch (Midstream)  Final Report (22 Feb 2022 07:27):    <10,000 CFU/mL Normal Urogenital Jessica    CT- Stool, no acute findings

## 2022-02-22 NOTE — CONSULT NOTE ADULT - ASSESSMENT
ERIK FOX 31 f 2/21/2022 1990 DR FRANCINE FIORE     Initial evaluation/Pulmonary Critical Care consultation requested on  2/21/2022  by Dr FIORE     from Dr Gabriel   Patient examined chart reviewed    HOSPITAL ADMISSION   PATIENT CAME  FROM (if information available)      ERIK FOX 31 f 2/21/2022 1990 DR FRANCINE FIORE     REVIEW OF SYMPTOMS      Able to give ROS  Yes     RELIABLE +/-   CONSTITUTIONAL Weakness Yes  Chills No   ENDOCRINE  No heat or cold intolerance    ALLERGY No hives  Sore throat No stridor  RESP Coughing blood no  Shortness of breath YES   NEURO No Headache  Confusion Pain neck No   CARDIAC No Chest pain No Palpitations   GI  Pain abdomen NO   Vomiting NO     PHYSICAL EXAM    HEENT Unremarkable  atraumatic   RESP Fair air entry EXP prolonged    Harsh breath sound Resp distres mild   CARDIAC S1 S2 No S3     NO JVD    ABDOMEN SOFT BS PRESENT NOT DISTENDED No hepatosplenomegaly PEDAL EDEMA present No calf tenderness  NO rash     PATIENT PRESENTATION.  2/21/2022 31 year old female with PMH of HTN, Gastroparesis, and T1 DM presents to ED for abdominal pain, nausea, and vomiting that began today. Pt was recently admitted at [facility] x3 days ago and signed out AMA. Pt was previously admitted for abdominal pain and DKA. Pt denies sick contacts, recent travel, fever, and diarrhea. Pt reports pain is mostly in lower abdomen and rates pain 10/10 severity, she is unable to tolerate liquid and food intake.  Pt was found to have fibrotic changes on ctap in rml and pulm consulted 2/21/2022                                            DOA/CC/PROBLEMS poa .  2/21/2022 31 f  signed out ama from [facility] 2 d ago   cc pain abdomen vomiting   Fibrotic changes seen on ctap in rml and pulm consulted 2/21/2022       PMH-PSH .  pmh Colitis   pmh Diabetes mellitus type 1   pmh Gastroparesis due to DM   pmh History of cholecystectomy.  pmh Ectopic pregnancy 2009 and 2013, s/p removal of right and left fallopian tubes      CURRENT PROBLEMS .  HYPERGLYCEMIA   FEVER   FIBROTIC CHANGES IN LUNGS ON 2/21/2022 CTAP   PAIN ABDOMEN POA ct (-)         COVID/ICU/CODE STATUS.                       COVID  STATUS.     2/21/2022 (-)      ICU STAY. none  GOC.  2/21/2022 full code     BEST PRACTICE ISSUES.                                                  HEAD OF BED ELEVATION. Yes  DVT PROPHYLAXIS.      JULES PROPHYLAXIS.   2/21/2022 famotidin 20                                                                                      DIET.   2/21/2022 cons carb              GENERAL ISSUES .                                       ALLERGY.       nka                      WEIGHT.   2/21/2022 64                                  BMI.              2/21/2022 22           VITALS/PO/IO/VENT/DRIPS.     2/21/2022 afeb 90 120/60      ASSESSMENT/RECOMMENDATIONS.    HEMODYNAMICS.   Monitor bp Target MAP 65 (+)    RESP.   Monitor po Target po 90-95%    OXYGEN REQUIREMENTS.  2/21/2022 ra 97%    INFECTION  2/21/2022 T 101   w 2/21/2022 w 9.9   2/21/2022 Infection barillas started  2/21/2022 empiric rocephin started by Dr Fiore     FIBROTIC CHANGES IN LUNGS ON 2/21/2022 CTAP   ctap ic 2/21/2022 (-)   lower chest showed stable fibrocalcific changes rml  2/21/2022 Will plan ct chest to better characterize when more stable and plan barillas    ANEMIA   Hb 2/21/2022 Hb 9.4   Monitor     HYPERGLYCEMIA   not acidemic on vbg   Insiulin     IV fl.  2/21/2022 ns 80         TIME SPENT   Over 55 minutes aggregate care time spent on encounter; activities included   direct patient care, counseling and/or coordinating care reviewing notes, lab data/ imaging , discussion with multidisciplinary team/ patient  /family and explaining in detail risks, benefits, alternatives  of the recommendations     ERIK FOX 31 f 2/21/2022 1990 DR FRANCINE FIORE   
31-year-old female with poorly controlled diabetes mellitus type 1, presenting with abdominal pain of unclear etiology in the setting of a past history of drug-seeking behavior and signing out AGAINST MEDICAL ADVICE.  Her abdominal exam presently is completely benign, and she denies any abdominal discomfort at this point in time.  She did have objective fever.  She may have an odontogenic infection, and per discussion with the patient the murmur may be new.  There is no prior echocardiography here available for review.  As such the possibility of infective endocarditis is within the differential diagnosis, and echocardiography should be pursued.  Blood culture data is pending.     I do not think that the radiographic findings are acute, I suspect that they represent the same findings from her prior diagnosis of cryptogenic organizing pneumonia, though its not entirely possible to tell without [id number] comparison.  However the patient has no respiratory symptoms whatsoever.    Suggestions--  Will change Rx to Unasyn  ECHO  F/U Blood cx  Check full RVP  OPD dental eval depending on clinical course  Good but not overly tight diabetic control to avoid iatrogenic hypoglycemia. Uncontrolled hyperglycemia makes control of infections potentially problematic.     D/W Dr. Lal.    Thank you for the courtesy of this referral.     Earl Turpin MD  Attending Physician  [facility]  Division of Infectious Diseases  [phone]    Dental infection [K04.7]    Fever [R50.9]    DM (diabetes mellitus), type 1 [E10.9]    DM gastroparesis [E11.43]      
diabetes

## 2022-02-22 NOTE — CONSULT NOTE ADULT - PROBLEM SELECTOR RECOMMENDATION 9
Continue with the current  regimen while inpatient   while inpatient, finger sticks should be 100-180   Aggressive management of gastroparesis to continue.  Long-term side effects of metoclopramide should be kept in mind thank you for the courtesy of this consultation.

## 2022-02-22 NOTE — CONSULT NOTE ADULT - SUBJECTIVE AND OBJECTIVE BOX
Patient is a 31y old  Female who presents with a chief complaint of abd pain/ N/V (22 Feb 2022 11:48)      Reason For Consult:  uncontrolled diabetes with fluctuations     HPI:  31  yr    f   PmHx of T1DM with h/o gastroparesis,  HTN,    multiple admissions for DKA, Cholecystectomy, Colitis,     kae   out ama  from  [facility] ED ,  2  days ago      here  with abd pain/ n/ vomiting for 1  day    denies  fevers  at home/   cp/sob/ dysuria   was  febrile  in  er  given pain meds  by er      Endocrine History : Type I diabetes , HbA1C 8.9 . Diabetic Ketoacidosis resolved   gastroparesis severe , on Reglan . Also on Lantus 18 units and prandial lispro 6 units   earlier Had blood glucose > 400 and now they are in the high 100 100s            c/p abd pain/ n/ vomiting     pt had  signed  out ama , 2  days  ago from [facility]    (21 Feb 2022 13:17)      PAST MEDICAL & SURGICAL HISTORY:  Diabetes mellitus type 1    Gastroparesis due to DM    Colitis    Hypertension    Ectopic pregnancy  2009 and 2013, s/p removal of right and left fallopian tubes    History of cholecystectomy        FAMILY HISTORY:  Family history of type 1 diabetes mellitus (Grandparent)    FH: HTN (hypertension) (Father)    FHx: asthma  Brother          Social History:    MEDICATIONS  (STANDING):  acetaminophen   IVPB .. 1000 milliGRAM(s) IV Intermittent every 6 hours  ampicillin/sulbactam  IVPB      ampicillin/sulbactam  IVPB 3 Gram(s) IV Intermittent every 6 hours  dextrose 40% Gel 15 Gram(s) Oral once  dextrose 5%. 1000 milliLiter(s) (50 mL/Hr) IV Continuous <Continuous>  dextrose 5%. 1000 milliLiter(s) (100 mL/Hr) IV Continuous <Continuous>  dextrose 50% Injectable 25 Gram(s) IV Push once  dextrose 50% Injectable 12.5 Gram(s) IV Push once  dextrose 50% Injectable 25 Gram(s) IV Push once  glucagon  Injectable 1 milliGRAM(s) IntraMuscular once  hydrocortisone 1% Cream 1 Application(s) Topical once  influenza   Vaccine 0.5 milliLiter(s) IntraMuscular once  insulin glargine Injectable (LANTUS) 18 Unit(s) SubCutaneous at bedtime  insulin lispro (ADMELOG) corrective regimen sliding scale   SubCutaneous three times a day before meals  insulin lispro Injectable (ADMELOG) 6 Unit(s) SubCutaneous three times a day with meals  metoclopramide Injectable 10 milliGRAM(s) IV Push three times a day  polyethylene glycol 3350 17 Gram(s) Oral daily  sodium chloride 0.9%. 1000 milliLiter(s) (80 mL/Hr) IV Continuous <Continuous>    MEDICATIONS  (PRN):  diphenhydrAMINE Injectable 12.5 milliGRAM(s) IV Push every 8 hours PRN Itching  HYDROmorphone  Injectable 0.5 milliGRAM(s) IV Push every 8 hours PRN Severe Pain (7 - 10)  ondansetron Injectable 4 milliGRAM(s) IV Push every 4 hours PRN Nausea and/or Vomiting      REVIEW OF SYSTEMS:  CONSTITUTIONAL:  as per HPI  HEENT:  Eyes:  No diplopia or blurred vision.   ENT:  No earache, sore throat or runny nose.  CARDIOVASCULAR:  No chest pain .  RESPIRATORY:  No cough, shortness of breath, PND or orthopnea.  GASTROINTESTINAL:  No nausea, vomiting or diarrhea.  GENITOURINARY:  No dysuria, frequency or urgency. No Blood in urine  MUSCULOSKELETAL:  no joint aches, no muscle pain, myalgia  SKIN:  No change in skin, hair or nails.  NEUROLOGIC:  No paresthesias, fasciculations, seizures or weakness.  PSYCHIATRIC:  No disorder of thought or mood.  ENDOCRINE:  No heat or cold intolerance, polyuria or polydipsia. abnormal weight gain or loss, oral thrush  HEMATOLOGICAL:  No easy bruising or bleeding.     T(C): 36.8 (02-22-22 @ 17:06), Max: 37.2 (02-22-22 @ 05:03)  HR: 95 (02-22-22 @ 19:16) (95 - 121)  BP: 135/82 (02-22-22 @ 17:06) (118/69 - 153/95)  RR: 18 (02-22-22 @ 19:16) (18 - 19)  SpO2: 95% (02-22-22 @ 19:16) (95% - 98%)  Wt(kg): --    PHYSICAL EXAM:  GENERAL: NAD, well-groomed, well-developed  HEAD:  Atraumatic, Normocephalic  EYES: PERRLA, conjunctiva and sclera clear  ENMT: No  exudates,, Moist mucous membranes,, No lesions  NECK: Supple, No JVD,   NERVOUS SYSTEM:  Alert & Oriented   CHEST/LUNG: Clear to percussion bilaterally; No rales, rhonchi, wheezing, or rubs  HEART: Regular rate and rhythm; No murmurs, rubs, or gallops  ABDOMEN: Soft, Nontender, Nondistended; Bowel sounds present  EXTREMITIES:  2+ Peripheral Pulses, No clubbing, cyanosis, or edema  LYMPH: No lymphadenopathy noted  SKIN: No rashes or lesions    CAPILLARY BLOOD GLUCOSE      POCT Blood Glucose.: 281 mg/dL (22 Feb 2022 21:21)  POCT Blood Glucose.: 174 mg/dL (22 Feb 2022 16:08)  POCT Blood Glucose.: 84 mg/dL (22 Feb 2022 10:43)  POCT Blood Glucose.: 256 mg/dL (22 Feb 2022 07:34)  POCT Blood Glucose.: 466 mg/dL (22 Feb 2022 04:28)  POCT Blood Glucose.: 506 mg/dL (22 Feb 2022 03:51)                            8.9    12.64 )-----------( 393      ( 22 Feb 2022 11:36 )             29.4       CMP:  02-22 @ 11:36  SGPT --  Albumin --   Alk Phos --   Anion Gap 8   SGOT --   Total Bili --   BUN 17   Calcium Total 9.9   CO2 23   Chloride 106   Creatinine 0.92   eGFR if AA 96   eGFR if non AA 83   Glucose 70   Potassium 3.8   Protein --   Sodium 137      Thyroid Function Tests:      Diabetes Tests:     Parathyroids:     Adrenals:       Radiology:

## 2022-02-22 NOTE — PROGRESS NOTE ADULT - ASSESSMENT
ERIK FOX 31 f 2/21/2022 1990 DR FRANCINE FIORE     REVIEW OF SYMPTOMS      Able to give ROS  Yes     RELIABLE +/-   CONSTITUTIONAL Weakness Yes  Chills No   ENDOCRINE  No heat or cold intolerance    ALLERGY No hives  Sore throat No stridor  RESP Coughing blood no  Shortness of breath YES   NEURO No Headache  Confusion Pain neck No   CARDIAC No Chest pain No Palpitations   GI  Pain abdomen NO   Vomiting NO     PHYSICAL EXAM    HEENT Unremarkable  atraumatic   RESP Fair air entry EXP prolonged    Harsh breath sound Resp distres mild   CARDIAC S1 S2 No S3     NO JVD    ABDOMEN SOFT BS PRESENT NOT DISTENDED No hepatosplenomegaly PEDAL EDEMA present No calf tenderness  NO rash     DOA/CC/PROBLEMS poa .  2/21/2022 31 f  signed out ama from [facility] 2 d ago   cc pain abdomen vomiting   Fibrotic changes seen on ctap in l and pulm consulted 2/21/2022       PMH-PSH .  pmh Colitis   pmh Diabetes mellitus type 1   pmh Gastroparesis due to DM   pmh History of cholecystectomy.  pmh Ectopic pregnancy 2009 and 2013, s/p removal of right and left fallopian tubes    CURRENT PROBLEMS .  HYPERGLYCEMIA   FEVER   FIBROTIC CHANGES IN LUNGS ON 2/21/2022 CTAP   PAIN ABDOMEN POA ct (-)     COVID/ICU/CODE STATUS.                       COVID  STATUS.     2/21/2022 (-)      ICU STAY. none  GOC.  2/21/2022 full code     BEST PRACTICE ISSUES.                                                  HEAD OF BED ELEVATION. Yes  DVT PROPHYLAXIS.      JULES PROPHYLAXIS.   2/21/2022 famotidin 20                                                                                      DIET.   2/21/2022 cons carb               ASSESSMENT/RECOMMENDATIONS.    HEMODYNAMICS.   Monitor bp Target MAP 65 (+)    RESP.   Monitor po Target po 90-95%    OXYGEN REQUIREMENTS.  2/21/2022 ra 97%    INFECTION  2/21/2022 T 101   w 2/21/2022 w 9.9   2/21/2022 Infection barillas started  2/21/2022 empiric rocephin started by Dr Fiore     FIBROTIC CHANGES IN LUNGS ON 2/21/2022 CTAP   ctap ic 2/21/2022 (-)   lower chest showed stable fibrocalcific changes [facility]  2/21/2022 Will plan ct chest to better characterize when more stable and plan barillas    ANEMIA   Hb 2/21/2022 Hb 9.4   Monitor     HYPERGLYCEMIA   not acidemic on vbg   Insiulin     IV fl.  2/21/2022 ns 80       TIME SPENT   Over 25 minutes aggregate care time spent on encounter; activities included   direct patient care, counseling and/or coordinating care reviewing notes, lab data/ imaging , discussion with multidisciplinary team/ patient  /family and explaining in detail risks, benefits, alternatives  of the recommendations     ERIK FOX 31 f 2/21/2022 1990 DR FRANCINE FIORE

## 2022-02-23 DIAGNOSIS — J06.9 ACUTE UPPER RESPIRATORY INFECTION, UNSPECIFIED: ICD-10-CM

## 2022-02-23 DIAGNOSIS — J84.10 PULMONARY FIBROSIS, UNSPECIFIED: ICD-10-CM

## 2022-02-23 LAB
ANION GAP SERPL CALC-SCNC: 9 MMOL/L — SIGNIFICANT CHANGE UP (ref 5–17)
BUN SERPL-MCNC: 13 MG/DL — SIGNIFICANT CHANGE UP (ref 7–23)
CALCIUM SERPL-MCNC: 8.3 MG/DL — LOW (ref 8.5–10.1)
CHLORIDE SERPL-SCNC: 102 MMOL/L — SIGNIFICANT CHANGE UP (ref 96–108)
CO2 SERPL-SCNC: 24 MMOL/L — SIGNIFICANT CHANGE UP (ref 22–31)
CREAT SERPL-MCNC: 0.91 MG/DL — SIGNIFICANT CHANGE UP (ref 0.5–1.3)
GLUCOSE BLDC GLUCOMTR-MCNC: 185 MG/DL — HIGH (ref 70–99)
GLUCOSE BLDC GLUCOMTR-MCNC: 186 MG/DL — HIGH (ref 70–99)
GLUCOSE BLDC GLUCOMTR-MCNC: 276 MG/DL — HIGH (ref 70–99)
GLUCOSE BLDC GLUCOMTR-MCNC: 417 MG/DL — HIGH (ref 70–99)
GLUCOSE SERPL-MCNC: 315 MG/DL — HIGH (ref 70–99)
HCOV PNL SPEC NAA+PROBE: DETECTED
HCT VFR BLD CALC: 29.8 % — LOW (ref 34.5–45)
HGB BLD-MCNC: 8.9 G/DL — LOW (ref 11.5–15.5)
MCHC RBC-ENTMCNC: 18.1 PG — LOW (ref 27–34)
MCHC RBC-ENTMCNC: 29.9 G/DL — LOW (ref 32–36)
MCV RBC AUTO: 60.4 FL — LOW (ref 80–100)
NRBC # BLD: 0 /100 WBCS — SIGNIFICANT CHANGE UP (ref 0–0)
PLATELET # BLD AUTO: 339 K/UL — SIGNIFICANT CHANGE UP (ref 150–400)
POTASSIUM SERPL-MCNC: 3.8 MMOL/L — SIGNIFICANT CHANGE UP (ref 3.5–5.3)
POTASSIUM SERPL-SCNC: 3.8 MMOL/L — SIGNIFICANT CHANGE UP (ref 3.5–5.3)
RAPID RVP RESULT: DETECTED
RBC # BLD: 4.93 M/UL — SIGNIFICANT CHANGE UP (ref 3.8–5.2)
RBC # FLD: 20 % — HIGH (ref 10.3–14.5)
SARS-COV-2 RNA SPEC QL NAA+PROBE: SIGNIFICANT CHANGE UP
SODIUM SERPL-SCNC: 135 MMOL/L — SIGNIFICANT CHANGE UP (ref 135–145)
WBC # BLD: 9.01 K/UL — SIGNIFICANT CHANGE UP (ref 3.8–10.5)
WBC # FLD AUTO: 9.01 K/UL — SIGNIFICANT CHANGE UP (ref 3.8–10.5)

## 2022-02-23 PROCEDURE — 71250 CT THORAX DX C-: CPT | Mod: 26

## 2022-02-23 PROCEDURE — 99233 SBSQ HOSP IP/OBS HIGH 50: CPT

## 2022-02-23 PROCEDURE — 99232 SBSQ HOSP IP/OBS MODERATE 35: CPT

## 2022-02-23 RX ORDER — INSULIN LISPRO 100/ML
VIAL (ML) SUBCUTANEOUS AT BEDTIME
Refills: 0 | Status: DISCONTINUED | OUTPATIENT
Start: 2022-02-23 | End: 2022-02-25

## 2022-02-23 RX ADMIN — SODIUM CHLORIDE 80 MILLILITER(S): 9 INJECTION INTRAMUSCULAR; INTRAVENOUS; SUBCUTANEOUS at 08:06

## 2022-02-23 RX ADMIN — Medication 1000 MILLIGRAM(S): at 00:10

## 2022-02-23 RX ADMIN — Medication 400 MILLIGRAM(S): at 05:04

## 2022-02-23 RX ADMIN — Medication 1: at 11:16

## 2022-02-23 RX ADMIN — Medication 400 MILLIGRAM(S): at 17:45

## 2022-02-23 RX ADMIN — HYDROMORPHONE HYDROCHLORIDE 0.5 MILLIGRAM(S): 2 INJECTION INTRAMUSCULAR; INTRAVENOUS; SUBCUTANEOUS at 00:08

## 2022-02-23 RX ADMIN — AMPICILLIN SODIUM AND SULBACTAM SODIUM 200 GRAM(S): 250; 125 INJECTION, POWDER, FOR SUSPENSION INTRAMUSCULAR; INTRAVENOUS at 06:13

## 2022-02-23 RX ADMIN — Medication 12.5 MILLIGRAM(S): at 08:34

## 2022-02-23 RX ADMIN — Medication 10 MILLIGRAM(S): at 05:04

## 2022-02-23 RX ADMIN — Medication 1: at 16:12

## 2022-02-23 RX ADMIN — INSULIN GLARGINE 18 UNIT(S): 100 INJECTION, SOLUTION SUBCUTANEOUS at 21:57

## 2022-02-23 RX ADMIN — Medication 1000 MILLIGRAM(S): at 05:34

## 2022-02-23 RX ADMIN — Medication 8: at 21:56

## 2022-02-23 RX ADMIN — AMPICILLIN SODIUM AND SULBACTAM SODIUM 200 GRAM(S): 250; 125 INJECTION, POWDER, FOR SUSPENSION INTRAMUSCULAR; INTRAVENOUS at 11:15

## 2022-02-23 RX ADMIN — HYDROMORPHONE HYDROCHLORIDE 0.5 MILLIGRAM(S): 2 INJECTION INTRAMUSCULAR; INTRAVENOUS; SUBCUTANEOUS at 00:23

## 2022-02-23 RX ADMIN — HYDROMORPHONE HYDROCHLORIDE 0.5 MILLIGRAM(S): 2 INJECTION INTRAMUSCULAR; INTRAVENOUS; SUBCUTANEOUS at 08:05

## 2022-02-23 RX ADMIN — AMPICILLIN SODIUM AND SULBACTAM SODIUM 200 GRAM(S): 250; 125 INJECTION, POWDER, FOR SUSPENSION INTRAMUSCULAR; INTRAVENOUS at 17:45

## 2022-02-23 RX ADMIN — Medication 400 MILLIGRAM(S): at 11:15

## 2022-02-23 RX ADMIN — Medication 12.5 MILLIGRAM(S): at 00:33

## 2022-02-23 RX ADMIN — Medication 3: at 08:05

## 2022-02-23 RX ADMIN — HYDROMORPHONE HYDROCHLORIDE 0.5 MILLIGRAM(S): 2 INJECTION INTRAMUSCULAR; INTRAVENOUS; SUBCUTANEOUS at 16:11

## 2022-02-23 RX ADMIN — AMPICILLIN SODIUM AND SULBACTAM SODIUM 200 GRAM(S): 250; 125 INJECTION, POWDER, FOR SUSPENSION INTRAMUSCULAR; INTRAVENOUS at 00:13

## 2022-02-23 RX ADMIN — ONDANSETRON 4 MILLIGRAM(S): 8 TABLET, FILM COATED ORAL at 16:14

## 2022-02-23 NOTE — PROGRESS NOTE ADULT - ASSESSMENT
31-year-old female with poorly controlled diabetes mellitus type 1, presenting with abdominal pain of unclear etiology in the setting of a past history of drug-seeking behavior and signing out AGAINST MEDICAL ADVICE.  Her abdominal exam presently is completely benign, and she denies any abdominal discomfort at this point in time.  She did have objective fever.  She may have an odontogenic infection, and per discussion with the patient the murmur may be new.  There is no prior echocardiography here available for review.  As such the possibility of infective endocarditis is within the differential diagnosis, and echocardiography should be pursued.  Blood culture data is pending.     I do not think that the radiographic findings are acute, I suspect that they represent the same findings from her prior diagnosis of cryptogenic organizing pneumonia, though its not entirely possible to tell without [id number]-[id number] comparison.  However the patient has no respiratory symptoms whatsoever.    2/23:  31-year-old female with poorly controlled diabetes mellitus type 1, presenting with abdominal pain of unclear etiology in the setting of a past history of drug-seeking behavior and signing out AGAINST MEDICAL ADVICE.  Her abdominal exam presently is completely benign, and she denies any abdominal discomfort at this point in time.  She did have objective fever.  She may have an odontogenic infection, and per discussion with the patient the murmur may be new.  There is no prior echocardiography here available for review.  As such the possibility of infective endocarditis is within the differential diagnosis, and echocardiography should be pursued.  Blood culture data is pending.     I do not think that the radiographic findings are acute, I suspect that they represent the same findings from her prior diagnosis of cryptogenic organizing pneumonia, though its not entirely possible to tell without [id number] comparison.  However the patient has no respiratory symptoms whatsoever.    2/23: Overall stable with negative blood cx data thus far. +RVP for non-COVID coronavirus.

## 2022-02-23 NOTE — PROGRESS NOTE ADULT - SUBJECTIVE AND OBJECTIVE BOX
DOMINIQUE VALENCIA    LVS 1E 183 D    Allergies    No Known Allergies    Intolerances        PAST MEDICAL & SURGICAL HISTORY:  Diabetes mellitus type 1    Gastroparesis due to DM    Colitis    Hypertension    Ectopic pregnancy   and , s/p removal of right and left fallopian tubes    History of cholecystectomy        FAMILY HISTORY:  Family history of type 1 diabetes mellitus (Grandparent)    FH: HTN (hypertension) (Father)    FHx: asthma  Brother        Home Medications:  Admelog SoloStar 100 units/mL injectable solution: 8 unit(s) injectable 3 times a day (2022 07:56)  Basaglar KwikPen 100 units/mL subcutaneous solution: 18 unit(s) subcutaneous once a day (at bedtime) (2022 07:56)  enalapril 5 mg oral tablet: 1 tab(s) orally once a day, As Needed (2022 07:56)  famotidine 20 mg oral tablet: 1 tab(s) orally once a day (2022 07:56)  metoclopramide 10 mg oral tablet: 1 tab(s) orally 4 times a day (before meals and at bedtime), As Needed (2022 07:56)  polyethylene glycol 3350 oral powder for reconstitution: 17 gram(s) orally once a day (2022 01:28)      MEDICATIONS  (STANDING):  acetaminophen   IVPB .. 1000 milliGRAM(s) IV Intermittent every 6 hours  ampicillin/sulbactam  IVPB      ampicillin/sulbactam  IVPB 3 Gram(s) IV Intermittent every 6 hours  dextrose 40% Gel 15 Gram(s) Oral once  dextrose 5%. 1000 milliLiter(s) (50 mL/Hr) IV Continuous <Continuous>  dextrose 5%. 1000 milliLiter(s) (100 mL/Hr) IV Continuous <Continuous>  dextrose 50% Injectable 25 Gram(s) IV Push once  dextrose 50% Injectable 12.5 Gram(s) IV Push once  dextrose 50% Injectable 25 Gram(s) IV Push once  glucagon  Injectable 1 milliGRAM(s) IntraMuscular once  hydrocortisone 1% Cream 1 Application(s) Topical once  influenza   Vaccine 0.5 milliLiter(s) IntraMuscular once  insulin glargine Injectable (LANTUS) 18 Unit(s) SubCutaneous at bedtime  insulin lispro (ADMELOG) corrective regimen sliding scale   SubCutaneous three times a day before meals  insulin lispro Injectable (ADMELOG) 6 Unit(s) SubCutaneous three times a day with meals  metoclopramide Injectable 10 milliGRAM(s) IV Push three times a day  polyethylene glycol 3350 17 Gram(s) Oral daily  sodium chloride 0.9%. 1000 milliLiter(s) (80 mL/Hr) IV Continuous <Continuous>    MEDICATIONS  (PRN):  diphenhydrAMINE Injectable 12.5 milliGRAM(s) IV Push every 8 hours PRN Itching  HYDROmorphone  Injectable 0.5 milliGRAM(s) IV Push every 8 hours PRN Severe Pain (7 - 10)  ondansetron Injectable 4 milliGRAM(s) IV Push every 4 hours PRN Nausea and/or Vomiting      Diet, Consistent Carbohydrate w/Evening Snack (22 @ 13:46) [Active]          Vital Signs Last 24 Hrs  T(C): 36.8 (2022 05:13), Max: 37.1 (2022 00:30)  T(F): 98.3 (2022 05:13), Max: 98.8 (2022 00:30)  HR: 79 (2022 05:13) (79 - 121)  BP: 136/84 (2022 05:13) (118/69 - 136/84)  BP(mean): --  RR: 18 (2022 05:13) (18 - 18)  SpO2: 100% (2022 05:13) (95% - 100%)              LABS:                        8.9    12.64 )-----------( 393      ( 2022 11:36 )             29.4         137  |  106  |  17  ----------------------------<  70  3.8   |  23  |  0.92    Ca    9.9      2022 11:36    TPro  7.1  /  Alb  3.0<L>  /  TBili  0.5  /  DBili  x   /  AST  19  /  ALT  26  /  AlkPhos  105  02-    PT/INR - ( 2022 08:03 )   PT: 11.4 sec;   INR: 0.98 ratio         PTT - ( 2022 08:03 )  PTT:29.9 sec  Urinalysis Basic - ( 2022 08:28 )    Color: Yellow / Appearance: Clear / S.010 / pH: x  Gluc: x / Ketone: Small  / Bili: Negative / Urobili: Negative mg/dL   Blood: x / Protein: 100 mg/dL / Nitrite: Negative   Leuk Esterase: Negative / RBC: 3-5 /HPF / WBC 0-2   Sq Epi: x / Non Sq Epi: Occasional / Bacteria: Few            WBC:  WBC Count: 12.64 K/uL ( @ 11:36)  WBC Count: 9.96 K/uL ( @ 08:03)      MICROBIOLOGY:  RECENT CULTURES:   .Blood Blood-Peripheral XXXX XXXX   No growth to date.     Clean Catch Clean Catch (Midstream) XXXX XXXX   <10,000 CFU/mL Normal Urogenital Jessica                PT/INR - ( 2022 08:03 )   PT: 11.4 sec;   INR: 0.98 ratio         PTT - ( 2022 08:03 )  PTT:29.9 sec    Sodium:  Sodium, Serum: 137 mmol/L ( @ 11:36)  Sodium, Serum: 136 mmol/L ( @ 11:35)  Sodium, Serum: 134 mmol/L ( @ 08:03)      0.92 mg/dL  @ 11:36  0.93 mg/dL  @ 11:35  1.02 mg/dL  @ 08:03      Hemoglobin:  Hemoglobin: 8.9 g/dL ( @ 11:36)  Hemoglobin: 9.4 g/dL ( @ 08:03)      Platelets: Platelet Count - Automated: 393 K/uL ( @ 11:36)  Platelet Count - Automated: 416 K/uL ( @ 08:03)      LIVER FUNCTIONS - ( 2022 08:03 )  Alb: 3.0 g/dL / Pro: 7.1 gm/dL / ALK PHOS: 105 U/L / ALT: 26 U/L / AST: 19 U/L / GGT: x             Urinalysis Basic - ( 2022 08:28 )    Color: Yellow / Appearance: Clear / S.010 / pH: x  Gluc: x / Ketone: Small  / Bili: Negative / Urobili: Negative mg/dL   Blood: x / Protein: 100 mg/dL / Nitrite: Negative   Leuk Esterase: Negative / RBC: 3-5 /HPF / WBC 0-2   Sq Epi: x / Non Sq Epi: Occasional / Bacteria: Few        RADIOLOGY & ADDITIONAL STUDIES:      MICROBIOLOGY:  RECENT CULTURES:   .Blood Blood-Peripheral XXXX XXXX   No growth to date.     Clean Catch Clean Catch (Midstream) XXXX XXXX   <10,000 CFU/mL Normal Urogenital Jessica

## 2022-02-23 NOTE — PROGRESS NOTE ADULT - ASSESSMENT
31 YOF PMHx DMI with episodes of DKA, chronic abd pain ? gastroparesis, ? opiate dependence, presenting with abdominal pain and fever, hyperglycemic without DKA.  Concern for dental infection.    Possible systemic dental infection.  Appreciate ID consult.  Unasyn, echo, blood cx pending.    interstitial lung changes. appreciate pulm consult.  noncon CT chest ordered and pending    DMI, uncontrolled: continue lantus 18U, decrease admelog to 6U to be given if eats >50% of meal only.    chronic abdominal pain: likely multifactorial related to uncontrolled DM and possible hyperalgesia from narcotics.  For now can continue with IV dilaudid 0.5mg every 8 hours, added standing tylenol IV while not tolerating PO, with IV reglan and zofran.  She was given clear education that once she is tolerating PO she will be changed off of the IV narcotics.  we also need to add low dose benadryl per pateints repeated reuests due to itching with the narcotics.  dosing instructions to minimize side effects written.  -utox ordered, of note was negative 12/25    no medical VTE prophylaxis for now, start tomorrow if condition not further improving.  She is quite ambulatory right now so relatively low risk.    pt high risk, due to IV narcotic administration 31 YOF PMHx DMI with episodes of DKA, chronic abd pain ? gastroparesis, ? opiate dependence, presenting with abdominal pain and fever, hyperglycemic without DKA.  Concern for dental infection.    Possible systemic dental infection.  Appreciate ID consult.  Unasyn, echo, blood cx NGTD    interstitial lung changes. appreciate pulm consult.  noncon CT chest result pending    DMI, uncontrolled: continue lantus 18U, admelog to 6U to be given if eats >50% of meal only, SSI.  endo consult appreciated    obstipation: dulcolax suppository ordered    chronic abdominal pain: likely multifactorial related to uncontrolled DM, possible hyperalgesia from narcotics, and obstipation seen on imaging.  Continue with IV dilaudid 0.5mg every 8 hours, added standing tylenol IV while not tolerating PO, with IV reglan and zofran.  She was given clear education that once she is tolerating PO she will be changed off of the IV narcotics.  we also need to add low dose benadryl per patients repeated requests due to itching with the narcotics.  dosing instructions to minimize side effects written.  -utox ordered, of note was negative 12/25    no medical VTE prophylaxis for now, she is quite ambulatory right now so relatively low risk.    pt high risk, due to IV narcotic administration

## 2022-02-23 NOTE — PROGRESS NOTE ADULT - SUBJECTIVE AND OBJECTIVE BOX
s. pt notes improvement in abdominal pain, controlled with IV tylenol and hydromorphone.  Pateint reports and nurse confirms that she has been unable to keep any food down.  She reports that she is hungry.  We discussed that next steps to minimize GI symptoms is to discontinue the dilaudid if she is not able to tolerate lunch as this worsens motility issues.  If she is able to tolerate PO intake she is aware that we will discontinue IV narcotics and other IV medications at that time.    o.  Vital Signs Last 24 Hrs  T(C): 36.8 (23 Feb 2022 05:13), Max: 37.1 (23 Feb 2022 00:30)  T(F): 98.3 (23 Feb 2022 05:13), Max: 98.8 (23 Feb 2022 00:30)  HR: 79 (23 Feb 2022 05:13) (79 - 121)  BP: 136/84 (23 Feb 2022 05:13) (128/74 - 136/84)  BP(mean): --  RR: 18 (23 Feb 2022 05:13) (18 - 18)  SpO2: 100% (23 Feb 2022 05:13) (95% - 100%)    gen more comfortable in bed  lungs  s. pt notes improvement in abdominal pain, controlled with IV tylenol and hydromorphone.  Pateint reports and nurse confirms that she has been unable to keep any food down.  She reports that she is hungry.  We discussed that next steps to minimize GI symptoms is to discontinue the dilaudid if she is not able to tolerate lunch as this worsens motility issues.  If she is able to tolerate PO intake she is aware that we will discontinue IV narcotics and other IV medications at that time.    o.  Vital Signs Last 24 Hrs  T(C): 36.8 (23 Feb 2022 05:13), Max: 37.1 (23 Feb 2022 00:30)  T(F): 98.3 (23 Feb 2022 05:13), Max: 98.8 (23 Feb 2022 00:30)  HR: 79 (23 Feb 2022 05:13) (79 - 121)  BP: 136/84 (23 Feb 2022 05:13) (128/74 - 136/84)  BP(mean): --  RR: 18 (23 Feb 2022 05:13) (18 - 18)  SpO2: 100% (23 Feb 2022 05:13) (95% - 100%)    gen more comfortable in bed  lungs clear, good movement  cv no LE edema, ext warm well perfused  abd no tenderness when palpated with head of stethoscope  psyc aaox3    labs reviewed, glucoses remain mildly elevated, chem otherwise stable    personally reviewed CT a/p from admission as well as pending CT chest today; she has large amount of stool throughout intesting

## 2022-02-23 NOTE — PROGRESS NOTE ADULT - SUBJECTIVE AND OBJECTIVE BOX
[facility]  Division of Infectious Diseases  [phone]    Name: DOMINIQUE VALENCIA  Age: 31y  Gender: Female  MRN: [id number]    Interval History--  Notes reviewed. Seen earlier today. Patient reviewed with Dr. Lal.   Patient c/o nausea with PO intake earlier today, tolerated PO yesterday evening  No fevers, chills, or rigors.   Denies any SOB or CP  Denies any abdominal pain  Dental pain better, not resolved entirely. "... trying not to chew on that side."      Past Medical History--  Diabetes mellitus type 1    Gastroparesis    No pertinent past medical history    Type 1 diabetes mellitus with ketoacidosis without coma, with long-term current use of insulin    Gastroparesis due to DM    Colitis    Hypertension    Ectopic pregnancy    No significant past surgical history    History of cholecystectomy        For details regarding the patient's social history, family history, and other miscellaneous elements, please refer the initial infectious diseases consultation and/or the admitting history and physical examination for this admission.    Allergies    No Known Allergies    Intolerances        Medications--  Antibiotics:  ampicillin/sulbactam  IVPB      ampicillin/sulbactam  IVPB 3 Gram(s) IV Intermittent every 6 hours    Immunologic:  influenza   Vaccine 0.5 milliLiter(s) IntraMuscular once    Other:  acetaminophen   IVPB ..  bisacodyl Suppository  dextrose 40% Gel  dextrose 5%.  dextrose 5%.  dextrose 50% Injectable  dextrose 50% Injectable  dextrose 50% Injectable  diphenhydrAMINE Injectable PRN  glucagon  Injectable  hydrocortisone 1% Cream  HYDROmorphone  Injectable PRN  insulin glargine Injectable (LANTUS)  insulin lispro (ADMELOG) corrective regimen sliding scale  insulin lispro Injectable (ADMELOG)  metoclopramide Injectable  ondansetron Injectable PRN  polyethylene glycol 3350  sodium chloride 0.9%.      Review of Systems--  A 10-point review of systems was obtained.  Review of systems otherwise negative except as previously noted.    Physical Examination--  Vital Signs: T(F): 98.3 (02-23-22 @ 11:22), Max: 98.8 (02-23-22 @ 00:30)  HR: 85 (02-23-22 @ 11:22)  BP: 124/78 (02-23-22 @ 11:22)  RR: 18 (02-23-22 @ 11:22)  SpO2: 98% (02-23-22 @ 11:22)  Wt(kg): --  General: Nontoxic-appearing Female in no acute distress.  HEENT: AT/NC. Anicteric. Conjunctiva pink and moist. Oropharynx clear. Dentition no change.  Neck: Not rigid. No sense of mass.  Nodes: None palpable.  Lungs: Clear bilaterally without rales, wheezing or rhonchi  Heart: Regular rate and rhythm. 2-3/6 Murmur. No rub. No gallop. No palpable thrill.  Abdomen: Bowel sounds present and normoactive. Soft. Mildly distended. Nontender. No sense of mass. No organomegaly.  Extremities: No cyanosis or clubbing. No edema. Possible IDU stigmata, scarring vs track marks.   Skin: Warm. Dry. Good turgor. No rash. No vasculitic stigmata.  Psychiatric: grossly appropriate affect and mood for situation.       Laboratory Studies--  CBC                        8.9    9.01  )-----------( 339      ( 23 Feb 2022 07:43 )             29.8       Chemistries  02-23    135  |  102  |  13  ----------------------------<  315<H>  3.8   |  24  |  0.91    Ca    8.3<L>      23 Feb 2022 07:43        Culture Data  Culture - Blood (collected 21 Feb 2022 11:17)  Source: .Blood Blood-Peripheral  Preliminary Report (22 Feb 2022 12:01):    No growth to date.    Culture - Blood (collected 21 Feb 2022 11:17)  Source: .Blood Blood-Peripheral  Preliminary Report (22 Feb 2022 12:01):    No growth to date.    Culture - Urine (collected 21 Feb 2022 10:52)  Source: Clean Catch Clean Catch (Midstream)  Final Report (22 Feb 2022 07:27):    <10,000 CFU/mL Normal Urogenital Jessica      Respiratory Viral Panel with COVID-19 by JANIS (02.23.22 @ 12:10)    Rapid RVP Result: Detected    SARS-CoV-2: NotDetec: This Respiratory Panel uses polymerase chain reaction (PCR) to detect for  adenovirus; coronavirus (HKU1, NL63, 229E, OC43); human metapneumovirus  (hMPV); human enterovirus/rhinovirus (Entero/RV); influenza A; influenza  A/H1; influenza A/H3; influenza A/H1-2009; influenza B; parainfluenza  viruses 1, 2, 3, 4; respiratory syncytial virus; Mycoplasma pneumoniae;  Chlamydophila pneumoniae; and SARS-CoV-2.    Coronavirus (229E,HKU1,NL63,OC43): Detected    < from: CT Chest No Cont (02.23.22 @ 12:12) >    ACC: [id number] EXAM:  CT CHEST                        PROCEDURE DATE:  02/23/2022    INTERPRETATION:  CLINICAL INFORMATION: Abnormal chest x-ray. Possible   interstitial lung disease.  COMPARISON: Pertinent images from CT abdomen pelvis of12/16/2021  CONTRAST/COMPLICATIONS:  IV Contrast: None  Oral Contrast: None  PROCEDURE:  CT of the Chest was performed.  Sagittal and coronal reformats were performed.    FINDINGS:  LUNGS AND AIRWAYS: Patent central airways.  Lungs are remarkable for   scarring in the upper lobes with linear densities and multiple areas of   punctate calcification. There is a lesser degree of scarring in the   bilateral lower lobes.  PLEURA: No pleural effusion.  MEDIASTINUM AND TASH: No lymphadenopathy.  VESSELS: Within normal limits.  HEART: Heart size is normal. No pericardial effusion.  CHEST WALL AND LOWER NECK: Within normal limits.  VISUALIZED UPPER ABDOMEN: Cholecystectomy clips.  BONES: Within normal limits.    IMPRESSION: Extensive bilateral upper lobe scarring with linear densities   and multiple foci of calcification. Much milder degree of scarring in the   lower lobes. Findings also seen on CT of the abdomen and pelvis from   October 2021 without significant change. Upper lobe fibrosis can be seen   with multiple causes including sarcoidosis, silicosis as well as chronic   hypersensitivity pneumonitis. Recommend clinical correlation.    --- End of Report ---  NEVA CARTER MD; Attending Radiologist  This document has been electronically signed. Feb 23 2022  2:06PM    < end of copied text >

## 2022-02-23 NOTE — PROGRESS NOTE ADULT - ASSESSMENT
ERIK FOX 31 f 2/21/2022 1990 DR FRANCINE FIORE     REVIEW OF SYMPTOMS      Able to give ROS  Yes     RELIABLE +/-   CONSTITUTIONAL Weakness Yes  Chills No   ENDOCRINE  No heat or cold intolerance    ALLERGY No hives  Sore throat No stridor  RESP Coughing blood no  Shortness of breath YES   NEURO No Headache  Confusion Pain neck No   CARDIAC No Chest pain No Palpitations   GI  Pain abdomen NO   Vomiting NO     PHYSICAL EXAM    HEENT Unremarkable  atraumatic   RESP Fair air entry EXP prolonged    Harsh breath sound Resp distres mild   CARDIAC S1 S2 No S3     NO JVD    ABDOMEN SOFT BS PRESENT NOT DISTENDED No hepatosplenomegaly PEDAL EDEMA present No calf tenderness  NO rash     DOA/CC/PROBLEMS poa .  2/21/2022 31 f  signed out ama from [facility] 2 d ago   cc pain abdomen vomiting   Fibrotic changes seen on ctap in rml and pulm consulted 2/21/2022       PMH-PSH .  pmh Colitis   pmh Diabetes mellitus type 1   pmh Gastroparesis due to DM   pmh History of cholecystectomy.  pmh Ectopic pregnancy 2009 and 2013, s/p removal of right and left fallopian tubes    PROBLEMS .  HYPERGLYCEMIA   FEVER   RESP TRACT INFECTN 2/23 RVP Coronavirus HKU (not covid)  UNASYN 2/22 Dr WATTS (Odontogenetic infectn)   FIBROTIC CHANGES IN LUNGS ON 2/21/2022 CTAP   PAIN ABDOMEN POA ct (-)       COVID/ICU/CODE STATUS.                       COVID  STATUS.     2/21/2022 (-)      ICU STAY. none  GOC.  2/21/2022 full code     BEST PRACTICE ISSUES.                                                  HEAD OF BED ELEVATION. Yes  DVT PROPHYLAXIS.      JULES PROPHYLAXIS.   2/21/2022 famotidin 20                                                                                      DIET.   2/21/2022 cons carb      ASSESSMENT/RECOMMENDATIONS.    HEMODYNAMICS.   Monitor bp Target MAP 65 (+)    RESP.   Monitor po Target po 90-95%    OXYGEN REQUIREMENTS.  2/21/2022 ra 97%  Will need home oxygen at discharge if ra rest or ra exercise PO drops below 88%    INFECTION  Dental infection poa   Corona HKU infection 2/23 2/21/2022 T 101   w 2/21-2/23/2022 w 9.9 - 9   2/23 RVP Coronavirus HKU (not covid)  2/21/2022 Infection barillas started  2/21/2022 empiric rocephin started by Dr Fiore   2/23/2022 started on unasyn Dr WATTS     FIBROTIC CHANGES IN LUNGS ON 2/21/2022 CTAP   Hx Pt gives ho having had BOOP () on bx done several years ago but did noit recall rx with steroids   ctap ic 2/21/2022 (-)   lower chest showed stable fibrocalcific changes rml  ct ch 2/23/2022 extensive upper lobe scarring with linear densities and multiple foci of calcification   nsc from 10/2021   Pt advised to see me in office   Will need barillas for upper lobe scarring including TB in this diabetic pt      ANEMIA   Hb 2/21-2/23/2022 Hb 9.4 -8.9  anemia is mictrocytic and needs further eval by gi and hemat   Monitor       TIME SPENT   Over 25 minutes aggregate care time spent on encounter; activities included   direct patient care, counseling and/or coordinating care reviewing notes, lab data/ imaging , discussion with multidisciplinary team/ patient  /family and explaining in detail risks, benefits, alternatives  of the recommendations     ERIK FOX 31 f 2/21/2022 1990 DR FRANCINE FIORE

## 2022-02-24 ENCOUNTER — TRANSCRIPTION ENCOUNTER (OUTPATIENT)
Age: 32
End: 2022-02-24

## 2022-02-24 LAB
GLUCOSE BLDC GLUCOMTR-MCNC: 200 MG/DL — HIGH (ref 70–99)
GLUCOSE BLDC GLUCOMTR-MCNC: 269 MG/DL — HIGH (ref 70–99)
GLUCOSE BLDC GLUCOMTR-MCNC: 305 MG/DL — HIGH (ref 70–99)
GLUCOSE BLDC GLUCOMTR-MCNC: 515 MG/DL — CRITICAL HIGH (ref 70–99)
GLUCOSE BLDC GLUCOMTR-MCNC: 75 MG/DL — SIGNIFICANT CHANGE UP (ref 70–99)
GLUCOSE BLDC GLUCOMTR-MCNC: >600 MG/DL — CRITICAL HIGH (ref 70–99)

## 2022-02-24 PROCEDURE — 99231 SBSQ HOSP IP/OBS SF/LOW 25: CPT

## 2022-02-24 PROCEDURE — 99232 SBSQ HOSP IP/OBS MODERATE 35: CPT

## 2022-02-24 PROCEDURE — 93306 TTE W/DOPPLER COMPLETE: CPT | Mod: 26

## 2022-02-24 RX ORDER — HYDROMORPHONE HYDROCHLORIDE 2 MG/ML
0.5 INJECTION INTRAMUSCULAR; INTRAVENOUS; SUBCUTANEOUS ONCE
Refills: 0 | Status: DISCONTINUED | OUTPATIENT
Start: 2022-02-24 | End: 2022-02-24

## 2022-02-24 RX ORDER — METOCLOPRAMIDE HCL 10 MG
10 TABLET ORAL ONCE
Refills: 0 | Status: DISCONTINUED | OUTPATIENT
Start: 2022-02-24 | End: 2022-02-24

## 2022-02-24 RX ORDER — METOCLOPRAMIDE HCL 10 MG
10 TABLET ORAL ONCE
Refills: 0 | Status: COMPLETED | OUTPATIENT
Start: 2022-02-24 | End: 2022-02-24

## 2022-02-24 RX ORDER — DIPHENHYDRAMINE HCL 50 MG
25 CAPSULE ORAL EVERY 8 HOURS
Refills: 0 | Status: DISCONTINUED | OUTPATIENT
Start: 2022-02-24 | End: 2022-02-25

## 2022-02-24 RX ORDER — METOCLOPRAMIDE HCL 10 MG
5 TABLET ORAL
Refills: 0 | Status: DISCONTINUED | OUTPATIENT
Start: 2022-02-24 | End: 2022-02-25

## 2022-02-24 RX ORDER — DIPHENHYDRAMINE HCL 50 MG
12.5 CAPSULE ORAL ONCE
Refills: 0 | Status: COMPLETED | OUTPATIENT
Start: 2022-02-24 | End: 2022-02-24

## 2022-02-24 RX ORDER — HYDROMORPHONE HYDROCHLORIDE 2 MG/ML
2 INJECTION INTRAMUSCULAR; INTRAVENOUS; SUBCUTANEOUS EVERY 8 HOURS
Refills: 0 | Status: DISCONTINUED | OUTPATIENT
Start: 2022-02-24 | End: 2022-02-25

## 2022-02-24 RX ORDER — DIPHENHYDRAMINE HCL 50 MG
50 CAPSULE ORAL ONCE
Refills: 0 | Status: COMPLETED | OUTPATIENT
Start: 2022-02-24 | End: 2022-02-24

## 2022-02-24 RX ORDER — ACETAMINOPHEN 500 MG
975 TABLET ORAL EVERY 6 HOURS
Refills: 0 | Status: DISCONTINUED | OUTPATIENT
Start: 2022-02-24 | End: 2022-02-25

## 2022-02-24 RX ORDER — MORPHINE SULFATE 50 MG/1
2 CAPSULE, EXTENDED RELEASE ORAL ONCE
Refills: 0 | Status: DISCONTINUED | OUTPATIENT
Start: 2022-02-24 | End: 2022-02-24

## 2022-02-24 RX ORDER — DIPHENHYDRAMINE HCL 50 MG
12.5 CAPSULE ORAL ONCE
Refills: 0 | Status: DISCONTINUED | OUTPATIENT
Start: 2022-02-24 | End: 2022-02-24

## 2022-02-24 RX ADMIN — Medication 12.5 MILLIGRAM(S): at 02:28

## 2022-02-24 RX ADMIN — HYDROMORPHONE HYDROCHLORIDE 2 MILLIGRAM(S): 2 INJECTION INTRAMUSCULAR; INTRAVENOUS; SUBCUTANEOUS at 21:29

## 2022-02-24 RX ADMIN — Medication 10 MILLIGRAM(S): at 23:58

## 2022-02-24 RX ADMIN — Medication 975 MILLIGRAM(S): at 18:26

## 2022-02-24 RX ADMIN — HYDROMORPHONE HYDROCHLORIDE 0.5 MILLIGRAM(S): 2 INJECTION INTRAMUSCULAR; INTRAVENOUS; SUBCUTANEOUS at 10:06

## 2022-02-24 RX ADMIN — Medication 3: at 08:14

## 2022-02-24 RX ADMIN — HYDROMORPHONE HYDROCHLORIDE 0.5 MILLIGRAM(S): 2 INJECTION INTRAMUSCULAR; INTRAVENOUS; SUBCUTANEOUS at 08:34

## 2022-02-24 RX ADMIN — Medication 6 UNIT(S): at 09:04

## 2022-02-24 RX ADMIN — AMPICILLIN SODIUM AND SULBACTAM SODIUM 200 GRAM(S): 250; 125 INJECTION, POWDER, FOR SUSPENSION INTRAMUSCULAR; INTRAVENOUS at 08:34

## 2022-02-24 RX ADMIN — HYDROMORPHONE HYDROCHLORIDE 0.5 MILLIGRAM(S): 2 INJECTION INTRAMUSCULAR; INTRAVENOUS; SUBCUTANEOUS at 02:35

## 2022-02-24 RX ADMIN — Medication 6 UNIT(S): at 17:44

## 2022-02-24 RX ADMIN — SODIUM CHLORIDE 80 MILLILITER(S): 9 INJECTION INTRAMUSCULAR; INTRAVENOUS; SUBCUTANEOUS at 02:31

## 2022-02-24 RX ADMIN — HYDROMORPHONE HYDROCHLORIDE 0.5 MILLIGRAM(S): 2 INJECTION INTRAMUSCULAR; INTRAVENOUS; SUBCUTANEOUS at 00:17

## 2022-02-24 RX ADMIN — HYDROMORPHONE HYDROCHLORIDE 0.5 MILLIGRAM(S): 2 INJECTION INTRAMUSCULAR; INTRAVENOUS; SUBCUTANEOUS at 02:05

## 2022-02-24 RX ADMIN — MORPHINE SULFATE 2 MILLIGRAM(S): 50 CAPSULE, EXTENDED RELEASE ORAL at 23:59

## 2022-02-24 RX ADMIN — AMPICILLIN SODIUM AND SULBACTAM SODIUM 200 GRAM(S): 250; 125 INJECTION, POWDER, FOR SUSPENSION INTRAMUSCULAR; INTRAVENOUS at 02:45

## 2022-02-24 RX ADMIN — Medication 6: at 17:49

## 2022-02-24 RX ADMIN — Medication 400 MILLIGRAM(S): at 02:19

## 2022-02-24 RX ADMIN — Medication 25 MILLIGRAM(S): at 21:36

## 2022-02-24 RX ADMIN — AMPICILLIN SODIUM AND SULBACTAM SODIUM 200 GRAM(S): 250; 125 INJECTION, POWDER, FOR SUSPENSION INTRAMUSCULAR; INTRAVENOUS at 16:46

## 2022-02-24 RX ADMIN — Medication 1000 MILLIGRAM(S): at 02:49

## 2022-02-24 RX ADMIN — AMPICILLIN SODIUM AND SULBACTAM SODIUM 200 GRAM(S): 250; 125 INJECTION, POWDER, FOR SUSPENSION INTRAMUSCULAR; INTRAVENOUS at 21:35

## 2022-02-24 RX ADMIN — HYDROMORPHONE HYDROCHLORIDE 2 MILLIGRAM(S): 2 INJECTION INTRAMUSCULAR; INTRAVENOUS; SUBCUTANEOUS at 20:59

## 2022-02-24 RX ADMIN — INSULIN GLARGINE 18 UNIT(S): 100 INJECTION, SOLUTION SUBCUTANEOUS at 21:35

## 2022-02-24 RX ADMIN — Medication 12.5 MILLIGRAM(S): at 09:16

## 2022-02-24 NOTE — DISCHARGE NOTE PROVIDER - NSDCCPCAREPLAN_GEN_ALL_CORE_FT
PRINCIPAL DISCHARGE DIAGNOSIS  Diagnosis: DM gastroparesis  Assessment and Plan of Treatment:       SECONDARY DISCHARGE DIAGNOSES  Diagnosis: Sepsis, unspecified organism  Assessment and Plan of Treatment:     Diagnosis: Abdominal pain  Assessment and Plan of Treatment:      PRINCIPAL DISCHARGE DIAGNOSIS  Diagnosis: DM gastroparesis  Assessment and Plan of Treatment: small meals are often recommended.  nausea medications are being provided for discharge.  discuss symptoms and further referral with your endocrinologist and / or PCP.  Our schedule shows you have an endocrinology appointment coming up in 2/28      SECONDARY DISCHARGE DIAGNOSES  Diagnosis: Dental infection  Assessment and Plan of Treatment: seen by infectious disease team.  you could complete the prescribed course of augmentin antibiotic.  please see a dentist ASAP.    Diagnosis: DM (diabetes mellitus), type 1  Assessment and Plan of Treatment: continue with prescribed insulin doses and please obtain your new glucometer and test strips recommended by your insurance company     Diagnosis: Pulmonary fibrosis  Assessment and Plan of Treatment: follow up with pulmonary medicine in their office for further monitoring of this concern; they saw you in the hospital

## 2022-02-24 NOTE — PROGRESS NOTE ADULT - ASSESSMENT
31 YOF PMHx DMI with episodes of DKA, chronic abd pain ? gastroparesis, ? opiate dependence, presenting with abdominal pain and fever, hyperglycemic without DKA.  Concern for dental infection.    Possible systemic dental infection.  Appreciate ID consult.  Unasyn, blood cx NGTD.  Echo obtained today and pending    interstitial lung changes. appreciate pulm consult, will need office follow up per Dr. Gabriel's note    DMI, uncontrolled: continue lantus 18U, admelog to 6U to be given if eats >50% of meal only, SSI.  endo consult appreciated    obstipation: dulcolax suppository ordered    chronic abdominal pain: now that she is tolerating diet, change dilaudid, reglan, benadryl to PO.  Encouraged her to accept the suppository to move her bowels.  Leave zofran IV.    no medical VTE prophylaxis for now, she is quite ambulatory right now so relatively low risk.    expect discharge tomorrow if continues to improve

## 2022-02-24 NOTE — PROGRESS NOTE ADULT - SUBJECTIVE AND OBJECTIVE BOX
Patient is a 31y old  Female who presents with a chief complaint of abd pain/ N/V (24 Feb 2022 18:47)      Interval History: Fingersticks with fluctuations secondary to gastroparesis.  P.o. intake is fair.  Currently on Lantus and prandial lispro    MEDICATIONS  (STANDING):  acetaminophen     Tablet .. 975 milliGRAM(s) Oral every 6 hours  ampicillin/sulbactam  IVPB      ampicillin/sulbactam  IVPB 3 Gram(s) IV Intermittent every 6 hours  bisacodyl Suppository 10 milliGRAM(s) Rectal once  dextrose 40% Gel 15 Gram(s) Oral once  dextrose 5%. 1000 milliLiter(s) (50 mL/Hr) IV Continuous <Continuous>  dextrose 5%. 1000 milliLiter(s) (100 mL/Hr) IV Continuous <Continuous>  dextrose 50% Injectable 25 Gram(s) IV Push once  dextrose 50% Injectable 12.5 Gram(s) IV Push once  dextrose 50% Injectable 25 Gram(s) IV Push once  glucagon  Injectable 1 milliGRAM(s) IntraMuscular once  hydrocortisone 1% Cream 1 Application(s) Topical once  influenza   Vaccine 0.5 milliLiter(s) IntraMuscular once  insulin glargine Injectable (LANTUS) 18 Unit(s) SubCutaneous at bedtime  insulin lispro (ADMELOG) corrective regimen sliding scale   SubCutaneous three times a day before meals  insulin lispro (ADMELOG) corrective regimen sliding scale   SubCutaneous at bedtime  insulin lispro Injectable (ADMELOG) 6 Unit(s) SubCutaneous three times a day with meals  metoclopramide 5 milliGRAM(s) Oral three times a day before meals  metoclopramide Injectable 10 milliGRAM(s) IV Push once  polyethylene glycol 3350 17 Gram(s) Oral daily  sodium chloride 0.9%. 1000 milliLiter(s) (80 mL/Hr) IV Continuous <Continuous>    MEDICATIONS  (PRN):  diphenhydrAMINE 25 milliGRAM(s) Oral every 8 hours PRN Rash and/or Itching  HYDROmorphone   Tablet 2 milliGRAM(s) Oral every 8 hours PRN Severe Pain (7 - 10)  ondansetron Injectable 4 milliGRAM(s) IV Push every 4 hours PRN Nausea and/or Vomiting      Allergies    No Known Allergies    Intolerances        REVIEW OF SYSTEMS:  CONSTITUTIONAL: no changes  EYES: No eye pain, visual disturbances, or discharge  ENMT:  No difficulty hearing, No sinus or throat pain  NECK: No pain or stiffness  RESPIRATORY: No cough, wheezing, chills or hemoptysis; No shortness of breath  CARDIOVASCULAR: No chest pain, palpitations or leg swelling  GASTROINTESTINAL: No abdominal or epigastric pain. No nausea, vomiting, or hematemesis; No diarrhea or constipation. No melena or hematochezia.  GENITOURINARY: No dysuria, frequency, hematuria, or incontinence  NEUROLOGICAL: No headaches, memory loss, loss of strength, numbness, or tremors  SKIN: No itching, burning, rashes, or lesions   ENDOCRINE: No heat or cold intolerance; No hair loss  MUSCULOSKELETAL: No joint pain or swelling; No muscle, back, or extremity pain  PSYCHIATRIC: No depression, anxiety, mood swings, or difficulty sleeping  HEME/LYMPH: No easy bruising, or bleeding gums  ALLERY AND IMMUNOLOGIC: No hives or eczema    Vital Signs Last 24 Hrs  T(C): 36.7 (24 Feb 2022 17:01), Max: 37.3 (23 Feb 2022 23:43)  T(F): 98.1 (24 Feb 2022 17:01), Max: 99.1 (23 Feb 2022 23:43)  HR: 93 (24 Feb 2022 17:01) (73 - 93)  BP: 149/76 (24 Feb 2022 17:01) (125/82 - 160/93)  BP(mean): --  RR: 18 (24 Feb 2022 17:01) (17 - 18)  SpO2: 98% (24 Feb 2022 17:01) (97% - 99%)    PHYSICAL EXAM:  GENERAL:   HEAD: Atraumatic, Normocephalic  EYES: PERRLA, conjunctiva and sclera clear  ENMT: No  exudates,; Moist mucous membranes,, No lesions  NECK: Supple, No JVD, Normal thyroid  NERVOUS SYSTEM:  Alert & Oriented,   CHEST/LUNG: Clear to auscultation bilaterally; No rales, rhonchi, wheezing, or rubs  HEART: Regular rate and rhythm; No murmurs, rubs, or gallops  ABDOMEN: Soft, Nontender, Nondistended; Bowel sounds present  EXTREMITIES:  2+ Peripheral Pulses, no edema  SKIN: No rashes or lesions    LABS:        CAPILLARY BLOOD GLUCOSE      POCT Blood Glucose.: 200 mg/dL (24 Feb 2022 21:16)  POCT Blood Glucose.: 515 mg/dL (24 Feb 2022 17:42)  POCT Blood Glucose.: >600 mg/dL (24 Feb 2022 17:23)  POCT Blood Glucose.: 75 mg/dL (24 Feb 2022 11:28)  POCT Blood Glucose.: 305 mg/dL (24 Feb 2022 08:56)  POCT Blood Glucose.: 269 mg/dL (24 Feb 2022 07:30)    Lipid panel:           Thyroid:  Diabetes Tests:  Parathyroid Panel:  Adrenals:  RADIOLOGY & ADDITIONAL TESTS:    Imaging Personally Reviewed:  [ ] YES  [ ] NO    Consultant(s) Notes Reviewed:  [ ] YES  [ ] NO    Care Discussed with Consultants/Other Providers [ ] YES  [ ] NO

## 2022-02-24 NOTE — PROGRESS NOTE ADULT - SUBJECTIVE AND OBJECTIVE BOX
[facility]  Division of Infectious Diseases  [phone]    Name: DOMINIQUE VALENCIA  Age: 31y  Gender: Female  MRN: [id number]    Interval History--  Notes reviewed. Seen earlier today. Denies fevers, chills, or rigors. Tolerating PO. Dental pain about the same. No other new issues. Was awaiting ECHO when I saw her; now done, but not read.      Past Medical History--  Diabetes mellitus type 1    Gastroparesis    No pertinent past medical history    Type 1 diabetes mellitus with ketoacidosis without coma, with long-term current use of insulin    Gastroparesis due to DM    Colitis    Hypertension    Ectopic pregnancy    No significant past surgical history    History of cholecystectomy        For details regarding the patient's social history, family history, and other miscellaneous elements, please refer the initial infectious diseases consultation and/or the admitting history and physical examination for this admission.    Allergies    No Known Allergies    Intolerances        Medications--  Antibiotics:  ampicillin/sulbactam  IVPB      ampicillin/sulbactam  IVPB 3 Gram(s) IV Intermittent every 6 hours    Immunologic:  influenza   Vaccine 0.5 milliLiter(s) IntraMuscular once    Other:  acetaminophen     Tablet ..  bisacodyl Suppository  dextrose 40% Gel  dextrose 5%.  dextrose 5%.  dextrose 50% Injectable  dextrose 50% Injectable  dextrose 50% Injectable  diphenhydrAMINE PRN  glucagon  Injectable  hydrocortisone 1% Cream  HYDROmorphone   Tablet PRN  insulin glargine Injectable (LANTUS)  insulin lispro (ADMELOG) corrective regimen sliding scale  insulin lispro (ADMELOG) corrective regimen sliding scale  insulin lispro Injectable (ADMELOG)  metoclopramide  metoclopramide Injectable  ondansetron Injectable PRN  polyethylene glycol 3350  sodium chloride 0.9%.      Review of Systems--  A 10-point review of systems was obtained.   Review of systems otherwise negative except as previously noted.    Physical Examination--  Vital Signs: T(F): 97.6 (02-24-22 @ 13:16), Max: 99.1 (02-23-22 @ 23:43)  HR: 84 (02-24-22 @ 13:16)  BP: 125/82 (02-24-22 @ 13:16)  RR: 17 (02-24-22 @ 13:16)  SpO2: 97% (02-24-22 @ 13:16)  Wt(kg): --  General: Nontoxic-appearing Female in no acute distress.  HEENT: AT/NC. Anicteric. Conjunctiva pink and moist. Oropharynx clear. Dentition no change.  Neck: Not rigid. No sense of mass.  Nodes: None palpable.  Lungs: Clear bilaterally without rales, wheezing or rhonchi  Heart: Regular rate and rhythm. II-III/VI SM no RGT  Abdomen: Bowel sounds present and normoactive. Soft. Mildly distended. Nontender. No sense of mass. No organomegaly.  Extremities: No cyanosis or clubbing. No edema.  Skin: Warm. Dry. Good turgor. No rash. No vasculitic stigmata.  Psychiatric: grossly appropriate affect and mood for situation.         Laboratory Studies--  CBC                        8.9    9.01  )-----------( 339      ( 23 Feb 2022 07:43 )             29.8       Chemistries  02-23    135  |  102  |  13  ----------------------------<  315<H>  3.8   |  24  |  0.91    Ca    8.3<L>      23 Feb 2022 07:43        Culture Data    Culture - Blood (collected 21 Feb 2022 11:17)  Source: .Blood Blood-Peripheral  Preliminary Report (22 Feb 2022 12:01):    No growth to date.    Culture - Blood (collected 21 Feb 2022 11:17)  Source: .Blood Blood-Peripheral  Preliminary Report (22 Feb 2022 12:01):    No growth to date.    Culture - Urine (collected 21 Feb 2022 10:52)  Source: Clean Catch Clean Catch (Midstream)  Final Report (22 Feb 2022 07:27):    <10,000 CFU/mL Normal Urogenital Jessica

## 2022-02-24 NOTE — DISCHARGE NOTE PROVIDER - PROVIDER TOKENS
PROVIDER:[TOKEN:[[id number]],FOLLOWUP:[1 month]],FREE:[LAST:[dentist],PHONE:[(   )    -],FAX:[(   )    -],FOLLOWUP:[1 week]],FREE:[LAST:[PCP],PHONE:[(   )    -],FAX:[(   )    -],FOLLOWUP:[1 week]]

## 2022-02-24 NOTE — PROGRESS NOTE ADULT - SUBJECTIVE AND OBJECTIVE BOX
DOMINIQUE VALENCIA    LVS 1E 183 D    Allergies    No Known Allergies    Intolerances        PAST MEDICAL & SURGICAL HISTORY:  Diabetes mellitus type 1    Gastroparesis due to DM    Colitis    Hypertension    Ectopic pregnancy  2009 and 2013, s/p removal of right and left fallopian tubes    History of cholecystectomy        FAMILY HISTORY:  Family history of type 1 diabetes mellitus (Grandparent)    FH: HTN (hypertension) (Father)    FHx: asthma  Brother        Home Medications:  Admelog SoloStar 100 units/mL injectable solution: 8 unit(s) injectable 3 times a day (17 Feb 2022 07:56)  Basaglar KwikPen 100 units/mL subcutaneous solution: 18 unit(s) subcutaneous once a day (at bedtime) (17 Feb 2022 07:56)  enalapril 5 mg oral tablet: 1 tab(s) orally once a day, As Needed (17 Feb 2022 07:56)  famotidine 20 mg oral tablet: 1 tab(s) orally once a day (17 Feb 2022 07:56)  metoclopramide 10 mg oral tablet: 1 tab(s) orally 4 times a day (before meals and at bedtime), As Needed (17 Feb 2022 07:56)  polyethylene glycol 3350 oral powder for reconstitution: 17 gram(s) orally once a day (18 Feb 2022 01:28)      MEDICATIONS  (STANDING):  acetaminophen   IVPB .. 1000 milliGRAM(s) IV Intermittent every 6 hours  ampicillin/sulbactam  IVPB      ampicillin/sulbactam  IVPB 3 Gram(s) IV Intermittent every 6 hours  bisacodyl Suppository 10 milliGRAM(s) Rectal once  dextrose 40% Gel 15 Gram(s) Oral once  dextrose 5%. 1000 milliLiter(s) (50 mL/Hr) IV Continuous <Continuous>  dextrose 5%. 1000 milliLiter(s) (100 mL/Hr) IV Continuous <Continuous>  dextrose 50% Injectable 12.5 Gram(s) IV Push once  dextrose 50% Injectable 25 Gram(s) IV Push once  dextrose 50% Injectable 25 Gram(s) IV Push once  diphenhydrAMINE Injectable 12.5 milliGRAM(s) IV Push once  glucagon  Injectable 1 milliGRAM(s) IntraMuscular once  hydrocortisone 1% Cream 1 Application(s) Topical once  influenza   Vaccine 0.5 milliLiter(s) IntraMuscular once  insulin glargine Injectable (LANTUS) 18 Unit(s) SubCutaneous at bedtime  insulin lispro (ADMELOG) corrective regimen sliding scale   SubCutaneous three times a day before meals  insulin lispro (ADMELOG) corrective regimen sliding scale   SubCutaneous at bedtime  insulin lispro Injectable (ADMELOG) 6 Unit(s) SubCutaneous three times a day with meals  metoclopramide Injectable 10 milliGRAM(s) IV Push three times a day  metoclopramide Injectable 10 milliGRAM(s) IV Push once  polyethylene glycol 3350 17 Gram(s) Oral daily  sodium chloride 0.9%. 1000 milliLiter(s) (80 mL/Hr) IV Continuous <Continuous>    MEDICATIONS  (PRN):  diphenhydrAMINE Injectable 12.5 milliGRAM(s) IV Push every 8 hours PRN Itching  HYDROmorphone  Injectable 0.5 milliGRAM(s) IV Push every 8 hours PRN Severe Pain (7 - 10)  ondansetron Injectable 4 milliGRAM(s) IV Push every 4 hours PRN Nausea and/or Vomiting      Diet, Consistent Carbohydrate w/Evening Snack (02-21-22 @ 13:46) [Active]          Vital Signs Last 24 Hrs  T(C): 36.7 (24 Feb 2022 05:20), Max: 37.3 (23 Feb 2022 23:43)  T(F): 98 (24 Feb 2022 05:20), Max: 99.1 (23 Feb 2022 23:43)  HR: 73 (24 Feb 2022 05:20) (73 - 93)  BP: 160/93 (24 Feb 2022 05:20) (124/78 - 160/93)  BP(mean): --  RR: 18 (24 Feb 2022 05:20) (18 - 18)  SpO2: 98% (24 Feb 2022 05:20) (98% - 99%)      02-23-22 @ 07:01  -  02-24-22 @ 07:00  --------------------------------------------------------  IN: 1160 mL / OUT: 0 mL / NET: 1160 mL              LABS:                        8.9    9.01  )-----------( 339      ( 23 Feb 2022 07:43 )             29.8     02-23    135  |  102  |  13  ----------------------------<  315<H>  3.8   |  24  |  0.91    Ca    8.3<L>      23 Feb 2022 07:43                WBC:  WBC Count: 9.01 K/uL (02-23 @ 07:43)  WBC Count: 12.64 K/uL (02-22 @ 11:36)  WBC Count: 9.96 K/uL (02-21 @ 08:03)      MICROBIOLOGY:  RECENT CULTURES:  02-21 .Blood Blood-Peripheral XXXX XXXX   No growth to date.    02-21 Clean Catch Clean Catch (Midstream) XXXX XXXX   <10,000 CFU/mL Normal Urogenital Jessica                    Sodium:  Sodium, Serum: 135 mmol/L (02-23 @ 07:43)  Sodium, Serum: 137 mmol/L (02-22 @ 11:36)  Sodium, Serum: 136 mmol/L (02-21 @ 11:35)  Sodium, Serum: 134 mmol/L (02-21 @ 08:03)      0.91 mg/dL 02-23 @ 07:43  0.92 mg/dL 02-22 @ 11:36  0.93 mg/dL 02-21 @ 11:35  1.02 mg/dL 02-21 @ 08:03      Hemoglobin:  Hemoglobin: 8.9 g/dL (02-23 @ 07:43)  Hemoglobin: 8.9 g/dL (02-22 @ 11:36)  Hemoglobin: 9.4 g/dL (02-21 @ 08:03)      Platelets: Platelet Count - Automated: 339 K/uL (02-23 @ 07:43)  Platelet Count - Automated: 393 K/uL (02-22 @ 11:36)  Platelet Count - Automated: 416 K/uL (02-21 @ 08:03)              RADIOLOGY & ADDITIONAL STUDIES:      MICROBIOLOGY:  RECENT CULTURES:  02-21 .Blood Blood-Peripheral XXXX XXXX   No growth to date.    02-21 Clean Catch Clean Catch (Midstream) XXXX XXXX   <10,000 CFU/mL Normal Urogenital Jessica

## 2022-02-24 NOTE — DISCHARGE NOTE PROVIDER - NSDCFUSCHEDAPPT_GEN_ALL_CORE_FT
DOMINIQUE VALENCIA ; 02/28/2022 ; [facility] [phone] [facility]  DOMINIQUE VALENCIA ; 03/17/2022 ; [facility] [phone] [facility]

## 2022-02-24 NOTE — PROGRESS NOTE ADULT - ASSESSMENT
31-year-old female with poorly controlled diabetes mellitus type 1, presenting with abdominal pain of unclear etiology in the setting of a past history of drug-seeking behavior and signing out AGAINST MEDICAL ADVICE.  Her abdominal exam presently is completely benign, and she denies any abdominal discomfort at this point in time.  She did have objective fever.  She may have an odontogenic infection, and per discussion with the patient the murmur may be new.  There is no prior echocardiography here available for review.  As such the possibility of infective endocarditis is within the differential diagnosis, and echocardiography should be pursued.  Blood culture data is pending.     I do not think that the radiographic findings are acute, I suspect that they represent the same findings from her prior diagnosis of cryptogenic organizing pneumonia, though its not entirely possible to tell without arcc-az-[id number] comparison.  However the patient has no respiratory symptoms whatsoever.    2/23: Overall stable with negative blood cx data thus far. +RVP for non-COVID coronavirus.  2/24: Awaiting ECHO, overall stable.

## 2022-02-24 NOTE — DISCHARGE NOTE PROVIDER - CARE PROVIDER_API CALL
Warren Gabriel)  Critical Care Medicine; Internal Medicine; Pulmonary Disease  [facility] [place] [address]  Phone: [phone]  Fax: [phone]  Follow Up Time: 1 month    dentist,   Phone: (   )    -  Fax: (   )    -  Follow Up Time: 1 week    PCP,   Phone: (   )    -  Fax: (   )    -  Follow Up Time: 1 week

## 2022-02-24 NOTE — DISCHARGE NOTE PROVIDER - HOSPITAL COURSE
31 YOF PMHx DMI with episodes of DKA, gastroparesis, concerns for opiate dependence / abuse, presenting with abdominal pain, vomiting, fever, hyperglycemia without DKA.  Started on antibiotics, insulin.  CT a/p imaging revealed fibrocalcific changes to the lung, abdominal imaging only significant for large amount of stool.  She was initiated on IV narcotics in the ED and these were continued at admission.  IVF and antiemetics were administered.      Started on broad spectrum antibiotics for her fever.  ID was consulted.  Concern for dental infection, and antibiotics were transitioned to unasyn.  Blood cultures were no growth.  TTE was obtained and was unremarkable.  She will be prescribed a course of augmentin at discharge and needs to follow up with a dentist.    For her exacerbation of gastroparesis, along with IVF she was given reglan & zofran with gradual improvement in her symptoms.  Multi-modal pain control with standing IV tylenol was administered.  Once she was able to tolerate PO IV narcotics were immediately transitioned to PO.  She also requested benadryl with the narcotic, which was scheduled to be spaced by 15-30 minutes between medications.  She will be discharged with a supply of zofran ODT only as there are significant risks to longer term reglan treatment.    For lung CT changes, pulmonary was consulted.  She had a follow up noncon CT chest.  she will follow up with pulmonary medicine as an outpatient.    She was offered a dulcolax suppository for her obstipation twice during her stay but she declined (although likely contributing to her symptoms).    For her DMI she was seen by endocrinology and home dosing of basal-bolus insulins was continued.   31 YOF PMHx DMI with episodes of DKA, gastroparesis, concerns for opiate dependence / abuse, presenting with abdominal pain, vomiting, fever, hyperglycemia without DKA.  Started on antibiotics, insulin.  CT a/p imaging revealed fibrocalcific changes to the lung, abdominal imaging only significant for large amount of stool.  She was initiated on IV narcotics in the ED and these were continued at admission.  IVF and antiemetics were administered.      Started on broad spectrum antibiotics for her fever.  ID was consulted.  Concern for dental infection, and antibiotics were transitioned to unasyn.  Blood cultures were no growth.  TTE was obtained and was unremarkable.  She will be prescribed a course of augmentin x7 days at discharge and needs to follow up with a dentist.    For her exacerbation of gastroparesis, along with IVF she was given reglan & zofran with gradual improvement in her symptoms.  Multi-modal pain control with standing IV tylenol was administered.  Once she was able to tolerate PO IV narcotics were immediately transitioned to PO.  She also requested benadryl with the narcotic, which was scheduled to be spaced by 15-30 minutes between medications.  She will be discharged with a supply of zofran ODT.  A complex care note was entered due to the challenges in managing this diagnosis in this patient; careful consideration should be given for further treatment with narcotics due to negative effects on gastric motility and concern for growing dependence in this patient.    For lung CT changes, pulmonary was consulted.  She had a follow up noncon CT chest.  she will follow up with pulmonary medicine as an outpatient.    She was provided dulcolax suppository for her obstipation during her stay.    For her DMI she was seen by endocrinology and home dosing of basal-bolus insulins was continued.  Pre request of her outpatient  in her insurance company she was provided with script for new glucometer and test strips compatible with it; [employer] brand.

## 2022-02-24 NOTE — PROGRESS NOTE ADULT - SUBJECTIVE AND OBJECTIVE BOX
s. pt was able to tolerate breakfast, has not had vomiting, appeared comfortable when I saw her this morning.    o.  Vital Signs Last 24 Hrs  T(C): 36.4 (24 Feb 2022 13:16), Max: 37.3 (23 Feb 2022 23:43)  T(F): 97.6 (24 Feb 2022 13:16), Max: 99.1 (23 Feb 2022 23:43)  HR: 84 (24 Feb 2022 13:16) (73 - 93)  BP: 125/82 (24 Feb 2022 13:16) (125/82 - 160/93)  BP(mean): --  RR: 17 (24 Feb 2022 13:16) (17 - 18)  SpO2: 97% (24 Feb 2022 13:16) (97% - 99%)    gen nad, lying in bed  lungs clear  abd soft, no tenderness to palpation, +BS  psyc aaox3, appropriate affect     s. pt was able to tolerate breakfast, has not had vomiting, appeared comfortable when I saw her this morning.    o.  Vital Signs Last 24 Hrs  T(C): 36.4 (24 Feb 2022 13:16), Max: 37.3 (23 Feb 2022 23:43)  T(F): 97.6 (24 Feb 2022 13:16), Max: 99.1 (23 Feb 2022 23:43)  HR: 84 (24 Feb 2022 13:16) (73 - 93)  BP: 125/82 (24 Feb 2022 13:16) (125/82 - 160/93)  BP(mean): --  RR: 17 (24 Feb 2022 13:16) (17 - 18)  SpO2: 97% (24 Feb 2022 13:16) (97% - 99%)    gen nad, lying in bed  lungs clear  abd soft, no tenderness to palpation, +BS  psyc aaox3, appropriate affect    labs reviewed, bcx NGTD, RVP showed positive to non COVID coronavirus

## 2022-02-24 NOTE — DISCHARGE NOTE PROVIDER - NSDCMRMEDTOKEN_GEN_ALL_CORE_FT
Admelog SoloStar 100 units/mL injectable solution: 8 unit(s) injectable 3 times a day  Basaglar KwikPen 100 units/mL subcutaneous solution: 18 unit(s) subcutaneous once a day (at bedtime)  enalapril 5 mg oral tablet: 1 tab(s) orally once a day, As Needed  famotidine 20 mg oral tablet: 1 tab(s) orally once a day  metoclopramide 10 mg oral tablet: 1 tab(s) orally 4 times a day (before meals and at bedtime), As Needed  polyethylene glycol 3350 oral powder for reconstitution: 17 gram(s) orally once a day   acetaminophen 325 mg oral tablet: 2 tab(s) orally every 8 hours, As Needed  Admelog SoloStar 100 units/mL injectable solution: 8 unit(s) injectable 3 times a day  amoxicillin-clavulanate 875 mg-125 mg oral tablet: 1 tab(s) orally 2 times a day  Basaglar KwikPen 100 units/mL subcutaneous solution: 18 unit(s) subcutaneous once a day (at bedtime)  enalapril 5 mg oral tablet: 1 tab(s) orally once a day, As Needed  famotidine 20 mg oral tablet: 1 tab(s) orally once a day  glucometer (per patient&#x27;s insurance): Test blood sugars four times a day. Dispense #1 glucometer. Brand [employer]  metoclopramide 10 mg oral tablet: 1 tab(s) orally 4 times a day (before meals and at bedtime), As Needed  ondansetron 4 mg oral tablet, disintegratin tab(s) orally every 8 hours   polyethylene glycol 3350 oral powder for reconstitution: 17 gram(s) orally once a day  test strips (per patient&#x27;s insurance): 1 application subcutaneously 4 times a day. ** Compatible with patient&#x27;s glucometer ** [employer]

## 2022-02-25 ENCOUNTER — TRANSCRIPTION ENCOUNTER (OUTPATIENT)
Age: 32
End: 2022-02-25

## 2022-02-25 VITALS
SYSTOLIC BLOOD PRESSURE: 155 MMHG | RESPIRATION RATE: 18 BRPM | DIASTOLIC BLOOD PRESSURE: 108 MMHG | HEART RATE: 107 BPM | TEMPERATURE: 98 F | OXYGEN SATURATION: 97 %

## 2022-02-25 LAB
GLUCOSE BLDC GLUCOMTR-MCNC: 115 MG/DL — HIGH (ref 70–99)
GLUCOSE BLDC GLUCOMTR-MCNC: 163 MG/DL — HIGH (ref 70–99)
GLUCOSE BLDC GLUCOMTR-MCNC: 274 MG/DL — HIGH (ref 70–99)
GLUCOSE BLDC GLUCOMTR-MCNC: 37 MG/DL — CRITICAL LOW (ref 70–99)
GLUCOSE BLDC GLUCOMTR-MCNC: 75 MG/DL — SIGNIFICANT CHANGE UP (ref 70–99)

## 2022-02-25 PROCEDURE — 99282 EMERGENCY DEPT VISIT SF MDM: CPT

## 2022-02-25 PROCEDURE — 99239 HOSP IP/OBS DSCHRG MGMT >30: CPT

## 2022-02-25 PROCEDURE — 99232 SBSQ HOSP IP/OBS MODERATE 35: CPT

## 2022-02-25 RX ORDER — METOCLOPRAMIDE HCL 10 MG
10 TABLET ORAL THREE TIMES A DAY
Refills: 0 | Status: DISCONTINUED | OUTPATIENT
Start: 2022-02-25 | End: 2022-02-25

## 2022-02-25 RX ORDER — METOCLOPRAMIDE HCL 10 MG
TABLET ORAL
Refills: 0 | Status: DISCONTINUED | OUTPATIENT
Start: 2022-02-25 | End: 2022-02-25

## 2022-02-25 RX ORDER — METOCLOPRAMIDE HCL 10 MG
10 TABLET ORAL ONCE
Refills: 0 | Status: COMPLETED | OUTPATIENT
Start: 2022-02-25 | End: 2022-02-25

## 2022-02-25 RX ORDER — MULTIVIT WITH MIN/MFOLATE/K2 340-15/3 G
1 POWDER (GRAM) ORAL ONCE
Refills: 0 | Status: DISCONTINUED | OUTPATIENT
Start: 2022-02-25 | End: 2022-02-25

## 2022-02-25 RX ORDER — ONDANSETRON 8 MG/1
1 TABLET, FILM COATED ORAL
Qty: 20 | Refills: 0
Start: 2022-02-25

## 2022-02-25 RX ORDER — ACETAMINOPHEN 500 MG
2 TABLET ORAL
Qty: 0 | Refills: 0 | DISCHARGE
Start: 2022-02-25

## 2022-02-25 RX ADMIN — Medication 10 MILLIGRAM(S): at 04:34

## 2022-02-25 RX ADMIN — Medication 3: at 12:09

## 2022-02-25 RX ADMIN — HYDROMORPHONE HYDROCHLORIDE 2 MILLIGRAM(S): 2 INJECTION INTRAMUSCULAR; INTRAVENOUS; SUBCUTANEOUS at 14:24

## 2022-02-25 RX ADMIN — ONDANSETRON 4 MILLIGRAM(S): 8 TABLET, FILM COATED ORAL at 04:34

## 2022-02-25 RX ADMIN — Medication 5 MILLIGRAM(S): at 12:07

## 2022-02-25 RX ADMIN — AMPICILLIN SODIUM AND SULBACTAM SODIUM 200 GRAM(S): 250; 125 INJECTION, POWDER, FOR SUSPENSION INTRAMUSCULAR; INTRAVENOUS at 01:49

## 2022-02-25 RX ADMIN — Medication 975 MILLIGRAM(S): at 12:08

## 2022-02-25 RX ADMIN — MORPHINE SULFATE 2 MILLIGRAM(S): 50 CAPSULE, EXTENDED RELEASE ORAL at 00:29

## 2022-02-25 RX ADMIN — Medication 6 UNIT(S): at 14:32

## 2022-02-25 RX ADMIN — AMPICILLIN SODIUM AND SULBACTAM SODIUM 200 GRAM(S): 250; 125 INJECTION, POWDER, FOR SUSPENSION INTRAMUSCULAR; INTRAVENOUS at 08:34

## 2022-02-25 RX ADMIN — Medication 25 MILLIGRAM(S): at 14:24

## 2022-02-25 RX ADMIN — Medication 10 MILLIGRAM(S): at 14:25

## 2022-02-25 RX ADMIN — HYDROMORPHONE HYDROCHLORIDE 2 MILLIGRAM(S): 2 INJECTION INTRAMUSCULAR; INTRAVENOUS; SUBCUTANEOUS at 15:20

## 2022-02-25 RX ADMIN — SODIUM CHLORIDE 80 MILLILITER(S): 9 INJECTION INTRAMUSCULAR; INTRAVENOUS; SUBCUTANEOUS at 08:34

## 2022-02-25 RX ADMIN — Medication 975 MILLIGRAM(S): at 13:10

## 2022-02-25 RX ADMIN — Medication 50 MILLIGRAM(S): at 00:07

## 2022-02-25 NOTE — PROGRESS NOTE ADULT - PROBLEM SELECTOR PLAN 5
Complicated further by opaite use/opiate seeking behavior  Rx as per medicine

## 2022-02-25 NOTE — PROGRESS NOTE ADULT - SUBJECTIVE AND OBJECTIVE BOX
s. pt notes that since dinner last night she has had significantly increased abdominal pain; occured after discussed transition from IV to PO pain medications.  She states the only thing relieving her pain is hitting herself against the bed rails; the bed broke as a result of these behaviors.  We discussed the negative effects of narcotic pain medications on GI motility, and advised that we would not be resuming IV pain medications, and encouraged her to accept the IV antiemetic medications so she could tolerate small sips of water and the oral pain medications if she wished.    o.  Vital Signs Last 24 Hrs  T(C): 36.3 (25 Feb 2022 14:21), Max: 36.7 (24 Feb 2022 17:01)  T(F): 97.4 (25 Feb 2022 14:21), Max: 98.1 (24 Feb 2022 17:01)  HR: 101 (25 Feb 2022 14:21) (82 - 117)  BP: 140/86 (25 Feb 2022 14:21) (131/89 - 149/76)  BP(mean): --  RR: 17 (25 Feb 2022 14:21) (17 - 18)  SpO2: 100% (25 Feb 2022 14:21) (98% - 100%)    gen writhing in bed, uncomfortable  abd soft, +BS, no rebound nor guarding  psyc awake and alert, alternating between tearful and anger    labs reviewed, had hypoglycemic episode early this AM; was markedly hyperglycemic last night ~600.

## 2022-02-25 NOTE — PROGRESS NOTE ADULT - PROBLEM SELECTOR PROBLEM 2
Upper respiratory infection, viral

## 2022-02-25 NOTE — PROGRESS NOTE ADULT - SUBJECTIVE AND OBJECTIVE BOX
Patient is a 31y old  Female who presents with a chief complaint of abd pain/ N/V (25 Feb 2022 14:58)      Interval History: finger sticks are stable   discharge planning is on     MEDICATIONS  (STANDING):  acetaminophen     Tablet .. 975 milliGRAM(s) Oral every 6 hours  amoxicillin  875 milliGRAM(s)/clavulanate 1 Tablet(s) Oral two times a day  dextrose 40% Gel 15 Gram(s) Oral once  dextrose 5%. 1000 milliLiter(s) (50 mL/Hr) IV Continuous <Continuous>  dextrose 5%. 1000 milliLiter(s) (100 mL/Hr) IV Continuous <Continuous>  dextrose 50% Injectable 25 Gram(s) IV Push once  dextrose 50% Injectable 12.5 Gram(s) IV Push once  dextrose 50% Injectable 25 Gram(s) IV Push once  glucagon  Injectable 1 milliGRAM(s) IntraMuscular once  hydrocortisone 1% Cream 1 Application(s) Topical once  influenza   Vaccine 0.5 milliLiter(s) IntraMuscular once  insulin glargine Injectable (LANTUS) 18 Unit(s) SubCutaneous at bedtime  insulin lispro (ADMELOG) corrective regimen sliding scale   SubCutaneous three times a day before meals  insulin lispro (ADMELOG) corrective regimen sliding scale   SubCutaneous at bedtime  insulin lispro Injectable (ADMELOG) 6 Unit(s) SubCutaneous three times a day with meals  metoclopramide 5 milliGRAM(s) Oral three times a day before meals  metoclopramide Injectable 10 milliGRAM(s) IV Push three times a day  metoclopramide Injectable      polyethylene glycol 3350 17 Gram(s) Oral daily    MEDICATIONS  (PRN):  bisacodyl Suppository 10 milliGRAM(s) Rectal daily PRN Constipation  diphenhydrAMINE 25 milliGRAM(s) Oral every 8 hours PRN Rash and/or Itching  HYDROmorphone   Tablet 2 milliGRAM(s) Oral every 8 hours PRN Severe Pain (7 - 10)  ondansetron Injectable 4 milliGRAM(s) IV Push every 4 hours PRN Nausea and/or Vomiting      Allergies    No Known Allergies    Intolerances        REVIEW OF SYSTEMS:  CONSTITUTIONAL: no changes  EYES: No eye pain, visual disturbances, or discharge  ENMT:  No difficulty hearing, No sinus or throat pain  NECK: No pain or stiffness  RESPIRATORY: No cough, wheezing, chills or hemoptysis; No shortness of breath  CARDIOVASCULAR: No chest pain, palpitations or leg swelling  GASTROINTESTINAL: No abdominal or epigastric pain. No nausea, vomiting, or hematemesis; No diarrhea or constipation. No melena or hematochezia.  GENITOURINARY: No dysuria, frequency, hematuria, or incontinence  NEUROLOGICAL: No headaches, memory loss, loss of strength, numbness, or tremors  SKIN: No itching, burning, rashes, or lesions   ENDOCRINE: No heat or cold intolerance; No hair loss  MUSCULOSKELETAL: No joint pain or swelling; No muscle, back, or extremity pain  PSYCHIATRIC: No depression, anxiety, mood swings, or difficulty sleeping  HEME/LYMPH: No easy bruising, or bleeding gums  ALLERY AND IMMUNOLOGIC: No hives or eczema    Vital Signs Last 24 Hrs  T(C): 36.7 (25 Feb 2022 15:30), Max: 36.7 (24 Feb 2022 17:01)  T(F): 98.1 (25 Feb 2022 15:30), Max: 98.1 (24 Feb 2022 17:01)  HR: 107 (25 Feb 2022 15:30) (82 - 117)  BP: 155/108 (25 Feb 2022 15:30) (131/89 - 155/108)  BP(mean): --  RR: 18 (25 Feb 2022 15:30) (17 - 18)  SpO2: 97% (25 Feb 2022 15:30) (97% - 100%)    PHYSICAL EXAM:  GENERAL:   HEAD: Atraumatic, Normocephalic  EYES: PERRLA, conjunctiva and sclera clear  ENMT: No  exudates,; Moist mucous membranes,, No lesions  NECK: Supple, No JVD, Normal thyroid  NERVOUS SYSTEM:  Alert & Oriented,   CHEST/LUNG: Clear to auscultation bilaterally; No rales, rhonchi, wheezing, or rubs  HEART: Regular rate and rhythm; No murmurs, rubs, or gallops  ABDOMEN: Soft, Nontender, Nondistended; Bowel sounds present  EXTREMITIES:  2+ Peripheral Pulses, no edema  SKIN: No rashes or lesions    LABS:        CAPILLARY BLOOD GLUCOSE      POCT Blood Glucose.: 163 mg/dL (25 Feb 2022 14:30)  POCT Blood Glucose.: 274 mg/dL (25 Feb 2022 11:41)  POCT Blood Glucose.: 115 mg/dL (25 Feb 2022 07:39)  POCT Blood Glucose.: 37 mg/dL (25 Feb 2022 06:40)  POCT Blood Glucose.: 75 mg/dL (25 Feb 2022 05:55)  POCT Blood Glucose.: 200 mg/dL (24 Feb 2022 21:16)  POCT Blood Glucose.: 515 mg/dL (24 Feb 2022 17:42)  POCT Blood Glucose.: >600 mg/dL (24 Feb 2022 17:23)    Lipid panel:           Thyroid:  Diabetes Tests:  Parathyroid Panel:  Adrenals:  RADIOLOGY & ADDITIONAL TESTS:    Imaging Personally Reviewed:  [ ] YES  [ ] NO    Consultant(s) Notes Reviewed:  [ ] YES  [ ] NO    Care Discussed with Consultants/Other Providers [ ] YES  [ ] NO

## 2022-02-25 NOTE — DISCHARGE NOTE NURSING/CASE MANAGEMENT/SOCIAL WORK - PATIENT PORTAL LINK FT
You can access the FollowMyHealth Patient Portal offered by [facility] by registering at the following website: [email or web address]. By joining [employer]’s FollowMyHealth portal, you will also be able to view your health information using other applications (apps) compatible with our system.

## 2022-02-25 NOTE — PROGRESS NOTE ADULT - PROBLEM SELECTOR PROBLEM 1
DM (diabetes mellitus), type 1
DM (diabetes mellitus), type 1
Dental infection
DM (diabetes mellitus), type 1
Dental infection
Dental infection

## 2022-02-25 NOTE — PROGRESS NOTE ADULT - PROBLEM SELECTOR PLAN 6
Good but not overly tight diabetic control to avoid iatrogenic hypoglycemia. Uncontrolled hyperglycemia makes control of infections potentially problematic.   A1c uncontrolled  Recent DKA

## 2022-02-25 NOTE — DISCHARGE NOTE NURSING/CASE MANAGEMENT/SOCIAL WORK - NSDCPEEMAIL_GEN_ALL_CORE
[facility] for Tobacco Control email [email or web address]

## 2022-02-25 NOTE — PROGRESS NOTE ADULT - ASSESSMENT
31 YOF PMHx DMI with episodes of DKA, chronic abd pain ? gastroparesis, ? opiate dependence, presenting with abdominal pain and fever, hyperglycemic without DKA.  Concern for dental infection as well as exacerbation of gastroparesis.    Possible systemic dental infection.  Appreciate ID consult.  Unasyn, blood cx NGTD.  Echo unremarkable.  Transition to PO augmentin.    interstitial lung changes. appreciate pulm consult, will need office follow up per Dr. Gabriel's note    DMI, uncontrolled: continue lantus 18U, admelog to 6U to be given if eats >50% of meal only, SSI.  endo consult appreciated    obstipation: dulcolax suppository PRN available if cannot tolerate miralax PO    chronic abdominal pain and gastroparesis: due to negative effects on GI motility would not re-escalate narcotic pain medications to IV. continue with PO tylenol, PO dilaudid, PO benadryl with standing IV reglan and PRN IV zofran.    no medical VTE prophylaxis for now, she is quite ambulatory right now so relatively low risk.    expect discharge once able to tolerate PO 31 YOF PMHx DMI with episodes of DKA, chronic abd pain ? gastroparesis, ? opiate dependence, presenting with abdominal pain and fever, hyperglycemic without DKA.  Being treated for dental infection as well as gastroparesis.    chronic abdominal pain and gastroparesis: due to negative effects on GI motility would not re-escalate narcotic pain medications to IV or provide additional doses. continue with PO tylenol RTC, PO dilaudid PRN, PO benadryl with standing IV reglan and PRN IV zofran.    Possible systemic dental infection.  Appreciate ID consult.  Unasyn, blood cx NGTD.  Echo unremarkable.  Transition to PO augmentin.    interstitial lung changes. appreciate pulm consult, will need office follow up per Dr. Gabriel's note    DMI, uncontrolled: continue lantus 18U, admelog to 6U to be given if eats >50% of meal only, SSI.  endo consult appreciated    obstipation: dulcolax suppository PRN available if cannot tolerate miralax PO    no medical VTE prophylaxis for now, she is quite ambulatory right now so relatively low risk.    expect discharge once able to tolerate PO

## 2022-02-25 NOTE — PROGRESS NOTE ADULT - PROBLEM SELECTOR PLAN 3
Could be from either of the above  F/U blood cx data  F/U ECHO
Could be from either of the above  Appears resolved  F/U blood cx data  F/U ECHO
Resolved.

## 2022-02-25 NOTE — DISCHARGE NOTE NURSING/CASE MANAGEMENT/SOCIAL WORK - NSDCPEFALRISK_GEN_ALL_CORE
For information on Fall & Injury Prevention, visit: [email or web address] OR  [email or web address] OR  https://www.cdc.gov/steadi/patient.html

## 2022-02-25 NOTE — DISCHARGE NOTE NURSING/CASE MANAGEMENT/SOCIAL WORK - NSDCPEWEB_GEN_ALL_CORE
[facility] for Tobacco Control website --- [email or web address] website --- [email or web address]

## 2022-02-25 NOTE — PROGRESS NOTE ADULT - PROBLEM SELECTOR PLAN 4
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

## 2022-02-25 NOTE — PROGRESS NOTE ADULT - ASSESSMENT
31-year-old female with poorly controlled diabetes mellitus type 1, presenting with abdominal pain of unclear etiology in the setting of a past history of drug-seeking behavior and signing out AGAINST MEDICAL ADVICE.  Her abdominal exam presently is completely benign, and she denies any abdominal discomfort at this point in time.  She did have objective fever.  She may have an odontogenic infection, and per discussion with the patient the murmur may be new.  There is no prior echocardiography here available for review.  As such the possibility of infective endocarditis is within the differential diagnosis, and echocardiography should be pursued.  Blood culture data is pending.     I do not think that the radiographic findings are acute, I suspect that they represent the same findings from her prior diagnosis of cryptogenic organizing pneumonia, though its not entirely possible to tell without [id number] comparison.  However the patient has no respiratory symptoms whatsoever.    2/23: Overall stable with negative blood cx data thus far. +RVP for non-COVID coronavirus.  2/24: Awaiting ECHO, overall stable.   2/25: Interestingly despite very loud murmur, only mild valvular disease on ECHO. No findings concerning for endocarditis. Blood cx NTD.

## 2022-02-25 NOTE — PROGRESS NOTE ADULT - NSPROGADDITIONALINFOA_GEN_ALL_CORE
D/W patient in layman's terms    Earl Turpin MD  Attending Physician  [facility]  Division of Infectious Diseases  [phone]
D/W patient in layman's terms  D/W Dr. Lal    I'll sign off at this time. Thank you for the courtesy of this referral.     Earl Turpin MD  Attending Physician  [facility]  Division of Infectious Diseases  [phone]
D/W patient in layman's terms  D/W Dr. Lukasz Turpin MD  Attending Physician  [facility]  Division of Infectious Diseases  [phone]

## 2022-02-25 NOTE — PROGRESS NOTE ADULT - PROBLEM SELECTOR PLAN 2
+Coronovarius- the ordinary kind, NOT SARS-COV-2  Standard precautions  Supportive care

## 2022-02-25 NOTE — PROGRESS NOTE ADULT - SUBJECTIVE AND OBJECTIVE BOX
DOMINIQUE VALENCIA    LVS 1E 185 D    Allergies    No Known Allergies    Intolerances        PAST MEDICAL & SURGICAL HISTORY:  Diabetes mellitus type 1    Gastroparesis due to DM    Colitis    Hypertension    Ectopic pregnancy  2009 and 2013, s/p removal of right and left fallopian tubes    History of cholecystectomy        FAMILY HISTORY:  Family history of type 1 diabetes mellitus (Grandparent)    FH: HTN (hypertension) (Father)    FHx: asthma  Brother        Home Medications:  Admelog SoloStar 100 units/mL injectable solution: 8 unit(s) injectable 3 times a day (17 Feb 2022 07:56)  Basaglar KwikPen 100 units/mL subcutaneous solution: 18 unit(s) subcutaneous once a day (at bedtime) (17 Feb 2022 07:56)  enalapril 5 mg oral tablet: 1 tab(s) orally once a day, As Needed (17 Feb 2022 07:56)  famotidine 20 mg oral tablet: 1 tab(s) orally once a day (17 Feb 2022 07:56)  metoclopramide 10 mg oral tablet: 1 tab(s) orally 4 times a day (before meals and at bedtime), As Needed (17 Feb 2022 07:56)  polyethylene glycol 3350 oral powder for reconstitution: 17 gram(s) orally once a day (18 Feb 2022 01:28)      MEDICATIONS  (STANDING):  acetaminophen     Tablet .. 975 milliGRAM(s) Oral every 6 hours  ampicillin/sulbactam  IVPB      ampicillin/sulbactam  IVPB 3 Gram(s) IV Intermittent every 6 hours  dextrose 40% Gel 15 Gram(s) Oral once  dextrose 5%. 1000 milliLiter(s) (50 mL/Hr) IV Continuous <Continuous>  dextrose 5%. 1000 milliLiter(s) (100 mL/Hr) IV Continuous <Continuous>  dextrose 50% Injectable 25 Gram(s) IV Push once  dextrose 50% Injectable 12.5 Gram(s) IV Push once  dextrose 50% Injectable 25 Gram(s) IV Push once  glucagon  Injectable 1 milliGRAM(s) IntraMuscular once  hydrocortisone 1% Cream 1 Application(s) Topical once  influenza   Vaccine 0.5 milliLiter(s) IntraMuscular once  insulin glargine Injectable (LANTUS) 18 Unit(s) SubCutaneous at bedtime  insulin lispro (ADMELOG) corrective regimen sliding scale   SubCutaneous three times a day before meals  insulin lispro (ADMELOG) corrective regimen sliding scale   SubCutaneous at bedtime  insulin lispro Injectable (ADMELOG) 6 Unit(s) SubCutaneous three times a day with meals  metoclopramide 5 milliGRAM(s) Oral three times a day before meals  polyethylene glycol 3350 17 Gram(s) Oral daily  sodium chloride 0.9%. 1000 milliLiter(s) (80 mL/Hr) IV Continuous <Continuous>    MEDICATIONS  (PRN):  diphenhydrAMINE 25 milliGRAM(s) Oral every 8 hours PRN Rash and/or Itching  HYDROmorphone   Tablet 2 milliGRAM(s) Oral every 8 hours PRN Severe Pain (7 - 10)  ondansetron Injectable 4 milliGRAM(s) IV Push every 4 hours PRN Nausea and/or Vomiting      Diet, Consistent Carbohydrate w/Evening Snack (02-21-22 @ 13:46) [Active]          Vital Signs Last 24 Hrs  T(C): 36.7 (25 Feb 2022 05:59), Max: 36.7 (24 Feb 2022 17:01)  T(F): 98 (25 Feb 2022 05:59), Max: 98.1 (24 Feb 2022 17:01)  HR: 117 (25 Feb 2022 05:59) (82 - 117)  BP: 131/89 (25 Feb 2022 05:59) (125/82 - 149/76)  BP(mean): --  RR: 18 (25 Feb 2022 05:59) (17 - 18)  SpO2: 99% (25 Feb 2022 05:59) (97% - 99%)      02-24-22 @ 07:01  -  02-25-22 @ 07:00  --------------------------------------------------------  IN: 100 mL / OUT: 0 mL / NET: 100 mL              LABS:                        8.9    9.01  )-----------( 339      ( 23 Feb 2022 07:43 )             29.8     02-23    135  |  102  |  13  ----------------------------<  315<H>  3.8   |  24  |  0.91    Ca    8.3<L>      23 Feb 2022 07:43                WBC:  WBC Count: 9.01 K/uL (02-23 @ 07:43)  WBC Count: 12.64 K/uL (02-22 @ 11:36)  WBC Count: 9.96 K/uL (02-21 @ 08:03)      MICROBIOLOGY:  RECENT CULTURES:  02-21 .Blood Blood-Peripheral XXXX XXXX   No growth to date.    02-21 Clean Catch Clean Catch (Midstream) XXXX XXXX   <10,000 CFU/mL Normal Urogenital Jessica                    Sodium:  Sodium, Serum: 135 mmol/L (02-23 @ 07:43)  Sodium, Serum: 137 mmol/L (02-22 @ 11:36)  Sodium, Serum: 136 mmol/L (02-21 @ 11:35)  Sodium, Serum: 134 mmol/L (02-21 @ 08:03)      0.91 mg/dL 02-23 @ 07:43  0.92 mg/dL 02-22 @ 11:36  0.93 mg/dL 02-21 @ 11:35  1.02 mg/dL 02-21 @ 08:03      Hemoglobin:  Hemoglobin: 8.9 g/dL (02-23 @ 07:43)  Hemoglobin: 8.9 g/dL (02-22 @ 11:36)  Hemoglobin: 9.4 g/dL (02-21 @ 08:03)      Platelets: Platelet Count - Automated: 339 K/uL (02-23 @ 07:43)  Platelet Count - Automated: 393 K/uL (02-22 @ 11:36)  Platelet Count - Automated: 416 K/uL (02-21 @ 08:03)              RADIOLOGY & ADDITIONAL STUDIES:      MICROBIOLOGY:  RECENT CULTURES:  02-21 .Blood Blood-Peripheral XXXX XXXX   No growth to date.    02-21 Clean Catch Clean Catch (Midstream) XXXX XXXX   <10,000 CFU/mL Normal Urogenital Jessica

## 2022-02-25 NOTE — PROGRESS NOTE ADULT - PROVIDER SPECIALTY LIST ADULT
Pulmonology
Hospitalist
Infectious Disease
Pulmonology
Pulmonology
Endocrinology
Hospitalist
Hospitalist
Pulmonology
Endocrinology
Hospitalist
Endocrinology
Infectious Disease
Infectious Disease

## 2022-02-25 NOTE — PROGRESS NOTE ADULT - REASON FOR ADMISSION
abd pain/ N/V

## 2022-02-25 NOTE — PROGRESS NOTE ADULT - PROBLEM SELECTOR PLAN 1
Continue with the current diabetic regimen.  Aggressive management of gastroparesis  short frequent meals and low glycemic index carbohydrates will help stabilize blood glucose and eliminate fluctuations   while inpatient, finger sticks should be 100-180
Continue with the current  regimen while inpatient   short frequent meals and low glycemic index carbohydrates will help stabilize blood glucose and eliminate fluctuations   continue with Reglan   while inpatient, finger sticks should be 100-180 and uniform
aggressive treatment of gastroparesis   Continue with the current  regimen while inpatient   can be discharged on current dose/ regimen
OPD Dental evaliation  Augmentin 875mg PO Q12H
Depending on clinical course  OPD Dental evaliation  F/U Cx data  If Blood cx remain negative/no concern of IE, transition to augmentin 875mg PO Q12H
Depending on clinical course  OPD Dental evaliation  F/U Cx data  If Blood cx remain negative/no concern of IE, transition to augmentin 875mg PO Q12H

## 2022-02-25 NOTE — PROGRESS NOTE ADULT - SUBJECTIVE AND OBJECTIVE BOX
[facility]  Division of Infectious Diseases  [phone]    Name: DOMINIQUE VALENCIA  Age: 31y  Gender: Female  MRN: [id number]    Interval History--  Notes reviewed.     Past Medical History--  Diabetes mellitus type 1    Gastroparesis    No pertinent past medical history    Type 1 diabetes mellitus with ketoacidosis without coma, with long-term current use of insulin    Gastroparesis due to DM    Colitis    Hypertension    Ectopic pregnancy    No significant past surgical history    History of cholecystectomy        For details regarding the patient's social history, family history, and other miscellaneous elements, please refer the initial infectious diseases consultation and/or the admitting history and physical examination for this admission.    Allergies    No Known Allergies    Intolerances        Medications--  Antibiotics:  amoxicillin  875 milliGRAM(s)/clavulanate 1 Tablet(s) Oral two times a day    Immunologic:  influenza   Vaccine 0.5 milliLiter(s) IntraMuscular once    Other:  acetaminophen     Tablet ..  dextrose 40% Gel  dextrose 5%.  dextrose 5%.  dextrose 50% Injectable  dextrose 50% Injectable  dextrose 50% Injectable  diphenhydrAMINE PRN  glucagon  Injectable  hydrocortisone 1% Cream  HYDROmorphone   Tablet PRN  insulin glargine Injectable (LANTUS)  insulin lispro (ADMELOG) corrective regimen sliding scale  insulin lispro (ADMELOG) corrective regimen sliding scale  insulin lispro Injectable (ADMELOG)  metoclopramide  metoclopramide Injectable  metoclopramide Injectable  metoclopramide Injectable  ondansetron Injectable PRN  polyethylene glycol 3350      Review of Systems--  A 10-point review of systems was obtained.     Pertinent positives and negatives--  Constitutional: No fevers. No Chills. No Rigors.   Cardiovascular: No chest pain. No palpitations.  Respiratory: No shortness of breath. No cough.  Gastrointestinal: No nausea or vomiting. No diarrhea or constipation.   Psychiatric: Pleasant. Appropriate affect.    Review of systems otherwise negative except as previously noted.    Physical Examination--  Vital Signs: T(F): 98 (02-25-22 @ 05:59), Max: 98.1 (02-24-22 @ 17:01)  HR: 117 (02-25-22 @ 05:59)  BP: 131/89 (02-25-22 @ 05:59)  RR: 18 (02-25-22 @ 05:59)  SpO2: 99% (02-25-22 @ 05:59)  Wt(kg): --  General: Nontoxic-appearing Female in no acute distress.  HEENT: AT/NC. PERRL. EOMI. Anicteric. Conjunctiva pink and moist. Oropharynx clear. Dentition fair.  Neck: Not rigid. No sense of mass.  Nodes: None palpable.  Lungs: Clear bilaterally without rales, wheezing or rhonchi  Heart: Regular rate and rhythm. No Murmur. No rub. No gallop. No palpable thrill.  Abdomen: Bowel sounds present and normoactive. Soft. Nondistended. Nontender. No sense of mass. No organomegaly.  Back: No spinal tenderness. No costovertebral angle tenderness.   Extremities: No cyanosis or clubbing. No edema.   Skin: Warm. Dry. Good turgor. No rash. No vasculitic stigmata.  Psychiatric: Appropriate affect and mood for situation.         Laboratory Studies--  CBC      Chemistries          Culture Data    Culture - Blood (collected 21 Feb 2022 11:17)  Source: .Blood Blood-Peripheral  Preliminary Report (22 Feb 2022 12:01):    No growth to date.    Culture - Blood (collected 21 Feb 2022 11:17)  Source: .Blood Blood-Peripheral  Preliminary Report (22 Feb 2022 12:01):    No growth to date.    Culture - Urine (collected 21 Feb 2022 10:52)  Source: Clean Catch Clean Catch (Midstream)  Final Report (22 Feb 2022 07:27):    <10,000 CFU/mL Normal Urogenital Jessica             [facility]  Division of Infectious Diseases  [phone]    Name: DOMINIQUE VALENCIA  Age: 31y  Gender: Female  MRN: [id number]    Interval History--  Notes reviewed. Seen earlier this am. States still intermittently has abdominal discomfort. No fevers, chills, or rigors.   ECHO report reviewed, blood cx NTD.       Past Medical History--  Diabetes mellitus type 1    Gastroparesis    No pertinent past medical history    Type 1 diabetes mellitus with ketoacidosis without coma, with long-term current use of insulin    Gastroparesis due to DM    Colitis    Hypertension    Ectopic pregnancy    No significant past surgical history    History of cholecystectomy        For details regarding the patient's social history, family history, and other miscellaneous elements, please refer the initial infectious diseases consultation and/or the admitting history and physical examination for this admission.    Allergies    No Known Allergies    Intolerances        Medications--  Antibiotics:  amoxicillin  875 milliGRAM(s)/clavulanate 1 Tablet(s) Oral two times a day    Immunologic:  influenza   Vaccine 0.5 milliLiter(s) IntraMuscular once    Other:  acetaminophen     Tablet ..  dextrose 40% Gel  dextrose 5%.  dextrose 5%.  dextrose 50% Injectable  dextrose 50% Injectable  dextrose 50% Injectable  diphenhydrAMINE PRN  glucagon  Injectable  hydrocortisone 1% Cream  HYDROmorphone   Tablet PRN  insulin glargine Injectable (LANTUS)  insulin lispro (ADMELOG) corrective regimen sliding scale  insulin lispro (ADMELOG) corrective regimen sliding scale  insulin lispro Injectable (ADMELOG)  metoclopramide  metoclopramide Injectable  metoclopramide Injectable  metoclopramide Injectable  ondansetron Injectable PRN  polyethylene glycol 3350      Review of Systems--  A 10-point review of systems was obtained.   Review of systems otherwise unchanged compared to prior visit except as previously noted.    Physical Examination--  Vital Signs: T(F): 98 (02-25-22 @ 05:59), Max: 98.1 (02-24-22 @ 17:01)  HR: 117 (02-25-22 @ 05:59)  BP: 131/89 (02-25-22 @ 05:59)  RR: 18 (02-25-22 @ 05:59)  SpO2: 99% (02-25-22 @ 05:59)  Wt(kg): --  General: Nontoxic-appearing Female in no acute distress.  HEENT: AT/NC. Anicteric. Conjunctiva pink and moist. Oropharynx clear. Dentition no change.  Neck: Not rigid. No sense of mass.  Nodes: None palpable.  Lungs: Clear bilaterally without rales, wheezing or rhonchi  Heart: Regular rate and rhythm. II-III/VI SM no RGT  Abdomen: Bowel sounds present and normoactive. Soft. Mildly distended. Nontender. No sense of mass. No organomegaly.  Extremities: No cyanosis or clubbing. No edema.  Skin: Warm. Dry. Good turgor. No rash. No vasculitic stigmata.  Psychiatric: grossly appropriate affect and mood for situation.       Laboratory Studies--  CBC      Chemistries          Culture Data    Culture - Blood (collected 21 Feb 2022 11:17)  Source: .Blood Blood-Peripheral  Preliminary Report (22 Feb 2022 12:01):    No growth to date.    Culture - Blood (collected 21 Feb 2022 11:17)  Source: .Blood Blood-Peripheral  Preliminary Report (22 Feb 2022 12:01):    No growth to date.    Culture - Urine (collected 21 Feb 2022 10:52)  Source: Clean Catch Clean Catch (Midstream)  Final Report (22 Feb 2022 07:27):    <10,000 CFU/mL Normal Urogenital Jessica    < from: TTE Echo Complete w/o Contrast w/ Doppler (02.24.22 @ 13:38) >  Summary:   1. Left ventricular ejection fraction, by visual estimation, is 65 to   70%.   2. Normal global left ventricular systolic function.   3. There is mild asymmetric left ventricular hypertrophy.   4. Mild mitral valve regurgitation.   5. Mild tricuspid regurgitation.   6. Mild aortic regurgitation.   7. No evidence of any vegetations.    < end of copied text >

## 2022-02-28 ENCOUNTER — APPOINTMENT (OUTPATIENT)
Dept: ENDOCRINOLOGY | Facility: CLINIC | Age: 32
End: 2022-02-28

## 2022-03-02 DIAGNOSIS — Z90.49 ACQUIRED ABSENCE OF OTHER SPECIFIED PARTS OF DIGESTIVE TRACT: ICD-10-CM

## 2022-03-02 DIAGNOSIS — K59.00 CONSTIPATION, UNSPECIFIED: ICD-10-CM

## 2022-03-02 DIAGNOSIS — Z20.822 CONTACT WITH AND (SUSPECTED) EXPOSURE TO COVID-19: ICD-10-CM

## 2022-03-02 DIAGNOSIS — I10 ESSENTIAL (PRIMARY) HYPERTENSION: ICD-10-CM

## 2022-03-02 DIAGNOSIS — K04.7 PERIAPICAL ABSCESS WITHOUT SINUS: ICD-10-CM

## 2022-03-02 DIAGNOSIS — B97.29 OTHER CORONAVIRUS AS THE CAUSE OF DISEASES CLASSIFIED ELSEWHERE: ICD-10-CM

## 2022-03-02 DIAGNOSIS — J84.10 PULMONARY FIBROSIS, UNSPECIFIED: ICD-10-CM

## 2022-03-02 DIAGNOSIS — E10.65 TYPE 1 DIABETES MELLITUS WITH HYPERGLYCEMIA: ICD-10-CM

## 2022-03-02 DIAGNOSIS — K31.84 GASTROPARESIS: ICD-10-CM

## 2022-03-02 DIAGNOSIS — R10.9 UNSPECIFIED ABDOMINAL PAIN: ICD-10-CM

## 2022-03-02 DIAGNOSIS — E10.43 TYPE 1 DIABETES MELLITUS WITH DIABETIC AUTONOMIC (POLY)NEUROPATHY: ICD-10-CM

## 2022-03-02 DIAGNOSIS — D50.9 IRON DEFICIENCY ANEMIA, UNSPECIFIED: ICD-10-CM

## 2022-03-02 DIAGNOSIS — J06.9 ACUTE UPPER RESPIRATORY INFECTION, UNSPECIFIED: ICD-10-CM

## 2022-03-02 LAB
6-ACETYLCODEINE UR CFM-MCNC: NEGATIVE NG/ML — SIGNIFICANT CHANGE UP
AMPHET UR-MCNC: NEGATIVE — SIGNIFICANT CHANGE UP
BARBITURATES, URINE.: NEGATIVE — SIGNIFICANT CHANGE UP
BENZODIAZ UR-MCNC: NEGATIVE — SIGNIFICANT CHANGE UP
COCAINE METAB.OTHER UR-MCNC: NEGATIVE — SIGNIFICANT CHANGE UP
CODEINE UR CFM-MCNC: SIGNIFICANT CHANGE UP NG/ML
CREATININE, URINE THERAPEUTIC: 92 MG/DL — SIGNIFICANT CHANGE UP
HYDROCODONE UR CFM-MCNC: SIGNIFICANT CHANGE UP NG/ML
HYDROMORPHONE UR CFM-MCNC: 437 NG/ML — SIGNIFICANT CHANGE UP
METHADONE UR-MCNC: NEGATIVE — SIGNIFICANT CHANGE UP
METHAQUALONE UR QL: NEGATIVE — SIGNIFICANT CHANGE UP
METHAQUALONE UR-MCNC: NEGATIVE — SIGNIFICANT CHANGE UP
MORPHINE UR QL CFM: 1562 NG/ML — SIGNIFICANT CHANGE UP
OPIATES UR-MCNC: SIGNIFICANT CHANGE UP
PCP UR-MCNC: NEGATIVE — SIGNIFICANT CHANGE UP
PROPOXYPH UR QL: NEGATIVE — SIGNIFICANT CHANGE UP
THC UR QL CFM: 65 NG/ML — SIGNIFICANT CHANGE UP
THC UR QL: SIGNIFICANT CHANGE UP

## 2022-03-09 LAB — GLUCOSE BLDC GLUCOMTR-MCNC: 587 MG/DL — CRITICAL HIGH (ref 70–99)

## 2022-03-17 ENCOUNTER — APPOINTMENT (OUTPATIENT)
Dept: ENDOCRINOLOGY | Facility: CLINIC | Age: 32
End: 2022-03-17
Payer: MEDICAID

## 2022-03-17 PROCEDURE — G0108 DIAB MANAGE TRN  PER INDIV: CPT

## 2022-03-18 ENCOUNTER — APPOINTMENT (OUTPATIENT)
Dept: ENDOCRINOLOGY | Facility: CLINIC | Age: 32
End: 2022-03-18

## 2022-04-04 ENCOUNTER — APPOINTMENT (OUTPATIENT)
Dept: INTERNAL MEDICINE | Facility: CLINIC | Age: 32
End: 2022-04-04

## 2022-04-18 ENCOUNTER — APPOINTMENT (OUTPATIENT)
Dept: ENDOCRINOLOGY | Facility: CLINIC | Age: 32
End: 2022-04-18

## 2022-04-19 ENCOUNTER — EMERGENCY (EMERGENCY)
Facility: HOSPITAL | Age: 32
LOS: 0 days | Discharge: HOME | End: 2022-04-19
Attending: STUDENT IN AN ORGANIZED HEALTH CARE EDUCATION/TRAINING PROGRAM
Payer: COMMERCIAL

## 2022-04-19 VITALS
OXYGEN SATURATION: 98 % | HEART RATE: 107 BPM | HEIGHT: 66 IN | WEIGHT: 134.92 LBS | RESPIRATION RATE: 17 BRPM | SYSTOLIC BLOOD PRESSURE: 135 MMHG | DIASTOLIC BLOOD PRESSURE: 81 MMHG | TEMPERATURE: 98 F

## 2022-04-19 VITALS
RESPIRATION RATE: 18 BRPM | OXYGEN SATURATION: 98 % | HEART RATE: 91 BPM | TEMPERATURE: 98 F | SYSTOLIC BLOOD PRESSURE: 147 MMHG | DIASTOLIC BLOOD PRESSURE: 96 MMHG

## 2022-04-19 DIAGNOSIS — E10.43 TYPE 1 DIABETES MELLITUS WITH DIABETIC AUTONOMIC (POLY)NEUROPATHY: ICD-10-CM

## 2022-04-19 DIAGNOSIS — K31.84 GASTROPARESIS: ICD-10-CM

## 2022-04-19 DIAGNOSIS — Z87.19 PERSONAL HISTORY OF OTHER DISEASES OF THE DIGESTIVE SYSTEM: ICD-10-CM

## 2022-04-19 DIAGNOSIS — Z88.6 ALLERGY STATUS TO ANALGESIC AGENT: ICD-10-CM

## 2022-04-19 DIAGNOSIS — R11.2 NAUSEA WITH VOMITING, UNSPECIFIED: ICD-10-CM

## 2022-04-19 DIAGNOSIS — I10 ESSENTIAL (PRIMARY) HYPERTENSION: ICD-10-CM

## 2022-04-19 DIAGNOSIS — Z79.84 LONG TERM (CURRENT) USE OF ORAL HYPOGLYCEMIC DRUGS: ICD-10-CM

## 2022-04-19 DIAGNOSIS — O00.9 ECTOPIC PREGNANCY, UNSPECIFIED: Chronic | ICD-10-CM

## 2022-04-19 DIAGNOSIS — Z90.49 ACQUIRED ABSENCE OF OTHER SPECIFIED PARTS OF DIGESTIVE TRACT: Chronic | ICD-10-CM

## 2022-04-19 DIAGNOSIS — R10.31 RIGHT LOWER QUADRANT PAIN: ICD-10-CM

## 2022-04-19 DIAGNOSIS — R19.7 DIARRHEA, UNSPECIFIED: ICD-10-CM

## 2022-04-19 DIAGNOSIS — F17.200 NICOTINE DEPENDENCE, UNSPECIFIED, UNCOMPLICATED: ICD-10-CM

## 2022-04-19 LAB
ALBUMIN SERPL ELPH-MCNC: 2.8 G/DL — LOW (ref 3.3–5)
ALP SERPL-CCNC: 91 U/L — SIGNIFICANT CHANGE UP (ref 40–120)
ALT FLD-CCNC: 21 U/L — SIGNIFICANT CHANGE UP (ref 12–78)
ANION GAP SERPL CALC-SCNC: 8 MMOL/L — SIGNIFICANT CHANGE UP (ref 5–17)
ANISOCYTOSIS BLD QL: SLIGHT — SIGNIFICANT CHANGE UP
APPEARANCE UR: CLEAR — SIGNIFICANT CHANGE UP
AST SERPL-CCNC: 14 U/L — LOW (ref 15–37)
BACTERIA # UR AUTO: ABNORMAL
BASOPHILS # BLD AUTO: 0.04 K/UL — SIGNIFICANT CHANGE UP (ref 0–0.2)
BASOPHILS NFR BLD AUTO: 0.6 % — SIGNIFICANT CHANGE UP (ref 0–2)
BILIRUB SERPL-MCNC: 0.4 MG/DL — SIGNIFICANT CHANGE UP (ref 0.2–1.2)
BILIRUB UR-MCNC: NEGATIVE — SIGNIFICANT CHANGE UP
BLD GP AB SCN SERPL QL: SIGNIFICANT CHANGE UP
BUN SERPL-MCNC: 14 MG/DL — SIGNIFICANT CHANGE UP (ref 7–23)
CALCIUM SERPL-MCNC: 8.8 MG/DL — SIGNIFICANT CHANGE UP (ref 8.5–10.1)
CHLORIDE SERPL-SCNC: 102 MMOL/L — SIGNIFICANT CHANGE UP (ref 96–108)
CO2 SERPL-SCNC: 23 MMOL/L — SIGNIFICANT CHANGE UP (ref 22–31)
COLOR SPEC: YELLOW — SIGNIFICANT CHANGE UP
COMMENT - URINE: SIGNIFICANT CHANGE UP
CREAT SERPL-MCNC: 0.76 MG/DL — SIGNIFICANT CHANGE UP (ref 0.5–1.3)
DIFF PNL FLD: ABNORMAL
EGFR: 107 ML/MIN/1.73M2 — SIGNIFICANT CHANGE UP
EOSINOPHIL # BLD AUTO: 0.07 K/UL — SIGNIFICANT CHANGE UP (ref 0–0.5)
EOSINOPHIL NFR BLD AUTO: 1.1 % — SIGNIFICANT CHANGE UP (ref 0–6)
EPI CELLS # UR: SIGNIFICANT CHANGE UP
GLUCOSE SERPL-MCNC: 383 MG/DL — HIGH (ref 70–99)
GLUCOSE UR QL: 1000 MG/DL
HCG SERPL-ACNC: <1 MIU/ML — SIGNIFICANT CHANGE UP
HCT VFR BLD CALC: 32.4 % — LOW (ref 34.5–45)
HGB BLD-MCNC: 9.7 G/DL — LOW (ref 11.5–15.5)
HYPOCHROMIA BLD QL: SLIGHT — SIGNIFICANT CHANGE UP
IMM GRANULOCYTES NFR BLD AUTO: 0.2 % — SIGNIFICANT CHANGE UP (ref 0–1.5)
KETONES UR-MCNC: NEGATIVE — SIGNIFICANT CHANGE UP
LACTATE SERPL-SCNC: 1.4 MMOL/L — SIGNIFICANT CHANGE UP (ref 0.7–2)
LEUKOCYTE ESTERASE UR-ACNC: NEGATIVE — SIGNIFICANT CHANGE UP
LIDOCAIN IGE QN: 74 U/L — SIGNIFICANT CHANGE UP (ref 73–393)
LYMPHOCYTES # BLD AUTO: 1.39 K/UL — SIGNIFICANT CHANGE UP (ref 1–3.3)
LYMPHOCYTES # BLD AUTO: 22.3 % — SIGNIFICANT CHANGE UP (ref 13–44)
MANUAL SMEAR VERIFICATION: SIGNIFICANT CHANGE UP
MCHC RBC-ENTMCNC: 18.4 PG — LOW (ref 27–34)
MCHC RBC-ENTMCNC: 29.9 G/DL — LOW (ref 32–36)
MCV RBC AUTO: 61.4 FL — LOW (ref 80–100)
MICROCYTES BLD QL: SIGNIFICANT CHANGE UP
MONOCYTES # BLD AUTO: 0.44 K/UL — SIGNIFICANT CHANGE UP (ref 0–0.9)
MONOCYTES NFR BLD AUTO: 7.1 % — SIGNIFICANT CHANGE UP (ref 2–14)
NEUTROPHILS # BLD AUTO: 4.27 K/UL — SIGNIFICANT CHANGE UP (ref 1.8–7.4)
NEUTROPHILS NFR BLD AUTO: 68.7 % — SIGNIFICANT CHANGE UP (ref 43–77)
NITRITE UR-MCNC: NEGATIVE — SIGNIFICANT CHANGE UP
NRBC # BLD: 0 /100 WBCS — SIGNIFICANT CHANGE UP (ref 0–0)
OVALOCYTES BLD QL SMEAR: SLIGHT — SIGNIFICANT CHANGE UP
PH UR: 6 — SIGNIFICANT CHANGE UP (ref 5–8)
PLAT MORPH BLD: NORMAL — SIGNIFICANT CHANGE UP
PLATELET # BLD AUTO: 407 K/UL — HIGH (ref 150–400)
POTASSIUM SERPL-MCNC: 3.9 MMOL/L — SIGNIFICANT CHANGE UP (ref 3.5–5.3)
POTASSIUM SERPL-SCNC: 3.9 MMOL/L — SIGNIFICANT CHANGE UP (ref 3.5–5.3)
PROT SERPL-MCNC: 6.8 GM/DL — SIGNIFICANT CHANGE UP (ref 6–8.3)
PROT UR-MCNC: 100 MG/DL
RBC # BLD: 5.28 M/UL — HIGH (ref 3.8–5.2)
RBC # FLD: 20.3 % — HIGH (ref 10.3–14.5)
RBC BLD AUTO: ABNORMAL
RBC CASTS # UR COMP ASSIST: SIGNIFICANT CHANGE UP /HPF (ref 0–4)
SODIUM SERPL-SCNC: 133 MMOL/L — LOW (ref 135–145)
SP GR SPEC: 1.02 — SIGNIFICANT CHANGE UP (ref 1.01–1.02)
TARGETS BLD QL SMEAR: SLIGHT — SIGNIFICANT CHANGE UP
UROBILINOGEN FLD QL: NEGATIVE MG/DL — SIGNIFICANT CHANGE UP
WBC # BLD: 6.22 K/UL — SIGNIFICANT CHANGE UP (ref 3.8–10.5)
WBC # FLD AUTO: 6.22 K/UL — SIGNIFICANT CHANGE UP (ref 3.8–10.5)
WBC UR QL: SIGNIFICANT CHANGE UP

## 2022-04-19 PROCEDURE — 99285 EMERGENCY DEPT VISIT HI MDM: CPT

## 2022-04-19 PROCEDURE — 74177 CT ABD & PELVIS W/CONTRAST: CPT | Mod: 26,MC

## 2022-04-19 RX ORDER — SODIUM CHLORIDE 9 MG/ML
1000 INJECTION INTRAMUSCULAR; INTRAVENOUS; SUBCUTANEOUS ONCE
Refills: 0 | Status: COMPLETED | OUTPATIENT
Start: 2022-04-19 | End: 2022-04-19

## 2022-04-19 RX ORDER — ONDANSETRON 8 MG/1
4 TABLET, FILM COATED ORAL ONCE
Refills: 0 | Status: COMPLETED | OUTPATIENT
Start: 2022-04-19 | End: 2022-04-19

## 2022-04-19 RX ORDER — MORPHINE SULFATE 50 MG/1
4 CAPSULE, EXTENDED RELEASE ORAL ONCE
Refills: 0 | Status: DISCONTINUED | OUTPATIENT
Start: 2022-04-19 | End: 2022-04-19

## 2022-04-19 RX ORDER — ACETAMINOPHEN 500 MG
1000 TABLET ORAL ONCE
Refills: 0 | Status: COMPLETED | OUTPATIENT
Start: 2022-04-19 | End: 2022-04-19

## 2022-04-19 RX ORDER — DIPHENHYDRAMINE HCL 50 MG
25 CAPSULE ORAL ONCE
Refills: 0 | Status: COMPLETED | OUTPATIENT
Start: 2022-04-19 | End: 2022-04-19

## 2022-04-19 RX ADMIN — SODIUM CHLORIDE 1000 MILLILITER(S): 9 INJECTION INTRAMUSCULAR; INTRAVENOUS; SUBCUTANEOUS at 13:29

## 2022-04-19 RX ADMIN — MORPHINE SULFATE 4 MILLIGRAM(S): 50 CAPSULE, EXTENDED RELEASE ORAL at 13:50

## 2022-04-19 RX ADMIN — ONDANSETRON 4 MILLIGRAM(S): 8 TABLET, FILM COATED ORAL at 13:37

## 2022-04-19 RX ADMIN — Medication 25 MILLIGRAM(S): at 13:30

## 2022-04-19 RX ADMIN — SODIUM CHLORIDE 1000 MILLILITER(S): 9 INJECTION INTRAMUSCULAR; INTRAVENOUS; SUBCUTANEOUS at 14:29

## 2022-04-19 RX ADMIN — MORPHINE SULFATE 4 MILLIGRAM(S): 50 CAPSULE, EXTENDED RELEASE ORAL at 13:38

## 2022-04-19 NOTE — ED ADULT TRIAGE NOTE - CHIEF COMPLAINT QUOTE
Pt states she had diarrhea yesterday c/o severe abdominal pain with nausea and vomiting today. Pt restless in chair at triage

## 2022-04-19 NOTE — ED PROVIDER NOTE - CARE PROVIDER_API CALL
Jose J Black  [facility]  [address], [address]  [place] [address]  Phone: [phone]  Fax: [phone]  Follow Up Time: 7-10 Days

## 2022-04-19 NOTE — ED PROVIDER NOTE - CLINICAL SUMMARY MEDICAL DECISION MAKING FREE TEXT BOX
31F w/ PMH HTN, IDDM, gastroparesis pw RLQ abd pain a/w N/V/D onset this AM. AFVSS. Pt appears uncomfortable, but non toxic appearing. Plan: CBC, CMP, lipase, lactate, HCG, UA/C, Utox, CT AP. Give Zofran, Morphine, IVF. Re-eval. 31F w/ PMH HTN, IDDM, gastroparesis pw RLQ abd pain a/w N/V/D onset this AM. AFVSS. Pt appears uncomfortable, but non toxic appearing. Plan: CBC, CMP, lipase, lactate, HCG, UA/C, Utox, CT AP. Give Zofran, Morphine, Tylenol, Bentyl, IVF. Re-eval.

## 2022-04-19 NOTE — ED PROVIDER NOTE - OBJECTIVE STATEMENT
31F w/ PMH HTN, IDDM, gastroparesis pw N/V/D, severe R sided lower abd pain onset this AM. Pt reports hx similar pain in past w/ ruptured ovarian cyst. Denies F/C, CP, SOB, cough, back pain, UTI sx, vaginal bleeding or d/c. Denies recent travel, sick contacts, new or spoiled foods. NBNB emesis, NB BMs.     PMH as above, PSH BL fallopian tubes 2/2 ectopic pregnancies, jamie, Allergy Toradol - hives, Meds as listed. LMP 1wk ago. + daily smoker, denies EtOH / rec drug use.

## 2022-04-19 NOTE — ED ADULT NURSE REASSESSMENT NOTE - NS ED NURSE REASSESS COMMENT FT1
IV obtained and labs drawn and sent after Dr was unable to get with sono machine. Pt instructing RN to push benadryl and MS rapidly with quick follow up of >ns push. Pt aware that meds would be pushed slowly into I.V line

## 2022-04-19 NOTE — ED ADULT NURSE NOTE - OBJECTIVE STATEMENT
pt c/o RLQ abdominal pain, nausea, vomiting started this morning. hx: bilateral fallopian tube removes, gallbladder removal.

## 2022-04-19 NOTE — ED PROVIDER NOTE - PATIENT PORTAL LINK FT
You can access the FollowMyHealth Patient Portal offered by [facility] by registering at the following website: [email or web address]. By joining [employer]’s FollowMyHealth portal, you will also be able to view your health information using other applications (apps) compatible with our system.

## 2022-04-19 NOTE — ED ADULT NURSE REASSESSMENT NOTE - NS ED NURSE REASSESS COMMENT FT1
multiple attempts at IV heplock insertion and blood work done, unsuccessful. MD Camacho aware. MD Dicther attempt at IV with ultrasound unsuccessful at this time. pending medications and blood work. awaiting additional MD assist.

## 2022-04-19 NOTE — ED ADULT NURSE NOTE - SUICIDE SCREENING QUESTION 1
Writer spoke to patient regarding referral to general surgery. Patient stated she will wait till f/u imaging in August. If there is still concern then, will get second opinion.    No

## 2022-04-19 NOTE — ED PROVIDER NOTE - PHYSICAL EXAMINATION
GEN: Awake, alert, interactive, NAD.  HEAD AND NECK: NC/AT. Airway patent. Neck supple.   EYES:  Clear b/l. EOMI. PERRL.   ENT: Moist mucus membranes.   CARDIAC: Regular rate, regular rhythm. No evident pedal edema.    RESP/CHEST: Normal respiratory effort with no use of accessory muscles or retractions. Clear throughout on auscultation.  ABD: Soft, non-distended, + TTP RLQ. No rebound, no guarding.   BACK: No midline spinal TTP. No CVAT.   EXTREMITIES: Moving all extremities with no apparent deformities.   SKIN: Warm, dry, intact normal color. No rash.   NEURO: AOx3, CN II-XII grossly intact, no focal deficits.   PSYCH: Appropriate mood and affect.

## 2022-04-19 NOTE — ED PROVIDER NOTE - PROGRESS NOTE DETAILS
W/u w/o significant abnormalities: Hgb 9.7 (baseline ~9), HCG neg, lactate / e-lytes WNL. CT AP w/ normal appendix, adnexa. UA w/o evidence infection. Stable for d/c home. Given and recommend outpatient GI, PCP f/u. Return signs / symptoms d/w pt. She understands / agrees w/ this plan.

## 2022-04-24 RX ORDER — CIPROFLOXACIN LACTATE 400MG/40ML
1 VIAL (ML) INTRAVENOUS
Qty: 20 | Refills: 0
Start: 2022-04-24 | End: 2022-05-03

## 2022-04-27 ENCOUNTER — INPATIENT (INPATIENT)
Facility: HOSPITAL | Age: 32
LOS: 0 days | Discharge: HOME | End: 2022-04-28
Attending: STUDENT IN AN ORGANIZED HEALTH CARE EDUCATION/TRAINING PROGRAM | Admitting: STUDENT IN AN ORGANIZED HEALTH CARE EDUCATION/TRAINING PROGRAM
Payer: MEDICAID

## 2022-04-27 VITALS
OXYGEN SATURATION: 100 % | TEMPERATURE: 98 F | DIASTOLIC BLOOD PRESSURE: 93 MMHG | RESPIRATION RATE: 18 BRPM | HEIGHT: 66 IN | SYSTOLIC BLOOD PRESSURE: 175 MMHG | HEART RATE: 134 BPM

## 2022-04-27 DIAGNOSIS — O00.9 ECTOPIC PREGNANCY, UNSPECIFIED: Chronic | ICD-10-CM

## 2022-04-27 DIAGNOSIS — Z90.49 ACQUIRED ABSENCE OF OTHER SPECIFIED PARTS OF DIGESTIVE TRACT: Chronic | ICD-10-CM

## 2022-04-27 LAB
ALBUMIN SERPL ELPH-MCNC: 3.4 G/DL — SIGNIFICANT CHANGE UP (ref 3.3–5)
ALP SERPL-CCNC: 87 U/L — SIGNIFICANT CHANGE UP (ref 40–120)
ALT FLD-CCNC: 14 U/L — SIGNIFICANT CHANGE UP (ref 4–33)
ANION GAP SERPL CALC-SCNC: 20 MMOL/L — HIGH (ref 7–14)
ANISOCYTOSIS BLD QL: SLIGHT — SIGNIFICANT CHANGE UP
APPEARANCE UR: CLEAR — SIGNIFICANT CHANGE UP
APTT BLD: 25.4 SEC — LOW (ref 27–36.3)
AST SERPL-CCNC: 47 U/L — HIGH (ref 4–32)
B PERT DNA SPEC QL NAA+PROBE: SIGNIFICANT CHANGE UP
B PERT+PARAPERT DNA PNL SPEC NAA+PROBE: SIGNIFICANT CHANGE UP
BACTERIA # UR AUTO: NEGATIVE — SIGNIFICANT CHANGE UP
BASE EXCESS BLDV CALC-SCNC: -3.8 MMOL/L — LOW (ref -2–3)
BASOPHILS # BLD AUTO: 0.08 K/UL — SIGNIFICANT CHANGE UP (ref 0–0.2)
BASOPHILS NFR BLD AUTO: 0.9 % — SIGNIFICANT CHANGE UP (ref 0–2)
BILIRUB SERPL-MCNC: 0.4 MG/DL — SIGNIFICANT CHANGE UP (ref 0.2–1.2)
BILIRUB UR-MCNC: NEGATIVE — SIGNIFICANT CHANGE UP
BLD GP AB SCN SERPL QL: NEGATIVE — SIGNIFICANT CHANGE UP
BLOOD GAS VENOUS COMPREHENSIVE RESULT: SIGNIFICANT CHANGE UP
BORDETELLA PARAPERTUSSIS (RAPRVP): SIGNIFICANT CHANGE UP
BUN SERPL-MCNC: 15 MG/DL — SIGNIFICANT CHANGE UP (ref 7–23)
BURR CELLS BLD QL SMEAR: PRESENT — SIGNIFICANT CHANGE UP
C PNEUM DNA SPEC QL NAA+PROBE: SIGNIFICANT CHANGE UP
CALCIUM SERPL-MCNC: 9.3 MG/DL — SIGNIFICANT CHANGE UP (ref 8.4–10.5)
CHLORIDE BLDV-SCNC: 100 MMOL/L — SIGNIFICANT CHANGE UP (ref 96–108)
CHLORIDE SERPL-SCNC: 99 MMOL/L — SIGNIFICANT CHANGE UP (ref 98–107)
CO2 BLDV-SCNC: 21.6 MMOL/L — LOW (ref 22–26)
CO2 SERPL-SCNC: 16 MMOL/L — LOW (ref 22–31)
COLOR SPEC: SIGNIFICANT CHANGE UP
CREAT SERPL-MCNC: 0.84 MG/DL — SIGNIFICANT CHANGE UP (ref 0.5–1.3)
DACRYOCYTES BLD QL SMEAR: SLIGHT — SIGNIFICANT CHANGE UP
DIFF PNL FLD: ABNORMAL
EGFR: 95 ML/MIN/1.73M2 — SIGNIFICANT CHANGE UP
EOSINOPHIL # BLD AUTO: 0.08 K/UL — SIGNIFICANT CHANGE UP (ref 0–0.5)
EOSINOPHIL NFR BLD AUTO: 0.9 % — SIGNIFICANT CHANGE UP (ref 0–6)
EPI CELLS # UR: 3 /HPF — SIGNIFICANT CHANGE UP (ref 0–5)
FLUAV SUBTYP SPEC NAA+PROBE: SIGNIFICANT CHANGE UP
FLUBV RNA SPEC QL NAA+PROBE: SIGNIFICANT CHANGE UP
GAS PNL BLDV: 128 MMOL/L — LOW (ref 136–145)
GIANT PLATELETS BLD QL SMEAR: PRESENT — SIGNIFICANT CHANGE UP
GLUCOSE BLDV-MCNC: 511 MG/DL — CRITICAL HIGH (ref 70–99)
GLUCOSE SERPL-MCNC: 495 MG/DL — CRITICAL HIGH (ref 70–99)
GLUCOSE UR QL: ABNORMAL
HADV DNA SPEC QL NAA+PROBE: SIGNIFICANT CHANGE UP
HCG SERPL-ACNC: <5 MIU/ML — SIGNIFICANT CHANGE UP
HCO3 BLDV-SCNC: 21 MMOL/L — LOW (ref 22–29)
HCOV 229E RNA SPEC QL NAA+PROBE: SIGNIFICANT CHANGE UP
HCOV HKU1 RNA SPEC QL NAA+PROBE: SIGNIFICANT CHANGE UP
HCOV NL63 RNA SPEC QL NAA+PROBE: DETECTED
HCOV OC43 RNA SPEC QL NAA+PROBE: SIGNIFICANT CHANGE UP
HCT VFR BLD CALC: 31.1 % — LOW (ref 34.5–45)
HCT VFR BLDA CALC: 28 % — LOW (ref 34.5–46.5)
HGB BLD CALC-MCNC: 9.4 G/DL — LOW (ref 11.5–15.5)
HGB BLD-MCNC: 9.5 G/DL — LOW (ref 11.5–15.5)
HMPV RNA SPEC QL NAA+PROBE: SIGNIFICANT CHANGE UP
HPIV1 RNA SPEC QL NAA+PROBE: SIGNIFICANT CHANGE UP
HPIV2 RNA SPEC QL NAA+PROBE: SIGNIFICANT CHANGE UP
HPIV3 RNA SPEC QL NAA+PROBE: SIGNIFICANT CHANGE UP
HPIV4 RNA SPEC QL NAA+PROBE: SIGNIFICANT CHANGE UP
HYALINE CASTS # UR AUTO: 0 /LPF — SIGNIFICANT CHANGE UP (ref 0–7)
HYPOCHROMIA BLD QL: SLIGHT — SIGNIFICANT CHANGE UP
IANC: 6.99 K/UL — SIGNIFICANT CHANGE UP (ref 1.8–7.4)
INR BLD: 0.95 RATIO — SIGNIFICANT CHANGE UP (ref 0.88–1.16)
KETONES UR-MCNC: ABNORMAL
LACTATE BLDV-MCNC: 2.4 MMOL/L — HIGH (ref 0.5–2)
LEUKOCYTE ESTERASE UR-ACNC: NEGATIVE — SIGNIFICANT CHANGE UP
LIDOCAIN IGE QN: 18 U/L — SIGNIFICANT CHANGE UP (ref 7–60)
LYMPHOCYTES # BLD AUTO: 1.09 K/UL — SIGNIFICANT CHANGE UP (ref 1–3.3)
LYMPHOCYTES # BLD AUTO: 11.7 % — LOW (ref 13–44)
M PNEUMO DNA SPEC QL NAA+PROBE: SIGNIFICANT CHANGE UP
MAGNESIUM SERPL-MCNC: 1.8 MG/DL — SIGNIFICANT CHANGE UP (ref 1.6–2.6)
MCHC RBC-ENTMCNC: 18.6 PG — LOW (ref 27–34)
MCHC RBC-ENTMCNC: 30.5 GM/DL — LOW (ref 32–36)
MCV RBC AUTO: 61 FL — LOW (ref 80–100)
MICROCYTES BLD QL: SIGNIFICANT CHANGE UP
MONOCYTES # BLD AUTO: 0.42 K/UL — SIGNIFICANT CHANGE UP (ref 0–0.9)
MONOCYTES NFR BLD AUTO: 4.5 % — SIGNIFICANT CHANGE UP (ref 2–14)
NEUTROPHILS # BLD AUTO: 7.62 K/UL — HIGH (ref 1.8–7.4)
NEUTROPHILS NFR BLD AUTO: 82 % — HIGH (ref 43–77)
NITRITE UR-MCNC: NEGATIVE — SIGNIFICANT CHANGE UP
PCO2 BLDV: 34 MMHG — LOW (ref 39–42)
PH BLDV: 7.39 — SIGNIFICANT CHANGE UP (ref 7.32–7.43)
PH UR: 6.5 — SIGNIFICANT CHANGE UP (ref 5–8)
PLAT MORPH BLD: NORMAL — SIGNIFICANT CHANGE UP
PLATELET # BLD AUTO: 390 K/UL — SIGNIFICANT CHANGE UP (ref 150–400)
PLATELET COUNT - ESTIMATE: NORMAL — SIGNIFICANT CHANGE UP
PO2 BLDV: 69 MMHG — SIGNIFICANT CHANGE UP
POIKILOCYTOSIS BLD QL AUTO: SIGNIFICANT CHANGE UP
POLYCHROMASIA BLD QL SMEAR: SLIGHT — SIGNIFICANT CHANGE UP
POTASSIUM BLDV-SCNC: SIGNIFICANT CHANGE UP MMOL/L (ref 3.5–5.1)
POTASSIUM SERPL-MCNC: 5.2 MMOL/L — SIGNIFICANT CHANGE UP (ref 3.5–5.3)
POTASSIUM SERPL-SCNC: 5.2 MMOL/L — SIGNIFICANT CHANGE UP (ref 3.5–5.3)
PROT SERPL-MCNC: 6.8 G/DL — SIGNIFICANT CHANGE UP (ref 6–8.3)
PROT UR-MCNC: ABNORMAL
PROTHROM AB SERPL-ACNC: 11 SEC — SIGNIFICANT CHANGE UP (ref 10.5–13.4)
RAPID RVP RESULT: DETECTED
RBC # BLD: 5.1 M/UL — SIGNIFICANT CHANGE UP (ref 3.8–5.2)
RBC # FLD: 20.8 % — HIGH (ref 10.3–14.5)
RBC BLD AUTO: ABNORMAL
RBC CASTS # UR COMP ASSIST: 17 /HPF — HIGH (ref 0–4)
RH IG SCN BLD-IMP: POSITIVE — SIGNIFICANT CHANGE UP
RSV RNA SPEC QL NAA+PROBE: SIGNIFICANT CHANGE UP
RV+EV RNA SPEC QL NAA+PROBE: SIGNIFICANT CHANGE UP
SAO2 % BLDV: 93.6 % — SIGNIFICANT CHANGE UP
SARS-COV-2 RNA SPEC QL NAA+PROBE: SIGNIFICANT CHANGE UP
SCHISTOCYTES BLD QL AUTO: SLIGHT — SIGNIFICANT CHANGE UP
SODIUM SERPL-SCNC: 135 MMOL/L — SIGNIFICANT CHANGE UP (ref 135–145)
SP GR SPEC: 1.04 — SIGNIFICANT CHANGE UP (ref 1–1.05)
TARGETS BLD QL SMEAR: SIGNIFICANT CHANGE UP
UROBILINOGEN FLD QL: SIGNIFICANT CHANGE UP
WBC # BLD: 9.29 K/UL — SIGNIFICANT CHANGE UP (ref 3.8–10.5)
WBC # FLD AUTO: 9.29 K/UL — SIGNIFICANT CHANGE UP (ref 3.8–10.5)
WBC UR QL: 3 /HPF — SIGNIFICANT CHANGE UP (ref 0–5)

## 2022-04-27 PROCEDURE — 99285 EMERGENCY DEPT VISIT HI MDM: CPT

## 2022-04-27 PROCEDURE — 76830 TRANSVAGINAL US NON-OB: CPT | Mod: 26

## 2022-04-27 RX ORDER — DIPHENHYDRAMINE HCL 50 MG
25 CAPSULE ORAL ONCE
Refills: 0 | Status: COMPLETED | OUTPATIENT
Start: 2022-04-27 | End: 2022-04-27

## 2022-04-27 RX ORDER — ONDANSETRON 8 MG/1
4 TABLET, FILM COATED ORAL ONCE
Refills: 0 | Status: COMPLETED | OUTPATIENT
Start: 2022-04-27 | End: 2022-04-27

## 2022-04-27 RX ORDER — MORPHINE SULFATE 50 MG/1
4 CAPSULE, EXTENDED RELEASE ORAL ONCE
Refills: 0 | Status: DISCONTINUED | OUTPATIENT
Start: 2022-04-27 | End: 2022-04-27

## 2022-04-27 RX ORDER — SODIUM CHLORIDE 9 MG/ML
1000 INJECTION, SOLUTION INTRAVENOUS ONCE
Refills: 0 | Status: COMPLETED | OUTPATIENT
Start: 2022-04-27 | End: 2022-04-27

## 2022-04-27 RX ORDER — SODIUM CHLORIDE 9 MG/ML
1000 INJECTION INTRAMUSCULAR; INTRAVENOUS; SUBCUTANEOUS ONCE
Refills: 0 | Status: COMPLETED | OUTPATIENT
Start: 2022-04-27 | End: 2022-04-27

## 2022-04-27 RX ORDER — INSULIN LISPRO 100/ML
8 VIAL (ML) SUBCUTANEOUS ONCE
Refills: 0 | Status: COMPLETED | OUTPATIENT
Start: 2022-04-27 | End: 2022-04-27

## 2022-04-27 RX ORDER — HYDROMORPHONE HYDROCHLORIDE 2 MG/ML
0.5 INJECTION INTRAMUSCULAR; INTRAVENOUS; SUBCUTANEOUS ONCE
Refills: 0 | Status: DISCONTINUED | OUTPATIENT
Start: 2022-04-27 | End: 2022-04-27

## 2022-04-27 RX ORDER — INSULIN GLARGINE 100 [IU]/ML
18 INJECTION, SOLUTION SUBCUTANEOUS AT BEDTIME
Refills: 0 | Status: DISCONTINUED | OUTPATIENT
Start: 2022-04-27 | End: 2022-04-28

## 2022-04-27 RX ADMIN — ONDANSETRON 4 MILLIGRAM(S): 8 TABLET, FILM COATED ORAL at 20:48

## 2022-04-27 RX ADMIN — INSULIN GLARGINE 18 UNIT(S): 100 INJECTION, SOLUTION SUBCUTANEOUS at 23:58

## 2022-04-27 RX ADMIN — MORPHINE SULFATE 4 MILLIGRAM(S): 50 CAPSULE, EXTENDED RELEASE ORAL at 20:48

## 2022-04-27 RX ADMIN — SODIUM CHLORIDE 1000 MILLILITER(S): 9 INJECTION INTRAMUSCULAR; INTRAVENOUS; SUBCUTANEOUS at 20:51

## 2022-04-27 RX ADMIN — Medication 25 MILLIGRAM(S): at 21:04

## 2022-04-27 RX ADMIN — Medication 8 UNIT(S): at 23:58

## 2022-04-27 RX ADMIN — Medication 25 MILLIGRAM(S): at 21:43

## 2022-04-27 RX ADMIN — HYDROMORPHONE HYDROCHLORIDE 0.5 MILLIGRAM(S): 2 INJECTION INTRAMUSCULAR; INTRAVENOUS; SUBCUTANEOUS at 21:50

## 2022-04-27 RX ADMIN — ONDANSETRON 4 MILLIGRAM(S): 8 TABLET, FILM COATED ORAL at 21:50

## 2022-04-27 NOTE — ED ADULT NURSE NOTE - CHIEF COMPLAINT QUOTE
pt with co lower abdominal pain that started earlier. pt states had ovarian cyst in the past feels the same. Pt co nausea and vomiting pt is a type one DM . fis [phone]

## 2022-04-27 NOTE — ED PROVIDER NOTE - PROGRESS NOTE DETAILS
Nikita Lion PGY2: Pt transiently hypoglycemia. Remains well appearing, although still in pain. Attempted PO w/ orange juice, but severe pain shortly thereafter. Will admit for further management.

## 2022-04-27 NOTE — ED PROVIDER NOTE - ATTENDING CONTRIBUTION TO CARE
30 yo female with PMH T1DM, gastroparesis, ?opiate dependence, ectopic preg x 2 s/p BL tubal ligation,   for evaluation of abd pain x few days with nausea, vomiting, PE: generalized abd ttp pa. A/P Labs, imaging, medicate, reassess

## 2022-04-27 NOTE — ED PROVIDER NOTE - PHYSICAL EXAMINATION
Gen: A&Ox4. Mild distress 2/2 to pain.  HEENT: Atraumatic. Mucous membranes moist, no scleral icterus.  CV: tachycardic. No significant lower extremity edema.   Resp: Respirations unlabored. CTAB, no rales, no wheezes.  GI: Abdomen ttp in bilat lower quadrants, otherwise non tender to palpation, soft and non-distended.   Skin/MSK: No open wounds. No ecchymosis appreciated.  Neuro: Following commands. EOMI. Pupils ERRL.  Psych: Appropriate mood, cooperative

## 2022-04-27 NOTE — ED ADULT NURSE NOTE - OBJECTIVE STATEMENT
Patient is a 30 yo female, h/x ovarian cyst, DM 1, presenting with LLQ pain, nausea and vomiting starting today. Patient AAOx4, in severe pain, retching, reports she was recently diagnosed with UTI and then today started having LLQ pain/nausea/vomiting similar to previous ruptured ovarian cyst. Patient also reports BG have been high. Patient denies chest pain, shortness of breath, fever, chills, urinary symptoms, reports last BM today was normal. Placed on cardiac monitor, sinus tachycardia in 130s, MD Fiore immediately notified. Fall precautions maintained. Pending MD evaluation. Patient is a 30 yo female, h/x ovarian cyst, DM 1, HTN, presenting with LLQ pain, nausea and vomiting starting today. Patient AAOx4, in severe pain, retching, reports she was recently diagnosed with UTI and then today started having LLQ pain/nausea/vomiting similar to previous ruptured ovarian cyst. Patient also reports BG have been high,  in triage. Patient denies chest pain, shortness of breath, fever, chills, urinary symptoms, reports last BM today was normal. Placed on cardiac monitor, sinus tachycardia in 130s, MD Fiore immediately notified. Fall precautions maintained. Pending MD evaluation.

## 2022-04-27 NOTE — ED ADULT TRIAGE NOTE - CHIEF COMPLAINT QUOTE
pt with co lower abdominal pain that started earlier. pt states had ovarian cyst in the past feels the same. Pt co nausea. pt is a type one DM . fis 394 pt with co lower abdominal pain that started earlier. pt states had ovarian cyst in the past feels the same. Pt co nausea and vomiting pt is a type one DM . fis [phone]

## 2022-04-27 NOTE — ED PROVIDER NOTE - OBJECTIVE STATEMENT
31 oy F PMHx Type 1 DM w/ episodes of DKA, gastroparesis, concerns for opiate dependence, ectopic pregnancy x 2, s/p bilat tubal ligation, presenting for lower abdominal pain for few days, increasing in severity today in AM. Associated N/V since this afternoon. States this feels like prior ruptured cyst. pt reports visit to OBGYN for low abd pain few days ago - found to have cyst in GYN office per pt. She denies fevers, chills, vaginal discharge, or vaginal bleeding. + mild dysuria, no hematuria. Last BM today AM and normal for pt. No melena/hematochezia. LMP 2 weeks ago.

## 2022-04-27 NOTE — ED PROVIDER NOTE - NSFOLLOWUPINSTRUCTIONS_ED_ALL_ED_FT
1. You presented to the emergency department for:  abdominal pain and vomiting    2. Your evaluation in the emergency department included a physician evaluation and testing consisting of: lab work, ultrasound, and CT scan. Your work-up did not reveal any findings indicating the need for admission to the hospital or any emergent interventions at this time.     3. It is recommended that you follow-up with your primary care doctor within 1-3 days as discussed for a repeat evaluation, and potentially further testing and treatment.     If needed, to arrange an appointment with a primary care provider please call: [phone]    4. Please continue taking your regular medications as prescribed.     For pain you may take 500-1000mg Tylenol every 8 hours - as needed.  This is an over-the-counter medication - please read the instructions for use and warnings on the label. If you have any questions regarding its use, you may refer them to your local pharmacist.    5. PLEASE RETURN TO THE EMERGENCY DEPARTMENT IMMEDIATELY IF you develop any fevers not responding to over the counter medications, uncontrollable nausea and vomiting, an inability to tolerate eating and drinking, difficulty breathing, chest pain, a severe increase in your symptoms or pain, or any other new symptoms that concern you.

## 2022-04-27 NOTE — ED ADULT NURSE NOTE - ED STAT RN HANDOFF DETAILS
report given to ESS[facility]2 RN Miranda, Pt a&04 ambulatory. Respirations even and unlabored, in NAD. USIVRAC in place no redness or swelling noted. offers no complaints at this time.

## 2022-04-27 NOTE — ED PROVIDER NOTE - CLINICAL SUMMARY MEDICAL DECISION MAKING FREE TEXT BOX
31 oy F PMHx Type 1 DM w/ episodes of DKA, gastroparesis, concerns for opiate dependence, ectopic pregnancy x 2, s/p bilat tubal ligation, presenting for lower abdominal pain for few days, increasing in severity today in AM. Associated N/V since this afternoon. States this feels like prior ruptured cyst. pt reports visit to OBGYN for low abd pain few days ago - found to have cyst in GYN office per pt. She denies fevers, chills, vaginal discharge, or vaginal bleeding. + mild dysuria, no hematuria. Last BM today AM and normal for pt. No melena/hematochezia. LMP 2 weeks ago. Exam as above. Concern for ruptured cyst, less likely ectopic. Consider ovarian torsion, less likely TOA or PID. Consider UTI, kidney stone. Less likely pyelo. Consider DKA, dehydration, KERRI, electrolyte abnormality. Consider appendicitis, colitis. Labs, CT, IVF, analgesia, will reassess.

## 2022-04-27 NOTE — ED PROVIDER NOTE - NS ED ROS FT
Gen: Denies fever.   HEENT: Denies headache. Denies congestion.  CV: Denies chest pain. Denies lightheadedness.  Skin: Denies rash.   Resp: Denies SOB. Denies cough.  GI: +abd pain. +nausea. +vomiting. Denies diarrhea. Denies melena. Denies hematochezia.  Msk: Denies extremity swelling. Denies extremity pain.  : +dysuria. Denies hematuria.  Neuro: Denies LOC. Denies dizziness. Denies new numbness/tingling. Denies new focal weakness.  Psych: Denies SI

## 2022-04-28 ENCOUNTER — TRANSCRIPTION ENCOUNTER (OUTPATIENT)
Age: 32
End: 2022-04-28

## 2022-04-28 DIAGNOSIS — E10.10 TYPE 1 DIABETES MELLITUS WITH KETOACIDOSIS WITHOUT COMA: ICD-10-CM

## 2022-04-28 DIAGNOSIS — D64.9 ANEMIA, UNSPECIFIED: ICD-10-CM

## 2022-04-28 DIAGNOSIS — E78.5 HYPERLIPIDEMIA, UNSPECIFIED: ICD-10-CM

## 2022-04-28 DIAGNOSIS — E11.43 TYPE 2 DIABETES MELLITUS WITH DIABETIC AUTONOMIC (POLY)NEUROPATHY: ICD-10-CM

## 2022-04-28 DIAGNOSIS — E11.9 TYPE 2 DIABETES MELLITUS WITHOUT COMPLICATIONS: ICD-10-CM

## 2022-04-28 DIAGNOSIS — I10 ESSENTIAL (PRIMARY) HYPERTENSION: ICD-10-CM

## 2022-04-28 DIAGNOSIS — B34.9 VIRAL INFECTION, UNSPECIFIED: ICD-10-CM

## 2022-04-28 DIAGNOSIS — R10.9 UNSPECIFIED ABDOMINAL PAIN: ICD-10-CM

## 2022-04-28 DIAGNOSIS — Z29.9 ENCOUNTER FOR PROPHYLACTIC MEASURES, UNSPECIFIED: ICD-10-CM

## 2022-04-28 LAB
ANION GAP SERPL CALC-SCNC: 10 MMOL/L — SIGNIFICANT CHANGE UP (ref 7–14)
BASE EXCESS BLDV CALC-SCNC: 0.2 MMOL/L — SIGNIFICANT CHANGE UP (ref -2–3)
BLOOD GAS VENOUS COMPREHENSIVE RESULT: SIGNIFICANT CHANGE UP
BUN SERPL-MCNC: 11 MG/DL — SIGNIFICANT CHANGE UP (ref 7–23)
CALCIUM SERPL-MCNC: 8.5 MG/DL — SIGNIFICANT CHANGE UP (ref 8.4–10.5)
CHLORIDE BLDV-SCNC: 107 MMOL/L — SIGNIFICANT CHANGE UP (ref 96–108)
CHLORIDE SERPL-SCNC: 104 MMOL/L — SIGNIFICANT CHANGE UP (ref 98–107)
CO2 BLDV-SCNC: 26.7 MMOL/L — HIGH (ref 22–26)
CO2 SERPL-SCNC: 22 MMOL/L — SIGNIFICANT CHANGE UP (ref 22–31)
CREAT SERPL-MCNC: 0.72 MG/DL — SIGNIFICANT CHANGE UP (ref 0.5–1.3)
EGFR: 115 ML/MIN/1.73M2 — SIGNIFICANT CHANGE UP
GAS PNL BLDV: 137 MMOL/L — SIGNIFICANT CHANGE UP (ref 136–145)
GLUCOSE BLDV-MCNC: 89 MG/DL — SIGNIFICANT CHANGE UP (ref 70–99)
GLUCOSE SERPL-MCNC: 97 MG/DL — SIGNIFICANT CHANGE UP (ref 70–99)
HCO3 BLDV-SCNC: 25 MMOL/L — SIGNIFICANT CHANGE UP (ref 22–29)
HCT VFR BLDA CALC: 26 % — LOW (ref 34.5–46.5)
HGB BLD CALC-MCNC: 8.5 G/DL — LOW (ref 11.5–15.5)
LACTATE BLDV-MCNC: 2 MMOL/L — SIGNIFICANT CHANGE UP (ref 0.5–2)
PCO2 BLDV: 43 MMHG — HIGH (ref 39–42)
PH BLDV: 7.38 — SIGNIFICANT CHANGE UP (ref 7.32–7.43)
PO2 BLDV: 70 MMHG — SIGNIFICANT CHANGE UP
POTASSIUM BLDV-SCNC: 3.9 MMOL/L — SIGNIFICANT CHANGE UP (ref 3.5–5.1)
POTASSIUM SERPL-MCNC: 3.8 MMOL/L — SIGNIFICANT CHANGE UP (ref 3.5–5.3)
POTASSIUM SERPL-SCNC: 3.8 MMOL/L — SIGNIFICANT CHANGE UP (ref 3.5–5.3)
SAO2 % BLDV: 93 % — SIGNIFICANT CHANGE UP
SODIUM SERPL-SCNC: 136 MMOL/L — SIGNIFICANT CHANGE UP (ref 135–145)

## 2022-04-28 PROCEDURE — 99223 1ST HOSP IP/OBS HIGH 75: CPT

## 2022-04-28 PROCEDURE — 74177 CT ABD & PELVIS W/CONTRAST: CPT | Mod: 26,MA

## 2022-04-28 PROCEDURE — 71045 X-RAY EXAM CHEST 1 VIEW: CPT | Mod: 26

## 2022-04-28 PROCEDURE — 99254 IP/OBS CNSLTJ NEW/EST MOD 60: CPT | Mod: GC

## 2022-04-28 PROCEDURE — 99233 SBSQ HOSP IP/OBS HIGH 50: CPT | Mod: GC

## 2022-04-28 RX ORDER — INSULIN LISPRO 100/ML
VIAL (ML) SUBCUTANEOUS AT BEDTIME
Refills: 0 | Status: DISCONTINUED | OUTPATIENT
Start: 2022-04-28 | End: 2022-04-28

## 2022-04-28 RX ORDER — HYDROMORPHONE HYDROCHLORIDE 2 MG/ML
0.25 INJECTION INTRAMUSCULAR; INTRAVENOUS; SUBCUTANEOUS ONCE
Refills: 0 | Status: DISCONTINUED | OUTPATIENT
Start: 2022-04-28 | End: 2022-04-28

## 2022-04-28 RX ORDER — DIPHENHYDRAMINE HCL 50 MG
25 CAPSULE ORAL EVERY 6 HOURS
Refills: 0 | Status: DISCONTINUED | OUTPATIENT
Start: 2022-04-28 | End: 2022-04-28

## 2022-04-28 RX ORDER — HYDROMORPHONE HYDROCHLORIDE 2 MG/ML
0.5 INJECTION INTRAMUSCULAR; INTRAVENOUS; SUBCUTANEOUS
Refills: 0 | Status: DISCONTINUED | OUTPATIENT
Start: 2022-04-28 | End: 2022-04-28

## 2022-04-28 RX ORDER — DEXTROSE 50 % IN WATER 50 %
12.5 SYRINGE (ML) INTRAVENOUS ONCE
Refills: 0 | Status: DISCONTINUED | OUTPATIENT
Start: 2022-04-28 | End: 2022-04-28

## 2022-04-28 RX ORDER — SODIUM CHLORIDE 9 MG/ML
1000 INJECTION, SOLUTION INTRAVENOUS
Refills: 0 | Status: DISCONTINUED | OUTPATIENT
Start: 2022-04-28 | End: 2022-04-28

## 2022-04-28 RX ORDER — HEPARIN SODIUM 5000 [USP'U]/ML
5000 INJECTION INTRAVENOUS; SUBCUTANEOUS EVERY 8 HOURS
Refills: 0 | Status: DISCONTINUED | OUTPATIENT
Start: 2022-04-28 | End: 2022-04-28

## 2022-04-28 RX ORDER — DEXTROSE 50 % IN WATER 50 %
25 SYRINGE (ML) INTRAVENOUS ONCE
Refills: 0 | Status: DISCONTINUED | OUTPATIENT
Start: 2022-04-28 | End: 2022-04-28

## 2022-04-28 RX ORDER — POLYETHYLENE GLYCOL 3350 17 G/17G
17 POWDER, FOR SOLUTION ORAL DAILY
Refills: 0 | Status: DISCONTINUED | OUTPATIENT
Start: 2022-04-28 | End: 2022-04-28

## 2022-04-28 RX ORDER — INSULIN LISPRO 100/ML
VIAL (ML) SUBCUTANEOUS
Refills: 0 | Status: DISCONTINUED | OUTPATIENT
Start: 2022-04-28 | End: 2022-04-28

## 2022-04-28 RX ORDER — DIPHENHYDRAMINE HCL 50 MG
25 CAPSULE ORAL ONCE
Refills: 0 | Status: COMPLETED | OUTPATIENT
Start: 2022-04-28 | End: 2022-04-28

## 2022-04-28 RX ORDER — FAMOTIDINE 10 MG/ML
20 INJECTION INTRAVENOUS DAILY
Refills: 0 | Status: DISCONTINUED | OUTPATIENT
Start: 2022-04-28 | End: 2022-04-28

## 2022-04-28 RX ORDER — ACETAMINOPHEN 500 MG
1000 TABLET ORAL ONCE
Refills: 0 | Status: COMPLETED | OUTPATIENT
Start: 2022-04-28 | End: 2022-04-28

## 2022-04-28 RX ORDER — ACETAMINOPHEN 500 MG
650 TABLET ORAL EVERY 6 HOURS
Refills: 0 | Status: DISCONTINUED | OUTPATIENT
Start: 2022-04-28 | End: 2022-04-28

## 2022-04-28 RX ORDER — DEXTROSE 50 % IN WATER 50 %
25 SYRINGE (ML) INTRAVENOUS ONCE
Refills: 0 | Status: COMPLETED | OUTPATIENT
Start: 2022-04-28 | End: 2022-04-28

## 2022-04-28 RX ORDER — ONDANSETRON 8 MG/1
4 TABLET, FILM COATED ORAL EVERY 8 HOURS
Refills: 0 | Status: DISCONTINUED | OUTPATIENT
Start: 2022-04-28 | End: 2022-04-28

## 2022-04-28 RX ORDER — METOCLOPRAMIDE HCL 10 MG
10 TABLET ORAL EVERY 8 HOURS
Refills: 0 | Status: DISCONTINUED | OUTPATIENT
Start: 2022-04-28 | End: 2022-04-28

## 2022-04-28 RX ORDER — GLUCAGON INJECTION, SOLUTION 0.5 MG/.1ML
1 INJECTION, SOLUTION SUBCUTANEOUS ONCE
Refills: 0 | Status: DISCONTINUED | OUTPATIENT
Start: 2022-04-28 | End: 2022-04-28

## 2022-04-28 RX ORDER — INSULIN LISPRO 100/ML
8 VIAL (ML) SUBCUTANEOUS
Refills: 0 | Status: DISCONTINUED | OUTPATIENT
Start: 2022-04-28 | End: 2022-04-28

## 2022-04-28 RX ORDER — DEXTROSE 50 % IN WATER 50 %
15 SYRINGE (ML) INTRAVENOUS ONCE
Refills: 0 | Status: DISCONTINUED | OUTPATIENT
Start: 2022-04-28 | End: 2022-04-28

## 2022-04-28 RX ORDER — METOCLOPRAMIDE HCL 10 MG
10 TABLET ORAL ONCE
Refills: 0 | Status: COMPLETED | OUTPATIENT
Start: 2022-04-28 | End: 2022-04-28

## 2022-04-28 RX ADMIN — Medication 10 MILLIGRAM(S): at 02:46

## 2022-04-28 RX ADMIN — SODIUM CHLORIDE 125 MILLILITER(S): 9 INJECTION, SOLUTION INTRAVENOUS at 03:14

## 2022-04-28 RX ADMIN — Medication 5 MILLIGRAM(S): at 14:14

## 2022-04-28 RX ADMIN — Medication 25 MILLIGRAM(S): at 09:25

## 2022-04-28 RX ADMIN — HYDROMORPHONE HYDROCHLORIDE 0.25 MILLIGRAM(S): 2 INJECTION INTRAMUSCULAR; INTRAVENOUS; SUBCUTANEOUS at 14:15

## 2022-04-28 RX ADMIN — HYDROMORPHONE HYDROCHLORIDE 0.25 MILLIGRAM(S): 2 INJECTION INTRAMUSCULAR; INTRAVENOUS; SUBCUTANEOUS at 15:03

## 2022-04-28 RX ADMIN — HYDROMORPHONE HYDROCHLORIDE 0.25 MILLIGRAM(S): 2 INJECTION INTRAMUSCULAR; INTRAVENOUS; SUBCUTANEOUS at 04:42

## 2022-04-28 RX ADMIN — Medication 400 MILLIGRAM(S): at 02:46

## 2022-04-28 RX ADMIN — ONDANSETRON 4 MILLIGRAM(S): 8 TABLET, FILM COATED ORAL at 09:25

## 2022-04-28 RX ADMIN — Medication 25 MILLIGRAM(S): at 02:46

## 2022-04-28 RX ADMIN — HYDROMORPHONE HYDROCHLORIDE 0.5 MILLIGRAM(S): 2 INJECTION INTRAMUSCULAR; INTRAVENOUS; SUBCUTANEOUS at 09:25

## 2022-04-28 RX ADMIN — SODIUM CHLORIDE 1000 MILLILITER(S): 9 INJECTION, SOLUTION INTRAVENOUS at 00:02

## 2022-04-28 RX ADMIN — HYDROMORPHONE HYDROCHLORIDE 0.5 MILLIGRAM(S): 2 INJECTION INTRAMUSCULAR; INTRAVENOUS; SUBCUTANEOUS at 10:00

## 2022-04-28 NOTE — H&P ADULT - PROBLEM SELECTOR PLAN 1
Abd pain, nausea, vomiting probably due to recurrent diabetic gastroparesis (Ectopic /ruptured ovarian cyst ruled out)  Pain regimen: tylenol, opiates  Antiemetics: zofran, reglan  GI prophylaxis: PPI- pepcid  Bowel regimen: Miralax  TV Ultrasound: Uterus: 5.4 cm x 3.0 cm x 3.9 cm. Within normal limits. Endometrium: 2 mm. Within normal limits. Right ovary: 2.5 cm x 1.7 cm x 1.9 cm. Within normal limits. Normal  arterial and venous waveforms.  Left ovary: 2.5 cm x 1.4 cm x 1.6 cm. Within normal limits. Normal  arterial and venous waveforms.  CT abd pelvis: Circumferential bladder wall thickening. Correlate with urinalysis to exclude cystitis.  UA:+ glucose; no bacteria  Urine culture- P; GC- Chlmaydia - P  Clear liquid diet- advance as tolerated  Case to be discussed with hospitalist Abd pain, nausea, vomiting probably due to recurrent diabetic gastroparesis (Ectopic /ruptured ovarian cyst ruled out)  Pain regimen: Tylenol, opiates  Antiemetics: Zofran, reglan  GI prophylaxis: PPI- pepcid  Bowel regimen: Miralax  TV Ultrasound: Uterus: 5.4 cm x 3.0 cm x 3.9 cm. Within normal limits. Endometrium: 2 mm. Within normal limits. Right ovary: 2.5 cm x 1.7 cm x 1.9 cm. Within normal limits. Normal  arterial and venous waveforms.  Left ovary: 2.5 cm x 1.4 cm x 1.6 cm. Within normal limits. Normal  arterial and venous waveforms.  CT abd pelvis: Circumferential bladder wall thickening. Correlate with urinalysis to exclude cystitis.  UA:+ glucose; no bacteria  Urine culture- P; GC- Chlmaydia - P  Clear liquid diet- advance as tolerated  Case to be discussed with hospitalist presents with abdominal pain, n/v  - CTAP: circumferential bladder wall thickening, UA negative, patient without urinary sx  - TVUS: no acute findings  - Reglan PRN before meals (QTc 420ms)  - pain control: encourage Tylenol PRN, c/f opioid seeking behavior  - clear liquid diet, advance as tolerated

## 2022-04-28 NOTE — DISCHARGE NOTE NURSING/CASE MANAGEMENT/SOCIAL WORK - NSDCPEFALRISK_GEN_ALL_CORE
For information on Fall & Injury Prevention, visit: [email or web address] OR  [email or web address] OR  https://www.cdc.gov/steadi/patient.html

## 2022-04-28 NOTE — H&P ADULT - NS ATTEND AMEND GEN_ALL_CORE FT
31F with hx of T1DM (on insulin) c/b DKA and gastroparesis, c/f opiate seeking behavior, ectopic pregnancy x2, s/p bilateral ligation, presenting with abdominal pain, n/v likely due to gastroparesis. VSS. Labs reviewed. Imaging as above. FS improved, anion gap closed, ruled out for DKA. Per endo resume home basal/bolus regimen - f/u further recs. Reglan PRN before meals, advance diet as tolerated.

## 2022-04-28 NOTE — CONSULT NOTE ADULT - SUBJECTIVE AND OBJECTIVE BOX
HPI:  31 oy F PMHx HTN Type 1 DM w/ episodes of DKA, diabetic gastroparesis, concerns for opiate dependence, ectopic pregnancy x 2, s/p bilat tubal ligation, presenting for lower abdominal pain for 2 days, increasing in severity today in AM. Associated N/V since this afternoon. Abd pain LLQ started suddenly 6-7/10 non radiating intermittent since onset/crampy relieved with pain medications without any  other alleviating or aggravating factors. States "this feels like prior ruptured cyst." Pt reports visit to OBGYN for low abd pain few days ago - and found to have ovarian cyst in GYN office per pt. She denies fevers, chills, vaginal discharge, or vaginal bleeding. + mild dysuria, no hematuria. + Mild rhinorrhea x 2 days ago.  Last BM today AM and normal for pt. No melena/hematochezia. LMP 2 weeks ago. NO chest pain, SOB at rest, CELESTE, PND, orthopnea, palpitations, diaphoresis, lightheadedness, dizziness, syncope, increased lower extremity edema, fever chills, malaise, myalgias, anorexia, weight changes ( loss or gain), night sweats, generalized fatigue, abdominal pain, C/D BRBPR, melena, cough, and wheezing.   (28 Apr 2022 08:05)      PAST MEDICAL & SURGICAL HISTORY:  Diabetes mellitus type 1    Gastroparesis due to DM    Colitis    Hypertension    Ectopic pregnancy  2009 and 2013, s/p removal of right and left fallopian tubes    History of cholecystectomy        FAMILY HISTORY:  Family history of type 1 diabetes mellitus (Grandparent)    FH: HTN (hypertension) (Father)    FHx: asthma  Brother        Social History:    Outpatient Medications:    MEDICATIONS  (STANDING):  dextrose 50% Injectable 25 Gram(s) IV Push once  dextrose 50% Injectable 12.5 Gram(s) IV Push once  dextrose 50% Injectable 25 Gram(s) IV Push once  enalapril 5 milliGRAM(s) Oral daily  famotidine    Tablet 20 milliGRAM(s) Oral daily  heparin   Injectable 5000 Unit(s) SubCutaneous every 8 hours  HYDROmorphone  Injectable 0.25 milliGRAM(s) IV Push once  insulin glargine Injectable (LANTUS) 18 Unit(s) SubCutaneous at bedtime  insulin lispro (ADMELOG) corrective regimen sliding scale   SubCutaneous three times a day before meals  insulin lispro (ADMELOG) corrective regimen sliding scale   SubCutaneous at bedtime  insulin lispro Injectable (ADMELOG) 8 Unit(s) SubCutaneous three times a day before meals  polyethylene glycol 3350 17 Gram(s) Oral daily    MEDICATIONS  (PRN):  acetaminophen     Tablet .. 650 milliGRAM(s) Oral every 6 hours PRN Temp greater or equal to 38C (100.4F), Mild Pain (1 - 3)  aluminum hydroxide/magnesium hydroxide/simethicone Suspension 30 milliLiter(s) Oral every 4 hours PRN Dyspepsia  dextrose Oral Gel 15 Gram(s) Oral once PRN Blood Glucose LESS THAN 70 milliGRAM(s)/deciliter  diphenhydrAMINE Injectable 25 milliGRAM(s) IV Push every 6 hours PRN Rash and/or Itching  metoclopramide Injectable 10 milliGRAM(s) IV Push every 8 hours PRN nausea/vomiting      Allergies    Toradol (Pruritus)    Intolerances      Review of Systems:  Constitutional: No fever  Eyes: No blurry vision  Neuro: No tremors  HEENT: No pain  Cardiovascular: No chest pain, palpitations  Respiratory: No SOB, no cough  GI: No nausea, vomiting, abdominal pain  : No dysuria  Skin: no rash  Psych: no depression  Endocrine: no polyuria, polydipsia  Hem/lymph: no swelling  Osteoporosis: no fractures    ALL OTHER SYSTEMS REVIEWED AND NEGATIVE    UNABLE TO OBTAIN    PHYSICAL EXAM:  VITALS: T(C): 36.7 (04-28-22 @ 09:06)  T(F): 98 (04-28-22 @ 09:06), Max: 99.3 (04-27-22 @ 19:58)  HR: 82 (04-28-22 @ 09:06) (82 - 134)  BP: 135/87 (04-28-22 @ 09:06) (110/58 - 175/93)  RR:  (18 - 20)  SpO2:  (98% - 100%)  Wt(kg): --  GENERAL: NAD, well-groomed, well-developed  EYES: No proptosis, no lid lag, anicteric  HEENT:  Atraumatic, Normocephalic, moist mucous membranes  THYROID: Normal size, no palpable nodules  RESPIRATORY: Clear to auscultation bilaterally; No rales, rhonchi, wheezing  CARDIOVASCULAR: Regular rate and rhythm; No murmurs; no peripheral edema  GI: Soft, nontender, non distended, normal bowel sounds  SKIN: Dry, intact, No rashes or lesions  MUSCULOSKELETAL: Full range of motion, normal strength  NEURO: sensation intact, extraocular movements intact, no tremor  PSYCH: Alert and oriented x 3, normal affect, normal mood  CUSHING'S SIGNS: no striae    POCT Blood Glucose.: 125 mg/dL (04-28-22 @ 12:34)  POCT Blood Glucose.: 142 mg/dL (04-28-22 @ 09:22)  POCT Blood Glucose.: 153 mg/dL (04-28-22 @ 04:37)  POCT Blood Glucose.: 122 mg/dL (04-28-22 @ 03:25)  POCT Blood Glucose.: 39 mg/dL (04-28-22 @ 03:00)  POCT Blood Glucose.: 40 mg/dL (04-28-22 @ 02:58)  POCT Blood Glucose.: 98 mg/dL (04-28-22 @ 01:47)  POCT Blood Glucose.: 316 mg/dL (04-27-22 @ 23:57)  POCT Blood Glucose.: 487 mg/dL (04-27-22 @ 20:38)  POCT Blood Glucose.: 394 mg/dL (04-27-22 @ 19:26)                            9.5    9.29  )-----------( 390      ( 27 Apr 2022 21:04 )             31.1       04-28    136  |  104  |  11  ----------------------------<  97  3.8   |  22  |  0.72    EGFR if : x   EGFR if non : x     Ca    8.5      04-28  Mg     1.80     04-27    TPro  6.8  /  Alb  3.4  /  TBili  0.4  /  DBili  x   /  AST  47<H>  /  ALT  14  /  AlkPhos  87  04-27      Thyroid Function Tests:  04-28 @ 02:25 TSH 1.03 FreeT4 -- T3 -- Anti TPO -- Anti Thyroglobulin Ab -- TSI --          02-17 Chol 226<H> Direct LDL -- LDL calculated 114<H>  Trig 61      Radiology:              ENDOCRINE INITIAL CONSULT - DM1  HPI:  31 oy F PMHx HTN Type 1 DM w/ episodes of DKA, diabetic gastroparesis, concerns for opiate dependence, ectopic pregnancy x 2, s/p bilat tubal ligation, presenting for lower abdominal pain for 2 days, increasing in severity today in AM. Associated N/V since this afternoon. Abd pain LLQ started suddenly 6-7/10 non radiating intermittent since onset/crampy relieved with pain medications without any  other alleviating or aggravating factors. States "this feels like prior ruptured cyst." Pt reports visit to OBGYN for low abd pain few days ago - and found to have ovarian cyst in GYN office per pt. She denies fevers, chills, vaginal discharge, or vaginal bleeding. + mild dysuria, no hematuria. + Mild rhinorrhea x 2 days ago.  Last BM today AM and normal for pt. No melena/hematochezia. LMP 2 weeks ago. NO chest pain, SOB at rest, CELESTE, PND, orthopnea, palpitations, diaphoresis, lightheadedness, dizziness, syncope, increased lower extremity edema, fever chills, malaise, myalgias, anorexia, weight changes ( loss or gain), night sweats, generalized fatigue, abdominal pain, C/D BRBPR, melena, cough, and wheezing.   (28 Apr 2022 08:05)    ENDOCRINE HISTORY   Dx at age 12, Follows w/ Dr. Bose - last seen 12/2021  Home regimen: Basaglar 18, Admelog 8-10units on sliding scale   Recently has been checking FS, variable 70s to 240s but patient attributes this to limited diet & nonadherence w/ dm diet when she does eat due to her gastroparesis/GI symptoms.   Used to use Dexcom but waiting for refills & has an appointment w/ Endocrine NP in the next week or 2 per patient to be initiated on Omnipod pump along w/ the Dexcom CGM.   Has not gone to see a GI doctor although patient was previously referred   +Gastroparesis, +nephropathy  Reports intermittent compliance w/ enalapril   Not on statin.     Has had many admissions for dka/gastroparesis symptoms. Last admitted in February.   Labs initially concerning for dka although no acidosis followed by hypoglycemia after patient received short acting insulin but did not eat.     PAST MEDICAL & SURGICAL HISTORY:  Diabetes mellitus type 1    Gastroparesis due to DM    Colitis    Hypertension    Ectopic pregnancy  2009 and 2013, s/p removal of right and left fallopian tubes    History of cholecystectomy        FAMILY HISTORY:  Family history of type 1 diabetes mellitus (Grandparent)    FH: HTN (hypertension) (Father)    FHx: asthma  Brother        Social History:    Outpatient Medications:    MEDICATIONS  (STANDING):  dextrose 50% Injectable 25 Gram(s) IV Push once  dextrose 50% Injectable 12.5 Gram(s) IV Push once  dextrose 50% Injectable 25 Gram(s) IV Push once  enalapril 5 milliGRAM(s) Oral daily  famotidine    Tablet 20 milliGRAM(s) Oral daily  heparin   Injectable 5000 Unit(s) SubCutaneous every 8 hours  HYDROmorphone  Injectable 0.25 milliGRAM(s) IV Push once  insulin glargine Injectable (LANTUS) 18 Unit(s) SubCutaneous at bedtime  insulin lispro (ADMELOG) corrective regimen sliding scale   SubCutaneous three times a day before meals  insulin lispro (ADMELOG) corrective regimen sliding scale   SubCutaneous at bedtime  insulin lispro Injectable (ADMELOG) 8 Unit(s) SubCutaneous three times a day before meals  polyethylene glycol 3350 17 Gram(s) Oral daily    MEDICATIONS  (PRN):  acetaminophen     Tablet .. 650 milliGRAM(s) Oral every 6 hours PRN Temp greater or equal to 38C (100.4F), Mild Pain (1 - 3)  aluminum hydroxide/magnesium hydroxide/simethicone Suspension 30 milliLiter(s) Oral every 4 hours PRN Dyspepsia  dextrose Oral Gel 15 Gram(s) Oral once PRN Blood Glucose LESS THAN 70 milliGRAM(s)/deciliter  diphenhydrAMINE Injectable 25 milliGRAM(s) IV Push every 6 hours PRN Rash and/or Itching  metoclopramide Injectable 10 milliGRAM(s) IV Push every 8 hours PRN nausea/vomiting      Allergies    Toradol (Pruritus)    Intolerances      Review of Systems:  Constitutional: No fever  Eyes: No blurry vision  Neuro: No tremors  HEENT: No pain  Cardiovascular: No chest pain, palpitations  Respiratory: No SOB, no cough  GI: No nausea, vomiting, abdominal pain  : No dysuria  Skin: no rash  Psych: no depression  Endocrine: no polyuria, polydipsia  Hem/lymph: no swelling  Osteoporosis: no fractures  ALL OTHER SYSTEMS REVIEWED AND NEGATIVE    PHYSICAL EXAM:  VITALS: T(C): 36.7 (04-28-22 @ 09:06)  T(F): 98 (04-28-22 @ 09:06), Max: 99.3 (04-27-22 @ 19:58)  HR: 82 (04-28-22 @ 09:06) (82 - 134)  BP: 135/87 (04-28-22 @ 09:06) (110/58 - 175/93)  RR:  (18 - 20)  SpO2:  (98% - 100%)  Wt(kg): --  GENERAL: NAD, well-groomed, well-developed  EYES: No proptosis, no lid lag, anicteric  HEENT:  Atraumatic, Normocephalic, moist mucous membranes  THYROID: Normal size, no palpable nodules  RESPIRATORY: Clear to auscultation bilaterally; No rales, rhonchi, wheezing  CARDIOVASCULAR: Regular rate and rhythm; No murmurs; no peripheral edema  GI: Soft, nontender, non distended, normal bowel sounds  SKIN: Dry, intact, No rashes or lesions  MUSCULOSKELETAL: Full range of motion, normal strength  NEURO: sensation intact, extraocular movements intact, no tremor  PSYCH: Alert and oriented x 3, normal affect, normal mood  CUSHING'S SIGNS: no striae    POCT Blood Glucose.: 125 mg/dL (04-28-22 @ 12:34)  POCT Blood Glucose.: 142 mg/dL (04-28-22 @ 09:22)  POCT Blood Glucose.: 153 mg/dL (04-28-22 @ 04:37)  POCT Blood Glucose.: 122 mg/dL (04-28-22 @ 03:25)  POCT Blood Glucose.: 39 mg/dL (04-28-22 @ 03:00)  POCT Blood Glucose.: 40 mg/dL (04-28-22 @ 02:58)  POCT Blood Glucose.: 98 mg/dL (04-28-22 @ 01:47)  POCT Blood Glucose.: 316 mg/dL (04-27-22 @ 23:57)  POCT Blood Glucose.: 487 mg/dL (04-27-22 @ 20:38)  POCT Blood Glucose.: 394 mg/dL (04-27-22 @ 19:26)                            9.5    9.29  )-----------( 390      ( 27 Apr 2022 21:04 )             31.1       04-28    136  |  104  |  11  ----------------------------<  97  3.8   |  22  |  0.72    EGFR if : x   EGFR if non : x     Ca    8.5      04-28  Mg     1.80     04-27    TPro  6.8  /  Alb  3.4  /  TBili  0.4  /  DBili  x   /  AST  47<H>  /  ALT  14  /  AlkPhos  87  04-27      Thyroid Function Tests:  04-28 @ 02:25 TSH 1.03 FreeT4 -- T3 -- Anti TPO -- Anti Thyroglobulin Ab -- TSI --          02-17 Chol 226<H> Direct LDL -- LDL calculated 114<H>  Trig 61      Radiology:

## 2022-04-28 NOTE — ED ADULT NURSE REASSESSMENT NOTE - CONDITION
Pt. c/o being itchy. IV Benadryl 25 mg given as ordered. c/o pain. Administered IV tylenol. c/o nausea. Administered IV Reglan. c/o being shaky. FS 40, repeated FS 39. Refused amp of D50. Gave 16 ounces of orange with 4 sugar packets. Feels better. Will repeat FS. NAD noted.

## 2022-04-28 NOTE — H&P ADULT - HISTORY OF PRESENT ILLNESS
31 oy F PMHx HTN Type 1 DM w/ episodes of DKA, diabetic gastroparesis, concerns for opiate dependence, ectopic pregnancy x 2, s/p bilat tubal ligation, presenting for lower abdominal pain for 2 days, increasing in severity today in AM. Associated N/V since this afternoon. Abd pain LLQ started suddenly 6-7/10 non radiating intermittent since onset/crampy relieved with pain medications without any  other alleviating or aggravating factors. States "this feels like prior ruptured cyst." Pt reports visit to OBGYN for low abd pain few days ago - and found to have ovarian cyst in GYN office per pt. She denies fevers, chills, vaginal discharge, or vaginal bleeding. + mild dysuria, no hematuria. + Mild rhinorrhea x 2 days ago.  Last BM today AM and normal for pt. No melena/hematochezia. LMP 2 weeks ago. NO chest pain, SOB at rest, CELESTE, PND, orthopnea, palpitations, diaphoresis, lightheadedness, dizziness, syncope, increased lower extremity edema, fever chills, malaise, myalgias, anorexia, weight changes ( loss or gain), night sweats, generalized fatigue, abdominal pain, C/D BRBPR, melena, cough, and wheezing.

## 2022-04-28 NOTE — H&P ADULT - NEGATIVE NEUROLOGICAL SYMPTOMS
no transient paralysis/no weakness/no paresthesias/no generalized seizures/no focal seizures/no difficulty walking/no headache/no loss of consciousness/no hemiparesis/no confusion/no facial palsy

## 2022-04-28 NOTE — DISCHARGE NOTE PROVIDER - HOSPITAL COURSE
31 oy F PMHx HTN Type 1 DM w/ episodes of DKA, gastroparesis, concerns for opiate dependence, ectopic pregnancy x 2, s/p bilat tubal ligation, presenting for lower abdominal pain for few days, increasing in severity today in AM. Associated N/V since this afternoon. States this feels like prior ruptured cyst. pt reports visit to OBGYN for low abd pain few days ago - found to have cyst in GYN office per pt. She denies fevers, chills, vaginal discharge, or vaginal bleeding. + mild dysuria, no hematuria. Last BM today AM and normal for pt. No melena/hematochezia. LMP 2 weeks ago.    +Diabetic gastroparesis on reglan  +Hyperglycemia then hypoglycemia. Glucose level now normal stable      Patient wants to sign our AMA. patient able to tolerate PO and says that abdominal pain, nausea, vomiting has resolved. Patient told that she should stay overnight for monitoring of glucose and GI symptoms. Patient told of risks including but not limited to syncope, heart attack, stroke, DKA, hypoglycemia, and sudden death. Patient has capacity to make decisions and is competent. Patient wants to leave anyway. Wintessed by ROXANA Green. Case discussed with attending DR Lopez. 31 oy F PMHx HTN Type 1 DM w/ episodes of DKA, gastroparesis, concerns for opiate dependence, ectopic pregnancy x 2, s/p bilat tubal ligation, presenting for lower abdominal pain for few days, increasing in severity today in AM. Associated N/V since this afternoon. States this feels like prior ruptured cyst. pt reports visit to OBGYN for low abd pain few days ago - found to have cyst in GYN office per pt. She denies fevers, chills, vaginal discharge, or vaginal bleeding. + mild dysuria, no hematuria. Last BM today AM and normal for pt. No melena/hematochezia. LMP 2 weeks ago.    +Diabetic gastroparesis on reglan  +Hyperglycemia then hypoglycemia. Glucose level now normal stable      Patient wants to sign our AMA. patient able to tolerate PO and says that abdominal pain, nausea, vomiting has resolved. Patient told that she should stay overnight for monitoring of glucose and GI symptoms. Patient told of risks including but not limited to syncope, heart attack, stroke, DKA, hypoglycemia, and sudden death. Patient has capacity to make decisions and is competent. Patient expressed understanding and wants to leave anyway. Wintessed by ROXANA Green. Case discussed with attending DR Lopez.

## 2022-04-28 NOTE — H&P ADULT - NEGATIVE GASTROINTESTINAL SYMPTOMS
no diarrhea/no constipation/no change in bowel habits/no flatulence/no melena/no hematochezia/no steatorrhea/no jaundice/no hiccoughs

## 2022-04-28 NOTE — H&P ADULT - GASTROINTESTINAL
Medication:   Requested Prescriptions     Pending Prescriptions Disp Refills    glimepiride (AMARYL) 4 MG tablet [Pharmacy Med Name: GLIMEPIRIDE 4 MG TABLET] 60 tablet 0     Sig: TAKE 1 TABLET BY MOUTH TWICE A DAY       Last Filled:  4/23/2019    Patient Phone Number: [phone] (home) [phone] (work)    Last appt: 4/30/2019   Next appt: 8/1/2019    Last Labs DM:   Lab Results   Component Value Date    LABA1C 7.8 04/30/2019     Last Lipid:   Lab Results   Component Value Date    CHOL 159 12/15/2018    TRIG 160 12/15/2018    HDL 32 12/15/2018    [address] 95 12/15/2018     Last PSA: No results found for: PSA  Last Thyroid:   Lab Results   Component Value Date    TSH 0.78 11/27/2017    T4FREE 1.1 08/15/2016 detailed exam details…

## 2022-04-28 NOTE — H&P ADULT - PROBLEM SELECTOR PLAN 2
Hyperglycemia ( LU=600) with anion gap/ normal ph; BhB= 1.8 rule out DKA; now hypoglycemia  Restarted lantus immediately  Endocrine consult  IVF ( s/p 2 L lactated ringer) now on D5 NS @ 125 cc/hr;   Anion gap resolved;   Lactate 2.4--> 2.0 lactate improved  Ph normal   Case discussed with endocrine team - ok to restart diet as tolerated and home insulin regimen with sliding scale coverage Hyperglycemia ( MO=517) with anion gap/ normal ph; BhB= 1.8 rule out DKA; now hypoglycemia  Restarted lantus immediately  Endocrine consult  IVF ( s/p 2 L lactated ringer) now on D5 NS @ 125 cc/hr;   Anion gap resolved;   Lactate 2.4--> 2.0 lactate improved  Ph normal   Case discussed with endocrine team as patient was hypoglyemic in ED after receving both lantus and admelog- ok to restart diet as tolerated and home insulin regimen with sliding scale coverage as per endocrine. Official consult to follow later. Brittle FS  - FS elevated on admission, now improved, anion gap closed r/o'd for DKA  - discussed with endocrinology: resume home insulin regimen   - c/w Lantus 18U qhs + Admelog 8UTID qac + FS/SSI qac and hs  - follow up further endocrine recommendations

## 2022-04-28 NOTE — CONSULT NOTE ADULT - ATTENDING COMMENTS
Type 1 DM complicated by gastroparesis, glucose fluctuations. Agree with plan as outlined.  Planning to transition to pump therapy as outpatient.    Shanna Hathaway MD  Division of Endocrinology  Pager: [phone]    If after 6PM or before 9AM, or on weekends/holidays, please call endocrine answering service for assistance ([phone]).  For nonurgent matters email [email or web address] for assistance.

## 2022-04-28 NOTE — H&P ADULT - PROBLEM SELECTOR PLAN 4
Mycrocytic hypochromic anemia  H/H stable Microcytic hypochromic anemia  H/H stable Hb 9.5 (baseline ~9)  - microcytic  - check iron studies  - trend CBC

## 2022-04-28 NOTE — DISCHARGE NOTE PROVIDER - NSDCFUSCHEDAPPT_GEN_ALL_CORE_FT
Kathe Cobb Physician [facility]  Med Endocr [address] [facility]  Scheduled Appointment: 05/13/2022

## 2022-04-28 NOTE — DISCHARGE NOTE PROVIDER - CARE PROVIDER_API CALL
Chrissy Bose (DO)  EndocrinologyMetabDiabetes; Internal Medicine  [address]  [place] [address]  Phone: [phone]  Fax: [phone]  Follow Up Time:

## 2022-04-28 NOTE — DISCHARGE NOTE PROVIDER - NSDCCPCAREPLAN_GEN_ALL_CORE_FT
PRINCIPAL DISCHARGE DIAGNOSIS  Diagnosis: Gastroparesis due to DM  Assessment and Plan of Treatment: Follow up with your primary care doctor in one week  Continue reglan, and zofran and pepcid      SECONDARY DISCHARGE DIAGNOSES  Diagnosis: Viral syndrome  Assessment and Plan of Treatment: Please drink plenty of fluids and take tylenol and cold medicine as needed,    Diagnosis: Diabetes mellitus  Assessment and Plan of Treatment: 1800 calorie diabetic diet/ low salt, low fat , low cholesterol   Insulin ana and admelog  Follow up with your endocrinologist DR Bose tomorrow as outpatient    Diagnosis: Hypertension  Assessment and Plan of Treatment: low salt, low fat, low cholesterol  Continuye enalapril

## 2022-04-28 NOTE — DISCHARGE NOTE NURSING/CASE MANAGEMENT/SOCIAL WORK - PATIENT PORTAL LINK FT
You can access the FollowMyHealth Patient Portal offered by [facility] by registering at the following website: [email or web address]. By joining [facility]’s FollowMyHealth portal, you will also be able to view your health information using other applications (apps) compatible with our system.

## 2022-04-28 NOTE — H&P ADULT - PROBLEM SELECTOR PLAN 5
+ Coronovirus positive ( NL63)  CXR: P  EKG: SR @ 135bpm Q V1- V3  IVF supportive care RVP +NL63 Coronavirus  - CXR clear lungs, oxygen sats stable  - no respiratory symptoms  - continue to monitor, supportive care

## 2022-04-28 NOTE — H&P ADULT - PROBLEM SELECTOR PLAN 3
low salt, low fat, low cholesterol   Continue enalapril Stable  - c/w home enalapril  - monitor vital signs

## 2022-04-28 NOTE — CONSULT NOTE ADULT - ASSESSMENT
DM Type 1, uncontrolled w/ hypoglycemia   A1C 10.4% (04.27.22 @ 21:04)  Recommendations:   - Lantus SC QHS   - Admelog SC Premeal/TIDAC   - Admelog Correction Scale Premeal & Correction Scale Bedtime   - Goal -180  - Blood glucose monitoring Premeal/Bedtime   - Hypoglycemia protocol   - Carb Consistent Diet   - Nutrition consult   - Provider to RN     HTN  Recommendations:   - BP goal < 130/80   - on Enlapril 5mg daily   - defer to primary team     HLD    Recommendations:   - LDL goal < 70   - defer to primary team   - consider addition of statin if no other contraindications     Gastroparesis   - currently on reglan   - monitor QTc   - better glycemic control advised   - outpatient GI evaluation     Discussed w/ Dr. Potts      31F with hx of T1DM (on insulin) c/b DKA and gastroparesis, c/f opiate seeking behavior, ectopic pregnancy x2, s/p bilateral ligation, presenting with abdominal pain, n/v likely due to gastroparesis. Endocrine consulted for management of uncontrolled dm1 w/ hyper and hypoglycemia. Initially labs w/ hyperglycemia & ketosis although no acidosis, followed by hypoglycemia after administration of short acting insulin while patient was not eating.     DM Type 1, uncontrolled w/ complications   A1C 10.4% (04.27.22 @ 21:04)  Recommendations:   - Lantus 18units SC QHS   - Admelog 8units SC Premeal/TIDAC   - Admelog Low Correction Scale Premeal & Low Correction Scale Bedtime   - Goal -180  - Blood glucose monitoring Premeal/Bedtime   - Hypoglycemia protocol   - Carb Consistent Diet   DC planning: Patient can continue to follow up at [address] [place] [address]. Continue on same regimen noted above. Planned to be initiated on insulin pump along w/ CGM as outpatient per patient.     HTN  Recommendations:   - BP goal < 130/80   - on Enlapril 5mg daily   - defer to primary team     HLD    Recommendations:   - LDL goal < 70   - defer to primary team   - consider addition of statin if no other contraindications     Gastroparesis   - currently on reglan   - monitor QTc   - better glycemic control advised   - outpatient GI evaluation     recs discussed w/ ED ACP prior to patient's discharge     Discussed w/ Dr. Delroy Elliott DO   Endocrinology Fellow   Can be reached on [employer] Teams  Please call [phone] after hours and on weekends/holidays.  31F with hx of T1DM (on insulin) c/b DKA and gastroparesis, c/f opiate seeking behavior, ectopic pregnancy x2, s/p bilateral ligation, presenting with abdominal pain, n/v likely due to gastroparesis. Endocrine consulted for management of uncontrolled dm1 w/ hyper and hypoglycemia. Initially labs w/ hyperglycemia & ketosis although no acidosis, followed by hypoglycemia after administration of short acting insulin while patient was not eating.     DM Type 1, uncontrolled w/ complications   A1C 10.4% (04.27.22 @ 21:04)  Recommendations:   - Lantus 18units SC QHS   - Admelog 8units SC Premeal/TIDAC   - Admelog Low Correction Scale Premeal & Low Correction Scale Bedtime   - Goal -180  - Blood glucose monitoring Premeal/Bedtime   - Hypoglycemia protocol   - Carb Consistent Diet   DC planning: Patient can continue to follow up at [address] [place] [address]. Continue on same regimen noted above. Planned to be initiated on insulin pump along w/ CGM as outpatient per patient.     HTN  Recommendations:   - BP goal < 130/80   - on Enlapril 5mg daily   - defer to primary team     HLD    Recommendations:   - LDL goal < 70   - defer to primary team   - consider addition of statin if no other contraindications     Gastroparesis   - currently on reglan   - monitor QTc   - better glycemic control advised   - outpatient GI evaluation     recs discussed w/ primary team ACP prior to patient's discharge     Discussed w/ Dr. Delroy Elliott DO   Endocrinology Fellow   Can be reached on [employer] Teams  Please call [phone] after hours and on weekends/holidays.

## 2022-04-28 NOTE — DISCHARGE NOTE PROVIDER - NSDCMRMEDTOKEN_GEN_ALL_CORE_FT
acetaminophen 325 mg oral tablet: 2 tab(s) orally every 8 hours, As Needed  Admelog SoloStar 100 units/mL injectable solution: 8 unit(s) injectable 3 times a day  Basaglar KwikPen 100 units/mL subcutaneous solution: 18 unit(s) subcutaneous once a day (at bedtime)  enalapril 5 mg oral tablet: 1 tab(s) orally once a day, As Needed  famotidine 20 mg oral tablet: 1 tab(s) orally once a day  metoclopramide 10 mg oral tablet: 1 tab(s) orally 4 times a day (before meals and at bedtime), As Needed  ondansetron 4 mg oral tablet, disintegratin tab(s) orally every 8 hours   polyethylene glycol 3350 oral powder for reconstitution: 17 gram(s) orally once a day

## 2022-04-29 ENCOUNTER — INPATIENT (INPATIENT)
Facility: HOSPITAL | Age: 32
LOS: 0 days | Discharge: HOME | End: 2022-04-30
Attending: STUDENT IN AN ORGANIZED HEALTH CARE EDUCATION/TRAINING PROGRAM | Admitting: STUDENT IN AN ORGANIZED HEALTH CARE EDUCATION/TRAINING PROGRAM
Payer: MEDICAID

## 2022-04-29 VITALS
DIASTOLIC BLOOD PRESSURE: 86 MMHG | OXYGEN SATURATION: 98 % | HEART RATE: 74 BPM | SYSTOLIC BLOOD PRESSURE: 127 MMHG | RESPIRATION RATE: 18 BRPM | TEMPERATURE: 98 F

## 2022-04-29 VITALS
OXYGEN SATURATION: 96 % | SYSTOLIC BLOOD PRESSURE: 127 MMHG | TEMPERATURE: 99 F | HEART RATE: 100 BPM | RESPIRATION RATE: 18 BRPM | DIASTOLIC BLOOD PRESSURE: 78 MMHG | HEIGHT: 66 IN

## 2022-04-29 DIAGNOSIS — Z90.49 ACQUIRED ABSENCE OF OTHER SPECIFIED PARTS OF DIGESTIVE TRACT: Chronic | ICD-10-CM

## 2022-04-29 DIAGNOSIS — I10 ESSENTIAL (PRIMARY) HYPERTENSION: ICD-10-CM

## 2022-04-29 DIAGNOSIS — E10.65 TYPE 1 DIABETES MELLITUS WITH HYPERGLYCEMIA: ICD-10-CM

## 2022-04-29 DIAGNOSIS — O00.9 ECTOPIC PREGNANCY, UNSPECIFIED: Chronic | ICD-10-CM

## 2022-04-29 DIAGNOSIS — E78.5 HYPERLIPIDEMIA, UNSPECIFIED: ICD-10-CM

## 2022-04-29 DIAGNOSIS — E11.43 TYPE 2 DIABETES MELLITUS WITH DIABETIC AUTONOMIC (POLY)NEUROPATHY: ICD-10-CM

## 2022-04-29 DIAGNOSIS — R10.9 UNSPECIFIED ABDOMINAL PAIN: ICD-10-CM

## 2022-04-29 LAB
ALBUMIN SERPL ELPH-MCNC: 3.3 G/DL — SIGNIFICANT CHANGE UP (ref 3.3–5)
ALP SERPL-CCNC: 89 U/L — SIGNIFICANT CHANGE UP (ref 40–120)
ALT FLD-CCNC: 14 U/L — SIGNIFICANT CHANGE UP (ref 4–33)
ANION GAP SERPL CALC-SCNC: 13 MMOL/L — SIGNIFICANT CHANGE UP (ref 7–14)
APPEARANCE UR: CLEAR — SIGNIFICANT CHANGE UP
AST SERPL-CCNC: 21 U/L — SIGNIFICANT CHANGE UP (ref 4–32)
B-OH-BUTYR SERPL-SCNC: <0 MMOL/L — SIGNIFICANT CHANGE UP (ref 0–0.4)
BACTERIA # UR AUTO: ABNORMAL
BASE EXCESS BLDV CALC-SCNC: 1 MMOL/L — SIGNIFICANT CHANGE UP (ref -2–3)
BASE EXCESS BLDV CALC-SCNC: 3.3 MMOL/L — HIGH (ref -2–3)
BASOPHILS # BLD AUTO: 0.03 K/UL — SIGNIFICANT CHANGE UP (ref 0–0.2)
BASOPHILS NFR BLD AUTO: 0.4 % — SIGNIFICANT CHANGE UP (ref 0–2)
BILIRUB SERPL-MCNC: 0.2 MG/DL — SIGNIFICANT CHANGE UP (ref 0.2–1.2)
BILIRUB UR-MCNC: NEGATIVE — SIGNIFICANT CHANGE UP
BLOOD GAS VENOUS COMPREHENSIVE RESULT: SIGNIFICANT CHANGE UP
BLOOD GAS VENOUS COMPREHENSIVE RESULT: SIGNIFICANT CHANGE UP
BUN SERPL-MCNC: 11 MG/DL — SIGNIFICANT CHANGE UP (ref 7–23)
C TRACH RRNA SPEC QL NAA+PROBE: SIGNIFICANT CHANGE UP
CALCIUM SERPL-MCNC: 9 MG/DL — SIGNIFICANT CHANGE UP (ref 8.4–10.5)
CHLORIDE BLDV-SCNC: 101 MMOL/L — SIGNIFICANT CHANGE UP (ref 96–108)
CHLORIDE BLDV-SCNC: 103 MMOL/L — SIGNIFICANT CHANGE UP (ref 96–108)
CHLORIDE SERPL-SCNC: 103 MMOL/L — SIGNIFICANT CHANGE UP (ref 98–107)
CO2 BLDV-SCNC: 27.3 MMOL/L — HIGH (ref 22–26)
CO2 BLDV-SCNC: 29.1 MMOL/L — HIGH (ref 22–26)
CO2 SERPL-SCNC: 23 MMOL/L — SIGNIFICANT CHANGE UP (ref 22–31)
COLOR SPEC: SIGNIFICANT CHANGE UP
CREAT SERPL-MCNC: 0.81 MG/DL — SIGNIFICANT CHANGE UP (ref 0.5–1.3)
CULTURE RESULTS: SIGNIFICANT CHANGE UP
DIFF PNL FLD: ABNORMAL
EGFR: 99 ML/MIN/1.73M2 — SIGNIFICANT CHANGE UP
EOSINOPHIL # BLD AUTO: 0.34 K/UL — SIGNIFICANT CHANGE UP (ref 0–0.5)
EOSINOPHIL NFR BLD AUTO: 4.5 % — SIGNIFICANT CHANGE UP (ref 0–6)
EPI CELLS # UR: 7 /HPF — HIGH (ref 0–5)
GAS PNL BLDV: 132 MMOL/L — LOW (ref 136–145)
GAS PNL BLDV: 138 MMOL/L — SIGNIFICANT CHANGE UP (ref 136–145)
GLUCOSE BLDC GLUCOMTR-MCNC: 104 MG/DL — HIGH (ref 70–99)
GLUCOSE BLDC GLUCOMTR-MCNC: 128 MG/DL — HIGH (ref 70–99)
GLUCOSE BLDC GLUCOMTR-MCNC: 359 MG/DL — HIGH (ref 70–99)
GLUCOSE BLDC GLUCOMTR-MCNC: 404 MG/DL — HIGH (ref 70–99)
GLUCOSE BLDC GLUCOMTR-MCNC: 428 MG/DL — HIGH (ref 70–99)
GLUCOSE BLDC GLUCOMTR-MCNC: 452 MG/DL — CRITICAL HIGH (ref 70–99)
GLUCOSE BLDV-MCNC: 274 MG/DL — HIGH (ref 70–99)
GLUCOSE BLDV-MCNC: 503 MG/DL — CRITICAL HIGH (ref 70–99)
GLUCOSE SERPL-MCNC: 274 MG/DL — HIGH (ref 70–99)
GLUCOSE UR QL: ABNORMAL
HCG SERPL-ACNC: <5 MIU/ML — SIGNIFICANT CHANGE UP
HCO3 BLDV-SCNC: 26 MMOL/L — SIGNIFICANT CHANGE UP (ref 22–29)
HCO3 BLDV-SCNC: 28 MMOL/L — SIGNIFICANT CHANGE UP (ref 22–29)
HCT VFR BLD CALC: 28.9 % — LOW (ref 34.5–45)
HCT VFR BLDA CALC: 26 % — LOW (ref 34.5–46.5)
HCT VFR BLDA CALC: 27 % — LOW (ref 34.5–46.5)
HGB BLD CALC-MCNC: 8.8 G/DL — LOW (ref 11.5–15.5)
HGB BLD CALC-MCNC: 9 G/DL — LOW (ref 11.5–15.5)
HGB BLD-MCNC: 8.8 G/DL — LOW (ref 11.5–15.5)
HYALINE CASTS # UR AUTO: 0 /LPF — SIGNIFICANT CHANGE UP (ref 0–7)
IANC: 4.08 K/UL — SIGNIFICANT CHANGE UP (ref 1.8–7.4)
IMM GRANULOCYTES NFR BLD AUTO: 0.3 % — SIGNIFICANT CHANGE UP (ref 0–1.5)
KETONES UR-MCNC: NEGATIVE — SIGNIFICANT CHANGE UP
LACTATE BLDV-MCNC: 2 MMOL/L — SIGNIFICANT CHANGE UP (ref 0.5–2)
LACTATE BLDV-MCNC: 3 MMOL/L — HIGH (ref 0.5–2)
LEUKOCYTE ESTERASE UR-ACNC: NEGATIVE — SIGNIFICANT CHANGE UP
LIDOCAIN IGE QN: 16 U/L — SIGNIFICANT CHANGE UP (ref 7–60)
LYMPHOCYTES # BLD AUTO: 2.2 K/UL — SIGNIFICANT CHANGE UP (ref 1–3.3)
LYMPHOCYTES # BLD AUTO: 29.1 % — SIGNIFICANT CHANGE UP (ref 13–44)
MAGNESIUM SERPL-MCNC: 1.7 MG/DL — SIGNIFICANT CHANGE UP (ref 1.6–2.6)
MCHC RBC-ENTMCNC: 18.7 PG — LOW (ref 27–34)
MCHC RBC-ENTMCNC: 30.4 GM/DL — LOW (ref 32–36)
MCV RBC AUTO: 61.5 FL — LOW (ref 80–100)
MONOCYTES # BLD AUTO: 0.88 K/UL — SIGNIFICANT CHANGE UP (ref 0–0.9)
MONOCYTES NFR BLD AUTO: 11.7 % — SIGNIFICANT CHANGE UP (ref 2–14)
N GONORRHOEA RRNA SPEC QL NAA+PROBE: SIGNIFICANT CHANGE UP
NEUTROPHILS # BLD AUTO: 4.08 K/UL — SIGNIFICANT CHANGE UP (ref 1.8–7.4)
NEUTROPHILS NFR BLD AUTO: 54 % — SIGNIFICANT CHANGE UP (ref 43–77)
NITRITE UR-MCNC: NEGATIVE — SIGNIFICANT CHANGE UP
NRBC # BLD: 0 /100 WBCS — SIGNIFICANT CHANGE UP
NRBC # FLD: 0 K/UL — SIGNIFICANT CHANGE UP
PCO2 BLDV: 41 MMHG — SIGNIFICANT CHANGE UP (ref 39–42)
PCO2 BLDV: 42 MMHG — SIGNIFICANT CHANGE UP (ref 39–42)
PH BLDV: 7.4 — SIGNIFICANT CHANGE UP (ref 7.32–7.43)
PH BLDV: 7.44 — HIGH (ref 7.32–7.43)
PH UR: 7.5 — SIGNIFICANT CHANGE UP (ref 5–8)
PHOSPHATE SERPL-MCNC: 2.9 MG/DL — SIGNIFICANT CHANGE UP (ref 2.5–4.5)
PLATELET # BLD AUTO: 367 K/UL — SIGNIFICANT CHANGE UP (ref 150–400)
PO2 BLDV: 52 MMHG — SIGNIFICANT CHANGE UP
PO2 BLDV: 65 MMHG — SIGNIFICANT CHANGE UP
POTASSIUM BLDV-SCNC: 3.9 MMOL/L — SIGNIFICANT CHANGE UP (ref 3.5–5.1)
POTASSIUM BLDV-SCNC: 4.5 MMOL/L — SIGNIFICANT CHANGE UP (ref 3.5–5.1)
POTASSIUM SERPL-MCNC: 4.2 MMOL/L — SIGNIFICANT CHANGE UP (ref 3.5–5.3)
POTASSIUM SERPL-SCNC: 4.2 MMOL/L — SIGNIFICANT CHANGE UP (ref 3.5–5.3)
PROT SERPL-MCNC: 5.9 G/DL — LOW (ref 6–8.3)
PROT UR-MCNC: ABNORMAL
RBC # BLD: 4.7 M/UL — SIGNIFICANT CHANGE UP (ref 3.8–5.2)
RBC # FLD: 19.3 % — HIGH (ref 10.3–14.5)
RBC CASTS # UR COMP ASSIST: 9 /HPF — HIGH (ref 0–4)
SAO2 % BLDV: 85.9 % — SIGNIFICANT CHANGE UP
SAO2 % BLDV: 91.8 % — SIGNIFICANT CHANGE UP
SARS-COV-2 RNA SPEC QL NAA+PROBE: SIGNIFICANT CHANGE UP
SODIUM SERPL-SCNC: 139 MMOL/L — SIGNIFICANT CHANGE UP (ref 135–145)
SP GR SPEC: 1.02 — SIGNIFICANT CHANGE UP (ref 1–1.05)
SPECIMEN SOURCE: SIGNIFICANT CHANGE UP
SPECIMEN SOURCE: SIGNIFICANT CHANGE UP
UROBILINOGEN FLD QL: SIGNIFICANT CHANGE UP
WBC # BLD: 7.55 K/UL — SIGNIFICANT CHANGE UP (ref 3.8–10.5)
WBC # FLD AUTO: 7.55 K/UL — SIGNIFICANT CHANGE UP (ref 3.8–10.5)
WBC UR QL: 7 /HPF — HIGH (ref 0–5)

## 2022-04-29 PROCEDURE — 99223 1ST HOSP IP/OBS HIGH 75: CPT

## 2022-04-29 PROCEDURE — 99233 SBSQ HOSP IP/OBS HIGH 50: CPT | Mod: GC

## 2022-04-29 PROCEDURE — 99254 IP/OBS CNSLTJ NEW/EST MOD 60: CPT | Mod: GC

## 2022-04-29 PROCEDURE — 99285 EMERGENCY DEPT VISIT HI MDM: CPT | Mod: 25

## 2022-04-29 PROCEDURE — 99233 SBSQ HOSP IP/OBS HIGH 50: CPT

## 2022-04-29 PROCEDURE — 36000 PLACE NEEDLE IN VEIN: CPT

## 2022-04-29 RX ORDER — SODIUM CHLORIDE 9 MG/ML
1000 INJECTION, SOLUTION INTRAVENOUS
Refills: 0 | Status: DISCONTINUED | OUTPATIENT
Start: 2022-04-29 | End: 2022-04-30

## 2022-04-29 RX ORDER — ACETAMINOPHEN 500 MG
650 TABLET ORAL EVERY 6 HOURS
Refills: 0 | Status: DISCONTINUED | OUTPATIENT
Start: 2022-04-29 | End: 2022-04-30

## 2022-04-29 RX ORDER — DEXTROSE 50 % IN WATER 50 %
25 SYRINGE (ML) INTRAVENOUS ONCE
Refills: 0 | Status: DISCONTINUED | OUTPATIENT
Start: 2022-04-29 | End: 2022-04-30

## 2022-04-29 RX ORDER — INSULIN LISPRO 100/ML
8 VIAL (ML) SUBCUTANEOUS
Refills: 0 | Status: DISCONTINUED | OUTPATIENT
Start: 2022-04-29 | End: 2022-04-29

## 2022-04-29 RX ORDER — INSULIN LISPRO 100/ML
VIAL (ML) SUBCUTANEOUS
Refills: 0 | Status: DISCONTINUED | OUTPATIENT
Start: 2022-04-29 | End: 2022-04-30

## 2022-04-29 RX ORDER — INSULIN LISPRO 100/ML
6 VIAL (ML) SUBCUTANEOUS
Refills: 0 | Status: DISCONTINUED | OUTPATIENT
Start: 2022-04-29 | End: 2022-04-30

## 2022-04-29 RX ORDER — DIPHENHYDRAMINE HCL 50 MG
25 CAPSULE ORAL ONCE
Refills: 0 | Status: COMPLETED | OUTPATIENT
Start: 2022-04-29 | End: 2022-04-29

## 2022-04-29 RX ORDER — HYDROMORPHONE HYDROCHLORIDE 2 MG/ML
0.5 INJECTION INTRAMUSCULAR; INTRAVENOUS; SUBCUTANEOUS EVERY 6 HOURS
Refills: 0 | Status: DISCONTINUED | OUTPATIENT
Start: 2022-04-29 | End: 2022-04-30

## 2022-04-29 RX ORDER — HYDROMORPHONE HYDROCHLORIDE 2 MG/ML
0.5 INJECTION INTRAMUSCULAR; INTRAVENOUS; SUBCUTANEOUS EVERY 6 HOURS
Refills: 0 | Status: DISCONTINUED | OUTPATIENT
Start: 2022-04-29 | End: 2022-04-29

## 2022-04-29 RX ORDER — LANOLIN ALCOHOL/MO/W.PET/CERES
3 CREAM (GRAM) TOPICAL AT BEDTIME
Refills: 0 | Status: DISCONTINUED | OUTPATIENT
Start: 2022-04-29 | End: 2022-04-30

## 2022-04-29 RX ORDER — ACETAMINOPHEN 500 MG
1000 TABLET ORAL ONCE
Refills: 0 | Status: COMPLETED | OUTPATIENT
Start: 2022-04-29 | End: 2022-04-29

## 2022-04-29 RX ORDER — METOCLOPRAMIDE HCL 10 MG
10 TABLET ORAL THREE TIMES A DAY
Refills: 0 | Status: DISCONTINUED | OUTPATIENT
Start: 2022-04-29 | End: 2022-04-30

## 2022-04-29 RX ORDER — SODIUM CHLORIDE 9 MG/ML
1000 INJECTION, SOLUTION INTRAVENOUS ONCE
Refills: 0 | Status: COMPLETED | OUTPATIENT
Start: 2022-04-29 | End: 2022-04-29

## 2022-04-29 RX ORDER — DEXTROSE 50 % IN WATER 50 %
15 SYRINGE (ML) INTRAVENOUS ONCE
Refills: 0 | Status: DISCONTINUED | OUTPATIENT
Start: 2022-04-29 | End: 2022-04-30

## 2022-04-29 RX ORDER — ENOXAPARIN SODIUM 100 MG/ML
40 INJECTION SUBCUTANEOUS EVERY 24 HOURS
Refills: 0 | Status: DISCONTINUED | OUTPATIENT
Start: 2022-04-29 | End: 2022-04-30

## 2022-04-29 RX ORDER — LIDOCAINE 4 G/100G
10 CREAM TOPICAL ONCE
Refills: 0 | Status: COMPLETED | OUTPATIENT
Start: 2022-04-29 | End: 2022-04-29

## 2022-04-29 RX ORDER — METOCLOPRAMIDE HCL 10 MG
10 TABLET ORAL ONCE
Refills: 0 | Status: COMPLETED | OUTPATIENT
Start: 2022-04-29 | End: 2022-04-29

## 2022-04-29 RX ORDER — DEXTROSE 50 % IN WATER 50 %
12.5 SYRINGE (ML) INTRAVENOUS ONCE
Refills: 0 | Status: DISCONTINUED | OUTPATIENT
Start: 2022-04-29 | End: 2022-04-30

## 2022-04-29 RX ORDER — HYDROMORPHONE HYDROCHLORIDE 2 MG/ML
0.5 INJECTION INTRAMUSCULAR; INTRAVENOUS; SUBCUTANEOUS ONCE
Refills: 0 | Status: DISCONTINUED | OUTPATIENT
Start: 2022-04-29 | End: 2022-04-29

## 2022-04-29 RX ORDER — FAMOTIDINE 10 MG/ML
20 INJECTION INTRAVENOUS DAILY
Refills: 0 | Status: DISCONTINUED | OUTPATIENT
Start: 2022-04-29 | End: 2022-04-30

## 2022-04-29 RX ORDER — SODIUM CHLORIDE 9 MG/ML
1000 INJECTION INTRAMUSCULAR; INTRAVENOUS; SUBCUTANEOUS ONCE
Refills: 0 | Status: COMPLETED | OUTPATIENT
Start: 2022-04-29 | End: 2022-04-29

## 2022-04-29 RX ORDER — INSULIN LISPRO 100/ML
VIAL (ML) SUBCUTANEOUS AT BEDTIME
Refills: 0 | Status: DISCONTINUED | OUTPATIENT
Start: 2022-04-29 | End: 2022-04-30

## 2022-04-29 RX ORDER — DIPHENHYDRAMINE HCL 50 MG
25 CAPSULE ORAL EVERY 6 HOURS
Refills: 0 | Status: DISCONTINUED | OUTPATIENT
Start: 2022-04-29 | End: 2022-04-30

## 2022-04-29 RX ORDER — INSULIN GLARGINE 100 [IU]/ML
18 INJECTION, SOLUTION SUBCUTANEOUS AT BEDTIME
Refills: 0 | Status: DISCONTINUED | OUTPATIENT
Start: 2022-04-29 | End: 2022-04-30

## 2022-04-29 RX ORDER — GLUCAGON INJECTION, SOLUTION 0.5 MG/.1ML
1 INJECTION, SOLUTION SUBCUTANEOUS ONCE
Refills: 0 | Status: DISCONTINUED | OUTPATIENT
Start: 2022-04-29 | End: 2022-04-30

## 2022-04-29 RX ORDER — FAMOTIDINE 10 MG/ML
20 INJECTION INTRAVENOUS ONCE
Refills: 0 | Status: COMPLETED | OUTPATIENT
Start: 2022-04-29 | End: 2022-04-29

## 2022-04-29 RX ORDER — ONDANSETRON 8 MG/1
4 TABLET, FILM COATED ORAL ONCE
Refills: 0 | Status: COMPLETED | OUTPATIENT
Start: 2022-04-29 | End: 2022-04-29

## 2022-04-29 RX ADMIN — FAMOTIDINE 20 MILLIGRAM(S): 10 INJECTION INTRAVENOUS at 12:30

## 2022-04-29 RX ADMIN — HYDROMORPHONE HYDROCHLORIDE 0.5 MILLIGRAM(S): 2 INJECTION INTRAMUSCULAR; INTRAVENOUS; SUBCUTANEOUS at 12:12

## 2022-04-29 RX ADMIN — Medication 25 MILLIGRAM(S): at 13:11

## 2022-04-29 RX ADMIN — HYDROMORPHONE HYDROCHLORIDE 0.5 MILLIGRAM(S): 2 INJECTION INTRAMUSCULAR; INTRAVENOUS; SUBCUTANEOUS at 18:43

## 2022-04-29 RX ADMIN — Medication 10 MILLIGRAM(S): at 05:22

## 2022-04-29 RX ADMIN — Medication 10 MILLIGRAM(S): at 12:12

## 2022-04-29 RX ADMIN — HYDROMORPHONE HYDROCHLORIDE 0.5 MILLIGRAM(S): 2 INJECTION INTRAMUSCULAR; INTRAVENOUS; SUBCUTANEOUS at 08:01

## 2022-04-29 RX ADMIN — Medication 4: at 22:53

## 2022-04-29 RX ADMIN — SODIUM CHLORIDE 100 MILLILITER(S): 9 INJECTION, SOLUTION INTRAVENOUS at 12:46

## 2022-04-29 RX ADMIN — LIDOCAINE 10 MILLILITER(S): 4 CREAM TOPICAL at 03:46

## 2022-04-29 RX ADMIN — SODIUM CHLORIDE 1000 MILLILITER(S): 9 INJECTION, SOLUTION INTRAVENOUS at 03:46

## 2022-04-29 RX ADMIN — Medication 6: at 08:00

## 2022-04-29 RX ADMIN — ONDANSETRON 4 MILLIGRAM(S): 8 TABLET, FILM COATED ORAL at 03:44

## 2022-04-29 RX ADMIN — Medication 25 MILLIGRAM(S): at 19:53

## 2022-04-29 RX ADMIN — HYDROMORPHONE HYDROCHLORIDE 0.5 MILLIGRAM(S): 2 INJECTION INTRAMUSCULAR; INTRAVENOUS; SUBCUTANEOUS at 18:51

## 2022-04-29 RX ADMIN — Medication 25 MILLIGRAM(S): at 08:01

## 2022-04-29 RX ADMIN — Medication 25 MILLIGRAM(S): at 05:29

## 2022-04-29 RX ADMIN — FAMOTIDINE 20 MILLIGRAM(S): 10 INJECTION INTRAVENOUS at 03:43

## 2022-04-29 RX ADMIN — Medication 400 MILLIGRAM(S): at 03:46

## 2022-04-29 RX ADMIN — Medication 10 MILLIGRAM(S): at 22:38

## 2022-04-29 RX ADMIN — HYDROMORPHONE HYDROCHLORIDE 0.5 MILLIGRAM(S): 2 INJECTION INTRAMUSCULAR; INTRAVENOUS; SUBCUTANEOUS at 05:22

## 2022-04-29 RX ADMIN — INSULIN GLARGINE 18 UNIT(S): 100 INJECTION, SOLUTION SUBCUTANEOUS at 22:44

## 2022-04-29 RX ADMIN — SODIUM CHLORIDE 1000 MILLILITER(S): 9 INJECTION INTRAMUSCULAR; INTRAVENOUS; SUBCUTANEOUS at 06:29

## 2022-04-29 RX ADMIN — SODIUM CHLORIDE 100 MILLILITER(S): 9 INJECTION, SOLUTION INTRAVENOUS at 19:53

## 2022-04-29 RX ADMIN — Medication 25 MILLIGRAM(S): at 12:18

## 2022-04-29 NOTE — CONSULT NOTE ADULT - ATTENDING COMMENTS
Type 1 DM complicated by gastroparesis, glucose fluctuations. Agree with plan as outlined.  Planning to transition to pump therapy as outpatient. Signed out AMA yesterday then returned. Took Basaglar 18 units at home last night.    Shanna Hathaway MD  Division of Endocrinology  Pager: [phone]    If after 6PM or before 9AM, or on weekends/holidays, please call endocrine answering service for assistance ([phone]).  For nonurgent matters email [email or web address] for assistance.

## 2022-04-29 NOTE — ED ADULT NURSE NOTE - OBJECTIVE STATEMENT
pt. received to room 11 A&Ox4 ambulatory at [facility] of DM T1 managed by insulin, gastroparesis p/w abdominal pain. pt. endorses being in the [facility] ED yesterday, and AMAed due to a family emergency. pt. endorses having minimal relief after leaving, where the pain started again. pt. endorsing pain unrelieved by anything at home, endorses vomiting and nausea. one episode of vomiting since arriving to the ED. pain is localized to the LQ B/L of the abdomen. no blood noted in the emesis. denies blood in the stool. no acute distress noted at this time. respirations even and unlabored. Sinus tach on cardiac monitor. denies CP, HA, SOB, fever, chills. Pending USIV. comfort measures provided. safety precautions maintained.

## 2022-04-29 NOTE — CHART NOTE - NSCHARTNOTEFT_GEN_A_CORE
Called by RN due to patient c/o diffuse abdominal pain 10 out of 10. Patient states she had relief with Dilaudid and benadryl.   Will give Dilaudid 0.5 mg and benadryl 25 mg x 1.

## 2022-04-29 NOTE — CHART NOTE - NSCHARTNOTEFT_GEN_A_CORE
Called by RN FS of 404  Will start endocrinology recommendations from 4/28/22- Start Lantus 18 units QHS, Pre meal Admelog 8 units. Low corrective SS coverage.  Will f/u FS.

## 2022-04-29 NOTE — H&P ADULT - PROBLEM SELECTOR PLAN 5
RVP +NL63 Coronavirus  - CXR clear lungs, oxygen sats stable  - no respiratory symptoms  - continue to monitor, supportive care

## 2022-04-29 NOTE — H&P ADULT - ASSESSMENT
31F with hx of T1DM (on insulin) c/b DKA and gastroparesis, ectopic pregnancy x2, s/p bilateral ligation, presenting with abdominal pain, n/v likely due to gastroparesis.

## 2022-04-29 NOTE — ED ADULT TRIAGE NOTE - NS ED TRIAGE AVPU SCALE
Alert-The patient is alert, awake and responds to voice. The patient is oriented to time, place, and person. The triage nurse is able to obtain subjective information. Initial (On Arrival)

## 2022-04-29 NOTE — ED PROVIDER NOTE - ATTENDING CONTRIBUTION TO CARE
30yo F with PMHX HTN, DM on insulin, gastroparesis with prior admissions for pain and vomiting, ectopic pregnancy s/p b/l tubal ligation, cholecystectomy, presenting to ED after recent admission for abd pain with vomiting and hyper/hypoglycemia and AMA to visit brother in hospital, returning to ED for same symptoms she was having prior to and during admission- epigastric and LLQ pain with n/v.  Says she felt better on discharge but then symptoms returned last night with same intensity, quality, and location with 6-8x NBNB vomiting and normal stooling. Of note, pt had CTabd/pelvis and transvaginal ultrasound yesterday in ED with no acute findings with negative HCG. No vaginal bleeding or d/c, no dysuria, no fevers or back pain    General: Patient alert, lying upside down in bed, intermittent crying  Skin: Dry and intact  HEENT: Head atraumatic. Oral mucosa moist.   Eyes: Conjunctiva normal  Cardiac: Regular rhythm and rate. No pretibial edema b/l  Respiratory: Lungs clear b/l and symmetric. No respiratory distress. Able to speak in complete sentences.  Gastrointestinal: Abdomen soft, nondistended, +tender in epigastrium, LUQ, and LLQ  Musculoskeletal: Moves all extremities spontaneously  Neurological: alert and oriented to person, place, and time  Psychiatric: Calm and cooperative    a/p  unclear etiology of lower abd pain with comprehensive evaluation yesterday but upper abd pain likely d/t gastroparesis/gastritis/pud/functional  will get labs with lipase, check lytes  ivf, pepcid, gi meds to start  may need escalation of therapy if no improvement and admission

## 2022-04-29 NOTE — ED ADULT NURSE REASSESSMENT NOTE - NS ED NURSE REASSESS COMMENT FT1
FS as noted. Contacted ACP in regards to FS, ROXANA Elmore aware. orders to be placed.
MD Nava and MD Rausch made aware of FS.
[facility] ROXANA Elmore aware of 10/10 pain at this time. orders placed and followed. Insulin given as ordered. will reassess.
pt. remains A&Ox4, awake and resting. pt. endorsing abdominal pain at this time unrelieved by prior meds. MD Nava aware. orders placed and followed. no acute distress noted. respirations even and unlabored. NSR on cardiac monitor. VS as noted. pending admission.

## 2022-04-29 NOTE — H&P ADULT - HISTORY OF PRESENT ILLNESS
31 oy F PMHx HTN Type 1 DM w/ episodes of DKA, diabetic gastroparesis, concerns for opiate dependence, ectopic pregnancy x 2, s/p bilat tubal ligation, presenting for lower abdominal pain for 2 days. Pt was in ER yesterday, admitted but sign out AMA due to family emergency, now returned to ER. Pt reports baseline mild upper abdominal pain usually does not require pain meds. Pain increased in severity yesterday, now 8/10, intermitted, diffuse, radiating from epigastric area to suprapubic region,  associated N/V to the point that she cannot tolerate any food or liquids.  Pt reports visit to OBGYN for low abd pain few days ago, found to have ovarian cysts but was told nothing need to be done. She denies fevers, chills, constipation, diarrhea, hematemesis, BRBPR. Reports compliant with insulin and BP meds.

## 2022-04-29 NOTE — ED ADULT TRIAGE NOTE - CHIEF COMPLAINT QUOTE
Pt arrives C/O diffuse abd pain. Was seen in [facility] ED yesterday and was to be admitted for "elevated sugar", AMAed for family emergency. Pt endorsing N+V x4 episodes, non bloody. Denies diarrhea, fevers, sick contacts. PMHx DM. . LMP 4/19

## 2022-04-29 NOTE — ED ADULT NURSE NOTE - CHIEF COMPLAINT QUOTE
Pt arrives C/O diffuse abd pain. Was seen in [facility] ED yesterday and was to be admitted for "elevated sugar", AMAed for family emergency. Pt endorsing N+V x4 episodes, non bloody. Denies diarrhea, fevers, sick contacts. PMHx DM. . LMP 4/19

## 2022-04-29 NOTE — ED PROVIDER NOTE - PHYSICAL EXAMINATION
GENERAL: well appearing in no acute distress, non-toxic appearing  HEAD: normocephalic, atraumatic  HEENT: normal conjunctiva, oral mucosa moist, uvula midline, no tonsilar exudates, neck supple, no JVD  CARDIAC: regular rate and rhythm, normal S1S2, no appreciable murmurs, 2+ pulses in UE/LE b/l  PULM: normal breath sounds, clear to ascultation bilaterally, no rales, rhonchi, wheezing  GI: abdomen nondistended, soft, TTP in LLQ, no guarding, rebound tenderness  : no CVA tenderness b/l, no suprapubic tenderness  NEURO: no focal motor or sensory deficits, CN2-12 intact, normal speech, PERRLA, EOMI, normal gait, AAOx3  MSK: no peripheral edema, no calf tenderness b/l  SKIN: well-perfused, extremities warm, no visible rashes  PSYCH: appropriate mood and affect GENERAL: appears in pain, non-toxic appearing  HEAD: normocephalic, atraumatic  HEENT: normal conjunctiva, oral mucosa moist, uvula midline, no tonsilar exudates, neck supple, no JVD  CARDIAC: regular rate and rhythm, normal S1S2, no appreciable murmurs, 2+ pulses in UE/LE b/l  PULM: normal breath sounds, clear to ascultation bilaterally, no rales, rhonchi, wheezing  GI: abdomen nondistended, soft, TTP in LLQ and epigastrium, no guarding, rebound tenderness  : no CVA tenderness b/l, no suprapubic tenderness  NEURO: no focal motor or sensory deficits, CN2-12 intact, normal speech, PERRLA, EOMI, normal gait, AAOx3  MSK: no peripheral edema, no calf tenderness b/l  SKIN: well-perfused, extremities warm, no visible rashes  PSYCH: appropriate mood and affect

## 2022-04-29 NOTE — CHART NOTE - NSCHARTNOTEFT_GEN_A_CORE
Called by RN due to patient vomited Benadryl tablet. IV Benadryl 25 mg IVP x 1 dose for itching.  Also patient FS of 104- Patient refused pre meal coverage. Drink apple juice and FS went up to 128.

## 2022-04-29 NOTE — H&P ADULT - PROBLEM SELECTOR PLAN 1
presents with abdominal pain, n/v  - CTAP 4/28-  circumferential bladder wall thickening, UA unremarkable, patient without urinary sx  - TVUS 4/27: no acute findings  - Reglan q8 (QTc 420ms)  - pain control: encourage Tylenol PRN. attempt to limit opioid use  - DM diet, IVF

## 2022-04-29 NOTE — CONSULT NOTE ADULT - SUBJECTIVE AND OBJECTIVE BOX
HPI:  31 oy F PMHx HTN Type 1 DM w/ episodes of DKA, diabetic gastroparesis, concerns for opiate dependence, ectopic pregnancy x 2, s/p bilat tubal ligation, presenting for lower abdominal pain for 2 days. Pt was in ER yesterday, admitted but sign out AMA due to family emergency, now returned to ER. Pt reports baseline mild upper abdominal pain usually does not require pain meds. Pain increased in severity yesterday, now 8/10, intermitted, diffuse, radiating from epigastric area to suprapubic region,  associated N/V to the point that she cannot tolerate any food or liquids.  Pt reports visit to OBGYN for low abd pain few days ago, found to have ovarian cysts but was told nothing need to be done. She denies fevers, chills, constipation, diarrhea, hematemesis, BRBPR. Reports compliant with insulin and BP meds.  (29 Apr 2022 10:34)    ENDOCRINE HISTORY     Dx at age 12, Follows w/ Dr. Bose - last seen 12/2021  Home regimen: Basaglar 18, Admelog 8-10units on sliding scale   Recently has been checking FS, variable 70s to 240s but patient attributes this to limited diet & nonadherence w/ dm diet when she does eat due to her gastroparesis/GI symptoms.   Used to use Dexcom but waiting for refills & has an appointment w/ Endocrine NP in the next week or 2 per patient to be initiated on Omnipod pump along w/ the Dexcom CGM.   Has not gone to see a GI doctor although patient was previously referred   +Gastroparesis, +nephropathy  Reports intermittent compliance w/ enalapril   Not on statin.     Has had many admissions for dka/gastroparesis symptoms.   Was last admitted just yesterday for abdominal pain w/ initial labs concerning for dka w/ elevated AG & ketosis although no acidosis followed by hypoglycemia after patient received short acting insulin but did not eat. Patient then left AMA due to a family emergency and returned later in the evening/early AM for recurrent abdominal pain. Reports that she took her bedtime basal insulin 18 units & admelog 6 units before dinner last night. Serum glucose this morning 274 w/o concern for DKA on AM labs drawn at 4AM. Later in the morning found to be hyperglycemic to 400s on fingerstick POCT.     PAST MEDICAL & SURGICAL HISTORY:  Diabetes mellitus type 1    Gastroparesis due to DM    Colitis    Hypertension    Ectopic pregnancy  2009 and 2013, s/p removal of right and left fallopian tubes    History of cholecystectomy        FAMILY HISTORY:  Family history of type 1 diabetes mellitus (Grandparent)    FH: HTN (hypertension) (Father)    FHx: asthma  Brother        Social History:    Outpatient Medications:    MEDICATIONS  (STANDING):  dextrose 5%. 1000 milliLiter(s) (50 mL/Hr) IV Continuous <Continuous>  dextrose 5%. 1000 milliLiter(s) (100 mL/Hr) IV Continuous <Continuous>  dextrose 50% Injectable 25 Gram(s) IV Push once  dextrose 50% Injectable 12.5 Gram(s) IV Push once  dextrose 50% Injectable 25 Gram(s) IV Push once  enalapril 5 milliGRAM(s) Oral daily  enoxaparin Injectable 40 milliGRAM(s) SubCutaneous every 24 hours  famotidine    Tablet 20 milliGRAM(s) Oral daily  glucagon  Injectable 1 milliGRAM(s) IntraMuscular once  insulin glargine Injectable (LANTUS) 18 Unit(s) SubCutaneous at bedtime  insulin lispro (ADMELOG) corrective regimen sliding scale   SubCutaneous three times a day before meals  insulin lispro (ADMELOG) corrective regimen sliding scale   SubCutaneous at bedtime  insulin lispro Injectable (ADMELOG) 8 Unit(s) SubCutaneous three times a day before meals  lactated ringers. 1000 milliLiter(s) (100 mL/Hr) IV Continuous <Continuous>  metoclopramide Injectable 10 milliGRAM(s) IV Push three times a day    MEDICATIONS  (PRN):  acetaminophen     Tablet .. 650 milliGRAM(s) Oral every 6 hours PRN Temp greater or equal to 38C (100.4F), Mild Pain (1 - 3)  aluminum hydroxide/magnesium hydroxide/simethicone Suspension 30 milliLiter(s) Oral every 4 hours PRN Dyspepsia  dextrose Oral Gel 15 Gram(s) Oral once PRN Blood Glucose LESS THAN 70 milliGRAM(s)/deciliter  diphenhydrAMINE 25 milliGRAM(s) Oral every 6 hours PRN Rash and/or Itching  HYDROmorphone  Injectable 0.5 milliGRAM(s) IV Push every 6 hours PRN Severe Pain (7 - 10)  melatonin 3 milliGRAM(s) Oral at bedtime PRN Insomnia      Allergies    Toradol (Pruritus)    Intolerances      Review of Systems:  Constitutional: No fever  Eyes: No blurry vision  Neuro: No tremors  HEENT: No pain  Cardiovascular: No chest pain, palpitations  Respiratory: No SOB, no cough  GI: No nausea, vomiting, +abdominal pain  : No dysuria  Skin: no rash  Psych: no depression  Endocrine: no polyuria, polydipsia  Hem/lymph: no swelling  Osteoporosis: no fractures  ALL OTHER SYSTEMS REVIEWED AND NEGATIVE    PHYSICAL EXAM:  VITALS: T(C): 36.7 (04-29-22 @ 09:38)  T(F): 98.1 (04-29-22 @ 09:38), Max: 98.8 (04-29-22 @ 02:21)  HR: 74 (04-29-22 @ 09:38) (70 - 111)  BP: 127/86 (04-29-22 @ 09:38) (127/78 - 142/99)  RR:  (16 - 18)  SpO2:  (96% - 100%)  Wt(kg): --  GENERAL: NAD, well-groomed, well-developed  EYES: No proptosis, no lid lag, anicteric  HEENT:  Atraumatic, Normocephalic, moist mucous membranes  THYROID: Normal size, no palpable nodules  RESPIRATORY: Clear to auscultation bilaterally; No rales, rhonchi, wheezing  CARDIOVASCULAR: Regular rate and rhythm; No murmurs; no peripheral edema  GI: Soft, nontender, non distended, normal bowel sounds  SKIN: Dry, intact, No rashes or lesions  MUSCULOSKELETAL: Full range of motion, normal strength  NEURO: sensation intact, extraocular movements intact, no tremor  PSYCH: Alert and oriented x 3, normal affect, normal mood  CUSHING'S SIGNS: no striae    POCT Blood Glucose.: 428 mg/dL (04-29-22 @ 08:31)  POCT Blood Glucose.: 404 mg/dL (04-29-22 @ 07:31)  POCT Blood Glucose.: 460 mg/dL (04-29-22 @ 06:09)  POCT Blood Glucose.: 172 mg/dL (04-29-22 @ 02:24)  POCT Blood Glucose.: 125 mg/dL (04-28-22 @ 12:34)  POCT Blood Glucose.: 142 mg/dL (04-28-22 @ 09:22)  POCT Blood Glucose.: 153 mg/dL (04-28-22 @ 04:37)  POCT Blood Glucose.: 122 mg/dL (04-28-22 @ 03:25)  POCT Blood Glucose.: 39 mg/dL (04-28-22 @ 03:00)  POCT Blood Glucose.: 40 mg/dL (04-28-22 @ 02:58)  POCT Blood Glucose.: 98 mg/dL (04-28-22 @ 01:47)  POCT Blood Glucose.: 316 mg/dL (04-27-22 @ 23:57)  POCT Blood Glucose.: 487 mg/dL (04-27-22 @ 20:38)  POCT Blood Glucose.: 394 mg/dL (04-27-22 @ 19:26)                            8.8    7.55  )-----------( 367      ( 29 Apr 2022 04:16 )             28.9       04-29    139  |  103  |  11  ----------------------------<  274<H>  4.2   |  23  |  0.81    EGFR if : x   EGFR if non : x     Ca    9.0      04-29  Mg     1.70     04-29  Phos  2.9     04-29    TPro  5.9<L>  /  Alb  3.3  /  TBili  0.2  /  DBili  x   /  AST  21  /  ALT  14  /  AlkPhos  89  04-29      Thyroid Function Tests:  04-28 @ 02:25 TSH 1.03 FreeT4 -- T3 -- Anti TPO -- Anti Thyroglobulin Ab -- TSI --          02-17 Chol 226<H> Direct LDL -- LDL calculated 114<H>  Trig 61      Radiology:              ENDOCRINE CONSULT - DM1   HPI:  31 oy F PMHx HTN Type 1 DM w/ episodes of DKA, diabetic gastroparesis, concerns for opiate dependence, ectopic pregnancy x 2, s/p bilat tubal ligation, presenting for lower abdominal pain for 2 days. Pt was in ER yesterday, admitted but sign out AMA due to family emergency, now returned to ER. Pt reports baseline mild upper abdominal pain usually does not require pain meds. Pain increased in severity yesterday, now 8/10, intermitted, diffuse, radiating from epigastric area to suprapubic region,  associated N/V to the point that she cannot tolerate any food or liquids.  Pt reports visit to OBGYN for low abd pain few days ago, found to have ovarian cysts but was told nothing need to be done. She denies fevers, chills, constipation, diarrhea, hematemesis, BRBPR. Reports compliant with insulin and BP meds.  (29 Apr 2022 10:34)    ENDOCRINE HISTORY   Dx at age 12, Follows w/ Dr. Bose - last seen 12/2021  Home regimen: Basaglar 18, Admelog 8-10units on sliding scale   Recently has been checking FS, variable 70s to 240s but patient attributes this to limited diet & nonadherence w/ dm diet when she does eat due to her gastroparesis/GI symptoms.   Used to use Dexcom but waiting for refills & has an appointment w/ Endocrine NP in the next week or 2 per patient to be initiated on Omnipod pump along w/ the Dexcom CGM.   Has not gone to see a GI doctor although patient was previously referred   +Gastroparesis, +nephropathy  Reports intermittent compliance w/ enalapril   Not on statin.     Has had many admissions for dka/gastroparesis symptoms.   Was last admitted just yesterday for abdominal pain w/ initial labs concerning for dka w/ elevated AG & ketosis although no acidosis followed by hypoglycemia after patient received short acting insulin but did not eat. Patient then left AMA due to a family emergency and returned later in the evening/early AM for recurrent abdominal pain. Reports that she took her bedtime basal insulin 18 units & admelog 6 units before dinner last night. Serum glucose this morning 274 w/o concern for DKA on AM labs drawn at 4AM. Later in the morning found to be hyperglycemic to 400s on fingerstick POCT. Unclear if this was an accurate measurement as patient reports that she did not eat until lunchtime today. Received Admelog 6 units for FS BG level in 400s this morning & FS BG prior to lunch was 104. Was given juice w/ repeat FS BG level 128.     PAST MEDICAL & SURGICAL HISTORY:  Diabetes mellitus type 1    Gastroparesis due to DM    Colitis    Hypertension    Ectopic pregnancy  2009 and 2013, s/p removal of right and left fallopian tubes    History of cholecystectomy        FAMILY HISTORY:  Family history of type 1 diabetes mellitus (Grandparent)    FH: HTN (hypertension) (Father)    FHx: asthma  Brother        Social History:    Outpatient Medications:    MEDICATIONS  (STANDING):  dextrose 5%. 1000 milliLiter(s) (50 mL/Hr) IV Continuous <Continuous>  dextrose 5%. 1000 milliLiter(s) (100 mL/Hr) IV Continuous <Continuous>  dextrose 50% Injectable 25 Gram(s) IV Push once  dextrose 50% Injectable 12.5 Gram(s) IV Push once  dextrose 50% Injectable 25 Gram(s) IV Push once  enalapril 5 milliGRAM(s) Oral daily  enoxaparin Injectable 40 milliGRAM(s) SubCutaneous every 24 hours  famotidine    Tablet 20 milliGRAM(s) Oral daily  glucagon  Injectable 1 milliGRAM(s) IntraMuscular once  insulin glargine Injectable (LANTUS) 18 Unit(s) SubCutaneous at bedtime  insulin lispro (ADMELOG) corrective regimen sliding scale   SubCutaneous three times a day before meals  insulin lispro (ADMELOG) corrective regimen sliding scale   SubCutaneous at bedtime  insulin lispro Injectable (ADMELOG) 8 Unit(s) SubCutaneous three times a day before meals  lactated ringers. 1000 milliLiter(s) (100 mL/Hr) IV Continuous <Continuous>  metoclopramide Injectable 10 milliGRAM(s) IV Push three times a day    MEDICATIONS  (PRN):  acetaminophen     Tablet .. 650 milliGRAM(s) Oral every 6 hours PRN Temp greater or equal to 38C (100.4F), Mild Pain (1 - 3)  aluminum hydroxide/magnesium hydroxide/simethicone Suspension 30 milliLiter(s) Oral every 4 hours PRN Dyspepsia  dextrose Oral Gel 15 Gram(s) Oral once PRN Blood Glucose LESS THAN 70 milliGRAM(s)/deciliter  diphenhydrAMINE 25 milliGRAM(s) Oral every 6 hours PRN Rash and/or Itching  HYDROmorphone  Injectable 0.5 milliGRAM(s) IV Push every 6 hours PRN Severe Pain (7 - 10)  melatonin 3 milliGRAM(s) Oral at bedtime PRN Insomnia      Allergies    Toradol (Pruritus)    Intolerances      Review of Systems:  Constitutional: No fever  Eyes: No blurry vision  Neuro: No tremors  HEENT: No pain  Cardiovascular: No chest pain, palpitations  Respiratory: No SOB, no cough  GI: No nausea, vomiting, +abdominal pain  : No dysuria  Skin: no rash  Psych: no depression  Endocrine: no polyuria, polydipsia  Hem/lymph: no swelling  Osteoporosis: no fractures  ALL OTHER SYSTEMS REVIEWED AND NEGATIVE    PHYSICAL EXAM:  VITALS: T(C): 36.7 (04-29-22 @ 09:38)  T(F): 98.1 (04-29-22 @ 09:38), Max: 98.8 (04-29-22 @ 02:21)  HR: 74 (04-29-22 @ 09:38) (70 - 111)  BP: 127/86 (04-29-22 @ 09:38) (127/78 - 142/99)  RR:  (16 - 18)  SpO2:  (96% - 100%)  Wt(kg): --  GENERAL: NAD, well-groomed, well-developed  EYES: No proptosis, no lid lag, anicteric  HEENT:  Atraumatic, Normocephalic, moist mucous membranes  THYROID: Normal size, no palpable nodules  RESPIRATORY: Clear to auscultation bilaterally; No rales, rhonchi, wheezing  CARDIOVASCULAR: Regular rate and rhythm; No murmurs; no peripheral edema  GI: Soft, nontender, non distended, normal bowel sounds  SKIN: Dry, intact, No rashes or lesions  MUSCULOSKELETAL: Full range of motion, normal strength  NEURO: sensation intact, extraocular movements intact, no tremor  PSYCH: Alert and oriented x 3, normal affect, normal mood  CUSHING'S SIGNS: no striae    POCT Blood Glucose.: 428 mg/dL (04-29-22 @ 08:31)  POCT Blood Glucose.: 404 mg/dL (04-29-22 @ 07:31)  POCT Blood Glucose.: 460 mg/dL (04-29-22 @ 06:09)  POCT Blood Glucose.: 172 mg/dL (04-29-22 @ 02:24)  POCT Blood Glucose.: 125 mg/dL (04-28-22 @ 12:34)  POCT Blood Glucose.: 142 mg/dL (04-28-22 @ 09:22)  POCT Blood Glucose.: 153 mg/dL (04-28-22 @ 04:37)  POCT Blood Glucose.: 122 mg/dL (04-28-22 @ 03:25)  POCT Blood Glucose.: 39 mg/dL (04-28-22 @ 03:00)  POCT Blood Glucose.: 40 mg/dL (04-28-22 @ 02:58)  POCT Blood Glucose.: 98 mg/dL (04-28-22 @ 01:47)  POCT Blood Glucose.: 316 mg/dL (04-27-22 @ 23:57)  POCT Blood Glucose.: 487 mg/dL (04-27-22 @ 20:38)  POCT Blood Glucose.: 394 mg/dL (04-27-22 @ 19:26)                            8.8    7.55  )-----------( 367      ( 29 Apr 2022 04:16 )             28.9       04-29    139  |  103  |  11  ----------------------------<  274<H>  4.2   |  23  |  0.81    EGFR if : x   EGFR if non : x     Ca    9.0      04-29  Mg     1.70     04-29  Phos  2.9     04-29    TPro  5.9<L>  /  Alb  3.3  /  TBili  0.2  /  DBili  x   /  AST  21  /  ALT  14  /  AlkPhos  89  04-29      Thyroid Function Tests:  04-28 @ 02:25 TSH 1.03 FreeT4 -- T3 -- Anti TPO -- Anti Thyroglobulin Ab -- TSI --          02-17 Chol 226<H> Direct LDL -- LDL calculated 114<H>  Trig 61      Radiology:

## 2022-04-29 NOTE — ED PROVIDER NOTE - CLINICAL SUMMARY MEDICAL DECISION MAKING FREE TEXT BOX
30 y/o female w/ extensive PMH/PSH c/o 1 day history of increasing constant severe sharp localized lower abd pain (same pain as the one that was pursued w/ a CT abd/pelvis) nausea, and multiple episodes of non-bloody vomiting. Denies fevers, chills, chest pain, SOB, dizziness, dysuria, hematuria, diarrhea, constipation. Lower concern for intraabdominal process and ovarian torsion (negative CT scan and TVUS 1 day ago). Low concern for pregnancy (negative hcg 1 day ago). Concern for gastroparesis vs cystitis. Will pursue bloodwork to screen for electrolyte/anemia, provide fluids, zofran, pain meds, and reassess. Likely admit.

## 2022-04-29 NOTE — ED PROVIDER NOTE - OBJECTIVE STATEMENT
32 y/o female w/ PMH HTN, DM, ectopic pregnancy, gastroparesis w/ recent admission (A'ed for family emergency) w/ recent CT abd/pelvis concerning for bladder wall thickening, negative b-HCG, and unremarkable TVUS c/o 1 day history of increasing constant severe sharp localized lower abd pain (similar to pain for prior ED admission), nausea, and multiple episodes of non-bloody vomiting. Denies fevers, chills, chest pain, SOB, dizziness, dysuria, hematuria, diarrhea, constipation.

## 2022-04-29 NOTE — CONSULT NOTE ADULT - ASSESSMENT
31F with hx of T1DM (on insulin) c/b DKA and gastroparesis, c/f opiate seeking behavior, ectopic pregnancy x2, s/p bilateral ligation, presenting with abdominal pain, n/v likely due to gastroparesis - admitted just yesterday w/ same concerns but left AMA due to family emergency. Endocrine reconsulted for management of uncontrolled dm1.     DM Type 1, uncontrolled w/ complications   A1C 10.4% (04.27.22 @ 21:04)  Has had many admissions for DKA   Hyperglycemia & Hypoglycemia iso Variable PO intake  Recommendations:   - Lantus 18units SC QHS   - Admelog 8units SC Premeal/TIDAC   - Admelog Low Correction Scale Premeal & Low Correction Scale Bedtime   - Goal -180  - Blood glucose monitoring Premeal/Bedtime   - Hypoglycemia protocol   - Carb Consistent Diet   DC planning: Patient can continue to follow up at [address] [place] [address]. Continue on same regimen noted above. Planned to be initiated on insulin pump along w/ CGM as outpatient per patient.     HTN  Recommendations:   - BP goal < 130/80   - on Enlapril 5mg daily   - defer to primary team     HLD    Recommendations:   - LDL goal < 70   - defer to primary team   - consider addition of statin if no other contraindications     Gastroparesis   - currently on reglan   - monitor QTc   - better glycemic control advised   - outpatient GI evaluation     Discussed w/ Dr. Delroy Elliott DO   Endocrinology Fellow   Please call [phone] after hours and on weekends/holidays.  31F with hx of T1DM (on insulin) c/b DKA and gastroparesis, c/f opiate seeking behavior, ectopic pregnancy x2, s/p bilateral ligation, presenting with abdominal pain, n/v likely due to gastroparesis - admitted just yesterday w/ same concerns but left AMA due to family emergency. Endocrine reconsulted for management of uncontrolled dm1.     DM Type 1, uncontrolled w/ complications   A1C 10.4% (04.27.22 @ 21:04)  Has had many admissions for DKA   Hyperglycemia & Hypoglycemia iso Variable PO intake  Recommendations:   - Lantus 18units SC QHS   - Admelog 6units SC Premeal/TIDAC   - Admelog Low Correction Scale Premeal & Low Correction Scale Bedtime   - Goal -180  - Blood glucose monitoring Premeal/Bedtime   - Hypoglycemia protocol   - Carb Consistent Diet   DC planning: Patient can continue to follow up at [address] [place] [address]. Continue on same regimen noted above. Planned to be initiated on insulin pump along w/ CGM as outpatient per patient.     HTN  Recommendations:   - BP goal < 130/80   - on Enlapril 5mg daily   - defer to primary team     HLD    Recommendations:   - LDL goal < 70   - defer to primary team   - consider addition of statin if no other contraindications     Gastroparesis   - currently on reglan   - monitor QTc   - better glycemic control advised   - outpatient GI evaluation     Discussed w/ Dr. Delroy Elliott DO   Endocrinology Fellow   Please call [phone] after hours and on weekends/holidays.

## 2022-04-29 NOTE — ED PROVIDER NOTE - NS ED ROS FT
CONSTITUTIONAL: No fever, weight loss, or fatigue  EYES: No eye pain, visual disturbances, or discharge  ENMT:  No difficulty hearing, tinnitus, vertigo; No sinus or throat pain  NECK: No pain or stiffness  RESPIRATORY: No cough, wheezing, chills or hemoptysis; No shortness of breath  CARDIOVASCULAR: No chest pain, palpitations, dizziness, or leg swelling  GASTROINTESTINAL: + abdominal or epigastric pain. + nausea, vomiting, no hematemesis; No diarrhea or constipation. No melena or hematochezia.  GENITOURINARY: No dysuria, frequency, hematuria, or incontinence  NEUROLOGICAL: No headaches, memory loss, loss of strength, numbness, or tremors  SKIN: No itching, burning, rashes, or lesions

## 2022-04-30 ENCOUNTER — TRANSCRIPTION ENCOUNTER (OUTPATIENT)
Age: 32
End: 2022-04-30

## 2022-04-30 VITALS
OXYGEN SATURATION: 99 % | TEMPERATURE: 98 F | RESPIRATION RATE: 17 BRPM | HEART RATE: 88 BPM | DIASTOLIC BLOOD PRESSURE: 88 MMHG | SYSTOLIC BLOOD PRESSURE: 134 MMHG

## 2022-04-30 LAB
CULTURE RESULTS: SIGNIFICANT CHANGE UP
GLUCOSE BLDC GLUCOMTR-MCNC: 102 MG/DL — HIGH (ref 70–99)
GLUCOSE BLDC GLUCOMTR-MCNC: 236 MG/DL — HIGH (ref 70–99)
GLUCOSE BLDC GLUCOMTR-MCNC: 281 MG/DL — HIGH (ref 70–99)
GLUCOSE BLDC GLUCOMTR-MCNC: 328 MG/DL — HIGH (ref 70–99)
SPECIMEN SOURCE: SIGNIFICANT CHANGE UP

## 2022-04-30 PROCEDURE — 99232 SBSQ HOSP IP/OBS MODERATE 35: CPT

## 2022-04-30 PROCEDURE — 99239 HOSP IP/OBS DSCHRG MGMT >30: CPT

## 2022-04-30 RX ORDER — INSULIN LISPRO 100/ML
6 VIAL (ML) SUBCUTANEOUS
Qty: 540 | Refills: 0
Start: 2022-04-30 | End: 2022-05-29

## 2022-04-30 RX ORDER — HYDROMORPHONE HYDROCHLORIDE 2 MG/ML
0.5 INJECTION INTRAMUSCULAR; INTRAVENOUS; SUBCUTANEOUS ONCE
Refills: 0 | Status: DISCONTINUED | OUTPATIENT
Start: 2022-04-30 | End: 2022-04-30

## 2022-04-30 RX ORDER — ONDANSETRON 8 MG/1
1 TABLET, FILM COATED ORAL
Qty: 90 | Refills: 0
Start: 2022-04-30 | End: 2022-05-29

## 2022-04-30 RX ORDER — INSULIN GLARGINE 100 [IU]/ML
18 INJECTION, SOLUTION SUBCUTANEOUS
Qty: 540 | Refills: 0
Start: 2022-04-30 | End: 2022-05-29

## 2022-04-30 RX ORDER — ACETAMINOPHEN 500 MG
1000 TABLET ORAL ONCE
Refills: 0 | Status: COMPLETED | OUTPATIENT
Start: 2022-04-30 | End: 2022-04-30

## 2022-04-30 RX ADMIN — HYDROMORPHONE HYDROCHLORIDE 0.5 MILLIGRAM(S): 2 INJECTION INTRAMUSCULAR; INTRAVENOUS; SUBCUTANEOUS at 07:55

## 2022-04-30 RX ADMIN — HYDROMORPHONE HYDROCHLORIDE 0.5 MILLIGRAM(S): 2 INJECTION INTRAMUSCULAR; INTRAVENOUS; SUBCUTANEOUS at 01:35

## 2022-04-30 RX ADMIN — HYDROMORPHONE HYDROCHLORIDE 0.5 MILLIGRAM(S): 2 INJECTION INTRAMUSCULAR; INTRAVENOUS; SUBCUTANEOUS at 07:30

## 2022-04-30 RX ADMIN — Medication 6 UNIT(S): at 12:28

## 2022-04-30 RX ADMIN — Medication 5 MILLIGRAM(S): at 05:07

## 2022-04-30 RX ADMIN — HYDROMORPHONE HYDROCHLORIDE 0.5 MILLIGRAM(S): 2 INJECTION INTRAMUSCULAR; INTRAVENOUS; SUBCUTANEOUS at 01:15

## 2022-04-30 RX ADMIN — Medication 2: at 08:39

## 2022-04-30 RX ADMIN — Medication 6 UNIT(S): at 08:40

## 2022-04-30 NOTE — DISCHARGE NOTE PROVIDER - NSDCMRMEDTOKEN_GEN_ALL_CORE_FT
acetaminophen 325 mg oral tablet: 2 tab(s) orally every 8 hours, As Needed  Admelog SoloStar 100 units/mL injectable solution: 6 unit(s) injectable 3 times a day  ***Follow up with endocrinologist on further dose adjustments***  HOLD if not eating a meal  Basaglar KwikPen 100 units/mL subcutaneous solution: 18 unit(s) subcutaneous once a day (at bedtime)  enalapril 5 mg oral tablet: 1 tab(s) orally once a day, HOLD for SBP &lt;110  famotidine 20 mg oral tablet: 1 tab(s) orally once a day  glucose tablets: Follow instructions on bottle when sugar is low.  metoclopramide 10 mg oral tablet: 1 tab(s) orally 4 times a day (before meals and at bedtime), As Needed  ondansetron 4 mg oral tablet, disintegratin tab(s) orally 3 times a day, As Needed -for nausea   polyethylene glycol 3350 oral powder for reconstitution: 17 gram(s) orally once a day, As Needed

## 2022-04-30 NOTE — DISCHARGE NOTE PROVIDER - HOSPITAL COURSE
31F with hx of T1DM (on insulin) c/b DKA and gastroparesis, ectopic pregnancy x2, s/p bilateral ligation, presenting with abdominal pain, n/v likely due to gastroparesis. Seen by Endo, insulin adjusted, FS now stable. Abdominal pain and N/V resolved, tolerating diet well. Patient is hemodynamically stable and medically cleared for discharge.     Hospital Course:   Gastroparesis due to DM.   - presents with abdominal pain, n/v- resolved   - CTAP 4/28-  circumferential bladder wall thickening, UA unremarkable, patient without urinary symptoms, UCX negative till date   - TVUS 4/27: no acute findings   - Reglan q8 (QTc 420ms)    - pain control: encourage Tylenol PRN. attempt to limit opioid use  - tolerating diet well   - follow up with GI Clinic [phone]    Diabetes mellitus.   - A1C 10.4%  - Brittle FS, Has had many admissions for DKA   - FS elevated on admission, now improved, no anion gap   - Endo consulted - c/w basal/bolus, ISS >adjusted bolus insulin  - follow up with outpatient Kristopher Bose, Appt with MAGDA Cobb on 5/13/22 at 10am at [address]. And with Kristopher Bose on 7/11 at 1:15pm at [address] [place] [address]., [place]. Planned to be initiated on insulin pump along w/ CGM as outpatient per patient    Hypertension.   - Stable  - c/w home enalapril    Anemia.   - Hb 8.8 (baseline ~9)  - microcytic.    Viral syndrome.   - RVP +covid neg  - CXR clear lungs, oxygen sats stable on room air   - no respiratory symptoms  - continue to monitor, supportive care.    Dispo: home    On 4/30/2022, case was discussed with Dr. Mike, patient is medically cleared and optimized for discharge home today. All medications were reviewed with attending, and sent to mutually agreed upon pharmacy. As per Kristopher Potts to discharge patient on current insulin regimen, patient has follow up with Endo NP on 5/13 and with Dr. Bose (patient states she has all home meds and does not need refills).

## 2022-04-30 NOTE — DISCHARGE NOTE PROVIDER - NSDCFUSCHEDAPPT_GEN_ALL_CORE_FT
Kathe Cobb Physician [facility]  Med Endocr [address] [facility]  Scheduled Appointment: 05/13/2022

## 2022-04-30 NOTE — DISCHARGE NOTE NURSING/CASE MANAGEMENT/SOCIAL WORK - PATIENT PORTAL LINK FT
You can access the FollowMyHealth Patient Portal offered by [facility] by registering at the following website: [email or web address]. By joining [facility]’s FollowMyHealth portal, you will also be able to view your health information using other applications (apps) compatible with our system.

## 2022-04-30 NOTE — PROGRESS NOTE ADULT - ASSESSMENT
31F with hx of T1DM (on insulin) c/b DKA and gastroparesis, c/f opiate seeking behavior, ectopic pregnancy x2, s/p bilateral ligation, presenting with abdominal pain, n/v likely due to gastroparesis. Endocrine consulted for management of uncontrolled dm1.     DM Type 1, uncontrolled w/ complications   A1C 10.4% (04.27.22 @ 21:04)  Has had many admissions for DKA   Hyperglycemia & Hypoglycemia iso Variable PO intake  Recommendations:   -Continue with Lantus 18 units SC QHS   - Continue with Admelog 6units SC Premeal/TIDAC   - Admelog Low Correction Scale Premeal & Low Correction Scale Bedtime   - Goal -180  - Blood glucose monitoring Premeal/Bedtime   - Hypoglycemia protocol   - Carb Consistent Diet   DC planning: Resume home Basaglar 18 units qhs  Admelog 6/6/6 for now (if running high may resume prior higher doses of 8-10 units premeal as outpatient).     Patient can continue to follow up at [address] [place] [address]., [place]. Continue on same regimen noted above. Planned to be initiated on insulin pump along w/ CGM as outpatient per patient.   Has appointment with MAGDA Archuleta 5/13 at 10AM ay [address] [place] [address].    HTN  Recommendations:   - BP goal < 130/80   - on Enlapril 5mg daily (ensure contraception)  - defer to primary team     HLD    Recommendations:   - LDL goal < 70   - defer to primary team     Gastroparesis   - currently on reglan   - monitor QTc   - better glycemic control advised   - outpatient GI evaluation     DC recommendations reviewed with EUGENIA Ramos.    Shanna Hathaway MD  Division of Endocrinology  Pager: [phone]    If after 6PM or before 9AM, or on weekends/holidays, please call endocrine answering service for assistance ([phone]).  For nonurgent matters email [email or web address] for assistance.
31F with hx of T1DM (on insulin) c/b DKA and gastroparesis, ectopic pregnancy x2, s/p bilateral ligation, presenting with abdominal pain, n/v likely due to gastroparesis.

## 2022-04-30 NOTE — DISCHARGE NOTE PROVIDER - CARE PROVIDER_API CALL
Chrissy Bose (DO)  EndocrinologyMetabDiabetes; Internal Medicine  [address] [facility], [address]  [place] [address]  Phone: [phone]  Fax: [phone]  Established Patient  Scheduled Appointment: 07/11/2022 01:15 PM    Kathe Cobb (NP; RN)  NP in Family Health  [address] [facility] [address]  [place] [address]  Phone: [phone]  Fax: [phone]  Established Patient  Scheduled Appointment: 05/13/2022 10:00 AM

## 2022-04-30 NOTE — PROGRESS NOTE ADULT - PROBLEM SELECTOR PLAN 6
- lovenox sc   will dc home today as pain improved, pt tolerating diet, pt wants to go home as its her birthday today   outpt GI and endo f/u   Patient hemodynamically stable for discharge home  Time spent in discharge process is 40 min

## 2022-04-30 NOTE — PROGRESS NOTE ADULT - PROBLEM SELECTOR PROBLEM 1
Gastroparesis due to DM
Uncontrolled type 1 diabetes mellitus with hyperglycemia, with long-term current use of insulin

## 2022-04-30 NOTE — PROGRESS NOTE ADULT - PROBLEM SELECTOR PLAN 2
Brittle FS  - FS elevated on admission, now improved, no anion gap   - endo recs appreciated   - c/w Lantus 18U qhs + Admelog 6  UTID qac + FS/SSI qac and hs  - pt advised to f/u with endo as outpt

## 2022-04-30 NOTE — DISCHARGE NOTE PROVIDER - PROVIDER TOKENS
PROVIDER:[TOKEN:[[id number]],SCHEDULEDAPPT:[07/11/2022],SCHEDULEDAPPTTIME:[01:15 PM],ESTABLISHEDPATIENT:[T]],PROVIDER:[TOKEN:[[id number]],SCHEDULEDAPPT:[05/13/2022],SCHEDULEDAPPTTIME:[10:00 AM],ESTABLISHEDPATIENT:[T]]

## 2022-04-30 NOTE — PROGRESS NOTE ADULT - PROBLEM SELECTOR PLAN 4
Hb 8.8 (baseline ~9)  - microcytic  - Anemia w/u as outpt as pt wants to go home today   - trend CBC

## 2022-04-30 NOTE — DISCHARGE NOTE PROVIDER - NSDCFUADDAPPT_GEN_ALL_CORE_FT
Repeat bloodwork with a primary care doctor within 2 weeks (CBC, BMP, LFTs).  *** If you are in need of a general medicine physician and post-discharge medical follow-up for further care/recommendations you may contact the [facility] for an appointment [phone] at [address] [place] [address] (also this is the GI Clinic)

## 2022-04-30 NOTE — DISCHARGE NOTE PROVIDER - NSDCCPCAREPLAN_GEN_ALL_CORE_FT
PRINCIPAL DISCHARGE DIAGNOSIS  Diagnosis: Abdominal pain  Assessment and Plan of Treatment: Gastroparesis, pain now resolved. Tolerating diet well.   CAT scan with circumferential bladder wall thickening, urine studies unremarkable. Transvaginal ultrasound with no acute findings   Continue tylenol as needed for pain.   *****Close follow up with GI Clinic [phone] for further management and evaluation**** Call for appointment      SECONDARY DISCHARGE DIAGNOSES  Diagnosis: Uncontrolled type 1 diabetes mellitus with hyperglycemia, with long-term current use of insulin  Assessment and Plan of Treatment: A1C 10.4%, blood sugars noted to be fluctuating. You were closely monitored by endocrinologist, now blood sugars stable. Continue your insulin regimen as directed (dose adjusted).***  Continue a consistent carbohydrate diet (Meaning eating the same amount of carbohydrates at the same time each day). Monitor blood glucose levels throughout the day before meals and at bedtime. Record blood sugars and bring to outpatient providers appointment in order to be reviewed by your doctor for management modifications. If your sugars are more than 400 or less than 70 you should contact your PCP immediately. Monitor for signs/symptoms of low blood glucose, such as, dizziness, altered mental status, or cool/clammy skin. In addition, monitor for signs/symptoms of high blood glucose, such as, feeling hot, dry, fatigued, or with increased thirst/urination. Make regular podiatry appointments in order to have feet checked for wounds and uncontrolled toe nail growth to prevent infections, as well as, appointments with an ophthalmologist to monitor your vision.  ******You have an appointment with Endocrinology NP Kathe Cobb on 5/13/22 at 10am at [address] [place] [address], call office to confirm********  ****Also appointment with Endo Dr. Bose on 7/11/22 at 1:15pm at [address] [place] [address]., [place]>>to discuss possible plans to initiate on insulin pump along w/ CGM as outpatient per patient*******************    Diagnosis: Hypertension  Assessment and Plan of Treatment: Stable, Continue blood pressure medication regimen as directed. Follow a low salt/cholesterol diet. Monitor for any visual changes, headaches or dizziness.  Monitor blood pressure regularly.  Follow up with your primary care provider for further management for high blood pressure.  ***Follow up with primary care papito on further management,  if you need a medicine physician and post-discharge medical follow-up for further care/recommendations you may contact the [facility] for an appointment [phone]

## 2022-04-30 NOTE — PROGRESS NOTE ADULT - PROBLEM SELECTOR PLAN 1
presents with abdominal pain, n/v, now resolved   - CTAP 4/28-  circumferential bladder wall thickening, UA unremarkable, patient without urinary sx  - TVUS 4/27: no acute findings  - Reglan q8 (QTc 420ms)  - pain control: encourage Tylenol PRN. attempt to limit opioid use  - DM diet, IVF  -pt wants to go home today and see GI as outpt for w/u of gastroparesis

## 2022-04-30 NOTE — DISCHARGE NOTE PROVIDER - NSFOLLOWUPCLINICS_GEN_ALL_ED_FT
[facility]  Gastroenterology  [address] [place] [address]  Phone: [phone]  Fax:   Follow Up Time: 2 weeks    [facility]  Internal Medicine  [address] [place] [address]  Phone: [phone]  Fax: [phone]

## 2022-05-08 ENCOUNTER — INPATIENT (INPATIENT)
Facility: HOSPITAL | Age: 32
LOS: 3 days | Discharge: LEFT BEFORE DISCHARGE | End: 2022-05-12
Attending: STUDENT IN AN ORGANIZED HEALTH CARE EDUCATION/TRAINING PROGRAM | Admitting: STUDENT IN AN ORGANIZED HEALTH CARE EDUCATION/TRAINING PROGRAM
Payer: MEDICAID

## 2022-05-08 VITALS
DIASTOLIC BLOOD PRESSURE: 74 MMHG | RESPIRATION RATE: 17 BRPM | SYSTOLIC BLOOD PRESSURE: 165 MMHG | HEART RATE: 140 BPM | TEMPERATURE: 100 F | OXYGEN SATURATION: 99 % | HEIGHT: 66 IN

## 2022-05-08 DIAGNOSIS — O00.9 ECTOPIC PREGNANCY, UNSPECIFIED: Chronic | ICD-10-CM

## 2022-05-08 DIAGNOSIS — Z90.49 ACQUIRED ABSENCE OF OTHER SPECIFIED PARTS OF DIGESTIVE TRACT: Chronic | ICD-10-CM

## 2022-05-08 PROCEDURE — 99285 EMERGENCY DEPT VISIT HI MDM: CPT

## 2022-05-08 RX ORDER — ACETAMINOPHEN 500 MG
1000 TABLET ORAL ONCE
Refills: 0 | Status: COMPLETED | OUTPATIENT
Start: 2022-05-08 | End: 2022-05-08

## 2022-05-08 RX ORDER — SODIUM CHLORIDE 9 MG/ML
1000 INJECTION, SOLUTION INTRAVENOUS ONCE
Refills: 0 | Status: COMPLETED | OUTPATIENT
Start: 2022-05-08 | End: 2022-05-08

## 2022-05-08 NOTE — ED ADULT TRIAGE NOTE - CHIEF COMPLAINT QUOTE
Pt c/o hyperglycemia. Also endorsing lower abdominal pain, nausea, vomiting X 3 hours. Received 4mg Zofran IM and 4mg morphine IM by EMS PTA. Temp 100.0 orally. Actively vomiting in triage. Phx: DM I (insulin-dependent), ectopic pregnancy, gastroparesis.  in triage.

## 2022-05-09 DIAGNOSIS — F17.200 NICOTINE DEPENDENCE, UNSPECIFIED, UNCOMPLICATED: ICD-10-CM

## 2022-05-09 DIAGNOSIS — I10 ESSENTIAL (PRIMARY) HYPERTENSION: ICD-10-CM

## 2022-05-09 DIAGNOSIS — E10.10 TYPE 1 DIABETES MELLITUS WITH KETOACIDOSIS WITHOUT COMA: ICD-10-CM

## 2022-05-09 DIAGNOSIS — Z29.9 ENCOUNTER FOR PROPHYLACTIC MEASURES, UNSPECIFIED: ICD-10-CM

## 2022-05-09 LAB
A1C WITH ESTIMATED AVERAGE GLUCOSE RESULT: 10.4 % — HIGH (ref 4–5.6)
ALBUMIN SERPL ELPH-MCNC: 3 G/DL — LOW (ref 3.3–5)
ALBUMIN SERPL ELPH-MCNC: 3.8 G/DL — SIGNIFICANT CHANGE UP (ref 3.3–5)
ALP SERPL-CCNC: 104 U/L — SIGNIFICANT CHANGE UP (ref 40–120)
ALP SERPL-CCNC: 84 U/L — SIGNIFICANT CHANGE UP (ref 40–120)
ALT FLD-CCNC: 17 U/L — SIGNIFICANT CHANGE UP (ref 4–33)
ALT FLD-CCNC: 22 U/L — SIGNIFICANT CHANGE UP (ref 4–33)
ANION GAP SERPL CALC-SCNC: 11 MMOL/L — SIGNIFICANT CHANGE UP (ref 7–14)
ANION GAP SERPL CALC-SCNC: 11 MMOL/L — SIGNIFICANT CHANGE UP (ref 7–14)
ANION GAP SERPL CALC-SCNC: 20 MMOL/L — HIGH (ref 7–14)
APPEARANCE UR: CLEAR — SIGNIFICANT CHANGE UP
APPEARANCE UR: CLEAR — SIGNIFICANT CHANGE UP
AST SERPL-CCNC: 22 U/L — SIGNIFICANT CHANGE UP (ref 4–32)
AST SERPL-CCNC: 34 U/L — HIGH (ref 4–32)
B PERT DNA SPEC QL NAA+PROBE: SIGNIFICANT CHANGE UP
B PERT+PARAPERT DNA PNL SPEC NAA+PROBE: SIGNIFICANT CHANGE UP
B-OH-BUTYR SERPL-SCNC: 2.9 MMOL/L — HIGH (ref 0–0.4)
BACTERIA # UR AUTO: NEGATIVE — SIGNIFICANT CHANGE UP
BASOPHILS # BLD AUTO: 0.04 K/UL — SIGNIFICANT CHANGE UP (ref 0–0.2)
BASOPHILS NFR BLD AUTO: 0.4 % — SIGNIFICANT CHANGE UP (ref 0–2)
BILIRUB SERPL-MCNC: 0.3 MG/DL — SIGNIFICANT CHANGE UP (ref 0.2–1.2)
BILIRUB SERPL-MCNC: 0.6 MG/DL — SIGNIFICANT CHANGE UP (ref 0.2–1.2)
BILIRUB UR-MCNC: NEGATIVE — SIGNIFICANT CHANGE UP
BILIRUB UR-MCNC: NEGATIVE — SIGNIFICANT CHANGE UP
BLOOD GAS VENOUS COMPREHENSIVE RESULT: SIGNIFICANT CHANGE UP
BORDETELLA PARAPERTUSSIS (RAPRVP): SIGNIFICANT CHANGE UP
BUN SERPL-MCNC: 10 MG/DL — SIGNIFICANT CHANGE UP (ref 7–23)
BUN SERPL-MCNC: 13 MG/DL — SIGNIFICANT CHANGE UP (ref 7–23)
BUN SERPL-MCNC: 8 MG/DL — SIGNIFICANT CHANGE UP (ref 7–23)
C PNEUM DNA SPEC QL NAA+PROBE: SIGNIFICANT CHANGE UP
CALCIUM SERPL-MCNC: 8.9 MG/DL — SIGNIFICANT CHANGE UP (ref 8.4–10.5)
CALCIUM SERPL-MCNC: 9 MG/DL — SIGNIFICANT CHANGE UP (ref 8.4–10.5)
CALCIUM SERPL-MCNC: 9.8 MG/DL — SIGNIFICANT CHANGE UP (ref 8.4–10.5)
CHLORIDE SERPL-SCNC: 100 MMOL/L — SIGNIFICANT CHANGE UP (ref 98–107)
CHLORIDE SERPL-SCNC: 100 MMOL/L — SIGNIFICANT CHANGE UP (ref 98–107)
CHLORIDE SERPL-SCNC: 90 MMOL/L — LOW (ref 98–107)
CO2 SERPL-SCNC: 18 MMOL/L — LOW (ref 22–31)
CO2 SERPL-SCNC: 24 MMOL/L — SIGNIFICANT CHANGE UP (ref 22–31)
CO2 SERPL-SCNC: 24 MMOL/L — SIGNIFICANT CHANGE UP (ref 22–31)
COLOR SPEC: SIGNIFICANT CHANGE UP
COLOR SPEC: SIGNIFICANT CHANGE UP
CREAT SERPL-MCNC: 0.72 MG/DL — SIGNIFICANT CHANGE UP (ref 0.5–1.3)
CREAT SERPL-MCNC: 0.89 MG/DL — SIGNIFICANT CHANGE UP (ref 0.5–1.3)
CREAT SERPL-MCNC: 0.9 MG/DL — SIGNIFICANT CHANGE UP (ref 0.5–1.3)
DIFF PNL FLD: NEGATIVE — SIGNIFICANT CHANGE UP
DIFF PNL FLD: NEGATIVE — SIGNIFICANT CHANGE UP
EGFR: 114 ML/MIN/1.73M2 — SIGNIFICANT CHANGE UP
EGFR: 87 ML/MIN/1.73M2 — SIGNIFICANT CHANGE UP
EGFR: 88 ML/MIN/1.73M2 — SIGNIFICANT CHANGE UP
EOSINOPHIL # BLD AUTO: 0.08 K/UL — SIGNIFICANT CHANGE UP (ref 0–0.5)
EOSINOPHIL NFR BLD AUTO: 0.8 % — SIGNIFICANT CHANGE UP (ref 0–6)
EPI CELLS # UR: 2 /HPF — SIGNIFICANT CHANGE UP (ref 0–5)
ESTIMATED AVERAGE GLUCOSE: 252 — SIGNIFICANT CHANGE UP
FLUAV SUBTYP SPEC NAA+PROBE: SIGNIFICANT CHANGE UP
FLUBV RNA SPEC QL NAA+PROBE: SIGNIFICANT CHANGE UP
GLUCOSE BLDC GLUCOMTR-MCNC: 103 MG/DL — HIGH (ref 70–99)
GLUCOSE BLDC GLUCOMTR-MCNC: 118 MG/DL — HIGH (ref 70–99)
GLUCOSE BLDC GLUCOMTR-MCNC: 147 MG/DL — HIGH (ref 70–99)
GLUCOSE BLDC GLUCOMTR-MCNC: 157 MG/DL — HIGH (ref 70–99)
GLUCOSE BLDC GLUCOMTR-MCNC: 167 MG/DL — HIGH (ref 70–99)
GLUCOSE BLDC GLUCOMTR-MCNC: 253 MG/DL — HIGH (ref 70–99)
GLUCOSE BLDC GLUCOMTR-MCNC: 464 MG/DL — CRITICAL HIGH (ref 70–99)
GLUCOSE BLDC GLUCOMTR-MCNC: 470 MG/DL — CRITICAL HIGH (ref 70–99)
GLUCOSE BLDC GLUCOMTR-MCNC: 54 MG/DL — CRITICAL LOW (ref 70–99)
GLUCOSE BLDC GLUCOMTR-MCNC: 68 MG/DL — LOW (ref 70–99)
GLUCOSE BLDC GLUCOMTR-MCNC: 82 MG/DL — SIGNIFICANT CHANGE UP (ref 70–99)
GLUCOSE SERPL-MCNC: 274 MG/DL — HIGH (ref 70–99)
GLUCOSE SERPL-MCNC: 344 MG/DL — HIGH (ref 70–99)
GLUCOSE SERPL-MCNC: 596 MG/DL — CRITICAL HIGH (ref 70–99)
GLUCOSE UR QL: ABNORMAL
GLUCOSE UR QL: ABNORMAL
HADV DNA SPEC QL NAA+PROBE: SIGNIFICANT CHANGE UP
HCOV 229E RNA SPEC QL NAA+PROBE: SIGNIFICANT CHANGE UP
HCOV HKU1 RNA SPEC QL NAA+PROBE: SIGNIFICANT CHANGE UP
HCOV NL63 RNA SPEC QL NAA+PROBE: SIGNIFICANT CHANGE UP
HCOV OC43 RNA SPEC QL NAA+PROBE: SIGNIFICANT CHANGE UP
HCT VFR BLD CALC: 30.7 % — LOW (ref 34.5–45)
HGB BLD-MCNC: 9.3 G/DL — LOW (ref 11.5–15.5)
HMPV RNA SPEC QL NAA+PROBE: SIGNIFICANT CHANGE UP
HPIV1 RNA SPEC QL NAA+PROBE: SIGNIFICANT CHANGE UP
HPIV2 RNA SPEC QL NAA+PROBE: SIGNIFICANT CHANGE UP
HPIV3 RNA SPEC QL NAA+PROBE: SIGNIFICANT CHANGE UP
HPIV4 RNA SPEC QL NAA+PROBE: SIGNIFICANT CHANGE UP
IANC: 8.35 K/UL — HIGH (ref 1.8–7.4)
IMM GRANULOCYTES NFR BLD AUTO: 0.3 % — SIGNIFICANT CHANGE UP (ref 0–1.5)
KETONES UR-MCNC: ABNORMAL
KETONES UR-MCNC: ABNORMAL
LEUKOCYTE ESTERASE UR-ACNC: NEGATIVE — SIGNIFICANT CHANGE UP
LEUKOCYTE ESTERASE UR-ACNC: NEGATIVE — SIGNIFICANT CHANGE UP
LIDOCAIN IGE QN: 7 U/L — SIGNIFICANT CHANGE UP (ref 7–60)
LYMPHOCYTES # BLD AUTO: 1.02 K/UL — SIGNIFICANT CHANGE UP (ref 1–3.3)
LYMPHOCYTES # BLD AUTO: 9.6 % — LOW (ref 13–44)
M PNEUMO DNA SPEC QL NAA+PROBE: SIGNIFICANT CHANGE UP
MAGNESIUM SERPL-MCNC: 1.8 MG/DL — SIGNIFICANT CHANGE UP (ref 1.6–2.6)
MAGNESIUM SERPL-MCNC: 1.9 MG/DL — SIGNIFICANT CHANGE UP (ref 1.6–2.6)
MCHC RBC-ENTMCNC: 18.5 PG — LOW (ref 27–34)
MCHC RBC-ENTMCNC: 30.3 GM/DL — LOW (ref 32–36)
MCV RBC AUTO: 60.9 FL — LOW (ref 80–100)
MONOCYTES # BLD AUTO: 1.11 K/UL — HIGH (ref 0–0.9)
MONOCYTES NFR BLD AUTO: 10.4 % — SIGNIFICANT CHANGE UP (ref 2–14)
NEUTROPHILS # BLD AUTO: 8.35 K/UL — HIGH (ref 1.8–7.4)
NEUTROPHILS NFR BLD AUTO: 78.5 % — HIGH (ref 43–77)
NITRITE UR-MCNC: NEGATIVE — SIGNIFICANT CHANGE UP
NITRITE UR-MCNC: NEGATIVE — SIGNIFICANT CHANGE UP
NRBC # BLD: 0 /100 WBCS — SIGNIFICANT CHANGE UP
NRBC # FLD: 0 K/UL — SIGNIFICANT CHANGE UP
PH UR: 6 — SIGNIFICANT CHANGE UP (ref 5–8)
PH UR: 6 — SIGNIFICANT CHANGE UP (ref 5–8)
PHOSPHATE SERPL-MCNC: 2.7 MG/DL — SIGNIFICANT CHANGE UP (ref 2.5–4.5)
PHOSPHATE SERPL-MCNC: 3 MG/DL — SIGNIFICANT CHANGE UP (ref 2.5–4.5)
PLATELET # BLD AUTO: 415 K/UL — HIGH (ref 150–400)
POTASSIUM SERPL-MCNC: 3.7 MMOL/L — SIGNIFICANT CHANGE UP (ref 3.5–5.3)
POTASSIUM SERPL-MCNC: 4 MMOL/L — SIGNIFICANT CHANGE UP (ref 3.5–5.3)
POTASSIUM SERPL-MCNC: 4.4 MMOL/L — SIGNIFICANT CHANGE UP (ref 3.5–5.3)
POTASSIUM SERPL-SCNC: 3.7 MMOL/L — SIGNIFICANT CHANGE UP (ref 3.5–5.3)
POTASSIUM SERPL-SCNC: 4 MMOL/L — SIGNIFICANT CHANGE UP (ref 3.5–5.3)
POTASSIUM SERPL-SCNC: 4.4 MMOL/L — SIGNIFICANT CHANGE UP (ref 3.5–5.3)
PROT SERPL-MCNC: 6 G/DL — SIGNIFICANT CHANGE UP (ref 6–8.3)
PROT SERPL-MCNC: 7.1 G/DL — SIGNIFICANT CHANGE UP (ref 6–8.3)
PROT UR-MCNC: ABNORMAL
PROT UR-MCNC: ABNORMAL
RAPID RVP RESULT: SIGNIFICANT CHANGE UP
RBC # BLD: 5.04 M/UL — SIGNIFICANT CHANGE UP (ref 3.8–5.2)
RBC # FLD: 19.8 % — HIGH (ref 10.3–14.5)
RBC CASTS # UR COMP ASSIST: 5 /HPF — HIGH (ref 0–4)
RSV RNA SPEC QL NAA+PROBE: SIGNIFICANT CHANGE UP
RV+EV RNA SPEC QL NAA+PROBE: SIGNIFICANT CHANGE UP
SARS-COV-2 RNA SPEC QL NAA+PROBE: SIGNIFICANT CHANGE UP
SODIUM SERPL-SCNC: 128 MMOL/L — LOW (ref 135–145)
SODIUM SERPL-SCNC: 135 MMOL/L — SIGNIFICANT CHANGE UP (ref 135–145)
SODIUM SERPL-SCNC: 135 MMOL/L — SIGNIFICANT CHANGE UP (ref 135–145)
SP GR SPEC: 1.03 — SIGNIFICANT CHANGE UP (ref 1–1.05)
SP GR SPEC: 1.03 — SIGNIFICANT CHANGE UP (ref 1–1.05)
UROBILINOGEN FLD QL: SIGNIFICANT CHANGE UP
UROBILINOGEN FLD QL: SIGNIFICANT CHANGE UP
WBC # BLD: 10.63 K/UL — HIGH (ref 3.8–10.5)
WBC # FLD AUTO: 10.63 K/UL — HIGH (ref 3.8–10.5)
WBC UR QL: 3 /HPF — SIGNIFICANT CHANGE UP (ref 0–5)

## 2022-05-09 PROCEDURE — 99223 1ST HOSP IP/OBS HIGH 75: CPT

## 2022-05-09 PROCEDURE — 71045 X-RAY EXAM CHEST 1 VIEW: CPT | Mod: 26

## 2022-05-09 PROCEDURE — 74019 RADEX ABDOMEN 2 VIEWS: CPT | Mod: 26

## 2022-05-09 PROCEDURE — 99233 SBSQ HOSP IP/OBS HIGH 50: CPT

## 2022-05-09 PROCEDURE — 99255 IP/OBS CONSLTJ NEW/EST HI 80: CPT

## 2022-05-09 RX ORDER — INSULIN LISPRO 100/ML
5 VIAL (ML) SUBCUTANEOUS
Refills: 0 | Status: DISCONTINUED | OUTPATIENT
Start: 2022-05-09 | End: 2022-05-11

## 2022-05-09 RX ORDER — INSULIN LISPRO 100/ML
VIAL (ML) SUBCUTANEOUS AT BEDTIME
Refills: 0 | Status: DISCONTINUED | OUTPATIENT
Start: 2022-05-09 | End: 2022-05-11

## 2022-05-09 RX ORDER — DEXTROSE 50 % IN WATER 50 %
25 SYRINGE (ML) INTRAVENOUS ONCE
Refills: 0 | Status: COMPLETED | OUTPATIENT
Start: 2022-05-09 | End: 2022-05-09

## 2022-05-09 RX ORDER — HYDROMORPHONE HYDROCHLORIDE 2 MG/ML
0.5 INJECTION INTRAMUSCULAR; INTRAVENOUS; SUBCUTANEOUS ONCE
Refills: 0 | Status: DISCONTINUED | OUTPATIENT
Start: 2022-05-09 | End: 2022-05-09

## 2022-05-09 RX ORDER — DIPHENHYDRAMINE HCL 50 MG
25 CAPSULE ORAL ONCE
Refills: 0 | Status: COMPLETED | OUTPATIENT
Start: 2022-05-09 | End: 2022-05-09

## 2022-05-09 RX ORDER — HALOPERIDOL DECANOATE 100 MG/ML
5 INJECTION INTRAMUSCULAR ONCE
Refills: 0 | Status: COMPLETED | OUTPATIENT
Start: 2022-05-09 | End: 2022-05-09

## 2022-05-09 RX ORDER — ACETAMINOPHEN 500 MG
1000 TABLET ORAL ONCE
Refills: 0 | Status: COMPLETED | OUTPATIENT
Start: 2022-05-09 | End: 2022-05-09

## 2022-05-09 RX ORDER — INSULIN LISPRO 100/ML
4 VIAL (ML) SUBCUTANEOUS ONCE
Refills: 0 | Status: COMPLETED | OUTPATIENT
Start: 2022-05-09 | End: 2022-05-09

## 2022-05-09 RX ORDER — INSULIN GLARGINE 100 [IU]/ML
15 INJECTION, SOLUTION SUBCUTANEOUS AT BEDTIME
Refills: 0 | Status: DISCONTINUED | OUTPATIENT
Start: 2022-05-09 | End: 2022-05-10

## 2022-05-09 RX ORDER — CAPSAICIN 0.025 %
1 CREAM (GRAM) TOPICAL ONCE
Refills: 0 | Status: COMPLETED | OUTPATIENT
Start: 2022-05-09 | End: 2022-05-09

## 2022-05-09 RX ORDER — INSULIN GLARGINE 100 [IU]/ML
18 INJECTION, SOLUTION SUBCUTANEOUS AT BEDTIME
Refills: 0 | Status: DISCONTINUED | OUTPATIENT
Start: 2022-05-09 | End: 2022-05-09

## 2022-05-09 RX ORDER — INSULIN GLARGINE 100 [IU]/ML
18 INJECTION, SOLUTION SUBCUTANEOUS ONCE
Refills: 0 | Status: COMPLETED | OUTPATIENT
Start: 2022-05-09 | End: 2022-05-09

## 2022-05-09 RX ORDER — PANTOPRAZOLE SODIUM 20 MG/1
40 TABLET, DELAYED RELEASE ORAL DAILY
Refills: 0 | Status: DISCONTINUED | OUTPATIENT
Start: 2022-05-09 | End: 2022-05-12

## 2022-05-09 RX ORDER — ONDANSETRON 8 MG/1
4 TABLET, FILM COATED ORAL EVERY 8 HOURS
Refills: 0 | Status: DISCONTINUED | OUTPATIENT
Start: 2022-05-09 | End: 2022-05-12

## 2022-05-09 RX ORDER — GLUCAGON INJECTION, SOLUTION 0.5 MG/.1ML
1 INJECTION, SOLUTION SUBCUTANEOUS ONCE
Refills: 0 | Status: DISCONTINUED | OUTPATIENT
Start: 2022-05-09 | End: 2022-05-12

## 2022-05-09 RX ORDER — DEXTROSE 50 % IN WATER 50 %
25 SYRINGE (ML) INTRAVENOUS ONCE
Refills: 0 | Status: DISCONTINUED | OUTPATIENT
Start: 2022-05-09 | End: 2022-05-12

## 2022-05-09 RX ORDER — SCOPALAMINE 1 MG/3D
1 PATCH, EXTENDED RELEASE TRANSDERMAL ONCE
Refills: 0 | Status: COMPLETED | OUTPATIENT
Start: 2022-05-09 | End: 2022-05-09

## 2022-05-09 RX ORDER — LANOLIN ALCOHOL/MO/W.PET/CERES
3 CREAM (GRAM) TOPICAL AT BEDTIME
Refills: 0 | Status: DISCONTINUED | OUTPATIENT
Start: 2022-05-09 | End: 2022-05-12

## 2022-05-09 RX ORDER — DEXTROSE 50 % IN WATER 50 %
12.5 SYRINGE (ML) INTRAVENOUS ONCE
Refills: 0 | Status: DISCONTINUED | OUTPATIENT
Start: 2022-05-09 | End: 2022-05-12

## 2022-05-09 RX ORDER — INSULIN LISPRO 100/ML
4 VIAL (ML) SUBCUTANEOUS
Refills: 0 | Status: DISCONTINUED | OUTPATIENT
Start: 2022-05-09 | End: 2022-05-09

## 2022-05-09 RX ORDER — DEXTROSE 50 % IN WATER 50 %
15 SYRINGE (ML) INTRAVENOUS ONCE
Refills: 0 | Status: DISCONTINUED | OUTPATIENT
Start: 2022-05-09 | End: 2022-05-12

## 2022-05-09 RX ORDER — INSULIN LISPRO 100/ML
6 VIAL (ML) SUBCUTANEOUS
Refills: 0 | Status: DISCONTINUED | OUTPATIENT
Start: 2022-05-09 | End: 2022-05-09

## 2022-05-09 RX ORDER — INSULIN LISPRO 100/ML
VIAL (ML) SUBCUTANEOUS
Refills: 0 | Status: DISCONTINUED | OUTPATIENT
Start: 2022-05-09 | End: 2022-05-11

## 2022-05-09 RX ORDER — SODIUM CHLORIDE 9 MG/ML
1000 INJECTION, SOLUTION INTRAVENOUS ONCE
Refills: 0 | Status: COMPLETED | OUTPATIENT
Start: 2022-05-09 | End: 2022-05-09

## 2022-05-09 RX ORDER — ACETAMINOPHEN 500 MG
650 TABLET ORAL EVERY 6 HOURS
Refills: 0 | Status: DISCONTINUED | OUTPATIENT
Start: 2022-05-09 | End: 2022-05-12

## 2022-05-09 RX ORDER — HYDROMORPHONE HYDROCHLORIDE 2 MG/ML
0.5 INJECTION INTRAMUSCULAR; INTRAVENOUS; SUBCUTANEOUS EVERY 6 HOURS
Refills: 0 | Status: DISCONTINUED | OUTPATIENT
Start: 2022-05-09 | End: 2022-05-10

## 2022-05-09 RX ORDER — METOCLOPRAMIDE HCL 10 MG
10 TABLET ORAL THREE TIMES A DAY
Refills: 0 | Status: DISCONTINUED | OUTPATIENT
Start: 2022-05-09 | End: 2022-05-12

## 2022-05-09 RX ORDER — INSULIN LISPRO 100/ML
8 VIAL (ML) SUBCUTANEOUS ONCE
Refills: 0 | Status: COMPLETED | OUTPATIENT
Start: 2022-05-09 | End: 2022-05-09

## 2022-05-09 RX ADMIN — Medication 10 MILLIGRAM(S): at 12:56

## 2022-05-09 RX ADMIN — Medication 3: at 17:40

## 2022-05-09 RX ADMIN — SODIUM CHLORIDE 1000 MILLILITER(S): 9 INJECTION, SOLUTION INTRAVENOUS at 02:32

## 2022-05-09 RX ADMIN — SODIUM CHLORIDE 1000 MILLILITER(S): 9 INJECTION, SOLUTION INTRAVENOUS at 00:19

## 2022-05-09 RX ADMIN — HYDROMORPHONE HYDROCHLORIDE 0.5 MILLIGRAM(S): 2 INJECTION INTRAMUSCULAR; INTRAVENOUS; SUBCUTANEOUS at 20:14

## 2022-05-09 RX ADMIN — ONDANSETRON 4 MILLIGRAM(S): 8 TABLET, FILM COATED ORAL at 19:43

## 2022-05-09 RX ADMIN — PANTOPRAZOLE SODIUM 40 MILLIGRAM(S): 20 TABLET, DELAYED RELEASE ORAL at 23:01

## 2022-05-09 RX ADMIN — Medication 25 GRAM(S): at 10:03

## 2022-05-09 RX ADMIN — INSULIN GLARGINE 15 UNIT(S): 100 INJECTION, SOLUTION SUBCUTANEOUS at 23:00

## 2022-05-09 RX ADMIN — Medication 25 MILLIGRAM(S): at 00:35

## 2022-05-09 RX ADMIN — Medication 4: at 23:00

## 2022-05-09 RX ADMIN — Medication 400 MILLIGRAM(S): at 05:01

## 2022-05-09 RX ADMIN — Medication 400 MILLIGRAM(S): at 00:19

## 2022-05-09 RX ADMIN — Medication 25 GRAM(S): at 07:25

## 2022-05-09 RX ADMIN — Medication 8 UNIT(S): at 02:27

## 2022-05-09 RX ADMIN — Medication 1000 MILLIGRAM(S): at 06:06

## 2022-05-09 RX ADMIN — HYDROMORPHONE HYDROCHLORIDE 0.5 MILLIGRAM(S): 2 INJECTION INTRAMUSCULAR; INTRAVENOUS; SUBCUTANEOUS at 00:38

## 2022-05-09 RX ADMIN — HYDROMORPHONE HYDROCHLORIDE 0.5 MILLIGRAM(S): 2 INJECTION INTRAMUSCULAR; INTRAVENOUS; SUBCUTANEOUS at 12:56

## 2022-05-09 RX ADMIN — SCOPALAMINE 1 PATCH: 1 PATCH, EXTENDED RELEASE TRANSDERMAL at 10:34

## 2022-05-09 RX ADMIN — HYDROMORPHONE HYDROCHLORIDE 0.5 MILLIGRAM(S): 2 INJECTION INTRAMUSCULAR; INTRAVENOUS; SUBCUTANEOUS at 19:44

## 2022-05-09 RX ADMIN — SCOPALAMINE 1 PATCH: 1 PATCH, EXTENDED RELEASE TRANSDERMAL at 02:31

## 2022-05-09 RX ADMIN — HALOPERIDOL DECANOATE 5 MILLIGRAM(S): 100 INJECTION INTRAMUSCULAR at 00:36

## 2022-05-09 RX ADMIN — SCOPALAMINE 1 PATCH: 1 PATCH, EXTENDED RELEASE TRANSDERMAL at 19:00

## 2022-05-09 RX ADMIN — Medication 4 UNIT(S): at 04:41

## 2022-05-09 RX ADMIN — HYDROMORPHONE HYDROCHLORIDE 0.5 MILLIGRAM(S): 2 INJECTION INTRAMUSCULAR; INTRAVENOUS; SUBCUTANEOUS at 13:10

## 2022-05-09 RX ADMIN — Medication 1 APPLICATION(S): at 02:30

## 2022-05-09 RX ADMIN — Medication 25 MILLIGRAM(S): at 19:43

## 2022-05-09 RX ADMIN — INSULIN GLARGINE 18 UNIT(S): 100 INJECTION, SOLUTION SUBCUTANEOUS at 03:16

## 2022-05-09 NOTE — ED PROVIDER NOTE - PHYSICAL EXAMINATION
Vital signs reviewed.  CONSTITUTIONAL: uncomfortable appearing, awake  HEAD: Normocephalic; atraumatic  EYES: EOMI, no conjunctival injection, no scleral icterus  MOUTH/THROAT:  dry mucus membranes  NECK: Trachea midline  CV: Normal S1, S2; no audible murmurs; extremities WWP  RESP: kussmaul breathing with increased work of breathing; CTAB, no stridor  ABD: soft, non-distended; diffusely tender  : Deferred  MSK/EXT: no edema, no limited ROM.    SKIN: No rashes on exposed skin surfaces.  Scar marks along arm from frequent IV placement.  NEURO: Moves all extremities spontaneously with no focal deficits, speech is appropriate

## 2022-05-09 NOTE — H&P ADULT - NSHPPHYSICALEXAM_GEN_ALL_CORE
Vital Signs Last 24 Hrs  T(C): 36.7 (09 May 2022 10:41), Max: 37.8 (08 May 2022 23:23)  T(F): 98 (09 May 2022 10:41), Max: 100 (08 May 2022 23:23)  HR: 82 (09 May 2022 10:41) (82 - 140)  BP: 114/76 (09 May 2022 10:41) (105/73 - 165/74)  BP(mean): --  RR: 17 (09 May 2022 10:41) (16 - 18)  SpO2: 98% (09 May 2022 10:41) (98% - 100%)    PHYSICAL EXAM:  GENERAL: NAD, well-developed  HEAD:  Atraumatic, Normocephalic  EYES: EOMI, PERRLA, conjunctiva and sclera clear  NECK: Supple, No JVD  CHEST/LUNG: Clear to auscultation bilaterally; No wheeze  HEART: Regular rate and rhythm; No murmurs, rubs, or gallops  ABDOMEN: Soft, Nontender, Nondistended; Bowel sounds present  EXTREMITIES:  2+ Peripheral Pulses, No clubbing, cyanosis, or edema  PSYCH: AAOx3  NEUROLOGY: non-focal  SKIN: No rashes or lesions Vital Signs Last 24 Hrs  T(C): 36.7 (09 May 2022 10:41), Max: 37.8 (08 May 2022 23:23)  T(F): 98 (09 May 2022 10:41), Max: 100 (08 May 2022 23:23)  HR: 82 (09 May 2022 10:41) (82 - 140)  BP: 114/76 (09 May 2022 10:41) (105/73 - 165/74)  BP(mean): --  RR: 17 (09 May 2022 10:41) (16 - 18)  SpO2: 98% (09 May 2022 10:41) (98% - 100%)    PHYSICAL EXAM:  GENERAL: sleeping arousable to voice   HEAD:  Atraumatic, Normocephalic  EYES: EOMI, PERRLA, conjunctiva and sclera clear  NECK: Supple, No JVD  CHEST/LUNG: Clear to auscultation bilaterally; No wheeze  HEART: Regular rate and rhythm; No murmurs, rubs, or gallops  ABDOMEN: Soft,, Nondistended; Bowel sounds present + b/l LQ tenderness without guarding or rebound  EXTREMITIES:  2+ Peripheral Pulses, No clubbing, cyanosis, or edema  PSYCH: AAOx3  NEUROLOGY: non-focal  SKIN: No rashes or lesions

## 2022-05-09 NOTE — H&P ADULT - NSHPLABSRESULTS_GEN_ALL_CORE
9.3    10.63 )-----------( 415      ( 09 May 2022 00:49 )             30.7           135  |  100  |  10  ----------------------------<  344<H>  4.0   |  24  |  0.89    Ca    9.0      09 May 2022 04:35  Phos  2.7     -  Mg     1.80         TPro  7.1  /  Alb  3.8  /  TBili  0.6  /  DBili  x   /  AST  34<H>  /  ALT  22  /  AlkPhos  104  05-      CAPILLARY BLOOD GLUCOSE      POCT Blood Glucose.: 118 mg/dL (09 May 2022 10:41)  POCT Blood Glucose.: 157 mg/dL (09 May 2022 09:49)  POCT Blood Glucose.: 54 mg/dL (09 May 2022 09:27)  POCT Blood Glucose.: 147 mg/dL (09 May 2022 07:50)  POCT Blood Glucose.: 167 mg/dL (09 May 2022 07:36)  POCT Blood Glucose.: 68 mg/dL (09 May 2022 07:15)  POCT Blood Glucose.: 103 mg/dL (09 May 2022 06:24)  POCT Blood Glucose.: 220 mg/dL (09 May 2022 05:26)  POCT Blood Glucose.: 349 mg/dL (09 May 2022 04:25)  POCT Blood Glucose.: 565 mg/dL (09 May 2022 03:14)  POCT Blood Glucose.: >600 mg/dL (09 May 2022 01:34)  POCT Blood Glucose.: 566 mg/dL (09 May 2022 00:17)  POCT Blood Glucose.: 569 mg/dL (08 May 2022 23:27)      Urinalysis Basic - ( 09 May 2022 01:00 )    Color: Light Yellow / Appearance: Clear / S.033 / pH: x  Gluc: x / Ketone: Moderate  / Bili: Negative / Urobili: <2 mg/dL   Blood: x / Protein: 30 mg/dL / Nitrite: Negative   Leuk Esterase: Negative / RBC: 5 /HPF / WBC 3 /HPF   Sq Epi: x / Non Sq Epi: 2 /HPF / Bacteria: Negative            Radiology  < from: Xray Chest 1 View- PORTABLE-Urgent (22 @ 01:33) >    FINDINGS:  The heart is normal in size.  Bilateral upper lobe predominant reticular opacities. Chain sutures in   the left hemithorax.  There is no pneumothorax or pleural effusion.  Surgical clips in the right upper quadrant.    IMPRESSION: No acute pulmonary disease.      --- End of Report ---    < end of copied text >

## 2022-05-09 NOTE — H&P ADULT - HISTORY OF PRESENT ILLNESS
33 yo F w/ hx T1DM w/ gastroparesis, HTN who presents with nausea, vomiting, and abd pain along with some SOB, onset this evening.  She has frequent episodes of DKA with unclear causes, she notes; she says she did not miss any insulin doses except for this evening's basaglar which she did not take due to vomiting.  She also notes some shortness of breath/kussmaul breathing which she says is typical of when she is in DKA.    Otherwise denies cough, fever, recent illness, constipation or diarrhea, or urinary complaints.    In ED was noted to have elevated AG 20 with glucose 596; 2L LR  recieved 18 U lantus 3 AM;  8 U sq at 2 AM ademalog and 4 AM 4 U ademalog subsequent  hypoglycemia 7 AM 68--  D50 x 1 --9:30 --54--D50x 1.  31 yo F w/ hx T1DM w/ gastroparesis, HTN who presents with nausea, vomiting (NBNB), and abd pain along with some SOB, onset this evening. She notes abdominal pain is in b/l LQ denies any change in bowel habits blood or dark stool. Last BM was yesterday. She states has not been taking any opiates or marijuana since discharge from hospital. She has frequent episodes of DKA with unclear causes, she notes; she says she did not miss any insulin doses. SOB has resolved. She denies any sick contacts fever, cough, fever, recent illness, constipation or diarrhea, or urinary complaints.    In ED was noted to have elevated AG 20 with glucose 596; 2L LR  recieved 18 U lantus 3 AM;  8 U sq at 2 AM ademalog and 4 AM 4 U ademalog subsequent  hypoglycemia 7 AM 68--  D50 x 1 --9:30 --54--D50x 1. AG resolved

## 2022-05-09 NOTE — ED PROVIDER NOTE - ATTENDING CONTRIBUTION TO CARE
I have personally performed a face to face medical and diagnostic evaluation of the patient. I have discussed with and reviewed the Resident's note and agree with the History, ROS, Physical Exam and MDM unless otherwise indicated. A brief summary of my personal evaluation and impression can be found below.    32F hx of DM1 on insulin, gastroparesis prior episodes of cannabinoid induced hyperemesis presets with a cc of repeated episodes of NBNB n/v c/w prior episodes of prior DKA and cannabis hyperemesis also endorsing global diffuse abdominal pain that is c/w prior episodes of above, however pain is more severe, s/p prior cholecystectomy. Notes SOB. No CP. Has been taking meds as indicated other than most recent insulin dose.     All other ROS negative, except as above and as per HPI and ROS section.    VITALS: Initial triage and subsequent vitals have been reviewed by me.  GEN APPEARANCE: WDWN, alert, non-toxic, uncomfortable appearing   HEAD: Atraumatic.  EYES: PERRLa, EOMI, vision grossly intact.   NECK: Supple  CV: RRR, S1S2, no c/r/m/g. Cap refill <2 seconds. No bruits.   LUNGS: CTAB. No abnormal breath sounds.  ABDOMEN: mild diffuse abdominal ttp   MSK/EXT: No spinal or extremity point tenderness. No CVA ttp. Pelvis stable. No peripheral edema.  NEURO: Alert, follows commands. Weight bearing normal. Speech normal. Sensation and motor normal x4 extremities.   SKIN: Warm, dry and intact. No rash.  PSYCH: Appropriate    Plan/MDM: 32F hx of DM1 on insulin, gastroparesis prior episodes of cannabinoid induced hyperemesis presets with a cc of repeated episodes of NBNB n/v c/w prior episodes of prior DKA and cannabis hyperemesis also endorsing global diffuse abdominal pain that is c/w prior episodes of above, however pain is more severe, s/p prior cholecystectomy. exam tachycardic ill appearing ddx c/f dka vs or 2/2 Cannibis hyperemesis vs gastroparesis, will get ed dka bundle labs give ivf meds antiemetics reassess on chart review had ctap that was recent and non actionable, will defer new imaging for now pending clinical reassessment, anticipate admission.

## 2022-05-09 NOTE — ED PROVIDER NOTE - OBJECTIVE STATEMENT
32F with pmh T1DM, gastroparesis (vs possible cannabinoid hyperemesis), HTN who presents with nausea, vomiting, and abd pain along with some SOB, onset this evening.  She has frequent episodes of DKA with unclear causes, she notes; she says she did not miss any insulin doses except for this evening's basaglar which she did not take due to vomiting.  She also notes some shortness of breath/kussmaul breathing which she says is typical of when she is in DKA.    Otherwise denies cough, fever, recent illness, constipation or diarrhea, or urinary complaints.    Home regimen basaglar 18u, admelog sliding scale based on FSG and carb intake, usually around 8U.

## 2022-05-09 NOTE — ED PROVIDER NOTE - PROGRESS NOTE DETAILS
patient itchy after dilaudid, threw up benadryl will trial scopolamine patch labs with very mild DKA.  Will likely resolve without insulin gtt. Patient endorses continued abdominal pain and itching.  Given her itching with opioids (as well as the concerns for pain med seeking behavior as documented in complex care note brought up by patient's mother), will avoid going forward.  She is agreeable with trial of ofirmev for her abdominal pain. Discussed latest results with patient including resolution of DKA.  Patient still endorsing refractory abdominal pain and desires admission for this purpose. Discussed with hospitalist Dr Coker, will admit.

## 2022-05-09 NOTE — ED ADULT NURSE NOTE - NSFALLRSKASSESSTYPE_ED_ALL_ED
Initial (On Arrival)
Need for prophylactic measure

## 2022-05-09 NOTE — CONSULT NOTE ADULT - ASSESSMENT
33 yo F w/ hx T1DM w/ gastroparesis A1c 10.4, HTN who presents with nausea, vomiting (NBNB), and abd pain  found to be in DKA AG 20 given lantus and sq ademalog with subsequent hypoglycemia.       1. DM  DM Type 1, uncontrolled w/ complications   A1C 10.4% (04.27.22 @ 21:04)  Has had many admissions for DKA   Hyperglycemia & Hypoglycemia iso Variable PO intake  Recommendations:   - Lantus 15 units SC QHS   - Admelog 5 units SC Premeal/TIDAC   - Admelog Low Correction Scale Premeal & Low Correction Scale Bedtime   - Goal -180  - Blood glucose monitoring Premeal/Bedtime   - Hypoglycemia protocol   - Carb Consistent Diet   DC planning: Patient can continue to follow up at [address] [place] [address], [place]. Planned to be initiated on insulin pump along w/ CGM as outpatient per patient.     HTN  Recommendations:   - BP goal < 130/80   - on Enlapril 5mg daily outpt (restart inpt??)  - defer to primary team     HLD    Recommendations:   - LDL goal < 70   - defer to primary team   - pt is at reproductive age, can discuss statin tx outpt if no plans for pregnancy, currently using protection     Gastroparesis   - currently on reglan   - monitor QTc   - better glycemic control advised   - outpatient GI evaluation

## 2022-05-09 NOTE — H&P ADULT - PROBLEM SELECTOR PLAN 1
- DKA ( AG 20 and BBOH 2.9) with gastroparesis  - A1c 10.4  - UA neg   - Lantus 18 U sq qhs and ademalog 4 U sq QAc with ISS  - Diabetic deit with low fiber and low fat   - reglan 10 IV TID with meals  - encouraged small frequent meals  - pain control dilaudid 0.5 mg IV q6  - PPI   - repeat BMP ordered @ 1700 ensure eval AG   - pt denies recent opioid use until hospitalization yesterday in past 2 weeks check urine tox and serum tox   - check UA lipase and AXR  - endocrine c/s - DKA ( AG 20 and BBOH 2.9) with gastroparesis  - istop Reference #: [id number] no recent opoid rx  - A1c 10.4  - UA neg   - Lantus 18 U sq qhs; hold mealtime for now given hypoglycemia; ISS  - Diabetic deit with low fiber and low fat - encouraged small frequent meals  - reglan 10 IV TID with meals  - pain control dilaudid 0.5 mg IV q6  - PPI   - repeat BMP ordered @ 1700 ensure eval AG   - pt denies recent opioid use until hospitalization yesterday in past 2 weeks check urine tox and serum tox   - check lipase and AXR  - endocrine c/s

## 2022-05-09 NOTE — ED PROVIDER NOTE - CLINICAL SUMMARY MEDICAL DECISION MAKING FREE TEXT BOX
32F with T1DM, gastroparesis who presents with N/V and abd pain consistent with frequent DKA presentations.  Complex care notes state that she comes to "10 different hospitals in 2 weeks" with same presentation, and intentionally does not take her insulin in order to get pain medications.    Will get labs, IVF, likely require insulin gtt and admission, possibly to ICU.

## 2022-05-09 NOTE — ED ADULT NURSE REASSESSMENT NOTE - NS ED NURSE REASSESS COMMENT FT1
Pt found to be hypoglycemic, a symptomatic.  Pt given D50 as ordered, repeat .
pt . pt given apple juice at this time. Dwayne Emerson MD aware. pt refusing to eat at this time. pt in NAD and respirations even and unlabored with no accessory muscle use. pt refusing to put on gown.
pt a&ox4. pt c/o abdominal pain consistent with chief complaint. pt medicated as per md orders. pt respirations even and unlabored with no accessory muscle use. pt in NAD and resting in stretcher.
pt a&ox4. pt refusing PO intake. pt FS 68. MAR called. MD Presley aware. GORDON Alonso on 5S made aware. pt medicated as per standing orders. pt respirations even and unlabored with no accessory muscle use. pt in NAD
pt a&ox4. pt repeat . Dwayne Emerson MD and team made aware. pt respirations even and unlabored with no accessory muscle use. pt in NAD and resting in stretcher. pt normal sinus on monitor
report rcd from break rn crystalla. pt a&ox4. pt has no new complaints at this time. pt respirations even and unlabored with no accessory muscle use. pt medicated as per md orders. pt in NAD and resting in stretcher at this time. 20g to the RAC with no redness or swelling.
Pt resting comfortably, vs stable, awaiting transport to bed assignment.

## 2022-05-09 NOTE — H&P ADULT - PROBLEM SELECTOR PLAN 3
- current smoker encouraged quiting; willing to think about it  - doesn't want nicotine supp  - venodyne while in bed

## 2022-05-09 NOTE — H&P ADULT - NSHPSOCIALHISTORY_GEN_ALL_CORE
works as a pharm tech. stills smokes 2packs/wk; denies alcohol; last marijuana use was 2 weeks ago prior to previous admission

## 2022-05-09 NOTE — CONSULT NOTE ADULT - SUBJECTIVE AND OBJECTIVE BOX
Reason For Consult:     HPI:  31 yo F w/ hx T1DM w/ gastroparesis, HTN who presents with nausea, vomiting (NBNB), and abd pain along with some SOB, onset this evening. She notes abdominal pain is in b/l LQ denies any change in bowel habits blood or dark stool. Last BM was yesterday. She states has not been taking any opiates or marijuana since discharge from hospital. She has frequent episodes of DKA with unclear causes, she notes; she says she did not miss any insulin doses. SOB has resolved. She denies any sick contacts fever, cough, fever, recent illness, constipation or diarrhea, or urinary complaints.    In ED was noted to have elevated AG 20 with glucose 596; 2L LR  recieved 18 U lantus 3 AM;  8 U sq at 2 AM ademalog and 4 AM 4 U ademalog subsequent  hypoglycemia 7 AM 68--  D50 x 1 --9:30 --54--D50x 1. AG resolved (09 May 2022 10:49)  endocrine called for DM       ENDOCRINE HISTORY   Dx at age 12, Follows with Dr. Bose at [facility]- last seen   Home regimen: Basaglar 18, Admelog 8-10units on sliding scale   Used to use Dexcom but waiting for refills & has an appointment w/ Endocrine NP in the next week or 2 per patient to be initiated on Omnipod pump along w/ the Dexcom CGM.   Has not gone to see a GI doctor although patient was previously referred   +Gastroparesis, +nephropathy  Reports intermittent compliance w/ enalapril   Not on statin.     Has had many admissions for dka/gastroparesis symptoms.    at admissions., labs concerning for DKApt started on bvasal bolus inpt c/b by hypo and hyperglycemia         PAST MEDICAL & SURGICAL HISTORY:  Diabetes mellitus type 1    Gastroparesis due to DM    Colitis    Hypertension    Ectopic pregnancy   and , s/p removal of right and left fallopian tubes    History of cholecystectomy        FAMILY HISTORY:  Family history of type 1 diabetes mellitus (Grandparent)    FH: HTN (hypertension) (Father)    FHx: asthma  Brother        Social History:  +smoking     Outpatient Medications:  Home Medications:   * Patient Currently Takes Medications as of 2022 14:07 documented in Structured Notes  · 	glucose tablets: Follow instructions on bottle when sugar is low.  · 	Admelog SoloStar 100 units/mL injectable solution: 6 unit(s) injectable 3 times a day  	***Follow up with endocrinologist on further dose adjustments***  	HOLD if not eating a meal  · 	ondansetron 4 mg oral tablet, disintegratin tab(s) orally 3 times a day, As Needed -for nausea   · 	Basaglar KwikPen 100 units/mL subcutaneous solution: 18 unit(s) subcutaneous once a day (at bedtime)  · 	acetaminophen 325 mg oral tablet: 2 tab(s) orally every 8 hours, As Needed  · 	polyethylene glycol 3350 oral powder for reconstitution: 17 gram(s) orally once a day, As Needed  · 	famotidine 20 mg oral tablet: 1 tab(s) orally once a day  · 	metoclopramide 10 mg oral tablet: 1 tab(s) orally 4 times a day (before meals and at bedtime), As Needed  · 	enalapril 5 mg oral tablet: 1 tab(s) orally once a day, HOLD for SBP <110    MEDICATIONS  (STANDING):  dextrose 50% Injectable 25 Gram(s) IV Push once  dextrose 50% Injectable 12.5 Gram(s) IV Push once  dextrose 50% Injectable 25 Gram(s) IV Push once  dextrose Oral Gel 15 Gram(s) Oral once  diphenhydrAMINE 25 milliGRAM(s) Oral once  glucagon  Injectable 1 milliGRAM(s) IntraMuscular once  insulin glargine Injectable (LANTUS) 18 Unit(s) SubCutaneous at bedtime  insulin lispro (ADMELOG) corrective regimen sliding scale   SubCutaneous three times a day before meals  insulin lispro (ADMELOG) corrective regimen sliding scale   SubCutaneous at bedtime  metoclopramide Injectable 10 milliGRAM(s) IV Push three times a day  pantoprazole  Injectable 40 milliGRAM(s) IV Push daily    MEDICATIONS  (PRN):  acetaminophen     Tablet .. 650 milliGRAM(s) Oral every 6 hours PRN Temp greater or equal to 38C (100.4F), Mild Pain (1 - 3)  aluminum hydroxide/magnesium hydroxide/simethicone Suspension 30 milliLiter(s) Oral every 4 hours PRN Dyspepsia  HYDROmorphone  Injectable 0.5 milliGRAM(s) IV Push every 6 hours PRN Moderate Pain (4 - 6) and severe pain  melatonin 3 milliGRAM(s) Oral at bedtime PRN Insomnia  ondansetron Injectable 4 milliGRAM(s) IV Push every 8 hours PRN Nausea and/or Vomiting      Allergies    Toradol (Pruritus)    Intolerances      Review of Systems:  Constitutional: No fever  Eyes: No blurry vision  Neuro: No tremors  HEENT: No pain  Cardiovascular: No chest pain, palpitations  Respiratory: No SOB, no cough  GI: No nausea, vomiting, abdominal pain  : No dysuria  Skin: no rash  Psych: no depression  Endocrine: no polyuria, polydipsia  Hem/lymph: no swelling  Osteoporosis: no fractures    ALL OTHER SYSTEMS REVIEWED AND NEGATIVE      PHYSICAL EXAM:  VITALS: T(C): 36.6 (22 @ 14:55)  T(F): 97.9 (22 @ 14:55), Max: 100 (22 @ 23:23)  HR: 84 (22 @ 14:55) (82 - 140)  BP: 127/76 (22 @ 14:55) (105/73 - 165/74)  RR:  (16 - 18)  SpO2:  (98% - 100%)  Wt(kg): --  GENERAL: NAD, well-groomed, well-developed  RESPIRATORY: no resp distress   CARDIOVASCULAR:  no peripheral edema  SKIN: Dry, intact, No rashes or lesions  MUSCULOSKELETAL: Full range of motion,   NEURO: sensation intact, extraocular movements intact, no tremor, normal reflexes  PSYCH: Alert and oriented x 3, normal affect, normal mood      POCT Blood Glucose.: 253 mg/dL (22 @ 17:22)  POCT Blood Glucose.: 82 mg/dL (22 @ 12:18)  POCT Blood Glucose.: 118 mg/dL (22 @ 10:41)  POCT Blood Glucose.: 157 mg/dL (22 @ 09:49)  POCT Blood Glucose.: 54 mg/dL (22 @ 09:27)  POCT Blood Glucose.: 147 mg/dL (22 @ 07:50)  POCT Blood Glucose.: 167 mg/dL (22 @ 07:36)  POCT Blood Glucose.: 68 mg/dL (22 @ 07:15)  POCT Blood Glucose.: 103 mg/dL (22 @ 06:24)  POCT Blood Glucose.: 220 mg/dL (22 @ 05:26)  POCT Blood Glucose.: 349 mg/dL (22 @ 04:25)  POCT Blood Glucose.: 565 mg/dL (22 @ 03:14)  POCT Blood Glucose.: >600 mg/dL (22 @ 01:34)  POCT Blood Glucose.: 566 mg/dL (22 @ 00:17)  POCT Blood Glucose.: 569 mg/dL (22 @ 23:27)                            9.3    10.63 )-----------( 415      ( 09 May 2022 00:49 )             30.7           135  |  100  |  10  ----------------------------<  344<H>  4.0   |  24  |  0.89    eGFR: 88    Ca    9.0        Mg     1.80       Phos  2.7         TPro  7.1  /  Alb  3.8  /  TBili  0.6  /  DBili  x   /  AST  34<H>  /  ALT  22  /  AlkPhos  104        Thyroid Function Tests:   @ 02:25 TSH 1.03 FreeT4 -- T3 -- Anti TPO -- Anti Thyroglobulin Ab -- TSI --           Chol 226<H> Direct LDL -- LDL calculated 114<H>  Trig 61    Radiology:

## 2022-05-09 NOTE — H&P ADULT - ASSESSMENT
33 yo F w/ hx T1DM w/ gastroparesis A1c 10.4, HTN who presents with nausea, vomiting (NBNB), and abd pain  found to be in DKA AG 20 given lantus and sq ademalog with subsequent hypoglycemia.

## 2022-05-09 NOTE — H&P ADULT - NSHPREVIEWOFSYSTEMS_GEN_ALL_CORE
Review of Systems:   CONSTITUTIONAL: No fever, weight loss, or fatigue  EYES: No eye pain, visual disturbances, or discharge  ENMT:  No difficulty hearing, tinnitus, vertigo; No sinus or throat pain  NECK: No pain or stiffness  BREASTS: No pain, masses, or nipple discharge  RESPIRATORY: No cough, wheezing, chills or hemoptysis; No shortness of breath  CARDIOVASCULAR: No chest pain, palpitations, dizziness, or leg swelling  GASTROINTESTINAL: No epigastric pain., hematemesis; No diarrhea or constipation. No melena or hematochezia. +N/V; Abdominal pain  GENITOURINARY: No dysuria, frequency, hematuria, or incontinence  NEUROLOGICAL: No headaches, memory loss, loss of strength, numbness, or tremors  SKIN: No itching, burning, rashes, or lesions   LYMPH NODES: No enlarged glands  ENDOCRINE: No heat or cold intolerance; No hair loss  MUSCULOSKELETAL: No joint pain or swelling; No muscle, back, or extremity pain  PSYCHIATRIC: No depression, anxiety, mood swings, or difficulty sleeping  HEME/LYMPH: No easy bruising, or bleeding gums  ALLERGY AND IMMUNOLOGIC: No hives or eczema

## 2022-05-09 NOTE — ED ADULT NURSE NOTE - OBJECTIVE STATEMENT
pt rcd to rm 03 c/o abdominal pain associated with nausea, vomiting for the last 3 hours. pt states she has frequent episodes of DKA with unknown cause. pt hyperglycemic at this time. pt states she is compliant with insulin. pt actively vomiting at bedside. pt denies chest pain, headache, dizziness, sick contacts, diarrhea at this time. 20g to the RAC placed by Dwayne Emerson MD via US. pt sinus tachy on monitor. pt respirations even and unlabored with no accessory muscle use. pt in NAD and resting in stretcher at this time. pt pmhx T1DM and gastroparesis. labs collected and sent. pt medicarted as per md orders. covid collected and sent. urine collected and sent. pt rcd to rm 03 c/o abdominal pain associated with nausea, vomiting for the last 3 hours. pt states she has frequent episodes of DKA with unknown cause. pt hyperglycemic at this time. pt states she is compliant with insulin. pt actively vomiting at bedside. abdomen soft and nondistended. pt c/o pain to bilateral lower quadrants upon palpation. pt denies chest pain, headache, dizziness, sick contacts, diarrhea at this time. 20g to the RAC placed by Dwayen Emerson MD via US. pt sinus tachy on monitor. pt respirations even and unlabored with no accessory muscle use. pt in NAD and resting in stretcher at this time. pt pmhx T1DM and gastroparesis. labs collected and sent. pt medicated as per md orders. covid collected and sent. urine collected and sent. pt rcd to rm 03 c/o abdominal pain associated with nausea, vomiting for the last 3 hours. pt states she has frequent episodes of DKA with unknown cause. pt hyperglycemic at this time. pt states she is compliant with insulin. pt actively vomiting at bedside. abdomen soft and nondistended. pt c/o pain to bilateral lower quadrants upon palpation. pt denies chest pain, headache, dizziness, sick contacts, diarrhea at this time. 20g to the RAC placed by Dwayne Emerson MD via US. pt sinus tachy on monitor. pt respirations even and unlabored with no accessory muscle use. pt in NAD and resting in stretcher at this time in own clothes because pt does not want to put on gown. pt pmhx T1DM and gastroparesis. labs collected and sent. pt medicated as per md orders. covid collected and sent. urine collected and sent.

## 2022-05-10 DIAGNOSIS — E78.5 HYPERLIPIDEMIA, UNSPECIFIED: ICD-10-CM

## 2022-05-10 LAB
ALBUMIN SERPL ELPH-MCNC: 2.9 G/DL — LOW (ref 3.3–5)
ALP SERPL-CCNC: 94 U/L — SIGNIFICANT CHANGE UP (ref 40–120)
ALT FLD-CCNC: 17 U/L — SIGNIFICANT CHANGE UP (ref 4–33)
AMPHET UR-MCNC: NEGATIVE — SIGNIFICANT CHANGE UP
ANION GAP SERPL CALC-SCNC: 13 MMOL/L — SIGNIFICANT CHANGE UP (ref 7–14)
AST SERPL-CCNC: 23 U/L — SIGNIFICANT CHANGE UP (ref 4–32)
BARBITURATES UR SCN-MCNC: NEGATIVE — SIGNIFICANT CHANGE UP
BASOPHILS # BLD AUTO: 0.02 K/UL — SIGNIFICANT CHANGE UP (ref 0–0.2)
BASOPHILS NFR BLD AUTO: 0.3 % — SIGNIFICANT CHANGE UP (ref 0–2)
BENZODIAZ UR-MCNC: NEGATIVE — SIGNIFICANT CHANGE UP
BILIRUB SERPL-MCNC: <0.2 MG/DL — SIGNIFICANT CHANGE UP (ref 0.2–1.2)
BUN SERPL-MCNC: 12 MG/DL — SIGNIFICANT CHANGE UP (ref 7–23)
CALCIUM SERPL-MCNC: 8.5 MG/DL — SIGNIFICANT CHANGE UP (ref 8.4–10.5)
CHLORIDE SERPL-SCNC: 104 MMOL/L — SIGNIFICANT CHANGE UP (ref 98–107)
CO2 SERPL-SCNC: 19 MMOL/L — LOW (ref 22–31)
COCAINE METAB.OTHER UR-MCNC: NEGATIVE — SIGNIFICANT CHANGE UP
CREAT SERPL-MCNC: 0.83 MG/DL — SIGNIFICANT CHANGE UP (ref 0.5–1.3)
CREATININE URINE RESULT, DAU: 42 MG/DL — SIGNIFICANT CHANGE UP
CULTURE RESULTS: SIGNIFICANT CHANGE UP
EGFR: 96 ML/MIN/1.73M2 — SIGNIFICANT CHANGE UP
EOSINOPHIL # BLD AUTO: 0.23 K/UL — SIGNIFICANT CHANGE UP (ref 0–0.5)
EOSINOPHIL NFR BLD AUTO: 3.3 % — SIGNIFICANT CHANGE UP (ref 0–6)
GLUCOSE BLDC GLUCOMTR-MCNC: 112 MG/DL — HIGH (ref 70–99)
GLUCOSE BLDC GLUCOMTR-MCNC: 128 MG/DL — HIGH (ref 70–99)
GLUCOSE BLDC GLUCOMTR-MCNC: 145 MG/DL — HIGH (ref 70–99)
GLUCOSE BLDC GLUCOMTR-MCNC: 152 MG/DL — HIGH (ref 70–99)
GLUCOSE BLDC GLUCOMTR-MCNC: 200 MG/DL — HIGH (ref 70–99)
GLUCOSE BLDC GLUCOMTR-MCNC: 246 MG/DL — HIGH (ref 70–99)
GLUCOSE BLDC GLUCOMTR-MCNC: 284 MG/DL — HIGH (ref 70–99)
GLUCOSE BLDC GLUCOMTR-MCNC: 310 MG/DL — HIGH (ref 70–99)
GLUCOSE BLDC GLUCOMTR-MCNC: 46 MG/DL — CRITICAL LOW (ref 70–99)
GLUCOSE BLDC GLUCOMTR-MCNC: 47 MG/DL — CRITICAL LOW (ref 70–99)
GLUCOSE SERPL-MCNC: 226 MG/DL — HIGH (ref 70–99)
HCT VFR BLD CALC: 30.4 % — LOW (ref 34.5–45)
HGB BLD-MCNC: 9 G/DL — LOW (ref 11.5–15.5)
IANC: 4.61 K/UL — SIGNIFICANT CHANGE UP (ref 1.8–7.4)
IMM GRANULOCYTES NFR BLD AUTO: 0.3 % — SIGNIFICANT CHANGE UP (ref 0–1.5)
LYMPHOCYTES # BLD AUTO: 1.42 K/UL — SIGNIFICANT CHANGE UP (ref 1–3.3)
LYMPHOCYTES # BLD AUTO: 20.4 % — SIGNIFICANT CHANGE UP (ref 13–44)
MAGNESIUM SERPL-MCNC: 1.7 MG/DL — SIGNIFICANT CHANGE UP (ref 1.6–2.6)
MAGNESIUM SERPL-MCNC: 1.7 MG/DL — SIGNIFICANT CHANGE UP (ref 1.6–2.6)
MCHC RBC-ENTMCNC: 18.8 PG — LOW (ref 27–34)
MCHC RBC-ENTMCNC: 29.6 GM/DL — LOW (ref 32–36)
MCV RBC AUTO: 63.6 FL — LOW (ref 80–100)
METHADONE UR-MCNC: NEGATIVE — SIGNIFICANT CHANGE UP
MONOCYTES # BLD AUTO: 0.66 K/UL — SIGNIFICANT CHANGE UP (ref 0–0.9)
MONOCYTES NFR BLD AUTO: 9.5 % — SIGNIFICANT CHANGE UP (ref 2–14)
NEUTROPHILS # BLD AUTO: 4.61 K/UL — SIGNIFICANT CHANGE UP (ref 1.8–7.4)
NEUTROPHILS NFR BLD AUTO: 66.2 % — SIGNIFICANT CHANGE UP (ref 43–77)
NRBC # BLD: 0 /100 WBCS — SIGNIFICANT CHANGE UP
NRBC # FLD: 0 K/UL — SIGNIFICANT CHANGE UP
OPIATES UR-MCNC: NEGATIVE — SIGNIFICANT CHANGE UP
OXYCODONE UR-MCNC: NEGATIVE — SIGNIFICANT CHANGE UP
PCP SPEC-MCNC: SIGNIFICANT CHANGE UP
PCP UR-MCNC: NEGATIVE — SIGNIFICANT CHANGE UP
PHOSPHATE SERPL-MCNC: 3.2 MG/DL — SIGNIFICANT CHANGE UP (ref 2.5–4.5)
PHOSPHATE SERPL-MCNC: 3.2 MG/DL — SIGNIFICANT CHANGE UP (ref 2.5–4.5)
PHOSPHATE SERPL-MCNC: 3.4 MG/DL — SIGNIFICANT CHANGE UP (ref 2.5–4.5)
PLATELET # BLD AUTO: 298 K/UL — SIGNIFICANT CHANGE UP (ref 150–400)
POTASSIUM SERPL-MCNC: 4.6 MMOL/L — SIGNIFICANT CHANGE UP (ref 3.5–5.3)
POTASSIUM SERPL-SCNC: 4.6 MMOL/L — SIGNIFICANT CHANGE UP (ref 3.5–5.3)
PROT SERPL-MCNC: 5.9 G/DL — LOW (ref 6–8.3)
RBC # BLD: 4.78 M/UL — SIGNIFICANT CHANGE UP (ref 3.8–5.2)
RBC # FLD: 19.6 % — HIGH (ref 10.3–14.5)
SODIUM SERPL-SCNC: 136 MMOL/L — SIGNIFICANT CHANGE UP (ref 135–145)
SPECIMEN SOURCE: SIGNIFICANT CHANGE UP
THC UR QL: POSITIVE
WBC # BLD: 6.96 K/UL — SIGNIFICANT CHANGE UP (ref 3.8–10.5)
WBC # FLD AUTO: 6.96 K/UL — SIGNIFICANT CHANGE UP (ref 3.8–10.5)

## 2022-05-10 PROCEDURE — 99233 SBSQ HOSP IP/OBS HIGH 50: CPT

## 2022-05-10 PROCEDURE — 99232 SBSQ HOSP IP/OBS MODERATE 35: CPT

## 2022-05-10 RX ORDER — DEXTROSE 50 % IN WATER 50 %
25 SYRINGE (ML) INTRAVENOUS ONCE
Refills: 0 | Status: COMPLETED | OUTPATIENT
Start: 2022-05-10 | End: 2022-05-10

## 2022-05-10 RX ORDER — DIPHENHYDRAMINE HCL 50 MG
50 CAPSULE ORAL ONCE
Refills: 0 | Status: COMPLETED | OUTPATIENT
Start: 2022-05-10 | End: 2022-05-10

## 2022-05-10 RX ORDER — HYDROMORPHONE HYDROCHLORIDE 2 MG/ML
0.5 INJECTION INTRAMUSCULAR; INTRAVENOUS; SUBCUTANEOUS ONCE
Refills: 0 | Status: DISCONTINUED | OUTPATIENT
Start: 2022-05-10 | End: 2022-05-10

## 2022-05-10 RX ORDER — DIPHENHYDRAMINE HCL 50 MG
12.5 CAPSULE ORAL ONCE
Refills: 0 | Status: COMPLETED | OUTPATIENT
Start: 2022-05-10 | End: 2022-05-10

## 2022-05-10 RX ORDER — DIPHENHYDRAMINE HCL 50 MG
25 CAPSULE ORAL ONCE
Refills: 0 | Status: DISCONTINUED | OUTPATIENT
Start: 2022-05-10 | End: 2022-05-12

## 2022-05-10 RX ORDER — INSULIN GLARGINE 100 [IU]/ML
10 INJECTION, SOLUTION SUBCUTANEOUS AT BEDTIME
Refills: 0 | Status: DISCONTINUED | OUTPATIENT
Start: 2022-05-10 | End: 2022-05-12

## 2022-05-10 RX ADMIN — Medication 2: at 08:39

## 2022-05-10 RX ADMIN — HYDROMORPHONE HYDROCHLORIDE 0.5 MILLIGRAM(S): 2 INJECTION INTRAMUSCULAR; INTRAVENOUS; SUBCUTANEOUS at 08:48

## 2022-05-10 RX ADMIN — Medication 4: at 17:42

## 2022-05-10 RX ADMIN — Medication 5 UNIT(S): at 08:39

## 2022-05-10 RX ADMIN — HYDROMORPHONE HYDROCHLORIDE 0.5 MILLIGRAM(S): 2 INJECTION INTRAMUSCULAR; INTRAVENOUS; SUBCUTANEOUS at 15:00

## 2022-05-10 RX ADMIN — ONDANSETRON 4 MILLIGRAM(S): 8 TABLET, FILM COATED ORAL at 14:32

## 2022-05-10 RX ADMIN — PANTOPRAZOLE SODIUM 40 MILLIGRAM(S): 20 TABLET, DELAYED RELEASE ORAL at 12:28

## 2022-05-10 RX ADMIN — HYDROMORPHONE HYDROCHLORIDE 0.5 MILLIGRAM(S): 2 INJECTION INTRAMUSCULAR; INTRAVENOUS; SUBCUTANEOUS at 22:12

## 2022-05-10 RX ADMIN — HYDROMORPHONE HYDROCHLORIDE 0.5 MILLIGRAM(S): 2 INJECTION INTRAMUSCULAR; INTRAVENOUS; SUBCUTANEOUS at 14:51

## 2022-05-10 RX ADMIN — INSULIN GLARGINE 10 UNIT(S): 100 INJECTION, SOLUTION SUBCUTANEOUS at 22:22

## 2022-05-10 RX ADMIN — HYDROMORPHONE HYDROCHLORIDE 0.5 MILLIGRAM(S): 2 INJECTION INTRAMUSCULAR; INTRAVENOUS; SUBCUTANEOUS at 02:01

## 2022-05-10 RX ADMIN — Medication 12.5 MILLIGRAM(S): at 02:27

## 2022-05-10 RX ADMIN — Medication 5 UNIT(S): at 12:27

## 2022-05-10 RX ADMIN — Medication 50 MILLIGRAM(S): at 00:56

## 2022-05-10 RX ADMIN — Medication 1: at 12:27

## 2022-05-10 RX ADMIN — Medication 10 MILLIGRAM(S): at 22:15

## 2022-05-10 RX ADMIN — HYDROMORPHONE HYDROCHLORIDE 0.5 MILLIGRAM(S): 2 INJECTION INTRAMUSCULAR; INTRAVENOUS; SUBCUTANEOUS at 22:30

## 2022-05-10 RX ADMIN — Medication 5 UNIT(S): at 17:43

## 2022-05-10 RX ADMIN — Medication 10 MILLIGRAM(S): at 08:44

## 2022-05-10 RX ADMIN — HYDROMORPHONE HYDROCHLORIDE 0.5 MILLIGRAM(S): 2 INJECTION INTRAMUSCULAR; INTRAVENOUS; SUBCUTANEOUS at 09:06

## 2022-05-10 NOTE — PROGRESS NOTE ADULT - SUBJECTIVE AND OBJECTIVE BOX
History:  pt seen.  Just ate lunch, felt very nauseous and vomited.  Had hypoglycemia this AM.    MEDICATIONS  (STANDING):  dextrose 50% Injectable 25 Gram(s) IV Push once  dextrose 50% Injectable 12.5 Gram(s) IV Push once  dextrose 50% Injectable 25 Gram(s) IV Push once  dextrose Oral Gel 15 Gram(s) Oral once  glucagon  Injectable 1 milliGRAM(s) IntraMuscular once  insulin glargine Injectable (LANTUS) 10 Unit(s) SubCutaneous at bedtime  insulin lispro (ADMELOG) corrective regimen sliding scale   SubCutaneous three times a day before meals  insulin lispro (ADMELOG) corrective regimen sliding scale   SubCutaneous at bedtime  insulin lispro Injectable (ADMELOG) 5 Unit(s) SubCutaneous three times a day before meals  metoclopramide Injectable 10 milliGRAM(s) IV Push three times a day  pantoprazole  Injectable 40 milliGRAM(s) IV Push daily    MEDICATIONS  (PRN):  acetaminophen     Tablet .. 650 milliGRAM(s) Oral every 6 hours PRN Temp greater or equal to 38C (100.4F), Mild Pain (1 - 3)  aluminum hydroxide/magnesium hydroxide/simethicone Suspension 30 milliLiter(s) Oral every 4 hours PRN Dyspepsia  diphenhydrAMINE 25 milliGRAM(s) Oral once PRN Rash and/or Itching  melatonin 3 milliGRAM(s) Oral at bedtime PRN Insomnia  ondansetron Injectable 4 milliGRAM(s) IV Push every 8 hours PRN Nausea and/or Vomiting      Allergies    Toradol (Pruritus)    Intolerances      Review of Systems:  Constitutional: mild fever  ALL OTHER SYSTEMS REVIEWED AND NEGATIVE      PHYSICAL EXAM:  VITALS: T(C): 37.2 (05-10-22 @ 12:10)  T(F): 99 (05-10-22 @ 12:10), Max: 99 (05-10-22 @ 12:10)  HR: 95 (05-10-22 @ 12:10) (86 - 100)  BP: 133/89 (05-10-22 @ 12:10) (133/89 - 141/88)  RR:  (17 - 18)  SpO2:  (99% - 100%)  Wt(kg): --  GENERAL: NAD, well-groomed, well-developed  GI: Soft, nontender, non distended  SKIN: Dry, intact, No rashes or lesions  MUSCULOSKELETAL: Full range of motion, normal strength  NEURO: extraocular movements intact, no tremor  PSYCH: Alert and oriented x 3, normal affect, normal mood      CAPILLARY BLOOD GLUCOSE      POCT Blood Glucose.: 310 mg/dL (10 May 2022 16:45)  POCT Blood Glucose.: 152 mg/dL (10 May 2022 12:00)  POCT Blood Glucose.: 246 mg/dL (10 May 2022 08:23)  POCT Blood Glucose.: 200 mg/dL (10 May 2022 06:43)  POCT Blood Glucose.: 112 mg/dL (10 May 2022 06:24)  POCT Blood Glucose.: 46 mg/dL (10 May 2022 05:44)  POCT Blood Glucose.: 47 mg/dL (10 May 2022 05:40)  POCT Blood Glucose.: 145 mg/dL (10 May 2022 03:37)  POCT Blood Glucose.: 284 mg/dL (10 May 2022 01:11)  POCT Blood Glucose.: 470 mg/dL (09 May 2022 22:23)  POCT Blood Glucose.: 464 mg/dL (09 May 2022 22:22)      05-10    136  |  104  |  12  ----------------------------<  226<H>  4.6   |  19<L>  |  0.83    eGFR: 96    Ca    8.5      05-10  Mg     1.70     05-10  Phos  3.2     05-10    TPro  5.9<L>  /  Alb  2.9<L>  /  TBili  <0.2  /  DBili  x   /  AST  23  /  ALT  17  /  AlkPhos  94  05-10    A1C with Estimated Average Glucose (05.09.22 @ 00:49)    A1C with Estimated Average Glucose Result: 10.4 %    Estimated Average Glucose: 252      Thyroid Function Tests:  04-28 @ 02:25 TSH 1.03 FreeT4 -- T3 -- Anti TPO -- Anti Thyroglobulin Ab -- TSI --

## 2022-05-10 NOTE — CHART NOTE - NSCHARTNOTEFT_GEN_A_CORE
[facility] NIGHT MEDICINE COVERAGE    Notified by RN that pt is hypoglycemic to 47 on AM FS, pt asymptomatic per RN, she denies tremors, weakness, confusion, or sweating.  Requested hypoglycemia protocol be activated, D50 ordered.  Pt refused D50 per RN, as pt says it "has caused my vein to blow and I am a challenging stick for bloodwork".  RN instructed to give PO repletion w/ juice, w/ pack of sugar.  Pt assessed at bedside.  She is awake, A&Ox3, says she feels "okay".  She says she has been hypoglycemic previously, and she "only feel a lot of symptoms when it drops to 20".  Pt notes she had her hospital tray last night and had food delivered, consisted of "fried chicken and stuff".  I explained her sugars have been very labile.  Repeat FS checked, 112, next FS to be checked within 15 minutes.  Pt presently asymptomatic, VSS.  I explained to the pt to avoid using outside foods as this can be hazardous to her and cause labile sugars.  Pt said she understands.  Will endorse to day provider.  Pt stable at this time, will continue to monitor.    Octavio Hassan PA-C  Department of Medicine - [facility]  In-House Pager: #[phone]

## 2022-05-10 NOTE — PROGRESS NOTE ADULT - SUBJECTIVE AND OBJECTIVE BOX
[facility] Division of Hospital Medicine  Jess Lopez MD  Pager (M-F, 8A-5P): [phone]  Other Times:  [phone]      SUBJECTIVE / OVERNIGHT EVENTS: Dietary indiscretions overnight. No labs drawn today. Pt requesting IV benadryl. Also notes some dental pain.    MEDICATIONS  (STANDING):  dextrose 50% Injectable 25 Gram(s) IV Push once  dextrose 50% Injectable 12.5 Gram(s) IV Push once  dextrose 50% Injectable 25 Gram(s) IV Push once  dextrose Oral Gel 15 Gram(s) Oral once  glucagon  Injectable 1 milliGRAM(s) IntraMuscular once  insulin glargine Injectable (LANTUS) 10 Unit(s) SubCutaneous at bedtime  insulin lispro (ADMELOG) corrective regimen sliding scale   SubCutaneous three times a day before meals  insulin lispro (ADMELOG) corrective regimen sliding scale   SubCutaneous at bedtime  insulin lispro Injectable (ADMELOG) 5 Unit(s) SubCutaneous three times a day before meals  metoclopramide Injectable 10 milliGRAM(s) IV Push three times a day  pantoprazole  Injectable 40 milliGRAM(s) IV Push daily    MEDICATIONS  (PRN):  acetaminophen     Tablet .. 650 milliGRAM(s) Oral every 6 hours PRN Temp greater or equal to 38C (100.4F), Mild Pain (1 - 3)  aluminum hydroxide/magnesium hydroxide/simethicone Suspension 30 milliLiter(s) Oral every 4 hours PRN Dyspepsia  diphenhydrAMINE 25 milliGRAM(s) Oral once PRN Rash and/or Itching  HYDROmorphone  Injectable 0.5 milliGRAM(s) IV Push every 6 hours PRN Moderate Pain (4 - 6) and severe pain  melatonin 3 milliGRAM(s) Oral at bedtime PRN Insomnia  ondansetron Injectable 4 milliGRAM(s) IV Push every 8 hours PRN Nausea and/or Vomiting      I&O's Summary      PHYSICAL EXAM:  Vital Signs Last 24 Hrs  T(C): 37.2 (10 May 2022 12:10), Max: 37.2 (10 May 2022 12:10)  T(F): 99 (10 May 2022 12:10), Max: 99 (10 May 2022 12:10)  HR: 95 (10 May 2022 12:10) (86 - 100)  BP: 133/89 (10 May 2022 12:10) (133/89 - 141/88)  BP(mean): --  RR: 18 (10 May 2022 12:10) (17 - 18)  SpO2: 100% (10 May 2022 12:10) (99% - 100%)  CONSTITUTIONAL: NAD, well-developed, well-groomed, poor dentition  EYES: PERRLA; conjunctiva and sclera clear  ENMT: Moist oral mucosa, no pharyngeal injection or exudates  RESPIRATORY: Normal respiratory effort; lungs are clear to auscultation bilaterally  CARDIOVASCULAR: Regular rate and rhythm, normal S1 and S2, no murmur/rub/gallop; No lower extremity edema; Peripheral pulses are 2+ bilaterally  ABDOMEN: Nontender to palpation, normoactive bowel sounds, no rebound/guarding; No hepatosplenomegaly  PSYCH: A+O to person, place, and time; affect appropriate  SKIN: No rashes; no palpable lesions    LABS:                        9.0    6.96  )-----------( 298      ( 10 May 2022 15:45 )             30.4     05-10    136  |  104  |  12  ----------------------------<  226<H>  4.6   |  19<L>  |  0.83    Ca    8.5      10 May 2022 15:45  Phos  3.2     05-10  Mg     1.70     05-10    TPro  5.9<L>  /  Alb  2.9<L>  /  TBili  <0.2  /  DBili  x   /  AST  23  /  ALT  17  /  AlkPhos  94  05-10          Urinalysis Basic - ( 09 May 2022 01:00 )    Color: Light Yellow / Appearance: Clear / S.033 / pH: x  Gluc: x / Ketone: Moderate  / Bili: Negative / Urobili: <2 mg/dL   Blood: x / Protein: 30 mg/dL / Nitrite: Negative   Leuk Esterase: Negative / RBC: 5 /HPF / WBC 3 /HPF   Sq Epi: x / Non Sq Epi: 2 /HPF / Bacteria: Negative        Culture - Blood (collected 09 May 2022 09:40)  Source: .Blood Blood-Peripheral  Preliminary Report (10 May 2022 10:01):    No growth to date.    Culture - Blood (collected 09 May 2022 09:40)  Source: .Blood Blood-Peripheral  Preliminary Report (10 May 2022 10:01):    No growth to date.    Culture - Urine (collected 09 May 2022 00:50)  Source: Clean Catch Clean Catch (Midstream)  Final Report (10 May 2022 07:08):    <10,000 CFU/mL Normal Urogenital Jessica        RADIOLOGY & ADDITIONAL TESTS:  Results Reviewed:   Imaging Personally Reviewed:  Electrocardiogram Personally Reviewed:    COORDINATION OF CARE:  Care Discussed with Consultants/Other Providers [Y/N]: endo  Prior or Outpatient Records Reviewed [Y/N]:

## 2022-05-10 NOTE — CHART NOTE - NSCHARTNOTEFT_GEN_A_CORE
[facility] NIGHT MEDICINE COVERAGE    Notified by RN that pt is requesting to leave against medical advice because of uncontrolled pain.  Chart reviewed, pt was on Dilaudid 0.5mg q6h for moderate pain, has h/o gastroparesis 2/2 diabetes, multiple admissions for DKA and gastroparesis related pain, there was concern for opioid dependence documented, Dilaudid order was d/cortez during the day, and PO Tylenol was ordered.    Spoke to pt at bedside in presence of primary RN, pt noted to be A&Ox3, laying on side, appears uncomfortable.  Pt says shes in 10/10 abdominal pain, pointed to LLQ, she reports the Dilaudid was helping her and that she was "unaware of plan to change my meds".  She says she would rather stay if her pain can be controlled.  I explained the risks of signing out against medical advice, especially given her h/o diabetes complicated by DKA and hypoglycemic episodes - risks including: coma, seizures, and death.  Pt says she understands, she said if he pain cannot be controlled, she would "call my parents and have them take me elsewhere".  She denies any nausea, vomiting, chest pain, or difficulty breathing.    Pt examined: Thin, non-toxic appearing female, A&Ox3, appears uncomfortable, speaking clearly, and calmly.  Abdominal exam reveals +bowel sounds, no distension noted, diffuse abdominal TTP, noted more over LLQ, no rebound or guarding noted.  Abdominal XR reviewed (result copied below):    PROCEDURE DATE: 05/09/2022    INTERPRETATION: CLINICAL INDICATION: Abdominal pain    TECHNIQUE: AP view(s) of the abdomen.    COMPARISON: CT abdomen/pelvis 4/27/2022    FINDINGS:  Paucity of bowel gas. Nonspecific bowel gas pattern. Large amount of stool noted within the ascending and descending colon. Visualized osseous structures are unremarkable.    IMPRESSION:  Nonobstructing gas pattern.  Constipation.  --- End of Report ---  <End of copied text.    Plan:  -Case d/w Hospitalist, Dr. Neely, recommended giving Dilaudid 0.5mg x1 IVP now for uncontrolled pain   -Monitor pain overnight, can do serial abdominal exams as needed, consider repeat imaging if indicated  -Monitor FS closely as pt has very labile FS noted from previous trend in last 24 hours - Endocrine is following  -UTox on 5/10 negative for opiates, positive for THC  -Plan d/w RN.    Pt stable at this time, will continue to monitor.    Octavio Hassan PA-C  Department of Medicine - [facility]  In-House Pager: #[phone]

## 2022-05-11 LAB
ANION GAP SERPL CALC-SCNC: 10 MMOL/L — SIGNIFICANT CHANGE UP (ref 7–14)
B-OH-BUTYR SERPL-SCNC: <0 MMOL/L — SIGNIFICANT CHANGE UP (ref 0–0.4)
BASE EXCESS BLDV CALC-SCNC: 1.3 MMOL/L — SIGNIFICANT CHANGE UP (ref -2–3)
BLOOD GAS VENOUS COMPREHENSIVE RESULT: SIGNIFICANT CHANGE UP
BUN SERPL-MCNC: 14 MG/DL — SIGNIFICANT CHANGE UP (ref 7–23)
CALCIUM SERPL-MCNC: 8.4 MG/DL — SIGNIFICANT CHANGE UP (ref 8.4–10.5)
CHLORIDE BLDV-SCNC: 101 MMOL/L — SIGNIFICANT CHANGE UP (ref 96–108)
CHLORIDE SERPL-SCNC: 101 MMOL/L — SIGNIFICANT CHANGE UP (ref 98–107)
CO2 BLDV-SCNC: 27.1 MMOL/L — HIGH (ref 22–26)
CO2 SERPL-SCNC: 22 MMOL/L — SIGNIFICANT CHANGE UP (ref 22–31)
CREAT SERPL-MCNC: 0.71 MG/DL — SIGNIFICANT CHANGE UP (ref 0.5–1.3)
EGFR: 116 ML/MIN/1.73M2 — SIGNIFICANT CHANGE UP
GAS PNL BLDV: 132 MMOL/L — LOW (ref 136–145)
GLUCOSE BLDC GLUCOMTR-MCNC: 112 MG/DL — HIGH (ref 70–99)
GLUCOSE BLDC GLUCOMTR-MCNC: 122 MG/DL — HIGH (ref 70–99)
GLUCOSE BLDC GLUCOMTR-MCNC: 200 MG/DL — HIGH (ref 70–99)
GLUCOSE BLDC GLUCOMTR-MCNC: 229 MG/DL — HIGH (ref 70–99)
GLUCOSE BLDC GLUCOMTR-MCNC: 322 MG/DL — HIGH (ref 70–99)
GLUCOSE BLDC GLUCOMTR-MCNC: 404 MG/DL — HIGH (ref 70–99)
GLUCOSE BLDC GLUCOMTR-MCNC: 411 MG/DL — HIGH (ref 70–99)
GLUCOSE BLDC GLUCOMTR-MCNC: 422 MG/DL — HIGH (ref 70–99)
GLUCOSE BLDC GLUCOMTR-MCNC: 56 MG/DL — LOW (ref 70–99)
GLUCOSE BLDC GLUCOMTR-MCNC: 74 MG/DL — SIGNIFICANT CHANGE UP (ref 70–99)
GLUCOSE BLDC GLUCOMTR-MCNC: 84 MG/DL — SIGNIFICANT CHANGE UP (ref 70–99)
GLUCOSE BLDV-MCNC: 340 MG/DL — HIGH (ref 70–99)
GLUCOSE SERPL-MCNC: 335 MG/DL — HIGH (ref 70–99)
HCG SERPL-ACNC: <5 MIU/ML — SIGNIFICANT CHANGE UP
HCO3 BLDV-SCNC: 26 MMOL/L — SIGNIFICANT CHANGE UP (ref 22–29)
HCT VFR BLD CALC: 30.7 % — LOW (ref 34.5–45)
HCT VFR BLDA CALC: 28 % — LOW (ref 34.5–46.5)
HGB BLD CALC-MCNC: 9.3 G/DL — LOW (ref 11.5–15.5)
HGB BLD-MCNC: 9.1 G/DL — LOW (ref 11.5–15.5)
LACTATE BLDV-MCNC: 1.6 MMOL/L — SIGNIFICANT CHANGE UP (ref 0.5–2)
MAGNESIUM SERPL-MCNC: 1.7 MG/DL — SIGNIFICANT CHANGE UP (ref 1.6–2.6)
MCHC RBC-ENTMCNC: 18.6 PG — LOW (ref 27–34)
MCHC RBC-ENTMCNC: 29.6 GM/DL — LOW (ref 32–36)
MCV RBC AUTO: 62.7 FL — LOW (ref 80–100)
NRBC # BLD: 0 /100 WBCS — SIGNIFICANT CHANGE UP
NRBC # FLD: 0 K/UL — SIGNIFICANT CHANGE UP
PCO2 BLDV: 40 MMHG — SIGNIFICANT CHANGE UP (ref 39–42)
PH BLDV: 7.42 — SIGNIFICANT CHANGE UP (ref 7.32–7.43)
PHOSPHATE SERPL-MCNC: 3.1 MG/DL — SIGNIFICANT CHANGE UP (ref 2.5–4.5)
PLATELET # BLD AUTO: 381 K/UL — SIGNIFICANT CHANGE UP (ref 150–400)
PO2 BLDV: 79 MMHG — SIGNIFICANT CHANGE UP
POTASSIUM BLDV-SCNC: 4.1 MMOL/L — SIGNIFICANT CHANGE UP (ref 3.5–5.1)
POTASSIUM SERPL-MCNC: 4.1 MMOL/L — SIGNIFICANT CHANGE UP (ref 3.5–5.3)
POTASSIUM SERPL-SCNC: 4.1 MMOL/L — SIGNIFICANT CHANGE UP (ref 3.5–5.3)
RBC # BLD: 4.9 M/UL — SIGNIFICANT CHANGE UP (ref 3.8–5.2)
RBC # FLD: 19.4 % — HIGH (ref 10.3–14.5)
SAO2 % BLDV: 95.4 % — SIGNIFICANT CHANGE UP
SODIUM SERPL-SCNC: 133 MMOL/L — LOW (ref 135–145)
WBC # BLD: 7.82 K/UL — SIGNIFICANT CHANGE UP (ref 3.8–10.5)
WBC # FLD AUTO: 7.82 K/UL — SIGNIFICANT CHANGE UP (ref 3.8–10.5)

## 2022-05-11 PROCEDURE — 74019 RADEX ABDOMEN 2 VIEWS: CPT | Mod: 26

## 2022-05-11 PROCEDURE — 99233 SBSQ HOSP IP/OBS HIGH 50: CPT

## 2022-05-11 PROCEDURE — 70486 CT MAXILLOFACIAL W/O DYE: CPT | Mod: 26

## 2022-05-11 RX ORDER — INSULIN LISPRO 100/ML
VIAL (ML) SUBCUTANEOUS AT BEDTIME
Refills: 0 | Status: DISCONTINUED | OUTPATIENT
Start: 2022-05-11 | End: 2022-05-12

## 2022-05-11 RX ORDER — POLYETHYLENE GLYCOL 3350 17 G/17G
17 POWDER, FOR SOLUTION ORAL DAILY
Refills: 0 | Status: DISCONTINUED | OUTPATIENT
Start: 2022-05-11 | End: 2022-05-12

## 2022-05-11 RX ORDER — AMITRIPTYLINE HCL 25 MG
10 TABLET ORAL AT BEDTIME
Refills: 0 | Status: DISCONTINUED | OUTPATIENT
Start: 2022-05-11 | End: 2022-05-12

## 2022-05-11 RX ORDER — ACETAMINOPHEN 500 MG
1000 TABLET ORAL ONCE
Refills: 0 | Status: DISCONTINUED | OUTPATIENT
Start: 2022-05-11 | End: 2022-05-12

## 2022-05-11 RX ORDER — INSULIN LISPRO 100/ML
3 VIAL (ML) SUBCUTANEOUS AT BEDTIME
Refills: 0 | Status: DISCONTINUED | OUTPATIENT
Start: 2022-05-11 | End: 2022-05-12

## 2022-05-11 RX ORDER — INSULIN LISPRO 100/ML
VIAL (ML) SUBCUTANEOUS AT BEDTIME
Refills: 0 | Status: DISCONTINUED | OUTPATIENT
Start: 2022-05-11 | End: 2022-05-11

## 2022-05-11 RX ORDER — DEXTROSE 50 % IN WATER 50 %
15 SYRINGE (ML) INTRAVENOUS ONCE
Refills: 0 | Status: COMPLETED | OUTPATIENT
Start: 2022-05-11 | End: 2022-05-11

## 2022-05-11 RX ORDER — INSULIN LISPRO 100/ML
5 VIAL (ML) SUBCUTANEOUS
Refills: 0 | Status: DISCONTINUED | OUTPATIENT
Start: 2022-05-12 | End: 2022-05-12

## 2022-05-11 RX ORDER — ACETAMINOPHEN 500 MG
1000 TABLET ORAL ONCE
Refills: 0 | Status: COMPLETED | OUTPATIENT
Start: 2022-05-11 | End: 2022-05-11

## 2022-05-11 RX ORDER — INSULIN LISPRO 100/ML
VIAL (ML) SUBCUTANEOUS
Refills: 0 | Status: DISCONTINUED | OUTPATIENT
Start: 2022-05-11 | End: 2022-05-11

## 2022-05-11 RX ORDER — INSULIN LISPRO 100/ML
VIAL (ML) SUBCUTANEOUS
Refills: 0 | Status: DISCONTINUED | OUTPATIENT
Start: 2022-05-12 | End: 2022-05-12

## 2022-05-11 RX ORDER — HYDROMORPHONE HYDROCHLORIDE 2 MG/ML
2 INJECTION INTRAMUSCULAR; INTRAVENOUS; SUBCUTANEOUS EVERY 4 HOURS
Refills: 0 | Status: DISCONTINUED | OUTPATIENT
Start: 2022-05-11 | End: 2022-05-12

## 2022-05-11 RX ORDER — HYDROMORPHONE HYDROCHLORIDE 2 MG/ML
0.5 INJECTION INTRAMUSCULAR; INTRAVENOUS; SUBCUTANEOUS ONCE
Refills: 0 | Status: DISCONTINUED | OUTPATIENT
Start: 2022-05-11 | End: 2022-05-11

## 2022-05-11 RX ORDER — INSULIN LISPRO 100/ML
VIAL (ML) SUBCUTANEOUS EVERY 6 HOURS
Refills: 0 | Status: DISCONTINUED | OUTPATIENT
Start: 2022-05-11 | End: 2022-05-11

## 2022-05-11 RX ORDER — ACETAMINOPHEN 500 MG
650 TABLET ORAL EVERY 6 HOURS
Refills: 0 | Status: DISCONTINUED | OUTPATIENT
Start: 2022-05-11 | End: 2022-05-12

## 2022-05-11 RX ORDER — INSULIN LISPRO 100/ML
3 VIAL (ML) SUBCUTANEOUS ONCE
Refills: 0 | Status: COMPLETED | OUTPATIENT
Start: 2022-05-11 | End: 2022-05-11

## 2022-05-11 RX ADMIN — PANTOPRAZOLE SODIUM 40 MILLIGRAM(S): 20 TABLET, DELAYED RELEASE ORAL at 16:58

## 2022-05-11 RX ADMIN — INSULIN GLARGINE 10 UNIT(S): 100 INJECTION, SOLUTION SUBCUTANEOUS at 22:45

## 2022-05-11 RX ADMIN — Medication 10 MILLIGRAM(S): at 22:44

## 2022-05-11 RX ADMIN — Medication 6: at 17:47

## 2022-05-11 RX ADMIN — Medication 1000 MILLIGRAM(S): at 04:45

## 2022-05-11 RX ADMIN — HYDROMORPHONE HYDROCHLORIDE 2 MILLIGRAM(S): 2 INJECTION INTRAMUSCULAR; INTRAVENOUS; SUBCUTANEOUS at 19:37

## 2022-05-11 RX ADMIN — Medication 400 MILLIGRAM(S): at 04:15

## 2022-05-11 RX ADMIN — HYDROMORPHONE HYDROCHLORIDE 0.5 MILLIGRAM(S): 2 INJECTION INTRAMUSCULAR; INTRAVENOUS; SUBCUTANEOUS at 13:17

## 2022-05-11 RX ADMIN — SCOPALAMINE 1 PATCH: 1 PATCH, EXTENDED RELEASE TRANSDERMAL at 01:52

## 2022-05-11 RX ADMIN — ONDANSETRON 4 MILLIGRAM(S): 8 TABLET, FILM COATED ORAL at 00:01

## 2022-05-11 RX ADMIN — Medication 12.5 MILLIGRAM(S): at 00:01

## 2022-05-11 RX ADMIN — ONDANSETRON 4 MILLIGRAM(S): 8 TABLET, FILM COATED ORAL at 20:01

## 2022-05-11 RX ADMIN — SCOPALAMINE 1 PATCH: 1 PATCH, EXTENDED RELEASE TRANSDERMAL at 19:00

## 2022-05-11 RX ADMIN — Medication 2: at 13:21

## 2022-05-11 RX ADMIN — Medication 4: at 04:16

## 2022-05-11 RX ADMIN — Medication 3 UNIT(S): at 22:47

## 2022-05-11 RX ADMIN — Medication 3 UNIT(S): at 17:49

## 2022-05-11 RX ADMIN — HYDROMORPHONE HYDROCHLORIDE 0.5 MILLIGRAM(S): 2 INJECTION INTRAMUSCULAR; INTRAVENOUS; SUBCUTANEOUS at 13:47

## 2022-05-11 RX ADMIN — HYDROMORPHONE HYDROCHLORIDE 0.5 MILLIGRAM(S): 2 INJECTION INTRAMUSCULAR; INTRAVENOUS; SUBCUTANEOUS at 20:01

## 2022-05-11 RX ADMIN — HYDROMORPHONE HYDROCHLORIDE 0.5 MILLIGRAM(S): 2 INJECTION INTRAMUSCULAR; INTRAVENOUS; SUBCUTANEOUS at 20:15

## 2022-05-11 RX ADMIN — SCOPALAMINE 1 PATCH: 1 PATCH, EXTENDED RELEASE TRANSDERMAL at 07:21

## 2022-05-11 NOTE — PROGRESS NOTE ADULT - SUBJECTIVE AND OBJECTIVE BOX
[facility] Division of Hospital Medicine  Jess Lopez MD  Pager (M-F, 8A-5P): [phone]  Other Times:  [id number]      SUBJECTIVE / OVERNIGHT EVENTS:    MEDICATIONS  (STANDING):  acetaminophen   IVPB .. 1000 milliGRAM(s) IV Intermittent once  dextrose 50% Injectable 25 Gram(s) IV Push once  dextrose 50% Injectable 12.5 Gram(s) IV Push once  dextrose 50% Injectable 25 Gram(s) IV Push once  dextrose Oral Gel 15 Gram(s) Oral once  glucagon  Injectable 1 milliGRAM(s) IntraMuscular once  insulin glargine Injectable (LANTUS) 10 Unit(s) SubCutaneous at bedtime  insulin lispro (ADMELOG) corrective regimen sliding scale   SubCutaneous three times a day before meals  insulin lispro Injectable (ADMELOG) 5 Unit(s) SubCutaneous three times a day before meals  metoclopramide Injectable 10 milliGRAM(s) IV Push three times a day  pantoprazole  Injectable 40 milliGRAM(s) IV Push daily    MEDICATIONS  (PRN):  acetaminophen     Tablet .. 650 milliGRAM(s) Oral every 6 hours PRN Temp greater or equal to 38C (100.4F), Mild Pain (1 - 3)  aluminum hydroxide/magnesium hydroxide/simethicone Suspension 30 milliLiter(s) Oral every 4 hours PRN Dyspepsia  diphenhydrAMINE 25 milliGRAM(s) Oral once PRN Rash and/or Itching  melatonin 3 milliGRAM(s) Oral at bedtime PRN Insomnia  ondansetron Injectable 4 milliGRAM(s) IV Push every 8 hours PRN Nausea and/or Vomiting      I&O's Summary      PHYSICAL EXAM:  Vital Signs Last 24 Hrs  T(C): 36.9 (11 May 2022 15:27), Max: 36.9 (11 May 2022 15:27)  T(F): 98.4 (11 May 2022 15:27), Max: 98.4 (11 May 2022 15:27)  HR: 94 (11 May 2022 15:27) (79 - 97)  BP: 152/98 (11 May 2022 15:27) (132/88 - 152/98)  BP(mean): --  RR: 18 (11 May 2022 15:27) (18 - 18)  SpO2: 98% (11 May 2022 15:27) (98% - 100%)      LABS:                        9.1    7.82  )-----------( 381      ( 11 May 2022 04:09 )             30.7     05-11    133<L>  |  101  |  14  ----------------------------<  335<H>  4.1   |  22  |  0.71    Ca    8.4      11 May 2022 04:09  Phos  3.1     05-11  Mg     1.70     05-11    TPro  5.9<L>  /  Alb  2.9<L>  /  TBili  <0.2  /  DBili  x   /  AST  23  /  ALT  17  /  AlkPhos  94  05-10              Culture - Blood (collected 09 May 2022 09:40)  Source: .Blood Blood-Peripheral  Preliminary Report (10 May 2022 10:01):    No growth to date.    Culture - Blood (collected 09 May 2022 09:40)  Source: .Blood Blood-Peripheral  Preliminary Report (10 May 2022 10:01):    No growth to date.    Culture - Urine (collected 09 May 2022 00:50)  Source: Clean Catch Clean Catch (Midstream)  Final Report (10 May 2022 07:08):    <10,000 CFU/mL Normal Urogenital Jessica        RADIOLOGY & ADDITIONAL TESTS:  Results Reviewed:   Imaging Personally Reviewed:  Electrocardiogram Personally Reviewed:    COORDINATION OF CARE:  Care Discussed with Consultants/Other Providers [Y/N]:  Prior or Outpatient Records Reviewed [Y/N]:   [facility] Division of Hospital Medicine  Jess Lopez MD  Pager (M-F, 8A-5P): [phone]  Other Times:  [phone]      SUBJECTIVE / OVERNIGHT EVENTS: Pt upset that dialudid was discontinued last night and " no one told me." Complaining of some hard stools. States she will go for her CT scan if she can get something for pain that is not tylenol. Still bringing in food from outside. Reports ongoing abdominal pain.    MEDICATIONS  (STANDING):  acetaminophen   IVPB .. 1000 milliGRAM(s) IV Intermittent once  dextrose 50% Injectable 25 Gram(s) IV Push once  dextrose 50% Injectable 12.5 Gram(s) IV Push once  dextrose 50% Injectable 25 Gram(s) IV Push once  dextrose Oral Gel 15 Gram(s) Oral once  glucagon  Injectable 1 milliGRAM(s) IntraMuscular once  insulin glargine Injectable (LANTUS) 10 Unit(s) SubCutaneous at bedtime  insulin lispro (ADMELOG) corrective regimen sliding scale   SubCutaneous three times a day before meals  insulin lispro Injectable (ADMELOG) 5 Unit(s) SubCutaneous three times a day before meals  metoclopramide Injectable 10 milliGRAM(s) IV Push three times a day  pantoprazole  Injectable 40 milliGRAM(s) IV Push daily    MEDICATIONS  (PRN):  acetaminophen     Tablet .. 650 milliGRAM(s) Oral every 6 hours PRN Temp greater or equal to 38C (100.4F), Mild Pain (1 - 3)  aluminum hydroxide/magnesium hydroxide/simethicone Suspension 30 milliLiter(s) Oral every 4 hours PRN Dyspepsia  diphenhydrAMINE 25 milliGRAM(s) Oral once PRN Rash and/or Itching  melatonin 3 milliGRAM(s) Oral at bedtime PRN Insomnia  ondansetron Injectable 4 milliGRAM(s) IV Push every 8 hours PRN Nausea and/or Vomiting      I&O's Summary      PHYSICAL EXAM:  Vital Signs Last 24 Hrs  T(C): 36.9 (11 May 2022 15:27), Max: 36.9 (11 May 2022 15:27)  T(F): 98.4 (11 May 2022 15:27), Max: 98.4 (11 May 2022 15:27)  HR: 94 (11 May 2022 15:27) (79 - 97)  BP: 152/98 (11 May 2022 15:27) (132/88 - 152/98)  BP(mean): --  RR: 18 (11 May 2022 15:27) (18 - 18)  SpO2: 98% (11 May 2022 15:27) (98% - 100%)      LABS:                        9.1    7.82  )-----------( 381      ( 11 May 2022 04:09 )             30.7     05-11    133<L>  |  101  |  14  ----------------------------<  335<H>  4.1   |  22  |  0.71    Ca    8.4      11 May 2022 04:09  Phos  3.1     05-11  Mg     1.70     05-11    TPro  5.9<L>  /  Alb  2.9<L>  /  TBili  <0.2  /  DBili  x   /  AST  23  /  ALT  17  /  AlkPhos  94  05-10              Culture - Blood (collected 09 May 2022 09:40)  Source: .Blood Blood-Peripheral  Preliminary Report (10 May 2022 10:01):    No growth to date.    Culture - Blood (collected 09 May 2022 09:40)  Source: .Blood Blood-Peripheral  Preliminary Report (10 May 2022 10:01):    No growth to date.    Culture - Urine (collected 09 May 2022 00:50)  Source: Clean Catch Clean Catch (Midstream)  Final Report (10 May 2022 07:08):    <10,000 CFU/mL Normal Urogenital Jessica        RADIOLOGY & ADDITIONAL TESTS:  Results Reviewed:   Imaging Personally Reviewed:  Electrocardiogram Personally Reviewed:    COORDINATION OF CARE:  Care Discussed with Consultants/Other Providers [Y/N]: pain mgmtg  Prior or Outpatient Records Reviewed [Y/N]:

## 2022-05-11 NOTE — PROVIDER CONTACT NOTE (HYPOGLYCEMIA EVENT) - NS PROVIDER CONTACT NOTE-TREATMENT-HYPO
4 oz Fruit Juice (Specify quantity, date/time)/Dextrose 50% 25 Grams IV Push
4 oz Fruit Juice (Specify quantity, date/time)
PATIENT REFUSED iv PUSH DEXTROSE. sTATED IT BLOWS HER VEINS. DRANK 4Oz CRANBERRY JUICE AND 6 Oz Milk/4 oz Fruit Juice (Specify quantity, date/time)/Other (Specify)

## 2022-05-11 NOTE — CHART NOTE - NSCHARTNOTEFT_GEN_A_CORE
Endocrine contacted regarding hyperglycemia overnight. Patient being followed by endocrine for DM1 management & known to endocrine service.     CAPILLARY BLOOD GLUCOSE  POCT Blood Glucose.: 422 mg/dL (11 May 2022 02:51)  POCT Blood Glucose.: 404 mg/dL (11 May 2022 02:45)  POCT Blood Glucose.: 128 mg/dL (10 May 2022 22:20)  POCT Blood Glucose.: 310 mg/dL (10 May 2022 16:45)  POCT Blood Glucose.: 152 mg/dL (10 May 2022 12:00)  POCT Blood Glucose.: 246 mg/dL (10 May 2022 08:23)  POCT Blood Glucose.: 200 mg/dL (10 May 2022 06:43)  POCT Blood Glucose.: 112 mg/dL (10 May 2022 06:24)  POCT Blood Glucose.: 46 mg/dL (10 May 2022 05:44)  POCT Blood Glucose.: 47 mg/dL (10 May 2022 05:40)    Patient reported to have had food delivery earlier tonight and noncompliant w/ inpatient diet.     Recommendations:   - Keep NPO until FS BG levels < 250 or close   - Change to Admelog Low Dose Correction Scale [id number]   - Check BMP, BHB, VBG    recs discussed w/ ACP Octavio Elliott,    Endocrine Fellow  For follow up questions, discharge recommendations, or new consults, please call answering service at [phone] (weekdays); [phone] (nights/weekends) Endocrine contacted regarding hyperglycemia overnight. Patient being followed by endocrine for DM1 management & known to endocrine service.     CAPILLARY BLOOD GLUCOSE  POCT Blood Glucose.: 422 mg/dL (11 May 2022 02:51)  POCT Blood Glucose.: 404 mg/dL (11 May 2022 02:45)  POCT Blood Glucose.: 128 mg/dL (10 May 2022 22:20)  POCT Blood Glucose.: 310 mg/dL (10 May 2022 16:45)  POCT Blood Glucose.: 152 mg/dL (10 May 2022 12:00)  POCT Blood Glucose.: 246 mg/dL (10 May 2022 08:23)  POCT Blood Glucose.: 200 mg/dL (10 May 2022 06:43)  POCT Blood Glucose.: 112 mg/dL (10 May 2022 06:24)  POCT Blood Glucose.: 46 mg/dL (10 May 2022 05:44)  POCT Blood Glucose.: 47 mg/dL (10 May 2022 05:40)    Patient reported to have had food delivery earlier tonight and noncompliant w/ inpatient diet.     Recommendations:   - Keep NPO until FS BG levels < 250 or close   - Change to Admelog Low Dose Correction Scale [id number] (starting now)   - Check BMP, BHB, VBG to evaluate for DKA     recs discussed w/ ACP Octavio Elliott DO   Endocrine Fellow  For follow up questions, discharge recommendations, or new consults, please call answering service at [phone] (weekdays); [phone] (nights/weekends) Endocrine contacted regarding hyperglycemia overnight. Patient being followed by endocrine for DM1 management & known to endocrine service.     CAPILLARY BLOOD GLUCOSE  POCT Blood Glucose.: 422 mg/dL (11 May 2022 02:51)  POCT Blood Glucose.: 404 mg/dL (11 May 2022 02:45)  POCT Blood Glucose.: 128 mg/dL (10 May 2022 22:20)  POCT Blood Glucose.: 310 mg/dL (10 May 2022 16:45)  POCT Blood Glucose.: 152 mg/dL (10 May 2022 12:00)  POCT Blood Glucose.: 246 mg/dL (10 May 2022 08:23)  POCT Blood Glucose.: 200 mg/dL (10 May 2022 06:43)  POCT Blood Glucose.: 112 mg/dL (10 May 2022 06:24)  POCT Blood Glucose.: 46 mg/dL (10 May 2022 05:44)  POCT Blood Glucose.: 47 mg/dL (10 May 2022 05:40)    Patient reported to have had food delivery earlier tonight and noncompliant w/ inpatient diet.     Recommendations:   - Keep NPO until FS BG levels < 250 or close   - Change to Admelog Low Dose Correction Scale [id number] (starting now)   - Check BMP, BHB, VBG to evaluate for DKA   - FS BG monitoring   - Hypoglycemia protocol     recs discussed w/ ACP Octavio Elliott DO   Endocrine Fellow  For follow up questions, discharge recommendations, or new consults, please call answering service at [phone] (weekdays); [phone] (nights/weekends) Endocrine contacted regarding hyperglycemia overnight. Patient being followed by endocrine for DM1 management & known to endocrine service.     CAPILLARY BLOOD GLUCOSE  POCT Blood Glucose.: 422 mg/dL (11 May 2022 02:51)  POCT Blood Glucose.: 404 mg/dL (11 May 2022 02:45)  POCT Blood Glucose.: 128 mg/dL (10 May 2022 22:20)  POCT Blood Glucose.: 310 mg/dL (10 May 2022 16:45)  POCT Blood Glucose.: 152 mg/dL (10 May 2022 12:00)  POCT Blood Glucose.: 246 mg/dL (10 May 2022 08:23)  POCT Blood Glucose.: 200 mg/dL (10 May 2022 06:43)  POCT Blood Glucose.: 112 mg/dL (10 May 2022 06:24)  POCT Blood Glucose.: 46 mg/dL (10 May 2022 05:44)  POCT Blood Glucose.: 47 mg/dL (10 May 2022 05:40)    Patient reported to have had food delivery earlier tonight and noncompliant w/ inpatient diet.     Recommendations:   - Keep NPO until FS BG levels < 250 or close   - Change to Admelog Low Dose Correction Scale [id number] (starting now)   - Check STAT BMP, BHB, VBG to evaluate for DKA   - FS BG monitoring   - Hypoglycemia protocol     recs discussed w/ ACP Octavio Elliott DO   Endocrine Fellow  For follow up questions, discharge recommendations, or new consults, please call answering service at [phone] (weekdays); [phone] (nights/weekends)

## 2022-05-11 NOTE — CONSULT NOTE ADULT - SUBJECTIVE AND OBJECTIVE BOX
Chief Complaint: Lower abdominal pain    HPI:  31 yo F w/ hx T1DM w/ gastroparesis, HTN who presents with nausea, vomiting (NBNB), and abd pain along with some SOB, onset this evening. She notes abdominal pain is in b/l LQ denies any change in bowel habits blood or dark stool. Last BM was yesterday. She states has not been taking any opiates or marijuana since discharge from hospital. She has frequent episodes of DKA with unclear causes, she notes; she says she did not miss any insulin doses. SOB has resolved. She denies any sick contacts fever, cough, fever, recent illness, constipation or diarrhea, or urinary complaints.    In ED was noted to have elevated AG 20 with glucose 596; 2L LR  recieved 18 U lantus 3 AM;  8 U sq at 2 AM ademalog and 4 AM 4 U ademalog subsequent  hypoglycemia 7 AM 68--  D50 x 1 --9:30 --54--D50x 1. AG resolved (09 May 2022 10:49)      PAST MEDICAL & SURGICAL HISTORY:  Diabetes mellitus type 1      Gastroparesis due to DM      Colitis      Hypertension      Ectopic pregnancy  2009 and 2013, s/p removal of right and left fallopian tubes      History of cholecystectomy          FAMILY HISTORY:  Family history of type 1 diabetes mellitus (Grandparent)    FH: HTN (hypertension) (Father)    FHx: asthma  Brother        SOCIAL HISTORY:  [X ] Denies Smoking, Alcohol, or Drug Use. Endorses marijuana use, stopped 3 weeks ago as per patient.    Allergies    Toradol (Pruritus)    Intolerances        PAIN MEDICATIONS:  acetaminophen     Tablet .. 650 milliGRAM(s) Oral every 6 hours PRN  acetaminophen   IVPB .. 1000 milliGRAM(s) IV Intermittent once  diphenhydrAMINE 25 milliGRAM(s) Oral once PRN  melatonin 3 milliGRAM(s) Oral at bedtime PRN  metoclopramide Injectable 10 milliGRAM(s) IV Push three times a day  ondansetron Injectable 4 milliGRAM(s) IV Push every 8 hours PRN    Heme:    Antibiotics:    Cardiovascular:    GI:  aluminum hydroxide/magnesium hydroxide/simethicone Suspension 30 milliLiter(s) Oral every 4 hours PRN  pantoprazole  Injectable 40 milliGRAM(s) IV Push daily  polyethylene glycol 3350 17 Gram(s) Oral daily    Endocrine:  dextrose 50% Injectable 25 Gram(s) IV Push once  dextrose 50% Injectable 12.5 Gram(s) IV Push once  dextrose 50% Injectable 25 Gram(s) IV Push once  dextrose Oral Gel 15 Gram(s) Oral once  glucagon  Injectable 1 milliGRAM(s) IntraMuscular once  insulin glargine Injectable (LANTUS) 10 Unit(s) SubCutaneous at bedtime  insulin lispro (ADMELOG) corrective regimen sliding scale   SubCutaneous three times a day before meals  insulin lispro (ADMELOG) corrective regimen sliding scale   SubCutaneous at bedtime  insulin lispro Injectable (ADMELOG) 5 Unit(s) SubCutaneous three times a day before meals  insulin lispro Injectable (ADMELOG) 3 Unit(s) SubCutaneous at bedtime    All Other Medications:      Vital Signs Last 24 Hrs  T(C): 36.9 (11 May 2022 15:27), Max: 36.9 (11 May 2022 15:27)  T(F): 98.4 (11 May 2022 15:27), Max: 98.4 (11 May 2022 15:27)  HR: 94 (11 May 2022 15:27) (79 - 97)  BP: 152/98 (11 May 2022 15:27) (132/88 - 152/98)  BP(mean): --  RR: 18 (11 May 2022 15:27) (18 - 18)  SpO2: 98% (11 May 2022 15:27) (98% - 100%)    PAIN SCORE: 10/10        SCALE USED: (1-10 VNRS)           LABS:                          9.1    7.82  )-----------( 381      ( 11 May 2022 04:09 )             30.7     05-11    133<L>  |  101  |  14  ----------------------------<  335<H>  4.1   |  22  |  0.71    Ca    8.4      11 May 2022 04:09  Phos  3.1     05-11  Mg     1.70     05-11    TPro  5.9<L>  /  Alb  2.9<L>  /  TBili  <0.2  /  DBili  x   /  AST  23  /  ALT  17  /  AlkPhos  94  05-10          RADIOLOGY:    Drug Screen: Urine tox:  positive for THC    [x ]  [place]   Pregabalin 25mg capsules. Quantity 90 pills for 30 days. Last dispensed 09/06/2021.  Buprenorphine-naloxone 2-0.5mg sl tablet. Quantity 36 for 9 days. Last dispensed 11/09/2021.    Summary:  Patient is a 31 yo F w/ hx T1DM, HTN who presents with nausea, vomiting (NBNB), and abd pain.  Patient She notes diffuse abdominal pain that is mostly in her bilateral lower abdomen that is characterized as stabbing and burning.  The patient was recently admitted for the same abdominal pain.  As per patient, she has never been officially diagnosed with gastropareisis, and a gastric emptying study has never been done.  Last recent admission, they were considering that she may have cannabinoid hyperemesis syndrome.  So, she stopped smoking marijuana 3 weeks ago, but she feels no changes.  The patient does smoke 2 packs of cigarettes a week.  The patient denies any alcohol or any other illicit drug use.  The patient also admits that she has not been taking any opioids since last discharge from the hospital. She has frequent episodes of DKA with unclear causes and does not miss any of her insulin doses.  Patient’s fingersticks range from 40’s to 400’s.  During this admission, they were considering dental infections as possible cause of uncontrolled DM.  Since admission, the patient has been given capsacian cream which has not been helping with her lower abdominal pain.  Zofran and Maalox PRN  and Reglan with meals have been helping with her nausea and vomiting. The patient also notes that the IV Dilaudid helps about 6 to 8 hours for her pain.  The patient understands she cannot stay on the IV Dilaudid and is willing to do a slow wean.  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  Discussed with patient, that right now it is difficult to manage patient’s pain because it is unsure what the exact cause of her pain. Abdominal xray just shows constipation.  Informed patient about some possible pain regimen plans to wean her off the IV Dilaudid and patient verbally understood, but is concerned because the po Dilaudid takes forever to work.  Patient would prefer to be weaned and kept on IV Dilaudid till discharge.  Patient also mentioned that in the past, she received po Morphine which also helped with her abdominal pain, but then had to be put on Suboxone to help wean her off.  Patient also received Gabapentin in the past and it did not help. **As per ACP, patient was complaining of abdominal pain and was nauseous.  IV Dilaudid 0.5mg x1 given, and not even 2 minutes later patient’s pain was relieved and she was feeling fine.     PHYSICAL EXAM:  GENERAL: Alert & Oriented x 3 in NAD, well-groomed, well-developed, sitting up in bed and ambulating around room without difficulty.       Impression/Plan: Requested by ACP team to help manage pain.   Recommendations:  NO Toradol patient is allergic. Discussed patient with Chronic Pain Attending on call, Dr. Ryan, and the following are her recommendations:  Given patient has minimal vomiting-  -  Consider ordering po Dilaudid 2mg every 4 hours PRN for moderate to severe pain x2 days, then q 6 hoursx1 day, then q 8 hoursx1 day, then q 12 hoursx1 day, then discontinue as patient can tolerate.  Hold for oversedation. Not to be given within 1 hour of any other immediate acting opioid.  -  Consider changing Acetaminophen order to Acetaminophen 650 mg every 6 hours standing x2 days, then PRN for pain.   -  Consider ordering PO Amitriptyline 10mg at bedtime, with up-titration as tolerated by patient.  -  Recommend maintaining continuous pulse oximetry.  -  Recommend follow up with GI evaluation for definitive diagnosis.  -  Recommend Holistic RN for complementary and alternative therapies for pain, using a mid-body-spirit-emotion approach.  -  Recommend follow up with Chronic Pain doctor when discharged. If patient does not have a Chronic Pain doctor, may acquire one through patient's personal insurance carrier.  No further recommendations at this time, Chronic pain service to sign off. May call Chronic Pain Service if needed.   Thank you.

## 2022-05-11 NOTE — CHART NOTE - NSCHARTNOTEFT_GEN_A_CORE
33 yo F w/ hx T1DM w/ gastroparesis A1c 10.4, HTN who presents with nausea, vomiting (NBNB), and abd pain  found to be in DKA AG 20 given lantus and sq ademalog with subsequent hypoglycemia.   QAttempted to see pt; pt was currently getting a catscan at time of visit.    1. DM Type 1, uncontrolled w/ complications   Target A1C 6-7%; current A1c: 10.4% (04.27.22 @ 21:04)  Has had many admissions for DKA   Hyperglycemia & Hypoglycemia is Variable PO intake  Recommendations:   -Pt did not eat today; as per RN pt is planning on eating dinner  - Decrease Lantus 10 units SC QHS (please give 80%of dose if NPO)  - Continue Admelog 5 units SC Premeal/TIDAC (please hold if NPO/not eating   -Please add Admelog 3 units sq qhs for a bedtime snack(please hold if skips snack)  - Admelog Low Correction Scale Premeal & Low Correction Scale Bedtime   - Goal -180  - Blood glucose monitoring Premeal/Bedtime   - Hypoglycemia protocol   - Carb Consistent Diet   -RD consult (ordered)  DC planning: Patient can continue to follow up at [address] [place] [address]. Planned to be initiated on insulin pump along w/ CGM as outpatient per patient. May 13th at 10 am with Kathe Endocrine NP and Dr. Liu Endocrinology Attending on 10/10 at 3pm. DC on prior home insulins Admelog/basaglar (doses TBD)  Ensure patient has working glucometer, test strips, lancets, alcohol pads, and BD tej pen needles  Please also prescribe glucose tabs, Baqsimi nasal spray or glucagon emergency kit for hypoglycemia risk   Please follow up with opthalmology and podiatry as an outpt     HTN  Recommendations:   - BP goal < 130/80   - on Enlapril 5mg daily outpt (restart inpt??)  - defer to primary team     HLD    Recommendations:   - LDL goal < 70   - defer to primary team   - pt is at reproductive age, can discuss statin tx outpt if no plans for pregnancy, currently using protection     Gastroparesis   - currently on reglan   - monitor QTc   - better glycemic control advised   - outpatient GI evaluation       MEDICATIONS  (STANDING):  acetaminophen   IVPB .. 1000 milliGRAM(s) IV Intermittent once  dextrose 50% Injectable 25 Gram(s) IV Push once  dextrose 50% Injectable 12.5 Gram(s) IV Push once  dextrose 50% Injectable 25 Gram(s) IV Push once  dextrose Oral Gel 15 Gram(s) Oral once  glucagon  Injectable 1 milliGRAM(s) IntraMuscular once  insulin glargine Injectable (LANTUS) 10 Unit(s) SubCutaneous at bedtime  insulin lispro (ADMELOG) corrective regimen sliding scale   SubCutaneous three times a day before meals  insulin lispro Injectable (ADMELOG) 5 Unit(s) SubCutaneous three times a day before meals  metoclopramide Injectable 10 milliGRAM(s) IV Push three times a day  pantoprazole  Injectable 40 milliGRAM(s) IV Push daily  polyethylene glycol 3350 17 Gram(s) Oral daily    MEDICATIONS  (PRN):  acetaminophen     Tablet .. 650 milliGRAM(s) Oral every 6 hours PRN Temp greater or equal to 38C (100.4F), Mild Pain (1 - 3)  aluminum hydroxide/magnesium hydroxide/simethicone Suspension 30 milliLiter(s) Oral every 4 hours PRN Dyspepsia  diphenhydrAMINE 25 milliGRAM(s) Oral once PRN Rash and/or Itching  melatonin 3 milliGRAM(s) Oral at bedtime PRN Insomnia  ondansetron Injectable 4 milliGRAM(s) IV Push every 8 hours PRN Nausea and/or Vomiting      Allergies: Toradol (Pruritus)    CAPILLARY BLOOD GLUCOSE  POCT Blood Glucose.: 229 mg/dL (11 May 2022 12:59)  POCT Blood Glucose.: 84 mg/dL (11 May 2022 10:51)  POCT Blood Glucose.: 74 mg/dL (11 May 2022 08:23)  POCT Blood Glucose.: 112 mg/dL (11 May 2022 06:15)  POCT Blood Glucose.: 322 mg/dL (11 May 2022 04:08)  POCT Blood Glucose.: 422 mg/dL (11 May 2022 02:51)  POCT Blood Glucose.: 404 mg/dL (11 May 2022 02:45)  POCT Blood Glucose.: 128 mg/dL (10 May 2022 22:20)  POCT Blood Glucose.: 310 mg/dL (10 May 2022 16:45)    A1C with Estimated Average Glucose (05.09.22 @ 00:49)    A1C with Estimated Average Glucose Result: 10.4      05-11    133<L>  |  101  |  14  ----------------------------<  335<H>  4.1   |  22  |  0.71    eGFR: 116    Ca    8.4      05-11  Mg     1.70     05-11  Phos  3.1     05-11    TPro  5.9<L>  /  Alb  2.9<L>  /  TBili  <0.2  /  DBili  x   /  AST  23  /  ALT  17  /  AlkPhos  94  05-10    Thyroid Function Tests:  04-28 @ 02:25 TSH 1.03 FreeT4 -- T3 -- Anti TPO -- Anti Thyroglobulin Ab -- TSI --    Diet, Regular:   Consistent Carbohydrate {Evening Snack} (CSTCHOSN)  Fiber/Residue Restricted (LOWFIBER)  Low Fat (LOWFAT) (05-11-22 @ 07:25) [Active]      Yenny Ortega  Nurse Practitioner  Division of Endocrinology & Diabetes  In house pager #[phone]    If before 9AM or after 6PM, or on weekends/holidays, please call endocrine answering service for assistance ([phone]).For nonurgent matters email [email or web address] for assistance. 31 yo F w/ hx T1DM w/ gastroparesis A1c 10.4, HTN who presents with nausea, vomiting (NBNB), and abd pain  found to be in DKA AG 20 given lantus and sq ademalog with subsequent hypoglycemia.   QAttempted to see pt; pt was currently getting a catscan at time of visit.    1. DM Type 1, uncontrolled w/ complications   Target A1C 6-7%; current A1c: 10.4% (04.27.22 @ 21:04)  Has had many admissions for DKA   Hyperglycemia & Hypoglycemia is Variable PO intake  Recommendations:   -Pt did not eat today; as per RN pt is planning on eating dinner:    -Continue Lantus 10 units SC QHS (please give 80%of dose if NPO)  - Continue Admelog 5 units SC Premeal/TIDAC (please hold if NPO/not eating   -Please add Admelog 3 units sq qhs for a bedtime snack(please hold if skips snack)  -Changed Admelog low to customized correction Scale Premeal & change Low to customized Correction Scale Bedtime   - Goal -180  - Blood glucose monitoring Premeal/Bedtime   - Hypoglycemia protocol   - Carb Consistent Diet   -RD consult (ordered)   pre-dinner noted. Ok to resume diet and give insulin as prescribed. RN made aware that pt shouldn't be eating outside food and should have consistent carb diet as ordered.  Please repeat FS in 2 hours after receiving correction and premeal. If repeat FS >400 please place pt npo, obatin, vbg, bhb, customized correction q 6 hours while NPO    DC planning: Patient can continue to follow up at [address] [place] [address]. Planned to be initiated on insulin pump along w/ CGM as outpatient per patient. May 13th at 10 am with Kathe Endocrine NP and Dr. Liu Endocrinology Attending on 10/10 at 3pm. DC on prior home insulins Admelog/basaglar (doses TBD)  Ensure patient has working glucometer, test strips, lancets, alcohol pads, and BD tej pen needles  Please also prescribe glucose tabs, Baqsimi nasal spray or glucagon emergency kit for hypoglycemia risk   Please follow up with opthalmology and podiatry as an outpt     HTN  Recommendations:   - BP goal < 130/80   - on Enlapril 5mg daily outpt (restart inpt??)  - defer to primary team     HLD    Recommendations:   - LDL goal < 70   - defer to primary team   - pt is at reproductive age, can discuss statin tx outpt if no plans for pregnancy, currently using protection     Gastroparesis   - currently on reglan   - monitor QTc   - better glycemic control advised   - outpatient GI evaluation       MEDICATIONS  (STANDING):  acetaminophen   IVPB .. 1000 milliGRAM(s) IV Intermittent once  dextrose 50% Injectable 25 Gram(s) IV Push once  dextrose 50% Injectable 12.5 Gram(s) IV Push once  dextrose 50% Injectable 25 Gram(s) IV Push once  dextrose Oral Gel 15 Gram(s) Oral once  glucagon  Injectable 1 milliGRAM(s) IntraMuscular once  insulin glargine Injectable (LANTUS) 10 Unit(s) SubCutaneous at bedtime  insulin lispro (ADMELOG) corrective regimen sliding scale   SubCutaneous three times a day before meals  insulin lispro Injectable (ADMELOG) 5 Unit(s) SubCutaneous three times a day before meals  metoclopramide Injectable 10 milliGRAM(s) IV Push three times a day  pantoprazole  Injectable 40 milliGRAM(s) IV Push daily  polyethylene glycol 3350 17 Gram(s) Oral daily    MEDICATIONS  (PRN):  acetaminophen     Tablet .. 650 milliGRAM(s) Oral every 6 hours PRN Temp greater or equal to 38C (100.4F), Mild Pain (1 - 3)  aluminum hydroxide/magnesium hydroxide/simethicone Suspension 30 milliLiter(s) Oral every 4 hours PRN Dyspepsia  diphenhydrAMINE 25 milliGRAM(s) Oral once PRN Rash and/or Itching  melatonin 3 milliGRAM(s) Oral at bedtime PRN Insomnia  ondansetron Injectable 4 milliGRAM(s) IV Push every 8 hours PRN Nausea and/or Vomiting      Allergies: Toradol (Pruritus)    CAPILLARY BLOOD GLUCOSE  POCT Blood Glucose.: 229 mg/dL (11 May 2022 12:59)  POCT Blood Glucose.: 84 mg/dL (11 May 2022 10:51)  POCT Blood Glucose.: 74 mg/dL (11 May 2022 08:23)  POCT Blood Glucose.: 112 mg/dL (11 May 2022 06:15)  POCT Blood Glucose.: 322 mg/dL (11 May 2022 04:08)  POCT Blood Glucose.: 422 mg/dL (11 May 2022 02:51)  POCT Blood Glucose.: 404 mg/dL (11 May 2022 02:45)  POCT Blood Glucose.: 128 mg/dL (10 May 2022 22:20)  POCT Blood Glucose.: 310 mg/dL (10 May 2022 16:45)    A1C with Estimated Average Glucose (05.09.22 @ 00:49)    A1C with Estimated Average Glucose Result: 10.4      05-11    133<L>  |  101  |  14  ----------------------------<  335<H>  4.1   |  22  |  0.71    eGFR: 116    Ca    8.4      05-11  Mg     1.70     05-11  Phos  3.1     05-11    TPro  5.9<L>  /  Alb  2.9<L>  /  TBili  <0.2  /  DBili  x   /  AST  23  /  ALT  17  /  AlkPhos  94  05-10    Thyroid Function Tests:  04-28 @ 02:25 TSH 1.03 FreeT4 -- T3 -- Anti TPO -- Anti Thyroglobulin Ab -- TSI --    Diet, Regular:   Consistent Carbohydrate {Evening Snack} (CSTCHOSN)  Fiber/Residue Restricted (LOWFIBER)  Low Fat (LOWFAT) (05-11-22 @ 07:25) [Active]      Yenny Ortega  Nurse Practitioner  Division of Endocrinology & Diabetes  In house pager #[phone]    If before 9AM or after 6PM, or on weekends/holidays, please call endocrine answering service for assistance ([phone]).For nonurgent matters email [email or web address] for assistance. 33 yo F w/ hx T1DM w/ gastroparesis A1c 10.4, HTN who presents with nausea, vomiting (NBNB), and abd pain  found to be in DKA AG 20 given lantus and sq ademalog with subsequent hypoglycemia.   QAttempted to see pt; pt was currently getting a catscan at time of visit.    1. DM Type 1, uncontrolled w/ complications   Target A1C 6-7%; current A1c: 10.4% (04.27.22 @ 21:04)  Has had many admissions for DKA   Hyperglycemia & Hypoglycemia is Variable PO intake  Recommendations:   -Pt did not eat today; as per RN pt is planning on eating dinner:    -Continue Lantus 10 units SC QHS (please give 80%of dose if NPO)  - Continue Admelog 5 units SC Premeal/TIDAC (please hold if NPO/not eating   -Please add Admelog 3 units sq qhs for a bedtime snack(please hold if skips snack)  -Changed Admelog low to customized correction Scale Premeal & change Low to customized Correction Scale Bedtime   - Goal -180  - Blood glucose monitoring Premeal/Bedtime   - Hypoglycemia protocol   - Carb Consistent Diet   -RD consult (ordered)   pre-dinner noted pt received 6 units admelog correction and 3 units premeal of admelog for a total of 9 units of admelog.  Based on trend pt had a fs of 310 predinner received a total of 9 units with a bedtime FS of 128 . Ok to resume diet and give insulin as prescribed. RN made aware that pt shouldn't be eating outside food and should have consistent carb diet as ordered.  Please repeat FS in 2 hours after receiving correction and premeal. If repeat FS >400 please place pt npo, obatin, vbg, bhb, customized correction q 6 hours while NPO    DC planning: Patient can continue to follow up at [address] [place] [address]. Planned to be initiated on insulin pump along w/ CGM as outpatient per patient. May 13th at 10 am with Kathe Endocrine NP and Dr. Liu Endocrinology Attending on 10/10 at 3pm. DC on prior home insulins Admelog/basaglar (doses TBD)  Ensure patient has working glucometer, test strips, lancets, alcohol pads, and BD tej pen needles  Please also prescribe glucose tabs, Baqsimi nasal spray or glucagon emergency kit for hypoglycemia risk   Please follow up with opthalmology and podiatry as an outpt     HTN  Recommendations:   - BP goal < 130/80   - on Enlapril 5mg daily outpt (restart inpt??)  - defer to primary team     HLD    Recommendations:   - LDL goal < 70   - defer to primary team   - pt is at reproductive age, can discuss statin tx outpt if no plans for pregnancy, currently using protection     Gastroparesis   - currently on reglan   - monitor QTc   - better glycemic control advised   - outpatient GI evaluation       MEDICATIONS  (STANDING):  acetaminophen   IVPB .. 1000 milliGRAM(s) IV Intermittent once  dextrose 50% Injectable 25 Gram(s) IV Push once  dextrose 50% Injectable 12.5 Gram(s) IV Push once  dextrose 50% Injectable 25 Gram(s) IV Push once  dextrose Oral Gel 15 Gram(s) Oral once  glucagon  Injectable 1 milliGRAM(s) IntraMuscular once  insulin glargine Injectable (LANTUS) 10 Unit(s) SubCutaneous at bedtime  insulin lispro (ADMELOG) corrective regimen sliding scale   SubCutaneous three times a day before meals  insulin lispro Injectable (ADMELOG) 5 Unit(s) SubCutaneous three times a day before meals  metoclopramide Injectable 10 milliGRAM(s) IV Push three times a day  pantoprazole  Injectable 40 milliGRAM(s) IV Push daily  polyethylene glycol 3350 17 Gram(s) Oral daily    MEDICATIONS  (PRN):  acetaminophen     Tablet .. 650 milliGRAM(s) Oral every 6 hours PRN Temp greater or equal to 38C (100.4F), Mild Pain (1 - 3)  aluminum hydroxide/magnesium hydroxide/simethicone Suspension 30 milliLiter(s) Oral every 4 hours PRN Dyspepsia  diphenhydrAMINE 25 milliGRAM(s) Oral once PRN Rash and/or Itching  melatonin 3 milliGRAM(s) Oral at bedtime PRN Insomnia  ondansetron Injectable 4 milliGRAM(s) IV Push every 8 hours PRN Nausea and/or Vomiting      Allergies: Toradol (Pruritus)    CAPILLARY BLOOD GLUCOSE  POCT Blood Glucose.: 229 mg/dL (11 May 2022 12:59)  POCT Blood Glucose.: 84 mg/dL (11 May 2022 10:51)  POCT Blood Glucose.: 74 mg/dL (11 May 2022 08:23)  POCT Blood Glucose.: 112 mg/dL (11 May 2022 06:15)  POCT Blood Glucose.: 322 mg/dL (11 May 2022 04:08)  POCT Blood Glucose.: 422 mg/dL (11 May 2022 02:51)  POCT Blood Glucose.: 404 mg/dL (11 May 2022 02:45)  POCT Blood Glucose.: 128 mg/dL (10 May 2022 22:20)  POCT Blood Glucose.: 310 mg/dL (10 May 2022 16:45)    A1C with Estimated Average Glucose (05.09.22 @ 00:49)    A1C with Estimated Average Glucose Result: 10.4 05-11    133<L>  |  101  |  14  ----------------------------<  335<H>  4.1   |  22  |  0.71    eGFR: 116    Ca    8.4      05-11  Mg     1.70     05-11  Phos  3.1     05-11    TPro  5.9<L>  /  Alb  2.9<L>  /  TBili  <0.2  /  DBili  x   /  AST  23  /  ALT  17  /  AlkPhos  94  05-10    Thyroid Function Tests:  04-28 @ 02:25 TSH 1.03 FreeT4 -- T3 -- Anti TPO -- Anti Thyroglobulin Ab -- TSI --    Diet, Regular:   Consistent Carbohydrate {Evening Snack} (CSTCHOSN)  Fiber/Residue Restricted (LOWFIBER)  Low Fat (LOWFAT) (05-11-22 @ 07:25) [Active]      Yenny Ortega  Nurse Practitioner  Division of Endocrinology & Diabetes  In house pager #[phone]    If before 9AM or after 6PM, or on weekends/holidays, please call endocrine answering service for assistance ([phone]).For nonurgent matters email [email or web address] for assistance. 33 yo F w/ hx T1DM w/ gastroparesis A1c 10.4, HTN who presents with nausea, vomiting (NBNB), and abd pain  found to be in DKA AG 20 given lantus and sq ademalog with subsequent hypoglycemia.   QAttempted to see pt; pt was currently getting a catscan at time of visit.    1. DM Type 1, uncontrolled w/ complications   Target A1C 6-7%; current A1c: 10.4% (04.27.22 @ 21:04)  Has had many admissions for DKA   Hyperglycemia & Hypoglycemia is Variable PO intake  Recommendations:   -Pt did not eat today; as per RN pt is planning on eating dinner:    -Continue Lantus 10 units SC QHS (please give 80%of dose if NPO)  - Continue Admelog 5 units SC Premeal/TIDAC (please hold if NPO/not eating   -Please add Admelog 3 units sq qhs for a bedtime snack(please hold if skips snack)  -Changed Admelog low to customized correction Scale Premeal & change Low to customized Correction Scale Bedtime   - Goal -180  - Blood glucose monitoring Premeal/Bedtime   - Hypoglycemia protocol   - Carb Consistent Diet   -RD consult (ordered)   pre-dinner noted pt received 6 units admelog correction and 3 units premeal of admelog for a total of 9 units of admelog.  Based on trend pt had a fs of 310 predinner received a total of 9 units with a bedtime FS of 128 . Ok to resume diet and give insulin as prescribed. RN made aware that pt shouldn't be eating outside food and should have consistent carb diet as ordered.   Please repeat FS in 2 hours after receiving correction and premeal. If repeat FS >400 please place pt npo, obatin, vbg, bhb, customized correction q 6 hours while NPO  Fs 56 noted. Pt explained to me that she received 9 units of insulin and didn't; have a fork to eat her food then was taken down to radiology for an xray. When she returned to the units her fs was 56. hypoglycemia protocol was initiated     DC planning: Patient can continue to follow up at [address] [place] [address]. Planned to be initiated on insulin pump along w/ CGM as outpatient per patient. May 13th at 10 am with Kathe Endocrine NP and Dr. Liu Endocrinology Attending on 10/10 at 3pm. DC on prior home insulins Admelog/basaglar (doses TBD)  Ensure patient has working glucometer, test strips, lancets, alcohol pads, and BD tej pen needles  Please also prescribe glucose tabs, Baqsimi nasal spray or glucagon emergency kit for hypoglycemia risk   Please follow up with opthalmology and podiatry as an outpt     HTN  Recommendations:   - BP goal < 130/80   - on Enlapril 5mg daily outpt (restart inpt??)  - defer to primary team     HLD    Recommendations:   - LDL goal < 70   - defer to primary team   - pt is at reproductive age, can discuss statin tx outpt if no plans for pregnancy, currently using protection     Gastroparesis   - currently on reglan   - monitor QTc   - better glycemic control advised   - outpatient GI evaluation       MEDICATIONS  (STANDING):  acetaminophen   IVPB .. 1000 milliGRAM(s) IV Intermittent once  dextrose 50% Injectable 25 Gram(s) IV Push once  dextrose 50% Injectable 12.5 Gram(s) IV Push once  dextrose 50% Injectable 25 Gram(s) IV Push once  dextrose Oral Gel 15 Gram(s) Oral once  glucagon  Injectable 1 milliGRAM(s) IntraMuscular once  insulin glargine Injectable (LANTUS) 10 Unit(s) SubCutaneous at bedtime  insulin lispro (ADMELOG) corrective regimen sliding scale   SubCutaneous three times a day before meals  insulin lispro Injectable (ADMELOG) 5 Unit(s) SubCutaneous three times a day before meals  metoclopramide Injectable 10 milliGRAM(s) IV Push three times a day  pantoprazole  Injectable 40 milliGRAM(s) IV Push daily  polyethylene glycol 3350 17 Gram(s) Oral daily    MEDICATIONS  (PRN):  acetaminophen     Tablet .. 650 milliGRAM(s) Oral every 6 hours PRN Temp greater or equal to 38C (100.4F), Mild Pain (1 - 3)  aluminum hydroxide/magnesium hydroxide/simethicone Suspension 30 milliLiter(s) Oral every 4 hours PRN Dyspepsia  diphenhydrAMINE 25 milliGRAM(s) Oral once PRN Rash and/or Itching  melatonin 3 milliGRAM(s) Oral at bedtime PRN Insomnia  ondansetron Injectable 4 milliGRAM(s) IV Push every 8 hours PRN Nausea and/or Vomiting      Allergies: Toradol (Pruritus)    CAPILLARY BLOOD GLUCOSE  POCT Blood Glucose.: 229 mg/dL (11 May 2022 12:59)  POCT Blood Glucose.: 84 mg/dL (11 May 2022 10:51)  POCT Blood Glucose.: 74 mg/dL (11 May 2022 08:23)  POCT Blood Glucose.: 112 mg/dL (11 May 2022 06:15)  POCT Blood Glucose.: 322 mg/dL (11 May 2022 04:08)  POCT Blood Glucose.: 422 mg/dL (11 May 2022 02:51)  POCT Blood Glucose.: 404 mg/dL (11 May 2022 02:45)  POCT Blood Glucose.: 128 mg/dL (10 May 2022 22:20)  POCT Blood Glucose.: 310 mg/dL (10 May 2022 16:45)    A1C with Estimated Average Glucose (05.09.22 @ 00:49)    A1C with Estimated Average Glucose Result: 10.4 05-11    133<L>  |  101  |  14  ----------------------------<  335<H>  4.1   |  22  |  0.71    eGFR: 116    Ca    8.4      05-11  Mg     1.70     05-11  Phos  3.1     05-11    TPro  5.9<L>  /  Alb  2.9<L>  /  TBili  <0.2  /  DBili  x   /  AST  23  /  ALT  17  /  AlkPhos  94  05-10    Thyroid Function Tests:  04-28 @ 02:25 TSH 1.03 FreeT4 -- T3 -- Anti TPO -- Anti Thyroglobulin Ab -- TSI --    Diet, Regular:   Consistent Carbohydrate {Evening Snack} (CSTCHOSN)  Fiber/Residue Restricted (LOWFIBER)  Low Fat (LOWFAT) (05-11-22 @ 07:25) [Active]      Yenny Ortega  Nurse Practitioner  Division of Endocrinology & Diabetes  In house pager #[phone]    If before 9AM or after 6PM, or on weekends/holidays, please call endocrine answering service for assistance ([phone]).For nonurgent matters email [email or web address] for assistance.

## 2022-05-11 NOTE — PROVIDER CONTACT NOTE (HYPOGLYCEMIA EVENT) - NS PROVIDER CONTACT BACKGROUND-HYPO
Age: 32y    Gender: Female    POCT Blood Glucose:  56 mg/dL (05-11-22 @ 18:54)  411 mg/dL (05-11-22 @ 16:44)  229 mg/dL (05-11-22 @ 12:59)  84 mg/dL (05-11-22 @ 10:51)  74 mg/dL (05-11-22 @ 08:23)  112 mg/dL (05-11-22 @ 06:15)  322 mg/dL (05-11-22 @ 04:08)  422 mg/dL (05-11-22 @ 02:51)      eMAR:  insulin glargine Injectable (LANTUS)   10 Unit(s) SubCutaneous (05-10-22 @ 22:22)    insulin lispro (ADMELOG) corrective regimen sliding scale   4 Unit(s) SubCutaneous (05-11-22 @ 04:16)    insulin lispro (ADMELOG) corrective regimen sliding scale   2 Unit(s) SubCutaneous (05-11-22 @ 13:21)    insulin lispro (ADMELOG) corrective regimen sliding scale   6  SubCutaneous (05-11-22 @ 17:47)    insulin lispro Injectable (ADMELOG).   3 Unit(s) SubCutaneous (05-11-22 @ 17:49)    
Age: 32y    Gender: Female    POCT Blood Glucose:  68 mg/dL (05-09-22 @ 07:15)  103 mg/dL (05-09-22 @ 06:24)  220 mg/dL (05-09-22 @ 05:26)  349 mg/dL (05-09-22 @ 04:25)  565 mg/dL (05-09-22 @ 03:14)  >600 mg/dL (05-09-22 @ 01:34)  566 mg/dL (05-09-22 @ 00:17)  569 mg/dL (05-08-22 @ 23:27)      eMAR:  insulin glargine Injectable (LANTUS)   18 Unit(s) SubCutaneous (05-09-22 @ 03:16)    insulin lispro Injectable (ADMELOG)   8 Unit(s) SubCutaneous (05-09-22 @ 02:27)    insulin lispro Injectable (ADMELOG).   4 Unit(s) SubCutaneous (05-09-22 @ 04:41)    
Age: 32y    Gender: Female    POCT Blood Glucose:  200 mg/dL (05-10-22 @ 06:43)  112 mg/dL (05-10-22 @ 06:24)  46 mg/dL (05-10-22 @ 05:44)  47 mg/dL (05-10-22 @ 05:40)  145 mg/dL (05-10-22 @ 03:37)  284 mg/dL (05-10-22 @ 01:11)  470 mg/dL (05-09-22 @ 22:23)  464 mg/dL (05-09-22 @ 22:22)      eMAR:  dextrose 50% Injectable   25 Gram(s) IV Push (05-09-22 @ 10:03)    insulin glargine Injectable (LANTUS)   15 Unit(s) SubCutaneous (05-09-22 @ 23:00)    insulin lispro (ADMELOG) corrective regimen sliding scale   3 Unit(s) SubCutaneous (05-09-22 @ 17:40)    insulin lispro (ADMELOG) corrective regimen sliding scale   4 Unit(s) SubCutaneous (05-09-22 @ 23:00)

## 2022-05-11 NOTE — CHART NOTE - NSCHARTNOTEFT_GEN_A_CORE
Notified by RN that patient c/o of abdominal pain at 11 am, Pt declined to have ordered IV Tylenol ,, as per Attending Dr. Lopez , Dilaudid 0.5 mg IV ordered, pt reported to have diminished of abdominal pain , pt went for CT of maxillofacial bones. Notified by RN at 1700 that [place] BS- [id number], Endocrinology contacted , pt received 6 units admelog correction and 3 units premeal of admelog for a total of 9 units of admelog., ate dinner and to have BS rechecked in 2 hours , if > 400 then patient to be placed  npo and to obatin, vbg, bhb, and to be placed on customized correction q 6 hours while NPO. Endorse to night provider to f/u with blood glucose levels.    Alejandra Erazo, NP-C  Department of Medicine- [facility]  In House Pager #[phone]

## 2022-05-11 NOTE — PROVIDER CONTACT NOTE (HYPOGLYCEMIA EVENT) - NS PROVIDER CONTACT CONTRIBUTING FACTORS OF EPISODE
Poor oral intake within the last 24 hours/Previous finger stick less than 100 mg/dL
Poor oral intake within the last 24 hours
Poor oral intake within the last 24 hours

## 2022-05-12 ENCOUNTER — TRANSCRIPTION ENCOUNTER (OUTPATIENT)
Age: 32
End: 2022-05-12

## 2022-05-12 VITALS
SYSTOLIC BLOOD PRESSURE: 133 MMHG | DIASTOLIC BLOOD PRESSURE: 92 MMHG | TEMPERATURE: 98 F | RESPIRATION RATE: 18 BRPM | OXYGEN SATURATION: 100 % | HEART RATE: 80 BPM

## 2022-05-12 LAB
GLUCOSE BLDC GLUCOMTR-MCNC: 107 MG/DL — HIGH (ref 70–99)
GLUCOSE BLDC GLUCOMTR-MCNC: 134 MG/DL — HIGH (ref 70–99)
GLUCOSE BLDC GLUCOMTR-MCNC: 193 MG/DL — HIGH (ref 70–99)
GLUCOSE BLDC GLUCOMTR-MCNC: 198 MG/DL — HIGH (ref 70–99)
GLUCOSE BLDC GLUCOMTR-MCNC: 287 MG/DL — HIGH (ref 70–99)
GLUCOSE BLDC GLUCOMTR-MCNC: 391 MG/DL — HIGH (ref 70–99)

## 2022-05-12 PROCEDURE — 99239 HOSP IP/OBS DSCHRG MGMT >30: CPT

## 2022-05-12 PROCEDURE — 99232 SBSQ HOSP IP/OBS MODERATE 35: CPT

## 2022-05-12 RX ORDER — METOCLOPRAMIDE HCL 10 MG
10 TABLET ORAL ONCE
Refills: 0 | Status: COMPLETED | OUTPATIENT
Start: 2022-05-12 | End: 2022-05-12

## 2022-05-12 RX ORDER — HYDROMORPHONE HYDROCHLORIDE 2 MG/ML
0.5 INJECTION INTRAMUSCULAR; INTRAVENOUS; SUBCUTANEOUS ONCE
Refills: 0 | Status: DISCONTINUED | OUTPATIENT
Start: 2022-05-12 | End: 2022-05-12

## 2022-05-12 RX ORDER — INSULIN LISPRO 100/ML
4 VIAL (ML) SUBCUTANEOUS
Qty: 90 | Refills: 0
Start: 2022-05-12 | End: 2022-06-10

## 2022-05-12 RX ORDER — SCOPALAMINE 1 MG/3D
1 PATCH, EXTENDED RELEASE TRANSDERMAL ONCE
Refills: 0 | Status: COMPLETED | OUTPATIENT
Start: 2022-05-12 | End: 2022-05-12

## 2022-05-12 RX ORDER — INSULIN LISPRO 100/ML
3 VIAL (ML) SUBCUTANEOUS
Refills: 0 | Status: DISCONTINUED | OUTPATIENT
Start: 2022-05-12 | End: 2022-05-12

## 2022-05-12 RX ORDER — INSULIN LISPRO 100/ML
3 VIAL (ML) SUBCUTANEOUS ONCE
Refills: 0 | Status: COMPLETED | OUTPATIENT
Start: 2022-05-12 | End: 2022-05-12

## 2022-05-12 RX ORDER — HYDROMORPHONE HYDROCHLORIDE 2 MG/ML
0.25 INJECTION INTRAMUSCULAR; INTRAVENOUS; SUBCUTANEOUS ONCE
Refills: 0 | Status: DISCONTINUED | OUTPATIENT
Start: 2022-05-12 | End: 2022-05-12

## 2022-05-12 RX ORDER — ACETAMINOPHEN 500 MG
1000 TABLET ORAL ONCE
Refills: 0 | Status: COMPLETED | OUTPATIENT
Start: 2022-05-12 | End: 2022-05-12

## 2022-05-12 RX ADMIN — HYDROMORPHONE HYDROCHLORIDE 0.5 MILLIGRAM(S): 2 INJECTION INTRAMUSCULAR; INTRAVENOUS; SUBCUTANEOUS at 04:31

## 2022-05-12 RX ADMIN — Medication 3 UNIT(S): at 12:37

## 2022-05-12 RX ADMIN — HYDROMORPHONE HYDROCHLORIDE 0.25 MILLIGRAM(S): 2 INJECTION INTRAMUSCULAR; INTRAVENOUS; SUBCUTANEOUS at 13:07

## 2022-05-12 RX ADMIN — Medication 4: at 18:01

## 2022-05-12 RX ADMIN — Medication 3 UNIT(S): at 10:45

## 2022-05-12 RX ADMIN — Medication 10 MILLIGRAM(S): at 18:00

## 2022-05-12 RX ADMIN — SCOPALAMINE 1 PATCH: 1 PATCH, EXTENDED RELEASE TRANSDERMAL at 04:15

## 2022-05-12 RX ADMIN — Medication 1: at 12:37

## 2022-05-12 RX ADMIN — HYDROMORPHONE HYDROCHLORIDE 2 MILLIGRAM(S): 2 INJECTION INTRAMUSCULAR; INTRAVENOUS; SUBCUTANEOUS at 11:13

## 2022-05-12 RX ADMIN — PANTOPRAZOLE SODIUM 40 MILLIGRAM(S): 20 TABLET, DELAYED RELEASE ORAL at 12:59

## 2022-05-12 RX ADMIN — Medication 10 MILLIGRAM(S): at 12:38

## 2022-05-12 RX ADMIN — HYDROMORPHONE HYDROCHLORIDE 0.25 MILLIGRAM(S): 2 INJECTION INTRAMUSCULAR; INTRAVENOUS; SUBCUTANEOUS at 12:37

## 2022-05-12 RX ADMIN — HYDROMORPHONE HYDROCHLORIDE 2 MILLIGRAM(S): 2 INJECTION INTRAMUSCULAR; INTRAVENOUS; SUBCUTANEOUS at 18:43

## 2022-05-12 RX ADMIN — HYDROMORPHONE HYDROCHLORIDE 2 MILLIGRAM(S): 2 INJECTION INTRAMUSCULAR; INTRAVENOUS; SUBCUTANEOUS at 18:13

## 2022-05-12 RX ADMIN — SCOPALAMINE 1 PATCH: 1 PATCH, EXTENDED RELEASE TRANSDERMAL at 09:46

## 2022-05-12 RX ADMIN — HYDROMORPHONE HYDROCHLORIDE 0.5 MILLIGRAM(S): 2 INJECTION INTRAMUSCULAR; INTRAVENOUS; SUBCUTANEOUS at 04:10

## 2022-05-12 NOTE — DIETITIAN INITIAL EVALUATION ADULT - WEIGHT FOR BMI (KG)
Problem: Communication  Goal: The ability to communicate needs accurately and effectively will improve  Outcome: PROGRESSING AS EXPECTED     Problem: Safety  Goal: Will remain free from injury  Outcome: PROGRESSING AS EXPECTED  Goal: Will remain free from falls  Outcome: PROGRESSING AS EXPECTED  Note: Fall precautions in place: treaded slipper socks on, mobility signs posted, hourly rounding, bed in lowest position, belongings and call light are within reach, bed alarm on, and near nurses station.      Problem: Mobility  Goal: Risk for activity intolerance will decrease  Outcome: PROGRESSING AS EXPECTED      72.4

## 2022-05-12 NOTE — CHART NOTE - NSCHARTNOTEFT_GEN_A_CORE
Informed by RN that pt is requesting IV Dilaudid for pain and states she vomited her oral Dilaudid.     Pt seen and examined at bedside.     Patient is a 33 yo F w/ hx T1DM w/ gastroparesis A1c 10.4, HTN who presents with nausea, vomiting (NBNB), and abd pain found to be in DKA AG 20 given lantus and sq ademalog with subsequent hypoglycemia. Pt states she has generalized abdominal pain that has been going on ever since she is in the hospital. States even if she takes the PO Dilaudid, it does not help her and would like IV pain medications.     Pt repeatedly demanding to have IV Dilaudid. Pt offered IV Tylenol and anti-emetics instead. Upon further discussion, pt states she would like to sign out AMA and called her parents already to pick her up.     Pt has full capacity to make her own decisions. Patient & family informed of risks of death, MI, CVA, Seizure etc if he or she signs out AMA. After expressing full understanding Pt then sign out AMA. Witnessed by Informed by RN that pt is requesting IV Dilaudid for pain and states she vomited her oral Dilaudid.     Pt seen and examined at bedside. Pt states she has generalized abdominal pain that has been going on ever since she is in the hospital. States even if she takes the PO Dilaudid, it does not help her and would like IV pain medications. Pt repeatedly demanding to have IV Dilaudid. Pt offered IV Tylenol and anti-emetics instead. Upon further discussion, pt states she would like to sign out AMA and called her parents already to pick her up. Pt was advised to stay and try alternative medication but pt states she would like to leave and go to [facility] instead.     Pt has full capacity to make her own decisions. Patient informed of risks of death, worsening symptoms, etc if she signs out AMA. After expressing full understanding, pt signed out AMA. Witnessed by MAGDA Piper and form placed in chart.     Contacted endocrinology to order diabetes discharge regimen for patient.

## 2022-05-12 NOTE — DISCHARGE NOTE PROVIDER - NSDCCPCAREPLAN_GEN_ALL_CORE_FT
PRINCIPAL DISCHARGE DIAGNOSIS  Diagnosis: DKA, type 1, not at goal  Assessment and Plan of Treatment: Your Hemoglobin A1C is  . Target goal for hemoglobin A1C is <7. Monitor blood glucose levels throughout the day before meals and at bedtime. Record blood sugars and bring to outpatient providers appointment in order to be reviewed by your doctor for management modifications. If your sugars are more than 400 or less than 70 you should contact your PCP immediately. Monitor for signs/symptoms of low blood glucose, such as, dizziness, altered mental status, or cool/clammy skin. In addition, monitor for signs/symptoms of high blood glucose, such as, feeling hot, dry, fatigued, or with increased thirst/urination. Make regular podiatry appointments in order to have feet checked for wounds and uncontrolled toe nail growth to prevent infections, as well as, appointments with an ophthalmologist to monitor your vision.        SECONDARY DISCHARGE DIAGNOSES  Diagnosis: Pseudohyponatremia  Assessment and Plan of Treatment:

## 2022-05-12 NOTE — PROVIDER CONTACT NOTE (OTHER) - NAME OF MD/NP/PA/DO NOTIFIED:
Octavio Hassan ACP
Octavio Hassan, ACP
Octavio Hassan, PA
Octavio Hassan,ACP
jeff peterson
jeff peterson
anyi fink
jeff peterson
Octavio Hassan
Octavio Hassan, ACP
jeff peterson
Alejandra HOOKER)
anyi fink
Alejandra

## 2022-05-12 NOTE — PROGRESS NOTE ADULT - SUBJECTIVE AND OBJECTIVE BOX
Chief Complaint: Type 1 Dm     History: Pt seen at bedside. Pt tolerating oral intake. Pt denies nausea and vomiting/any signs of hypoglycemia at time of visit. Pt had one episode of vomiting after breakfast. Pt reports an adequate appetite.      MEDICATIONS  (STANDING):  acetaminophen     Tablet .. 650 milliGRAM(s) Oral every 6 hours  acetaminophen   IVPB .. 1000 milliGRAM(s) IV Intermittent once  amitriptyline 10 milliGRAM(s) Oral at bedtime  dextrose 50% Injectable 25 Gram(s) IV Push once  dextrose 50% Injectable 12.5 Gram(s) IV Push once  dextrose 50% Injectable 25 Gram(s) IV Push once  dextrose Oral Gel 15 Gram(s) Oral once  glucagon  Injectable 1 milliGRAM(s) IntraMuscular once  insulin glargine Injectable (LANTUS) 10 Unit(s) SubCutaneous at bedtime  insulin lispro (ADMELOG) corrective regimen sliding scale   SubCutaneous at bedtime  insulin lispro (ADMELOG) corrective regimen sliding scale   SubCutaneous three times a day before meals  insulin lispro Injectable (ADMELOG) 3 Unit(s) SubCutaneous at bedtime  insulin lispro Injectable (ADMELOG) 3 Unit(s) SubCutaneous three times a day before meals  metoclopramide Injectable 10 milliGRAM(s) IV Push three times a day  pantoprazole  Injectable 40 milliGRAM(s) IV Push daily  polyethylene glycol 3350 17 Gram(s) Oral daily    MEDICATIONS  (PRN):  acetaminophen     Tablet .. 650 milliGRAM(s) Oral every 6 hours PRN Temp greater or equal to 38C (100.4F), Mild Pain (1 - 3)  acetaminophen     Tablet .. 650 milliGRAM(s) Oral every 6 hours PRN Moderate Pain (4 - 6)  aluminum hydroxide/magnesium hydroxide/simethicone Suspension 30 milliLiter(s) Oral every 4 hours PRN Dyspepsia  diphenhydrAMINE 25 milliGRAM(s) Oral once PRN Rash and/or Itching  HYDROmorphone   Tablet 2 milliGRAM(s) Oral every 4 hours PRN Severe Pain (7 - 10)  melatonin 3 milliGRAM(s) Oral at bedtime PRN Insomnia  ondansetron Injectable 4 milliGRAM(s) IV Push every 8 hours PRN Nausea and/or Vomiting      Allergies: Toradol (Pruritus)    Review of Systems:  HEENT: No pain  Cardiovascular: No chest pain, palpitations  Respiratory: No SOB, no cough  GI: No nausea, vomiting, abdominal pain  Endocrine: no polyuria, polydipsia    PHYSICAL EXAM:  VITALS: T(C): 36.7 (05-12-22 @ 15:38)  T(F): 98 (05-12-22 @ 15:38), Max: 98.7 (05-11-22 @ 23:19)  HR: 80 (05-12-22 @ 15:38) (77 - 97)  BP: 133/92 (05-12-22 @ 15:38) (133/83 - 150/88)  RR:  (18 - 18)  SpO2:  (97% - 100%)  Wt(kg): --  GENERAL: NAD, well-groomed, well-developed  RESPIRATORY: No labored breathing   GI: Soft, nontender, non distended  PSYCH: Alert and oriented x 3, normal affect, normal mood      CAPILLARY BLOOD GLUCOSE  POCT Blood Glucose.: 391 mg/dL (12 May 2022 17:15)  POCT Blood Glucose.: 198 mg/dL (12 May 2022 11:47)  POCT Blood Glucose.: 107 mg/dL (12 May 2022 10:13)  POCT Blood Glucose.: 134 mg/dL (12 May 2022 08:24)  POCT Blood Glucose.: 287 mg/dL (12 May 2022 06:40)  POCT Blood Glucose.: 193 mg/dL (12 May 2022 03:40)  POCT Blood Glucose.: 200 mg/dL (11 May 2022 22:30)  POCT Blood Glucose.: 122 mg/dL (11 May 2022 19:36)  POCT Blood Glucose.: 56 mg/dL (11 May 2022 18:54)    A1C with Estimated Average Glucose (05.09.22 @ 00:49)    A1C with Estimated Average Glucose Result: 10.4    05-11    133<L>  |  101  |  14  ----------------------------<  335<H>  4.1   |  22  |  0.71    eGFR: 116    Ca    8.4      05-11  Mg     1.70     05-11  Phos  3.1     05-11    TPro  5.9<L>  /  Alb  2.9<L>  /  TBili  <0.2  /  DBili  x   /  AST  23  /  ALT  17  /  AlkPhos  94  05-10          Thyroid Function Tests:  04-28 @ 02:25 TSH 1.03 FreeT4 -- T3 -- Anti TPO -- Anti Thyroglobulin Ab -- TSI --

## 2022-05-12 NOTE — CHART NOTE - NSCHARTNOTEFT_GEN_A_CORE
Notified by RN, pt is in pain and vomiting. Pt states she is unable to tolerate PO pain medications and refusing IV tylenol. Chart reviewed and pt seen at bedside. Pt states her pain is not relieved by reglan or IV tylenol. States she was told today that 'if she threw up the oral dilaudid, then she would get IV dilaudid.' Pt threatening to leave AMA. Spoke with RN who confirms a similar incident occurred with the pt the night prior.   Spoke with Dr Lutz. Will give one time dose of 0.5mg IV Dilaudid. Will inform day team of overnight events. Will continue to monitor.

## 2022-05-12 NOTE — CHART NOTE - NSCHARTNOTESELECT_GEN_ALL_CORE
AMA/Event Note
Event Note
Endocrinology/Event Note
Endocrinology/Event Note
Event Note
Hypoglycemic Event/Event Note

## 2022-05-12 NOTE — PROGRESS NOTE ADULT - PROBLEM SELECTOR PLAN 2
- BP in 110's  - will hold ACE for now

## 2022-05-12 NOTE — PROGRESS NOTE ADULT - PROBLEM SELECTOR PROBLEM 3
HLD (hyperlipidemia)
HLD (hyperlipidemia)
Nicotine use disorder

## 2022-05-12 NOTE — DIETITIAN INITIAL EVALUATION ADULT - REASON FOR ADMISSION
Type 1 diabetes mellitus with ketoacidosis without coma    31 yo F w/ hx T1DM w/ gastroparesis A1c 10.4, HTN, hx of dilaudid addiction who presents with nausea, vomiting (NBNB), and abd pain  found to be in DKA AG 20 given lantus and sq ademalog with subsequent hypoglycemia.  consulted-- pt requesting IV narcotics and IV benadryl, documentation from prior hospitalizations suggest hx of narcotic addiction.

## 2022-05-12 NOTE — DIETITIAN INITIAL EVALUATION ADULT - OTHER INFO
Refill declined, appointment needed. Patient was due for follow-up in August due to change in medication. Left message for patient to call to schedule appointment this week. Please follow-up with patient./Flor Quinonez CNP    A&Ox4; lethargic on pain medications. Pt states she ate well today. Noted from chart that she had an episode of emesis today. No constipation/diarrhea. Denies any chewing or swallowing difficulties at this time. Diabetes ed provided.

## 2022-05-12 NOTE — DIETITIAN INITIAL EVALUATION ADULT - PERTINENT MEDS FT
MEDICATIONS  (STANDING):  acetaminophen     Tablet .. 650 milliGRAM(s) Oral every 6 hours  acetaminophen   IVPB .. 1000 milliGRAM(s) IV Intermittent once  amitriptyline 10 milliGRAM(s) Oral at bedtime  dextrose 50% Injectable 25 Gram(s) IV Push once  dextrose 50% Injectable 12.5 Gram(s) IV Push once  dextrose 50% Injectable 25 Gram(s) IV Push once  dextrose Oral Gel 15 Gram(s) Oral once  glucagon  Injectable 1 milliGRAM(s) IntraMuscular once  insulin glargine Injectable (LANTUS) 10 Unit(s) SubCutaneous at bedtime  insulin lispro (ADMELOG) corrective regimen sliding scale   SubCutaneous at bedtime  insulin lispro (ADMELOG) corrective regimen sliding scale   SubCutaneous three times a day before meals  insulin lispro Injectable (ADMELOG) 3 Unit(s) SubCutaneous at bedtime  insulin lispro Injectable (ADMELOG) 3 Unit(s) SubCutaneous three times a day before meals  metoclopramide Injectable 10 milliGRAM(s) IV Push three times a day  pantoprazole  Injectable 40 milliGRAM(s) IV Push daily  polyethylene glycol 3350 17 Gram(s) Oral daily    MEDICATIONS  (PRN):  acetaminophen     Tablet .. 650 milliGRAM(s) Oral every 6 hours PRN Temp greater or equal to 38C (100.4F), Mild Pain (1 - 3)  acetaminophen     Tablet .. 650 milliGRAM(s) Oral every 6 hours PRN Moderate Pain (4 - 6)  aluminum hydroxide/magnesium hydroxide/simethicone Suspension 30 milliLiter(s) Oral every 4 hours PRN Dyspepsia  diphenhydrAMINE 25 milliGRAM(s) Oral once PRN Rash and/or Itching  HYDROmorphone   Tablet 2 milliGRAM(s) Oral every 4 hours PRN Severe Pain (7 - 10)  melatonin 3 milliGRAM(s) Oral at bedtime PRN Insomnia  ondansetron Injectable 4 milliGRAM(s) IV Push every 8 hours PRN Nausea and/or Vomiting

## 2022-05-12 NOTE — DISCHARGE NOTE NURSING/CASE MANAGEMENT/SOCIAL WORK - PATIENT PORTAL LINK FT
You can access the FollowMyHealth Patient Portal offered by [facility] by registering at the following website: [email or web address]. By joining [employer]’s FollowMyHealth portal, you will also be able to view your health information using other applications (apps) compatible with our system.

## 2022-05-12 NOTE — PROVIDER CONTACT NOTE (OTHER) - ACTION/TREATMENT ORDERED:
Continue to encourage IV tylenol; pain management will be rounding
Give correctional insulin, monitor blood sugar after 2 hours.
will order for 50mg benadryl PO
continue to monitor fingersticks as ordered.
after talking to patient. provider will look into if patient can get IV Dilaudid at this time. pt still refusing Reglan and IV Tylenol for relief. patient will be monitored closely
providers are aware patient will be treated for N/V and pain. patient will be closely monitored.
providers are aware. patient will be closely monitored. patient will be treated once provider finds out what should be given to patient for relief.
get a repeat fingerstick, and coming to speak to the patient
providers are aware. patients FS will be rechecked. patient will be given PO Dilaudid. patient will be closely monitored and educated why treatment for hypoglycemia is needed.
PO dilaudid for pain will be given. glucose gel that was order will not be given due to repeat FS being higher and manageable.
activate hyperglycemic protocol
order 5:30 blood sugar check
patient will be given medication for relief. providers aware. patient will be closely monitored.
provider aware. write provider contact note. will order 12.5mg of IV push benadryl

## 2022-05-12 NOTE — PROVIDER CONTACT NOTE (OTHER) - SITUATION
patient complaining of itching and vomiting
patient requesting benadryl for itchiness, pt vomited up 50mg benadryl tablet
Patient has a blood sugar of 411.
patient FS 46, and 47
patients FS rechecked after FS resulted as 56. FS is now 122.
patient  and 200
patient .
patient refused the D50
Patient is refusing IV tylenol; refusing to go down for CT/CAT scan r/t to inability to stay still due to abdominal pain.
patient requesting benadryl for itchiness
patient throwing up after receiving PO dilaudid.
patients FS resulted to 422 at 3am
pt hypoglycemic of 56 and complaining of pain
pt throwing up and states she is in pain. patient requesting IV pain medication and says IV tylenol does not relieve pain. patient wants to speak to provider.

## 2022-05-12 NOTE — CHART NOTE - NSCHARTNOTEFT_GEN_A_CORE
Per earlier discussion with attending Dr. Lopez, if patient threatens to leave against medical advice if she doesn't receive IV dilaudid, it is okay for patient to sign out against medical advice.

## 2022-05-12 NOTE — DIETITIAN INITIAL EVALUATION ADULT - EDUCATION DIETARY MODIFICATIONS
-Discussed carbohydrate counting and importance with insulin regimen./(1) partially meets; needs review/practice/verbalization

## 2022-05-12 NOTE — PROVIDER CONTACT NOTE (OTHER) - ASSESSMENT
Pt AxO x4. VSS w/ no acute distress noted.
Pt AxO x4. VSS w/ no acute distress noted. patient refused the D50. Patient stated that the D50 blew out her veins. Wanted cranberry juice and milk with sugar instead
Pt AxO x4. VSS w/ no acute distress noted. patient requesting benadryl for itchiness
patient having no s/s at this time related to hyperglycemia.
patient throwing up and guarding abdomen. FS stable
patient throwing up at this time. patient complaining of pain.
patient showing no hypoglycemic s/s. patient frustrated about pain and requesting pain medication. patient refusing apple juice or glucose gel or IV treatment until her pain is resolved.
Patient is AO4, vss; no s/s of acute distress noted; on R.A.
Pt AxO x4. VSS w/ no acute distress noted. patient FS 46, and 47. Pt without complaints of nausea, dizziness, or tremors
patient complaining of pain. patient showing no s/s of hypoglycemia or hyperglycemia at this time
Pt AxO4.VSS w/no acute distress noted.Pt 
Pt AxOx4. VSS w/ no acute distress noted. patient requesting benadryl for itchiness, pt vomited up 50mg benadryl tablet. The benadryl tablet seen in the vomit still intact
Patient is AO4, vss; no s/s of acute distress noted.
patient itching and stated she vomited in bathroom. no other s/s at this time. vitals stable.

## 2022-05-12 NOTE — DISCHARGE NOTE PROVIDER - NSDCFUSCHEDAPPT_GEN_ALL_CORE_FT
Kathe Cobb Physician [facility]  Med Endocr [address] [facility]  Scheduled Appointment: 05/13/2022     Kathe Cobb Physician [facility]  Med Endocr [address] [facility]  Scheduled Appointment: 06/03/2022

## 2022-05-12 NOTE — DIETITIAN INITIAL EVALUATION ADULT - PERTINENT LABORATORY DATA
05-11    133<L>  |  101  |  14  ----------------------------<  335<H>  4.1   |  22  |  0.71    Ca    8.4      11 May 2022 04:09  Phos  3.1     05-11  Mg     1.70     05-11    TPro  5.9<L>  /  Alb  2.9<L>  /  TBili  <0.2  /  DBili  x   /  AST  23  /  ALT  17  /  AlkPhos  94  05-10  POCT Blood Glucose.: 198 mg/dL (05-12-22 @ 11:47)  A1C with Estimated Average Glucose Result: 10.4 % (05-09-22 @ 00:49)  A1C with Estimated Average Glucose Result: 10.4 % (04-27-22 @ 21:04)  A1C with Estimated Average Glucose Result: 9.7 % (02-17-22 @ 07:34)

## 2022-05-12 NOTE — DIETITIAN INITIAL EVALUATION ADULT - ORAL INTAKE PTA/DIET HISTORY
pt not following specific diabetic diet. denies ever skipping her insulin. Her current A1c is 10.4-states this is the highest it has been in awhile. Her lowest A1c she remembers was 8.2.

## 2022-05-12 NOTE — PROVIDER CONTACT NOTE (OTHER) - RECOMMENDATIONS
activate hyperglycemic protocol
providers made aware. patient will monitored closely patient will be given PO dilaudid for pain. glucose gel will be held at this time.
provider is aware and will need to call endocrine for guidance on what should be given to patient for relief.
redo blood sugar at 5:30
make the provider aware and monitor the patient''s fingerstick
No recommendations
continue to monitor fingersticks as normal
Order for PO benadryl
continue to monitor for N/V, itchiness and patient's status
provider talked to pt and patient states IV Dilaudid is what helps the pain and if she does not receive IV Tylenol she will want to leave AMA.
providers made aware. patient will be given 1 time dose IV dilaudid as well as zofran for relief.
providers made aware. providers requested that we attempt to work with patient and attempt to get a repeat FS. providers will put in PO Dilaudid for patient after FS is rechecked.
No recommendations
providers are aware. patient will be given IVP benadryl and zofran as approved by provider.

## 2022-05-12 NOTE — PROVIDER CONTACT NOTE (OTHER) - REASON
patient hypoglycemic and complaining of pain
Pt is refusing IV tylenol; cat scan
patient 
patient itching and vomiting
patient requesting benadryl for itchiness, pt vomited up 50mg benadryl tablet
Patient has a blood sugar of 411.
patient's repeat  
3AM FS resulted to 422
patient requesting benadryl
patient throwing up and requesting pain medication and to speak to provider
pt throwing up after given PO Dilaudid
FS rechecked and resulted as 122
patient FS 46,47
patient refused the D50

## 2022-05-12 NOTE — DISCHARGE NOTE NURSING/CASE MANAGEMENT/SOCIAL WORK - NSDCPEFALRISK_GEN_ALL_CORE
For information on Fall & Injury Prevention, visit: [email or web address] OR  [email or web address] OR  https://www.cdc.gov/steadi/patient.html

## 2022-05-12 NOTE — PROVIDER CONTACT NOTE (OTHER) - BACKGROUND
patient admitted for Type 1 Diabetes Mellitus with ketoacidosis without coma
patient admitted for abdominal pain
patient admitted for abdominal pain
Patient was admitted for DKA
patient admitted for abdominal pain
pt admitted for abdominal pain and is a type 1 diabetic
pt admitted for type 1 DM with ketoacidosis without coma
Patient admitted for type 1 DM with ketoacidosis without coma
Patient was admitted for abdominal pain/DKA
patient admitted for abdominal pain
patient admitted for abdominal pain.
Pt admitted for Type 1 DM with ketoacidosis without coma
patient admitted for type 1 dm with ketoacidosis without coma
patient admitted for Type 1 dm ketoacidosis without coma

## 2022-05-12 NOTE — DIETITIAN INITIAL EVALUATION ADULT - NS FNS DIET ORDER
Diet, Regular:   Consistent Carbohydrate {Evening Snack} (CSTCHOSN)  Fiber/Residue Restricted (LOWFIBER)  Low Fat (LOWFAT) (05-11-22 @ 17:13)

## 2022-05-12 NOTE — DISCHARGE NOTE PROVIDER - NSDCMRMEDTOKEN_GEN_ALL_CORE_FT
acetaminophen 325 mg oral tablet: 2 tab(s) orally every 8 hours, As Needed  enalapril 5 mg oral tablet: 1 tab(s) orally once a day, HOLD for SBP &lt;110  famotidine 20 mg oral tablet: 1 tab(s) orally once a day  glucose tablets: Follow instructions on bottle when sugar is low.  metoclopramide 10 mg oral tablet: 1 tab(s) orally 4 times a day (before meals and at bedtime), As Needed  ondansetron 4 mg oral tablet, disintegratin tab(s) orally 3 times a day, As Needed -for nausea   polyethylene glycol 3350 oral powder for reconstitution: 17 gram(s) orally once a day, As Needed

## 2022-05-12 NOTE — DISCHARGE NOTE PROVIDER - HOSPITAL COURSE
31 yo F w/ hx T1DM w/ gastroparesis A1c 10.4, HTN, hx of dilaudid addiction who presents with nausea, vomiting (NBNB), and abd pain  found to be in DKA AG 20 given lantus and sq ademalog with subsequent hypoglycemia.  consulted-- pt requesting IV narcotics and IV benadryl, documentation from prior hospitalizations suggest hx of narcotic addiction.    1: DKA, type 1.   - Likely due to medication nonadherence vs ? infection (poor dentition) vs intrabdominal pathology (she reports abd pain)  - DKA ( AG 20 and BBOH 2.9) with gastroparesis (self reported by pt; no hx of gastric emptying study)  - istop Reference #: [id number] no recent opoid rx  - A1c 10.4  - UA neg   - In hospital, pt was on Lantus 10 units SC QHS, Admelog 3 units sq qhs for a bedtime snack and remained on a low to customized correction Scale Premeal & low Correction Scale Bedtime   - Diabetic diet with low fiber and low fat - encouraged small frequent meals  - treated with reglan 10 IV TID with meals during hospital stay  - Pain management consulted with following regimen: Dilaudid 2mg every 4 hours PRN for moderate to severe pain x2 days, then q 6 hoursx1 day, then q 8 hoursx1 day, then q 12 hoursx1 day, then discontinue as patient can tolerate.  Hold for oversedation. Not to be given within 1 hour of any other immediate acting opioid.  -  Recommend follow up with Chronic Pain doctor when discharged. If patient does not have a Chronic Pain doctor, may acquire one through patient's personal insurance carrier.  - Avoid narcotics given hx of narcotic addiction  - PPI   - Endocrine consulted regarding dc plans and spoke to NP Yenny Espino - recommends pt to be discharged on Admelog 4 units pre-meal and Basaglar 12 units at bedtime      2. Nicotine use disorder   ·  Plan: - current smoker encouraged quiting; willing to think about it  - doesn't want nicotine supp  - venodyne while in bed.      Dr. Mix's aware of pt wanting to sign out AMA. As per his recommendations, pt can leave if she desires to leave AMA.   Spoke to pt. Patient is unwilling to stay in hospital for further evaluation at this time. Pt expressed that she is going to [facility] to seek other treatment. Risks of leaving AMA discussed with patient.

## 2022-05-12 NOTE — CONSULT NOTE ADULT - SUBJECTIVE AND OBJECTIVE BOX
Patient is a 32y old  Female who presents with a chief complaint of DKA/ abdominal pain (11 May 2022 17:21)      HPI:  33 yo F w/ hx T1DM w/ gastroparesis, HTN who presents with nausea, vomiting (NBNB), and abd pain along with some SOB, onset this evening. She notes abdominal pain is in b/l LQ denies any change in bowel habits blood or dark stool. Last BM was yesterday. She states has not been taking any opiates or marijuana since discharge from hospital. She has frequent episodes of DKA with unclear causes, she notes; she says she did not miss any insulin doses. SOB has resolved. She denies any sick contacts fever, cough, fever, recent illness, constipation or diarrhea, or urinary complaints.    In ED was noted to have elevated AG 20 with glucose 596; 2L LR  recieved 18 U lantus 3 AM;  8 U sq at 2 AM ademalog and 4 AM 4 U ademalog subsequent  hypoglycemia 7 AM 68--  D50 x 1 --9:30 --54--D50x 1. AG resolved (09 May 2022 10:49)      PAST MEDICAL & SURGICAL HISTORY:  Diabetes mellitus type 1      Gastroparesis due to DM      Colitis      Hypertension      Ectopic pregnancy  2009 and 2013, s/p removal of right and left fallopian tubes      History of cholecystectomy          MEDICATIONS  (STANDING):  acetaminophen     Tablet .. 650 milliGRAM(s) Oral every 6 hours  acetaminophen   IVPB .. 1000 milliGRAM(s) IV Intermittent once  amitriptyline 10 milliGRAM(s) Oral at bedtime  dextrose 50% Injectable 25 Gram(s) IV Push once  dextrose 50% Injectable 12.5 Gram(s) IV Push once  dextrose 50% Injectable 25 Gram(s) IV Push once  dextrose Oral Gel 15 Gram(s) Oral once  glucagon  Injectable 1 milliGRAM(s) IntraMuscular once  HYDROmorphone  Injectable 0.25 milliGRAM(s) IV Push once  insulin glargine Injectable (LANTUS) 10 Unit(s) SubCutaneous at bedtime  insulin lispro (ADMELOG) corrective regimen sliding scale   SubCutaneous at bedtime  insulin lispro (ADMELOG) corrective regimen sliding scale   SubCutaneous three times a day before meals  insulin lispro Injectable (ADMELOG) 3 Unit(s) SubCutaneous at bedtime  insulin lispro Injectable (ADMELOG) 3 Unit(s) SubCutaneous three times a day before meals  metoclopramide Injectable 10 milliGRAM(s) IV Push three times a day  metoclopramide Injectable 10 milliGRAM(s) IV Push once  pantoprazole  Injectable 40 milliGRAM(s) IV Push daily  polyethylene glycol 3350 17 Gram(s) Oral daily    MEDICATIONS  (PRN):  acetaminophen     Tablet .. 650 milliGRAM(s) Oral every 6 hours PRN Temp greater or equal to 38C (100.4F), Mild Pain (1 - 3)  acetaminophen     Tablet .. 650 milliGRAM(s) Oral every 6 hours PRN Moderate Pain (4 - 6)  aluminum hydroxide/magnesium hydroxide/simethicone Suspension 30 milliLiter(s) Oral every 4 hours PRN Dyspepsia  diphenhydrAMINE 25 milliGRAM(s) Oral once PRN Rash and/or Itching  HYDROmorphone   Tablet 2 milliGRAM(s) Oral every 4 hours PRN Severe Pain (7 - 10)  melatonin 3 milliGRAM(s) Oral at bedtime PRN Insomnia  ondansetron Injectable 4 milliGRAM(s) IV Push every 8 hours PRN Nausea and/or Vomiting      Allergies    Toradol (Pruritus)    Intolerances        FAMILY HISTORY:  Family history of type 1 diabetes mellitus (Grandparent)    FH: HTN (hypertension) (Father)    FHx: asthma  Brother        *SOCIAL HISTORY: (guardian or who pt came with), (smoking hx)    *Last Dental Visit: a month ago    Vital Signs Last 24 Hrs  T(C): 36.7 (12 May 2022 06:36), Max: 37.1 (11 May 2022 23:19)  T(F): 98 (12 May 2022 06:36), Max: 98.7 (11 May 2022 23:19)  HR: 77 (12 May 2022 06:36) (77 - 97)  BP: 150/88 (12 May 2022 06:36) (133/83 - 152/98)  BP(mean): --  RR: 18 (12 May 2022 06:36) (18 - 18)  SpO2: 97% (12 May 2022 06:36) (97% - 98%)    EOE:  TMJ ( -  ) clicks                    (  -  ) pops                    (   - ) crepitus             Mandible FROM             Facial bones and MOM grossly intact             (  - ) trismus             (  - ) LAD             (  - ) swelling             ( -  ) asymmetry             (-   ) palpation             ( -  ) SOB             ( -  ) dysphagia             (  - ) LOC    IOE:  permanent: multiple carious teeth, multiple missing teeth, poor dentition           hard/soft palate:  (  - ) palatal torus           tongue/FOM WNL           labial/buccal mucosa WNL>           (  - ) percussion           (  - ) palpation           ( -  ) swelling   no signs of acute odontogenic infection.      LABS:                        9.1    7.82  )-----------( 381      ( 11 May 2022 04:09 )             30.7     05-11    133<L>  |  101  |  14  ----------------------------<  335<H>  4.1   |  22  |  0.71    Ca    8.4      11 May 2022 04:09  Phos  3.1     05-11  Mg     1.70     05-11    TPro  5.9<L>  /  Alb  2.9<L>  /  TBili  <0.2  /  DBili  x   /  AST  23  /  ALT  17  /  AlkPhos  94  05-10    WBC Count: 7.82 K/uL [3.80 - 10.50] (05-11 @ 04:09)  Platelet Count - Automated: 381 K/uL [150 - 400] (05-11 @ 04:09)  WBC Count: 6.96 K/uL [3.80 - 10.50] (05-10 @ 15:45)  Platelet Count - Automated: 298 K/uL [150 - 400] (05-10 @ 15:45)    ASSESSMENT: no intraoral swelling. no discharge. no signs of acute odontogenic infection. multiple missing teeth. multiple caries. Patient reports no dental pain. no acute dental intervention recommended at this time. recommended patient follow up with outpatient dentist upon discharge    PROCEDURE: clinical bedside exam performed.    RECOMMENDATIONS:  1) Dental F/U with outpatient dentist for comprehensive dental care.   2) If any difficulty swallowing/breathing, fever occur, page dental.     Sveta Cruz DDS #[id number]  Phoebe Chen, DMD [id number]

## 2022-05-12 NOTE — PROVIDER CONTACT NOTE (OTHER) - DATE AND TIME:
10-May-2022 02:18
10-May-2022 06:20
10-May-2022 22:30
11-May-2022 20:15
12-May-2022 03:30
11-May-2022 03:10
11-May-2022 18:50
11-May-2022 19:15
10-May-2022 00:05
10-May-2022 05:45
11-May-2022 20:00
11-May-2022 12:53
10-May-2022 06:30
10-May-2022 03:58

## 2022-05-12 NOTE — PROGRESS NOTE ADULT - PROBLEM/PLAN-1
Patient Quality Outreach 2nd Attempt      Summary:    Type of outreach:    Phone, spoke to patient/parent. appointments scheduled    Next Steps:  Reach out within 90 days via none.    Max number of attempts reached: No. Will try again in 90 days if patient still on fail list.    Questions for provider review:    None                                                                                                                    Jaqui Bains, BRENT on 7/28/2021 at 2:17 PM       Chart routed to none.    
DISPLAY PLAN FREE TEXT

## 2022-05-12 NOTE — PROGRESS NOTE ADULT - ASSESSMENT
33 yo F w/ hx T1DM w/ gastroparesis A1c 10.4, HTN who presents with nausea, vomiting (NBNB), and abd pain  found to be in DKA AG 20 given lantus and sq ademalog with subsequent hypoglycemia.   QAttempted to see pt; pt was currently getting a catscan at time of visit.    1. DM Type 1, uncontrolled w/ complications   Target A1C 6-7%; current A1c: 10.4% (04.27.22 @ 21:04)  Has had many admissions for DKA   Hyperglycemia & Hypoglycemia is Variable PO intake  Recommendations:   - Goal -180  -Fs more stable today but still variable ; pt had 1 episode of vomiting this am   -Continue Lantus 10 units SC QHS (please give 80%of dose if NPO)  -Decrease Admelog 3 units SC Premeal/TIDAC (please hold if NPO/not eating ) to prevent hypoglycemia  -Continue  Admelog 3 units sq qhs for a bedtime snack(please hold if skips snack)  -Continue Admelog low to customized correction Scale Premeal & low Correction Scale Bedtime   - Blood glucose monitoring Premeal/Bedtime   - Hypoglycemia protocol   - Carb Consistent Diet   -RD consult (ordered)      DC planning: Patient can continue to follow up at [address] [place] [address]. Planned to be initiated on insulin pump along w/ CGM as outpatient per patient. May 13th at 10 am with Kathe Endocrine NP and Dr. Liu Endocrinology Attending on 10/10 at 3pm. DC on prior home insulins Admelog/basaglar (doses TBD)  Ensure patient has working glucometer, test strips, lancets, alcohol pads, and BD tej pen needles  Please also prescribe glucose tabs, Baqsimi nasal spray or glucagon emergency kit for hypoglycemia risk   Please follow up with opthalmology and podiatry as an outpt     HTN  Recommendations:   - BP goal < 130/80   - on Enlapril 5mg daily outpt (restart inpt??)  - defer to primary team     HLD    Recommendations:   - LDL goal < 70   - defer to primary team   - pt is at reproductive age, can discuss statin tx outpt if no plans for pregnancy, currently using protection     Gastroparesis   - currently on reglan   - monitor QTc   - better glycemic control advised   - outpatient GI evaluation     Yenny Ortega  Nurse Practitioner  Division of Endocrinology & Diabetes  In house pager #[phone]    If before 9AM or after 6PM, or on weekends/holidays, please call endocrine answering service for assistance ([phone]).For nonurgent matters email [email or web address] for assistance.   
33 yo F w/ hx T1DM w/ gastroparesis A1c 10.4, HTN, hx of dilaudid addiction who presents with nausea, vomiting (NBNB), and abd pain  found to be in DKA AG 20 given lantus and sq ademalog with subsequent hypoglycemia.  consulted-- pt requesting IV narcotics and IV benadryl, documentation from prior hospitalizations suggest hx of narcotic addiction.
31 yo F w/ hx T1DM w/ gastroparesis A1c 10.4, HTN, hx of dilaudid addiction who presents with nausea, vomiting (NBNB), and abd pain  found to be in DKA AG 20 given lantus and sq ademalog with subsequent hypoglycemia.  consulted-- pt requesting IV narcotics and IV benadryl, documentation from prior hospitalizations suggest hx of narcotic addiction.
31 yo F w/ hx T1DM w/ gastroparesis A1c 10.4, HTN who presents with nausea, vomiting (NBNB), and abd pain  found to be in DKA AG 20 given lantus and sq ademalog with subsequent hypoglycemia.       1. DM1   DM Type 1, uncontrolled w/ complications   A1C 10.4% (04.27.22 @ 21:04)  Has had many admissions for DKA   Hyperglycemia & Hypoglycemia iso Variable PO intake  Recommendations:   - Decrease Lantus 10 units SC QHS   - Continue Admelog 5 units SC Premeal/TIDAC for now given patient has been having vomiting episodes   - Admelog Low Correction Scale Premeal & Low Correction Scale Bedtime   - Goal -180  - Blood glucose monitoring Premeal/Bedtime   - Hypoglycemia protocol   - Carb Consistent Diet   DC planning: Patient can continue to follow up at [address] [place] [address]. Planned to be initiated on insulin pump along w/ CGM as outpatient per patient. DC on prior home insulins    HTN  Recommendations:   - BP goal < 130/80   - on Enlapril 5mg daily outpt (restart inpt??)  - defer to primary team     HLD    Recommendations:   - LDL goal < 70   - defer to primary team   - pt is at reproductive age, can discuss statin tx outpt if no plans for pregnancy, currently using protection     Gastroparesis   - currently on reglan   - monitor QTc   - better glycemic control advised   - outpatient GI evaluation           Soha Dowling  Nurse Practitioner  Division of Endocrinology & Diabetes  Pager # [phone]      If after 6PM or before 9AM, or on weekends/holidays, please call endocrine answering service for assistance ([phone]).  For nonurgent matters email [email or web address] for assistance.
33 yo F w/ hx T1DM w/ gastroparesis A1c 10.4, HTN, hx of dilaudid addiction who presents with nausea, vomiting (NBNB), and abd pain  found to be in DKA AG 20 given lantus and sq ademalog with subsequent hypoglycemia.  consulted-- pt requesting IV narcotics and IV benadryl, documentation from prior hospitalizations suggest hx of narcotic addiction.

## 2022-05-14 ENCOUNTER — EMERGENCY (EMERGENCY)
Facility: HOSPITAL | Age: 32
LOS: 0 days | Discharge: HOME | End: 2022-05-14
Attending: STUDENT IN AN ORGANIZED HEALTH CARE EDUCATION/TRAINING PROGRAM
Payer: MEDICAID

## 2022-05-14 VITALS
RESPIRATION RATE: 16 BRPM | OXYGEN SATURATION: 97 % | SYSTOLIC BLOOD PRESSURE: 135 MMHG | DIASTOLIC BLOOD PRESSURE: 85 MMHG | TEMPERATURE: 97 F | HEART RATE: 88 BPM

## 2022-05-14 VITALS
RESPIRATION RATE: 16 BRPM | HEART RATE: 109 BPM | WEIGHT: 134.92 LBS | OXYGEN SATURATION: 97 % | DIASTOLIC BLOOD PRESSURE: 70 MMHG | SYSTOLIC BLOOD PRESSURE: 123 MMHG | TEMPERATURE: 98 F | HEIGHT: 66 IN

## 2022-05-14 DIAGNOSIS — R11.2 NAUSEA WITH VOMITING, UNSPECIFIED: ICD-10-CM

## 2022-05-14 DIAGNOSIS — R10.32 LEFT LOWER QUADRANT PAIN: ICD-10-CM

## 2022-05-14 DIAGNOSIS — Z20.822 CONTACT WITH AND (SUSPECTED) EXPOSURE TO COVID-19: ICD-10-CM

## 2022-05-14 DIAGNOSIS — Z90.49 ACQUIRED ABSENCE OF OTHER SPECIFIED PARTS OF DIGESTIVE TRACT: Chronic | ICD-10-CM

## 2022-05-14 DIAGNOSIS — Z88.6 ALLERGY STATUS TO ANALGESIC AGENT: ICD-10-CM

## 2022-05-14 DIAGNOSIS — K31.84 GASTROPARESIS: ICD-10-CM

## 2022-05-14 DIAGNOSIS — I10 ESSENTIAL (PRIMARY) HYPERTENSION: ICD-10-CM

## 2022-05-14 DIAGNOSIS — Z87.19 PERSONAL HISTORY OF OTHER DISEASES OF THE DIGESTIVE SYSTEM: ICD-10-CM

## 2022-05-14 DIAGNOSIS — O00.9 ECTOPIC PREGNANCY, UNSPECIFIED: Chronic | ICD-10-CM

## 2022-05-14 DIAGNOSIS — E10.43 TYPE 1 DIABETES MELLITUS WITH DIABETIC AUTONOMIC (POLY)NEUROPATHY: ICD-10-CM

## 2022-05-14 LAB
A1C WITH ESTIMATED AVERAGE GLUCOSE RESULT: 10.8 % — HIGH (ref 4–5.6)
ALBUMIN SERPL ELPH-MCNC: 2.7 G/DL — LOW (ref 3.3–5)
ALP SERPL-CCNC: 93 U/L — SIGNIFICANT CHANGE UP (ref 40–120)
ALT FLD-CCNC: 17 U/L — SIGNIFICANT CHANGE UP (ref 12–78)
ANION GAP SERPL CALC-SCNC: 7 MMOL/L — SIGNIFICANT CHANGE UP (ref 5–17)
APPEARANCE UR: CLEAR — SIGNIFICANT CHANGE UP
AST SERPL-CCNC: 14 U/L — LOW (ref 15–37)
B-OH-BUTYR SERPL-SCNC: 0.3 MMOL/L — SIGNIFICANT CHANGE UP
BACTERIA # UR AUTO: NEGATIVE — SIGNIFICANT CHANGE UP
BASE EXCESS BLDV CALC-SCNC: 3.2 MMOL/L — HIGH (ref -2–3)
BASOPHILS # BLD AUTO: 0.03 K/UL — SIGNIFICANT CHANGE UP (ref 0–0.2)
BASOPHILS NFR BLD AUTO: 0.4 % — SIGNIFICANT CHANGE UP (ref 0–2)
BILIRUB SERPL-MCNC: 0.3 MG/DL — SIGNIFICANT CHANGE UP (ref 0.2–1.2)
BILIRUB UR-MCNC: NEGATIVE — SIGNIFICANT CHANGE UP
BLOOD GAS COMMENTS, VENOUS: SIGNIFICANT CHANGE UP
BUN SERPL-MCNC: 21 MG/DL — SIGNIFICANT CHANGE UP (ref 7–23)
CALCIUM SERPL-MCNC: 9.4 MG/DL — SIGNIFICANT CHANGE UP (ref 8.5–10.1)
CHLORIDE BLDV-SCNC: 103 MMOL/L — SIGNIFICANT CHANGE UP (ref 98–107)
CHLORIDE SERPL-SCNC: 105 MMOL/L — SIGNIFICANT CHANGE UP (ref 96–108)
CO2 BLDV-SCNC: 29 MMOL/L — HIGH (ref 22–26)
CO2 SERPL-SCNC: 26 MMOL/L — SIGNIFICANT CHANGE UP (ref 22–31)
COLOR SPEC: YELLOW — SIGNIFICANT CHANGE UP
CREAT SERPL-MCNC: 1.04 MG/DL — SIGNIFICANT CHANGE UP (ref 0.5–1.3)
CULTURE RESULTS: SIGNIFICANT CHANGE UP
CULTURE RESULTS: SIGNIFICANT CHANGE UP
DIFF PNL FLD: ABNORMAL
EGFR: 73 ML/MIN/1.73M2 — SIGNIFICANT CHANGE UP
EOSINOPHIL # BLD AUTO: 0.15 K/UL — SIGNIFICANT CHANGE UP (ref 0–0.5)
EOSINOPHIL NFR BLD AUTO: 1.9 % — SIGNIFICANT CHANGE UP (ref 0–6)
EPI CELLS # UR: SIGNIFICANT CHANGE UP
ESTIMATED AVERAGE GLUCOSE: 263 MG/DL — HIGH (ref 68–114)
FLUAV AG NPH QL: SIGNIFICANT CHANGE UP
FLUBV AG NPH QL: SIGNIFICANT CHANGE UP
GAS PNL BLDV: 134 MMOL/L — LOW (ref 136–145)
GAS PNL BLDV: SIGNIFICANT CHANGE UP
GAS PNL BLDV: SIGNIFICANT CHANGE UP
GLUCOSE BLDV-MCNC: 446 MG/DL — HIGH (ref 65–95)
GLUCOSE SERPL-MCNC: 413 MG/DL — HIGH (ref 70–99)
GLUCOSE UR QL: 1000 MG/DL
HCG SERPL-ACNC: <1 MIU/ML — SIGNIFICANT CHANGE UP
HCO3 BLDV-SCNC: 28 MMOL/L — SIGNIFICANT CHANGE UP (ref 22–28)
HCT VFR BLD CALC: 30.5 % — LOW (ref 34.5–45)
HCT VFR BLDA CALC: 28 % — LOW (ref 37–47)
HGB BLD CALC-MCNC: 9.4 G/DL — LOW (ref 11.7–16.1)
HGB BLD-MCNC: 9.1 G/DL — LOW (ref 11.5–15.5)
HOROWITZ INDEX BLDV+IHG-RTO: 21 — SIGNIFICANT CHANGE UP
IMM GRANULOCYTES NFR BLD AUTO: 0.3 % — SIGNIFICANT CHANGE UP (ref 0–1.5)
KETONES UR-MCNC: NEGATIVE — SIGNIFICANT CHANGE UP
LACTATE BLDV-MCNC: 1.5 MMOL/L — HIGH (ref 0.56–1.39)
LEUKOCYTE ESTERASE UR-ACNC: ABNORMAL
LIDOCAIN IGE QN: 57 U/L — LOW (ref 73–393)
LYMPHOCYTES # BLD AUTO: 1.81 K/UL — SIGNIFICANT CHANGE UP (ref 1–3.3)
LYMPHOCYTES # BLD AUTO: 23.3 % — SIGNIFICANT CHANGE UP (ref 13–44)
MAGNESIUM SERPL-MCNC: 2 MG/DL — SIGNIFICANT CHANGE UP (ref 1.6–2.6)
MCHC RBC-ENTMCNC: 18.3 PG — LOW (ref 27–34)
MCHC RBC-ENTMCNC: 29.8 G/DL — LOW (ref 32–36)
MCV RBC AUTO: 61.5 FL — LOW (ref 80–100)
MONOCYTES # BLD AUTO: 0.71 K/UL — SIGNIFICANT CHANGE UP (ref 0–0.9)
MONOCYTES NFR BLD AUTO: 9.1 % — SIGNIFICANT CHANGE UP (ref 2–14)
NEUTROPHILS # BLD AUTO: 5.04 K/UL — SIGNIFICANT CHANGE UP (ref 1.8–7.4)
NEUTROPHILS NFR BLD AUTO: 65 % — SIGNIFICANT CHANGE UP (ref 43–77)
NITRITE UR-MCNC: NEGATIVE — SIGNIFICANT CHANGE UP
NRBC # BLD: 0 /100 WBCS — SIGNIFICANT CHANGE UP (ref 0–0)
OSMOLALITY SERPL: 307 MOSMOL/KG — HIGH (ref 275–300)
PCO2 BLDV: 42 MMHG — SIGNIFICANT CHANGE UP (ref 42–55)
PH BLDV: 7.43 — SIGNIFICANT CHANGE UP (ref 7.32–7.43)
PH UR: 6 — SIGNIFICANT CHANGE UP (ref 5–8)
PHOSPHATE SERPL-MCNC: 3.5 MG/DL — SIGNIFICANT CHANGE UP (ref 2.5–4.5)
PLATELET # BLD AUTO: 431 K/UL — HIGH (ref 150–400)
PO2 BLDV: 63 MMHG — HIGH (ref 25–45)
POTASSIUM BLDV-SCNC: 4.1 MMOL/L — SIGNIFICANT CHANGE UP (ref 3.5–5.1)
POTASSIUM SERPL-MCNC: 4.2 MMOL/L — SIGNIFICANT CHANGE UP (ref 3.5–5.3)
POTASSIUM SERPL-SCNC: 4.2 MMOL/L — SIGNIFICANT CHANGE UP (ref 3.5–5.3)
PROT SERPL-MCNC: 6.6 GM/DL — SIGNIFICANT CHANGE UP (ref 6–8.3)
PROT UR-MCNC: 500 MG/DL
RBC # BLD: 4.96 M/UL — SIGNIFICANT CHANGE UP (ref 3.8–5.2)
RBC # FLD: 19.8 % — HIGH (ref 10.3–14.5)
RBC CASTS # UR COMP ASSIST: SIGNIFICANT CHANGE UP /HPF (ref 0–4)
SAO2 % BLDV: 92.4 % — LOW (ref 94–98)
SARS-COV-2 RNA SPEC QL NAA+PROBE: SIGNIFICANT CHANGE UP
SODIUM SERPL-SCNC: 138 MMOL/L — SIGNIFICANT CHANGE UP (ref 135–145)
SP GR SPEC: 1.01 — SIGNIFICANT CHANGE UP (ref 1.01–1.02)
SPECIMEN SOURCE: SIGNIFICANT CHANGE UP
SPECIMEN SOURCE: SIGNIFICANT CHANGE UP
TROPONIN I, HIGH SENSITIVITY RESULT: 5.9 NG/L — SIGNIFICANT CHANGE UP
UROBILINOGEN FLD QL: NEGATIVE MG/DL — SIGNIFICANT CHANGE UP
WBC # BLD: 7.76 K/UL — SIGNIFICANT CHANGE UP (ref 3.8–10.5)
WBC # FLD AUTO: 7.76 K/UL — SIGNIFICANT CHANGE UP (ref 3.8–10.5)
WBC UR QL: SIGNIFICANT CHANGE UP

## 2022-05-14 PROCEDURE — 71045 X-RAY EXAM CHEST 1 VIEW: CPT | Mod: 26

## 2022-05-14 PROCEDURE — 93010 ELECTROCARDIOGRAM REPORT: CPT

## 2022-05-14 PROCEDURE — 74177 CT ABD & PELVIS W/CONTRAST: CPT | Mod: 26,MA

## 2022-05-14 PROCEDURE — 99285 EMERGENCY DEPT VISIT HI MDM: CPT

## 2022-05-14 RX ORDER — DIPHENHYDRAMINE HCL 50 MG
25 CAPSULE ORAL ONCE
Refills: 0 | Status: COMPLETED | OUTPATIENT
Start: 2022-05-14 | End: 2022-05-14

## 2022-05-14 RX ORDER — HYDROMORPHONE HYDROCHLORIDE 2 MG/ML
1 INJECTION INTRAMUSCULAR; INTRAVENOUS; SUBCUTANEOUS ONCE
Refills: 0 | Status: DISCONTINUED | OUTPATIENT
Start: 2022-05-14 | End: 2022-05-14

## 2022-05-14 RX ORDER — METOCLOPRAMIDE HCL 10 MG
10 TABLET ORAL ONCE
Refills: 0 | Status: COMPLETED | OUTPATIENT
Start: 2022-05-14 | End: 2022-05-14

## 2022-05-14 RX ORDER — SODIUM CHLORIDE 9 MG/ML
2000 INJECTION INTRAMUSCULAR; INTRAVENOUS; SUBCUTANEOUS ONCE
Refills: 0 | Status: COMPLETED | OUTPATIENT
Start: 2022-05-14 | End: 2022-05-14

## 2022-05-14 RX ADMIN — Medication 10 MILLIGRAM(S): at 02:51

## 2022-05-14 RX ADMIN — HYDROMORPHONE HYDROCHLORIDE 1 MILLIGRAM(S): 2 INJECTION INTRAMUSCULAR; INTRAVENOUS; SUBCUTANEOUS at 03:55

## 2022-05-14 RX ADMIN — Medication 25 MILLIGRAM(S): at 02:51

## 2022-05-14 RX ADMIN — SODIUM CHLORIDE 2000 MILLILITER(S): 9 INJECTION INTRAMUSCULAR; INTRAVENOUS; SUBCUTANEOUS at 02:51

## 2022-05-14 RX ADMIN — Medication 25 MILLIGRAM(S): at 07:02

## 2022-05-14 RX ADMIN — HYDROMORPHONE HYDROCHLORIDE 1 MILLIGRAM(S): 2 INJECTION INTRAMUSCULAR; INTRAVENOUS; SUBCUTANEOUS at 02:52

## 2022-05-14 RX ADMIN — HYDROMORPHONE HYDROCHLORIDE 1 MILLIGRAM(S): 2 INJECTION INTRAMUSCULAR; INTRAVENOUS; SUBCUTANEOUS at 07:02

## 2022-05-14 NOTE — ED ADULT NURSE NOTE - OBJECTIVE STATEMENT
AOx3, ambulatory. pt came in today complaining of lower abdominal pain. pt states the abdominal pain started a couple of hours ago. pt was seen at [facility] recently for the same thing. pt describes the abdominal pain as stabbing, constant, pt rates the pain a 9/10. pt hasn't taken anything for the pain. pt experiencing N/V, no diarrhea. pts abdomen is tender to touch.

## 2022-05-14 NOTE — ED PROVIDER NOTE - PHYSICAL EXAMINATION
VITAL SIGNS: I have reviewed nursing notes and confirm.  CONSTITUTIONAL: well-appearing, non-toxic  SKIN: Warm dry, normal skin turgor  HEAD: NCAT  EYES: EOMI, PERRLA, no scleral icterus  ENT: Moist mucous membranes, normal pharynx with no erythema or exudates  NECK: Supple; non tender. Full ROM. No cervical LAD  CARD: RRR, no murmurs, rubs or gallops  RESP: clear to ausculation b/l.  No rales, rhonchi, or wheezing.  ABD: soft, + BS, LLQ ttp, non-distended, no rebound or guarding. No CVA tenderness  EXT: Full ROM, no bony tenderness, no pedal edema, no calf tenderness  NEURO: normal motor. normal sensory. non-focal   PSYCH: Cooperative, appropriate.

## 2022-05-14 NOTE — ED PROVIDER NOTE - PATIENT PORTAL LINK FT
You can access the FollowMyHealth Patient Portal offered by [facility] by registering at the following website: [email or web address]. By joining [employer]’s FollowMyHealth portal, you will also be able to view your health information using other applications (apps) compatible with our system.

## 2022-05-14 NOTE — ED ADULT NURSE NOTE - CHIEF COMPLAINT QUOTE
As per EMS pt with abdominal pain and vomiting. Was seen at [facility] yesterday for same complaint.

## 2022-05-14 NOTE — ED ADULT NURSE NOTE - ED STAT RN HANDOFF DETAILS
Handoff report given to GORDON Crespo. RN made aware of pts current condition/plan for discharge. pt resting comfortably at this time, doesn't appear to be in any acute distress. pt baseline mental status

## 2022-05-14 NOTE — ED PROVIDER NOTE - OBJECTIVE STATEMENT
31 yo F w/ hx T1DM w/ gastroparesis A1c 10.4, HTN, hx of dilaudid addiction who presents with nausea, vomiting (NBNB), and abd pain recently left AMA from [facility] on 5/12 presenting with nausea, nbnb emesis and acute on chronic abdominal pain, LLQ non-radiating w/o associated fevers/chills, diarrhea, passing gas, normal bowel movements. Patient w/ hx of DKA on recent admission. denies other complaints.

## 2022-05-14 NOTE — ED PROVIDER NOTE - NS ED ROS FT
Constitutional: See HPI.  Eyes: No visual changes, eye pain or discharge. No Photophobia  ENMT: No hearing changes, pain, discharge or infections. No neck pain or stiffness. No limited ROM  Cardiac: No SOB or edema. No chest pain with exertion.  Respiratory: No cough or respiratory distress.   GI: see hpi   : No dysuria, frequency or burning. No Discharge  MS: No myalgia, muscle weakness, joint pain or back pain.  Neuro: No headache or weakness. No LOC.  Skin: No skin rash.  Except as documented in the HPI, all other systems are negative.
yes

## 2022-05-14 NOTE — ED ADULT TRIAGE NOTE - CHIEF COMPLAINT QUOTE
As per EMS pt with abdominal pain and vomiting. Was seen at [facility] yesterday for same complaint.

## 2022-05-16 ENCOUNTER — EMERGENCY (EMERGENCY)
Facility: HOSPITAL | Age: 32
LOS: 0 days | Discharge: HOME | End: 2022-05-16
Attending: STUDENT IN AN ORGANIZED HEALTH CARE EDUCATION/TRAINING PROGRAM
Payer: COMMERCIAL

## 2022-05-16 ENCOUNTER — EMERGENCY (EMERGENCY)
Facility: HOSPITAL | Age: 32
LOS: 0 days | Discharge: HOME | End: 2022-05-17
Attending: STUDENT IN AN ORGANIZED HEALTH CARE EDUCATION/TRAINING PROGRAM
Payer: COMMERCIAL

## 2022-05-16 VITALS
RESPIRATION RATE: 18 BRPM | DIASTOLIC BLOOD PRESSURE: 71 MMHG | TEMPERATURE: 98 F | SYSTOLIC BLOOD PRESSURE: 123 MMHG | HEART RATE: 86 BPM | OXYGEN SATURATION: 99 %

## 2022-05-16 VITALS
SYSTOLIC BLOOD PRESSURE: 134 MMHG | DIASTOLIC BLOOD PRESSURE: 86 MMHG | WEIGHT: 139.99 LBS | OXYGEN SATURATION: 99 % | HEART RATE: 98 BPM | HEIGHT: 66 IN | RESPIRATION RATE: 16 BRPM | TEMPERATURE: 98 F

## 2022-05-16 VITALS
SYSTOLIC BLOOD PRESSURE: 135 MMHG | TEMPERATURE: 98 F | HEART RATE: 104 BPM | OXYGEN SATURATION: 99 % | RESPIRATION RATE: 19 BRPM | HEIGHT: 66 IN | DIASTOLIC BLOOD PRESSURE: 81 MMHG | WEIGHT: 139.99 LBS

## 2022-05-16 DIAGNOSIS — Z90.49 ACQUIRED ABSENCE OF OTHER SPECIFIED PARTS OF DIGESTIVE TRACT: Chronic | ICD-10-CM

## 2022-05-16 DIAGNOSIS — R10.32 LEFT LOWER QUADRANT PAIN: ICD-10-CM

## 2022-05-16 DIAGNOSIS — G89.29 OTHER CHRONIC PAIN: ICD-10-CM

## 2022-05-16 DIAGNOSIS — R10.9 UNSPECIFIED ABDOMINAL PAIN: ICD-10-CM

## 2022-05-16 DIAGNOSIS — R11.2 NAUSEA WITH VOMITING, UNSPECIFIED: ICD-10-CM

## 2022-05-16 DIAGNOSIS — Z90.49 ACQUIRED ABSENCE OF OTHER SPECIFIED PARTS OF DIGESTIVE TRACT: ICD-10-CM

## 2022-05-16 DIAGNOSIS — Z88.6 ALLERGY STATUS TO ANALGESIC AGENT: ICD-10-CM

## 2022-05-16 DIAGNOSIS — E10.43 TYPE 1 DIABETES MELLITUS WITH DIABETIC AUTONOMIC (POLY)NEUROPATHY: ICD-10-CM

## 2022-05-16 DIAGNOSIS — K31.84 GASTROPARESIS: ICD-10-CM

## 2022-05-16 DIAGNOSIS — I10 ESSENTIAL (PRIMARY) HYPERTENSION: ICD-10-CM

## 2022-05-16 DIAGNOSIS — R19.7 DIARRHEA, UNSPECIFIED: ICD-10-CM

## 2022-05-16 DIAGNOSIS — O00.9 ECTOPIC PREGNANCY, UNSPECIFIED: Chronic | ICD-10-CM

## 2022-05-16 LAB
ALBUMIN SERPL ELPH-MCNC: 2.7 G/DL — LOW (ref 3.3–5)
ALP SERPL-CCNC: 90 U/L — SIGNIFICANT CHANGE UP (ref 40–120)
ALT FLD-CCNC: 19 U/L — SIGNIFICANT CHANGE UP (ref 12–78)
ANION GAP SERPL CALC-SCNC: 8 MMOL/L — SIGNIFICANT CHANGE UP (ref 5–17)
AST SERPL-CCNC: 11 U/L — LOW (ref 15–37)
BASOPHILS # BLD AUTO: 0.02 K/UL — SIGNIFICANT CHANGE UP (ref 0–0.2)
BASOPHILS NFR BLD AUTO: 0.2 % — SIGNIFICANT CHANGE UP (ref 0–2)
BILIRUB SERPL-MCNC: 0.3 MG/DL — SIGNIFICANT CHANGE UP (ref 0.2–1.2)
BUN SERPL-MCNC: 17 MG/DL — SIGNIFICANT CHANGE UP (ref 7–23)
CALCIUM SERPL-MCNC: 9 MG/DL — SIGNIFICANT CHANGE UP (ref 8.5–10.1)
CHLORIDE SERPL-SCNC: 104 MMOL/L — SIGNIFICANT CHANGE UP (ref 96–108)
CO2 SERPL-SCNC: 27 MMOL/L — SIGNIFICANT CHANGE UP (ref 22–31)
CREAT SERPL-MCNC: 0.86 MG/DL — SIGNIFICANT CHANGE UP (ref 0.5–1.3)
EGFR: 92 ML/MIN/1.73M2 — SIGNIFICANT CHANGE UP
ELLIPTOCYTES BLD QL SMEAR: SLIGHT — SIGNIFICANT CHANGE UP
EOSINOPHIL # BLD AUTO: 0.25 K/UL — SIGNIFICANT CHANGE UP (ref 0–0.5)
EOSINOPHIL NFR BLD AUTO: 2.8 % — SIGNIFICANT CHANGE UP (ref 0–6)
GLUCOSE BLDC GLUCOMTR-MCNC: 318 MG/DL — HIGH (ref 70–99)
GLUCOSE BLDC GLUCOMTR-MCNC: 322 MG/DL — HIGH (ref 70–99)
GLUCOSE BLDC GLUCOMTR-MCNC: 334 MG/DL — HIGH (ref 70–99)
GLUCOSE BLDC GLUCOMTR-MCNC: 375 MG/DL — HIGH (ref 70–99)
GLUCOSE SERPL-MCNC: 149 MG/DL — HIGH (ref 70–99)
HCT VFR BLD CALC: 30.8 % — LOW (ref 34.5–45)
HGB BLD-MCNC: 9.2 G/DL — LOW (ref 11.5–15.5)
HYPOCHROMIA BLD QL: SIGNIFICANT CHANGE UP
IMM GRANULOCYTES NFR BLD AUTO: 0.3 % — SIGNIFICANT CHANGE UP (ref 0–1.5)
LIDOCAIN IGE QN: 43 U/L — LOW (ref 73–393)
LYMPHOCYTES # BLD AUTO: 2.43 K/UL — SIGNIFICANT CHANGE UP (ref 1–3.3)
LYMPHOCYTES # BLD AUTO: 27.2 % — SIGNIFICANT CHANGE UP (ref 13–44)
MANUAL SMEAR VERIFICATION: YES — SIGNIFICANT CHANGE UP
MCHC RBC-ENTMCNC: 18.3 PG — LOW (ref 27–34)
MCHC RBC-ENTMCNC: 29.9 G/DL — LOW (ref 32–36)
MCV RBC AUTO: 61.4 FL — LOW (ref 80–100)
MICROCYTES BLD QL: SIGNIFICANT CHANGE UP
MONOCYTES # BLD AUTO: 1 K/UL — HIGH (ref 0–0.9)
MONOCYTES NFR BLD AUTO: 11.2 % — SIGNIFICANT CHANGE UP (ref 2–14)
NEUTROPHILS # BLD AUTO: 5.21 K/UL — SIGNIFICANT CHANGE UP (ref 1.8–7.4)
NEUTROPHILS NFR BLD AUTO: 58.3 % — SIGNIFICANT CHANGE UP (ref 43–77)
NRBC # BLD: 0 /100 WBCS — SIGNIFICANT CHANGE UP (ref 0–0)
OVALOCYTES BLD QL SMEAR: SIGNIFICANT CHANGE UP
PLAT MORPH BLD: NORMAL — SIGNIFICANT CHANGE UP
PLATELET # BLD AUTO: 392 K/UL — SIGNIFICANT CHANGE UP (ref 150–400)
POLYCHROMASIA BLD QL SMEAR: SLIGHT — SIGNIFICANT CHANGE UP
POTASSIUM SERPL-MCNC: 3.5 MMOL/L — SIGNIFICANT CHANGE UP (ref 3.5–5.3)
POTASSIUM SERPL-SCNC: 3.5 MMOL/L — SIGNIFICANT CHANGE UP (ref 3.5–5.3)
PROT SERPL-MCNC: 6.6 GM/DL — SIGNIFICANT CHANGE UP (ref 6–8.3)
RBC # BLD: 5.02 M/UL — SIGNIFICANT CHANGE UP (ref 3.8–5.2)
RBC # FLD: 19.2 % — HIGH (ref 10.3–14.5)
RBC BLD AUTO: ABNORMAL
SODIUM SERPL-SCNC: 139 MMOL/L — SIGNIFICANT CHANGE UP (ref 135–145)
TARGETS BLD QL SMEAR: SIGNIFICANT CHANGE UP
WBC # BLD: 8.94 K/UL — SIGNIFICANT CHANGE UP (ref 3.8–10.5)
WBC # FLD AUTO: 8.94 K/UL — SIGNIFICANT CHANGE UP (ref 3.8–10.5)

## 2022-05-16 PROCEDURE — 99284 EMERGENCY DEPT VISIT MOD MDM: CPT

## 2022-05-16 PROCEDURE — 99291 CRITICAL CARE FIRST HOUR: CPT

## 2022-05-16 RX ORDER — DIPHENHYDRAMINE HCL 50 MG
25 CAPSULE ORAL ONCE
Refills: 0 | Status: COMPLETED | OUTPATIENT
Start: 2022-05-16 | End: 2022-05-16

## 2022-05-16 RX ORDER — ONDANSETRON 8 MG/1
4 TABLET, FILM COATED ORAL ONCE
Refills: 0 | Status: COMPLETED | OUTPATIENT
Start: 2022-05-16 | End: 2022-05-16

## 2022-05-16 RX ORDER — SODIUM CHLORIDE 9 MG/ML
1000 INJECTION INTRAMUSCULAR; INTRAVENOUS; SUBCUTANEOUS ONCE
Refills: 0 | Status: COMPLETED | OUTPATIENT
Start: 2022-05-16 | End: 2022-05-16

## 2022-05-16 RX ORDER — HYDROMORPHONE HYDROCHLORIDE 2 MG/ML
1 INJECTION INTRAMUSCULAR; INTRAVENOUS; SUBCUTANEOUS ONCE
Refills: 0 | Status: DISCONTINUED | OUTPATIENT
Start: 2022-05-16 | End: 2022-05-16

## 2022-05-16 RX ADMIN — HYDROMORPHONE HYDROCHLORIDE 1 MILLIGRAM(S): 2 INJECTION INTRAMUSCULAR; INTRAVENOUS; SUBCUTANEOUS at 03:52

## 2022-05-16 RX ADMIN — ONDANSETRON 4 MILLIGRAM(S): 8 TABLET, FILM COATED ORAL at 03:52

## 2022-05-16 RX ADMIN — Medication 25 MILLIGRAM(S): at 04:59

## 2022-05-16 RX ADMIN — HYDROMORPHONE HYDROCHLORIDE 1 MILLIGRAM(S): 2 INJECTION INTRAMUSCULAR; INTRAVENOUS; SUBCUTANEOUS at 04:25

## 2022-05-16 RX ADMIN — Medication 25 MILLIGRAM(S): at 03:52

## 2022-05-16 RX ADMIN — SODIUM CHLORIDE 1000 MILLILITER(S): 9 INJECTION INTRAMUSCULAR; INTRAVENOUS; SUBCUTANEOUS at 03:51

## 2022-05-16 NOTE — ED ADULT TRIAGE NOTE - CHIEF COMPLAINT QUOTE
BIBA abd pain, n/v LMP 3 weeks ago. denies smoking marijuana, denies alcohol use. F/S 375 and 334. took insulin earlier as per EMS. Hx tyle 1 DM

## 2022-05-16 NOTE — ED ADULT NURSE REASSESSMENT NOTE - NS ED NURSE REASSESS COMMENT FT1
patient is A&Ox4. Breathing unlabored on RA. patient resting comfortably in stretcher. No acute or respiratory distress noted. Patient reports partial relief of pain, 6/10. NS IVF infusing as ordered. Awaiting test results. Fall and safety precautions maintained.

## 2022-05-16 NOTE — ED PROVIDER NOTE - PHYSICAL EXAMINATION
VITAL SIGNS: I have reviewed nursing notes and confirm.  CONSTITUTIONAL: well-appearing, non-toxic  SKIN: Warm dry, normal skin turgor  HEAD: NCAT  EYES: EOMI, PERRLA, no scleral icterus  ENT: Moist mucous membranes, normal pharynx with no erythema or exudates  NECK: Supple; non tender. Full ROM.   CARD: RRR, no murmurs, rubs or gallops  RESP: clear to ausculation b/l.  No rales, rhonchi, or wheezing.  ABD: soft, + BS, non-tender, non-distended, no rebound or guarding. No CVA tenderness  EXT: Full ROM, no bony tenderness, no pedal edema, no calf tenderness  NEURO: normal motor. normal sensory. Normal gait.  PSYCH: Cooperative, appropriate.

## 2022-05-16 NOTE — ED ADULT NURSE NOTE - NSIMPLEMENTINTERV_GEN_ALL_ED
Implemented All Universal Safety Interventions:  [place] to call system. Call bell, personal items and telephone within reach. Instruct patient to call for assistance. Room bathroom lighting operational. Non-slip footwear when patient is off stretcher. Physically safe environment: no spills, clutter or unnecessary equipment. Stretcher in lowest position, wheels locked, appropriate side rails in place.

## 2022-05-16 NOTE — ED ADULT NURSE NOTE - OBJECTIVE STATEMENT
patient is A&Ox4. Breathing unlabored on RA. Complaining of LLQ and RLQ abdominal pain. Denies radiation of abdominal pain. Complaining of nausea and vomiting. Reports 6 episodes of vomiting today. patient is A&Ox4. Breathing unlabored on RA. Complaining of LLQ and RLQ abdominal pain. Denies radiation of abdominal pain. Complaining of nausea and vomiting. Reports 6 episodes of vomiting today. States the vomiting gets worsen after eating and drinking. States she is unable to eat and drink due to vomiting. Denies chest pain, fever/chills, numbness, tingling, diarrhea, constipation. Denies any other symptoms at this time. States she was in the hospital twice this week due to the pain. Reports being referred to pain management. PMH DM type I, HTN, colitis. Reports smoking tobacco x10 years (2 packs a week). Reports stopped smoking marijuana 3 weeks ago. LMP 4/19. denies pregnancy.

## 2022-05-16 NOTE — ED ADULT NURSE REASSESSMENT NOTE - NS ED NURSE REASSESS COMMENT FT1
Attempted to obtain IV access. Unable to obtain IV access at this time. patient states having ultrasound IV in her pervious hospital visits. Dr. Smith notified.

## 2022-05-16 NOTE — ED ADULT NURSE NOTE - DOES PATIENT HAVE ADVANCE DIRECTIVE
Writer spoke with patient. He is aware of positive results and states he is still not feeling well. Patient still not ready to schedule for monoclonal treatment. Patient states he feels hesitant based on literature he has read. Patient advised on managing fever and increasing fluids. No other questions at this time. No

## 2022-05-16 NOTE — ED ADULT TRIAGE NOTE - CHIEF COMPLAINT QUOTE
hx of HTN, colitis  and DM PW Lower ABD pain x today. reports in ED for same recently unable to get in contact with pain management. LMP 3 weeks ago, LBM yesterday

## 2022-05-16 NOTE — ED ADULT NURSE NOTE - OBJECTIVE STATEMENT
Received pt in ED, AOx3, from triage. C/o abdominal pain. Pt noted lying on the bed, endorsed abdominal pain for 1 week , worsening last 2 days. Pt stated, " It is so much pain I just want to end it" . Pt does not have any plans. Pt is on constant observation. EDT at bedside for constant observation. Pt noted tearful. yesterday Pt was seen on the ED for similar s/s and got discharged

## 2022-05-16 NOTE — ED ADULT NURSE REASSESSMENT NOTE - NS ED NURSE REASSESS COMMENT FT1
Patient complaining of itchiness due to Dilaudid. Dr. Smith notified. Patient to receive Benadryl for pruritus. As per Dr. Smith, patient ok to discharge after Benadryl administration. As per Dr. Smith repeat FS not needed before discharge. patient breathing unlabored on RA. No acute or respiratory distress noted. No further concerns at this time. Fall and safety precautions maintained.

## 2022-05-16 NOTE — ED ADULT NURSE REASSESSMENT NOTE - NS ED NURSE REASSESS COMMENT FT1
patient is A&Ox4. Breathing unlabored on RA. Benadryl IV administered as ordered. patient reports partial relief of pain, 4/10. patient to be discharged home. Patient seen ambulating independently, steady gait. No acute or respiratory distress noted at this time. VS stable on RA.

## 2022-05-17 ENCOUNTER — EMERGENCY (EMERGENCY)
Facility: HOSPITAL | Age: 32
LOS: 0 days | Discharge: HOME | End: 2022-05-18
Attending: STUDENT IN AN ORGANIZED HEALTH CARE EDUCATION/TRAINING PROGRAM
Payer: COMMERCIAL

## 2022-05-17 VITALS
TEMPERATURE: 99 F | HEART RATE: 107 BPM | SYSTOLIC BLOOD PRESSURE: 132 MMHG | RESPIRATION RATE: 18 BRPM | OXYGEN SATURATION: 97 % | DIASTOLIC BLOOD PRESSURE: 73 MMHG

## 2022-05-17 VITALS
RESPIRATION RATE: 17 BRPM | HEART RATE: 100 BPM | TEMPERATURE: 99 F | WEIGHT: 139.99 LBS | OXYGEN SATURATION: 96 % | HEIGHT: 66 IN | DIASTOLIC BLOOD PRESSURE: 78 MMHG | SYSTOLIC BLOOD PRESSURE: 118 MMHG

## 2022-05-17 DIAGNOSIS — R10.9 UNSPECIFIED ABDOMINAL PAIN: ICD-10-CM

## 2022-05-17 DIAGNOSIS — O00.9 ECTOPIC PREGNANCY, UNSPECIFIED: Chronic | ICD-10-CM

## 2022-05-17 DIAGNOSIS — K31.84 GASTROPARESIS: ICD-10-CM

## 2022-05-17 DIAGNOSIS — Z90.49 ACQUIRED ABSENCE OF OTHER SPECIFIED PARTS OF DIGESTIVE TRACT: Chronic | ICD-10-CM

## 2022-05-17 DIAGNOSIS — I10 ESSENTIAL (PRIMARY) HYPERTENSION: ICD-10-CM

## 2022-05-17 DIAGNOSIS — K52.9 NONINFECTIVE GASTROENTERITIS AND COLITIS, UNSPECIFIED: ICD-10-CM

## 2022-05-17 DIAGNOSIS — R11.2 NAUSEA WITH VOMITING, UNSPECIFIED: ICD-10-CM

## 2022-05-17 DIAGNOSIS — Z88.6 ALLERGY STATUS TO ANALGESIC AGENT: ICD-10-CM

## 2022-05-17 DIAGNOSIS — Z98.890 OTHER SPECIFIED POSTPROCEDURAL STATES: ICD-10-CM

## 2022-05-17 DIAGNOSIS — E11.43 TYPE 2 DIABETES MELLITUS WITH DIABETIC AUTONOMIC (POLY)NEUROPATHY: ICD-10-CM

## 2022-05-17 LAB
ACETONE SERPL-MCNC: ABNORMAL
ALBUMIN SERPL ELPH-MCNC: 2.8 G/DL — LOW (ref 3.3–5)
ALP SERPL-CCNC: 82 U/L — SIGNIFICANT CHANGE UP (ref 40–120)
ALT FLD-CCNC: 14 U/L — SIGNIFICANT CHANGE UP (ref 12–78)
ANION GAP SERPL CALC-SCNC: 13 MMOL/L — SIGNIFICANT CHANGE UP (ref 5–17)
APPEARANCE UR: CLEAR — SIGNIFICANT CHANGE UP
APTT BLD: 24.9 SEC — LOW (ref 27.5–35.5)
AST SERPL-CCNC: 18 U/L — SIGNIFICANT CHANGE UP (ref 15–37)
B-OH-BUTYR SERPL-SCNC: 1.9 MMOL/L — HIGH
BACTERIA # UR AUTO: ABNORMAL
BASOPHILS # BLD AUTO: 0.04 K/UL — SIGNIFICANT CHANGE UP (ref 0–0.2)
BASOPHILS NFR BLD AUTO: 0.5 % — SIGNIFICANT CHANGE UP (ref 0–2)
BILIRUB SERPL-MCNC: 0.5 MG/DL — SIGNIFICANT CHANGE UP (ref 0.2–1.2)
BILIRUB UR-MCNC: NEGATIVE — SIGNIFICANT CHANGE UP
BUN SERPL-MCNC: 15 MG/DL — SIGNIFICANT CHANGE UP (ref 7–23)
CALCIUM SERPL-MCNC: 9.2 MG/DL — SIGNIFICANT CHANGE UP (ref 8.5–10.1)
CHLORIDE SERPL-SCNC: 100 MMOL/L — SIGNIFICANT CHANGE UP (ref 96–108)
CO2 SERPL-SCNC: 22 MMOL/L — SIGNIFICANT CHANGE UP (ref 22–31)
COLOR SPEC: YELLOW — SIGNIFICANT CHANGE UP
CREAT SERPL-MCNC: 0.86 MG/DL — SIGNIFICANT CHANGE UP (ref 0.5–1.3)
DIFF PNL FLD: ABNORMAL
EGFR: 92 ML/MIN/1.73M2 — SIGNIFICANT CHANGE UP
EOSINOPHIL # BLD AUTO: 0.09 K/UL — SIGNIFICANT CHANGE UP (ref 0–0.5)
EOSINOPHIL NFR BLD AUTO: 1.2 % — SIGNIFICANT CHANGE UP (ref 0–6)
EPI CELLS # UR: SIGNIFICANT CHANGE UP
GLUCOSE BLDC GLUCOMTR-MCNC: 209 MG/DL — HIGH (ref 70–99)
GLUCOSE BLDC GLUCOMTR-MCNC: 431 MG/DL — HIGH (ref 70–99)
GLUCOSE BLDC GLUCOMTR-MCNC: 458 MG/DL — CRITICAL HIGH (ref 70–99)
GLUCOSE SERPL-MCNC: 490 MG/DL — CRITICAL HIGH (ref 70–99)
GLUCOSE UR QL: 1000 MG/DL
HCG SERPL-ACNC: <1 MIU/ML — SIGNIFICANT CHANGE UP
HCT VFR BLD CALC: 30.3 % — LOW (ref 34.5–45)
HGB BLD-MCNC: 9.1 G/DL — LOW (ref 11.5–15.5)
IMM GRANULOCYTES NFR BLD AUTO: 0.1 % — SIGNIFICANT CHANGE UP (ref 0–1.5)
INR BLD: 0.94 RATIO — SIGNIFICANT CHANGE UP (ref 0.88–1.16)
KETONES UR-MCNC: ABNORMAL
LEUKOCYTE ESTERASE UR-ACNC: NEGATIVE — SIGNIFICANT CHANGE UP
LIDOCAIN IGE QN: 50 U/L — LOW (ref 73–393)
LYMPHOCYTES # BLD AUTO: 1.64 K/UL — SIGNIFICANT CHANGE UP (ref 1–3.3)
LYMPHOCYTES # BLD AUTO: 21.5 % — SIGNIFICANT CHANGE UP (ref 13–44)
MCHC RBC-ENTMCNC: 18.1 PG — LOW (ref 27–34)
MCHC RBC-ENTMCNC: 30 G/DL — LOW (ref 32–36)
MCV RBC AUTO: 60.1 FL — LOW (ref 80–100)
MONOCYTES # BLD AUTO: 0.6 K/UL — SIGNIFICANT CHANGE UP (ref 0–0.9)
MONOCYTES NFR BLD AUTO: 7.9 % — SIGNIFICANT CHANGE UP (ref 2–14)
NEUTROPHILS # BLD AUTO: 5.26 K/UL — SIGNIFICANT CHANGE UP (ref 1.8–7.4)
NEUTROPHILS NFR BLD AUTO: 68.8 % — SIGNIFICANT CHANGE UP (ref 43–77)
NITRITE UR-MCNC: NEGATIVE — SIGNIFICANT CHANGE UP
NRBC # BLD: 0 /100 WBCS — SIGNIFICANT CHANGE UP (ref 0–0)
PH UR: 6 — SIGNIFICANT CHANGE UP (ref 5–8)
PLATELET # BLD AUTO: SIGNIFICANT CHANGE UP K/UL (ref 150–400)
POTASSIUM SERPL-MCNC: 4.1 MMOL/L — SIGNIFICANT CHANGE UP (ref 3.5–5.3)
POTASSIUM SERPL-SCNC: 4.1 MMOL/L — SIGNIFICANT CHANGE UP (ref 3.5–5.3)
PROT SERPL-MCNC: 6.6 GM/DL — SIGNIFICANT CHANGE UP (ref 6–8.3)
PROT UR-MCNC: 500 MG/DL
PROTHROM AB SERPL-ACNC: 11.2 SEC — SIGNIFICANT CHANGE UP (ref 10.5–13.4)
RBC # BLD: 5.04 M/UL — SIGNIFICANT CHANGE UP (ref 3.8–5.2)
RBC # FLD: 19.1 % — HIGH (ref 10.3–14.5)
RBC CASTS # UR COMP ASSIST: ABNORMAL /HPF (ref 0–4)
SODIUM SERPL-SCNC: 135 MMOL/L — SIGNIFICANT CHANGE UP (ref 135–145)
SP GR SPEC: 1.02 — SIGNIFICANT CHANGE UP (ref 1.01–1.02)
UROBILINOGEN FLD QL: NEGATIVE MG/DL — SIGNIFICANT CHANGE UP
WBC # BLD: 7.64 K/UL — SIGNIFICANT CHANGE UP (ref 3.8–10.5)
WBC # FLD AUTO: 7.64 K/UL — SIGNIFICANT CHANGE UP (ref 3.8–10.5)
WBC UR QL: SIGNIFICANT CHANGE UP

## 2022-05-17 PROCEDURE — 76830 TRANSVAGINAL US NON-OB: CPT | Mod: 26

## 2022-05-17 PROCEDURE — 99285 EMERGENCY DEPT VISIT HI MDM: CPT

## 2022-05-17 RX ORDER — HYDROMORPHONE HYDROCHLORIDE 2 MG/ML
1 INJECTION INTRAMUSCULAR; INTRAVENOUS; SUBCUTANEOUS ONCE
Refills: 0 | Status: DISCONTINUED | OUTPATIENT
Start: 2022-05-17 | End: 2022-05-17

## 2022-05-17 RX ORDER — DIPHENHYDRAMINE HCL 50 MG
25 CAPSULE ORAL ONCE
Refills: 0 | Status: COMPLETED | OUTPATIENT
Start: 2022-05-17 | End: 2022-05-17

## 2022-05-17 RX ORDER — SODIUM CHLORIDE 9 MG/ML
2000 INJECTION, SOLUTION INTRAVENOUS ONCE
Refills: 0 | Status: COMPLETED | OUTPATIENT
Start: 2022-05-17 | End: 2022-05-17

## 2022-05-17 RX ORDER — INSULIN HUMAN 100 [IU]/ML
10 INJECTION, SOLUTION SUBCUTANEOUS ONCE
Refills: 0 | Status: COMPLETED | OUTPATIENT
Start: 2022-05-17 | End: 2022-05-17

## 2022-05-17 RX ORDER — DIPHENHYDRAMINE HCL 50 MG
50 CAPSULE ORAL ONCE
Refills: 0 | Status: COMPLETED | OUTPATIENT
Start: 2022-05-17 | End: 2022-05-17

## 2022-05-17 RX ORDER — SODIUM CHLORIDE 9 MG/ML
2000 INJECTION, SOLUTION INTRAVENOUS ONCE
Refills: 0 | Status: DISCONTINUED | OUTPATIENT
Start: 2022-05-17 | End: 2022-05-17

## 2022-05-17 RX ORDER — INSULIN HUMAN 100 [IU]/ML
10 INJECTION, SOLUTION SUBCUTANEOUS ONCE
Refills: 0 | Status: DISCONTINUED | OUTPATIENT
Start: 2022-05-17 | End: 2022-05-17

## 2022-05-17 RX ORDER — METOCLOPRAMIDE HCL 10 MG
10 TABLET ORAL ONCE
Refills: 0 | Status: COMPLETED | OUTPATIENT
Start: 2022-05-17 | End: 2022-05-17

## 2022-05-17 RX ORDER — ONDANSETRON 8 MG/1
1 TABLET, FILM COATED ORAL
Qty: 30 | Refills: 0
Start: 2022-05-17 | End: 2022-05-26

## 2022-05-17 RX ORDER — HYDROMORPHONE HYDROCHLORIDE 2 MG/ML
2 INJECTION INTRAMUSCULAR; INTRAVENOUS; SUBCUTANEOUS ONCE
Refills: 0 | Status: DISCONTINUED | OUTPATIENT
Start: 2022-05-17 | End: 2022-05-17

## 2022-05-17 RX ORDER — SODIUM CHLORIDE 9 MG/ML
1000 INJECTION, SOLUTION INTRAVENOUS ONCE
Refills: 0 | Status: COMPLETED | OUTPATIENT
Start: 2022-05-17 | End: 2022-05-17

## 2022-05-17 RX ADMIN — Medication 10 MILLIGRAM(S): at 02:15

## 2022-05-17 RX ADMIN — SODIUM CHLORIDE 1000 MILLILITER(S): 9 INJECTION, SOLUTION INTRAVENOUS at 03:53

## 2022-05-17 RX ADMIN — INSULIN HUMAN 10 UNIT(S): 100 INJECTION, SOLUTION SUBCUTANEOUS at 03:43

## 2022-05-17 RX ADMIN — Medication 50 MILLIGRAM(S): at 02:27

## 2022-05-17 RX ADMIN — Medication 25 MILLIGRAM(S): at 06:00

## 2022-05-17 RX ADMIN — HYDROMORPHONE HYDROCHLORIDE 2 MILLIGRAM(S): 2 INJECTION INTRAMUSCULAR; INTRAVENOUS; SUBCUTANEOUS at 03:25

## 2022-05-17 RX ADMIN — HYDROMORPHONE HYDROCHLORIDE 2 MILLIGRAM(S): 2 INJECTION INTRAMUSCULAR; INTRAVENOUS; SUBCUTANEOUS at 02:16

## 2022-05-17 RX ADMIN — Medication 50 MILLIGRAM(S): at 03:43

## 2022-05-17 RX ADMIN — HYDROMORPHONE HYDROCHLORIDE 1 MILLIGRAM(S): 2 INJECTION INTRAMUSCULAR; INTRAVENOUS; SUBCUTANEOUS at 06:48

## 2022-05-17 RX ADMIN — SODIUM CHLORIDE 2000 MILLILITER(S): 9 INJECTION, SOLUTION INTRAVENOUS at 03:38

## 2022-05-17 RX ADMIN — HYDROMORPHONE HYDROCHLORIDE 1 MILLIGRAM(S): 2 INJECTION INTRAMUSCULAR; INTRAVENOUS; SUBCUTANEOUS at 05:19

## 2022-05-17 RX ADMIN — HYDROMORPHONE HYDROCHLORIDE 1 MILLIGRAM(S): 2 INJECTION INTRAMUSCULAR; INTRAVENOUS; SUBCUTANEOUS at 06:00

## 2022-05-17 RX ADMIN — SODIUM CHLORIDE 1000 MILLILITER(S): 9 INJECTION, SOLUTION INTRAVENOUS at 02:15

## 2022-05-17 RX ADMIN — SODIUM CHLORIDE 2000 MILLILITER(S): 9 INJECTION, SOLUTION INTRAVENOUS at 05:20

## 2022-05-17 RX ADMIN — HYDROMORPHONE HYDROCHLORIDE 1 MILLIGRAM(S): 2 INJECTION INTRAMUSCULAR; INTRAVENOUS; SUBCUTANEOUS at 03:44

## 2022-05-17 NOTE — ED PROVIDER NOTE - CLINICAL SUMMARY MEDICAL DECISION MAKING FREE TEXT BOX
This patient w/ nausea/vomiting and epigastric abd pain is likely secondary to her chronic gastroparesis  . Considered the differential diagnosis but LOW risk for SBO (normal bowel movements, passing flatus, ), no signs of DKA on labs. Patient's BMP with normal electrolytes and no signs of dehydration causing prerenal KERRI. Low suspicion for gastric or esophageal dysmotility as a cause.  Patient w/ no chest pain,  thus LOW suspicion for ACS. Based on the history, exam, and work up, there is a low suspicion for pancreatitis, appendicitis, biliary pathology, C. Diff or other emergent problems.  PLAN:  - IVF, CBC/CMP, Lipase,  HCG,  UA/UCx, TV US for rule out torsion. normal  - Pain control PRN  - Cyclical Vomiting Syndrome: D5NS IV bolus for likely starvation ketosis, Magnesium sulfate 1-2 grams IV, zofran 4mg IVP, sumatriptan 6mg subcut, ketorolac 15mg IVP, famotidine 20mg IVP or protonix 40mg IVP. 2nd line: chlorpromazine 12.5-25mg IV, haldol 2.5-5mg IVP, olanzapine 2.5-5mg IV. Third line: Ketamine 10-20mg IVP +/- 20-30 mg infusion over 1 hour.

## 2022-05-17 NOTE — ED PROVIDER NOTE - NSFOLLOWUPINSTRUCTIONS_ED_ALL_ED_FT
COLITIS  Colitis is inflammation of the colon. Colitis may last a short time (be acute), or it may last a long time (become chronic).    What are the causes?  This condition may be caused by:  •Viruses.  •Bacteria.  •Reaction to medicine.  •Certain autoimmune diseases such as Crohn's disease or ulcerative colitis.    What are the signs or symptoms?  Symptoms of this condition include:  •Watery diarrhea.  •Passing bloody or tarry stool.  •Pain.  •Fever.  •Vomiting.  •Tiredness (fatigue).  •Weight loss.  •Bloating.  •Abdominal pain.  •Having fewer bowel movements than usual.  •A strong and sudden urge to have a bowel movement.  •Feeling like the bowel is not empty after a bowel movement.    HOW IS THIS CONDITION DIAGNOSED AND TREATED?  This condition is diagnosed with a stool test or a blood test.  You may also have other tests, such as:  •CT scan.  •Colonoscopy.  •Endoscopy.  •Biopsy.    How is this treated?  Treatment for this condition depends on the cause. The condition may be treated by:  •Resting the bowel. This involves not eating or drinking for a period of time.  •Fluids that are given through an IV.  •Medicine for pain and diarrhea.  •Antibiotic medicines.  •Cortisone medicines.  •Surgery.    FOLLOW THESE INSTRUCTIONS AT HOME  Eating and drinking   •Follow instructions from your health care provider about eating or drinking restrictions.  •Drink enough fluid to keep your urine pale yellow.  •Work with a dietitian to determine which foods cause your condition to flare up.  •Avoid foods that cause flare-ups.  •Eat a well-balanced diet.    General instructions   •If you were prescribed an antibiotic medicine, take it as told by your health care provider. Do not stop taking the antibiotic even if you start to feel better.  •Take over-the-counter and prescription medicines only as told by your health care provider.  •Keep all follow-up visits as told by your health care provider. This is important.    Contact a health care provider if:  •Your symptoms do not go away.  •You develop new symptoms.    Get help right away if you:  •Have a fever that does not go away with treatment.  •Develop chills  •Have extreme weakness, fainting, or dehydration.  •Have repeated vomiting.  •Develop severe pain in your abdomen.  •Pass bloody or tarry stool.    Summary  •Colitis is inflammation of the colon. Colitis may last a short time (be acute), or it may last a long time (become chronic).  •Treatment for this condition depends on the cause and may include resting the bowel, taking medicines, or having surgery.  •If you were prescribed an antibiotic medicine, take it as told by your health care provider. Do not stop taking the antibiotic even if you start to feel better.  •Get help right away if you develop severe pain in your abdomen.  •Keep all follow-up visits as told by your health care provider. This is important.    Advance activity as tolerated.  Continue all previously prescribed medications as directed unless otherwise instructed.  Follow up with your primary care physician in 48-72 hours- bring copies of your results.  Return to the ER for worsening or persistent symptoms, and/or ANY NEW OR CONCERNING SYMPTOMS. If you have issues obtaining follow up, please call: [phone] to obtain a doctor or specialist who takes your insurance in your area.  You may call [phone] to make an appointment with the internal medicine clinic.    Take augmentin BID x 10 days.

## 2022-05-17 NOTE — ED PROVIDER NOTE - OBJECTIVE STATEMENT
32F PMHx of DM2, gastroparesis, HTN, s/p cholecystectomy, opioid dependence presenting with abdominal pain, nausea/vomiting x few days. Was seen on May 14th, had negative CT AP, normal cbc/cmp, and discharged home. Reporting diffuse abdominal pain, typical of her previous gastroparesis. Denies any chest pain, shortness of breath, syncope, lightheadedness, fevers, chills, dysuria/hematuria, visual complaints. Contrary to triage, patient does not endorse SI or HI, and admitted to saying it for pain medication.

## 2022-05-17 NOTE — ED PROVIDER NOTE - PHYSICAL EXAMINATION
VITAL SIGNS: I have reviewed nursing notes and confirm.   GEN: Well-developed; well-nourished; in no acute distress. Speaking full sentences.  SKIN: Warm, pink, no rash, no diaphoresis, no cyanosis, well perfused.   HEAD: Normocephalic; atraumatic. No scalp lacerations, no abrasions.  NECK: Supple; non tender.   EYES: Pupils 3mm equal, round, reactive to light and accomodation, conjunctiva and sclera clear. Extra-ocular movements intact bilaterally.  ENT: No nasal discharge; airway clear. Trachea is midline. ORAL: No oropharyngeal exudates or erythema. Normal dentition.  CV: Regular rate and rhythm. S1, S2 normal; no murmurs, gallops, or rubs. No lower extremity pitting edema bilaterally. Capillary refill < 2 seconds throughout. Distal pulses intact 2+ throughout.  RESP: CTA bilaterally. No wheezes, rales, or rhonchi.   ABD: Normal bowel sounds, soft, non-distended, (+) mild epigastric ttp, , no rebound, no guarding, no rigidity, no hepatosplenomegaly. No CVA tenderness bilaterally.  MSK: Normal range of motion and movement of all 4 extremities. No joint or muscular pain throughout. No clubbing.   BACK: No thoracolumbar midline or paravertebral tenderness. No step-offs or obvious deformities.  NEURO: Alert & oriented x 3, Grossly unremarkable. Sensory and motor intact throughout. No focal deficits. Gait: Fluid. Normal speech and coordination.   PSYCH: Cooperative, appropriate.

## 2022-05-17 NOTE — ED PROVIDER NOTE - PATIENT PORTAL LINK FT
You can access the FollowMyHealth Patient Portal offered by [facility] by registering at the following website: [email or web address]. By joining [employer]’s FollowMyHealth portal, you will also be able to view your health information using other applications (apps) compatible with our system.

## 2022-05-17 NOTE — CHART NOTE - NSCHARTNOTEFT_GEN_A_CORE
Message from Melodie Vang stating that this patient had a flu exposure on 5/12. Called pt to inform, unable to LVM for callback.

## 2022-05-17 NOTE — ED PROVIDER NOTE - PROGRESS NOTE DETAILS
labs unremarkable, CT showing colitis s/p zosyn, well appearing, abd soft nd nt. educated on opioid use disorder.

## 2022-05-17 NOTE — ED PROVIDER NOTE - PATIENT PORTAL LINK FT
You can access the FollowMyHealth Patient Portal offered by [facility] by registering at the following website: [email or web address]. By joining [employer]’s FollowMyHealth portal, you will also be able to view your health information using other applications (apps) compatible with our system.

## 2022-05-17 NOTE — ED PROVIDER NOTE - OBJECTIVE STATEMENT
32F PMHx of DM2, gastroparesis, HTN, s/p cholecystectomy, opioid dependence presenting with abdominal pain, nausea/vomiting x few days. Was seen here yesterday for same abdominal pain, nausea/vomiting, had mild hyperglycemia without DKA. She went to pain management but had her insurance denied.    Was seen on May 14th, had negative CT AP, normal cbc/cmp, and discharged home. Reporting diffuse abdominal pain, typical of her previous gastroparesis.   Denies any chest pain, shortness of breath, syncope, lightheadedness, fevers, chills, dysuria/hematuria, visual complaints.

## 2022-05-17 NOTE — ED PROVIDER NOTE - PRO INTERPRETER NEED 2
BIBA and [employer],  as per EMS pt was unresponsive on scene, snoring respiration 2mg IN Narcan and 2mg IV. pt A&O x 3 but is not saying what happened
English

## 2022-05-17 NOTE — ED PROVIDER NOTE - CLINICAL SUMMARY MEDICAL DECISION MAKING FREE TEXT BOX
This patient w/ nausea/vomiting and abdominal pain is likely secondary  gastroparesis . Considered the differential diagnosis but LOW risk for SBO (normal bowel movements, passing flatus,  ), no signs of DKA on labs. Patient's BMP with normal electrolytes and no signs of dehydration causing prerenal KERRI. Low suspicion for gastric or esophageal dysmotility as a cause.  Patient w/ no chest pain, unremarkable EKG, thus LOW suspicion for ACS. Based on the history, exam, and work up, there is a low suspicion for pancreatitis, appendicitis, biliary pathology, C. Diff or other emergent problems.  PLAN:  - IVF, CBC/CMP, Lipase,  HCG,  UA/UCx,  CTAP,     - Pain control PRN  - Cyclical Vomiting Syndrome: D5NS IV bolus for likely starvation ketosis, Magnesium sulfate 1-2 grams IV, zofran 4mg IVP, sumatriptan 6mg subcut, ketorolac 15mg IVP, famotidine 20mg IVP or protonix 40mg IVP. 2nd line: chlorpromazine 12.5-25mg IV, haldol 2.5-5mg IVP, olanzapine 2.5-5mg IV. Third line: Ketamine 10-20mg IVP +/- 20-30 mg infusion over 1 hour.

## 2022-05-17 NOTE — ED PROVIDER NOTE - PROGRESS NOTE DETAILS
improved s/p 3 rounds of dilaudid and FSG after 2L LR and 10u insulin IV improved to 206. Not in DKA, mild ketones. now tolerating oral intake, no vomiting. abd soft nd nt. I have discussed with the patient about the ED workup, lab results, diagnostics results, plan for discharge home, need for follow-up with primary care physician/specialists, and return precautions. At this time, the patient does not require further workup in the ED. The patient is subjectively feeling better and would like to be discharged home. The patient had the opportunity to ask questions and I have answered all inquiries. The patient verbalizes understanding and agreement with the plan. The patient is hemodynamically stable, clinically well-appearing, ambulatory, mentating well and ready for discharge home.

## 2022-05-17 NOTE — ED ADULT TRIAGE NOTE - CHIEF COMPLAINT QUOTE
BIBA, complaining of lower abdominal pain with n/v, was seen here and dc yesterday for the same thing, was given 4mg zofran IM by emt, hx of dm, htn, gastroparesis

## 2022-05-17 NOTE — ED PROVIDER NOTE - NSFOLLOWUPINSTRUCTIONS_ED_ALL_ED_FT
Rest, drink plenty of fluids.  Advance activity as tolerated.  Continue all previously prescribed medications as directed.  Follow up with your PMD 2-3 days and bring copies of your results.  Return to the ER for worsening symptoms, fevers, vomiting, abdominal pain, or new concerning symptoms.    Follow up with pain management in 2-5 days.

## 2022-05-17 NOTE — ED PROVIDER NOTE - CRITICAL CARE ATTENDING CONTRIBUTION TO CARE
I have discussed the case with the resident/mid-level provider. I have personally performed a history, physical exam, and my own medical decision making. I have reviewed the note and agree with the findings and plan with the following exceptions: None.    Upon my evaluation, this patient had a high probability of imminent or lifethreatening deterioration due to tachycardia, abdominal pain, gastroparesis, which required my direct attention, intervention, and personal management.     I have personally provided 31 minutes of critical care time exclusive of time spent on separately billable procedures. Time includes review of laboratory data, radiology results, discussion with consultants, and monitoring for potential decompensation. Interventions were performed as documented above.    - Skip Vazquez MD, Emergency Medicine and Medical Toxicology Attending.

## 2022-05-18 LAB
ACETONE SERPL-MCNC: NEGATIVE — SIGNIFICANT CHANGE UP
ALBUMIN SERPL ELPH-MCNC: 3.2 G/DL — LOW (ref 3.3–5)
ALP SERPL-CCNC: 90 U/L — SIGNIFICANT CHANGE UP (ref 40–120)
ALT FLD-CCNC: 15 U/L — SIGNIFICANT CHANGE UP (ref 12–78)
ANION GAP SERPL CALC-SCNC: 11 MMOL/L — SIGNIFICANT CHANGE UP (ref 5–17)
APPEARANCE UR: CLEAR — SIGNIFICANT CHANGE UP
AST SERPL-CCNC: 17 U/L — SIGNIFICANT CHANGE UP (ref 15–37)
BACTERIA # UR AUTO: ABNORMAL
BASE EXCESS BLDV CALC-SCNC: 3 MMOL/L — SIGNIFICANT CHANGE UP (ref -2–3)
BASOPHILS # BLD AUTO: 0.02 K/UL — SIGNIFICANT CHANGE UP (ref 0–0.2)
BASOPHILS NFR BLD AUTO: 0.2 % — SIGNIFICANT CHANGE UP (ref 0–2)
BILIRUB SERPL-MCNC: 0.5 MG/DL — SIGNIFICANT CHANGE UP (ref 0.2–1.2)
BILIRUB UR-MCNC: NEGATIVE — SIGNIFICANT CHANGE UP
BLOOD GAS COMMENTS, VENOUS: SIGNIFICANT CHANGE UP
BUN SERPL-MCNC: 12 MG/DL — SIGNIFICANT CHANGE UP (ref 7–23)
CALCIUM SERPL-MCNC: 9.5 MG/DL — SIGNIFICANT CHANGE UP (ref 8.5–10.1)
CHLORIDE BLDV-SCNC: 103 MMOL/L — SIGNIFICANT CHANGE UP (ref 98–107)
CHLORIDE SERPL-SCNC: 105 MMOL/L — SIGNIFICANT CHANGE UP (ref 96–108)
CO2 BLDV-SCNC: 29 MMOL/L — HIGH (ref 22–26)
CO2 SERPL-SCNC: 23 MMOL/L — SIGNIFICANT CHANGE UP (ref 22–31)
COLOR SPEC: YELLOW — SIGNIFICANT CHANGE UP
CREAT SERPL-MCNC: 0.81 MG/DL — SIGNIFICANT CHANGE UP (ref 0.5–1.3)
CULTURE RESULTS: SIGNIFICANT CHANGE UP
DIFF PNL FLD: ABNORMAL
EGFR: 99 ML/MIN/1.73M2 — SIGNIFICANT CHANGE UP
EOSINOPHIL # BLD AUTO: 0.15 K/UL — SIGNIFICANT CHANGE UP (ref 0–0.5)
EOSINOPHIL NFR BLD AUTO: 1.8 % — SIGNIFICANT CHANGE UP (ref 0–6)
EPI CELLS # UR: ABNORMAL
GAS PNL BLDV: 135 MMOL/L — LOW (ref 136–145)
GAS PNL BLDV: SIGNIFICANT CHANGE UP
GAS PNL BLDV: SIGNIFICANT CHANGE UP
GLUCOSE BLDV-MCNC: 180 MG/DL — HIGH (ref 65–95)
GLUCOSE SERPL-MCNC: 152 MG/DL — HIGH (ref 70–99)
GLUCOSE UR QL: 1000 MG/DL
HCG SERPL-ACNC: <1 MIU/ML — SIGNIFICANT CHANGE UP
HCO3 BLDV-SCNC: 28 MMOL/L — SIGNIFICANT CHANGE UP (ref 22–28)
HCT VFR BLD CALC: 33 % — LOW (ref 34.5–45)
HCT VFR BLDA CALC: 30 % — LOW (ref 37–47)
HGB BLD CALC-MCNC: 9.9 G/DL — LOW (ref 11.7–16.1)
HGB BLD-MCNC: 9.8 G/DL — LOW (ref 11.5–15.5)
HOROWITZ INDEX BLDV+IHG-RTO: 21 — SIGNIFICANT CHANGE UP
IMM GRANULOCYTES NFR BLD AUTO: 0.2 % — SIGNIFICANT CHANGE UP (ref 0–1.5)
KETONES UR-MCNC: ABNORMAL
LACTATE BLDV-MCNC: 1.3 MMOL/L — SIGNIFICANT CHANGE UP (ref 0.56–1.39)
LEUKOCYTE ESTERASE UR-ACNC: ABNORMAL
LIDOCAIN IGE QN: 46 U/L — LOW (ref 73–393)
LYMPHOCYTES # BLD AUTO: 1.59 K/UL — SIGNIFICANT CHANGE UP (ref 1–3.3)
LYMPHOCYTES # BLD AUTO: 19.4 % — SIGNIFICANT CHANGE UP (ref 13–44)
MCHC RBC-ENTMCNC: 18 PG — LOW (ref 27–34)
MCHC RBC-ENTMCNC: 29.7 G/DL — LOW (ref 32–36)
MCV RBC AUTO: 60.7 FL — LOW (ref 80–100)
MONOCYTES # BLD AUTO: 0.77 K/UL — SIGNIFICANT CHANGE UP (ref 0–0.9)
MONOCYTES NFR BLD AUTO: 9.4 % — SIGNIFICANT CHANGE UP (ref 2–14)
NEUTROPHILS # BLD AUTO: 5.65 K/UL — SIGNIFICANT CHANGE UP (ref 1.8–7.4)
NEUTROPHILS NFR BLD AUTO: 69 % — SIGNIFICANT CHANGE UP (ref 43–77)
NITRITE UR-MCNC: NEGATIVE — SIGNIFICANT CHANGE UP
NRBC # BLD: 0 /100 WBCS — SIGNIFICANT CHANGE UP (ref 0–0)
PCO2 BLDV: 43 MMHG — SIGNIFICANT CHANGE UP (ref 42–55)
PH BLDV: 7.42 — SIGNIFICANT CHANGE UP (ref 7.32–7.43)
PH UR: 7 — SIGNIFICANT CHANGE UP (ref 5–8)
PLATELET # BLD AUTO: 399 K/UL — SIGNIFICANT CHANGE UP (ref 150–400)
PO2 BLDV: 38 MMHG — SIGNIFICANT CHANGE UP (ref 25–45)
POTASSIUM BLDV-SCNC: 3.7 MMOL/L — SIGNIFICANT CHANGE UP (ref 3.5–5.1)
POTASSIUM SERPL-MCNC: 3.7 MMOL/L — SIGNIFICANT CHANGE UP (ref 3.5–5.3)
POTASSIUM SERPL-SCNC: 3.7 MMOL/L — SIGNIFICANT CHANGE UP (ref 3.5–5.3)
PROT SERPL-MCNC: 7.2 GM/DL — SIGNIFICANT CHANGE UP (ref 6–8.3)
PROT UR-MCNC: 500 MG/DL
RBC # BLD: 5.44 M/UL — HIGH (ref 3.8–5.2)
RBC # FLD: 19.7 % — HIGH (ref 10.3–14.5)
RBC CASTS # UR COMP ASSIST: ABNORMAL /HPF (ref 0–4)
SAO2 % BLDV: 64 % — LOW (ref 94–98)
SODIUM SERPL-SCNC: 139 MMOL/L — SIGNIFICANT CHANGE UP (ref 135–145)
SP GR SPEC: 1.01 — SIGNIFICANT CHANGE UP (ref 1.01–1.02)
SPECIMEN SOURCE: SIGNIFICANT CHANGE UP
UROBILINOGEN FLD QL: NEGATIVE MG/DL — SIGNIFICANT CHANGE UP
WBC # BLD: 8.2 K/UL — SIGNIFICANT CHANGE UP (ref 3.8–10.5)
WBC # FLD AUTO: 8.2 K/UL — SIGNIFICANT CHANGE UP (ref 3.8–10.5)
WBC UR QL: SIGNIFICANT CHANGE UP

## 2022-05-18 PROCEDURE — 74177 CT ABD & PELVIS W/CONTRAST: CPT | Mod: 26,MA

## 2022-05-18 PROCEDURE — 93010 ELECTROCARDIOGRAM REPORT: CPT

## 2022-05-18 RX ORDER — HYDROMORPHONE HYDROCHLORIDE 2 MG/ML
1 INJECTION INTRAMUSCULAR; INTRAVENOUS; SUBCUTANEOUS ONCE
Refills: 0 | Status: DISCONTINUED | OUTPATIENT
Start: 2022-05-18 | End: 2022-05-18

## 2022-05-18 RX ORDER — DIPHENHYDRAMINE HCL 50 MG
25 CAPSULE ORAL ONCE
Refills: 0 | Status: COMPLETED | OUTPATIENT
Start: 2022-05-18 | End: 2022-05-18

## 2022-05-18 RX ORDER — PIPERACILLIN AND TAZOBACTAM 4; .5 G/20ML; G/20ML
3.38 INJECTION, POWDER, LYOPHILIZED, FOR SOLUTION INTRAVENOUS ONCE
Refills: 0 | Status: COMPLETED | OUTPATIENT
Start: 2022-05-18 | End: 2022-05-18

## 2022-05-18 RX ADMIN — HYDROMORPHONE HYDROCHLORIDE 1 MILLIGRAM(S): 2 INJECTION INTRAMUSCULAR; INTRAVENOUS; SUBCUTANEOUS at 00:40

## 2022-05-18 RX ADMIN — HYDROMORPHONE HYDROCHLORIDE 1 MILLIGRAM(S): 2 INJECTION INTRAMUSCULAR; INTRAVENOUS; SUBCUTANEOUS at 00:20

## 2022-05-18 RX ADMIN — HYDROMORPHONE HYDROCHLORIDE 1 MILLIGRAM(S): 2 INJECTION INTRAMUSCULAR; INTRAVENOUS; SUBCUTANEOUS at 03:04

## 2022-05-18 RX ADMIN — HYDROMORPHONE HYDROCHLORIDE 1 MILLIGRAM(S): 2 INJECTION INTRAMUSCULAR; INTRAVENOUS; SUBCUTANEOUS at 03:51

## 2022-05-18 RX ADMIN — HYDROMORPHONE HYDROCHLORIDE 1 MILLIGRAM(S): 2 INJECTION INTRAMUSCULAR; INTRAVENOUS; SUBCUTANEOUS at 05:19

## 2022-05-18 RX ADMIN — Medication 25 MILLIGRAM(S): at 05:03

## 2022-05-18 RX ADMIN — SODIUM CHLORIDE 1000 MILLILITER(S): 9 INJECTION, SOLUTION INTRAVENOUS at 03:51

## 2022-05-18 RX ADMIN — Medication 25 MILLIGRAM(S): at 00:18

## 2022-05-18 RX ADMIN — SODIUM CHLORIDE 1000 MILLILITER(S): 9 INJECTION, SOLUTION INTRAVENOUS at 00:23

## 2022-05-18 RX ADMIN — HYDROMORPHONE HYDROCHLORIDE 1 MILLIGRAM(S): 2 INJECTION INTRAMUSCULAR; INTRAVENOUS; SUBCUTANEOUS at 05:06

## 2022-05-18 RX ADMIN — Medication 25 MILLIGRAM(S): at 03:03

## 2022-05-18 RX ADMIN — PIPERACILLIN AND TAZOBACTAM 200 GRAM(S): 4; .5 INJECTION, POWDER, LYOPHILIZED, FOR SOLUTION INTRAVENOUS at 04:19

## 2022-05-18 NOTE — ED ADULT NURSE NOTE - OBJECTIVE STATEMENT
Patient A& O x3 came in with complaints of stomach pain that started around 5-6pm and n/v/d as well. Patient vomited throughout the day about 4x. no blood in vomit and diarrhea. no chest pain, no sob. Patient has been seen here recently with these same symptoms.

## 2022-05-18 NOTE — ED ADULT NURSE NOTE - NSIMPLEMENTINTERV_GEN_ALL_ED
Received a request for diabetic supplies form [facility]. Pharmacy states patient is requesting a fill for their glucose strips. Faxed request back stating patient is not diabetic to [phone]. Fax confirmation receipt received. No other action required at this time.
Implemented All Universal Safety Interventions:  [place] to call system. Call bell, personal items and telephone within reach. Instruct patient to call for assistance. Room bathroom lighting operational. Non-slip footwear when patient is off stretcher. Physically safe environment: no spills, clutter or unnecessary equipment. Stretcher in lowest position, wheels locked, appropriate side rails in place.

## 2022-05-19 ENCOUNTER — INPATIENT (INPATIENT)
Facility: HOSPITAL | Age: 32
LOS: 5 days | Discharge: HOME | DRG: 74 | End: 2022-05-25
Attending: STUDENT IN AN ORGANIZED HEALTH CARE EDUCATION/TRAINING PROGRAM | Admitting: INTERNAL MEDICINE
Payer: MEDICAID

## 2022-05-19 VITALS
OXYGEN SATURATION: 95 % | TEMPERATURE: 99 F | DIASTOLIC BLOOD PRESSURE: 91 MMHG | HEART RATE: 129 BPM | RESPIRATION RATE: 18 BRPM | SYSTOLIC BLOOD PRESSURE: 143 MMHG

## 2022-05-19 DIAGNOSIS — Z90.49 ACQUIRED ABSENCE OF OTHER SPECIFIED PARTS OF DIGESTIVE TRACT: Chronic | ICD-10-CM

## 2022-05-19 DIAGNOSIS — K31.84 GASTROPARESIS: ICD-10-CM

## 2022-05-19 DIAGNOSIS — F11.90 OPIOID USE, UNSPECIFIED, UNCOMPLICATED: ICD-10-CM

## 2022-05-19 DIAGNOSIS — F43.25 ADJUSTMENT DISORDER WITH MIXED DISTURBANCE OF EMOTIONS AND CONDUCT: ICD-10-CM

## 2022-05-19 DIAGNOSIS — O00.9 ECTOPIC PREGNANCY, UNSPECIFIED: Chronic | ICD-10-CM

## 2022-05-19 LAB
AMPHET UR-MCNC: NEGATIVE — SIGNIFICANT CHANGE UP
ANION GAP SERPL CALC-SCNC: 17 MMOL/L — SIGNIFICANT CHANGE UP (ref 5–17)
ANION GAP SERPL CALC-SCNC: 22 MMOL/L — HIGH (ref 5–17)
ANISOCYTOSIS BLD QL: SLIGHT — SIGNIFICANT CHANGE UP
APAP SERPL-MCNC: <3 UG/ML — LOW (ref 10–26)
APPEARANCE UR: CLEAR — SIGNIFICANT CHANGE UP
BACTERIA # UR AUTO: ABNORMAL
BARBITURATES UR SCN-MCNC: NEGATIVE — SIGNIFICANT CHANGE UP
BASE EXCESS BLDV CALC-SCNC: 0.8 MMOL/L — SIGNIFICANT CHANGE UP (ref -2–3)
BASOPHILS # BLD AUTO: 0.02 K/UL — SIGNIFICANT CHANGE UP (ref 0–0.2)
BASOPHILS NFR BLD AUTO: 0.3 % — SIGNIFICANT CHANGE UP (ref 0–2)
BENZODIAZ UR-MCNC: POSITIVE
BILIRUB UR-MCNC: NEGATIVE — SIGNIFICANT CHANGE UP
BUN SERPL-MCNC: 13.5 MG/DL — SIGNIFICANT CHANGE UP (ref 8–20)
BUN SERPL-MCNC: 13.8 MG/DL — SIGNIFICANT CHANGE UP (ref 8–20)
CA-I SERPL-SCNC: 1.15 MMOL/L — SIGNIFICANT CHANGE UP (ref 1.15–1.33)
CALCIUM SERPL-MCNC: 9.1 MG/DL — SIGNIFICANT CHANGE UP (ref 8.6–10.2)
CALCIUM SERPL-MCNC: 9.3 MG/DL — SIGNIFICANT CHANGE UP (ref 8.6–10.2)
CHLORIDE BLDV-SCNC: 99 MMOL/L — SIGNIFICANT CHANGE UP (ref 98–107)
CHLORIDE SERPL-SCNC: 103 MMOL/L — SIGNIFICANT CHANGE UP (ref 98–107)
CHLORIDE SERPL-SCNC: 94 MMOL/L — LOW (ref 98–107)
CO2 SERPL-SCNC: 21 MMOL/L — LOW (ref 22–29)
CO2 SERPL-SCNC: 22 MMOL/L — SIGNIFICANT CHANGE UP (ref 22–29)
COCAINE METAB.OTHER UR-MCNC: NEGATIVE — SIGNIFICANT CHANGE UP
COLOR SPEC: YELLOW — SIGNIFICANT CHANGE UP
CREAT SERPL-MCNC: 0.91 MG/DL — SIGNIFICANT CHANGE UP (ref 0.5–1.3)
CREAT SERPL-MCNC: 0.96 MG/DL — SIGNIFICANT CHANGE UP (ref 0.5–1.3)
CULTURE RESULTS: SIGNIFICANT CHANGE UP
DIFF PNL FLD: ABNORMAL
EGFR: 81 ML/MIN/1.73M2 — SIGNIFICANT CHANGE UP
EGFR: 86 ML/MIN/1.73M2 — SIGNIFICANT CHANGE UP
ELLIPTOCYTES BLD QL SMEAR: SLIGHT — SIGNIFICANT CHANGE UP
EOSINOPHIL # BLD AUTO: 0.11 K/UL — SIGNIFICANT CHANGE UP (ref 0–0.5)
EOSINOPHIL NFR BLD AUTO: 1.4 % — SIGNIFICANT CHANGE UP (ref 0–6)
EPI CELLS # UR: ABNORMAL
ETHANOL SERPL-MCNC: <10 MG/DL — SIGNIFICANT CHANGE UP (ref 0–9)
GAS PNL BLDV: 137 MMOL/L — SIGNIFICANT CHANGE UP (ref 136–145)
GAS PNL BLDV: SIGNIFICANT CHANGE UP
GLUCOSE BLDC GLUCOMTR-MCNC: 110 MG/DL — HIGH (ref 70–99)
GLUCOSE BLDC GLUCOMTR-MCNC: 332 MG/DL — HIGH (ref 70–99)
GLUCOSE BLDV-MCNC: 548 MG/DL — CRITICAL HIGH (ref 70–99)
GLUCOSE SERPL-MCNC: 150 MG/DL — HIGH (ref 70–99)
GLUCOSE SERPL-MCNC: 533 MG/DL — CRITICAL HIGH (ref 70–99)
GLUCOSE UR QL: 1000 MG/DL
HCO3 BLDV-SCNC: 25 MMOL/L — SIGNIFICANT CHANGE UP (ref 22–29)
HCT VFR BLD CALC: 29.3 % — LOW (ref 34.5–45)
HCT VFR BLD CALC: 33.4 % — LOW (ref 34.5–45)
HCT VFR BLDA CALC: 31 % — SIGNIFICANT CHANGE UP
HGB BLD CALC-MCNC: 10.2 G/DL — LOW (ref 11.7–16.1)
HGB BLD-MCNC: 8.9 G/DL — LOW (ref 11.5–15.5)
HGB BLD-MCNC: 9.9 G/DL — LOW (ref 11.5–15.5)
HYPOCHROMIA BLD QL: SLIGHT — SIGNIFICANT CHANGE UP
IMM GRANULOCYTES NFR BLD AUTO: 0.3 % — SIGNIFICANT CHANGE UP (ref 0–1.5)
KETONES UR-MCNC: ABNORMAL
LACTATE BLDV-MCNC: 1.8 MMOL/L — SIGNIFICANT CHANGE UP (ref 0.5–2)
LEUKOCYTE ESTERASE UR-ACNC: NEGATIVE — SIGNIFICANT CHANGE UP
LYMPHOCYTES # BLD AUTO: 0.96 K/UL — LOW (ref 1–3.3)
LYMPHOCYTES # BLD AUTO: 12.5 % — LOW (ref 13–44)
MACROCYTES BLD QL: SLIGHT — SIGNIFICANT CHANGE UP
MANUAL SMEAR VERIFICATION: SIGNIFICANT CHANGE UP
MCHC RBC-ENTMCNC: 18.1 PG — LOW (ref 27–34)
MCHC RBC-ENTMCNC: 18.5 PG — LOW (ref 27–34)
MCHC RBC-ENTMCNC: 29.6 GM/DL — LOW (ref 32–36)
MCHC RBC-ENTMCNC: 30.4 GM/DL — LOW (ref 32–36)
MCV RBC AUTO: 60.8 FL — LOW (ref 80–100)
MCV RBC AUTO: 61.1 FL — LOW (ref 80–100)
METHADONE UR-MCNC: NEGATIVE — SIGNIFICANT CHANGE UP
MONOCYTES # BLD AUTO: 0.7 K/UL — SIGNIFICANT CHANGE UP (ref 0–0.9)
MONOCYTES NFR BLD AUTO: 9.2 % — SIGNIFICANT CHANGE UP (ref 2–14)
NEUTROPHILS # BLD AUTO: 5.84 K/UL — SIGNIFICANT CHANGE UP (ref 1.8–7.4)
NEUTROPHILS NFR BLD AUTO: 76.3 % — SIGNIFICANT CHANGE UP (ref 43–77)
NITRITE UR-MCNC: NEGATIVE — SIGNIFICANT CHANGE UP
OPIATES UR-MCNC: NEGATIVE — SIGNIFICANT CHANGE UP
OVALOCYTES BLD QL SMEAR: SLIGHT — SIGNIFICANT CHANGE UP
PCO2 BLDV: 39 MMHG — SIGNIFICANT CHANGE UP (ref 39–42)
PCP SPEC-MCNC: SIGNIFICANT CHANGE UP
PCP UR-MCNC: NEGATIVE — SIGNIFICANT CHANGE UP
PH BLDV: 7.42 — SIGNIFICANT CHANGE UP (ref 7.32–7.43)
PH UR: 7 — SIGNIFICANT CHANGE UP (ref 5–8)
PLAT MORPH BLD: NORMAL — SIGNIFICANT CHANGE UP
PLATELET # BLD AUTO: 385 K/UL — SIGNIFICANT CHANGE UP (ref 150–400)
PLATELET # BLD AUTO: 406 K/UL — HIGH (ref 150–400)
PO2 BLDV: 151 MMHG — HIGH (ref 25–45)
POIKILOCYTOSIS BLD QL AUTO: SLIGHT — SIGNIFICANT CHANGE UP
POLYCHROMASIA BLD QL SMEAR: SLIGHT — SIGNIFICANT CHANGE UP
POTASSIUM BLDV-SCNC: 4.1 MMOL/L — SIGNIFICANT CHANGE UP (ref 3.5–5.1)
POTASSIUM SERPL-MCNC: 4.1 MMOL/L — SIGNIFICANT CHANGE UP (ref 3.5–5.3)
POTASSIUM SERPL-MCNC: 4.2 MMOL/L — SIGNIFICANT CHANGE UP (ref 3.5–5.3)
POTASSIUM SERPL-SCNC: 4.1 MMOL/L — SIGNIFICANT CHANGE UP (ref 3.5–5.3)
POTASSIUM SERPL-SCNC: 4.2 MMOL/L — SIGNIFICANT CHANGE UP (ref 3.5–5.3)
PROT UR-MCNC: 15
RBC # BLD: 4.82 M/UL — SIGNIFICANT CHANGE UP (ref 3.8–5.2)
RBC # BLD: 5.47 M/UL — HIGH (ref 3.8–5.2)
RBC # FLD: 18.2 % — HIGH (ref 10.3–14.5)
RBC # FLD: 18.8 % — HIGH (ref 10.3–14.5)
RBC BLD AUTO: ABNORMAL
RBC CASTS # UR COMP ASSIST: ABNORMAL /HPF (ref 0–4)
SALICYLATES SERPL-MCNC: <0.6 MG/DL — LOW (ref 10–20)
SAO2 % BLDV: 97.2 % — SIGNIFICANT CHANGE UP
SARS-COV-2 RNA SPEC QL NAA+PROBE: SIGNIFICANT CHANGE UP
SCHISTOCYTES BLD QL AUTO: SLIGHT — SIGNIFICANT CHANGE UP
SODIUM SERPL-SCNC: 137 MMOL/L — SIGNIFICANT CHANGE UP (ref 135–145)
SODIUM SERPL-SCNC: 142 MMOL/L — SIGNIFICANT CHANGE UP (ref 135–145)
SP GR SPEC: 1.01 — SIGNIFICANT CHANGE UP (ref 1.01–1.02)
SPECIMEN SOURCE: SIGNIFICANT CHANGE UP
TARGETS BLD QL SMEAR: SLIGHT — SIGNIFICANT CHANGE UP
THC UR QL: NEGATIVE — SIGNIFICANT CHANGE UP
UROBILINOGEN FLD QL: NEGATIVE MG/DL — SIGNIFICANT CHANGE UP
WBC # BLD: 13.4 K/UL — HIGH (ref 3.8–10.5)
WBC # BLD: 7.65 K/UL — SIGNIFICANT CHANGE UP (ref 3.8–10.5)
WBC # FLD AUTO: 13.4 K/UL — HIGH (ref 3.8–10.5)
WBC # FLD AUTO: 7.65 K/UL — SIGNIFICANT CHANGE UP (ref 3.8–10.5)
WBC UR QL: SIGNIFICANT CHANGE UP /HPF (ref 0–5)

## 2022-05-19 PROCEDURE — 99285 EMERGENCY DEPT VISIT HI MDM: CPT

## 2022-05-19 PROCEDURE — 74176 CT ABD & PELVIS W/O CONTRAST: CPT | Mod: 26,MA

## 2022-05-19 PROCEDURE — 99223 1ST HOSP IP/OBS HIGH 75: CPT

## 2022-05-19 PROCEDURE — 71045 X-RAY EXAM CHEST 1 VIEW: CPT | Mod: 26

## 2022-05-19 RX ORDER — SODIUM CHLORIDE 9 MG/ML
1000 INJECTION, SOLUTION INTRAVENOUS
Refills: 0 | Status: DISCONTINUED | OUTPATIENT
Start: 2022-05-19 | End: 2022-05-19

## 2022-05-19 RX ORDER — DEXTROSE 50 % IN WATER 50 %
25 SYRINGE (ML) INTRAVENOUS ONCE
Refills: 0 | Status: DISCONTINUED | OUTPATIENT
Start: 2022-05-19 | End: 2022-05-25

## 2022-05-19 RX ORDER — DEXTROSE 50 % IN WATER 50 %
12.5 SYRINGE (ML) INTRAVENOUS ONCE
Refills: 0 | Status: DISCONTINUED | OUTPATIENT
Start: 2022-05-19 | End: 2022-05-19

## 2022-05-19 RX ORDER — DEXTROSE 50 % IN WATER 50 %
15 SYRINGE (ML) INTRAVENOUS ONCE
Refills: 0 | Status: DISCONTINUED | OUTPATIENT
Start: 2022-05-19 | End: 2022-05-25

## 2022-05-19 RX ORDER — PANTOPRAZOLE SODIUM 20 MG/1
8 TABLET, DELAYED RELEASE ORAL
Qty: 80 | Refills: 0 | Status: DISCONTINUED | OUTPATIENT
Start: 2022-05-19 | End: 2022-05-19

## 2022-05-19 RX ORDER — HALOPERIDOL DECANOATE 100 MG/ML
2.5 INJECTION INTRAMUSCULAR ONCE
Refills: 0 | Status: COMPLETED | OUTPATIENT
Start: 2022-05-19 | End: 2022-05-19

## 2022-05-19 RX ORDER — INSULIN HUMAN 100 [IU]/ML
7 INJECTION, SOLUTION SUBCUTANEOUS ONCE
Refills: 0 | Status: COMPLETED | OUTPATIENT
Start: 2022-05-19 | End: 2022-05-19

## 2022-05-19 RX ORDER — INSULIN GLARGINE 100 [IU]/ML
8 INJECTION, SOLUTION SUBCUTANEOUS ONCE
Refills: 0 | Status: COMPLETED | OUTPATIENT
Start: 2022-05-19 | End: 2022-05-19

## 2022-05-19 RX ORDER — SODIUM CHLORIDE 9 MG/ML
1000 INJECTION, SOLUTION INTRAVENOUS ONCE
Refills: 0 | Status: COMPLETED | OUTPATIENT
Start: 2022-05-19 | End: 2022-05-19

## 2022-05-19 RX ORDER — KETAMINE HYDROCHLORIDE 100 MG/ML
150 INJECTION INTRAMUSCULAR; INTRAVENOUS ONCE
Refills: 0 | Status: DISCONTINUED | OUTPATIENT
Start: 2022-05-19 | End: 2022-05-19

## 2022-05-19 RX ORDER — MIDAZOLAM HYDROCHLORIDE 1 MG/ML
4 INJECTION, SOLUTION INTRAMUSCULAR; INTRAVENOUS ONCE
Refills: 0 | Status: DISCONTINUED | OUTPATIENT
Start: 2022-05-19 | End: 2022-05-19

## 2022-05-19 RX ORDER — POTASSIUM CHLORIDE 20 MEQ
10 PACKET (EA) ORAL ONCE
Refills: 0 | Status: COMPLETED | OUTPATIENT
Start: 2022-05-19 | End: 2022-05-19

## 2022-05-19 RX ORDER — INSULIN GLARGINE 100 [IU]/ML
10 INJECTION, SOLUTION SUBCUTANEOUS AT BEDTIME
Refills: 0 | Status: DISCONTINUED | OUTPATIENT
Start: 2022-05-19 | End: 2022-05-22

## 2022-05-19 RX ORDER — SODIUM CHLORIDE 9 MG/ML
1000 INJECTION, SOLUTION INTRAVENOUS
Refills: 0 | Status: DISCONTINUED | OUTPATIENT
Start: 2022-05-19 | End: 2022-05-25

## 2022-05-19 RX ORDER — PIPERACILLIN AND TAZOBACTAM 4; .5 G/20ML; G/20ML
3.38 INJECTION, POWDER, LYOPHILIZED, FOR SOLUTION INTRAVENOUS ONCE
Refills: 0 | Status: COMPLETED | OUTPATIENT
Start: 2022-05-19 | End: 2022-05-19

## 2022-05-19 RX ORDER — KETAMINE HYDROCHLORIDE 100 MG/ML
70 INJECTION INTRAMUSCULAR; INTRAVENOUS ONCE
Refills: 0 | Status: DISCONTINUED | OUTPATIENT
Start: 2022-05-19 | End: 2022-05-19

## 2022-05-19 RX ORDER — PANTOPRAZOLE SODIUM 20 MG/1
80 TABLET, DELAYED RELEASE ORAL ONCE
Refills: 0 | Status: DISCONTINUED | OUTPATIENT
Start: 2022-05-19 | End: 2022-05-19

## 2022-05-19 RX ORDER — GLUCAGON INJECTION, SOLUTION 0.5 MG/.1ML
1 INJECTION, SOLUTION SUBCUTANEOUS ONCE
Refills: 0 | Status: DISCONTINUED | OUTPATIENT
Start: 2022-05-19 | End: 2022-05-19

## 2022-05-19 RX ORDER — FAMOTIDINE 10 MG/ML
20 INJECTION INTRAVENOUS ONCE
Refills: 0 | Status: COMPLETED | OUTPATIENT
Start: 2022-05-19 | End: 2022-05-19

## 2022-05-19 RX ORDER — SENNA PLUS 8.6 MG/1
2 TABLET ORAL AT BEDTIME
Refills: 0 | Status: DISCONTINUED | OUTPATIENT
Start: 2022-05-19 | End: 2022-05-25

## 2022-05-19 RX ORDER — METOCLOPRAMIDE HCL 10 MG
10 TABLET ORAL EVERY 8 HOURS
Refills: 0 | Status: DISCONTINUED | OUTPATIENT
Start: 2022-05-19 | End: 2022-05-25

## 2022-05-19 RX ORDER — INSULIN GLARGINE 100 [IU]/ML
18 INJECTION, SOLUTION SUBCUTANEOUS
Qty: 100 | Refills: 0
Start: 2022-05-19 | End: 2022-06-17

## 2022-05-19 RX ORDER — LIDOCAINE 4 G/100G
5 CREAM TOPICAL ONCE
Refills: 0 | Status: COMPLETED | OUTPATIENT
Start: 2022-05-19 | End: 2022-05-19

## 2022-05-19 RX ORDER — INSULIN LISPRO 100/ML
VIAL (ML) SUBCUTANEOUS
Refills: 0 | Status: DISCONTINUED | OUTPATIENT
Start: 2022-05-19 | End: 2022-05-25

## 2022-05-19 RX ORDER — DEXTROSE 50 % IN WATER 50 %
25 SYRINGE (ML) INTRAVENOUS ONCE
Refills: 0 | Status: DISCONTINUED | OUTPATIENT
Start: 2022-05-19 | End: 2022-05-19

## 2022-05-19 RX ORDER — PANTOPRAZOLE SODIUM 20 MG/1
40 TABLET, DELAYED RELEASE ORAL ONCE
Refills: 0 | Status: DISCONTINUED | OUTPATIENT
Start: 2022-05-19 | End: 2022-05-19

## 2022-05-19 RX ORDER — SUCRALFATE 1 G
1 TABLET ORAL ONCE
Refills: 0 | Status: COMPLETED | OUTPATIENT
Start: 2022-05-19 | End: 2022-05-19

## 2022-05-19 RX ORDER — MORPHINE SULFATE 50 MG/1
4 CAPSULE, EXTENDED RELEASE ORAL ONCE
Refills: 0 | Status: DISCONTINUED | OUTPATIENT
Start: 2022-05-19 | End: 2022-05-19

## 2022-05-19 RX ORDER — ACETAMINOPHEN 500 MG
1000 TABLET ORAL ONCE
Refills: 0 | Status: COMPLETED | OUTPATIENT
Start: 2022-05-19 | End: 2022-05-19

## 2022-05-19 RX ORDER — INSULIN HUMAN 100 [IU]/ML
7 INJECTION, SOLUTION SUBCUTANEOUS
Qty: 100 | Refills: 0 | Status: DISCONTINUED | OUTPATIENT
Start: 2022-05-19 | End: 2022-05-19

## 2022-05-19 RX ORDER — INSULIN LISPRO 100/ML
VIAL (ML) SUBCUTANEOUS AT BEDTIME
Refills: 0 | Status: DISCONTINUED | OUTPATIENT
Start: 2022-05-19 | End: 2022-05-19

## 2022-05-19 RX ORDER — INSULIN LISPRO 100/ML
10 VIAL (ML) SUBCUTANEOUS ONCE
Refills: 0 | Status: COMPLETED | OUTPATIENT
Start: 2022-05-19 | End: 2022-05-19

## 2022-05-19 RX ORDER — GLUCAGON INJECTION, SOLUTION 0.5 MG/.1ML
1 INJECTION, SOLUTION SUBCUTANEOUS ONCE
Refills: 0 | Status: DISCONTINUED | OUTPATIENT
Start: 2022-05-19 | End: 2022-05-25

## 2022-05-19 RX ORDER — ONDANSETRON 8 MG/1
4 TABLET, FILM COATED ORAL ONCE
Refills: 0 | Status: COMPLETED | OUTPATIENT
Start: 2022-05-19 | End: 2022-05-19

## 2022-05-19 RX ORDER — DEXTROSE 50 % IN WATER 50 %
12.5 SYRINGE (ML) INTRAVENOUS ONCE
Refills: 0 | Status: DISCONTINUED | OUTPATIENT
Start: 2022-05-19 | End: 2022-05-25

## 2022-05-19 RX ORDER — ENOXAPARIN SODIUM 100 MG/ML
40 INJECTION SUBCUTANEOUS EVERY 24 HOURS
Refills: 0 | Status: DISCONTINUED | OUTPATIENT
Start: 2022-05-19 | End: 2022-05-25

## 2022-05-19 RX ORDER — INSULIN LISPRO 100/ML
4 VIAL (ML) SUBCUTANEOUS
Refills: 0 | Status: DISCONTINUED | OUTPATIENT
Start: 2022-05-19 | End: 2022-05-22

## 2022-05-19 RX ORDER — ONDANSETRON 8 MG/1
8 TABLET, FILM COATED ORAL ONCE
Refills: 0 | Status: COMPLETED | OUTPATIENT
Start: 2022-05-19 | End: 2022-05-19

## 2022-05-19 RX ORDER — DEXTROSE 50 % IN WATER 50 %
15 SYRINGE (ML) INTRAVENOUS ONCE
Refills: 0 | Status: DISCONTINUED | OUTPATIENT
Start: 2022-05-19 | End: 2022-05-19

## 2022-05-19 RX ORDER — HALOPERIDOL DECANOATE 100 MG/ML
5 INJECTION INTRAMUSCULAR ONCE
Refills: 0 | Status: COMPLETED | OUTPATIENT
Start: 2022-05-19 | End: 2022-05-19

## 2022-05-19 RX ORDER — METOCLOPRAMIDE HCL 10 MG
10 TABLET ORAL ONCE
Refills: 0 | Status: COMPLETED | OUTPATIENT
Start: 2022-05-19 | End: 2022-05-19

## 2022-05-19 RX ORDER — MORPHINE SULFATE 50 MG/1
6 CAPSULE, EXTENDED RELEASE ORAL ONCE
Refills: 0 | Status: DISCONTINUED | OUTPATIENT
Start: 2022-05-19 | End: 2022-05-19

## 2022-05-19 RX ADMIN — SODIUM CHLORIDE 1000 MILLILITER(S): 9 INJECTION, SOLUTION INTRAVENOUS at 04:52

## 2022-05-19 RX ADMIN — MORPHINE SULFATE 6 MILLIGRAM(S): 50 CAPSULE, EXTENDED RELEASE ORAL at 13:30

## 2022-05-19 RX ADMIN — SODIUM CHLORIDE 250 MILLILITER(S): 9 INJECTION, SOLUTION INTRAVENOUS at 07:01

## 2022-05-19 RX ADMIN — Medication 30 MILLILITER(S): at 09:44

## 2022-05-19 RX ADMIN — HALOPERIDOL DECANOATE 2.5 MILLIGRAM(S): 100 INJECTION INTRAMUSCULAR at 10:55

## 2022-05-19 RX ADMIN — PIPERACILLIN AND TAZOBACTAM 200 GRAM(S): 4; .5 INJECTION, POWDER, LYOPHILIZED, FOR SOLUTION INTRAVENOUS at 17:03

## 2022-05-19 RX ADMIN — Medication 0.1 MILLIGRAM(S): at 02:15

## 2022-05-19 RX ADMIN — SODIUM CHLORIDE 1000 MILLILITER(S): 9 INJECTION, SOLUTION INTRAVENOUS at 09:02

## 2022-05-19 RX ADMIN — ONDANSETRON 8 MILLIGRAM(S): 8 TABLET, FILM COATED ORAL at 09:45

## 2022-05-19 RX ADMIN — SODIUM CHLORIDE 100 MILLILITER(S): 9 INJECTION, SOLUTION INTRAVENOUS at 08:10

## 2022-05-19 RX ADMIN — KETAMINE HYDROCHLORIDE 150 MILLIGRAM(S): 100 INJECTION INTRAMUSCULAR; INTRAVENOUS at 03:30

## 2022-05-19 RX ADMIN — MIDAZOLAM HYDROCHLORIDE 4 MILLIGRAM(S): 1 INJECTION, SOLUTION INTRAMUSCULAR; INTRAVENOUS at 10:35

## 2022-05-19 RX ADMIN — Medication 8: at 18:43

## 2022-05-19 RX ADMIN — HALOPERIDOL DECANOATE 5 MILLIGRAM(S): 100 INJECTION INTRAMUSCULAR at 04:40

## 2022-05-19 RX ADMIN — SODIUM CHLORIDE 1000 MILLILITER(S): 9 INJECTION, SOLUTION INTRAVENOUS at 08:10

## 2022-05-19 RX ADMIN — MORPHINE SULFATE 4 MILLIGRAM(S): 50 CAPSULE, EXTENDED RELEASE ORAL at 14:08

## 2022-05-19 RX ADMIN — Medication 400 MILLIGRAM(S): at 09:45

## 2022-05-19 RX ADMIN — Medication 10 UNIT(S): at 10:36

## 2022-05-19 RX ADMIN — Medication 1000 MILLIGRAM(S): at 10:00

## 2022-05-19 RX ADMIN — Medication 2 MILLIGRAM(S): at 04:45

## 2022-05-19 RX ADMIN — Medication 3 MILLIGRAM(S): at 18:42

## 2022-05-19 RX ADMIN — FAMOTIDINE 20 MILLIGRAM(S): 10 INJECTION INTRAVENOUS at 09:45

## 2022-05-19 RX ADMIN — Medication 20 MILLIGRAM(S): at 12:37

## 2022-05-19 RX ADMIN — INSULIN GLARGINE 8 UNIT(S): 100 INJECTION, SOLUTION SUBCUTANEOUS at 14:04

## 2022-05-19 RX ADMIN — INSULIN HUMAN 7 UNIT(S): 100 INJECTION, SOLUTION SUBCUTANEOUS at 05:07

## 2022-05-19 RX ADMIN — INSULIN GLARGINE 10 UNIT(S): 100 INJECTION, SOLUTION SUBCUTANEOUS at 23:41

## 2022-05-19 RX ADMIN — MORPHINE SULFATE 6 MILLIGRAM(S): 50 CAPSULE, EXTENDED RELEASE ORAL at 12:30

## 2022-05-19 RX ADMIN — Medication 1 GRAM(S): at 15:17

## 2022-05-19 RX ADMIN — Medication 25 MILLIGRAM(S): at 15:17

## 2022-05-19 RX ADMIN — LIDOCAINE 5 MILLILITER(S): 4 CREAM TOPICAL at 09:44

## 2022-05-19 RX ADMIN — Medication 10 MILLIGRAM(S): at 09:44

## 2022-05-19 RX ADMIN — Medication 100 MILLIEQUIVALENT(S): at 06:50

## 2022-05-19 RX ADMIN — ONDANSETRON 4 MILLIGRAM(S): 8 TABLET, FILM COATED ORAL at 10:55

## 2022-05-19 RX ADMIN — ONDANSETRON 4 MILLIGRAM(S): 8 TABLET, FILM COATED ORAL at 02:15

## 2022-05-19 RX ADMIN — KETAMINE HYDROCHLORIDE 70 MILLIGRAM(S): 100 INJECTION INTRAMUSCULAR; INTRAVENOUS at 04:50

## 2022-05-19 RX ADMIN — MORPHINE SULFATE 4 MILLIGRAM(S): 50 CAPSULE, EXTENDED RELEASE ORAL at 15:00

## 2022-05-19 NOTE — ED ADULT TRIAGE NOTE - CHIEF COMPLAINT QUOTE
Pt. BIBA from [facility] for erratic behavior. As per EMS, pt. is withdrawing from taking 26mg of Dilaudid a day. Pt. began screaming, kicking and bashing her head against the wall. Pt. given 10 of IM versed by EMS. Pt. changed into yellow gown and belongings secured.

## 2022-05-19 NOTE — ED BEHAVIORAL HEALTH ASSESSMENT NOTE - RISK ASSESSMENT
Risk: impulsivity, self harm gestures in hospital settings, chronic pain, substance abuse  Protective: no known  h/o SI or HI, no hx of serious SA, stably domiciled, , help seeking, has supportive family in area Unable to determine Suicide Risk

## 2022-05-19 NOTE — ED BEHAVIORAL HEALTH ASSESSMENT NOTE - PSYCHIATRIC ISSUES AND PLAN (INCLUDE STANDING AND PRN MEDICATION)
Will reassess mental status when able to participate, patient has h/o attention seeking behavior without suicidal intent.

## 2022-05-19 NOTE — ED PROVIDER NOTE - CARE PLAN
1 Principal Discharge DX:	Opioid withdrawal   Principal Discharge DX:	Gastroparesis  Secondary Diagnosis:	Intractable nausea and vomiting  Secondary Diagnosis:	Colitis  Secondary Diagnosis:	Abdominal pain  Secondary Diagnosis:	Hyperglycemia

## 2022-05-19 NOTE — ED PROVIDER NOTE - PATIENT PORTAL LINK FT
You can access the FollowMyHealth Patient Portal offered by [facility] by registering at the following website: [email or web address]. By joining [employer]’s FollowMyHealth portal, you will also be able to view your health information using other applications (apps) compatible with our system.

## 2022-05-19 NOTE — CONSULT NOTE ADULT - SUBJECTIVE AND OBJECTIVE BOX
Patient is a 32y old  Female who presents with a chief complaint of likely withdrawal, hyperglycemia, aggressive behaviour (19 May 2022 07:45)    BRIEF HOSPITAL COURSE:   31yo F with PMH: Diabetes, gastroparesis, drug abuse  Was sent from [facility] in [place] for severe agitation, banging her head, asking for opioids. In the ED the patient was very aggressive with staff and attempted to strangle herself with the telemetry wires twice.   Required ativan, klonipin and ketamine to provide adequate sedation.  ICU consulted.  Patient also had hyperglycemia, received IVF hydration and Insulin IV Push with improvement in glucoses  Currently patient is sleepy, slightly arousable but not following commands or answering questions.    PAST MEDICAL & SURGICAL HISTORY:  Diabetes mellitus  Gastroparesis  Drug abuse, came from detox facility    Allergies  Toradol (Unknown)    FAMILY HISTORY: unable to obtain due to sedation at present    Review of Systems: unable to obtain due to sedation at present      Medications:  dextrose 5% + sodium chloride 0.9%. 1000 milliLiter(s) IV Continuous <Continuous>  lactated ringers 1000 milliLiter(s) IV Continuous <Continuous>  s/p ativan, ketamine, klonopin    ICU Vital Signs Last 24 Hrs  T(C): 37.3 (19 May 2022 05:26), Max: 37.4 (19 May 2022 01:52)  T(F): 99.1 (19 May 2022 05:26), Max: 99.3 (19 May 2022 01:52)  HR: 104 (19 May 2022 07:30) (104 - 129)  BP: 126/58 (19 May 2022 07:30) (114/51 - 143/91)  RR: 18 (19 May 2022 07:30) (16 - 18)  SpO2: 96% (19 May 2022 07:30) (95% - 100%)    Vital Signs Last 24 Hrs  T(C): 37.3 (19 May 2022 05:26), Max: 37.4 (19 May 2022 01:52)  T(F): 99.1 (19 May 2022 05:26), Max: 99.3 (19 May 2022 01:52)  HR: 104 (19 May 2022 07:30) (104 - 129)  BP: 126/58 (19 May 2022 07:30) (114/51 - 143/91)  RR: 18 (19 May 2022 07:30) (16 - 18)  SpO2: 96% (19 May 2022 07:30) (95% - 100%)    LABS:                        8.9    13.40 )-----------( 385      ( 19 May 2022 07:27 )             29.3     05-    142  |  103  |  13.5  ----------------------------<  150<H>  4.1   |  22.0  |  0.91    Ca    9.1      19 May 2022 07:27    CAPILLARY BLOOD GLUCOSE POCT Blood Glucose.: 154 mg/dL (19 May 2022 08:14)      Urinalysis Basic - ( 19 May 2022 06:24 )  Color: Yellow / Appearance: Clear / S.010 / pH: x  Gluc: x / Ketone: Large  / Bili: Negative / Urobili: Negative mg/dL   Blood: x / Protein: 15 / Nitrite: Negative   Leuk Esterase: Negative / RBC: 11-25 /HPF / WBC 0-2 /HPF   Sq Epi: x / Non Sq Epi: Many / Bacteria: Occasional    Physical Examination:  General: Laying on her side on stretcher in ED, Arousable to calling her name, eyes open but not following commands, remains sedated. No acute distress.    HEENT: Pupils equal, reactive to light.  Symmetric.  PULM: Clear to auscultation bilaterally, no significant sputum production  CVS: Regular rate and rhythm, no murmurs, rubs, or gallops  ABD: Soft, nondistended, nontender, normoactive bowel sounds, no masses  EXT: No edema, nontender  SKIN: Warm and well perfused, no rashes noted.    RADIOLOGY:  none    CRITICAL CARE TIME SPENT: 35 minutes

## 2022-05-19 NOTE — ED ADULT NURSE NOTE - OBJECTIVE STATEMENT
Airway patent, breathing spontaneous and nonlabored. Pt A&Ox3 resting in stretcher. [facility]. per EMS report on triage, Pt was taking po Dilaudid.  denies SI/SA/HI/HA. c/o back pain

## 2022-05-19 NOTE — ED BEHAVIORAL HEALTH ASSESSMENT NOTE - NSSUICPROTFACT_PSY_ALL_CORE
Supportive social network of family or friends/Fear of death or the actual act of killing self/Other

## 2022-05-19 NOTE — ED PROVIDER NOTE - PROGRESS NOTE DETAILS
Barbara: Patient signed out to me by previous team. Patient evaluated bedside and is found to be sleeping. 1 to 1 bedside. Vitals stable. Will follow up on labs and ICU recommendations. Plan for psych consult if pt medically cleared. Barbara: Pt reportedly banging her head against bed rail. Pt laying face down in bed on re-evaluation. Plan for 4 versed. Tasha: pt with perisstent and intractable abd pain and vomiting, suspect due to gastroparesis. Now calm and cooperative and re-directable. Mild DKA has resolved, will admit

## 2022-05-19 NOTE — ED PROVIDER NOTE - CLINICAL SUMMARY MEDICAL DECISION MAKING FREE TEXT BOX
patient will need 1:1 constant observation  given ketamine for agitation  will check labs ua utox ekg tfts  serial h/h ppi bolus reglan   cardiac monitoring  airway monitoring  icu cx psych cx gi cx

## 2022-05-19 NOTE — ED PROVIDER NOTE - PHYSICAL EXAMINATION
***GEN - moderate distress ; A+O x2   ***HEAD - NC/AT  ***EYES/NOSE - PERRL, EOMI, mucous membranes moist, no discharge   ***THROAT: Oral cavity and pharynx normal. No inflammation, swelling, exudate, or lesions.    ***NECK: Neck supple, non-tender without lymphadenopathy, no masses, no thyromegaly.   ***PULMONARY - CTA b/l, symmetric breath sounds.   ***CARDIAC -s1s2,tachycardic, no M,G,R  ***ABDOMEN - +BS, ND, NT, soft, no guarding, no rebound, no masses   ***BACK - no CVA tenderness,   ***EXTREMITIES - symmetric pulses, 2+ dp, capillary refill < 2 seconds,  no edema   ***SKIN - no rash or bruising   ***NEUROLOGIC - alert, oriented, motor sensory intact all 4 extremities  , ***PSYCH - insight and judgment abnormal suicidal attempt in front of staff ***GEN - moderate distress ; A+O x2   ***HEAD - NC/AT  ***EYES/NOSE - PERRL, EOMI, mucous membranes moist, no discharge   ***THROAT: Oral cavity and pharynx normal. No inflammation, swelling, exudate, or lesions.    ***NECK: Neck supple, non-tender without lymphadenopathy, no masses, no thyromegaly.   ***PULMONARY - CTA b/l, symmetric breath sounds.   ***CARDIAC -s1s2,tachycardic, no M,G,R  ***ABDOMEN - +BS, ND, NT, soft, no guarding, no rebound, no masses   ***BACK - no CVA tenderness,   ***EXTREMITIES - symmetric pulses, 2+ dp, capillary refill < 2 seconds,  no edema   ***SKIN - no rash or bruising      rectal- no stool in vault  ***NEUROLOGIC - alert, oriented, motor sensory intact all 4 extremities  , ***PSYCH - insight and judgment abnormal suicidal attempt in front of staff

## 2022-05-19 NOTE — ED PROVIDER NOTE - OBJECTIVE STATEMENT
patient sent from [facility] in [place] for head banging. agitated , requesting iv opioids  hx gastroparesis and IDDM. patient combative towards staff, tried to strangle herself with monitor wires and call bell wires  vomitus appears as coffee grounds

## 2022-05-19 NOTE — H&P ADULT - NSHPPHYSICALEXAM_GEN_ALL_CORE
Vital Signs Last 24 Hrs  T(C): 37.3 (05-19-22 @ 10:49), Max: 37.4 (05-19-22 @ 01:52)  T(F): 99.1 (05-19-22 @ 10:49), Max: 99.3 (05-19-22 @ 01:52)  HR: 107 (05-19-22 @ 12:56) (104 - 129)  BP: 150/97 (05-19-22 @ 10:49) (114/51 - 150/97)  BP(mean): --  RR: 20 (05-19-22 @ 10:49) (16 - 20)  SpO2: 100% (05-19-22 @ 10:49) (95% - 100%)    GENERAL: Calm, lying in bed, not forthcoming, no distress  HEAD:  Atraumatic, Normocephalic  EYES: EOMI, PERRLA, conjunctiva and sclera clear  NECK: Supple, No JVD, Normal thyroid  NERVOUS SYSTEM:  Alert & Oriented X 3, Motor Strength 5/5 B/L upper and lower extremities  CHEST/LUNG: CTA bilaterally; No rales, rhonchi, wheezing, or rubs  HEART: Regular rate and rhythm; No murmurs, rubs, or gallops  ABDOMEN: Soft, Nontender, Nondistended; Bowel sounds present  EXTREMITIES:  2+ Peripheral Pulses, No clubbing, cyanosis, or edema  SKIN: No rashes or lesions

## 2022-05-19 NOTE — ED BEHAVIORAL HEALTH ASSESSMENT NOTE - SUMMARY
Patient is a 32F,  female, , PPH opioid dependence (Dilaudid), history of prior completed rehab in [facility] (2014-45 days), with h/o prior incomplete rehab in [facility] last year; +history of suicidal gestures in hospital setting in setting of wanting pain meds, no psychiatric hospitalizations, no known SAs with intent, no outpatient psychiatric care, PMHX HTN, DM on insulin, gastroparesis with prior admissions for pain and vomiting, ectopic pregnancy s/p b/l tubal ligation, cholecystectomy, presenting to ED after recent admission for abd pain with vomiting and hyper/hypoglycemia and AMA to visit brother in hospital, returning to ED for same symptoms she was having prior to and during admission- epigastric and LLQ pain with n/v.  Patient was transferred from [facility] due to vomiting, reported abdominal pain and started banging head against the wall. She was sent to ER due to pain and self harm behavior.   She also wrapped cord around her neck in ER. Asked to evaluate possible suicidal ideation.  Patient presented yesterday at [facility] for detox of opioids. Has been abusing Dilaudid off street and ER.  PT was transferred to ER for intractable pain, vomiting and elevated BS.  she has h/o similar prior admissions.   Patient has h/o making suicidal gestures in attention seeking manner but with no suicide intent. Unable to engage patient at this time due to lethargy. Collateral (mother) has no suicidal concerns. Will re evaluate when able to engage.  Likely disposition is to return to [facility] when medically stable.

## 2022-05-19 NOTE — H&P ADULT - NSICDXPASTMEDICALHX_GEN_ALL_CORE_FT
PAST MEDICAL HISTORY:  Colitis     Diabetes mellitus     Diabetes mellitus type 1     Gastroparesis     Gastroparesis due to DM     Hypertension

## 2022-05-19 NOTE — ED BEHAVIORAL HEALTH ASSESSMENT NOTE - HPI (INCLUDE ILLNESS QUALITY, SEVERITY, DURATION, TIMING, CONTEXT, MODIFYING FACTORS, ASSOCIATED SIGNS AND SYMPTOMS)
Patient is a 32F,  female, , PPH opioid dependence (Dilaudid), history of prior completed rehab in [facility] (2014-45 days), with h/o prior incomplete rehab in [facility] last year; +history of suicidal gestures in hospital setting in setting of wanting pain meds, no psychiatric hospitalizations, no known SAs with intent, no outpatient psychiatric care, PMHX HTN, DM on insulin, gastroparesis with prior admissions for pain and vomiting, ectopic pregnancy s/p b/l tubal ligation, cholecystectomy, presenting to ED after recent admission for abd pain with vomiting and hyper/hypoglycemia and AMA to visit brother in hospital, returning to ED for same symptoms she was having prior to and during admission- epigastric and LLQ pain with n/v.  Patient was transferred from [facility] due to vomiting, reported abdominal pain and started banging head against the wall. She was sent to ER due to pain and self harm behavior.   She also wrapped cord around her neck in ER. Asked to evaluate possible suicidal ideation.          Reviewed [facility] notes from 12/25/21. In prior hospital admissions psychiatry has consulted to evaluate for SI after pt wrapped cord around neck in triage area of ED, started choking herself yelling, "I want to kill myself, I cannot take this pain anymore." As per documentation in prior admissions she had reported that she did NOT want to die in the ED, but wrapped the cord around her neck because she knew it was going to take a long time to "get medications and be seen," states she "did it for attention."  States making suicidal gestures is a way to receive attention and "be heard." Patient  denied any  SI or homicidal ideation and had expressed she has never had a suicide attempt or intended to kill herself in earnest.        In ED  (during current presentation) pt reported that to ED  provider that she felt better on discharge but then symptoms returned last night with same intensity, quality, and location with 6-8x NBNB vomiting and normal stooling.  She reportedly endorsed that the behavior she was exhibiting was driven by pain and feeling that she was not being taken seriously.  Of note, pt had CTabd/pelvis and transvaginal ultrasound yesterday in ED with no acute findings with negative HCG.       Attempted to interview patient.   Patient is drowsy s/p two doses of morphine.  She stated that she was here due to stomach and vomiting but was not able to further participate in interview in meaningful way and kept falling asleep.                Spoke with Day, Director of Nursing ([phone] #2) from [facility]. She reported patient came yesterday.  She arrived in facility around 2:30 pm.  She came in for detox off opioids. Her drug screen positive for opioids, negative for fentanyl or anything else. Her LENA  and pregnancy test was negative.  She was initially calm, cooperative, agreeable,  and following commands.  She reported she obtained Dilaudid from street or ER.  She disclosed purchases up to 20 mg a day for the last 6 months.  She is often in ER for Dilaudid IV (6 mg daily), uses cannabis daily (since discharge from [facility] 6 months ago). Has been doing this since 2012. She has been non compliant w/ her diabetic diet. She has IDDM since age 12. She adamantly denied any psychiatric history.   She was observed eating quickly during lunch. She denied self harm or any suicidality on interview.  She disclosed  Pmhx of diabetes since age 12. Gastroparesis since 2012, HTN 2021, h/o Lap coly, H/o hospitalization for DKA, contracted pneumonia, and had lung biopsy to r/o BOOP 2016, biopsy was negative, h/o two ectopic pregnancy in 2016.  H/o iron deficiency. Patient denied suicidal history.  When she came in she was reportedly eager to follow through with detox.         Patient disclosed addiction to Dilaudid and expressed concerns that she would become heroine addict.  She denies h/o IV drug use.  She did say that she found MD out in [place] that was giving her more Dilaudid in ER.   .  She could not tolerate PRN fetergine or  Zofran and could not keep them down. She was given Valium 10 mg IM.  Slept for two hours, and when awake continued having wretching in pain, nausea and vomiting. She was banging head against the wall. They could not manage her anymore. Sent her to ER due to pain and self harm behavior.          She  had reportedly presented with multiple medications: Enalapril 5 mg in am (HTN), Ferrous sulfate 325 mg in am, Reglan 10 mg 3 x daily before meals, Vitamin D 50 meq by mouth once a day, admelog 5 units basal rate before meals + coverage slide scale (2 units for every 50 above 150 until 500 ) ,  Basaglar long acting 4 unit at HS.  No seizure h/o, no DT, denies psych history, h/o silver [facility]/detox in 2014  (completed 45 days).  As per director, when she is discharged [facility] is happy to take her back when medically stable.        Mother spoke with  Nursing  director, and writer also spoke to mother directly.  As per collateral when  patient  is using patient will neglect management of BS. Mother reportedly believes she does this in order to a facilitate admission to hospital.  Patient was sober and  abstaining from substances for a year after [place]. She was doing well, went back to school, managing diabetes, not hospital shopping (h/o seeking Dilaudid IV). She has wrapped cords around her neck in the past while in ER as an attention seeking gesture but without suicidal intent. Mother denies patient has history of SA in past or NSSIB; stated pt's gestures by wrapping cords are a means of getting attention and pain    She went to [facility] November 2021 but did not complete.   As per mother has been admitted to the past to psychiatric hospital and it has been explained that it was behavioral related to addiction and no other psychiatric illness was identified.  She has never been found to have suicidal intent.    When she is managing her medications well  she does not exhibit or display negative behaviors. She does not believe to be suicidal in any way shape or fashion.

## 2022-05-19 NOTE — ED PROVIDER NOTE - DATE/TIME 2
Patient Seen in: [address]    History   Patient presents with:  Cough/URI    Stated Complaint: cough    HPI    Patient here with cough, congestion for 10 days. No travel, no known sick contacts.   Patient denies sig sancho reviewed. All other systems reviewed and negative except as noted above. PSFH elements reviewed from today and agreed except as otherwise stated in HPI.     Physical Exam     ED Triage Vitals [01/07/19 1354]   /77   Pulse 86   Resp 20   Temp 9 Propionate 50 MCG/ACT Nasal Suspension  2 sprays by Nasal route daily. Qty: 16 g Refills: 0    benzonatate 100 MG Oral Cap  Take 1 capsule (100 mg total) by mouth 3 (three) times daily as needed for cough.   Qty: 30 capsule Refills: 0 19-May-2022 10:42

## 2022-05-19 NOTE — ED PROVIDER NOTE - NSFOLLOWUPINSTRUCTIONS_ED_ALL_ED_FT
Opioid Withdrawal      Opioids are powerful substances that relieve pain. Opioids include illegal drugs, such as heroin, as well as prescription pain medicines, such as codeine, morphine, hydrocodone, oxycodone, and fentanyl. Opioid withdrawal can happen if you have been taking opioids for a period of time and suddenly stop.    Opioid withdrawal can be mild to severe, and it may include a variety of different symptoms. It is not usually life-threatening.      What are the causes?    This condition is caused by taking opioids for a prolonged amount of time — usually over several weeks, but sometimes for as few as 5 days of regular and uninterrupted use — and then by doing any of the following:  •Stopping use completely.      •Rapidly reducing use, either by reducing the dose or the frequency of use.      •Taking a medicine to block the effect of the opioid (opioid antagonist).        What increases the risk?    The following factors may make you more likely to develop this condition:  •Taking opioids incorrectly.      •Taking opioids for a long period of time.      •Having a history of opioid, alcohol, or illicit drug use and withdrawal from those substances.        What are the signs or symptoms?    Symptoms of this condition can be physical or mental. Physical symptoms include:  •Nausea and vomiting.      •Muscle aches or spasms.      •Watery eyes and runny nose.      •Widening of the dark centers of the eyes (dilated pupils).      •Hair standing on end or goose bumps on the arms and legs.      •Chills or sweating.      •Frequent yawning.      •Twitching or shaking that you cannot control (tremors).      •Flushing and itching of the skin.      •Stomach cramps and diarrhea.      •Increased blood pressure and fast pulse.      Mental symptoms include:  •Depression.      •Anxiety.      •Restlessness and irritability.      •Trouble sleeping.      •Increased drug craving      When symptoms start and how long they last depend on the kind of opioid you have been taking.  •Short-acting opioids, such as heroin and oxycodone, work fast and then lose their effect quickly. Symptoms occur within hours of stopping or reducing the amount you take. The worst symptoms (peak withdrawal) occur in 24–48 hours. Symptoms should diminish over the next 3 to 5 days.      •Long-acting opioids, such as methadone, work for a longer period of time. Symptoms can occur within 30 hours of stopping or reducing the amount you take, and they usually resolve over the next 10 days or so.      There are also medicines that block the effects of opioids (opioid antagonists), such as naltrexone or naloxone, and partial agonists such as buprenorphine. If you take one of these medicines, symptoms begin within minutes.      How is this diagnosed?    This condition is diagnosed based on:  •Your symptoms.    •Your medical history, including:  •Your history of drug and alcohol use.      •Which medicines you have been taking, including dosages and how long and often you have been taking them.        •A physical exam.      Based on your symptoms, you may have testing, such as an ECG to look at the rhythm of your heart or blood tests to check for problems.    Your health care provider may ask that you see a mental health professional or addiction specialist.      How is this treated?     Treatment for this condition is usually provided by mental health professionals with training in substance use disorders (addiction specialists). Treatment may involve:  •Counseling. This treatment is also called talk therapy. It is provided by substance use treatment counselors.      •Support groups. Support groups are run by people who have quit using opioids. They provide emotional support, advice, and guidance.    •Medicines. The medicines used may depend on the severity of your withdrawal. Options may include:  •Medicines to help reduce certain withdrawal symptoms, such as medicines for nausea, vomiting, diarrhea, or stomach cramps.      •An opioid or opioid-like medicine, such as methadone or buprenorphine, to replace the opioid that you have been taking. You may be asked to take less and less of this medicine over time to reduce or prevent withdrawal symptoms.      •Other medicines that may decrease the effects of withdrawal.          Follow these instructions at home:    •Take over-the-counter and prescription medicines only as told by your health care provider.      •Always check with your health care provider before starting any new medicines.      •Keep all follow-up visits as told by your health care provider. This includes all mental health and counseling visits. This is very important.        Contact a health care provider if:    •You are not able to take your medicines as told.      •Your symptoms get worse.      •You take an opioid after stopping use, or you take more of an opioid than you have been.      •You have questions about how to take your medicines or you are unsure of when to take them.        Get help right away if:    •You have a seizure.      •You lose consciousness.      •You cannot stop vomiting.      •You are unable to drink or eat, and you become weak and dizzy.      •You develop chest pain, shortness of breath, or fever.      •You have serious thoughts about hurting yourself or others.      If you ever feel like you may hurt yourself or others, or have thoughts about taking your own life, get help right away. Go to your nearest emergency department or:   • Call your local emergency services (911 in the [place]       • Call a suicide crisis helpline, such as the National Suicide Prevention Lifeline at 1-689.502.9519. This is open 24 hours a day in the [place]       • Text the Crisis Text Line at 307486 (in the [place]         Summary    •Opioid withdrawal is a group of symptoms that can occur if you have been taking opioids for a period of time and suddenly stop.      •Symptoms range from mild to severe, but opioid withdrawal is usually not life-threatening.       •Common symptoms include anxiety, tremors, chills, body aches, nausea, vomiting, and diarrhea. Symptoms may last 5–10 days or so depending on the type of opioids that were involved.      •Treatment may include counseling, support groups, and medicines.      This information is not intended to replace advice given to you by your health care provider. Make sure you discuss any questions you have with your health care provider.

## 2022-05-19 NOTE — ED ADULT NURSE REASSESSMENT NOTE - GENERAL PATIENT STATE
comfortable appearance/cooperative
verbalization improved, communication improved/comfortable appearance/cooperative

## 2022-05-19 NOTE — H&P ADULT - ASSESSMENT
31 oy F PMHx HTN Type 1 DM w/ episodes of DKA, diabetic gastroparesis, opiate dependence, ectopic pregnancy x 2, s/p bilat tubal ligation, was discharged from [facility] on 5/13 after Rx of gastroparesis and mild DKA. She then went o [facility] ED on 5/14. 16, 17 with same c/o. She was treated in ED and released. On 5/19/2022 she was sent from [facility] (for narcs) for aggressive behavior. As per EMS, pt. is withdrawing from taking 26mg of Dilaudid a day. Pt. began screaming, kicking and bashing her head against the wall. Pt. given 10 of IM versed by EMS. In ED- asking for narcotics IV and when told she does not need it, she became combative towards staff, tried to strangle herself with monitor wires and call bell wires. Required ativan, Klonopin and ketamine to provide adequate sedation and then MICU was consulted. Also noted mild DKA. ICU recs- U tox, 1:1 watch, Psych eval and Ativan taper. CT A/P showed thickening of gastric wall and colonic wall being read as Colitis and ED gave Zosyn and is requesting admission.       # gastric wall and Colonic wall thickening being read as Colitis  likely all related to gastroparesis/ narcotics   Clinically no diarrhea  will hold off antibx  DM diet PO  was given Zosyn in ED  RTC Reglan  Will get GI eval for any other input    # Abd pain, N, V requesting IV narcs  will give RTC Ativan taper  and follow prevention of self injury protocol  1:1 watch  Psych eval for substance detox admission    # Gastroparesis  RTC Reglan and GI eval    # DM2 poorly controlled. s/p DKA  last seen her Endo in 12/2021  Lantus, premeal, ISS here    # HTN uncontrolled  Start Norvasc, ARB    # Microcytic anemia  likely sec to poor nutrition  iron studies in AM    Lovenox  Dispo tbd

## 2022-05-19 NOTE — H&P ADULT - HISTORY OF PRESENT ILLNESS
31 oy F PMHx HTN Type 1 DM w/ episodes of DKA, diabetic gastroparesis, opiate dependence, ectopic pregnancy x 2, s/p bilat tubal ligation, was discharged from [facility] on 5/13 after Rx of gastroparesis and mild DKA. She then went o [facility] ED on 5/14. 16, 17 with same c/o. She was treated in ED and released. On 5/19/2022 she was sent from [facility] (for narcs) for aggressive behavior. As per EMS, pt. is withdrawing from taking 26mg of Dilaudid a day. Pt. began screaming, kicking and bashing her head against the wall. Pt. given 10 of IM versed by EMS. In ED- asking for narcotics IV and when told she does not need it, she became combative towards staff, tried to strangle herself with monitor wires and call bell wires. Required ativan, Klonopin and ketamine to provide adequate sedation and then MICU was consulted. Also noted mild DKA. ICU recs- U tox, 1:1 watch, Psych eval and Ativan taper. CT A/P showed thickenign of gastric wall and colonic wall being read as Colitis and ED gave Zosyn and is requesting admission.   In order to get appropriate Psych dispo and GI input for gastroparesis, will admit.    SH- Substance dependent/ use  FH- DM, HTN, Asthma in family

## 2022-05-19 NOTE — ED BEHAVIORAL HEALTH ASSESSMENT NOTE - DESCRIPTION
see above.  Currently sedated.     ICU Vital Signs Last 24 Hrs  T(C): 37.3 (19 May 2022 10:49), Max: 37.4 (19 May 2022 01:52)  T(F): 99.1 (19 May 2022 10:49), Max: 99.3 (19 May 2022 01:52)  HR: 107 (19 May 2022 12:56) (104 - 129)  BP: 150/97 (19 May 2022 10:49) (114/51 - 150/97)  BP(mean): --  ABP: --  ABP(mean): --  RR: 20 (19 May 2022 10:49) (16 - 20)  SpO2: 100% (19 May 2022 10:49) (95% - 100%) Pt lives alone. Her mother is nearby but most of other family lives in [place]. Patient states her boyfriend broke up with her 1 month ago. States she likes to spend time with her friends (however mother states most of her friends are "not around" when patient is ill). Patient used to work as  prior to medical illness. Pt his history of cannabis and tobacco use. Denies other drugs, IVDU.  HTN, DM on insulin, gastroparesis with prior admissions for pain and vomiting, ectopic pregnancy s/p b/l tubal ligation, cholecystectomy,

## 2022-05-19 NOTE — ED BEHAVIORAL HEALTH ASSESSMENT NOTE - ADDITIONAL DETAILS ALL
Patient reports putting cord around her neck was to get more rapid pain management and treatment in ED, currently lethargic and sedated

## 2022-05-19 NOTE — ED ADULT NURSE REASSESSMENT NOTE - NS ED NURSE REASSESS COMMENT FT1
07:00am . Insulin gtt discontinued by MD. Pt is connected to continuous cardiac, pulse ox and NIBP monitoring. Remains asleep at this time.
; MD Cordova made aware and D5NS to be stopped on pt
In ED stretcher, pt thrashing around and locking toes to one side of rail while jerking body back to other rail lifting stretcher off of 2 wheels. also broke side rail. verbal deescalation attempted with poor results. pt stopped with movement in bed to take meds then would continue to push railing with feet and back causing more damage. md called to bedside to attempt further deescalation. security notified to respond bedside for assist to help with avoiding pt causing damage to self and property. pt adamantly denied any SI/SA/HI/HA during early assessment. pt then stood up and wrapped TV cord around her neck pulling it very tight and making gagging noise, fighting off staff from attempts to remove.
MD Sykes obtained peripheral IV access by ultrasound guided IV placement. After IV placement, MD set the pump to 999ml/hr on insulin gtt. 50mls on insult gtt infused into patient. FS taken immediately showed BG of 372 decreased from 533. Insulin gtt never started, MD made aware. RN to take FS again in 30 minutes. Pt is awake and alert, connected to continuous cardiac, pulse ox and NIBP monitor.
Pt restless and thrashing around stretcher; safety maintained.  MD Cordova called to bedside for pt reevaluation.  Pt c/o abdominal pain and nausea; medication to be ordered and administered.  1:1 to remain in place for safety.  POC explained to pt. and will reevaluate s/p medications. FS to be repeated at 10am; awaiting results
Rec'd pt in stretcher AAO x2 to person and situation. Pt moaning in pain. Medications to be given as ordered. US guided IV #20 to L upper arm. BS to be checked now and then as ordered ac/hs. 1:1 maintained at bedside. All safety measures in place. In a yellow gown. Abd s/nt. Skin intact. Lungs CTA. Pt has fair hygiene. Remains on monitor and pulse ox. Will continue to monitor. In view of nursing station. VSS. Denies wanting to eat at this time.
Repeat . Insulin gtt remains off, pt remains connected to continuous monitoring. Remains easily arousable.
Report received from Connor LEYVA and Marge RN.  ; MD Cordova made aware and D5NS at 100ml to be ordered and administered; FS to remain Q30min as per MD for safe monitoring of BG.  Pt remains asleep with VSS; respirations even and unlabored on room air; NSR on cardiac monitor.  1:1 remains in place for pt safety and pt directly observed at this time.
multiple attempts made at obtaining new IV access, unsuccessful. Dr. Gonzalez and Dr. Sykes made aware and called to bedside for ultrasound IV line.
patient moved to critical care area at this time, on 1:1, placed on cardiac monitor/ at this time.
pt continues to thrash head against stretcher. md harrison at bedside. pt redirectable. pt states " I keep trying to hurt myself because the abdominal pain is so bad". medication administered as per orders. pt repositioned to be more comfortable. continued cardiac monitoring In place. 1:1 at bedside to maintain safety. safety maintained.
pt vomiting up bile. md harrison made aware. medication administered as per orders
report given to GORDON Castro for continuation of care.  Pt sleeping comfortably on stretcher.  No nonverbal indicators of pain present.  Respirations even and unlabored.  Awaiting further POC.  PIV wnl; flushing without difficulty.  1:1 remains in place for pt safety
Pt remains sleeping in stretcher. VSS. Pt to be given a dinner tray as ordered. Pt responds and awakes to verbal stimuli at this time. Remains pleasant and redirectable in stretcher. 1:1 in place. All safety measures carried out. Will continue to monitor and check BS before dinner.

## 2022-05-20 LAB
GLUCOSE BLDC GLUCOMTR-MCNC: 112 MG/DL — HIGH (ref 70–99)
GLUCOSE BLDC GLUCOMTR-MCNC: 117 MG/DL — HIGH (ref 70–99)
GLUCOSE BLDC GLUCOMTR-MCNC: 243 MG/DL — HIGH (ref 70–99)
GLUCOSE BLDC GLUCOMTR-MCNC: 396 MG/DL — HIGH (ref 70–99)

## 2022-05-20 PROCEDURE — 99233 SBSQ HOSP IP/OBS HIGH 50: CPT

## 2022-05-20 PROCEDURE — 99254 IP/OBS CNSLTJ NEW/EST MOD 60: CPT

## 2022-05-20 PROCEDURE — 99232 SBSQ HOSP IP/OBS MODERATE 35: CPT

## 2022-05-20 RX ORDER — PANTOPRAZOLE SODIUM 20 MG/1
40 TABLET, DELAYED RELEASE ORAL
Refills: 0 | Status: DISCONTINUED | OUTPATIENT
Start: 2022-05-20 | End: 2022-05-21

## 2022-05-20 RX ORDER — INSULIN LISPRO 100/ML
6 VIAL (ML) SUBCUTANEOUS ONCE
Refills: 0 | Status: COMPLETED | OUTPATIENT
Start: 2022-05-20 | End: 2022-05-20

## 2022-05-20 RX ADMIN — Medication 3 MILLIGRAM(S): at 02:51

## 2022-05-20 RX ADMIN — Medication 10 MILLIGRAM(S): at 17:17

## 2022-05-20 RX ADMIN — INSULIN GLARGINE 10 UNIT(S): 100 INJECTION, SOLUTION SUBCUTANEOUS at 21:18

## 2022-05-20 RX ADMIN — Medication 3 MILLIGRAM(S): at 10:41

## 2022-05-20 RX ADMIN — Medication 5 MILLIGRAM(S): at 05:42

## 2022-05-20 RX ADMIN — Medication 2 MILLIGRAM(S): at 17:16

## 2022-05-20 RX ADMIN — Medication 4 UNIT(S): at 17:16

## 2022-05-20 RX ADMIN — Medication 4: at 17:17

## 2022-05-20 RX ADMIN — Medication 6 UNIT(S): at 21:24

## 2022-05-20 RX ADMIN — Medication 2 MILLIGRAM(S): at 21:57

## 2022-05-20 NOTE — PROGRESS NOTE ADULT - SUBJECTIVE AND OBJECTIVE BOX
no events per 1:1  sleeping a lot  denies any complaints    MEDICATIONS  (STANDING):  dextrose 5%. 1000 milliLiter(s) (50 mL/Hr) IV Continuous <Continuous>  dextrose 5%. 1000 milliLiter(s) (100 mL/Hr) IV Continuous <Continuous>  dextrose 50% Injectable 25 Gram(s) IV Push once  dextrose 50% Injectable 12.5 Gram(s) IV Push once  dextrose 50% Injectable 25 Gram(s) IV Push once  enalapril 5 milliGRAM(s) Oral daily  enoxaparin Injectable 40 milliGRAM(s) SubCutaneous every 24 hours  glucagon  Injectable 1 milliGRAM(s) IntraMuscular once  insulin glargine Injectable (LANTUS) 10 Unit(s) SubCutaneous at bedtime  insulin lispro (ADMELOG) corrective regimen sliding scale   SubCutaneous three times a day before meals  insulin lispro Injectable (ADMELOG) 4 Unit(s) SubCutaneous three times a day before meals  LORazepam   Injectable 2 milliGRAM(s) IV Push every 6 hours  LORazepam   Injectable   IV Push   metoclopramide Injectable 10 milliGRAM(s) IV Push every 8 hours  senna 2 Tablet(s) Oral at bedtime    MEDICATIONS  (PRN):  dextrose Oral Gel 15 Gram(s) Oral once PRN Blood Glucose LESS THAN 70 milliGRAM(s)/deciliter      Allergies    Toradol (Pruritus)  Toradol (Unknown)    Vital Signs Last 24 Hrs  T(C): 36.8 (20 May 2022 10:32), Max: 36.8 (19 May 2022 18:08)  T(F): 98.2 (20 May 2022 10:32), Max: 98.2 (19 May 2022 18:08)  HR: 70 (20 May 2022 10:32) (70 - 102)  BP: 122/76 (20 May 2022 10:32) (120/76 - 124/78)  BP(mean): --  RR: 18 (20 May 2022 10:32) (18 - 18)  SpO2: 99% (20 May 2022 10:32) (97% - 100%)    PHYSICAL EXAM:    GENERAL: NAD  CHEST/LUNG: Clear to ausculation bilaterally  HEART: Regular rate and rhythm; S1 S2  ABDOMEN: Soft, Bowel sounds present  EXTREMITIES: no edema     LABS:                        8.9    13.40 )-----------( 385      ( 19 May 2022 07:27 )             29.3     05-    142  |  103  |  13.5  ----------------------------<  150<H>  4.1   |  22.0  |  0.91    Ca    9.1      19 May 2022 07:27        Urinalysis Basic - ( 19 May 2022 06:24 )    Color: Yellow / Appearance: Clear / S.010 / pH: x  Gluc: x / Ketone: Large  / Bili: Negative / Urobili: Negative mg/dL   Blood: x / Protein: 15 / Nitrite: Negative   Leuk Esterase: Negative / RBC: 11-25 /HPF / WBC 0-2 /HPF   Sq Epi: x / Non Sq Epi: Many / Bacteria: Occasional        CAPILLARY BLOOD GLUCOSE      POCT Blood Glucose.: 112 mg/dL (20 May 2022 12:34)  POCT Blood Glucose.: 117 mg/dL (20 May 2022 07:38)  POCT Blood Glucose.: 110 mg/dL (19 May 2022 22:33)  POCT Blood Glucose.: 332 mg/dL (19 May 2022 17:53)        RADIOLOGY & ADDITIONAL TESTS:

## 2022-05-20 NOTE — CONSULT NOTE ADULT - SUBJECTIVE AND OBJECTIVE BOX
HPI:  31 oy F PMHx HTN Type 1 DM w/ episodes of DKA, diabetic gastroparesis, opiate dependence, ectopic pregnancy x 2, s/p bilat tubal ligation, was discharged from [facility] on  after Rx of gastroparesis and mild DKA. She then went o VS ED on . 16, 17 with same c/o. She was treated in ED and released. On 2022 she was sent from [facility], [place] (for narcs) for aggressive behavior. As per EMS, pt. is withdrawing from taking 26mg of Dilaudid a day. Pt. began screaming, kicking and bashing her head against the wall. Pt. given 10 of IM versed by EMS. In ED- asking for narcotics IV and when told she does not need it, she became combative towards staff, tried to strangle herself with monitor wires and call bell wires. Required ativan, Klonopin and ketamine to provide adequate sedation and then MICU was consulted. Also noted mild DKA. ICU recs- U tox, 1:1 watch, Psych eval and Ativan taper. CT A/P showed thickenign of gastric wall and colonic wall being read as Colitis and ED gave Zosyn and is requesting admission.   In order to get appropriate Psych dispo and GI input for gastroparesis, will admit.    SH- Substance dependent/ use  FH- DM, HTN, Asthma in family   (19 May 2022 16:13)      PAST MEDICAL & SURGICAL HISTORY:  Diabetes mellitus type 1      Gastroparesis due to DM      Colitis      Hypertension      Diabetes mellitus      Gastroparesis      Ectopic pregnancy   and , s/p removal of right and left fallopian tubes      History of cholecystectomy          ROS:  No Heartburn, regurgitation, dysphagia, odynophagia.  No dyspepsia  No abdominal pain.    No Nausea, vomiting.  No Bleeding.  No hematemesis.   No diarrhea.    No hematochesia.  No weight loss, anorexia.  No edema.      MEDICATIONS  (STANDING):  dextrose 5%. 1000 milliLiter(s) (50 mL/Hr) IV Continuous <Continuous>  dextrose 5%. 1000 milliLiter(s) (100 mL/Hr) IV Continuous <Continuous>  dextrose 50% Injectable 25 Gram(s) IV Push once  dextrose 50% Injectable 12.5 Gram(s) IV Push once  dextrose 50% Injectable 25 Gram(s) IV Push once  enalapril 5 milliGRAM(s) Oral daily  enoxaparin Injectable 40 milliGRAM(s) SubCutaneous every 24 hours  glucagon  Injectable 1 milliGRAM(s) IntraMuscular once  insulin glargine Injectable (LANTUS) 10 Unit(s) SubCutaneous at bedtime  insulin lispro (ADMELOG) corrective regimen sliding scale   SubCutaneous three times a day before meals  insulin lispro Injectable (ADMELOG) 4 Unit(s) SubCutaneous three times a day before meals  LORazepam   Injectable 3 milliGRAM(s) IV Push every 6 hours  LORazepam   Injectable 2 milliGRAM(s) IV Push every 6 hours  LORazepam   Injectable   IV Push   metoclopramide Injectable 10 milliGRAM(s) IV Push every 8 hours  senna 2 Tablet(s) Oral at bedtime    MEDICATIONS  (PRN):  dextrose Oral Gel 15 Gram(s) Oral once PRN Blood Glucose LESS THAN 70 milliGRAM(s)/deciliter      Allergies    Toradol (Pruritus)  Toradol (Unknown)    Intolerances        SOCIAL HISTORY:    ENDOSCOPIC/GI HISTORY:    FAMILY HISTORY:  Family history of type 1 diabetes mellitus (Grandparent)    FH: HTN (hypertension) (Father)    FHx: asthma  Brother        Vital Signs Last 24 Hrs  T(C): 36.7 (20 May 2022 05:04), Max: 37.3 (19 May 2022 10:49)  T(F): 98.1 (20 May 2022 05:04), Max: 99.1 (19 May 2022 10:49)  HR: 88 (20 May 2022 05:04) (88 - 128)  BP: 124/78 (20 May 2022 05:04) (120/76 - 150/97)  BP(mean): --  RR: 18 (20 May 2022 05:04) (18 - 20)  SpO2: 97% (20 May 2022 05:04) (97% - 100%)    PHYSICAL EXAM:    GENERAL: NAD, well-groomed, well-developed  HEAD:  Atraumatic, Normocephalic  EYES: EOMI, PERRLA, conjunctiva and sclera clear  ENMT: No tonsillar erythema, exudates, or enlargement; Moist mucous membranes, Good dentition, No lesions  NECK: Supple, No JVD, Normal thyroid  CHEST/LUNG: Clear to percussion bilaterally; No rales, rhonchi, wheezing, or rubs  HEART: Regular rate and rhythm; No murmurs, rubs, or gallops  ABDOMEN: Soft, Nontender, Nondistended; Bowel sounds present  EXTREMITIES:  2+ Peripheral Pulses, No clubbing, cyanosis, or edema  LYMPH: No lymphadenopathy noted  SKIN: No rashes or lesions      LABS:                        8.9    13.40 )-----------( 385      ( 19 May 2022 07:27 )             29.3     05-19    142  |  103  |  13.5  ----------------------------<  150<H>  4.1   |  22.0  |  0.91    Ca    9.1      19 May 2022 07:27         Urinalysis Basic - ( 19 May 2022 06:24 )    Color: Yellow / Appearance: Clear / S.010 / pH: x  Gluc: x / Ketone: Large  / Bili: Negative / Urobili: Negative mg/dL   Blood: x / Protein: 15 / Nitrite: Negative   Leuk Esterase: Negative / RBC: 11-25 /HPF / WBC 0-2 /HPF   Sq Epi: x / Non Sq Epi: Many / Bacteria: Occasional              RADIOLOGY & ADDITIONAL STUDIES: HPI: Patient is on 1:1. Most of the history is obtained from the chart. 31 oy F PMHx HTN Type 1 DM w/ episodes of DKA, diabetic gastroparesis, opiate dependence, ectopic pregnancy x 2, s/p bilat tubal ligation, was discharged from [facility] on  after Rx of gastroparesis and mild DKA. She then went o VS ED on . 16, 17 with same c/o. She was treated in ED and released. On 2022 she was sent from [facility] (for narcs) for aggressive behavior. As per EMS, pt. is withdrawing from taking 26mg of Dilaudid a day. Pt. began screaming, kicking and bashing her head against the wall. Pt. given 10 of IM versed by EMS. In ED- asking for narcotics IV and when told she does not need it, she became combative towards staff, tried to strangle herself with monitor wires and call bell wires. Required ativan, Klonopin and ketamine to provide adequate sedation and then MICU was consulted. Also noted mild DKA. ICU recs- U tox, 1:1 watch, Psych eval and Ativan taper. CT A/P showed thickenign of gastric wall and colonic wall being read as Colitis and ED gave Zosyn and is requesting admission.   In order to get appropriate Psych dispo.     PAST MEDICAL & SURGICAL HISTORY:  Diabetes mellitus type 1      Gastroparesis due to DM      Colitis      Hypertension      Diabetes mellitus      Gastroparesis      Ectopic pregnancy   and , s/p removal of right and left fallopian tubes      History of cholecystectomy          ROS:  Could not be obtained    MEDICATIONS  (STANDING):  dextrose 5%. 1000 milliLiter(s) (50 mL/Hr) IV Continuous <Continuous>  dextrose 5%. 1000 milliLiter(s) (100 mL/Hr) IV Continuous <Continuous>  dextrose 50% Injectable 25 Gram(s) IV Push once  dextrose 50% Injectable 12.5 Gram(s) IV Push once  dextrose 50% Injectable 25 Gram(s) IV Push once  enalapril 5 milliGRAM(s) Oral daily  enoxaparin Injectable 40 milliGRAM(s) SubCutaneous every 24 hours  glucagon  Injectable 1 milliGRAM(s) IntraMuscular once  insulin glargine Injectable (LANTUS) 10 Unit(s) SubCutaneous at bedtime  insulin lispro (ADMELOG) corrective regimen sliding scale   SubCutaneous three times a day before meals  insulin lispro Injectable (ADMELOG) 4 Unit(s) SubCutaneous three times a day before meals  LORazepam   Injectable 3 milliGRAM(s) IV Push every 6 hours  LORazepam   Injectable 2 milliGRAM(s) IV Push every 6 hours  LORazepam   Injectable   IV Push   metoclopramide Injectable 10 milliGRAM(s) IV Push every 8 hours  senna 2 Tablet(s) Oral at bedtime    MEDICATIONS  (PRN):  dextrose Oral Gel 15 Gram(s) Oral once PRN Blood Glucose LESS THAN 70 milliGRAM(s)/deciliter      Allergies    Toradol (Pruritus)  Toradol (Unknown)    Intolerances        SOCIAL HISTORY:NC    ENDOSCOPIC/GI HISTORY:NC    FAMILY HISTORY:  Family history of type 1 diabetes mellitus (Grandparent)    FH: HTN (hypertension) (Father)    FHx: asthma  Brother        Vital Signs Last 24 Hrs  T(C): 36.7 (20 May 2022 05:04), Max: 37.3 (19 May 2022 10:49)  T(F): 98.1 (20 May 2022 05:04), Max: 99.1 (19 May 2022 10:49)  HR: 88 (20 May 2022 05:04) (88 - 128)  BP: 124/78 (20 May 2022 05:04) (120/76 - 150/97)  BP(mean): --  RR: 18 (20 May 2022 05:04) (18 - 20)  SpO2: 97% (20 May 2022 05:04) (97% - 100%)    PHYSICAL EXAM:    GENERAL: Drowsy  HEAD:  Atraumatic, Normocephalic  EYES: EOMI, PERRLA, conjunctiva and sclera clear  ENMT: No tonsillar erythema, exudates, or enlargement; Moist mucous membranes, Good dentition, No lesions  NECK: Supple, No JVD, Normal thyroid  CHEST/LUNG: Clear to percussion bilaterally; No rales, rhonchi, wheezing, or rubs  HEART: Regular rate and rhythm; No murmurs, rubs, or gallops  ABDOMEN: Soft, Nontender, Nondistended; Bowel sounds present  EXTREMITIES:  2+ Peripheral Pulses, No clubbing, cyanosis, or edema  LYMPH: No lymphadenopathy noted  SKIN: No rashes or lesions      LABS:                        8.9    13.40 )-----------( 385      ( 19 May 2022 07:27 )             29.3         142  |  103  |  13.5  ----------------------------<  150<H>  4.1   |  22.0  |  0.91    Ca    9.1      19 May 2022 07:27         Urinalysis Basic - ( 19 May 2022 06:24 )    Color: Yellow / Appearance: Clear / S.010 / pH: x  Gluc: x / Ketone: Large  / Bili: Negative / Urobili: Negative mg/dL   Blood: x / Protein: 15 / Nitrite: Negative   Leuk Esterase: Negative / RBC: 11-25 /HPF / WBC 0-2 /HPF   Sq Epi: x / Non Sq Epi: Many / Bacteria: Occasional              RADIOLOGY & ADDITIONAL STUDIES:  < from: CT Abdomen and Pelvis No Cont (22 @ 14:42) >    ACC: [id number] EXAM:  CT ABDOMEN AND PELVIS                          PROCEDURE DATE:  2022          INTERPRETATION:  CLINICAL INFORMATION: Epigastric pain. Nausea.    COMPARISON: Multiple prior CTs of the abdomen/pelvis, most recently   2022    CONTRAST/COMPLICATIONS:  IV Contrast: NONE  Oral Contrast: NONE  Complications: None reported at time of study completion  The examination is limited in the absence of intravenous contrast.    PROCEDURE:  CT of the Abdomen and Pelvis was performed.  Sagittal and coronal reformats were performed.    FINDINGS:  LOWER CHEST: Partially imaged reticular and groundglass opacities in the   right middle lobe, likely fibrotic change. Additional scattered   groundglass opacities at the lung bases in a mosaic pattern, possibly   related to air trapping. Linear atelectasis or scarring in the left lower   lobe.    LIVER: Within normal limits.  BILE DUCTS: Normal caliber.  GALLBLADDER: Cholecystectomy.  SPLEEN: Within normal limits.  PANCREAS: Withinnormal limits.  ADRENALS: Within normal limits.  KIDNEYS/URETERS: No renal stones or hydronephrosis.    BLADDER: Unremarkable.  REPRODUCTIVE ORGANS: Uterus and adnexa are unremarkable.    BOWEL: Colonic wall thickening involving the transverse and descending   colon. Limited evaluation of the sigmoid colon secondary to   underdistention and the lack of oral and intravenous contrast. Gastric   wall thickening. No evidence of appendicitis; visualized portions of the   appendix are normal in caliber.  PERITONEUM: Small amount of ascites.  VESSELS: Abdominal aorta normal in caliber.  RETROPERITONEUM/LYMPH NODES: No lymphadenopathy.  ABDOMINAL WALL: Unremarkable.  BONES: No aggressive osseous lesion.    IMPRESSION:  Examination limited in the absence of intravenous contrast.    Colitis involving the transverse and descending colon.    Gastric wall thickening, possibly gastritis.    --- End of Report ---            KELSEY ALVARADO MD; Attending Radiologist  This document has been electronically signed. May 19 2022  3:00PM    < end of copied text >

## 2022-05-20 NOTE — PROGRESS NOTE ADULT - ASSESSMENT
31 oy F PMHx HTN Type 1 DM w/ episodes of DKA, diabetic gastroparesis, opiate dependence, ectopic pregnancy x 2, s/p bilat tubal ligation, was discharged from [facility] on 5/13 after Rx of gastroparesis and mild DKA. She then went o VS ED on 5/14. 16, 17 with same c/o. She was treated in ED and released. On 5/19/2022 she was sent from [facility] (for narcs) for aggressive behavior. As per EMS, pt. is withdrawing from taking 26mg of Dilaudid a day. Pt. began screaming, kicking and bashing her head against the wall. Pt. given 10 of IM versed by EMS. In ED- asking for narcotics IV and when told she does not need it, she became combative towards staff, tried to strangle herself with monitor wires and call bell wires. Required ativan, Klonopin and ketamine to provide adequate sedation and then MICU was consulted. Also noted mild DKA. ICU recs- U tox, 1:1 watch, Psych eval and Ativan taper. CT A/P showed thickening of gastric wall and colonic wall being read as Colitis and ED gave Zosyn and is requesting admission.       # gastric wall and Colonic wall thickening being read as Colitis  likely all related to gastroparesis/ narcotics   Clinically no diarrhea  will hold off antibx  DM diet PO  was given Zosyn in ED  RTC Reglan  GI consulted     # Abd pain, N, V requesting IV narcs  will give RTC Ativan taper per ICU recs  and follow prevention of self injury protocol  1:1 watch  Psych eval for substance detox admission    # Gastroparesis  RTC Reglan and GI eval    # DM2 poorly controlled. s/p DKA  last seen her Endo in 12/2021  Lantus, premeal, ISS here    # HTN uncontrolled  Start Norvasc, ARB    # Microcytic anemia  likely sec to poor nutrition  iron studies in AM    Lovenox  Dispo tbd

## 2022-05-21 LAB
ALBUMIN SERPL ELPH-MCNC: 2.9 G/DL — LOW (ref 3.3–5.2)
ALP SERPL-CCNC: 85 U/L — SIGNIFICANT CHANGE UP (ref 40–120)
ALT FLD-CCNC: 14 U/L — SIGNIFICANT CHANGE UP
ANION GAP SERPL CALC-SCNC: 12 MMOL/L — SIGNIFICANT CHANGE UP (ref 5–17)
AST SERPL-CCNC: 20 U/L — SIGNIFICANT CHANGE UP
BILIRUB SERPL-MCNC: 0.3 MG/DL — LOW (ref 0.4–2)
BUN SERPL-MCNC: 14.4 MG/DL — SIGNIFICANT CHANGE UP (ref 8–20)
CALCIUM SERPL-MCNC: 8.8 MG/DL — SIGNIFICANT CHANGE UP (ref 8.6–10.2)
CHLORIDE SERPL-SCNC: 98 MMOL/L — SIGNIFICANT CHANGE UP (ref 98–107)
CO2 SERPL-SCNC: 23 MMOL/L — SIGNIFICANT CHANGE UP (ref 22–29)
CREAT SERPL-MCNC: 0.65 MG/DL — SIGNIFICANT CHANGE UP (ref 0.5–1.3)
EGFR: 120 ML/MIN/1.73M2 — SIGNIFICANT CHANGE UP
GLUCOSE BLDC GLUCOMTR-MCNC: 228 MG/DL — HIGH (ref 70–99)
GLUCOSE BLDC GLUCOMTR-MCNC: 343 MG/DL — HIGH (ref 70–99)
GLUCOSE BLDC GLUCOMTR-MCNC: 453 MG/DL — CRITICAL HIGH (ref 70–99)
GLUCOSE BLDC GLUCOMTR-MCNC: 83 MG/DL — SIGNIFICANT CHANGE UP (ref 70–99)
GLUCOSE SERPL-MCNC: 309 MG/DL — HIGH (ref 70–99)
HCT VFR BLD CALC: 32.9 % — LOW (ref 34.5–45)
HGB BLD-MCNC: 9.6 G/DL — LOW (ref 11.5–15.5)
MCHC RBC-ENTMCNC: 17.9 PG — LOW (ref 27–34)
MCHC RBC-ENTMCNC: 29.2 GM/DL — LOW (ref 32–36)
MCV RBC AUTO: 61.5 FL — LOW (ref 80–100)
PLATELET # BLD AUTO: 288 K/UL — SIGNIFICANT CHANGE UP (ref 150–400)
POTASSIUM SERPL-MCNC: 4 MMOL/L — SIGNIFICANT CHANGE UP (ref 3.5–5.3)
POTASSIUM SERPL-SCNC: 4 MMOL/L — SIGNIFICANT CHANGE UP (ref 3.5–5.3)
PROT SERPL-MCNC: 6.1 G/DL — LOW (ref 6.6–8.7)
RBC # BLD: 5.35 M/UL — HIGH (ref 3.8–5.2)
RBC # FLD: 19.1 % — HIGH (ref 10.3–14.5)
SODIUM SERPL-SCNC: 133 MMOL/L — LOW (ref 135–145)
WBC # BLD: 7.17 K/UL — SIGNIFICANT CHANGE UP (ref 3.8–10.5)
WBC # FLD AUTO: 7.17 K/UL — SIGNIFICANT CHANGE UP (ref 3.8–10.5)

## 2022-05-21 PROCEDURE — 99221 1ST HOSP IP/OBS SF/LOW 40: CPT

## 2022-05-21 PROCEDURE — 99233 SBSQ HOSP IP/OBS HIGH 50: CPT

## 2022-05-21 PROCEDURE — 99252 IP/OBS CONSLTJ NEW/EST SF 35: CPT

## 2022-05-21 RX ORDER — PANTOPRAZOLE SODIUM 20 MG/1
40 TABLET, DELAYED RELEASE ORAL
Refills: 0 | Status: DISCONTINUED | OUTPATIENT
Start: 2022-05-21 | End: 2022-05-25

## 2022-05-21 RX ORDER — ACETAMINOPHEN 500 MG
1000 TABLET ORAL ONCE
Refills: 0 | Status: COMPLETED | OUTPATIENT
Start: 2022-05-21 | End: 2022-05-22

## 2022-05-21 RX ORDER — DIPHENHYDRAMINE HCL 50 MG
25 CAPSULE ORAL ONCE
Refills: 0 | Status: COMPLETED | OUTPATIENT
Start: 2022-05-21 | End: 2022-05-22

## 2022-05-21 RX ORDER — ACETAMINOPHEN 500 MG
1000 TABLET ORAL ONCE
Refills: 0 | Status: COMPLETED | OUTPATIENT
Start: 2022-05-21 | End: 2022-05-21

## 2022-05-21 RX ADMIN — INSULIN GLARGINE 10 UNIT(S): 100 INJECTION, SOLUTION SUBCUTANEOUS at 22:07

## 2022-05-21 RX ADMIN — PANTOPRAZOLE SODIUM 40 MILLIGRAM(S): 20 TABLET, DELAYED RELEASE ORAL at 16:36

## 2022-05-21 RX ADMIN — ENOXAPARIN SODIUM 40 MILLIGRAM(S): 100 INJECTION SUBCUTANEOUS at 05:57

## 2022-05-21 RX ADMIN — Medication 5 MILLIGRAM(S): at 05:57

## 2022-05-21 RX ADMIN — Medication 10 MILLIGRAM(S): at 22:08

## 2022-05-21 RX ADMIN — Medication 400 MILLIGRAM(S): at 13:22

## 2022-05-21 RX ADMIN — Medication 4 UNIT(S): at 16:36

## 2022-05-21 RX ADMIN — Medication 4 UNIT(S): at 07:34

## 2022-05-21 RX ADMIN — Medication 1 MILLIGRAM(S): at 11:16

## 2022-05-21 RX ADMIN — PANTOPRAZOLE SODIUM 40 MILLIGRAM(S): 20 TABLET, DELAYED RELEASE ORAL at 05:58

## 2022-05-21 RX ADMIN — Medication 1000 MILLIGRAM(S): at 13:30

## 2022-05-21 RX ADMIN — Medication 10 MILLIGRAM(S): at 16:36

## 2022-05-21 RX ADMIN — Medication 2 MILLIGRAM(S): at 03:51

## 2022-05-21 RX ADMIN — Medication 4 UNIT(S): at 11:06

## 2022-05-21 RX ADMIN — Medication 10 MILLIGRAM(S): at 08:06

## 2022-05-21 RX ADMIN — Medication 12: at 07:34

## 2022-05-21 RX ADMIN — Medication 1 MILLIGRAM(S): at 16:35

## 2022-05-21 RX ADMIN — Medication 10 MILLIGRAM(S): at 00:11

## 2022-05-21 RX ADMIN — Medication 1 MILLIGRAM(S): at 05:53

## 2022-05-21 RX ADMIN — Medication 4: at 11:06

## 2022-05-21 NOTE — CONSULT NOTE ADULT - SUBJECTIVE AND OBJECTIVE BOX
HPI:  32 oy F PMHx HTN Type 1 DM w/ episodes of DKA, diabetic gastroparesis, opiate dependence, ectopic pregnancy x 2, s/p bilat tubal ligation, was discharged from [facility] on 5/13 after Rx of gastroparesis and mild DKA. She had later similar presentation to ER. On 5/19/2022 she was sent from [facility] (for narcs) for aggressive behavior. As per EMS, pt. is withdrawing from taking 26mg of Dilaudid a day. Pt. began screaming, kicking and bashing her head against the wall.   She became combative towards staff, tried to strangle herself with monitor wires and call bell wires. Required ativan, Klonopin and ketamine to provide adequate sedation and then MICU was consulted. Also noted mild DKA.   She also seem to have colitis and ED gave Zosyn and is requesting admission.     PAST MEDICAL & SURGICAL HISTORY:  Diabetes mellitus type 1  Gastroparesis due to DM  Colitis  Hypertension  Diabetes mellitus  Gastroparesis    Ectopic [id number] and 2013, s/p removal of right and left fallopian tubes  History of cholecystectomy      FAMILY HISTORY:  Family history of type 1 diabetes mellitus (Grandparent)  FH: HTN (hypertension) (Father)    FHx: asthma Brother    SOCIAL HISTORY: drug user,     REVIEW OF SYSTEMS:    Constitutional: No fever, no chills, no change in weight.    Eyes: No eye swelling,no  blurry vision, no redness, no loss of vision.    Neck: No neck pain, no change in voice.    Lungs: No shortness of breath, no wheezing, no cough    CV: No chest pain, no palpitations, no pain with walking.    GI: No nausea, no vomiting, no constipation, no diarrhea, no abdominal pain    : No urinary frequency, no blood in urine, no urinary burning, no difficulty voiding.    Musculoskeletal: No muscle pain, no joint pain, no swelling.    Skin: No rash, no infections.    Neurologic: No headaches, no weakness, no burning or pain in feet, no tremor.    Endocrine: No heat intolerance, no cold intolerance, no increased sweating, no shakiness between meals.    Psych: No depression, no anxiety, no trouble concentrating    MEDICATIONS  (STANDING):  dextrose 5%. 1000 milliLiter(s) (100 mL/Hr) IV Continuous <Continuous>  dextrose 5%. 1000 milliLiter(s) (50 mL/Hr) IV Continuous <Continuous>  dextrose 50% Injectable 25 Gram(s) IV Push once  dextrose 50% Injectable 12.5 Gram(s) IV Push once  dextrose 50% Injectable 25 Gram(s) IV Push once  enalapril 5 milliGRAM(s) Oral daily  enoxaparin Injectable 40 milliGRAM(s) SubCutaneous every 24 hours  glucagon  Injectable 1 milliGRAM(s) IntraMuscular once  insulin glargine Injectable (LANTUS) 10 Unit(s) SubCutaneous at bedtime  insulin lispro (ADMELOG) corrective regimen sliding scale   SubCutaneous three times a day before meals  insulin lispro Injectable (ADMELOG) 4 Unit(s) SubCutaneous three times a day before meals  LORazepam   Injectable   IV Push   metoclopramide Injectable 10 milliGRAM(s) IV Push every 8 hours  pantoprazole    Tablet 40 milliGRAM(s) Oral two times a day  senna 2 Tablet(s) Oral at bedtime    MEDICATIONS  (PRN):  dextrose Oral Gel 15 Gram(s) Oral once PRN Blood Glucose LESS THAN 70 milliGRAM(s)/deciliter    Allergies  Toradol (Pruritus)  Toradol (Unknown)    CAPILLARY BLOOD GLUCOSE  POCT Blood Glucose.: 83 mg/dL (21 May 2022 16:10)    PHYSICAL EXAM:    Vital Signs Last 24 Hrs  T(C): 36.5 (21 May 2022 11:59), Max: 36.7 (20 May 2022 23:40)  T(F): 97.7 (21 May 2022 11:59), Max: 98.1 (20 May 2022 23:40)  HR: 79 (21 May 2022 11:59) (79 - 88)  BP: 148/95 (21 May 2022 11:59) (122/74 - 148/95)  BP(mean): --  RR: 18 (21 May 2022 11:59) (18 - 18)  SpO2: 97% (21 May 2022 11:59) (97% - 98%)  PHYSICAL EXAM:  GENERAL: Drowsy  HEAD:  Atraumatic, Normocephalic  EYES: EOMI, PERRLA, conjunctiva and sclera clear  ENMT: No tonsillar erythema, exudates, or enlargement;   NECK: Supple, No JVD, Normal thyroid  CHEST/LUNG: Clear to percussion bilaterally; No rales, rhonchi, wheezing, or rubs  HEART: Regular rate and rhythm; No murmurs, rubs, or gallops  ABDOMEN: Soft, Nontender, Nondistended; Bowel sounds present  EXTREMITIES:  2+ Peripheral Pulses, No clubbing, cyanosis, or edema  SKIN: No rashes     LABS:                        9.6    7.17  )-----------( 288      ( 21 May 2022 09:39 )             32.9     05-21    133<L>  |  98  |  14.4  ----------------------------<  309<H>  4.0   |  23.0  |  0.65    Ca    8.8      21 May 2022 09:39    TPro  6.1<L>  /  Alb  2.9<L>  /  TBili  0.3<L>  /  DBili  x   /  AST  20  /  ALT  14  /  AlkPhos  85  05-21      LIVER FUNCTIONS - ( 21 May 2022 09:39 )  Alb: 2.9 g/dL / Pro: 6.1 g/dL / ALK PHOS: 85 U/L / ALT: 14 U/L / AST: 20 U/L / GGT: x           CAPILLARY BLOOD GLUCOSE  POCT Blood Glucose.: 83 mg/dL (21 May 2022 16:10)  POCT Blood Glucose.: 228 mg/dL (21 May 2022 10:57)  POCT Blood Glucose.: 453 mg/dL (21 May 2022 07:27)  POCT Blood Glucose.: 396 mg/dL (20 May 2022 21:10)  POCT Blood Glucose.: 243 mg/dL (20 May 2022 17:09)    INTERPRETATION:  CLINICAL INFORMATION: Epigastric pain. Nausea.    COMPARISON: Multiple prior CTs of the abdomen/pelvis, most recently   5/18/2022    CONTRAST/COMPLICATIONS:  IV Contrast: NONE  Oral Contrast: NONE  Complications: None reported at time of study completion  The examination is limited in the absence of intravenous contrast.    PROCEDURE:  CT of the Abdomen and Pelvis was performed.  Sagittal and coronal reformats were performed.    FINDINGS:  LOWER CHEST: Partially imaged reticular and groundglass opacities in the   right middle lobe, likely fibrotic change. Additional scattered   groundglass opacities at the lung bases in a mosaic pattern, possibly   related to air trapping. Linear atelectasis or scarring in the left lower   lobe.    LIVER: Within normal limits.  BILE DUCTS: Normal caliber.  GALLBLADDER: Cholecystectomy.  SPLEEN: Within normal limits.  PANCREAS: Withinnormal limits.  ADRENALS: Within normal limits.  KIDNEYS/URETERS: No renal stones or hydronephrosis.    BLADDER: Unremarkable.  REPRODUCTIVE ORGANS: Uterus and adnexa are unremarkable.    BOWEL: Colonic wall thickening involving the transverse and descending colon. Limited evaluation of the sigmoid colon secondary to   underdistention and the lack of oral and intravenous contrast. Gastric wall thickening. No evidence of appendicitis; visualized portions of the appendix are normal in caliber.  PERITONEUM: Small amount of ascites.  VESSELS: Abdominal aorta normal in caliber.  RETROPERITONEUM/LYMPH NODES: No lymphadenopathy.  ABDOMINAL WALL: Unremarkable.  BONES: No aggressive osseous lesion.    IMPRESSION:  Examination limited in the absence of intravenous contrast.  Colitis involving the transverse and descending colon.  Gastric wall thickening, possibly gastritis.    --- End of Report ---             HPI:  32 oy F PMHx HTN Type 1 DM w/ episodes of DKA, diabetic gastroparesis, opiate dependence, ectopic pregnancy x 2, s/p bilat tubal ligation, was discharged from [facility] on 5/13 after Rx of gastroparesis and mild DKA. She had later similar presentation to ER. On 5/19/2022 she was sent from [facility] (for narcs) for aggressive behavior. As per EMS, pt. is withdrawing from taking 26mg of Dilaudid a day. Pt. began screaming, kicking and bashing her head against the wall.   She became combative towards staff, tried to strangle herself with monitor wires and call bell wires. Required ativan, Klonopin and ketamine to provide adequate sedation and then MICU was consulted. Also noted mild DKA.   She also seem to have colitis and ED gave Zosyn and is requesting admission. DM sincce 11 yo, Sh eateks basal bolus at home.     PAST MEDICAL & SURGICAL HISTORY:  Diabetes mellitus type 1  Gastroparesis due to DM  Colitis  Hypertension  Diabetes mellitus  Gastroparesis    Ectopic [id number] and 2013, s/p removal of right and left fallopian tubes  History of cholecystectomy      FAMILY HISTORY:  Family history of type 1 diabetes mellitus (Grandparent)  FH: HTN (hypertension) (Father)    FHx: asthma Brother    SOCIAL HISTORY: drug user, no etoh, smoker    REVIEW OF SYSTEMS:    Constitutional: No fever, no chills, no change in weight.    Eyes: No eye swelling,no  blurry vision, no redness, no loss of vision.    Neck: No neck pain, no change in voice.    Lungs: No shortness of breath, no wheezing, no cough    CV: No chest pain, no palpitations, no pain with walking.    GI: No nausea, no vomiting, no constipation, no diarrhea, no abdominal pain    : No urinary frequency, no blood in urine, no urinary burning, no difficulty voiding.    Musculoskeletal: No muscle pain, no joint pain, no swelling.    Skin: No rash, no infections.    Neurologic: No headaches, no weakness, no burning or pain in feet, no tremor.    Endocrine: No heat intolerance, no cold intolerance, no increased sweating, no shakiness between meals.    Psych: No depression, no anxiety, no trouble concentrating    MEDICATIONS  (STANDING):  dextrose 5%. 1000 milliLiter(s) (100 mL/Hr) IV Continuous <Continuous>  dextrose 5%. 1000 milliLiter(s) (50 mL/Hr) IV Continuous <Continuous>  dextrose 50% Injectable 25 Gram(s) IV Push once  dextrose 50% Injectable 12.5 Gram(s) IV Push once  dextrose 50% Injectable 25 Gram(s) IV Push once  enalapril 5 milliGRAM(s) Oral daily  enoxaparin Injectable 40 milliGRAM(s) SubCutaneous every 24 hours  glucagon  Injectable 1 milliGRAM(s) IntraMuscular once  insulin glargine Injectable (LANTUS) 10 Unit(s) SubCutaneous at bedtime  insulin lispro (ADMELOG) corrective regimen sliding scale   SubCutaneous three times a day before meals  insulin lispro Injectable (ADMELOG) 4 Unit(s) SubCutaneous three times a day before meals  LORazepam   Injectable   IV Push   metoclopramide Injectable 10 milliGRAM(s) IV Push every 8 hours  pantoprazole    Tablet 40 milliGRAM(s) Oral two times a day  senna 2 Tablet(s) Oral at bedtime    MEDICATIONS  (PRN):  dextrose Oral Gel 15 Gram(s) Oral once PRN Blood Glucose LESS THAN 70 milliGRAM(s)/deciliter    Allergies  Toradol (Pruritus)  Toradol (Unknown)    CAPILLARY BLOOD GLUCOSE  POCT Blood Glucose.: 83 mg/dL (21 May 2022 16:10)    PHYSICAL EXAM:    Vital Signs Last 24 Hrs  T(C): 36.5 (21 May 2022 11:59), Max: 36.7 (20 May 2022 23:40)  T(F): 97.7 (21 May 2022 11:59), Max: 98.1 (20 May 2022 23:40)  HR: 79 (21 May 2022 11:59) (79 - 88)  BP: 148/95 (21 May 2022 11:59) (122/74 - 148/95)  BP(mean): --  RR: 18 (21 May 2022 11:59) (18 - 18)  SpO2: 97% (21 May 2022 11:59) (97% - 98%)  PHYSICAL EXAM:  GENERAL: Drowsy  HEAD:  Atraumatic, Normocephalic  EYES: EOMI, PERRLA, conjunctiva and sclera clear  ENMT: No tonsillar erythema, exudates, or enlargement;   NECK: Supple, No JVD, Normal thyroid  CHEST/LUNG: Clear to percussion bilaterally; No rales, rhonchi, wheezing, or rubs  HEART: Regular rate and rhythm; No murmurs, rubs, or gallops  ABDOMEN: Soft, Nontender, Nondistended; Bowel sounds present  EXTREMITIES:  2+ Peripheral Pulses, No clubbing, cyanosis, or edema  SKIN: No rashes     LABS:                        9.6    7.17  )-----------( 288      ( 21 May 2022 09:39 )             32.9     05-21    133<L>  |  98  |  14.4  ----------------------------<  309<H>  4.0   |  23.0  |  0.65    Ca    8.8      21 May 2022 09:39    TPro  6.1<L>  /  Alb  2.9<L>  /  TBili  0.3<L>  /  DBili  x   /  AST  20  /  ALT  14  /  AlkPhos  85  05-21      LIVER FUNCTIONS - ( 21 May 2022 09:39 )  Alb: 2.9 g/dL / Pro: 6.1 g/dL / ALK PHOS: 85 U/L / ALT: 14 U/L / AST: 20 U/L / GGT: x           CAPILLARY BLOOD GLUCOSE  POCT Blood Glucose.: 83 mg/dL (21 May 2022 16:10)  POCT Blood Glucose.: 228 mg/dL (21 May 2022 10:57)  POCT Blood Glucose.: 453 mg/dL (21 May 2022 07:27)  POCT Blood Glucose.: 396 mg/dL (20 May 2022 21:10)  POCT Blood Glucose.: 243 mg/dL (20 May 2022 17:09)    INTERPRETATION:  CLINICAL INFORMATION: Epigastric pain. Nausea.    COMPARISON: Multiple prior CTs of the abdomen/pelvis, most recently   5/18/2022    CONTRAST/COMPLICATIONS:  IV Contrast: NONE  Oral Contrast: NONE  Complications: None reported at time of study completion  The examination is limited in the absence of intravenous contrast.    PROCEDURE:  CT of the Abdomen and Pelvis was performed.  Sagittal and coronal reformats were performed.    FINDINGS:  LOWER CHEST: Partially imaged reticular and groundglass opacities in the   right middle lobe, likely fibrotic change. Additional scattered   groundglass opacities at the lung bases in a mosaic pattern, possibly   related to air trapping. Linear atelectasis or scarring in the left lower   lobe.    LIVER: Within normal limits.  BILE DUCTS: Normal caliber.  GALLBLADDER: Cholecystectomy.  SPLEEN: Within normal limits.  PANCREAS: Withinnormal limits.  ADRENALS: Within normal limits.  KIDNEYS/URETERS: No renal stones or hydronephrosis.    BLADDER: Unremarkable.  REPRODUCTIVE ORGANS: Uterus and adnexa are unremarkable.    BOWEL: Colonic wall thickening involving the transverse and descending colon. Limited evaluation of the sigmoid colon secondary to   underdistention and the lack of oral and intravenous contrast. Gastric wall thickening. No evidence of appendicitis; visualized portions of the appendix are normal in caliber.  PERITONEUM: Small amount of ascites.  VESSELS: Abdominal aorta normal in caliber.  RETROPERITONEUM/LYMPH NODES: No lymphadenopathy.  ABDOMINAL WALL: Unremarkable.  BONES: No aggressive osseous lesion.    IMPRESSION:  Examination limited in the absence of intravenous contrast.  Colitis involving the transverse and descending colon.  Gastric wall thickening, possibly gastritis.    --- End of Report ---

## 2022-05-21 NOTE — PROGRESS NOTE ADULT - ASSESSMENT
31 oy F PMHx HTN Type 1 DM w/ episodes of DKA, diabetic gastroparesis, opiate dependence, ectopic pregnancy x 2, s/p bilat tubal ligation, was discharged from [facility] on 5/13 after Rx of gastroparesis and mild DKA. She then went o [facility] ED on 5/14. 16, 17 with same c/o. She was treated in ED and released. On 5/19/2022 she was sent from [facility] (for narcs) for aggressive behavior. As per EMS, pt. is withdrawing from taking 26mg of Dilaudid a day. Pt. began screaming, kicking and bashing her head against the wall. Pt. given 10 of IM versed by EMS. In ED- asking for narcotics IV and when told she does not need it, she became combative towards staff, tried to strangle herself with monitor wires and call bell wires. Required ativan, Klonopin and ketamine to provide adequate sedation and then MICU was consulted. Also noted mild DKA. ICU recs- U tox, 1:1 watch, Psych eval and Ativan taper. CT A/P showed thickening of gastric wall and colonic wall being read as Colitis and ED gave Zosyn and is requesting admission.     # gastric wall and Colonic wall thickening being read as Colitis  likely all related to gastroparesis/ narcotics   Clinically no diarrhea  will hold off antibx  DM diet PO  was given Zosyn in ED  RTC Reglan  GI consulted- will c/w PPI BID    # Abd pain, N, V requesting IV narcs  will give RTC Ativan taper per ICU recs  and follow prevention of self injury protocol  1:1 watch  Psych eval for substance detox admission    # Gastroparesis  RTC Reglan and GI eval    # DM2 poorly controlled. s/p DKA  last seen her Endo in 12/2021  Lantus, premeal, ISS here  Endo c/s placed given uncontrolled FS    # HTN uncontrolled  C/w Norvasc, ARB    # Microcytic anemia  likely sec to poor nutrition    Lovenox  Dispo tbd  Patient presents from [facility] in [place] for agitation.      31F PMHx HTN Type 1 DM w/ episodes of DKA, diabetic gastroparesis, opiate dependence, ectopic pregnancy x 2, s/p bilat tubal ligation, was discharged from [facility] on 5/13 after Rx of gastroparesis and mild DKA. She then went o [facility] ED on 5/14. 16, 17 with same c/o. She was treated in ED and released. On 5/19/2022 she was sent from [facility] (for narcs) for aggressive behavior. As per EMS, pt. is withdrawing from taking 26mg of Dilaudid a day. Pt. began screaming, kicking and bashing her head against the wall. Pt. given 10 of IM versed by EMS. In ED- asking for narcotics IV and when told she does not need it, she became combative towards staff, tried to strangle herself with monitor wires and call bell wires. Required ativan, Klonopin and ketamine to provide adequate sedation and then MICU was consulted. Also noted mild DKA. ICU recs- U tox, 1:1 watch, Psych eval and Ativan taper. CT A/P showed thickening of gastric wall and colonic wall being read as Colitis and ED gave Zosyn and is requesting admission.     # gastric wall and Colonic wall thickening being read as Colitis  likely all related to gastroparesis/ narcotics   Clinically no diarrhea  will hold off antibx  DM diet PO  was given Zosyn in ED  RTC Reglan  GI consulted- will c/w PPI BID    # Abd pain, N, V requesting IV narcs  will give RTC Ativan taper per ICU recs  and follow prevention of self injury protocol  1:1 watch  Psych eval for substance detox admission    # Gastroparesis  RTC Reglan and GI eval    # DM2 poorly controlled. s/p DKA  last seen her Endo in 12/2021  Lantus, premeal, ISS here  Endo c/s placed given uncontrolled FS    # HTN uncontrolled  C/w Norvasc, ARB    # Microcytic anemia  likely sec to poor nutrition    Lovenox  Dispo tbd  Patient presents from [facility] in [place] for agitation.   Updated patient's mom, Flower ([phone]) on 5/21.

## 2022-05-21 NOTE — PROGRESS NOTE ADULT - SUBJECTIVE AND OBJECTIVE BOX
[facility] Division of Hospital Medicine    Chief Complaint: abdominal pain      SUBJECTIVE / OVERNIGHT EVENTS: No acute events overnight. Patient continues to endorse abdominal pain. Denies n/v/f/c/h/d.     Patient denies chest pain, SOB, N/V, fever, chills, dysuria or any other complaints. All remainder ROS negative.     MEDICATIONS  (STANDING):  dextrose 5%. 1000 milliLiter(s) (100 mL/Hr) IV Continuous <Continuous>  dextrose 5%. 1000 milliLiter(s) (50 mL/Hr) IV Continuous <Continuous>  dextrose 50% Injectable 25 Gram(s) IV Push once  dextrose 50% Injectable 12.5 Gram(s) IV Push once  dextrose 50% Injectable 25 Gram(s) IV Push once  enalapril 5 milliGRAM(s) Oral daily  enoxaparin Injectable 40 milliGRAM(s) SubCutaneous every 24 hours  glucagon  Injectable 1 milliGRAM(s) IntraMuscular once  insulin glargine Injectable (LANTUS) 10 Unit(s) SubCutaneous at bedtime  insulin lispro (ADMELOG) corrective regimen sliding scale   SubCutaneous three times a day before meals  insulin lispro Injectable (ADMELOG) 4 Unit(s) SubCutaneous three times a day before meals  LORazepam   Injectable   IV Push   LORazepam   Injectable 1 milliGRAM(s) IV Push every 6 hours  metoclopramide Injectable 10 milliGRAM(s) IV Push every 8 hours  pantoprazole    Tablet 40 milliGRAM(s) Oral before breakfast  senna 2 Tablet(s) Oral at bedtime    MEDICATIONS  (PRN):  dextrose Oral Gel 15 Gram(s) Oral once PRN Blood Glucose LESS THAN 70 milliGRAM(s)/deciliter        I&O's Summary      PHYSICAL EXAM:  Vital Signs Last 24 Hrs  T(C): 36.7 (21 May 2022 04:20), Max: 36.7 (20 May 2022 15:57)  T(F): 98 (21 May 2022 04:20), Max: 98.1 (20 May 2022 23:40)  HR: 81 (21 May 2022 04:20) (81 - 90)  BP: 122/74 (21 May 2022 04:20) (122/74 - 138/87)  BP(mean): --  RR: 18 (21 May 2022 04:20) (18 - 18)  SpO2: 97% (21 May 2022 04:20) (97% - 98%)    CONSTITUTIONAL: NAD, well-developed  RESPIRATORY: Normal respiratory effort; lungs are clear to auscultation bilaterally  CARDIOVASCULAR: Regular rate and rhythm, normal S1 and S2  ABDOMEN: Nontender to palpation, normoactive bowel sound  PSYCH: A+O to person, place, and time; affect appropriate  NEUROLOGY: CN 2-12 are intact and symmetric; no gross sensory deficits;   SKIN: No rashes; no palpable lesions    LABS:                        9.6    7.17  )-----------( 288      ( 21 May 2022 09:39 )             32.9     05-21    133<L>  |  98  |  14.4  ----------------------------<  309<H>  4.0   |  23.0  |  0.65    Ca    8.8      21 May 2022 09:39    TPro  6.1<L>  /  Alb  2.9<L>  /  TBili  0.3<L>  /  DBili  x   /  AST  20  /  ALT  14  /  AlkPhos  85  05-21              CAPILLARY BLOOD GLUCOSE      POCT Blood Glucose.: 228 mg/dL (21 May 2022 10:57)  POCT Blood Glucose.: 453 mg/dL (21 May 2022 07:27)  POCT Blood Glucose.: 396 mg/dL (20 May 2022 21:10)  POCT Blood Glucose.: 243 mg/dL (20 May 2022 17:09)  POCT Blood Glucose.: 112 mg/dL (20 May 2022 12:34)        RADIOLOGY & ADDITIONAL TESTS:  Results Reviewed:   Imaging Personally Reviewed:  Electrocardiogram Personally Reviewed:

## 2022-05-22 LAB
ALBUMIN SERPL ELPH-MCNC: 3.2 G/DL — LOW (ref 3.3–5.2)
ALBUMIN SERPL ELPH-MCNC: 3.4 G/DL — SIGNIFICANT CHANGE UP (ref 3.3–5.2)
ALP SERPL-CCNC: 107 U/L — SIGNIFICANT CHANGE UP (ref 40–120)
ALP SERPL-CCNC: 94 U/L — SIGNIFICANT CHANGE UP (ref 40–120)
ALT FLD-CCNC: 13 U/L — SIGNIFICANT CHANGE UP
ALT FLD-CCNC: 14 U/L — SIGNIFICANT CHANGE UP
AMYLASE P1 CFR SERPL: 42 U/L — SIGNIFICANT CHANGE UP (ref 36–128)
ANION GAP SERPL CALC-SCNC: 17 MMOL/L — SIGNIFICANT CHANGE UP (ref 5–17)
ANION GAP SERPL CALC-SCNC: 19 MMOL/L — HIGH (ref 5–17)
APPEARANCE UR: CLEAR — SIGNIFICANT CHANGE UP
AST SERPL-CCNC: 14 U/L — SIGNIFICANT CHANGE UP
AST SERPL-CCNC: 18 U/L — SIGNIFICANT CHANGE UP
BACTERIA # UR AUTO: ABNORMAL
BILIRUB SERPL-MCNC: 0.3 MG/DL — LOW (ref 0.4–2)
BILIRUB SERPL-MCNC: 0.4 MG/DL — SIGNIFICANT CHANGE UP (ref 0.4–2)
BILIRUB UR-MCNC: NEGATIVE — SIGNIFICANT CHANGE UP
BUN SERPL-MCNC: 22.1 MG/DL — HIGH (ref 8–20)
BUN SERPL-MCNC: 24.5 MG/DL — HIGH (ref 8–20)
CALCIUM SERPL-MCNC: 8.8 MG/DL — SIGNIFICANT CHANGE UP (ref 8.6–10.2)
CALCIUM SERPL-MCNC: 9.6 MG/DL — SIGNIFICANT CHANGE UP (ref 8.6–10.2)
CHLORIDE SERPL-SCNC: 94 MMOL/L — LOW (ref 98–107)
CHLORIDE SERPL-SCNC: 98 MMOL/L — SIGNIFICANT CHANGE UP (ref 98–107)
CO2 SERPL-SCNC: 17 MMOL/L — LOW (ref 22–29)
CO2 SERPL-SCNC: 18 MMOL/L — LOW (ref 22–29)
COLOR SPEC: YELLOW — SIGNIFICANT CHANGE UP
CREAT SERPL-MCNC: 0.77 MG/DL — SIGNIFICANT CHANGE UP (ref 0.5–1.3)
CREAT SERPL-MCNC: 0.89 MG/DL — SIGNIFICANT CHANGE UP (ref 0.5–1.3)
DIFF PNL FLD: ABNORMAL
EGFR: 105 ML/MIN/1.73M2 — SIGNIFICANT CHANGE UP
EGFR: 88 ML/MIN/1.73M2 — SIGNIFICANT CHANGE UP
EPI CELLS # UR: SIGNIFICANT CHANGE UP
GLUCOSE BLDC GLUCOMTR-MCNC: 213 MG/DL — HIGH (ref 70–99)
GLUCOSE BLDC GLUCOMTR-MCNC: 225 MG/DL — HIGH (ref 70–99)
GLUCOSE BLDC GLUCOMTR-MCNC: 298 MG/DL — HIGH (ref 70–99)
GLUCOSE BLDC GLUCOMTR-MCNC: 328 MG/DL — HIGH (ref 70–99)
GLUCOSE BLDC GLUCOMTR-MCNC: 472 MG/DL — CRITICAL HIGH (ref 70–99)
GLUCOSE BLDC GLUCOMTR-MCNC: 530 MG/DL — CRITICAL HIGH (ref 70–99)
GLUCOSE SERPL-MCNC: 268 MG/DL — HIGH (ref 70–99)
GLUCOSE SERPL-MCNC: 410 MG/DL — HIGH (ref 70–99)
GLUCOSE UR QL: 1000 MG/DL
HCG UR QL: NEGATIVE — SIGNIFICANT CHANGE UP
HCT VFR BLD CALC: 35.4 % — SIGNIFICANT CHANGE UP (ref 34.5–45)
HGB BLD-MCNC: 10.3 G/DL — LOW (ref 11.5–15.5)
KETONES UR-MCNC: ABNORMAL
LEUKOCYTE ESTERASE UR-ACNC: ABNORMAL
LIDOCAIN IGE QN: 20 U/L — LOW (ref 22–51)
MAGNESIUM SERPL-MCNC: 1.6 MG/DL — SIGNIFICANT CHANGE UP (ref 1.6–2.6)
MAGNESIUM SERPL-MCNC: 1.7 MG/DL — SIGNIFICANT CHANGE UP (ref 1.6–2.6)
MCHC RBC-ENTMCNC: 18.1 PG — LOW (ref 27–34)
MCHC RBC-ENTMCNC: 29.1 GM/DL — LOW (ref 32–36)
MCV RBC AUTO: 62.1 FL — LOW (ref 80–100)
NITRITE UR-MCNC: NEGATIVE — SIGNIFICANT CHANGE UP
PH UR: 6.5 — SIGNIFICANT CHANGE UP (ref 5–8)
PHOSPHATE SERPL-MCNC: 3.2 MG/DL — SIGNIFICANT CHANGE UP (ref 2.4–4.7)
PHOSPHATE SERPL-MCNC: 3.7 MG/DL — SIGNIFICANT CHANGE UP (ref 2.4–4.7)
PLATELET # BLD AUTO: 363 K/UL — SIGNIFICANT CHANGE UP (ref 150–400)
POTASSIUM SERPL-MCNC: 4.1 MMOL/L — SIGNIFICANT CHANGE UP (ref 3.5–5.3)
POTASSIUM SERPL-MCNC: 4.5 MMOL/L — SIGNIFICANT CHANGE UP (ref 3.5–5.3)
POTASSIUM SERPL-SCNC: 4.1 MMOL/L — SIGNIFICANT CHANGE UP (ref 3.5–5.3)
POTASSIUM SERPL-SCNC: 4.5 MMOL/L — SIGNIFICANT CHANGE UP (ref 3.5–5.3)
PROT SERPL-MCNC: 6.1 G/DL — LOW (ref 6.6–8.7)
PROT SERPL-MCNC: 6.6 G/DL — SIGNIFICANT CHANGE UP (ref 6.6–8.7)
PROT UR-MCNC: 15
RBC # BLD: 5.7 M/UL — HIGH (ref 3.8–5.2)
RBC # FLD: 19.1 % — HIGH (ref 10.3–14.5)
RBC CASTS # UR COMP ASSIST: SIGNIFICANT CHANGE UP /HPF (ref 0–4)
SODIUM SERPL-SCNC: 130 MMOL/L — LOW (ref 135–145)
SODIUM SERPL-SCNC: 133 MMOL/L — LOW (ref 135–145)
SP GR SPEC: 1.01 — SIGNIFICANT CHANGE UP (ref 1.01–1.02)
UROBILINOGEN FLD QL: NEGATIVE MG/DL — SIGNIFICANT CHANGE UP
WBC # BLD: 8.05 K/UL — SIGNIFICANT CHANGE UP (ref 3.8–10.5)
WBC # FLD AUTO: 8.05 K/UL — SIGNIFICANT CHANGE UP (ref 3.8–10.5)
WBC UR QL: SIGNIFICANT CHANGE UP /HPF (ref 0–5)

## 2022-05-22 PROCEDURE — 99233 SBSQ HOSP IP/OBS HIGH 50: CPT

## 2022-05-22 PROCEDURE — 74176 CT ABD & PELVIS W/O CONTRAST: CPT | Mod: 26

## 2022-05-22 RX ORDER — INSULIN GLARGINE 100 [IU]/ML
15 INJECTION, SOLUTION SUBCUTANEOUS AT BEDTIME
Refills: 0 | Status: DISCONTINUED | OUTPATIENT
Start: 2022-05-22 | End: 2022-05-23

## 2022-05-22 RX ORDER — ACETAMINOPHEN 500 MG
650 TABLET ORAL ONCE
Refills: 0 | Status: COMPLETED | OUTPATIENT
Start: 2022-05-22 | End: 2022-05-22

## 2022-05-22 RX ORDER — MAGNESIUM SULFATE 500 MG/ML
1 VIAL (ML) INJECTION ONCE
Refills: 0 | Status: COMPLETED | OUTPATIENT
Start: 2022-05-22 | End: 2022-05-22

## 2022-05-22 RX ORDER — SODIUM CHLORIDE 9 MG/ML
1000 INJECTION INTRAMUSCULAR; INTRAVENOUS; SUBCUTANEOUS ONCE
Refills: 0 | Status: COMPLETED | OUTPATIENT
Start: 2022-05-22 | End: 2022-05-22

## 2022-05-22 RX ORDER — ACETAMINOPHEN 500 MG
650 TABLET ORAL EVERY 6 HOURS
Refills: 0 | Status: DISCONTINUED | OUTPATIENT
Start: 2022-05-22 | End: 2022-05-25

## 2022-05-22 RX ORDER — DIPHENHYDRAMINE HCL 50 MG
25 CAPSULE ORAL ONCE
Refills: 0 | Status: COMPLETED | OUTPATIENT
Start: 2022-05-22 | End: 2022-05-22

## 2022-05-22 RX ORDER — HYDROMORPHONE HYDROCHLORIDE 2 MG/ML
0.5 INJECTION INTRAMUSCULAR; INTRAVENOUS; SUBCUTANEOUS ONCE
Refills: 0 | Status: DISCONTINUED | OUTPATIENT
Start: 2022-05-22 | End: 2022-05-22

## 2022-05-22 RX ORDER — OXYCODONE AND ACETAMINOPHEN 5; 325 MG/1; MG/1
1 TABLET ORAL ONCE
Refills: 0 | Status: DISCONTINUED | OUTPATIENT
Start: 2022-05-22 | End: 2022-05-22

## 2022-05-22 RX ORDER — ACETAMINOPHEN 500 MG
1000 TABLET ORAL ONCE
Refills: 0 | Status: COMPLETED | OUTPATIENT
Start: 2022-05-22 | End: 2022-05-22

## 2022-05-22 RX ORDER — SODIUM CHLORIDE 9 MG/ML
1000 INJECTION INTRAMUSCULAR; INTRAVENOUS; SUBCUTANEOUS
Refills: 0 | Status: DISCONTINUED | OUTPATIENT
Start: 2022-05-22 | End: 2022-05-25

## 2022-05-22 RX ORDER — INSULIN LISPRO 100/ML
6 VIAL (ML) SUBCUTANEOUS
Refills: 0 | Status: DISCONTINUED | OUTPATIENT
Start: 2022-05-22 | End: 2022-05-25

## 2022-05-22 RX ADMIN — Medication 1 MILLIGRAM(S): at 06:09

## 2022-05-22 RX ADMIN — HYDROMORPHONE HYDROCHLORIDE 0.5 MILLIGRAM(S): 2 INJECTION INTRAMUSCULAR; INTRAVENOUS; SUBCUTANEOUS at 16:14

## 2022-05-22 RX ADMIN — SODIUM CHLORIDE 75 MILLILITER(S): 9 INJECTION INTRAMUSCULAR; INTRAVENOUS; SUBCUTANEOUS at 19:08

## 2022-05-22 RX ADMIN — Medication 4 UNIT(S): at 08:01

## 2022-05-22 RX ADMIN — Medication 25 MILLIGRAM(S): at 02:55

## 2022-05-22 RX ADMIN — Medication 12: at 08:01

## 2022-05-22 RX ADMIN — Medication 6 UNIT(S): at 12:15

## 2022-05-22 RX ADMIN — Medication 4: at 12:16

## 2022-05-22 RX ADMIN — SODIUM CHLORIDE 1000 MILLILITER(S): 9 INJECTION INTRAMUSCULAR; INTRAVENOUS; SUBCUTANEOUS at 15:03

## 2022-05-22 RX ADMIN — Medication 100 GRAM(S): at 19:08

## 2022-05-22 RX ADMIN — HYDROMORPHONE HYDROCHLORIDE 0.5 MILLIGRAM(S): 2 INJECTION INTRAMUSCULAR; INTRAVENOUS; SUBCUTANEOUS at 15:59

## 2022-05-22 RX ADMIN — Medication 400 MILLIGRAM(S): at 02:57

## 2022-05-22 RX ADMIN — Medication 1000 MILLIGRAM(S): at 10:16

## 2022-05-22 RX ADMIN — Medication 400 MILLIGRAM(S): at 10:01

## 2022-05-22 RX ADMIN — Medication 25 MILLIGRAM(S): at 16:00

## 2022-05-22 RX ADMIN — Medication 1000 MILLIGRAM(S): at 05:52

## 2022-05-22 RX ADMIN — Medication 1 MILLIGRAM(S): at 17:38

## 2022-05-22 RX ADMIN — INSULIN GLARGINE 15 UNIT(S): 100 INJECTION, SOLUTION SUBCUTANEOUS at 22:32

## 2022-05-22 RX ADMIN — Medication 10 MILLIGRAM(S): at 09:15

## 2022-05-22 RX ADMIN — Medication 6: at 16:24

## 2022-05-22 RX ADMIN — Medication 10 MILLIGRAM(S): at 16:25

## 2022-05-22 NOTE — CHART NOTE - NSCHARTNOTEFT_GEN_A_CORE
Called by RN patient c/o abdominal pain and requesting Dilaudid. Per RN abdomen is soft, non-distended but diffusely tender, VSS, no other complaints.  Myself and Dr. Ventura assessed patient at bedside, patient is tearful endorsing abdominal pain and requesting Dilaudid because "that's the only thing that works."  Patient's abdomen is soft, non-distended and abdominal pain is distractible. When speaking with the patient and simultaneously palpating the abdomen the patient does not endorse any pain or discomfort until asked explicitly does this hurt, and then again patient requests stronger pain medication. Patient adamantly denies: chest pain, palpitations, SOB, difficulty breathing, lightheadedness, dizziness, weakness, N/V/D/C, BRBPR, melena, pregnancy - per patient both fallopian tubes were removed years ago secondary to 2 ectopic pregnancies. Patient endorses having a normal BM yesterday, denies any lower back pain or urinary symptoms.     Vital Signs Last 24 Hrs  T(C): 37.1 (22 May 2022 08:26), Max: 37.1 (22 May 2022 06:02)  T(F): 98.7 (22 May 2022 08:26), Max: 98.7 (22 May 2022 06:02)  HR: 101 (22 May 2022 09:04) (78 - 109)  BP: 139/93 (22 May 2022 09:04) (118/81 - 161/84)  RR: 18 (22 May 2022 08:26) (18 - 18)  SpO2: 99% (22 May 2022 08:26) (97% - 99%)    Physical Exam:  General: NAD, NCAT, resting comfortably in bed  Cardio: normal S1, S2, no MRG appreciated  Lungs: CTAB, no abnormal breath sounds appreciated, respirations unlabored on RA  Abdomen: distractible diffuse tenderness on deep palpation, soft, non-distended, +BS, no guarding / rigidity / rebound tenderness appreciated   Extremities: no pedal edema appreciated  Neuro: AOX4, no focal deficits appreciated     A/P:  -STAT Ofimev 1g x 1 STAT.  Patient repeatedly requesting Dilaudid medication, advised patient we cannot give anything stronger until imaging is complete  -STAT labs: CBC / CMP / Mg /Phos / Lipase /Amylase /UA / Urine pregnancy test   -STAT CT Abdomen and Pelvis non-contrast   -RN to continue to monitor  -RN to call provider with any acute issues / questions / concerns  [phone]    The above patient and plan was d/w Dr. Ventura and GORDON Andrade. Called by RN patient c/o abdominal pain and requesting Dilaudid. Per RN abdomen is soft, non-distended but diffusely tender, VSS, no other complaints.  Myself and Dr. Ventura assessed patient at bedside, patient is tearful endorsing abdominal pain and requesting Dilaudid because "that's the only thing that works."  Patient's abdomen is soft, non-distended and abdominal pain is distractible. When speaking with the patient and simultaneously palpating the abdomen the patient does not endorse any pain or discomfort until asked explicitly does this hurt, and then again patient requests stronger pain medication. Patient adamantly denies: chest pain, palpitations, SOB, difficulty breathing, lightheadedness, dizziness, weakness, N/V/D/C, BRBPR, melena, pregnancy - per patient both fallopian tubes were removed years ago secondary to 2 ectopic pregnancies. Patient endorses having a normal BM yesterday, denies any lower back pain or urinary symptoms.     Vital Signs Last 24 Hrs  T(C): 37.1 (22 May 2022 08:26), Max: 37.1 (22 May 2022 06:02)  T(F): 98.7 (22 May 2022 08:26), Max: 98.7 (22 May 2022 06:02)  HR: 101 (22 May 2022 09:04) (78 - 109)  BP: 139/93 (22 May 2022 09:04) (118/81 - 161/84)  RR: 18 (22 May 2022 08:26) (18 - 18)  SpO2: 99% (22 May 2022 08:26) (97% - 99%)    Physical Exam:  General: NAD, NCAT, resting comfortably in bed  Cardio: normal S1, S2, no MRG appreciated  Lungs: CTAB, no abnormal breath sounds appreciated, respirations unlabored on RA  Abdomen: distractible diffuse tenderness on deep palpation, soft, non-distended, +BS, no guarding / rigidity / rebound tenderness appreciated   Extremities: no pedal edema appreciated  Neuro: AOX4, no focal deficits appreciated     A/P:  -STAT Ofimev 1g x 1 STAT.  Patient repeatedly requesting Dilaudid medication, advised patient we cannot give anything stronger until imaging is complete  -STAT labs: CBC / CMP / Mg /Phos / Lipase /Amylase /UA / Urine pregnancy test   -STAT CT Abdomen and Pelvis non-contrast   -Lantus increased from 10 units to 15 units qhs based on BG trend  -endocrinology consulted yesterday, recs apprecaited   -RN to continue to monitor  -RN to call provider with any acute issues / questions / concerns  [phone]    The above patient and plan was d/w Dr. Ventura and RN Raymond.

## 2022-05-22 NOTE — CONSULT NOTE ADULT - ASSESSMENT
31F PMHx HTN Type 1 DM w/ episodes of DKA, diabetic gastroparesis, opiate dependence, e comes for [facility] to be in DKA and has colitis.     DM1 : poorly controlled. A1c 10.8 . Noncompliant with insulin . DKA solved now with gap closed   check Bgs actid and hs   cont lantus 10 u Hs for now   use SSI for coverage   continue meal insulin TID     Colitis : will hold off antibx, maybe related to gastroparesis   DM diet PO  was given Zosyn in ED  GI consulted- will c/w PPI BID    Substance abuse: Psych eval for substance detox admission    HTN uncontrolled  C/w Norvasc, ARB      
32F history of DM 1, substance abuse, gastroparesis, previous fallopian tube removal, cholecystectomy presents with agitation and signs of withdrawal.  Now with elevated BG, anion gap, and c/o diffuse abdominal pain for which pt is requesting only IV dilaudid.  Condition stable.      r/o DKA  abdominal pain  narcotic withdrawal    -NPO   -NS bolus 1L stat  -repeat BG now 200  -repeat BMP, mag, phos.  Abg orderd by primary team, awaiting collection.   -pan culture  -consider abx if concern for infectious cause.    -Given normalization of BG despite eating chinese food, will repeat labs  -will continue to follow along with primary team pending lab results  -d/w Dr. Dumont, in agreement with plan. 
ICU ASSESSMENT AND PLAN:  33yo F with PMH: Diabetes, gastroparesis, drug abuse  Was sent from [facility] in [place] for severe agitation, banging her head, asking for opioids. In the ED the patient was very aggressive with staff and attempted to strangle herself with the telemetry wires twice.   Required ativan, klonipin and ketamine to provide adequate sedation.  ICU consulted.  Patient also had hyperglycemia, received IVF hydration and Insulin IV Push with improvement in glucoses  Currently patient is sleepy, slightly arousable but not following commands or answering questions.    Patient does not meet criteria for ICU level of admission, please reconsult as necessary,  Patient can go to Step down for increased observation    1- Aggressive behaviour, requiring sedation  2- Substance withdrawal vs psych component  3- Poorly controlled Diabetes/Hyperglycemia without DKA    Recommendations:  - Admit to medicine/step down for increased observation  - Psych consult  - consider ATC ativan taper  - 1:1 observation to prevent her from choking herself with telemetry wires or IV tubing  - Insulin sliding scale  - Urine toxicology to eval for current usage  - PRN Reglan for gastroparesis if necessary    Case discussed with Dr. Nash, ICU attending
Patient with DKA, opoid abuse, gastroparesis, gastritis and colitis on CT abdomen. No diarrhea. No alarming symptoms. Gastroparetic symptoms can be more pronounced with opoid use in setting of poor controlled DM. Will recommend PPI bid.  Advance diet as tolerated.

## 2022-05-22 NOTE — CHART NOTE - NSCHARTNOTEFT_GEN_A_CORE
s/w Dr Dumont, AG now 17 -does not accept pt to MICU at this time.  S/w Dr. Ventura.  Pt finishing IV fluid bolus, will plan to gently hydrate tonight and slowly advance diet as tolerated.  Can check BMP again overnight if concerned. Endocrine consult appreciated for hyperglycemia recommendations.  Please reconsult MICU if needed.

## 2022-05-22 NOTE — PROGRESS NOTE ADULT - ASSESSMENT
31F PMHx HTN Type 1 DM w/ episodes of DKA, diabetic gastroparesis, opiate dependence, [facility], and foudn to be in DKA and has colitis.     DM1 : poorly controlled. A1c 10.8 . Noncompliant with insulin .   check Bgs actid and hs   today she has hyperG again with hypoNa and high gap and low bicarb, Reenetering DKA   tranfer to MICU and start insulin gtt. Discussed with attending of record.   start iv fluid per DAKprotocol   patient ate Chinese brought in by her father for lunch.   she needs to be pancultured and abdominal CTs can to rule out infectious process.     Colitis : will hold off antibx, maybe related to gastroparesis   DM diet PO  was given Zosyn in ED  GI consulted- will c/w PPI BID    Substance abuse: Psych eval for substance detox admission    HTN uncontrolled  C/w Norvasc, ARB

## 2022-05-22 NOTE — PROGRESS NOTE ADULT - SUBJECTIVE AND OBJECTIVE BOX
INTERVAL HPI/OVERNIGHT EVENTS:  followup for dm 1     MEDICATIONS  (STANDING):  dextrose 5%. 1000 milliLiter(s) (100 mL/Hr) IV Continuous <Continuous>  dextrose 5%. 1000 milliLiter(s) (50 mL/Hr) IV Continuous <Continuous>  dextrose 50% Injectable 25 Gram(s) IV Push once  dextrose 50% Injectable 12.5 Gram(s) IV Push once  dextrose 50% Injectable 25 Gram(s) IV Push once  enalapril 5 milliGRAM(s) Oral daily  enoxaparin Injectable 40 milliGRAM(s) SubCutaneous every 24 hours  glucagon  Injectable 1 milliGRAM(s) IntraMuscular once  insulin glargine Injectable (LANTUS) 15 Unit(s) SubCutaneous at bedtime  insulin lispro (ADMELOG) corrective regimen sliding scale   SubCutaneous three times a day before meals  insulin lispro Injectable (ADMELOG) 6 Unit(s) SubCutaneous three times a day before meals  LORazepam   Injectable   IV Push   magnesium sulfate  IVPB 1 Gram(s) IV Intermittent once  metoclopramide Injectable 10 milliGRAM(s) IV Push every 8 hours  pantoprazole    Tablet 40 milliGRAM(s) Oral two times a day  senna 2 Tablet(s) Oral at bedtime    MEDICATIONS  (PRN):  acetaminophen     Tablet .. 650 milliGRAM(s) Oral every 6 hours PRN Mild Pain (1 - 3), Moderate Pain (4 - 6)  dextrose Oral Gel 15 Gram(s) Oral once PRN Blood Glucose LESS THAN 70 milliGRAM(s)/deciliter    Allergies  Toradol (Pruritus)  Toradol (Unknown)    Review of systems: comaplains of abdominal tenderness, no nausea., no vomiting     Vital Signs Last 24 Hrs  T(C): 36.7 (22 May 2022 16:18), Max: 37.1 (22 May 2022 06:02)  T(F): 98.1 (22 May 2022 16:18), Max: 98.7 (22 May 2022 06:02)  HR: 81 (22 May 2022 16:18) (78 - 109)  BP: 138/89 (22 May 2022 16:18) (118/81 - 161/84)  BP(mean): --  RR: 18 (22 May 2022 16:18) (18 - 18)  SpO2: 96% (22 May 2022 16:18) (96% - 99%)  PHYSICAL EXAM:  GENERAL: Drowsy  HEAD:  Atraumatic, Normocephalic  EYES: EOMI, PERRLA, conjunctiva and sclera clear  ENMT:  nO JVD   NECK: Supple, No JVD, Normal thyroid  CHEST/LUNG: Clear to percussion bilaterally; No rales, rhonchi, wheezing, or rubs  HEART: Regular rate and rhythm; No murmurs, rubs, or gallops  ABDOMEN: Soft, Nontender, Nondistended; Bowel sounds present  EXTREMITIES:  2+ Peripheral Pulses, No clubbing, cyanosis, or edema  SKIN: No rashes       LABS:                        10.3   8.05  )-----------( 363      ( 22 May 2022 10:30 )             35.4         133<L>  |  98  |  22.1<H>  ----------------------------<  268<H>  4.1   |  18.0<L>  |  0.77    Ca    8.8      22 May 2022 16:46  Phos  3.7       Mg     1.6         TPro  6.1<L>  /  Alb  3.4  /  TBili  0.3<L>  /  DBili  x   /  AST  14  /  ALT  13  /  AlkPhos  94      Urinalysis Basic - ( 22 May 2022 11:01 )    Color: Yellow / Appearance: Clear / S.015 / pH: x  Gluc: x / Ketone: Moderate  / Bili: Negative / Urobili: Negative mg/dL   Blood: x / Protein: 15 / Nitrite: Negative   Leuk Esterase: Trace / RBC: 0-2 /HPF / WBC 0-2 /HPF   Sq Epi: x / Non Sq Epi: Occasional / Bacteria: Occasional    CAPILLARY BLOOD GLUCOSE  POCT Blood Glucose.: 298 mg/dL (22 May 2022 16:23)  POCT Blood Glucose.: 213 mg/dL (22 May 2022 14:49)  POCT Blood Glucose.: 225 mg/dL (22 May 2022 12:15)  POCT Blood Glucose.: 472 mg/dL (22 May 2022 09:14)  POCT Blood Glucose.: 530 mg/dL (22 May 2022 07:53)  POCT Blood Glucose.: 343 mg/dL (21 May 2022 22:05)

## 2022-05-22 NOTE — PROGRESS NOTE ADULT - ASSESSMENT
31F PMHx HTN Type 1 DM w/ episodes of DKA, diabetic gastroparesis, opiate dependence, ectopic pregnancy x 2, s/p bilat tubal ligation, was discharged from [facility] on 5/13 after Rx of gastroparesis and mild DKA. She then went o [facility] ED on 5/14. 16, 17 with same c/o. She was treated in ED and released. On 5/19/2022 she was sent from [facility] (for narcs) for aggressive behavior. As per EMS, pt. is withdrawing from taking 26mg of Dilaudid a day. Pt. began screaming, kicking and bashing her head against the wall. Pt. given 10 of IM versed by EMS. In ED- asking for narcotics IV and when told she does not need it, she became combative towards staff, tried to strangle herself with monitor wires and call bell wires. Required ativan, Klonopin and ketamine to provide adequate sedation and then MICU was consulted. Also noted mild DKA. ICU recs- U tox, 1:1 watch, Psych eval and Ativan taper. CT A/P showed thickening of gastric wall and colonic wall being read as Colitis and ED gave Zosyn and is requesting admission.     # gastric wall and Colonic wall thickening being read as Colitis  likely all related to gastroparesis/ narcotics   Prior CT AP- Colitis involving the transverse and descending colon. Gastric wall thickening, possibly gastritis.  Clinically no diarrhea  will hold off antibx  DM diet PO  was given Zosyn in ED  RTC Reglan  GI consulted- will c/w PPI BID  - F/u CT A/P    # Abd pain, N, V requesting IV narcs  follow prevention of self injury protocol  1:1 watch  will give RTC Ativan taper per ICU recs, x 4 doses left  Psych eval for substance detox admission    # Gastroparesis  RTC Reglan and GI eval    # T1DM poorly controlled. s/p DKA  last seen her Endo in 12/2021. A1c- 10.8  Lantus, premeal, ISS here  Increased lantus to 15 units and lispro 6 units pre-meal  Appreciate endo recs    # HTN uncontrolled  C/w Norvasc, ARB    # Microcytic anemia  likely sec to poor nutrition    Lovenox  Dispo Pending optimal FS. Patient presents from [facility] in [place] for agitation.   Updated patient's mom, Flower ([phone]) on 5/22.

## 2022-05-22 NOTE — PROGRESS NOTE ADULT - SUBJECTIVE AND OBJECTIVE BOX
[facility] Division of Hospital Medicine    Chief Complaint: abdominal pain    SUBJECTIVE / OVERNIGHT EVENTS: Patient continues to endorse abdominal pain. Denies nausea and vomiting. FS were elevated overnight and today morning.     Patient denies chest pain, SOB, N/V, fever, chills, dysuria or any other complaints. All remainder ROS negative.     MEDICATIONS  (STANDING):  dextrose 5%. 1000 milliLiter(s) (100 mL/Hr) IV Continuous <Continuous>  dextrose 5%. 1000 milliLiter(s) (50 mL/Hr) IV Continuous <Continuous>  dextrose 50% Injectable 25 Gram(s) IV Push once  dextrose 50% Injectable 12.5 Gram(s) IV Push once  dextrose 50% Injectable 25 Gram(s) IV Push once  enalapril 5 milliGRAM(s) Oral daily  enoxaparin Injectable 40 milliGRAM(s) SubCutaneous every 24 hours  glucagon  Injectable 1 milliGRAM(s) IntraMuscular once  insulin glargine Injectable (LANTUS) 15 Unit(s) SubCutaneous at bedtime  insulin lispro (ADMELOG) corrective regimen sliding scale   SubCutaneous three times a day before meals  insulin lispro Injectable (ADMELOG) 4 Unit(s) SubCutaneous three times a day before meals  LORazepam   Injectable   IV Push   LORazepam   Injectable 1 milliGRAM(s) IV Push every 12 hours  metoclopramide Injectable 10 milliGRAM(s) IV Push every 8 hours  pantoprazole    Tablet 40 milliGRAM(s) Oral two times a day  senna 2 Tablet(s) Oral at bedtime    MEDICATIONS  (PRN):  dextrose Oral Gel 15 Gram(s) Oral once PRN Blood Glucose LESS THAN 70 milliGRAM(s)/deciliter        I&O's Summary      PHYSICAL EXAM:  Vital Signs Last 24 Hrs  T(C): 37.1 (22 May 2022 08:26), Max: 37.1 (22 May 2022 06:02)  T(F): 98.7 (22 May 2022 08:26), Max: 98.7 (22 May 2022 06:02)  HR: 101 (22 May 2022 09:04) (78 - 109)  BP: 139/93 (22 May 2022 09:04) (118/81 - 161/84)  BP(mean): --  RR: 18 (22 May 2022 08:26) (18 - 18)  SpO2: 99% (22 May 2022 08:26) (97% - 99%)    CONSTITUTIONAL: NAD, well-developed  RESPIRATORY: Normal respiratory effort; lungs are clear to auscultation bilaterally  CARDIOVASCULAR: Regular rate and rhythm, normal S1 and S2  ABDOMEN: Nontender to palpation, normoactive bowel sound  PSYCH: A+O to person, place, and time; affect appropriate  NEUROLOGY: CN 2-12 are intact and symmetric; no gross sensory deficits;   SKIN: No rashes; no palpable lesions    LABS:                        9.6    7.17  )-----------( 288      ( 21 May 2022 09:39 )             32.9     05-21    133<L>  |  98  |  14.4  ----------------------------<  309<H>  4.0   |  23.0  |  0.65    Ca    8.8      21 May 2022 09:39    TPro  6.1<L>  /  Alb  2.9<L>  /  TBili  0.3<L>  /  DBili  x   /  AST  20  /  ALT  14  /  AlkPhos  85  05-21              CAPILLARY BLOOD GLUCOSE      POCT Blood Glucose.: 472 mg/dL (22 May 2022 09:14)  POCT Blood Glucose.: 530 mg/dL (22 May 2022 07:53)  POCT Blood Glucose.: 343 mg/dL (21 May 2022 22:05)  POCT Blood Glucose.: 83 mg/dL (21 May 2022 16:10)  POCT Blood Glucose.: 228 mg/dL (21 May 2022 10:57)        RADIOLOGY & ADDITIONAL TESTS:  Results Reviewed:   Imaging Personally Reviewed:  Electrocardiogram Personally Reviewed:

## 2022-05-22 NOTE — CONSULT NOTE ADULT - SUBJECTIVE AND OBJECTIVE BOX
REASON FOR CONSULT: DKA    CONSULT REQUESTED BY: Lorenzo    Patient is a 32y old  Female who presents with a chief complaint of abd pain (22 May 2022 10:21).  32F multiple admissions or ED visits for abd pain and dka presents from [facility] facility c/o abd pain and attempted to strangle self with wires when she was denied pain meds.  Pt was currently withdrawing from 26mg dilaudid daily.  She was admitted and found to have glucose 500 this AM, Repeat labs showed AG 19 so MICU was consulted.  Pt states she had some nausea which resolved.  She was eating chinese food brought in by family until call was made to MICU and she was made NPO.  Denies CP, SOB, Fever, dysuria, flank pain, vomiting, diarrhea.  +flatus.  CT abd pelvis unremarkable.       PAST MEDICAL & SURGICAL HISTORY:  Diabetes mellitus type 1      Gastroparesis due to DM      Colitis      Hypertension      Diabetes mellitus      Gastroparesis      Ectopic pregnancy   and , s/p removal of right and left fallopian tubes      History of cholecystectomy        Allergies    Toradol (Pruritus)  Toradol (Unknown)    Intolerances      FAMILY HISTORY:  Family history of type 1 diabetes mellitus (Grandparent)    FH: HTN (hypertension) (Father)    FHx: asthma  Brother        Review of Systems:  CONSTITUTIONAL: No fever, chills, or fatigue  EYES: No eye pain, visual disturbances, or discharge  ENMT:  No difficulty hearing, tinnitus, vertigo; No sinus or throat pain  NECK: No pain or stiffness  RESPIRATORY: No cough, wheezing, chills or hemoptysis; No shortness of breath  CARDIOVASCULAR: No chest pain, palpitations, dizziness, or leg swelling  GASTROINTESTINAL: +abd pain only with palpation.   No current nausea, vomiting, or hematemesis; No diarrhea or constipation. No melena or hematochezia.  GENITOURINARY: No dysuria, frequency, hematuria, or incontinence  NEUROLOGICAL: No headaches, memory loss, loss of strength, numbness, or tremors  SKIN: No itching, burning, rashes, or lesions   MUSCULOSKELETAL: No joint pain or swelling; No muscle, back, or extremity pain  PSYCHIATRIC: +depression +anxiety +substance abuse disorder      Medications:    enalapril 5 milliGRAM(s) Oral daily    diphenhydrAMINE Injectable 25 milliGRAM(s) IV Push once    acetaminophen     Tablet .. 650 milliGRAM(s) Oral every 6 hours PRN  HYDROmorphone  Injectable 0.5 milliGRAM(s) IV Push once  LORazepam   Injectable   IV Push   LORazepam   Injectable 1 milliGRAM(s) IV Push every 12 hours  metoclopramide Injectable 10 milliGRAM(s) IV Push every 8 hours      enoxaparin Injectable 40 milliGRAM(s) SubCutaneous every 24 hours    pantoprazole    Tablet 40 milliGRAM(s) Oral two times a day  senna 2 Tablet(s) Oral at bedtime      dextrose 50% Injectable 25 Gram(s) IV Push once  dextrose 50% Injectable 12.5 Gram(s) IV Push once  dextrose 50% Injectable 25 Gram(s) IV Push once  dextrose Oral Gel 15 Gram(s) Oral once PRN  glucagon  Injectable 1 milliGRAM(s) IntraMuscular once  insulin glargine Injectable (LANTUS) 15 Unit(s) SubCutaneous at bedtime  insulin lispro (ADMELOG) corrective regimen sliding scale   SubCutaneous three times a day before meals  insulin lispro Injectable (ADMELOG) 6 Unit(s) SubCutaneous three times a day before meals    dextrose 5%. 1000 milliLiter(s) IV Continuous <Continuous>  dextrose 5%. 1000 milliLiter(s) IV Continuous <Continuous>                ICU Vital Signs Last 24 Hrs  T(C): 37.1 (22 May 2022 08:26), Max: 37.1 (22 May 2022 06:02)  T(F): 98.7 (22 May 2022 08:26), Max: 98.7 (22 May 2022 06:02)  HR: 101 (22 May 2022 09:04) (78 - 109)  BP: 139/93 (22 May 2022 09:04) (118/81 - 161/84)  BP(mean): --  ABP: --  ABP(mean): --  RR: 18 (22 May 2022 08:26) (18 - 18)  SpO2: 99% (22 May 2022 08:26) (98% - 99%)    Vital Signs Last 24 Hrs  T(C): 37.1 (22 May 2022 08:26), Max: 37.1 (22 May 2022 06:02)  T(F): 98.7 (22 May 2022 08:26), Max: 98.7 (22 May 2022 06:02)  HR: 101 (22 May 2022 09:04) (78 - 109)  BP: 139/93 (22 May 2022 09:04) (118/81 - 161/84)  BP(mean): --  RR: 18 (22 May 2022 08:26) (18 - 18)  SpO2: 99% (22 May 2022 08:26) (98% - 99%)        I&O's Detail    22 May 2022 07:01  -  22 May 2022 15:18  --------------------------------------------------------  IN:    Oral Fluid: 240 mL  Total IN: 240 mL    OUT:    Voided (mL): 400 mL  Total OUT: 400 mL    Total NET: -160 mL            LABS:                        10.3   8.05  )-----------( 363      ( 22 May 2022 10:30 )             35.4     05    130<L>  |  94<L>  |  24.5<H>  ----------------------------<  410<H>  4.5   |  17.0<L>  |  0.89    Ca    9.6      22 May 2022 10:30  Phos  3.2       Mg     1.7         TPro  6.6  /  Alb  3.2<L>  /  TBili  0.4  /  DBili  x   /  AST  18  /  ALT  14  /  AlkPhos  107            CAPILLARY BLOOD GLUCOSE      POCT Blood Glucose.: 213 mg/dL (22 May 2022 14:49)      Urinalysis Basic - ( 22 May 2022 11:01 )    Color: Yellow / Appearance: Clear / S.015 / pH: x  Gluc: x / Ketone: Moderate  / Bili: Negative / Urobili: Negative mg/dL   Blood: x / Protein: 15 / Nitrite: Negative   Leuk Esterase: Trace / RBC: 0-2 /HPF / WBC 0-2 /HPF   Sq Epi: x / Non Sq Epi: Occasional / Bacteria: Occasional      CULTURES:  Culture Results:   <10,000 CFU/mL Normal Urogenital Jessica ( @ 09:05)  Culture Results:   <10,000 CFU/mL Normal Urogenital Jessica (05-17 @ 10:57)      Physical Examination:    General: No acute distress.  Alert, oriented, interactive, nonfocal, ambulating in hallway    HEENT: Pupils equal, reactive to light.  Symmetric.    PULM: Clear to auscultation bilaterally, no significant sputum production    CVS: Regular rate and rhythm, no murmurs, rubs, or gallops    ABD: Soft, +BS x4, nondistended.  No rebound no guarding, however pt c/o pain with gentle palpation diffuse    EXT: No edema, nontender    SKIN: Warm and well perfused, no rashes noted.    RADIOLOGY: < from: CT Abdomen and Pelvis No Cont (22 @ 11:25) >  IMPRESSION:  Previously described bowel wall thickening in the transverse and   descending colon is no longer identified. Prominent stool in the   ascending and transverse colon. Particulate matter in the stomach is   presumably from ingested food. Please correlate clinically.    Mild pelvic ascites has minimally decreased.    Limited evaluation of vascular and gastrointestinal structures without   contrast and due to a possibly of intra-abdominal fat.        --- End of Report ---    < end of copied text >      CRITICAL CARE TIME SPENT: 32 minutes

## 2022-05-22 NOTE — PATIENT PROFILE ADULT - FALL HARM RISK - UNIVERSAL INTERVENTIONS
Bed in lowest position, wheels locked, appropriate side rails in place/Call bell, personal items and telephone in reach/Instruct patient to call for assistance before getting out of bed or chair/Non-slip footwear when patient is out of bed/[place] to call system/Physically safe environment - no spills, clutter or unnecessary equipment/Purposeful Proactive Rounding/Room/bathroom lighting operational, light cord in reach

## 2022-05-22 NOTE — CONSULT NOTE ADULT - NSCONSULTADDITIONALINFOA_GEN_ALL_CORE
patient seen and examined  agree with above    general   frail f  alert  and conversant  no distress no coug  heent sclera anicteric no lesions  neck  supple  lung bilateral  air entry  no   wheezing rales  rhonchi  heart   s1 s2 rrr  abd  +  bowel sounds soft nt  extremities    no edema no calf tenderness no cord

## 2022-05-23 DIAGNOSIS — F11.20 OPIOID DEPENDENCE, UNCOMPLICATED: ICD-10-CM

## 2022-05-23 LAB
ALBUMIN SERPL ELPH-MCNC: 3.3 G/DL — SIGNIFICANT CHANGE UP (ref 3.3–5.2)
ALP SERPL-CCNC: 100 U/L — SIGNIFICANT CHANGE UP (ref 40–120)
ALT FLD-CCNC: 12 U/L — SIGNIFICANT CHANGE UP
ANION GAP SERPL CALC-SCNC: 17 MMOL/L — SIGNIFICANT CHANGE UP (ref 5–17)
AST SERPL-CCNC: 9 U/L — SIGNIFICANT CHANGE UP
B-OH-BUTYR SERPL-SCNC: 1.1 MMOL/L — HIGH
BILIRUB SERPL-MCNC: 0.3 MG/DL — LOW (ref 0.4–2)
BUN SERPL-MCNC: 20.7 MG/DL — HIGH (ref 8–20)
CALCIUM SERPL-MCNC: 8.5 MG/DL — LOW (ref 8.6–10.2)
CHLORIDE SERPL-SCNC: 96 MMOL/L — LOW (ref 98–107)
CO2 SERPL-SCNC: 19 MMOL/L — LOW (ref 22–29)
CREAT SERPL-MCNC: 0.79 MG/DL — SIGNIFICANT CHANGE UP (ref 0.5–1.3)
EGFR: 102 ML/MIN/1.73M2 — SIGNIFICANT CHANGE UP
GLUCOSE BLDC GLUCOMTR-MCNC: 123 MG/DL — HIGH (ref 70–99)
GLUCOSE BLDC GLUCOMTR-MCNC: 251 MG/DL — HIGH (ref 70–99)
GLUCOSE BLDC GLUCOMTR-MCNC: 281 MG/DL — HIGH (ref 70–99)
GLUCOSE BLDC GLUCOMTR-MCNC: 423 MG/DL — HIGH (ref 70–99)
GLUCOSE SERPL-MCNC: 521 MG/DL — CRITICAL HIGH (ref 70–99)
HCT VFR BLD CALC: 31.3 % — LOW (ref 34.5–45)
HGB BLD-MCNC: 9.3 G/DL — LOW (ref 11.5–15.5)
MCHC RBC-ENTMCNC: 18.1 PG — LOW (ref 27–34)
MCHC RBC-ENTMCNC: 29.7 GM/DL — LOW (ref 32–36)
MCV RBC AUTO: 60.9 FL — LOW (ref 80–100)
PLATELET # BLD AUTO: 392 K/UL — SIGNIFICANT CHANGE UP (ref 150–400)
POTASSIUM SERPL-MCNC: 4.5 MMOL/L — SIGNIFICANT CHANGE UP (ref 3.5–5.3)
POTASSIUM SERPL-SCNC: 4.5 MMOL/L — SIGNIFICANT CHANGE UP (ref 3.5–5.3)
PROT SERPL-MCNC: 6.1 G/DL — LOW (ref 6.6–8.7)
RBC # BLD: 5.14 M/UL — SIGNIFICANT CHANGE UP (ref 3.8–5.2)
RBC # FLD: 18.1 % — HIGH (ref 10.3–14.5)
SODIUM SERPL-SCNC: 132 MMOL/L — LOW (ref 135–145)
WBC # BLD: 8.01 K/UL — SIGNIFICANT CHANGE UP (ref 3.8–10.5)
WBC # FLD AUTO: 8.01 K/UL — SIGNIFICANT CHANGE UP (ref 3.8–10.5)

## 2022-05-23 PROCEDURE — 99232 SBSQ HOSP IP/OBS MODERATE 35: CPT

## 2022-05-23 PROCEDURE — 99233 SBSQ HOSP IP/OBS HIGH 50: CPT

## 2022-05-23 RX ORDER — DIPHENHYDRAMINE HCL 50 MG
25 CAPSULE ORAL ONCE
Refills: 0 | Status: COMPLETED | OUTPATIENT
Start: 2022-05-23 | End: 2022-05-23

## 2022-05-23 RX ORDER — HYDROMORPHONE HYDROCHLORIDE 2 MG/ML
0.5 INJECTION INTRAMUSCULAR; INTRAVENOUS; SUBCUTANEOUS ONCE
Refills: 0 | Status: DISCONTINUED | OUTPATIENT
Start: 2022-05-23 | End: 2022-05-23

## 2022-05-23 RX ORDER — ACETAMINOPHEN 500 MG
1000 TABLET ORAL ONCE
Refills: 0 | Status: COMPLETED | OUTPATIENT
Start: 2022-05-23 | End: 2022-05-23

## 2022-05-23 RX ORDER — INSULIN GLARGINE 100 [IU]/ML
18 INJECTION, SOLUTION SUBCUTANEOUS AT BEDTIME
Refills: 0 | Status: DISCONTINUED | OUTPATIENT
Start: 2022-05-23 | End: 2022-05-25

## 2022-05-23 RX ADMIN — Medication 10 MILLIGRAM(S): at 08:18

## 2022-05-23 RX ADMIN — Medication 25 MILLIGRAM(S): at 16:23

## 2022-05-23 RX ADMIN — Medication 6 UNIT(S): at 16:29

## 2022-05-23 RX ADMIN — Medication 12: at 06:21

## 2022-05-23 RX ADMIN — INSULIN GLARGINE 18 UNIT(S): 100 INJECTION, SOLUTION SUBCUTANEOUS at 21:48

## 2022-05-23 RX ADMIN — HYDROMORPHONE HYDROCHLORIDE 0.5 MILLIGRAM(S): 2 INJECTION INTRAMUSCULAR; INTRAVENOUS; SUBCUTANEOUS at 16:23

## 2022-05-23 RX ADMIN — Medication 6 UNIT(S): at 11:58

## 2022-05-23 RX ADMIN — HYDROMORPHONE HYDROCHLORIDE 0.5 MILLIGRAM(S): 2 INJECTION INTRAMUSCULAR; INTRAVENOUS; SUBCUTANEOUS at 16:38

## 2022-05-23 RX ADMIN — Medication 6: at 11:58

## 2022-05-23 RX ADMIN — SENNA PLUS 2 TABLET(S): 8.6 TABLET ORAL at 21:55

## 2022-05-23 RX ADMIN — PANTOPRAZOLE SODIUM 40 MILLIGRAM(S): 20 TABLET, DELAYED RELEASE ORAL at 06:23

## 2022-05-23 RX ADMIN — Medication 0.5 MILLIGRAM(S): at 21:48

## 2022-05-23 RX ADMIN — Medication 5 MILLIGRAM(S): at 06:23

## 2022-05-23 RX ADMIN — Medication 10 MILLIGRAM(S): at 16:30

## 2022-05-23 RX ADMIN — Medication 6 UNIT(S): at 06:22

## 2022-05-23 RX ADMIN — Medication 0.5 MILLIGRAM(S): at 06:22

## 2022-05-23 NOTE — BH CONSULTATION LIAISON PROGRESS NOTE - NSBHCHARTREVIEWVS_PSY_A_CORE FT
Vital Signs Last 24 Hrs  T(C): 36.4 (23 May 2022 11:43), Max: 36.7 (22 May 2022 16:18)  T(F): 97.6 (23 May 2022 11:43), Max: 98.1 (22 May 2022 16:18)  HR: 88 (23 May 2022 11:43) (81 - 88)  BP: 104/68 (23 May 2022 11:43) (104/68 - 168/95)  BP(mean): --  RR: 18 (23 May 2022 05:12) (18 - 18)  SpO2: 95% (23 May 2022 11:43) (95% - 98%)

## 2022-05-23 NOTE — PROGRESS NOTE ADULT - SUBJECTIVE AND OBJECTIVE BOX
[facility] Division of Hospital Medicine    Chief Complaint: abdominal pain    SUBJECTIVE / OVERNIGHT EVENTS: Overnight, patient was eating Chinese food. Her FS was elevated to 320s-420s. Denies n/v/f/c/h/d.     Patient denies chest pain, SOB, abd pain, N/V, fever, chills, dysuria or any other complaints. All remainder ROS negative.     MEDICATIONS  (STANDING):  acetaminophen   IVPB .. 1000 milliGRAM(s) IV Intermittent once  dextrose 5%. 1000 milliLiter(s) (50 mL/Hr) IV Continuous <Continuous>  dextrose 5%. 1000 milliLiter(s) (100 mL/Hr) IV Continuous <Continuous>  dextrose 50% Injectable 25 Gram(s) IV Push once  dextrose 50% Injectable 12.5 Gram(s) IV Push once  dextrose 50% Injectable 25 Gram(s) IV Push once  enalapril 5 milliGRAM(s) Oral daily  enoxaparin Injectable 40 milliGRAM(s) SubCutaneous every 24 hours  glucagon  Injectable 1 milliGRAM(s) IntraMuscular once  insulin glargine Injectable (LANTUS) 15 Unit(s) SubCutaneous at bedtime  insulin lispro (ADMELOG) corrective regimen sliding scale   SubCutaneous three times a day before meals  insulin lispro Injectable (ADMELOG) 6 Unit(s) SubCutaneous three times a day before meals  LORazepam   Injectable   IV Push   LORazepam   Injectable 0.5 milliGRAM(s) IV Push every 12 hours  metoclopramide Injectable 10 milliGRAM(s) IV Push every 8 hours  pantoprazole    Tablet 40 milliGRAM(s) Oral two times a day  senna 2 Tablet(s) Oral at bedtime  sodium chloride 0.9%. 1000 milliLiter(s) (75 mL/Hr) IV Continuous <Continuous>    MEDICATIONS  (PRN):  acetaminophen     Tablet .. 650 milliGRAM(s) Oral every 6 hours PRN Mild Pain (1 - 3), Moderate Pain (4 - 6)  bisacodyl Suppository 10 milliGRAM(s) Rectal once PRN Constipation  dextrose Oral Gel 15 Gram(s) Oral once PRN Blood Glucose LESS THAN 70 milliGRAM(s)/deciliter        I&O's Summary    22 May 2022 07:01  -  23 May 2022 07:00  --------------------------------------------------------  IN: 240 mL / OUT: 400 mL / NET: -160 mL        PHYSICAL EXAM:  Vital Signs Last 24 Hrs  T(C): 36.4 (23 May 2022 11:43), Max: 36.7 (22 May 2022 16:18)  T(F): 97.6 (23 May 2022 11:43), Max: 98.1 (22 May 2022 16:18)  HR: 88 (23 May 2022 11:43) (81 - 88)  BP: 104/68 (23 May 2022 11:43) (104/68 - 168/95)  BP(mean): --  RR: 18 (23 May 2022 05:12) (18 - 18)  SpO2: 95% (23 May 2022 11:43) (95% - 98%)      CONSTITUTIONAL: NAD, well-developed  RESPIRATORY: Normal respiratory effort; lungs are clear to auscultation bilaterally  CARDIOVASCULAR: Regular rate and rhythm, normal S1 and S2  ABDOMEN: Nontender to palpation, normoactive bowel sound  PSYCH: A+O to person, place, and time; affect appropriate  NEUROLOGY: CN 2-12 are intact and symmetric; no gross sensory deficits;   SKIN: No rashes; no palpable lesions    LABS:                        9.3    8.01  )-----------( 392      ( 23 May 2022 05:23 )             31.3     05-23    132<L>  |  96<L>  |  20.7<H>  ----------------------------<  521<HH>  4.5   |  19.0<L>  |  0.79    Ca    8.5<L>      23 May 2022 05:23  Phos  3.7     05-22  Mg     1.6     05-22    TPro  6.1<L>  /  Alb  3.3  /  TBili  0.3<L>  /  DBili  x   /  AST  9   /  ALT  12  /  AlkPhos  100  05-23          Urinalysis Basic - ( 22 May 2022 11:01 )    Color: Yellow / Appearance: Clear / S.015 / pH: x  Gluc: x / Ketone: Moderate  / Bili: Negative / Urobili: Negative mg/dL   Blood: x / Protein: 15 / Nitrite: Negative   Leuk Esterase: Trace / RBC: 0-2 /HPF / WBC 0-2 /HPF   Sq Epi: x / Non Sq Epi: Occasional / Bacteria: Occasional        CAPILLARY BLOOD GLUCOSE      POCT Blood Glucose.: 251 mg/dL (23 May 2022 11:57)  POCT Blood Glucose.: 423 mg/dL (23 May 2022 06:16)  POCT Blood Glucose.: 328 mg/dL (22 May 2022 22:27)  POCT Blood Glucose.: 298 mg/dL (22 May 2022 16:23)  POCT Blood Glucose.: 213 mg/dL (22 May 2022 14:49)        RADIOLOGY & ADDITIONAL TESTS:  Results Reviewed:   Imaging Personally Reviewed:  Electrocardiogram Personally Reviewed:

## 2022-05-23 NOTE — BH CONSULTATION LIAISON PROGRESS NOTE - NSBHFUPINTERVALHXFT_PSY_A_CORE
Patient was seen and evaluated at beside, on constant observation, calm, cooperative, no PMA, exhibiting good behavioral control. Patient reports mood as "Good", she is eager to return to [facility], she reports that he had excruciating stomach discomfort at the facility while attempting to detox off opioids. She is unable to give details surrounding circumstances admission. "They said I was banging my head against the walls, I don't remember that." Patient denies hx of SIB, when question regarding scarring to b/l UE she states, "those are spots where the IVs were placed when I have been in the hospital." She notes that she has been using Dilaudid approximately 20mg daily over the last six months, she reports occasionally purchasing from third party, however she mainly "Hospital jumps to obtain Dilaudid. Per RN Lexis patient has not required any PRNs for behavioral disturbances, she is compliant with medication administration and tolerating meals without difficulty.   She denies hx of past psychiatric diagnosis, or acute mood features including hopelessness, anhedonia, changes in concentration/appetite, sleep disturbances, or feelings of guilt. She denies current depression. Patient denies SI/HI/SIB. Patient denies manic symptoms including elevated mood, increased irritability, mood lability, distractibility, grandiosity, pressured speech, increase in goal-directed activity, or decreased need for sleep. Patient denies any psychotic symptoms including paranoia, ideas of reference, thought insertion/broadcasting, or auditory/visual/olfactory/tactile/gustatory hallucinations.

## 2022-05-23 NOTE — PROGRESS NOTE ADULT - SUBJECTIVE AND OBJECTIVE BOX
Follow up: T1DM  Interval Hx/Events: feels that abd pains not well controlled    MEDICATIONS  (STANDING):  acetaminophen   IVPB .. 1000 milliGRAM(s) IV Intermittent once  dextrose 5%. 1000 milliLiter(s) (50 mL/Hr) IV Continuous <Continuous>  dextrose 5%. 1000 milliLiter(s) (100 mL/Hr) IV Continuous <Continuous>  dextrose 50% Injectable 25 Gram(s) IV Push once  dextrose 50% Injectable 12.5 Gram(s) IV Push once  dextrose 50% Injectable 25 Gram(s) IV Push once  enalapril 5 milliGRAM(s) Oral daily  enoxaparin Injectable 40 milliGRAM(s) SubCutaneous every 24 hours  glucagon  Injectable 1 milliGRAM(s) IntraMuscular once  insulin glargine Injectable (LANTUS) 15 Unit(s) SubCutaneous at bedtime  insulin lispro (ADMELOG) corrective regimen sliding scale   SubCutaneous three times a day before meals  insulin lispro Injectable (ADMELOG) 6 Unit(s) SubCutaneous three times a day before meals  LORazepam   Injectable   IV Push   LORazepam   Injectable 0.5 milliGRAM(s) IV Push every 12 hours  metoclopramide Injectable 10 milliGRAM(s) IV Push every 8 hours  pantoprazole    Tablet 40 milliGRAM(s) Oral two times a day  senna 2 Tablet(s) Oral at bedtime  sodium chloride 0.9%. 1000 milliLiter(s) (75 mL/Hr) IV Continuous <Continuous>    MEDICATIONS  (PRN):  acetaminophen     Tablet .. 650 milliGRAM(s) Oral every 6 hours PRN Mild Pain (1 - 3), Moderate Pain (4 - 6)  bisacodyl Suppository 10 milliGRAM(s) Rectal once PRN Constipation  dextrose Oral Gel 15 Gram(s) Oral once PRN Blood Glucose LESS THAN 70 milliGRAM(s)/deciliter      ALLERGIES: Toradol (Pruritus)  Toradol (Unknown)    ROAD - denies fever/chills. denies CP/palp. denies SOB/cough. (+) abd pains, denies nausea/vomiting/changes in bowel habits. denies blurry vision. denies dysuria.    Vital Signs Last 24 Hrs  T(C): 36.6 (23 May 2022 15:32), Max: 36.7 (22 May 2022 16:18)  T(F): 97.9 (23 May 2022 15:32), Max: 98.1 (22 May 2022 16:18)  HR: 101 (23 May 2022 15:32) (81 - 101)  BP: 114/68 (23 May 2022 15:32) (104/68 - 168/95)  BP(mean): --  RR: 16 (23 May 2022 15:32) (16 - 18)  SpO2: 97% (23 May 2022 15:32) (95% - 98%)    Physical Exam:  General appearance: thin appearing  Eyes: EOMI  Lungs: Normal respiratory excursion. Lungs clear no w/r/r  CV: Normal S1S2, regular. No m/r/g.  Abdomen: Soft, nontender, nondistended, (+) BS  Musculoskeletal: No edema.  Skin: Warm and moist. No acanthosis.   Neuro: Cranial nerves intact.   Psych: Normal affect, good judgement/insight      LABS:                        9.3    8.01  )-----------( 392      ( 23 May 2022 05:23 )             31.3     05-23    132<L>  |  96<L>  |  20.7<H>  ----------------------------<  521<HH>  4.5   |  19.0<L>  |  0.79    Ca    8.5<L>      23 May 2022 05:23  Phos  3.7     05-22  Mg     1.6     05-22    TPro  6.1<L>  /  Alb  3.3  /  TBili  0.3<L>  /  DBili  x   /  AST  9   /  ALT  12  /  AlkPhos  100  05-23    LIVER FUNCTIONS - ( 23 May 2022 05:23 )  Alb: 3.3 g/dL / Pro: 6.1 g/dL / ALK PHOS: 100 U/L / ALT: 12 U/L / AST: 9 U/L / GGT: x           A1C with Estimated Average Glucose Result: 10.8 % (05-14-22 @ 11:39)    CAPILLARY BLOOD GLUCOSE  POCT Blood Glucose.: 251 mg/dL (23 May 2022 11:57)  POCT Blood Glucose.: 423 mg/dL (23 May 2022 06:16)  POCT Blood Glucose.: 328 mg/dL (22 May 2022 22:27)  POCT Blood Glucose.: 298 mg/dL (22 May 2022 16:23)  POCT Blood Glucose.: 213 mg/dL (22 May 2022 14:49)  POCT Blood Glucose.: 225 mg/dL (22 May 2022 12:15)  POCT Blood Glucose.: 472 mg/dL (22 May 2022 09:14)  POCT Blood Glucose.: 530 mg/dL (22 May 2022 07:53)  POCT Blood Glucose.: 343 mg/dL (21 May 2022 22:05)  POCT Blood Glucose.: 83 mg/dL (21 May 2022 16:10)    Thyroid Stimulating Hormone, Serum: 0.93 uIU/mL [0.27 - 4.20] (05-19-22)

## 2022-05-23 NOTE — BH CONSULTATION LIAISON PROGRESS NOTE - NSBHCHARTREVIEWLAB_PSY_A_CORE FT
9.3    8.01  )-----------( 392      ( 23 May 2022 05:23 )             31.3   05-23    132<L>  |  96<L>  |  20.7<H>  ----------------------------<  521<HH>  4.5   |  19.0<L>  |  0.79    Ca    8.5<L>      23 May 2022 05:23  Phos  3.7     05-22  Mg     1.6     05-22  TPro  6.1<L>  /  Alb  3.3  /  TBili  0.3<L>  /  DBili  x   /  AST  9   /  ALT  12  /  AlkPhos  100  05-23

## 2022-05-23 NOTE — PROGRESS NOTE ADULT - ASSESSMENT
31F PMHx HTN Type 1 DM w/ episodes of DKA, diabetic gastroparesis, opiate dependence, ectopic pregnancy x 2, s/p bilat tubal ligation, was discharged from [facility] on 5/13 after Rx of gastroparesis and mild DKA. She then went o [facility] ED on 5/14. 16, 17 with same c/o. She was treated in ED and released. On 5/19/2022 she was sent from [facility] (for narcs) for aggressive behavior. As per EMS, pt. is withdrawing from taking 26mg of Dilaudid a day. Pt. began screaming, kicking and bashing her head against the wall. Pt. given 10 of IM versed by EMS. In ED- asking for narcotics IV and when told she does not need it, she became combative towards staff, tried to strangle herself with monitor wires and call bell wires. Required ativan, Klonopin and ketamine to provide adequate sedation and then MICU was consulted. Also noted mild DKA. ICU recs- U tox, 1:1 watch, Psych eval and Ativan taper. CT A/P showed thickening of gastric wall and colonic wall being read as Colitis and ED gave Zosyn and is requesting admission.     # gastric wall and Colonic wall thickening being read as Colitis  likely all related to gastroparesis/ narcotics   Prior CT AP- Colitis involving the transverse and descending colon. Gastric wall thickening, possibly gastritis.  Clinically no diarrhea  will hold off antibx  DM diet PO  was given Zosyn in ED  RTC Reglan  GI consulted- will c/w PPI BID  - F/u CT A/P- increased stool burden. Dulcolax suppository, x 1.    # Abd pain, N, V requesting IV narcs  follow prevention of self injury protocol  1:1 watch  will give RTC Ativan taper per ICU recs, x 2 doses left  Psych eval for substance detox admission    # Gastroparesis  RTC Reglan and GI eval    # T1DM poorly controlled. s/p DKA. Patient eating Chinese food. Emphasized compliance with hospital food. Patient agreeable.  last seen her Endo in 12/2021. A1c- 10.8  Lantus, premeal, ISS here  C/w lantus to 15 units and lispro 6 units pre-meal  Appreciate endo recs  Transitioned to [facility]    # HTN uncontrolled  C/w Norvasc, ARB    # Microcytic anemia  likely sec to poor nutrition    Lovenox  Dispo Pending optimal FS. Patient presents from [facility] in [place] for agitation.   Updated patient's mom, Flower ([phone]) on 5/23.

## 2022-05-23 NOTE — BH CONSULTATION LIAISON PROGRESS NOTE - NSBHASSESSMENTFT_PSY_ALL_CORE
Patient is a 32F,  female, , PPH opioid dependence (Dilaudid), history of prior completed rehab in [facility] (2014-45 days), with h/o prior incomplete rehab in [facility] last year; +history of suicidal gestures in hospital setting in setting of wanting pain meds, no psychiatric hospitalizations, no known SAs with intent, no outpatient psychiatric care, PMHX HTN, DM on insulin, gastroparesis with prior admissions for pain and vomiting, ectopic pregnancy s/p b/l tubal ligation, cholecystectomy, presenting to ED after recent admission for abd pain with vomiting and hyper/hypoglycemia and AMA to visit brother in hospital, returning to ED for same symptoms she was having prior to and during admission- epigastric and LLQ pain with n/v.  Patient was transferred from [facility] due to vomiting, reported abdominal pain and started banging head against the wall. She was sent to ER due to pain and self harm behavior.   She also wrapped cord around her neck in ER. Asked to evaluate possible suicidal ideation.  Patient presented yesterday at [facility] for detox of opioids. Has been abusing Dilaudid off street and ER.  PT was transferred to ER for intractable pain, vomiting and elevated BS.  she has h/o similar prior admissions.   Patient has h/o making suicidal gestures in attention seeking manner but with no suicide intent.       Patient was seen and evaluated, presents AOX4, calm, cooperative, fairly related, engaged. Patient with euthymic/full affect with mood congruence.  Likely No acute mood features reported or observed, she is unable to elaborate on details surrounding here admission. She denies HX of suicide attempt/ NSSIB. Hx of Opioid abuse x 6 months. No further episodes of behavioral dysregulation, she remains calm and cooperative, exhibiting better judgment and insight into her care. No sxs of pee, no AVH, no overt psychosis. Evidence of Adjustment disorder with disturbance in mood and conduct, due to underlying opioid withdrawal. Patient was provided with education regarding opioid withdrawal symptoms and Suboxone induction, including possible symptoms of worsening pain/ flu-like symptoms in the acute withdrawal phase. She remains eager to return to [facility] for detox treatment. She is future oriented and motivated for treatment. Psych cleared for discharge to substance rehabilitation.       Plan   MEDICATIONS  (STANDING):  LORazepam   Injectable 0.5 milliGRAM(s) IV Push every 12 hours  Safety: No acute safety concerns, constant observation at the discretion of the medical team  SW for substance rehabilitation  Emotional support with Brief CBT/DBT strategies discussed to enhance coping skills for external psychosocial factors.  Psych cleared for discharge when medically stable

## 2022-05-23 NOTE — PROGRESS NOTE ADULT - ASSESSMENT
31F PMHx HTN Type 1 DM w/ episodes of DKA, diabetic gastroparesis, opiate dependence was discharged from [facility] on 5/13 after Rx of gastroparesis and mild DKA. On 5/19/2022 she was sent from [facility] (for narcs) for aggressive behavior. Found to have opiate withdrawl (had been taking Dilaudid 26 mg daily), combative with behavior indicating self harm, DKA and colitis,    1. poorly controlled T1DM (A1c 10.8%) complicated by gastroparesis - poor control yesterday due to nonadherence with hospital diet  - glucoses improved today on clear liquid diet but still elevated  - increase Lantus 18 units at bedtime  - cont Admelog 6 units premeals, cont Admelog scale    2. diabetic gastroparesis worse with opiate use  - on Reglan, on PPI    3. Colitis on imaging, without diarrhea  - monitor for now  - not in Abx    4. opiate dependence w abd pains  - on 1:1 observation  - on Ativan taper  - psych eval for detox admission

## 2022-05-24 ENCOUNTER — RX RENEWAL (OUTPATIENT)
Age: 32
End: 2022-05-24

## 2022-05-24 LAB
ALBUMIN SERPL ELPH-MCNC: 3.2 G/DL — LOW (ref 3.3–5.2)
ALP SERPL-CCNC: 109 U/L — SIGNIFICANT CHANGE UP (ref 40–120)
ALT FLD-CCNC: 13 U/L — SIGNIFICANT CHANGE UP
ANION GAP SERPL CALC-SCNC: 16 MMOL/L — SIGNIFICANT CHANGE UP (ref 5–17)
AST SERPL-CCNC: 14 U/L — SIGNIFICANT CHANGE UP
BILIRUB SERPL-MCNC: <0.2 MG/DL — LOW (ref 0.4–2)
BUN SERPL-MCNC: 32.2 MG/DL — HIGH (ref 8–20)
CALCIUM SERPL-MCNC: 9.1 MG/DL — SIGNIFICANT CHANGE UP (ref 8.6–10.2)
CHLORIDE SERPL-SCNC: 102 MMOL/L — SIGNIFICANT CHANGE UP (ref 98–107)
CO2 SERPL-SCNC: 20 MMOL/L — LOW (ref 22–29)
CREAT SERPL-MCNC: 0.93 MG/DL — SIGNIFICANT CHANGE UP (ref 0.5–1.3)
EGFR: 84 ML/MIN/1.73M2 — SIGNIFICANT CHANGE UP
GLUCOSE BLDC GLUCOMTR-MCNC: 139 MG/DL — HIGH (ref 70–99)
GLUCOSE BLDC GLUCOMTR-MCNC: 172 MG/DL — HIGH (ref 70–99)
GLUCOSE BLDC GLUCOMTR-MCNC: 178 MG/DL — HIGH (ref 70–99)
GLUCOSE BLDC GLUCOMTR-MCNC: 260 MG/DL — HIGH (ref 70–99)
GLUCOSE BLDC GLUCOMTR-MCNC: 315 MG/DL — HIGH (ref 70–99)
GLUCOSE BLDC GLUCOMTR-MCNC: 336 MG/DL — HIGH (ref 70–99)
GLUCOSE BLDC GLUCOMTR-MCNC: 432 MG/DL — HIGH (ref 70–99)
GLUCOSE BLDC GLUCOMTR-MCNC: 80 MG/DL — SIGNIFICANT CHANGE UP (ref 70–99)
GLUCOSE BLDC GLUCOMTR-MCNC: 96 MG/DL — SIGNIFICANT CHANGE UP (ref 70–99)
GLUCOSE SERPL-MCNC: 233 MG/DL — HIGH (ref 70–99)
HCT VFR BLD CALC: 33.3 % — LOW (ref 34.5–45)
HGB BLD-MCNC: 9.8 G/DL — LOW (ref 11.5–15.5)
MCHC RBC-ENTMCNC: 18.3 PG — LOW (ref 27–34)
MCHC RBC-ENTMCNC: 29.4 GM/DL — LOW (ref 32–36)
MCV RBC AUTO: 62.2 FL — LOW (ref 80–100)
PLATELET # BLD AUTO: 396 K/UL — SIGNIFICANT CHANGE UP (ref 150–400)
POTASSIUM SERPL-MCNC: 3.4 MMOL/L — LOW (ref 3.5–5.3)
POTASSIUM SERPL-SCNC: 3.4 MMOL/L — LOW (ref 3.5–5.3)
PROT SERPL-MCNC: 6.4 G/DL — LOW (ref 6.6–8.7)
RBC # BLD: 5.35 M/UL — HIGH (ref 3.8–5.2)
RBC # FLD: 19.2 % — HIGH (ref 10.3–14.5)
SARS-COV-2 RNA SPEC QL NAA+PROBE: SIGNIFICANT CHANGE UP
SODIUM SERPL-SCNC: 138 MMOL/L — SIGNIFICANT CHANGE UP (ref 135–145)
WBC # BLD: 8.57 K/UL — SIGNIFICANT CHANGE UP (ref 3.8–10.5)
WBC # FLD AUTO: 8.57 K/UL — SIGNIFICANT CHANGE UP (ref 3.8–10.5)

## 2022-05-24 PROCEDURE — 99232 SBSQ HOSP IP/OBS MODERATE 35: CPT

## 2022-05-24 RX ORDER — HYDROMORPHONE HYDROCHLORIDE 2 MG/ML
0.5 INJECTION INTRAMUSCULAR; INTRAVENOUS; SUBCUTANEOUS ONCE
Refills: 0 | Status: DISCONTINUED | OUTPATIENT
Start: 2022-05-24 | End: 2022-05-24

## 2022-05-24 RX ORDER — POTASSIUM CHLORIDE 20 MEQ
40 PACKET (EA) ORAL ONCE
Refills: 0 | Status: COMPLETED | OUTPATIENT
Start: 2022-05-24 | End: 2022-05-24

## 2022-05-24 RX ORDER — DIPHENHYDRAMINE HCL 50 MG
25 CAPSULE ORAL ONCE
Refills: 0 | Status: COMPLETED | OUTPATIENT
Start: 2022-05-24 | End: 2022-05-24

## 2022-05-24 RX ORDER — ACETAMINOPHEN 500 MG
1000 TABLET ORAL ONCE
Refills: 0 | Status: COMPLETED | OUTPATIENT
Start: 2022-05-24 | End: 2022-05-24

## 2022-05-24 RX ADMIN — HYDROMORPHONE HYDROCHLORIDE 0.5 MILLIGRAM(S): 2 INJECTION INTRAMUSCULAR; INTRAVENOUS; SUBCUTANEOUS at 11:41

## 2022-05-24 RX ADMIN — Medication 6 UNIT(S): at 08:04

## 2022-05-24 RX ADMIN — Medication 2: at 17:43

## 2022-05-24 RX ADMIN — Medication 25 MILLIGRAM(S): at 00:50

## 2022-05-24 RX ADMIN — SENNA PLUS 2 TABLET(S): 8.6 TABLET ORAL at 21:52

## 2022-05-24 RX ADMIN — Medication 6 UNIT(S): at 11:40

## 2022-05-24 RX ADMIN — PANTOPRAZOLE SODIUM 40 MILLIGRAM(S): 20 TABLET, DELAYED RELEASE ORAL at 05:34

## 2022-05-24 RX ADMIN — HYDROMORPHONE HYDROCHLORIDE 0.5 MILLIGRAM(S): 2 INJECTION INTRAMUSCULAR; INTRAVENOUS; SUBCUTANEOUS at 12:10

## 2022-05-24 RX ADMIN — PANTOPRAZOLE SODIUM 40 MILLIGRAM(S): 20 TABLET, DELAYED RELEASE ORAL at 17:39

## 2022-05-24 RX ADMIN — Medication 10 MILLIGRAM(S): at 09:33

## 2022-05-24 RX ADMIN — Medication 5 MILLIGRAM(S): at 05:34

## 2022-05-24 RX ADMIN — Medication 25 MILLIGRAM(S): at 11:41

## 2022-05-24 RX ADMIN — Medication 10 MILLIGRAM(S): at 00:32

## 2022-05-24 RX ADMIN — Medication 12: at 06:22

## 2022-05-24 RX ADMIN — Medication 40 MILLIEQUIVALENT(S): at 14:41

## 2022-05-24 RX ADMIN — HYDROMORPHONE HYDROCHLORIDE 0.5 MILLIGRAM(S): 2 INJECTION INTRAMUSCULAR; INTRAVENOUS; SUBCUTANEOUS at 00:33

## 2022-05-24 NOTE — DIETITIAN INITIAL EVALUATION ADULT - PERTINENT MEDS FT
MEDICATIONS  (STANDING):  dextrose 5%. 1000 milliLiter(s) (50 mL/Hr) IV Continuous <Continuous>  dextrose 5%. 1000 milliLiter(s) (100 mL/Hr) IV Continuous <Continuous>  dextrose 50% Injectable 25 Gram(s) IV Push once  dextrose 50% Injectable 12.5 Gram(s) IV Push once  dextrose 50% Injectable 25 Gram(s) IV Push once  enalapril 5 milliGRAM(s) Oral daily  enoxaparin Injectable 40 milliGRAM(s) SubCutaneous every 24 hours  glucagon  Injectable 1 milliGRAM(s) IntraMuscular once  insulin glargine Injectable (LANTUS) 18 Unit(s) SubCutaneous at bedtime  insulin lispro (ADMELOG) corrective regimen sliding scale   SubCutaneous three times a day before meals  insulin lispro Injectable (ADMELOG) 6 Unit(s) SubCutaneous three times a day before meals  metoclopramide Injectable 10 milliGRAM(s) IV Push every 8 hours  pantoprazole    Tablet 40 milliGRAM(s) Oral two times a day  senna 2 Tablet(s) Oral at bedtime  sodium chloride 0.9%. 1000 milliLiter(s) (75 mL/Hr) IV Continuous <Continuous>    MEDICATIONS  (PRN):  acetaminophen     Tablet .. 650 milliGRAM(s) Oral every 6 hours PRN Mild Pain (1 - 3), Moderate Pain (4 - 6)  bisacodyl Suppository 10 milliGRAM(s) Rectal once PRN Constipation  dextrose Oral Gel 15 Gram(s) Oral once PRN Blood Glucose LESS THAN 70 milliGRAM(s)/deciliter

## 2022-05-24 NOTE — PROGRESS NOTE ADULT - SUBJECTIVE AND OBJECTIVE BOX
[facility] Division of Hospital Medicine    Chief Complaint:      SUBJECTIVE / OVERNIGHT EVENTS:    Patient denies chest pain, SOB, abd pain, N/V, fever, chills, dysuria or any other complaints. All remainder ROS negative.     MEDICATIONS  (STANDING):  dextrose 5%. 1000 milliLiter(s) (50 mL/Hr) IV Continuous <Continuous>  dextrose 5%. 1000 milliLiter(s) (100 mL/Hr) IV Continuous <Continuous>  dextrose 50% Injectable 25 Gram(s) IV Push once  dextrose 50% Injectable 12.5 Gram(s) IV Push once  dextrose 50% Injectable 25 Gram(s) IV Push once  enalapril 5 milliGRAM(s) Oral daily  enoxaparin Injectable 40 milliGRAM(s) SubCutaneous every 24 hours  glucagon  Injectable 1 milliGRAM(s) IntraMuscular once  insulin glargine Injectable (LANTUS) 18 Unit(s) SubCutaneous at bedtime  insulin lispro (ADMELOG) corrective regimen sliding scale   SubCutaneous three times a day before meals  insulin lispro Injectable (ADMELOG) 6 Unit(s) SubCutaneous three times a day before meals  metoclopramide Injectable 10 milliGRAM(s) IV Push every 8 hours  pantoprazole    Tablet 40 milliGRAM(s) Oral two times a day  senna 2 Tablet(s) Oral at bedtime  sodium chloride 0.9%. 1000 milliLiter(s) (75 mL/Hr) IV Continuous <Continuous>    MEDICATIONS  (PRN):  acetaminophen     Tablet .. 650 milliGRAM(s) Oral every 6 hours PRN Mild Pain (1 - 3), Moderate Pain (4 - 6)  bisacodyl Suppository 10 milliGRAM(s) Rectal once PRN Constipation  dextrose Oral Gel 15 Gram(s) Oral once PRN Blood Glucose LESS THAN 70 milliGRAM(s)/deciliter        I&O's Summary    23 May 2022 07:01  -  24 May 2022 07:00  --------------------------------------------------------  IN: 234 mL / OUT: 0 mL / NET: 234 mL        PHYSICAL EXAM:  Vital Signs Last 24 Hrs  T(C): 36.6 (24 May 2022 03:35), Max: 36.6 (23 May 2022 15:32)  T(F): 97.9 (24 May 2022 03:35), Max: 97.9 (23 May 2022 15:32)  HR: 79 (24 May 2022 03:35) (79 - 101)  BP: 129/81 (24 May 2022 03:35) (114/68 - 129/81)  BP(mean): --  RR: 18 (24 May 2022 03:35) (16 - 18)  SpO2: 96% (24 May 2022 03:35) (96% - 97%)        CONSTITUTIONAL: NAD, well-developed, well-groomed  ENMT: Moist oral mucosa, no pharyngeal injection or exudates; normal dentition  RESPIRATORY: Normal respiratory effort; lungs are clear to auscultation bilaterally  CARDIOVASCULAR: Regular rate and rhythm, normal S1 and S2, no murmur/rub/gallop; No lower extremity edema; Peripheral pulses are 2+ bilaterally  ABDOMEN: Nontender to palpation, normoactive bowel sounds, no rebound/guarding; No hepatosplenomegaly  MUSCLOSKELETAL:  Normal gait; no clubbing or cyanosis of digits; no joint swelling or tenderness to palpation  PSYCH: A+O to person, place, and time; affect appropriate  NEUROLOGY: CN 2-12 are intact and symmetric; no gross sensory deficits;   SKIN: No rashes; no palpable lesions    LABS:                        9.8    8.57  )-----------( 396      ( 24 May 2022 07:39 )             33.3     05-24    138  |  102  |  32.2<H>  ----------------------------<  233<H>  3.4<L>   |  20.0<L>  |  0.93    Ca    9.1      24 May 2022 07:39  Phos  3.7     05-22  Mg     1.6     05-22    TPro  6.4<L>  /  Alb  3.2<L>  /  TBili  <0.2<L>  /  DBili  x   /  AST  14  /  ALT  13  /  AlkPhos  109  05-24              CAPILLARY BLOOD GLUCOSE      POCT Blood Glucose.: 139 mg/dL (24 May 2022 11:38)  POCT Blood Glucose.: 96 mg/dL (24 May 2022 07:51)  POCT Blood Glucose.: 260 mg/dL (24 May 2022 06:51)  POCT Blood Glucose.: 432 mg/dL (24 May 2022 06:07)  POCT Blood Glucose.: 178 mg/dL (24 May 2022 00:42)  POCT Blood Glucose.: 281 mg/dL (23 May 2022 21:48)  POCT Blood Glucose.: 123 mg/dL (23 May 2022 16:28)        RADIOLOGY & ADDITIONAL TESTS:  Results Reviewed:   Imaging Personally Reviewed:  Electrocardiogram Personally Reviewed:                                           [facility] Division of Hospital Medicine    Chief Complaint: abdominal pain      SUBJECTIVE / OVERNIGHT EVENTS: No acute events overnight. HD stable.     Patient denies chest pain, SOB, abd pain, N/V, fever, chills, dysuria or any other complaints. All remainder ROS negative.     MEDICATIONS  (STANDING):  dextrose 5%. 1000 milliLiter(s) (50 mL/Hr) IV Continuous <Continuous>  dextrose 5%. 1000 milliLiter(s) (100 mL/Hr) IV Continuous <Continuous>  dextrose 50% Injectable 25 Gram(s) IV Push once  dextrose 50% Injectable 12.5 Gram(s) IV Push once  dextrose 50% Injectable 25 Gram(s) IV Push once  enalapril 5 milliGRAM(s) Oral daily  enoxaparin Injectable 40 milliGRAM(s) SubCutaneous every 24 hours  glucagon  Injectable 1 milliGRAM(s) IntraMuscular once  insulin glargine Injectable (LANTUS) 18 Unit(s) SubCutaneous at bedtime  insulin lispro (ADMELOG) corrective regimen sliding scale   SubCutaneous three times a day before meals  insulin lispro Injectable (ADMELOG) 6 Unit(s) SubCutaneous three times a day before meals  metoclopramide Injectable 10 milliGRAM(s) IV Push every 8 hours  pantoprazole    Tablet 40 milliGRAM(s) Oral two times a day  senna 2 Tablet(s) Oral at bedtime  sodium chloride 0.9%. 1000 milliLiter(s) (75 mL/Hr) IV Continuous <Continuous>    MEDICATIONS  (PRN):  acetaminophen     Tablet .. 650 milliGRAM(s) Oral every 6 hours PRN Mild Pain (1 - 3), Moderate Pain (4 - 6)  bisacodyl Suppository 10 milliGRAM(s) Rectal once PRN Constipation  dextrose Oral Gel 15 Gram(s) Oral once PRN Blood Glucose LESS THAN 70 milliGRAM(s)/deciliter        I&O's Summary    23 May 2022 07:01  -  24 May 2022 07:00  --------------------------------------------------------  IN: 234 mL / OUT: 0 mL / NET: 234 mL        PHYSICAL EXAM:  Vital Signs Last 24 Hrs  T(C): 36.6 (24 May 2022 03:35), Max: 36.6 (23 May 2022 15:32)  T(F): 97.9 (24 May 2022 03:35), Max: 97.9 (23 May 2022 15:32)  HR: 79 (24 May 2022 03:35) (79 - 101)  BP: 129/81 (24 May 2022 03:35) (114/68 - 129/81)  BP(mean): --  RR: 18 (24 May 2022 03:35) (16 - 18)  SpO2: 96% (24 May 2022 03:35) (96% - 97%)        CONSTITUTIONAL: NAD, well-developed, well-groomed  ENMT: Moist oral mucosa, no pharyngeal injection or exudates; normal dentition  RESPIRATORY: Normal respiratory effort; lungs are clear to auscultation bilaterally  CARDIOVASCULAR: Regular rate and rhythm, normal S1 and S2, no murmur/rub/gallop; No lower extremity edema; Peripheral pulses are 2+ bilaterally  ABDOMEN: Nontender to palpation, normoactive bowel sounds, no rebound/guarding; No hepatosplenomegaly  MUSCLOSKELETAL:  Normal gait; no clubbing or cyanosis of digits; no joint swelling or tenderness to palpation  PSYCH: A+O to person, place, and time; affect appropriate  NEUROLOGY: CN 2-12 are intact and symmetric; no gross sensory deficits;   SKIN: No rashes; no palpable lesions    LABS:                        9.8    8.57  )-----------( 396      ( 24 May 2022 07:39 )             33.3     05-24    138  |  102  |  32.2<H>  ----------------------------<  233<H>  3.4<L>   |  20.0<L>  |  0.93    Ca    9.1      24 May 2022 07:39  Phos  3.7     05-22  Mg     1.6     05-22    TPro  6.4<L>  /  Alb  3.2<L>  /  TBili  <0.2<L>  /  DBili  x   /  AST  14  /  ALT  13  /  AlkPhos  109  05-24              CAPILLARY BLOOD GLUCOSE      POCT Blood Glucose.: 139 mg/dL (24 May 2022 11:38)  POCT Blood Glucose.: 96 mg/dL (24 May 2022 07:51)  POCT Blood Glucose.: 260 mg/dL (24 May 2022 06:51)  POCT Blood Glucose.: 432 mg/dL (24 May 2022 06:07)  POCT Blood Glucose.: 178 mg/dL (24 May 2022 00:42)  POCT Blood Glucose.: 281 mg/dL (23 May 2022 21:48)  POCT Blood Glucose.: 123 mg/dL (23 May 2022 16:28)        RADIOLOGY & ADDITIONAL TESTS:  Results Reviewed:   Imaging Personally Reviewed:  Electrocardiogram Personally Reviewed:

## 2022-05-24 NOTE — DIETITIAN INITIAL EVALUATION ADULT - PERTINENT LABORATORY DATA
05-24    138  |  102  |  32.2<H>  ----------------------------<  233<H>  3.4<L>   |  20.0<L>  |  0.93    Ca    9.1      24 May 2022 07:39  Phos  3.7     05-22  Mg     1.6     05-22    TPro  6.4<L>  /  Alb  3.2<L>  /  TBili  <0.2<L>  /  DBili  x   /  AST  14  /  ALT  13  /  AlkPhos  109  05-24  POCT Blood Glucose.: 96 mg/dL (05-24-22 @ 07:51)  A1C with Estimated Average Glucose Result: 10.8 % (05-14-22 @ 11:39)  A1C with Estimated Average Glucose Result: 10.4 % (05-09-22 @ 00:49)  A1C with Estimated Average Glucose Result: 10.4 % (04-27-22 @ 21:04)

## 2022-05-24 NOTE — PROGRESS NOTE ADULT - ASSESSMENT
31F PMHx HTN Type 1 DM w/ episodes of DKA, diabetic gastroparesis, opiate dependence, ectopic pregnancy x 2, s/p bilat tubal ligation, was discharged from [facility] on 5/13 after Rx of gastroparesis and mild DKA. She then went o [facility] ED on 5/14. 16, 17 with same c/o. She was treated in ED and released. On 5/19/2022 she was sent from [facility] (for narcs) for aggressive behavior. As per EMS, pt. is withdrawing from taking 26mg of Dilaudid a day. Pt. began screaming, kicking and bashing her head against the wall. Pt. given 10 of IM versed by EMS. In ED- asking for narcotics IV and when told she does not need it, she became combative towards staff, tried to strangle herself with monitor wires and call bell wires. Required ativan, Klonopin and ketamine to provide adequate sedation and then MICU was consulted. Also noted mild DKA. ICU recs- U tox, 1:1 watch, Psych eval and Ativan taper. CT A/P showed thickening of gastric wall and colonic wall being read as Colitis and ED gave Zosyn and is requesting admission.     # gastric wall and Colonic wall thickening being read as Colitis  likely all related to gastroparesis/ narcotics   Prior CT AP- Colitis involving the transverse and descending colon. Gastric wall thickening, possibly gastritis.  Clinically no diarrhea  will hold off antibx  DM diet PO  was given Zosyn in ED  RTC Reglan  GI consulted- will c/w PPI BID  - F/u CT A/P- increased stool burden. S/p dulcolax suppository, x 1.    # Abd pain, N, V requesting IV narcs  follow prevention of self injury protocol  1:1 watch  S/p Ativan taper  Appreciate psych recs    # Gastroparesis  RTC Reglan and GI eval    # T1DM poorly controlled. s/p DKA.  last seen her Endo in 12/2021. A1c- 10.8  Lantus, premeal, ISS here  C/w lantus to 18 units and lispro 6 units pre-meal  Appreciate endo recs    # HTN uncontrolled  C/w Norvasc, ARB    # Microcytic anemia  likely sec to poor nutrition    Lovenox  Dispo Patient is medically optimized for discharge. Patient presents from [facility] in [place] for agitation. [facility] will not be able accommodate patient, per discussion SW. Patient will need to be discharged to apartment.   Updated patient's mom, Flower ([phone]) on 5/24.     31F PMHx HTN Type 1 DM w/ episodes of DKA, diabetic gastroparesis, opiate dependence, ectopic pregnancy x 2, s/p bilat tubal ligation, was discharged from [facility] on 5/13 after Rx of gastroparesis and mild DKA. She then went o [facility] ED on 5/14. 16, 17 with same c/o. She was treated in ED and released. On 5/19/2022 she was sent from [facility] (for narcs) for aggressive behavior. As per EMS, pt. is withdrawing from taking 26mg of Dilaudid a day. Pt. began screaming, kicking and bashing her head against the wall. Pt. given 10 of IM versed by EMS. In ED- asking for narcotics IV and when told she does not need it, she became combative towards staff, tried to strangle herself with monitor wires and call bell wires. Required ativan, Klonopin and ketamine to provide adequate sedation and then MICU was consulted. Also noted mild DKA. ICU recs- U tox, 1:1 watch, Psych eval and Ativan taper. CT A/P showed thickening of gastric wall and colonic wall being read as Colitis and ED gave Zosyn and is requesting admission.     # gastric wall and Colonic wall thickening being read as Colitis  likely all related to gastroparesis/ narcotics   Prior CT AP- Colitis involving the transverse and descending colon. Gastric wall thickening, possibly gastritis.  Clinically no diarrhea  will hold off antibx  DM diet PO  was given Zosyn in ED  RTC Reglan  GI consulted- will c/w PPI BID  - F/u CT A/P- increased stool burden. S/p dulcolax suppository, x 1.    # Abd pain, N, V  1:1 discontinued. No overnight episodes of agitation.   S/p Ativan taper  Appreciate psych recs    # Gastroparesis  RTC Reglan and GI eval    # T1DM poorly controlled. s/p DKA.  last seen her Endo in 12/2021. A1c- 10.8  Lantus, premeal, ISS here  C/w lantus to 18 units and lispro 6 units pre-meal  Appreciate endo recs    # HTN uncontrolled  C/w Norvasc, ARB    # Microcytic anemia  likely sec to poor nutrition    Lovenox  Dispo Patient is medically optimized for discharge. Patient presents from [facility] in [place].   Updated patient's mom, Flower ([phone]) on 5/24.

## 2022-05-24 NOTE — DIETITIAN INITIAL EVALUATION ADULT - ORAL INTAKE PTA/DIET HISTORY
Pt reported good po intake PTA and currently. Consuming % of meals per documentation. Pt denied recent changes in wt, stated -150 lbs. Pt does not adhere to a DM diet at home, provided education. Pt denied further questions/concerns. Stated ht 5'6".

## 2022-05-25 ENCOUNTER — TRANSCRIPTION ENCOUNTER (OUTPATIENT)
Age: 32
End: 2022-05-25

## 2022-05-25 ENCOUNTER — EMERGENCY (EMERGENCY)
Facility: HOSPITAL | Age: 32
LOS: 0 days | Discharge: HOME | End: 2022-05-26
Attending: STUDENT IN AN ORGANIZED HEALTH CARE EDUCATION/TRAINING PROGRAM
Payer: COMMERCIAL

## 2022-05-25 VITALS
HEIGHT: 66 IN | WEIGHT: 130.07 LBS | TEMPERATURE: 99 F | SYSTOLIC BLOOD PRESSURE: 138 MMHG | OXYGEN SATURATION: 98 % | HEART RATE: 119 BPM | DIASTOLIC BLOOD PRESSURE: 70 MMHG | RESPIRATION RATE: 18 BRPM

## 2022-05-25 VITALS
DIASTOLIC BLOOD PRESSURE: 72 MMHG | TEMPERATURE: 98 F | RESPIRATION RATE: 18 BRPM | SYSTOLIC BLOOD PRESSURE: 106 MMHG | OXYGEN SATURATION: 97 % | HEART RATE: 106 BPM

## 2022-05-25 DIAGNOSIS — R11.2 NAUSEA WITH VOMITING, UNSPECIFIED: ICD-10-CM

## 2022-05-25 DIAGNOSIS — R10.9 UNSPECIFIED ABDOMINAL PAIN: ICD-10-CM

## 2022-05-25 DIAGNOSIS — E10.43 TYPE 1 DIABETES MELLITUS WITH DIABETIC AUTONOMIC (POLY)NEUROPATHY: ICD-10-CM

## 2022-05-25 DIAGNOSIS — Z88.6 ALLERGY STATUS TO ANALGESIC AGENT: ICD-10-CM

## 2022-05-25 DIAGNOSIS — Z90.49 ACQUIRED ABSENCE OF OTHER SPECIFIED PARTS OF DIGESTIVE TRACT: Chronic | ICD-10-CM

## 2022-05-25 DIAGNOSIS — K21.9 GASTRO-ESOPHAGEAL REFLUX DISEASE WITHOUT ESOPHAGITIS: ICD-10-CM

## 2022-05-25 DIAGNOSIS — K31.84 GASTROPARESIS: ICD-10-CM

## 2022-05-25 DIAGNOSIS — I10 ESSENTIAL (PRIMARY) HYPERTENSION: ICD-10-CM

## 2022-05-25 DIAGNOSIS — Z79.4 LONG TERM (CURRENT) USE OF INSULIN: ICD-10-CM

## 2022-05-25 DIAGNOSIS — R00.0 TACHYCARDIA, UNSPECIFIED: ICD-10-CM

## 2022-05-25 DIAGNOSIS — Z90.49 ACQUIRED ABSENCE OF OTHER SPECIFIED PARTS OF DIGESTIVE TRACT: ICD-10-CM

## 2022-05-25 DIAGNOSIS — O00.9 ECTOPIC PREGNANCY, UNSPECIFIED: Chronic | ICD-10-CM

## 2022-05-25 LAB
ANION GAP SERPL CALC-SCNC: 14 MMOL/L — SIGNIFICANT CHANGE UP (ref 5–17)
BUN SERPL-MCNC: 28.3 MG/DL — HIGH (ref 8–20)
CALCIUM SERPL-MCNC: 9.4 MG/DL — SIGNIFICANT CHANGE UP (ref 8.6–10.2)
CHLORIDE SERPL-SCNC: 102 MMOL/L — SIGNIFICANT CHANGE UP (ref 98–107)
CO2 SERPL-SCNC: 22 MMOL/L — SIGNIFICANT CHANGE UP (ref 22–29)
CREAT SERPL-MCNC: 0.68 MG/DL — SIGNIFICANT CHANGE UP (ref 0.5–1.3)
EGFR: 119 ML/MIN/1.73M2 — SIGNIFICANT CHANGE UP
GLUCOSE BLDC GLUCOMTR-MCNC: 160 MG/DL — HIGH (ref 70–99)
GLUCOSE BLDC GLUCOMTR-MCNC: 201 MG/DL — HIGH (ref 70–99)
GLUCOSE BLDC GLUCOMTR-MCNC: 205 MG/DL — HIGH (ref 70–99)
GLUCOSE BLDC GLUCOMTR-MCNC: 267 MG/DL — HIGH (ref 70–99)
GLUCOSE BLDC GLUCOMTR-MCNC: 53 MG/DL — CRITICAL LOW (ref 70–99)
GLUCOSE BLDC GLUCOMTR-MCNC: 59 MG/DL — LOW (ref 70–99)
GLUCOSE BLDC GLUCOMTR-MCNC: 70 MG/DL — SIGNIFICANT CHANGE UP (ref 70–99)
GLUCOSE SERPL-MCNC: 54 MG/DL — CRITICAL LOW (ref 70–99)
POTASSIUM SERPL-MCNC: 3.4 MMOL/L — LOW (ref 3.5–5.3)
POTASSIUM SERPL-SCNC: 3.4 MMOL/L — LOW (ref 3.5–5.3)
SODIUM SERPL-SCNC: 138 MMOL/L — SIGNIFICANT CHANGE UP (ref 135–145)

## 2022-05-25 PROCEDURE — 83690 ASSAY OF LIPASE: CPT

## 2022-05-25 PROCEDURE — 80048 BASIC METABOLIC PNL TOTAL CA: CPT

## 2022-05-25 PROCEDURE — 83735 ASSAY OF MAGNESIUM: CPT

## 2022-05-25 PROCEDURE — 80053 COMPREHEN METABOLIC PANEL: CPT

## 2022-05-25 PROCEDURE — 85027 COMPLETE CBC AUTOMATED: CPT

## 2022-05-25 PROCEDURE — 84132 ASSAY OF SERUM POTASSIUM: CPT

## 2022-05-25 PROCEDURE — 82435 ASSAY OF BLOOD CHLORIDE: CPT

## 2022-05-25 PROCEDURE — 96375 TX/PRO/DX INJ NEW DRUG ADDON: CPT

## 2022-05-25 PROCEDURE — 85014 HEMATOCRIT: CPT

## 2022-05-25 PROCEDURE — 96372 THER/PROPH/DIAG INJ SC/IM: CPT | Mod: XU

## 2022-05-25 PROCEDURE — 84100 ASSAY OF PHOSPHORUS: CPT

## 2022-05-25 PROCEDURE — 83605 ASSAY OF LACTIC ACID: CPT

## 2022-05-25 PROCEDURE — U0005: CPT

## 2022-05-25 PROCEDURE — 85025 COMPLETE CBC W/AUTO DIFF WBC: CPT

## 2022-05-25 PROCEDURE — 84443 ASSAY THYROID STIM HORMONE: CPT

## 2022-05-25 PROCEDURE — 74176 CT ABD & PELVIS W/O CONTRAST: CPT

## 2022-05-25 PROCEDURE — 71045 X-RAY EXAM CHEST 1 VIEW: CPT

## 2022-05-25 PROCEDURE — 82947 ASSAY GLUCOSE BLOOD QUANT: CPT

## 2022-05-25 PROCEDURE — 99291 CRITICAL CARE FIRST HOUR: CPT

## 2022-05-25 PROCEDURE — 84702 CHORIONIC GONADOTROPIN TEST: CPT

## 2022-05-25 PROCEDURE — U0003: CPT

## 2022-05-25 PROCEDURE — 99239 HOSP IP/OBS DSCHRG MGMT >30: CPT

## 2022-05-25 PROCEDURE — 93005 ELECTROCARDIOGRAM TRACING: CPT

## 2022-05-25 PROCEDURE — 82803 BLOOD GASES ANY COMBINATION: CPT

## 2022-05-25 PROCEDURE — 81001 URINALYSIS AUTO W/SCOPE: CPT

## 2022-05-25 PROCEDURE — 99285 EMERGENCY DEPT VISIT HI MDM: CPT

## 2022-05-25 PROCEDURE — 82150 ASSAY OF AMYLASE: CPT

## 2022-05-25 PROCEDURE — 96374 THER/PROPH/DIAG INJ IV PUSH: CPT

## 2022-05-25 PROCEDURE — 80307 DRUG TEST PRSMV CHEM ANLYZR: CPT

## 2022-05-25 PROCEDURE — 96376 TX/PRO/DX INJ SAME DRUG ADON: CPT

## 2022-05-25 PROCEDURE — 82962 GLUCOSE BLOOD TEST: CPT

## 2022-05-25 PROCEDURE — 82009 KETONE BODYS QUAL: CPT

## 2022-05-25 PROCEDURE — 82330 ASSAY OF CALCIUM: CPT

## 2022-05-25 PROCEDURE — 99232 SBSQ HOSP IP/OBS MODERATE 35: CPT

## 2022-05-25 PROCEDURE — 36415 COLL VENOUS BLD VENIPUNCTURE: CPT

## 2022-05-25 PROCEDURE — 84295 ASSAY OF SERUM SODIUM: CPT

## 2022-05-25 PROCEDURE — 85018 HEMOGLOBIN: CPT

## 2022-05-25 PROCEDURE — 87040 BLOOD CULTURE FOR BACTERIA: CPT

## 2022-05-25 PROCEDURE — 82010 KETONE BODYS QUAN: CPT

## 2022-05-25 PROCEDURE — 81025 URINE PREGNANCY TEST: CPT

## 2022-05-25 PROCEDURE — 93010 ELECTROCARDIOGRAM REPORT: CPT

## 2022-05-25 RX ORDER — HYDROMORPHONE HYDROCHLORIDE 2 MG/ML
0.5 INJECTION INTRAMUSCULAR; INTRAVENOUS; SUBCUTANEOUS ONCE
Refills: 0 | Status: DISCONTINUED | OUTPATIENT
Start: 2022-05-25 | End: 2022-05-25

## 2022-05-25 RX ORDER — METOCLOPRAMIDE HCL 10 MG
10 TABLET ORAL ONCE
Refills: 0 | Status: COMPLETED | OUTPATIENT
Start: 2022-05-25 | End: 2022-05-25

## 2022-05-25 RX ORDER — INSULIN LISPRO 100/ML
6 VIAL (ML) SUBCUTANEOUS
Qty: 1 | Refills: 0
Start: 2022-05-25 | End: 2022-06-23

## 2022-05-25 RX ORDER — SODIUM CHLORIDE 9 MG/ML
1000 INJECTION, SOLUTION INTRAVENOUS ONCE
Refills: 0 | Status: COMPLETED | OUTPATIENT
Start: 2022-05-25 | End: 2022-05-25

## 2022-05-25 RX ORDER — HYDROMORPHONE HYDROCHLORIDE 2 MG/ML
1 INJECTION INTRAMUSCULAR; INTRAVENOUS; SUBCUTANEOUS ONCE
Refills: 0 | Status: DISCONTINUED | OUTPATIENT
Start: 2022-05-25 | End: 2022-05-25

## 2022-05-25 RX ORDER — INSULIN GLARGINE 100 [IU]/ML
16 INJECTION, SOLUTION SUBCUTANEOUS AT BEDTIME
Refills: 0 | Status: DISCONTINUED | OUTPATIENT
Start: 2022-05-25 | End: 2022-05-25

## 2022-05-25 RX ORDER — PANTOPRAZOLE SODIUM 20 MG/1
40 TABLET, DELAYED RELEASE ORAL ONCE
Refills: 0 | Status: COMPLETED | OUTPATIENT
Start: 2022-05-25 | End: 2022-05-25

## 2022-05-25 RX ORDER — DIPHENHYDRAMINE HCL 50 MG
25 CAPSULE ORAL ONCE
Refills: 0 | Status: COMPLETED | OUTPATIENT
Start: 2022-05-25 | End: 2022-05-25

## 2022-05-25 RX ORDER — POTASSIUM CHLORIDE 20 MEQ
40 PACKET (EA) ORAL ONCE
Refills: 0 | Status: DISCONTINUED | OUTPATIENT
Start: 2022-05-25 | End: 2022-05-25

## 2022-05-25 RX ORDER — METOCLOPRAMIDE HCL 10 MG
1 TABLET ORAL
Qty: 0 | Refills: 0 | DISCHARGE

## 2022-05-25 RX ORDER — POTASSIUM CHLORIDE 20 MEQ
40 PACKET (EA) ORAL ONCE
Refills: 0 | Status: COMPLETED | OUTPATIENT
Start: 2022-05-25 | End: 2022-05-25

## 2022-05-25 RX ORDER — INSULIN LISPRO 100/ML
8 VIAL (ML) SUBCUTANEOUS
Qty: 1 | Refills: 0
Start: 2022-05-25 | End: 2022-06-23

## 2022-05-25 RX ORDER — INSULIN GLARGINE 100 [IU]/ML
18 INJECTION, SOLUTION SUBCUTANEOUS
Qty: 0 | Refills: 0 | DISCHARGE

## 2022-05-25 RX ORDER — DEXTROSE 50 % IN WATER 50 %
15 SYRINGE (ML) INTRAVENOUS ONCE
Refills: 0 | Status: COMPLETED | OUTPATIENT
Start: 2022-05-25 | End: 2022-05-25

## 2022-05-25 RX ORDER — INSULIN LISPRO 100/ML
8 VIAL (ML) SUBCUTANEOUS
Qty: 0 | Refills: 0 | DISCHARGE

## 2022-05-25 RX ORDER — ENOXAPARIN SODIUM 100 MG/ML
16 INJECTION SUBCUTANEOUS
Qty: 1 | Refills: 0
Start: 2022-05-25 | End: 2022-06-07

## 2022-05-25 RX ORDER — METOCLOPRAMIDE HCL 10 MG
1 TABLET ORAL
Qty: 20 | Refills: 0
Start: 2022-05-25 | End: 2022-05-29

## 2022-05-25 RX ORDER — INSULIN GLARGINE 100 [IU]/ML
16 INJECTION, SOLUTION SUBCUTANEOUS
Qty: 0 | Refills: 0 | DISCHARGE
Start: 2022-05-25

## 2022-05-25 RX ORDER — PANTOPRAZOLE SODIUM 20 MG/1
1 TABLET, DELAYED RELEASE ORAL
Qty: 0 | Refills: 0 | DISCHARGE
Start: 2022-05-25

## 2022-05-25 RX ORDER — ONDANSETRON 8 MG/1
4 TABLET, FILM COATED ORAL ONCE
Refills: 0 | Status: COMPLETED | OUTPATIENT
Start: 2022-05-25 | End: 2022-05-25

## 2022-05-25 RX ORDER — SENNA PLUS 8.6 MG/1
2 TABLET ORAL
Qty: 0 | Refills: 0 | DISCHARGE
Start: 2022-05-25

## 2022-05-25 RX ORDER — PANTOPRAZOLE SODIUM 20 MG/1
1 TABLET, DELAYED RELEASE ORAL
Qty: 14 | Refills: 0
Start: 2022-05-25 | End: 2022-05-31

## 2022-05-25 RX ADMIN — PANTOPRAZOLE SODIUM 40 MILLIGRAM(S): 20 TABLET, DELAYED RELEASE ORAL at 05:35

## 2022-05-25 RX ADMIN — Medication 5 MILLIGRAM(S): at 05:35

## 2022-05-25 RX ADMIN — Medication 4: at 08:24

## 2022-05-25 RX ADMIN — Medication 25 MILLIGRAM(S): at 10:31

## 2022-05-25 RX ADMIN — ONDANSETRON 4 MILLIGRAM(S): 8 TABLET, FILM COATED ORAL at 10:30

## 2022-05-25 RX ADMIN — Medication 650 MILLIGRAM(S): at 04:12

## 2022-05-25 RX ADMIN — Medication 1000 MILLIGRAM(S): at 00:43

## 2022-05-25 RX ADMIN — Medication 650 MILLIGRAM(S): at 03:12

## 2022-05-25 RX ADMIN — Medication 6 UNIT(S): at 08:25

## 2022-05-25 RX ADMIN — Medication 40 MILLIEQUIVALENT(S): at 08:46

## 2022-05-25 RX ADMIN — Medication 6 UNIT(S): at 11:55

## 2022-05-25 RX ADMIN — Medication 10 MILLIGRAM(S): at 01:01

## 2022-05-25 RX ADMIN — HYDROMORPHONE HYDROCHLORIDE 0.5 MILLIGRAM(S): 2 INJECTION INTRAMUSCULAR; INTRAVENOUS; SUBCUTANEOUS at 10:30

## 2022-05-25 RX ADMIN — INSULIN GLARGINE 18 UNIT(S): 100 INJECTION, SOLUTION SUBCUTANEOUS at 00:10

## 2022-05-25 RX ADMIN — Medication 400 MILLIGRAM(S): at 00:28

## 2022-05-25 RX ADMIN — Medication 15 GRAM(S): at 05:48

## 2022-05-25 RX ADMIN — Medication 2: at 11:55

## 2022-05-25 NOTE — ED ADULT NURSE NOTE - CAS DISCH TRANSFER METHOD
Anesthetic History   No history of anesthetic complications            Review of Systems / Medical History  Patient summary reviewed, nursing notes reviewed and pertinent labs reviewed    Pulmonary  Within defined limits                 Neuro/Psych   Within defined limits           Cardiovascular  Within defined limits                     GI/Hepatic/Renal  Within defined limits              Endo/Other  Within defined limits           Other Findings              Physical Exam    Airway  Mallampati: I  TM Distance: 4 - 6 cm  Neck ROM: normal range of motion   Mouth opening: Normal     Cardiovascular  Regular rate and rhythm,  S1 and S2 normal,  no murmur, click, rub, or gallop             Dental  No notable dental hx       Pulmonary  Breath sounds clear to auscultation               Abdominal  GI exam deferred       Other Findings            Anesthetic Plan    ASA: 1  Anesthesia type: general          Induction: Inhalational  Anesthetic plan and risks discussed with: Parent / [address] Private car

## 2022-05-25 NOTE — PROGRESS NOTE ADULT - ASSESSMENT
31F PMHx HTN Type 1 DM w/ episodes of DKA, diabetic gastroparesis, opiate dependence was discharged from [facility] on 5/13 after Rx of gastroparesis and mild DKA. On 5/19/2022 she was sent from [facility] (for narcs) for aggressive behavior. Found to have opiate withdrawl (had been taking Dilaudid 26 mg daily), combative with behavior indicating self harm, DKA and colitis,    1. poorly controlled T1DM (A1c 10.8%) complicated by gastroparesis - erratic control  - pt going home to day, advised that she can resume her basagalr as outpt but take 16 units, cont prandial insulin at home  - she has appt with her outpt endo 6/3/22    2. diabetic gastroparesis worse with opiate use  - on Reglan, on PPI    3. Colitis on imaging, without diarrhea  - monitor for now  - not in Abx

## 2022-05-25 NOTE — PROGRESS NOTE ADULT - SUBJECTIVE AND OBJECTIVE BOX
[facility] Division of Hospital Medicine    Chief Complaint: abdominal pain    SUBJECTIVE / OVERNIGHT EVENTS: Today morning, patient was nauseous and had one episode of clear vomit. She continues to endorse abdominal pain.     Patient denies chest pain, SOB,  N/V, fever, chills, dysuria or any other complaints. All remainder ROS negative.     MEDICATIONS  (STANDING):  dextrose 5%. 1000 milliLiter(s) (50 mL/Hr) IV Continuous <Continuous>  dextrose 5%. 1000 milliLiter(s) (100 mL/Hr) IV Continuous <Continuous>  dextrose 50% Injectable 25 Gram(s) IV Push once  dextrose 50% Injectable 12.5 Gram(s) IV Push once  dextrose 50% Injectable 25 Gram(s) IV Push once  enalapril 5 milliGRAM(s) Oral daily  enoxaparin Injectable 40 milliGRAM(s) SubCutaneous every 24 hours  glucagon  Injectable 1 milliGRAM(s) IntraMuscular once  insulin glargine Injectable (LANTUS) 18 Unit(s) SubCutaneous at bedtime  insulin lispro (ADMELOG) corrective regimen sliding scale   SubCutaneous three times a day before meals  insulin lispro Injectable (ADMELOG) 6 Unit(s) SubCutaneous three times a day before meals  metoclopramide Injectable 10 milliGRAM(s) IV Push every 8 hours  pantoprazole    Tablet 40 milliGRAM(s) Oral two times a day  senna 2 Tablet(s) Oral at bedtime    MEDICATIONS  (PRN):  acetaminophen     Tablet .. 650 milliGRAM(s) Oral every 6 hours PRN Mild Pain (1 - 3), Moderate Pain (4 - 6)  bisacodyl Suppository 10 milliGRAM(s) Rectal once PRN Constipation  dextrose Oral Gel 15 Gram(s) Oral once PRN Blood Glucose LESS THAN 70 milliGRAM(s)/deciliter        I&O's Summary      PHYSICAL EXAM:  Vital Signs Last 24 Hrs  T(C): 36.6 (25 May 2022 11:34), Max: 36.8 (24 May 2022 17:19)  T(F): 97.9 (25 May 2022 11:34), Max: 98.3 (24 May 2022 17:19)  HR: 106 (25 May 2022 11:34) (90 - 106)  BP: 106/72 (25 May 2022 11:34) (105/66 - 125/75)  BP(mean): --  RR: 18 (25 May 2022 11:34) (18 - 18)  SpO2: 97% (25 May 2022 11:34) (96% - 98%)        CONSTITUTIONAL: NAD, well-developed, well-groomed  ENMT: Moist oral mucosa, no pharyngeal injection or exudates; normal dentition  RESPIRATORY: Normal respiratory effort; lungs are clear to auscultation bilaterally  CARDIOVASCULAR: Regular rate and rhythm, normal S1 and S2, no murmur/rub/gallop; No lower extremity edema; Peripheral pulses are 2+ bilaterally  ABDOMEN: Nontender to palpation, normoactive bowel sounds, no rebound/guarding; No hepatosplenomegaly  MUSCLOSKELETAL:  Normal gait; no clubbing or cyanosis of digits; no joint swelling or tenderness to palpation  PSYCH: A+O to person, place, and time; affect appropriate  NEUROLOGY: CN 2-12 are intact and symmetric; no gross sensory deficits;   SKIN: No rashes; no palpable lesions    LABS:                        9.8    8.57  )-----------( 396      ( 24 May 2022 07:39 )             33.3     05-25    138  |  102  |  28.3<H>  ----------------------------<  54<LL>  3.4<L>   |  22.0  |  0.68    Ca    9.4      25 May 2022 06:44    TPro  6.4<L>  /  Alb  3.2<L>  /  TBili  <0.2<L>  /  DBili  x   /  AST  14  /  ALT  13  /  AlkPhos  109  05-24              Culture - Blood (collected 22 May 2022 16:46)  Source: .Blood Blood  Preliminary Report (24 May 2022 17:01):    No growth at 48 hours      CAPILLARY BLOOD GLUCOSE      POCT Blood Glucose.: 160 mg/dL (25 May 2022 11:53)  POCT Blood Glucose.: 205 mg/dL (25 May 2022 07:29)  POCT Blood Glucose.: 70 mg/dL (25 May 2022 06:11)  POCT Blood Glucose.: 53 mg/dL (25 May 2022 05:31)  POCT Blood Glucose.: 59 mg/dL (25 May 2022 05:29)  POCT Blood Glucose.: 201 mg/dL (25 May 2022 02:53)  POCT Blood Glucose.: 336 mg/dL (24 May 2022 23:53)  POCT Blood Glucose.: 80 mg/dL (24 May 2022 21:50)  POCT Blood Glucose.: 172 mg/dL (24 May 2022 17:42)  POCT Blood Glucose.: 315 mg/dL (24 May 2022 16:28)        RADIOLOGY & ADDITIONAL TESTS:  Results Reviewed:   Imaging Personally Reviewed:  Electrocardiogram Personally Reviewed:                                           [facility] Division of Hospital Medicine    Chief Complaint: abdominal pain    SUBJECTIVE / OVERNIGHT EVENTS: Today morning, patient was nauseous and had one episode of clear vomit. She continues to endorse abdominal pain.     Patient denies chest pain, SOB,  N/V, fever, chills, dysuria or any other complaints. All remainder ROS negative.     MEDICATIONS  (STANDING):  dextrose 5%. 1000 milliLiter(s) (50 mL/Hr) IV Continuous <Continuous>  dextrose 5%. 1000 milliLiter(s) (100 mL/Hr) IV Continuous <Continuous>  dextrose 50% Injectable 25 Gram(s) IV Push once  dextrose 50% Injectable 12.5 Gram(s) IV Push once  dextrose 50% Injectable 25 Gram(s) IV Push once  enalapril 5 milliGRAM(s) Oral daily  enoxaparin Injectable 40 milliGRAM(s) SubCutaneous every 24 hours  glucagon  Injectable 1 milliGRAM(s) IntraMuscular once  insulin glargine Injectable (LANTUS) 18 Unit(s) SubCutaneous at bedtime  insulin lispro (ADMELOG) corrective regimen sliding scale   SubCutaneous three times a day before meals  insulin lispro Injectable (ADMELOG) 6 Unit(s) SubCutaneous three times a day before meals  metoclopramide Injectable 10 milliGRAM(s) IV Push every 8 hours  pantoprazole    Tablet 40 milliGRAM(s) Oral two times a day  senna 2 Tablet(s) Oral at bedtime    MEDICATIONS  (PRN):  acetaminophen     Tablet .. 650 milliGRAM(s) Oral every 6 hours PRN Mild Pain (1 - 3), Moderate Pain (4 - 6)  bisacodyl Suppository 10 milliGRAM(s) Rectal once PRN Constipation  dextrose Oral Gel 15 Gram(s) Oral once PRN Blood Glucose LESS THAN 70 milliGRAM(s)/deciliter        I&O's Summary      PHYSICAL EXAM:  Vital Signs Last 24 Hrs  T(C): 36.6 (25 May 2022 11:34), Max: 36.8 (24 May 2022 17:19)  T(F): 97.9 (25 May 2022 11:34), Max: 98.3 (24 May 2022 17:19)  HR: 106 (25 May 2022 11:34) (90 - 106)  BP: 106/72 (25 May 2022 11:34) (105/66 - 125/75)  BP(mean): --  RR: 18 (25 May 2022 11:34) (18 - 18)  SpO2: 97% (25 May 2022 11:34) (96% - 98%)        CONSTITUTIONAL: NAD, well-developed  RESPIRATORY: Normal respiratory effort; lungs are clear to auscultation bilaterally  CARDIOVASCULAR: Regular rate and rhythm, normal S1 and S2, no murmur/rub/gallop; No lower extremity edema; Peripheral pulses are 2+ bilaterally  ABDOMEN: Nontender to palpation, normoactive bowel sounds, no rebound/guarding  PSYCH: A+O to person, place, and time; affect appropriate  NEUROLOGY: CN 2-12 are intact and symmetric; no gross sensory deficits;   SKIN: No rashes; no palpable lesions    LABS:                        9.8    8.57  )-----------( 396      ( 24 May 2022 07:39 )             33.3     05-25    138  |  102  |  28.3<H>  ----------------------------<  54<LL>  3.4<L>   |  22.0  |  0.68    Ca    9.4      25 May 2022 06:44    TPro  6.4<L>  /  Alb  3.2<L>  /  TBili  <0.2<L>  /  DBili  x   /  AST  14  /  ALT  13  /  AlkPhos  109  05-24              Culture - Blood (collected 22 May 2022 16:46)  Source: .Blood Blood  Preliminary Report (24 May 2022 17:01):    No growth at 48 hours      CAPILLARY BLOOD GLUCOSE      POCT Blood Glucose.: 160 mg/dL (25 May 2022 11:53)  POCT Blood Glucose.: 205 mg/dL (25 May 2022 07:29)  POCT Blood Glucose.: 70 mg/dL (25 May 2022 06:11)  POCT Blood Glucose.: 53 mg/dL (25 May 2022 05:31)  POCT Blood Glucose.: 59 mg/dL (25 May 2022 05:29)  POCT Blood Glucose.: 201 mg/dL (25 May 2022 02:53)  POCT Blood Glucose.: 336 mg/dL (24 May 2022 23:53)  POCT Blood Glucose.: 80 mg/dL (24 May 2022 21:50)  POCT Blood Glucose.: 172 mg/dL (24 May 2022 17:42)  POCT Blood Glucose.: 315 mg/dL (24 May 2022 16:28)        RADIOLOGY & ADDITIONAL TESTS:  Results Reviewed:   Imaging Personally Reviewed:  Electrocardiogram Personally Reviewed:

## 2022-05-25 NOTE — ED PROVIDER NOTE - PROGRESS NOTE DETAILS
no abdominal pain, I have discussed with the patient about the ED workup, lab results, diagnostics results, plan for discharge home, need for follow-up with primary care physician/specialists, and return precautions. At this time, the patient does not require further workup in the ED. The patient is subjectively feeling better and would like to be discharged home. The patient had the opportunity to ask questions and I have answered all inquiries. The patient verbalizes understanding and agreement with the plan. The patient is hemodynamically stable, clinically well-appearing, ambulatory, mentating well and ready for discharge home.

## 2022-05-25 NOTE — ED ADULT NURSE NOTE - OBJECTIVE STATEMENT
pt c/o lower R-abd pain (chronic),  pt was dc'd from [facility] last week for same.  (PMH-DM pt c/o lower R-abd pain (chronic), pt also c/o N/V, 4 episodes today.   pt was dc'd from [facility] last week for same.  (PMH-DM)  pt was at [facility] this am for same and got dilaudid.  pt states she is going to detox tomorrow.  Dr. Vazquez at bedside.

## 2022-05-25 NOTE — DISCHARGE NOTE PROVIDER - ATTENDING DISCHARGE PHYSICAL EXAMINATION:
Vital Signs Last 24 Hrs  T(F): 97.9 (25 May 2022 11:34), Max: 98.3 (24 May 2022 17:19)  HR: 106 (25 May 2022 11:34) (90 - 106)  BP: 106/72 (25 May 2022 11:34) (106/72 - 125/75)  RR: 18 (25 May 2022 11:34) (18 - 18)  SpO2: 97% (25 May 2022 11:34) (96% - 97%)    Physical Exam:  Constitutional: NAD, awake and alert, well-developed  Neck: Soft and supple, No LAD, No JVD  Respiratory: cta b/l no wheezing no rhonchi  Cardiovascular: +s1/s2 no edema b/l le  Gastrointestinal: soft nt nd bs+  Vascular: 2+ peripheral pulses  Neurological: A/O x 3, no focal deficits

## 2022-05-25 NOTE — DISCHARGE NOTE PROVIDER - NSDCFUSCHEDAPPT_GEN_ALL_CORE_FT
Kathe Cobb Physician [facility]  Med Endocr [id number] [facility]  Scheduled Appointment: 06/03/2022

## 2022-05-25 NOTE — PROGRESS NOTE ADULT - SUBJECTIVE AND OBJECTIVE BOX
Follow up: T1DM  Interval Hx/Events: erratic glucoses, going home today    MEDICATIONS  (STANDING):  dextrose 5%. 1000 milliLiter(s) (50 mL/Hr) IV Continuous <Continuous>  dextrose 5%. 1000 milliLiter(s) (100 mL/Hr) IV Continuous <Continuous>  dextrose 50% Injectable 25 Gram(s) IV Push once  dextrose 50% Injectable 12.5 Gram(s) IV Push once  dextrose 50% Injectable 25 Gram(s) IV Push once  enalapril 5 milliGRAM(s) Oral daily  enoxaparin Injectable 40 milliGRAM(s) SubCutaneous every 24 hours  glucagon  Injectable 1 milliGRAM(s) IntraMuscular once  insulin glargine Injectable (LANTUS) 16 Unit(s) SubCutaneous at bedtime  insulin lispro (ADMELOG) corrective regimen sliding scale   SubCutaneous three times a day before meals  insulin lispro Injectable (ADMELOG) 6 Unit(s) SubCutaneous three times a day before meals  metoclopramide Injectable 10 milliGRAM(s) IV Push every 8 hours  pantoprazole    Tablet 40 milliGRAM(s) Oral two times a day  senna 2 Tablet(s) Oral at bedtime    MEDICATIONS  (PRN):  acetaminophen     Tablet .. 650 milliGRAM(s) Oral every 6 hours PRN Mild Pain (1 - 3), Moderate Pain (4 - 6)  bisacodyl Suppository 10 milliGRAM(s) Rectal once PRN Constipation  dextrose Oral Gel 15 Gram(s) Oral once PRN Blood Glucose LESS THAN 70 milliGRAM(s)/deciliter      ALLERGIES: Toradol (Pruritus)  Toradol (Unknown)    ROS negative    Vital Signs Last 24 Hrs  T(C): 36.6 (25 May 2022 11:34), Max: 36.8 (24 May 2022 17:19)  T(F): 97.9 (25 May 2022 11:34), Max: 98.3 (24 May 2022 17:19)  HR: 106 (25 May 2022 11:34) (90 - 106)  BP: 106/72 (25 May 2022 11:34) (106/72 - 125/75)  BP(mean): --  RR: 18 (25 May 2022 11:34) (18 - 18)  SpO2: 97% (25 May 2022 11:34) (96% - 97%)    Physical Exam:  General appearance: thin appearing  Eyes: EOMI  Lungs: Normal respiratory excursion. Lungs clear no w/r/r  CV: Normal S1S2, regular. No m/r/g.   Abdomen: Soft, nontender, nondistended, (+) BS  Musculoskeletal: No cyanosis, clubbing, or edema.  Skin: Warm and moist.   Neuro: Cranial nerves intact.   Psych: Normal affect, good judgement/insight      LABS:                        9.8    8.57  )-----------( 396      ( 24 May 2022 07:39 )             33.3     05-25    138  |  102  |  28.3<H>  ----------------------------<  54<LL>  3.4<L>   |  22.0  |  0.68    Ca    9.4      25 May 2022 06:44    TPro  6.4<L>  /  Alb  3.2<L>  /  TBili  <0.2<L>  /  DBili  x   /  AST  14  /  ALT  13  /  AlkPhos  109  05-24    LIVER FUNCTIONS - ( 24 May 2022 07:39 )  Alb: 3.2 g/dL / Pro: 6.4 g/dL / ALK PHOS: 109 U/L / ALT: 13 U/L / AST: 14 U/L / GGT: x           A1C with Estimated Average Glucose Result: 10.8 % (05-14-22 @ 11:39)    CAPILLARY BLOOD GLUCOSE  POCT Blood Glucose.: 160 mg/dL (25 May 2022 11:53)  POCT Blood Glucose.: 205 mg/dL (25 May 2022 07:29)  POCT Blood Glucose.: 70 mg/dL (25 May 2022 06:11)  POCT Blood Glucose.: 53 mg/dL (25 May 2022 05:31)  POCT Blood Glucose.: 59 mg/dL (25 May 2022 05:29)  POCT Blood Glucose.: 201 mg/dL (25 May 2022 02:53)  POCT Blood Glucose.: 336 mg/dL (24 May 2022 23:53)  POCT Blood Glucose.: 80 mg/dL (24 May 2022 21:50)  POCT Blood Glucose.: 172 mg/dL (24 May 2022 17:42)  POCT Blood Glucose.: 315 mg/dL (24 May 2022 16:28)  POCT Blood Glucose.: 139 mg/dL (24 May 2022 11:38)  POCT Blood Glucose.: 96 mg/dL (24 May 2022 07:51)  POCT Blood Glucose.: 260 mg/dL (24 May 2022 06:51)  POCT Blood Glucose.: 432 mg/dL (24 May 2022 06:07)  POCT Blood Glucose.: 178 mg/dL (24 May 2022 00:42)  POCT Blood Glucose.: 281 mg/dL (23 May 2022 21:48)  POCT Blood Glucose.: 123 mg/dL (23 May 2022 16:28)      Thyroid Stimulating Hormone, Serum: 0.75 uIU/mL [0.27 - 4.20] (05-19-22)

## 2022-05-25 NOTE — ED PROVIDER NOTE - CLINICAL SUMMARY MEDICAL DECISION MAKING FREE TEXT BOX
This patient w/ abdominal pain and nausea/vomiting a is likely secondary to benign  cannabis hyperemesis syndrome vs gastroparesis; Considered the differential diagnosis but LOW risk for SBO (normal bowel movements, passing flatus), no signs of DKA on labs. Patient's BMP with normal electrolytes and no signs of dehydration causing prerenal KERRI. Low suspicion for gastric or esophageal dysmotility as a cause.  Patient w/ no chest pain, unremarkable EKG, thus LOW suspicion for ACS. Based on the history, exam, and work up, there is a low suspicion for pancreatitis, appendicitis, biliary pathology, C. Diff or other emergent problems.  PLAN:  - IVF, CBC/CMP, Lipase,  HCG,  UA/UCx,    - Pain control PRN  - Antiemetics for cannabinoid hyperemesis syndrome: zofran 4mg IVP, reglan 10mg IVP, Haldol 5mg IV, capsaicin cream 0.025-0.075% across abdomen/arms/back, or lorazepam 1-2 mg IVP PRN.  - Cyclical Vomiting Syndrome: D5NS IV bolus for likely starvation ketosis, Magnesium sulfate 1-2 grams IV, zofran 4mg IVP, sumatriptan 6mg subcut, ketorolac 15mg IVP, famotidine 20mg IVP or protonix 40mg IVP. 2nd line: chlorpromazine 12.5-25mg IV, haldol 2.5-5mg IVP, olanzapine 2.5-5mg IV. Third line: Ketamine 10-20mg IVP +/- 20-30 mg infusion over 1 hour.

## 2022-05-25 NOTE — DISCHARGE NOTE NURSING/CASE MANAGEMENT/SOCIAL WORK - NSDCPEFALRISK_GEN_ALL_CORE
For information on Fall & Injury Prevention, visit: [email or web address] OR  [email or web address] OR  https://www.cdc.gov/steadi/patient.html

## 2022-05-25 NOTE — ED PROVIDER NOTE - OBJECTIVE STATEMENT
32F PMHx of DM2, gastroparesis, HTN, s/p cholecystectomy, opioid dependence presenting with abdominal pain, nausea/vomiting x few days. Says she will check in tomorrow morning to rehab and has her typical abdominal pain, generalized, a/w nausea/vomiting. Denies any chest pain, shortness of breath, fevers, chills, headaches, visual complaints, dysuria/hematuria, back pain, diarrhea.

## 2022-05-25 NOTE — DISCHARGE NOTE PROVIDER - NSDCMRMEDTOKEN_GEN_ALL_CORE_FT
Admelog SoloStar 100 units/mL injectable solution: 8 international unit(s) injectable 3 times a day  Basaglar KwikPen 100 units/mL subcutaneous solution: 18 inch(es) subcutaneous once a day (at bedtime)  bisacodyl 10 mg rectal suppository: 1 suppository(ies) rectal once, As needed, Constipation  enalapril 5 mg oral tablet: 1 tab(s) orally once a day, HOLD for SBP &lt;110  metoclopramide 10 mg oral tablet: 1 tab(s) orally 4 times a day (before meals and at bedtime), As Needed  pantoprazole 40 mg oral delayed release tablet: 1 tab(s) orally 2 times a day  senna oral tablet: 2 tab(s) orally once a day (at bedtime)   Admelog SoloStar 100 units/mL injectable solution: 8 international unit(s) injectable 3 times a day  bisacodyl 10 mg rectal suppository: 1 suppository(ies) rectal once, As needed, Constipation  enalapril 5 mg oral tablet: 1 tab(s) orally once a day, HOLD for SBP &lt;110  insulin glargine 100 units/mL subcutaneous solution: 16 unit(s) subcutaneous once a day (at bedtime)  metoclopramide 10 mg oral tablet: 1 tab(s) orally 4 times a day (before meals and at bedtime), As Needed abdo pain   pantoprazole 40 mg oral delayed release tablet: 1 tab(s) orally 2 times a day  senna oral tablet: 2 tab(s) orally once a day (at bedtime)   Admelog SoloStar 100 units/mL injectable solution: 8 unit(s) subcutaneous 3 times a day (with meals)   bisacodyl 10 mg rectal suppository: 1 suppository(ies) rectal once, As needed, Constipation  enalapril 5 mg oral tablet: 1 tab(s) orally once a day, HOLD for SBP &lt;110  Lantus Solostar Pen 100 units/mL subcutaneous solution: 16 unit(s) subcutaneous once a day (at bedtime)   metoclopramide 10 mg oral tablet: 1 tab(s) orally 4 times a day (before meals and at bedtime), As Needed abdo pain   pantoprazole 40 mg oral delayed release tablet: 1 tab(s) orally 2 times a day  senna oral tablet: 2 tab(s) orally once a day (at bedtime)   Admelog SoloStar 100 units/mL injectable solution: 6 unit(s) subcutaneous 3 times a day (with meals)   bisacodyl 10 mg rectal suppository: 1 suppository(ies) rectal once, As needed, Constipation  enalapril 5 mg oral tablet: 1 tab(s) orally once a day, HOLD for SBP &lt;110  Lantus Solostar Pen 100 units/mL subcutaneous solution: 16 unit(s) subcutaneous once a day (at bedtime)   metoclopramide 10 mg oral tablet: 1 tab(s) orally 4 times a day (before meals and at bedtime), As Needed abdo pain   pantoprazole 40 mg oral delayed release tablet: 1 tab(s) orally 2 times a day  senna oral tablet: 2 tab(s) orally once a day (at bedtime)

## 2022-05-25 NOTE — ED ADULT NURSE NOTE - CHIEF COMPLAINT QUOTE
BIBEMS c/o abd pain associated n/v x3 ongoing since last week. pt states this abd pain is chronic and was d/c w/ 5mg Dilaudid for pain but insurance doesn't cover for medications. denies: cp, sob  hx: dm

## 2022-05-25 NOTE — ED PROVIDER NOTE - NSFOLLOWUPCLINICS_GEN_ALL_ED_FT
[facility] Psych Dept of Substance Abuse  Psychiatry Substance Abuse  [address] [place] [address]  Phone: [phone]  Fax:

## 2022-05-25 NOTE — PROCEDURE NOTE - NSPROCDETAILS_GEN_ALL_CORE
location identified, draped/prepped, sterile technique used/blood seen on insertion/dressing applied/flushes easily/secured in place
flushes easily/secured in place/sterile technique, catheter placed
location identified, draped/prepped, sterile technique used/blood seen on insertion/dressing applied/flushes easily/secured in place/sterile technique, catheter placed

## 2022-05-25 NOTE — ED ADULT TRIAGE NOTE - STATUS:
Was the patient seen in the last year in this department? Yes     Does patient have an active prescription for medications requested? Yes     Received Request Via: Patient     Last office visit 11/07/17  Last labs 10/25/17     Intact

## 2022-05-25 NOTE — ED ADULT NURSE NOTE - MODE OF DISCHARGE
Lab drawn via straight stick and sent per orders. EKG completed and to Dr. Allison. Pt medicated per orders- see MAR for details. Pt calm and cooperative.    Ambulatory

## 2022-05-25 NOTE — ED ADULT TRIAGE NOTE - CHIEF COMPLAINT QUOTE
BIBEMS c/o abd pain associated n/v x3 ongoing since last week. pt states this abd pain is chronic and was d/c w/ 5mg Dilaudid for pain but insurance doesn't cover for medications. denies: cp, sob BIBEMS c/o abd pain associated n/v x3 ongoing since last week. pt states this abd pain is chronic and was d/c w/ 5mg Dilaudid for pain but insurance doesn't cover for medications. denies: cp, sob  hx: dm

## 2022-05-25 NOTE — PROGRESS NOTE ADULT - ASSESSMENT
31F PMHx HTN Type 1 DM w/ episodes of DKA, diabetic gastroparesis, opiate dependence, ectopic pregnancy x 2, s/p bilat tubal ligation, was discharged from [facility] on 5/13 after Rx of gastroparesis and mild DKA. She then went o VS ED on 5/14. 16, 17 with same c/o. She was treated in ED and released. On 5/19/2022 she was sent from [facility] (for narcs) for aggressive behavior. As per EMS, pt. is withdrawing from taking 26mg of Dilaudid a day. Pt. began screaming, kicking and bashing her head against the wall. Pt. given 10 of IM versed by EMS. In ED- asking for narcotics IV and when told she does not need it, she became combative towards staff, tried to strangle herself with monitor wires and call bell wires. Required ativan, Klonopin and ketamine to provide adequate sedation and then MICU was consulted. Also noted mild DKA. ICU recs- U tox, 1:1 watch, Psych eval and Ativan taper. CT A/P showed thickening of gastric wall and colonic wall being read as Colitis and ED gave Zosyn and is requesting admission.     # gastric wall and Colonic wall thickening being read as Colitis  likely all related to gastroparesis/ narcotics   Prior CT AP- Colitis involving the transverse and descending colon. Gastric wall thickening, possibly gastritis.  Clinically no diarrhea  will hold off antibx  DM diet PO  was given Zosyn in ED  RTC Reglan  GI consulted- will c/w PPI BID  - F/u CT A/P- increased stool burden. S/p dulcolax suppository, x 1.    # Abd pain, N, V  1:1 discontinued. No overnight episodes of agitation.   S/p Ativan taper  Appreciate psych recs    # Gastroparesis  RTC Reglan and GI eval    # T1DM poorly controlled. s/p DKA.  last seen her Endo in 12/2021. A1c- 10.8  Lantus, premeal, ISS here  C/w lantus to 18 units and lispro 6 units pre-meal  Appreciate endo recs    # HTN uncontrolled  C/w Norvasc, ARB    # Microcytic anemia  likely sec to poor nutrition    Lovenox  Dispo Patient is medically optimized for discharge. Patient presents from [facility] in [place].   Updated patient's mom, Flower ([phone]) on 5/24.     31F PMHx HTN Type 1 DM w/ episodes of DKA, diabetic gastroparesis, opiate dependence, ectopic pregnancy x 2, s/p bilat tubal ligation, was discharged from [facility] on 5/13 after Rx of gastroparesis and mild DKA. She then went o VS ED on 5/14. 16, 17 with same c/o. She was treated in ED and released. On 5/19/2022 she was sent from [facility] (for narcs) for aggressive behavior. As per EMS, pt. is withdrawing from taking 26mg of Dilaudid a day. Pt. began screaming, kicking and bashing her head against the wall. Pt. given 10 of IM versed by EMS. In ED- asking for narcotics IV and when told she does not need it, she became combative towards staff, tried to strangle herself with monitor wires and call bell wires. Required ativan, Klonopin and ketamine to provide adequate sedation and then MICU was consulted. Also noted mild DKA. ICU recs- U tox, 1:1 watch, Psych eval and Ativan taper. CT A/P showed thickening of gastric wall and colonic wall being read as Colitis and ED gave Zosyn and is requesting admission.     # gastric wall and Colonic wall thickening being read as Colitis  likely all related to gastroparesis/ narcotics   Prior CT AP- Colitis involving the transverse and descending colon. Gastric wall thickening, possibly gastritis.  Clinically no diarrhea  will hold off antibx  DM diet PO  was given Zosyn in ED  RTC Reglan  GI consulted- will c/w PPI BID  - F/u CT A/P- increased stool burden. S/p dulcolax suppository, x 1.    # Abd pain, N, V  1:1 discontinued. No overnight episodes of agitation.   S/p Ativan taper  Appreciate psych recs    # Gastroparesis  RTC Reglan and GI eval    # T1DM poorly controlled. s/p DKA.  last seen her Endo in 12/2021. A1c- 10.8  Lantus, premeal, ISS here  Decreased lantus to 16 units given episode of hypoglycemia and lispro 6 units pre-meal  Appreciate endo recs    # HTN uncontrolled  C/w Norvasc, ARB    # Microcytic anemia  likely sec to poor nutrition    Lovenox  Dispo Patient is medically optimized for discharge. Patient presents from [facility] in [place].   Updated patient's mom, Flower ([phone]) on 5/25.

## 2022-05-25 NOTE — DISCHARGE NOTE NURSING/CASE MANAGEMENT/SOCIAL WORK - PATIENT PORTAL LINK FT
You can access the FollowMyHealth Patient Portal offered by [facility] by registering at the following website: [email or web address]. By joining [email or web address] [email or web address] portal, you will also be able to view your health information using other applications (apps) compatible with our system.

## 2022-05-25 NOTE — DISCHARGE NOTE PROVIDER - NSDCCPCAREPLAN_GEN_ALL_CORE_FT
PRINCIPAL DISCHARGE DIAGNOSIS  Diagnosis: Gastroparesis  Assessment and Plan of Treatment: Continue with protonix and reglan. Compliancy with diabetic medications advised.      SECONDARY DISCHARGE DIAGNOSES  Diagnosis: Colitis  Assessment and Plan of Treatment: Continue with protonix and reglan.    Diagnosis: Hyperglycemia  Assessment and Plan of Treatment: Resolved. Compliancy with diabetic medications advised.    Diagnosis: Diabetes mellitus  Assessment and Plan of Treatment: Continue insulin therapy as outlined. Further management per doctors at rehab facility. Outpatinet endo follow up    Diagnosis: Opiate withdrawal  Assessment and Plan of Treatment: Treated with ativan taper, detox rehab on discharge.    Diagnosis: HTN (hypertension)  Assessment and Plan of Treatment: Continue BP meds

## 2022-05-25 NOTE — PROCEDURE NOTE - NSTOLERANCE_GEN_A_CORE
Arterial Line    Date/Time: 9/22/2020 10:18 AM  Performed by: Sunita Motta MD  Authorized by: Sunita Motta MD     Patient Location:  OR  Indication*:  Continuous blood pressure monitoring  Laterality*:  Left  Site*:  Radial  Max Sterile Barrier Technique*:  Hand Washing, Cap/Mask and Sterile gloves  Local Anesthetic:  Injectable  Prep*:  Chlorhexidine gluconate w/ alcohol  Catheter Size*:  20 G  Catheter Length*:  10 cm  Catheter Type:  Arrow  Ultrasound-Guided*: No    Seldinger Technique: Yes    Line Secured*:  Tape and Transparent dressing w/border  Events: patient tolerated procedure well with no complications    Performed By:  Anesthesiologist  Anesthesiologist:  Sunita Motta MD  Start Time:  9/22/2020 10:16 AM        
Patient tolerated procedure well.

## 2022-05-25 NOTE — ED PROVIDER NOTE - EKG #1 DATE/TIME
Gestational Diabetes Follow-up    Subjective/Objective:    Myrna Brothers sent in blood glucose log for review. Last date of communication was: 10/3.    Gestational diabetes is being managed with medications    Taking diabetes medications:   yes:     Diabetes Medication(s)     Insulin Sig    insulin isophane human (HUMULIN N PEN) 100 UNIT/ML injection Inject 20 Units Subcutaneous At Bedtime    insulin NPH (HUMULIN N/NOVOLIN N) 100 UNIT/ML injection Inject 20 Units Subcutaneous At Bedtime     Patient not taking:  Reported on 9/20/2018          Estimated Date of Delivery: Nov 14, 2018    BG/Food Log:       Assessment:    Ketones: T.   Fasting blood glucoses: 67% in target.  After breakfast: 100% in target.  After lunch: 100% in target.  After dinner: 67% in target.    Plan/Response:  No changes in the patient's current treatment plan.  CDE reply:  Andrea Norris,   Thank you for the update!  So far, so good :)    Looks like nearly all the numbers are in goal with the 20 units of insulin, no need to make any changes today.  Keep up the good work!     Could you please make a note to send in again on Wednesday the 17th?   We have an all staff meeting on the 18th that will cause some delay in replying- so day before would be best if possible.     Thank you! Have a good weekend-     Elida Corcoran RD, LD, CDE      Any diabetes medication dose changes were made via the CDE Protocol and Collaborative Practice Agreement with the patient's OB/GYN provider.         
25-May-2022 23:07

## 2022-05-25 NOTE — ED PROVIDER NOTE - PATIENT PORTAL LINK FT
You can access the FollowMyHealth Patient Portal offered by [facility] by registering at the following website: [email or web address]. By joining [employer]’s FollowMyHealth portal, you will also be able to view your health information using other applications (apps) compatible with our system.

## 2022-05-25 NOTE — PROGRESS NOTE ADULT - PROVIDER SPECIALTY LIST ADULT
Hospitalist
Endocrinology
Hospitalist
Hospitalist
Internal Medicine
Hospitalist
Hospitalist

## 2022-05-25 NOTE — ED PROVIDER NOTE - CRITICAL CARE ATTENDING CONTRIBUTION TO CARE
I have discussed the case with the resident/mid-level provider. I have personally performed a history, physical exam, and my own medical decision making. I have reviewed the note and agree with the findings and plan with the following exceptions: None.    Upon my evaluation, this patient had a high probability of imminent or lifethreatening deterioration due to tachycardia, nausea/vomiting, gastroparesis., which required my direct attention, intervention, and personal management.     I have personally provided 30 minutes of critical care time exclusive of time spent on separately billable procedures. Time includes review of laboratory data, radiology results, discussion with consultants, and monitoring for potential decompensation. Interventions were performed as documented above.    - Skip Vazquez MD, Emergency Medicine and Medical Toxicology Attending.

## 2022-05-25 NOTE — DISCHARGE NOTE PROVIDER - HOSPITAL COURSE
31F PMHx HTN Type 1 DM w/ episodes of DKA, diabetic gastroparesis, opiate dependence was discharged from [facility] on 5/13 after Rx of gastroparesis and mild DKA. On 5/19/2022 she was sent from [facility] (for narcs) for aggressive behavior. As per EMS, pt. is withdrawing from taking 26mg of Dilaudid a day. Pt. began screaming, kicking and bashing her head against the wall. Pt. given 10 of IM versed by EMS. In ED- asking for narcotics IV and when told she does not need it, she became combative towards staff, tried to strangle herself with monitor wires and call bell wires. Required ativan, Klonopin and ketamine to provide adequate sedation and then MICU was consulted. Also noted mild DKA. ICU recs- U tox, 1:1 watch, Psych eval and Ativan taper. CT A/P showed thickening of gastric wall and colonic wall being read as Colitis and ED gave Zosyn and is requesting admission. Patient was admitted with opiate withdrawal, combative with behavior indicating self harm, DKA and colitis.  Patient was treated with reglan and protonix for colitis/gastroparesis per GI recs. Abx were held as pt was clinically without any diarrhea. DKA was corrected with insulin therapy, endocrine was consulted for insulin recommendations. Patient was also treated with ativan taper for opiod withdrawal. Now medically stable for DC to inpatient detox rehab. 31F PMHx HTN Type 1 DM w/ episodes of DKA, diabetic gastroparesis, opiate dependence was discharged from [facility] on 5/13 after Rx of gastroparesis and mild DKA. On 5/19/2022 she was sent from [facility] (for narcs) for aggressive behavior. As per EMS, pt. is withdrawing from taking 26mg of Dilaudid a day. Pt. began screaming, kicking and bashing her head against the wall. Pt. given 10 of IM versed by EMS. In ED- asking for narcotics IV and when told she does not need it, she became combative towards staff, tried to strangle herself with monitor wires and call bell wires. Required ativan, Klonopin and ketamine to provide adequate sedation and then MICU was consulted. Also noted mild DKA. ICU recs- U tox, 1:1 watch, Psych eval and Ativan taper. CT A/P showed thickening of gastric wall and colonic wall being read as Colitis and ED gave Zosyn and is requesting admission. Patient was admitted with opiate withdrawal, combative with behavior indicating self harm, DKA and colitis.  Patient was treated with reglan and protonix for colitis/gastroparesis per GI recs. Abx were held as pt was clinically without any diarrhea. DKA was corrected with insulin therapy, endocrine was consulted for insulin recommendations. Patient was also treated with ativan taper for opiod withdrawal. Now medically stable for DC to home. Patient unable to be accepted by inpatient detox rehab and will need outpatient follow up.

## 2022-05-26 ENCOUNTER — EMERGENCY (EMERGENCY)
Facility: HOSPITAL | Age: 32
LOS: 1 days | Discharge: HOME | End: 2022-05-26
Admitting: EMERGENCY MEDICINE
Payer: MEDICAID

## 2022-05-26 VITALS
RESPIRATION RATE: 18 BRPM | OXYGEN SATURATION: 99 % | HEART RATE: 103 BPM | TEMPERATURE: 99 F | SYSTOLIC BLOOD PRESSURE: 134 MMHG | DIASTOLIC BLOOD PRESSURE: 78 MMHG

## 2022-05-26 DIAGNOSIS — I10 ESSENTIAL (PRIMARY) HYPERTENSION: ICD-10-CM

## 2022-05-26 DIAGNOSIS — Z90.49 ACQUIRED ABSENCE OF OTHER SPECIFIED PARTS OF DIGESTIVE TRACT: Chronic | ICD-10-CM

## 2022-05-26 DIAGNOSIS — R11.2 NAUSEA WITH VOMITING, UNSPECIFIED: ICD-10-CM

## 2022-05-26 DIAGNOSIS — R10.84 GENERALIZED ABDOMINAL PAIN: ICD-10-CM

## 2022-05-26 DIAGNOSIS — O00.9 ECTOPIC PREGNANCY, UNSPECIFIED: Chronic | ICD-10-CM

## 2022-05-26 DIAGNOSIS — E11.9 TYPE 2 DIABETES MELLITUS WITHOUT COMPLICATIONS: ICD-10-CM

## 2022-05-26 DIAGNOSIS — F11.23 OPIOID DEPENDENCE WITH WITHDRAWAL: ICD-10-CM

## 2022-05-26 PROBLEM — K31.84 GASTROPARESIS: Chronic | Status: ACTIVE | Noted: 2022-05-19

## 2022-05-26 LAB
ACETONE SERPL-MCNC: NEGATIVE — SIGNIFICANT CHANGE UP
ALBUMIN SERPL ELPH-MCNC: 2.9 G/DL — LOW (ref 3.3–5)
ALP SERPL-CCNC: 122 U/L — HIGH (ref 40–120)
ALT FLD-CCNC: 20 U/L — SIGNIFICANT CHANGE UP (ref 12–78)
AMPHET UR-MCNC: NEGATIVE — SIGNIFICANT CHANGE UP
ANION GAP SERPL CALC-SCNC: 8 MMOL/L — SIGNIFICANT CHANGE UP (ref 5–17)
AST SERPL-CCNC: 25 U/L — SIGNIFICANT CHANGE UP (ref 15–37)
B-OH-BUTYR SERPL-SCNC: 0.2 MMOL/L — SIGNIFICANT CHANGE UP
BARBITURATES UR SCN-MCNC: NEGATIVE — SIGNIFICANT CHANGE UP
BASE EXCESS BLDV CALC-SCNC: -0.4 MMOL/L — SIGNIFICANT CHANGE UP (ref -2–3)
BASOPHILS # BLD AUTO: 0.03 K/UL — SIGNIFICANT CHANGE UP (ref 0–0.2)
BASOPHILS NFR BLD AUTO: 0.2 % — SIGNIFICANT CHANGE UP (ref 0–2)
BENZODIAZ UR-MCNC: NEGATIVE — SIGNIFICANT CHANGE UP
BILIRUB SERPL-MCNC: 0.2 MG/DL — SIGNIFICANT CHANGE UP (ref 0.2–1.2)
BLOOD GAS COMMENTS, VENOUS: SIGNIFICANT CHANGE UP
BUN SERPL-MCNC: 40 MG/DL — HIGH (ref 7–23)
CALCIUM SERPL-MCNC: 9.7 MG/DL — SIGNIFICANT CHANGE UP (ref 8.5–10.1)
CHLORIDE BLDV-SCNC: 103 MMOL/L — SIGNIFICANT CHANGE UP (ref 98–107)
CHLORIDE SERPL-SCNC: 106 MMOL/L — SIGNIFICANT CHANGE UP (ref 96–108)
CO2 BLDV-SCNC: 25 MMOL/L — SIGNIFICANT CHANGE UP (ref 22–26)
CO2 SERPL-SCNC: 23 MMOL/L — SIGNIFICANT CHANGE UP (ref 22–31)
COCAINE METAB.OTHER UR-MCNC: NEGATIVE — SIGNIFICANT CHANGE UP
CREAT SERPL-MCNC: 1.07 MG/DL — SIGNIFICANT CHANGE UP (ref 0.5–1.3)
EGFR: 71 ML/MIN/1.73M2 — SIGNIFICANT CHANGE UP
EOSINOPHIL # BLD AUTO: 0.23 K/UL — SIGNIFICANT CHANGE UP (ref 0–0.5)
EOSINOPHIL NFR BLD AUTO: 1.7 % — SIGNIFICANT CHANGE UP (ref 0–6)
GAS PNL BLDV: 133 MMOL/L — LOW (ref 136–145)
GAS PNL BLDV: SIGNIFICANT CHANGE UP
GAS PNL BLDV: SIGNIFICANT CHANGE UP
GLUCOSE BLDC GLUCOMTR-MCNC: 126 MG/DL — HIGH (ref 70–99)
GLUCOSE BLDC GLUCOMTR-MCNC: 68 MG/DL — LOW (ref 70–99)
GLUCOSE BLDV-MCNC: 344 MG/DL — HIGH (ref 65–95)
GLUCOSE SERPL-MCNC: 279 MG/DL — HIGH (ref 70–99)
HCG SERPL-ACNC: <1 MIU/ML — SIGNIFICANT CHANGE UP
HCO3 BLDV-SCNC: 24 MMOL/L — SIGNIFICANT CHANGE UP (ref 22–28)
HCT VFR BLD CALC: 30.2 % — LOW (ref 34.5–45)
HCT VFR BLDA CALC: 28 % — LOW (ref 37–47)
HGB BLD CALC-MCNC: 9.3 G/DL — LOW (ref 11.7–16.1)
HGB BLD-MCNC: 9 G/DL — LOW (ref 11.5–15.5)
HOROWITZ INDEX BLDV+IHG-RTO: 21 — SIGNIFICANT CHANGE UP
IMM GRANULOCYTES NFR BLD AUTO: 0.2 % — SIGNIFICANT CHANGE UP (ref 0–1.5)
LACTATE BLDV-MCNC: 2 MMOL/L — HIGH (ref 0.56–1.39)
LG PLATELETS BLD QL AUTO: SLIGHT — SIGNIFICANT CHANGE UP
LIDOCAIN IGE QN: 128 U/L — SIGNIFICANT CHANGE UP (ref 73–393)
LYMPHOCYTES # BLD AUTO: 15.1 % — SIGNIFICANT CHANGE UP (ref 13–44)
LYMPHOCYTES # BLD AUTO: 2.02 K/UL — SIGNIFICANT CHANGE UP (ref 1–3.3)
MANUAL SMEAR VERIFICATION: SIGNIFICANT CHANGE UP
MCHC RBC-ENTMCNC: 18.4 PG — LOW (ref 27–34)
MCHC RBC-ENTMCNC: 29.8 G/DL — LOW (ref 32–36)
MCV RBC AUTO: 61.6 FL — LOW (ref 80–100)
METHADONE UR-MCNC: NEGATIVE — SIGNIFICANT CHANGE UP
MICROCYTES BLD QL: SLIGHT — SIGNIFICANT CHANGE UP
MONOCYTES # BLD AUTO: 1.03 K/UL — HIGH (ref 0–0.9)
MONOCYTES NFR BLD AUTO: 7.7 % — SIGNIFICANT CHANGE UP (ref 2–14)
NEUTROPHILS # BLD AUTO: 10.06 K/UL — HIGH (ref 1.8–7.4)
NEUTROPHILS NFR BLD AUTO: 75.1 % — SIGNIFICANT CHANGE UP (ref 43–77)
NRBC # BLD: 0 /100 WBCS — SIGNIFICANT CHANGE UP (ref 0–0)
OPIATES UR-MCNC: NEGATIVE — SIGNIFICANT CHANGE UP
OVALOCYTES BLD QL SMEAR: SLIGHT — SIGNIFICANT CHANGE UP
PCO2 BLDV: 38 MMHG — LOW (ref 42–55)
PCP SPEC-MCNC: SIGNIFICANT CHANGE UP
PCP UR-MCNC: NEGATIVE — SIGNIFICANT CHANGE UP
PH BLDV: 7.41 — SIGNIFICANT CHANGE UP (ref 7.32–7.43)
PLAT MORPH BLD: NORMAL — SIGNIFICANT CHANGE UP
PLATELET # BLD AUTO: 387 K/UL — SIGNIFICANT CHANGE UP (ref 150–400)
PO2 BLDV: 61 MMHG — HIGH (ref 25–45)
POTASSIUM BLDV-SCNC: 4.7 MMOL/L — SIGNIFICANT CHANGE UP (ref 3.5–5.1)
POTASSIUM SERPL-MCNC: 4.8 MMOL/L — SIGNIFICANT CHANGE UP (ref 3.5–5.3)
POTASSIUM SERPL-SCNC: 4.8 MMOL/L — SIGNIFICANT CHANGE UP (ref 3.5–5.3)
PROT SERPL-MCNC: 7 GM/DL — SIGNIFICANT CHANGE UP (ref 6–8.3)
RBC # BLD: 4.9 M/UL — SIGNIFICANT CHANGE UP (ref 3.8–5.2)
RBC # FLD: 19.3 % — HIGH (ref 10.3–14.5)
RBC BLD AUTO: SIGNIFICANT CHANGE UP
SAO2 % BLDV: 91.3 % — LOW (ref 94–98)
SODIUM SERPL-SCNC: 137 MMOL/L — SIGNIFICANT CHANGE UP (ref 135–145)
SPHEROCYTES BLD QL SMEAR: SLIGHT — SIGNIFICANT CHANGE UP
TARGETS BLD QL SMEAR: SLIGHT — SIGNIFICANT CHANGE UP
THC UR QL: NEGATIVE — SIGNIFICANT CHANGE UP
WBC # BLD: 13.4 K/UL — HIGH (ref 3.8–10.5)
WBC # FLD AUTO: 13.4 K/UL — HIGH (ref 3.8–10.5)

## 2022-05-26 PROCEDURE — 99284 EMERGENCY DEPT VISIT MOD MDM: CPT

## 2022-05-26 RX ORDER — DIPHENHYDRAMINE HCL 50 MG
25 CAPSULE ORAL ONCE
Refills: 0 | Status: COMPLETED | OUTPATIENT
Start: 2022-05-26 | End: 2022-05-26

## 2022-05-26 RX ORDER — HYDROMORPHONE HYDROCHLORIDE 2 MG/ML
1 INJECTION INTRAMUSCULAR; INTRAVENOUS; SUBCUTANEOUS ONCE
Refills: 0 | Status: DISCONTINUED | OUTPATIENT
Start: 2022-05-26 | End: 2022-05-26

## 2022-05-26 RX ADMIN — HYDROMORPHONE HYDROCHLORIDE 1 MILLIGRAM(S): 2 INJECTION INTRAMUSCULAR; INTRAVENOUS; SUBCUTANEOUS at 04:15

## 2022-05-26 RX ADMIN — HYDROMORPHONE HYDROCHLORIDE 1 MILLIGRAM(S): 2 INJECTION INTRAMUSCULAR; INTRAVENOUS; SUBCUTANEOUS at 00:31

## 2022-05-26 RX ADMIN — SODIUM CHLORIDE 1000 MILLILITER(S): 9 INJECTION, SOLUTION INTRAVENOUS at 02:18

## 2022-05-26 RX ADMIN — Medication 25 MILLIGRAM(S): at 00:01

## 2022-05-26 RX ADMIN — SODIUM CHLORIDE 1000 MILLILITER(S): 9 INJECTION, SOLUTION INTRAVENOUS at 00:00

## 2022-05-26 RX ADMIN — PANTOPRAZOLE SODIUM 40 MILLIGRAM(S): 20 TABLET, DELAYED RELEASE ORAL at 00:00

## 2022-05-26 RX ADMIN — SODIUM CHLORIDE 1000 MILLILITER(S): 9 INJECTION, SOLUTION INTRAVENOUS at 00:08

## 2022-05-26 RX ADMIN — Medication 25 MILLIGRAM(S): at 04:13

## 2022-05-26 RX ADMIN — HYDROMORPHONE HYDROCHLORIDE 1 MILLIGRAM(S): 2 INJECTION INTRAMUSCULAR; INTRAVENOUS; SUBCUTANEOUS at 00:01

## 2022-05-26 RX ADMIN — Medication 10 MILLIGRAM(S): at 00:00

## 2022-05-26 RX ADMIN — SODIUM CHLORIDE 1000 MILLILITER(S): 9 INJECTION, SOLUTION INTRAVENOUS at 02:19

## 2022-05-26 NOTE — ED ADULT NURSE REASSESSMENT NOTE - NS ED NURSE REASSESS COMMENT FT1
Blood glucose rechecked, 68 g/dl. food provided to the patient. Dr George lloyd.
Dr. Vazquez counseling pt on drug addiction, pt states she is going into drug treatment tomorrow morning.
pt states pain comes back lower abdomen more on left side, 7/10. Dr reynoso made aware.

## 2022-05-27 LAB
CULTURE RESULTS: SIGNIFICANT CHANGE UP
SPECIMEN SOURCE: SIGNIFICANT CHANGE UP

## 2022-05-29 ENCOUNTER — RX RENEWAL (OUTPATIENT)
Age: 32
End: 2022-05-29

## 2022-05-29 RX ORDER — PEN NEEDLE, DIABETIC 29 G X1/2"
32G X 4 MM NEEDLE, DISPOSABLE MISCELLANEOUS
Qty: 1 | Refills: 5 | Status: ACTIVE | COMMUNITY
Start: 2021-03-22 | End: 1900-01-01

## 2022-06-03 ENCOUNTER — APPOINTMENT (OUTPATIENT)
Dept: ENDOCRINOLOGY | Facility: CLINIC | Age: 32
End: 2022-06-03

## 2022-06-25 RX ORDER — INSULIN GLARGINE 100 [IU]/ML
100 INJECTION, SOLUTION SUBCUTANEOUS
Qty: 1 | Refills: 0 | Status: ACTIVE | COMMUNITY
Start: 2019-08-29 | End: 1900-01-01

## 2022-06-28 ENCOUNTER — NON-APPOINTMENT (OUTPATIENT)
Age: 32
End: 2022-06-28

## 2022-07-11 ENCOUNTER — APPOINTMENT (OUTPATIENT)
Dept: ENDOCRINOLOGY | Facility: CLINIC | Age: 32
End: 2022-07-11

## 2022-07-19 RX ORDER — INSULIN LISPRO 100 U/ML
100 INJECTION, SOLUTION SUBCUTANEOUS
Qty: 4 | Refills: 0 | Status: ACTIVE | COMMUNITY
Start: 2020-01-03 | End: 1900-01-01

## 2022-07-21 ENCOUNTER — APPOINTMENT (OUTPATIENT)
Dept: ENDOCRINOLOGY | Facility: CLINIC | Age: 32
End: 2022-07-21

## 2022-07-21 VITALS
BODY MASS INDEX: 23.14 KG/M2 | DIASTOLIC BLOOD PRESSURE: 78 MMHG | WEIGHT: 144 LBS | HEART RATE: 132 BPM | OXYGEN SATURATION: 98 % | TEMPERATURE: 97.5 F | HEIGHT: 66 IN | SYSTOLIC BLOOD PRESSURE: 118 MMHG

## 2022-07-21 LAB
GLUCOSE BLDC GLUCOMTR-MCNC: 113
HBA1C MFR BLD HPLC: 10.8

## 2022-07-21 PROCEDURE — 83036 HEMOGLOBIN GLYCOSYLATED A1C: CPT | Mod: QW

## 2022-07-21 PROCEDURE — 82962 GLUCOSE BLOOD TEST: CPT

## 2022-07-21 PROCEDURE — 99214 OFFICE O/P EST MOD 30 MIN: CPT | Mod: 25

## 2022-07-21 RX ORDER — LANCETS
EACH MISCELLANEOUS
Qty: 400 | Refills: 2 | Status: ACTIVE | COMMUNITY
Start: 2022-07-21 | End: 1900-01-01

## 2022-07-21 RX ORDER — PEN NEEDLE, DIABETIC 29 G X1/2"
32G X 4 MM NEEDLE, DISPOSABLE MISCELLANEOUS
Qty: 4 | Refills: 1 | Status: ACTIVE | COMMUNITY
Start: 2018-04-17 | End: 1900-01-01

## 2022-07-21 RX ORDER — BLOOD-GLUCOSE TRANSMITTER
EACH MISCELLANEOUS
Qty: 1 | Refills: 1 | Status: ACTIVE | COMMUNITY
Start: 2022-07-21 | End: 1900-01-01

## 2022-07-21 RX ORDER — BLOOD SUGAR DIAGNOSTIC
STRIP MISCELLANEOUS
Qty: 6 | Refills: 3 | Status: ACTIVE | COMMUNITY
Start: 2018-11-08 | End: 1900-01-01

## 2022-07-21 RX ORDER — BLOOD-GLUCOSE SENSOR
EACH MISCELLANEOUS
Qty: 3 | Refills: 3 | Status: ACTIVE | COMMUNITY
Start: 2022-07-21 | End: 1900-01-01

## 2022-07-21 RX ORDER — BLOOD-GLUCOSE METER
W/DEVICE KIT MISCELLANEOUS
Qty: 1 | Refills: 0 | Status: ACTIVE | COMMUNITY
Start: 2021-03-24 | End: 1900-01-01

## 2022-07-21 RX ORDER — INSULIN PMP CART,AUT,G6/7,CNTR
EACH SUBCUTANEOUS
Qty: 1 | Refills: 0 | Status: ACTIVE | COMMUNITY
Start: 2022-07-21 | End: 1900-01-01

## 2022-07-21 NOTE — HISTORY OF PRESENT ILLNESS
[FreeTextEntry1] : 32 year-old woman here for followup of type 1 diabetes. A1c 10.3% in March 2021 and 8.1% 12/2021.\par \par A1c 7/21/22: 10.8%\par \par States she had frequent hospital visits.\par In the last 3 months she had >5 hosp visits, some were at [place], 2 at [facility]\par She states she was in the hospital for c/o of diarrhea, nausea, vomiting, and abdominal pain. She had CT scans and X-ray without diagnosis. No endoscopes. She may have cyclic vomiting syndrome, since she smokes marijuana and tobacco.\par She stopped marijuana 1 month ago and still with less frequent nausea and vomiting (now has gone >1 week and before it was every 3 days). Yesterday with gas and diarrhea, now belching. Does not have a gastroenterologist or PCP.\par \par She was diagnosed when she was 12 years old. \par Last ophtho visit was possibly in 2019 (cannot recall), no known h/o retinopathy. No neuropathy. Admits to gastroparesis and had a gastric emptying study done at [facility]. Also has nephropathy n(ormal GFR). No h/o MI or CVA\par She has a history of opiate abuse and went to outpt facility in 2016.\par She was pregnant 5 times, 2 times low hormone, 1 time terminated, 2 ectopic pregnancies last surgery 2017 both fallopian tubes removed.\par \par Current Regimen:\par - Basaglar 18 units QHS\par -  Admelog 6 units plus--  1 units for every 50>150\par --Omnipod?-- never got supplies, may have been in rehab when they called her as she was not allowed to have her phone in rehab. \par \par Meter Readings:\par no meter readings. \par Did not get dexcom. \par Checking FS. \par Discharged 1 month ago from rehab, reporting all numbers are high, sometimes in the 200s. \par Breakfast: 300s.\par Pre-lunch: 150s. \par Pre-dinner: 200s. \par \par Good Days\par B-oatmeal-- not always eating breakfast\par L-3pm-chicken and rice\par D-10pm - carb,meat\par tries to eat less carbs at night given late timing\par She sleeps maybe 4-5am and wakes up around 10-11am or sometimes 12-1pm\par \par Current Medications:\par Enalapril 5mg daily for ++UACR. \par \par 7/21/22:\par Started on Suboxone for dilaudid dependence after rehab and has been running in the 200s since discharge. \par Sometimes it will go low if she takes a correction. \par Very hard to sleep after being discharged, overall she is feeling well at todays visit since her discharge. \par Current every day smoker, 1 pack per day. \par LMP 3 months ago, fallopian tubes were removed, patient is sexually active but only way for patient to achieve pregnancy is via IVF, therefore she will be following with GYN in the next month. \par Tachycardic at todays visit, will be following with cardriology as per ellen and her PCPs recommendation. \par Possible that suboxone is contributing to tachycardia. \par Significant improvement in her gastroparesis symptoms.

## 2022-07-21 NOTE — PHYSICAL EXAM
[Alert] : alert [Well Nourished] : well nourished [Healthy Appearance] : healthy appearance [No Acute Distress] : no acute distress [Well Developed] : well developed [Normal Sclera/Conjunctiva] : normal sclera/conjunctiva [EOMI] : extra ocular movement intact [Normal Outer Ear/Nose] : the ears and nose were normal in appearance [Thyroid Not Enlarged] : the thyroid was not enlarged [No Thyroid Nodules] : no palpable thyroid nodules [No Respiratory Distress] : no respiratory distress [No Accessory Muscle Use] : no accessory muscle use [Normal Rate and Effort] : normal respiratory rate and effort [Clear to Auscultation] : lungs were clear to auscultation bilaterally [Normal S1, S2] : normal S1 and S2 [Regular Rhythm] : with a regular rhythm [No Edema] : no peripheral edema [Normal Bowel Sounds] : normal bowel sounds [Not Tender] : non-tender [Not Distended] : not distended [Soft] : abdomen soft [No Rash] : no rash [Normal] : normal [Full ROM] : with full range of motion [2+] : 2+ in the dorsalis pedis [Oriented x3] : oriented to person, place, and time [Normal Affect] : the affect was normal [Normal Mood] : the mood was normal [Pedal Pulses Normal] : the pedal pulses are present [Acanthosis Nigricans] : no acanthosis nigricans [Diminished Throughout Both Feet] : normal tactile sensation with monofilament testing throughout both feet [Normal Insight/Judgement] : insight and judgment were intact [de-identified] : Tachycardic, normal heart sounds.

## 2022-07-21 NOTE — REVIEW OF SYSTEMS
[Fatigue] : fatigue [As Noted in HPI] : as noted in HPI [Negative] : Heme/Lymph [All other systems negative] : All other systems negative [Blurred Vision] : no blurred vision [Constipation] : no constipation [Pain/Numbness of Digits] : no pain/numbness of digits [Depression] : no depression [Anxiety] : no anxiety [Cold Intolerance] : no cold intolerance [Heat Intolerance] : no heat intolerance [Swelling] : no swelling [FreeTextEntry7] : significant improvement in her gastroparesis symptoms.

## 2022-07-21 NOTE — ASSESSMENT
[Diabetes Foot Care] : diabetes foot care [Long Term Vascular Complications] : long term vascular complications of diabetes [Carbohydrate Consistent Diet] : carbohydrate consistent diet [Importance of Diet and Exercise] : importance of diet and exercise to improve glycemic control, achieve weight loss and improve cardiovascular health [Exercise/Effect on Glucose] : exercise/effect on glucose [Self Monitoring of Blood Glucose] : self monitoring of blood glucose [Retinopathy Screening] : Patient was referred to ophthalmology for retinopathy screening [FreeTextEntry1] : 32-year-old woman with poorly controlled type 1 diabetes and gastroparesis \par \par Type 1 diabetes: HbA1c 10.8%, markedly uncontrolled. \par \par - Basaglar 18 units QHS --> Increase 21 units daily. \par -  Admelog increase to 7 TID AC and 1 units for every 50>150\par --UACR positive, on enalapril 5 mg daily. \par - Instructed to f/u w/ optho. \par - Has ketone strips, knows to use for FS>350\par - Pregnancy counselling done today, can only get pregnant via IVF in the future. Her fallopian tubes have been removed\par -Abnormal EKG with PCP 7/2022, due for cardiology visit. \par \par HTN\par - On Enalapril 5 mg\par \par Vit D def: \par - Not taking Vit D, will recheck levels today.\par \par HLD: \par - Check lipids.\par \par Tachycardic:\par Follow up cardiology.\par \par Anemia\par - Follow iron, B12, and folate today\par - Following with PCP. \par \par \par Check labs need dexcom and omnipod. \par \par -Patient requires a therapeutic CGM\par -Patient has diabetes mellitus\par -Patient has been using a home blood glucose monitor and performing frequent (four times a day) testing, 6 months\par -Patient is being insulin-treated with multiple daily injections (MDI) of insulin x4day, x 6months \par -Patient insulin treatment regimen requires frequent adjustments by the Patient on the basis of therapeutic CGM testing results.\par  [FreeTextEntry2] : - I had a lengthy discussion regarding healthy diet (consistent carbohydrates and low calorie content) with the patient. I also emphasized to maintain routine exercise activity (30 minutes daily or 150 minutes in the week).\par - I discussed the importance of avoiding mild hypoglycemia (FS<70 mg/dl) and severe hypoglycemia (FS<55 mg/dl) with the patient. I also discussed hypoglycemia correction with 4 oz of juice or 4 glucose tablets and rechecking FS in 15 minutes. If FS is still low, repeat 4oz juice or 4 glucose tablets and consume 12 grams of carbohydrates.

## 2022-07-22 LAB
25(OH)D3 SERPL-MCNC: 16.8 NG/ML
ALBUMIN SERPL ELPH-MCNC: 3.8 G/DL
ALP BLD-CCNC: 124 U/L
ALT SERPL-CCNC: 17 U/L
ANION GAP SERPL CALC-SCNC: 17 MMOL/L
AST SERPL-CCNC: 35 U/L
BILIRUB SERPL-MCNC: 0.4 MG/DL
BUN SERPL-MCNC: 14 MG/DL
CALCIUM SERPL-MCNC: 9.7 MG/DL
CHLORIDE SERPL-SCNC: 99 MMOL/L
CHOLEST SERPL-MCNC: 343 MG/DL
CO2 SERPL-SCNC: 21 MMOL/L
CREAT SERPL-MCNC: 0.67 MG/DL
CREAT SPEC-SCNC: 178 MG/DL
EGFR: 119 ML/MIN/1.73M2
GLUCOSE SERPL-MCNC: 53 MG/DL
HDLC SERPL-MCNC: 110 MG/DL
LDLC SERPL CALC-MCNC: 210 MG/DL
MICROALBUMIN 24H UR DL<=1MG/L-MCNC: 143.8 MG/DL
MICROALBUMIN/CREAT 24H UR-RTO: 808 MG/G
NONHDLC SERPL-MCNC: 233 MG/DL
POTASSIUM SERPL-SCNC: 5.6 MMOL/L
PROT SERPL-MCNC: 7 G/DL
SODIUM SERPL-SCNC: 136 MMOL/L
T3 SERPL-MCNC: 118 NG/DL
T4 FREE SERPL-MCNC: 1.4 NG/DL
TRIGL SERPL-MCNC: 116 MG/DL
TSH SERPL-ACNC: 0.69 UIU/ML
VIT B12 SERPL-MCNC: 360 PG/ML

## 2022-07-22 RX ORDER — ERGOCALCIFEROL 1.25 MG/1
1.25 MG CAPSULE, LIQUID FILLED ORAL
Qty: 4 | Refills: 2 | Status: ACTIVE | COMMUNITY
Start: 2022-07-22 | End: 1900-01-01

## 2022-08-10 ENCOUNTER — NON-APPOINTMENT (OUTPATIENT)
Age: 32
End: 2022-08-10

## 2022-10-07 NOTE — ED PROVIDER NOTE - NS ED ROS FT
In additional the that documented in the HPI, the additional ROS was obtained:    CONSTITUTIONAL: No fever, no chills  EYES: no change in vision, no blurred vision  HEENT: no trouble swallowing, no trouble speaking, no sore throat  CV: no chest pain, no palpitations  RESP: no cough, no shortness of breath  GI: +abdominal pain, +nausea, +vomiting, no diarrhea, no constipation  : No dysuria, no frequency  NEURO: +headache, no new numbness, no weakness, no dizziness  SKIN: no new rashes  HEME: No easy bruising or bleeding  MSK: no recent trauma  Dwanye Emerson M.D. -Resident
oral

## 2022-10-10 ENCOUNTER — APPOINTMENT (OUTPATIENT)
Dept: ENDOCRINOLOGY | Facility: CLINIC | Age: 32
End: 2022-10-10

## 2022-10-12 NOTE — DIETITIAN INITIAL EVALUATION ADULT. - REASON FOR ADMISSION
n/v/abd pain Tranexamic Acid Pregnancy And Lactation Text: It is unknown if this medication is safe during pregnancy or breast feeding.

## 2022-10-25 ENCOUNTER — APPOINTMENT (OUTPATIENT)
Dept: ENDOCRINOLOGY | Facility: CLINIC | Age: 32
End: 2022-10-25

## 2022-10-25 VITALS
BODY MASS INDEX: 22.5 KG/M2 | WEIGHT: 140 LBS | HEIGHT: 66 IN | DIASTOLIC BLOOD PRESSURE: 72 MMHG | SYSTOLIC BLOOD PRESSURE: 110 MMHG | TEMPERATURE: 97.3 F | OXYGEN SATURATION: 99 % | HEART RATE: 94 BPM

## 2022-10-25 DIAGNOSIS — I10 ESSENTIAL (PRIMARY) HYPERTENSION: ICD-10-CM

## 2022-10-25 LAB
GLUCOSE BLDC GLUCOMTR-MCNC: 218
HBA1C MFR BLD HPLC: 9.5

## 2022-10-25 PROCEDURE — 99215 OFFICE O/P EST HI 40 MIN: CPT | Mod: 25

## 2022-10-25 PROCEDURE — 82962 GLUCOSE BLOOD TEST: CPT

## 2022-10-25 PROCEDURE — 99417 PROLNG OP E/M EACH 15 MIN: CPT

## 2022-10-25 PROCEDURE — 83036 HEMOGLOBIN GLYCOSYLATED A1C: CPT | Mod: QW

## 2022-10-25 RX ORDER — ROSUVASTATIN CALCIUM 5 MG/1
5 TABLET, FILM COATED ORAL
Qty: 90 | Refills: 0 | Status: ACTIVE | COMMUNITY
Start: 2022-07-22 | End: 1900-01-01

## 2022-10-25 RX ORDER — ERGOCALCIFEROL 1.25 MG/1
1.25 MG CAPSULE ORAL
Qty: 13 | Refills: 0 | Status: ACTIVE | COMMUNITY
Start: 2021-12-20 | End: 1900-01-01

## 2022-10-25 RX ORDER — ENALAPRIL MALEATE 5 MG/1
5 TABLET ORAL DAILY
Qty: 90 | Refills: 2 | Status: ACTIVE | COMMUNITY
Start: 2022-10-25 | End: 1900-01-01

## 2022-10-25 NOTE — ASSESSMENT
[FreeTextEntry1] : 1. Diabetes Mellitus \par Type: 1\par A1c: 9.5%\par Current Regimen: Basaglar 20 units at bedtime, Admelog 4// with meals, SSI 2:50>200\par \par Complaining of fasting hypoglycemia as low as 20s to 30s, but poorly controlled A1c 9.5%.  She wants to undergo breast augmentation surgery, but the surgeon has told her that she would need to be cleared by endocrinology and also have an A1c of less than 8% before she can proceed with surgery.  She recently received the OmniPod 5 and Dexcom, she will be scheduling with diabetes educators at our clinic to get started with continuous subcutaneous insulin infusion.\par \par PLAN: \par - Regimen: \par      Decrease Basaglar to 18 units at bedtime given fasting hypoglycemia.  If fasting hypoglycemia persists, can decrease to 16 units at bedtime.\par      Continue Admelog / with meals plus sliding scale.  Encouraged to not miss the lunchtime dose of short acting insulin if she is consuming large portions of juice.\par \par - BG monitoring: Continue 3 times a day.  She has Dexcom, will start using soon.\par \par - Labs: We will get annual labs at next visit.\par \par - Preventive: \par        Nephropathy screenin2022, positive\par        Retinopathy screening: More than 1 year ago, referred to ophthalmology today\par        Foot exam/podiatrist: Foot exam within normal limits today, 10/25/2022\par \par - Counseling: We discussed diabetes foot care, long term complications of diabetes including but not limited to neuropathy, nephropathy, retinopathy and cardiovascular disease and the benefits of good glycemic control in preventing said complications. We discussed the risks and benefits of diabetes medications and/or insulin as relevant for today, prevention and management of hypoglycemia, importance of medication compliance and blood glucose monitoring.\par \par 2. Diabetic nephropathy\par BP goal <130/90\par Current medications: None\par -Start enalapril 5 mg daily, she has no current plans of pregnancy, has both her fallopian tubes surgically removed because of ectopic pregnancies.\par \par 3. Hyperlipidemia\par Last LDL: 210 in 2022\par LDL goal: <100\par She has not started taking a statin, even though this was prescribed at last visit.\par -Start rosuvastatin 5 mg daily, she has no current plans of pregnancy, has both fallopian tubes surgically removed because of ectopic pregnancies.\par \par 4.  Vitamin D deficiency\par 25 OH vitamin D 16.8 in 2022\par – Start vitamin D 50,000 IU once a week x 3 months, can repeat level at next visit.\par \par Return to clinic in 3 months.  Return to clinic with diabetes educator for pump education next available.\par \par Xiao Ken MD\par [facility]\par Endocrinology at [place] \par [address], [address]\par Ph: [phone]\par Fax: [phone]\par \par \par

## 2022-10-25 NOTE — HISTORY OF PRESENT ILLNESS
[FreeTextEntry1] : CHIEF COMPLAINT: Type 1 diabetes\par \par REFERRED BY: Former Dr. Bose patient.\par Available prior medical records reviewed. \par \par HISTORY OF PRESENTING ILLNESS:\par The patient is a 32-year-old female here for follow-up of type 1 diabetes.  She reports to me that she has frequent episodes of DKA, last was 2 weeks ago when she was admitted in the ICU and required an insulin drip.  She does not recall any inciting factor for DKA, except for the fact that she usually misses her lunchtime dose of insulin (denies missing any other doses).  She was also admitted in August for vomiting, withdrawal symptoms as she was out of her Suboxone, with blood sugars >450.  \par She has a history of opiate use, takes Suboxone.  She reports that she has a history of ectopic pregnancies, both fallopian tubes were removed.  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  But instead of lunch she drinks juice, but does not bolus for it.\par \par Diabetes complications: \par Microvascular: [-] neuropathy, [+] nephropathy, [-] retinopathy \par Macrovascular: No history of CAD, CVA or PAD\par History of DKA/hospitalization due to Diabetes: Frequent episodes of DKA\par \par Last retinopathy screening: More than 1 year ago\par Last nephropathy screening (urine ACR): 7/2022, positive\par Last foot exam/podiatry visit: Foot exam completed today, 10/25/2022\par \par BG self monitoring: Fingersticks 3x/day\par BG Trends: \par - Before Breakfast: 80-90\par - Before Lunch: 150-170\par - Before Dinner: 250-260s (she drinks a lot of juice during lunch and does not bolus insulin for this, and as a result is high by the evenings)\par \par Hypoglycemia: Reports that she has been having fasting hypoglycemia, occasionally in the 20s and 30s.\par \par Comorbidities: HTN, HLD\par Statin: Supposed to be on rosuvastatin 5 mg daily, but she never started\par ACE/ARB: Supposed to be on enalapril 5 mg daily, but she never started\par Vitamin D deficiency: Supposed to be on vitamin D 50,000 units x 12 weeks, but she never started\par \par Works as a pharmacy tech.\par \par PERTINENT LABS: \par \par 10/25/2022:\par \par A1c 9.5%\par \par 7/21/2022\par Urine \par Creatinine 0.67\par eGFR 119\par Triglyceride 116\par Total cholesterol 343\par \par \par Vitamin B12 360\par 25 OH vitamin D 16.8\par TSH 0.69\par Total T3 118\par Free T4 1.4\par A1c 10.8%

## 2022-11-09 ENCOUNTER — APPOINTMENT (OUTPATIENT)
Dept: ENDOCRINOLOGY | Facility: CLINIC | Age: 32
End: 2022-11-09

## 2022-11-09 ENCOUNTER — RX CHANGE (OUTPATIENT)
Age: 32
End: 2022-11-09

## 2022-11-09 PROCEDURE — G0108 DIAB MANAGE TRN  PER INDIV: CPT

## 2022-11-09 RX ORDER — INSULIN LISPRO 100 U/ML
100 INJECTION, SOLUTION INTRAVENOUS; SUBCUTANEOUS
Qty: 10 | Refills: 3 | Status: ACTIVE | COMMUNITY
Start: 2022-11-09 | End: 1900-01-01

## 2022-11-29 NOTE — ED ADULT NURSE NOTE - PAIN: DESCRIPTION (FREQUENCY/QUALITY)
Information: Selecting Yes will display possible errors in your note based on the variables you have selected. This validation is only offered as a suggestion for you. PLEASE NOTE THAT THE VALIDATION TEXT WILL BE REMOVED WHEN YOU FINALIZE YOUR NOTE. IF YOU WANT TO FAX A PRELIMINARY NOTE YOU WILL NEED TO TOGGLE THIS TO 'NO' IF YOU DO NOT WANT IT IN YOUR FAXED NOTE. constant

## 2022-12-26 ENCOUNTER — NON-APPOINTMENT (OUTPATIENT)
Age: 32
End: 2022-12-26

## 2023-01-03 ENCOUNTER — APPOINTMENT (OUTPATIENT)
Dept: ENDOCRINOLOGY | Facility: CLINIC | Age: 33
End: 2023-01-03

## 2023-02-20 NOTE — ED ADULT NURSE NOTE - CHIEF COMPLAINT
Addended by: DAISY DYSON on: 2/20/2023 04:01 PM     Modules accepted: Orders     The patient is a 31y Female complaining of abdominal pain.

## 2023-02-22 ENCOUNTER — NON-APPOINTMENT (OUTPATIENT)
Age: 33
End: 2023-02-22

## 2023-02-23 ENCOUNTER — NON-APPOINTMENT (OUTPATIENT)
Age: 33
End: 2023-02-23

## 2023-02-28 ENCOUNTER — APPOINTMENT (OUTPATIENT)
Dept: ENDOCRINOLOGY | Facility: CLINIC | Age: 33
End: 2023-02-28
Payer: MEDICAID

## 2023-02-28 VITALS
SYSTOLIC BLOOD PRESSURE: 130 MMHG | WEIGHT: 140 LBS | BODY MASS INDEX: 22.5 KG/M2 | HEART RATE: 146 BPM | HEIGHT: 66 IN | DIASTOLIC BLOOD PRESSURE: 80 MMHG | OXYGEN SATURATION: 97 %

## 2023-02-28 PROCEDURE — 99215 OFFICE O/P EST HI 40 MIN: CPT | Mod: 25

## 2023-02-28 PROCEDURE — 95251 CONT GLUC MNTR ANALYSIS I&R: CPT

## 2023-02-28 PROCEDURE — 83036 HEMOGLOBIN GLYCOSYLATED A1C: CPT | Mod: QW

## 2023-02-28 NOTE — HISTORY OF PRESENT ILLNESS
[FreeTextEntry1] : CHIEF COMPLAINT: Type 1 diabetes\par \par REFERRED BY: Former Dr. Bose patient.\par Available prior medical records reviewed. \par \par HISTORY OF PRESENTING ILLNESS:\par The patient is a 32-year-old female here for follow-up of type 1 diabetes.  She reports to me that she has frequent episodes of DKA.\par She has a history of opiate use, takes Suboxone.  She reports that she has a history of ectopic pregnancies, both fallopian tubes were removed.  No further plans of pregnancy.\par \par She transitioned to OmniPod 5 and Dexcom around 11/2022. However, she had pump malfunction last week 2/22/23 and transitioned back to subcutaneous insulin injections. \par \par DIABETES HPI: \par Type of Diabetes: 1\par Duration of Diabetes: Diagnosed at age 12\par \par A1c: 8.5% 2/28/23, 9.5% 10/25/2022; 10.8% in 7/2022\par \par Current home regimen: Basaglar 16 units at night, Admelog 4/5/6 units with breakfast/lunch/dinner, sliding scale 2:50>200\par \par Diabetes complications: \par Microvascular: [-] neuropathy, [+] nephropathy, [-] retinopathy \par Macrovascular: No history of CAD, CVA or PAD\par History of DKA/hospitalization due to Diabetes: Frequent episodes of DKA\par \par Last retinopathy screening: More than 1 year ago\par Last nephropathy screening (urine ACR): 7/2022, positive\par Last foot exam/podiatry visit: Foot exam completed today, 10/25/2022\par \par BG self monitoring: Dexcom G6\par CGM Review: 2/15/23 to 2/28/23\par TIR 40%, High 26%, Very High 27%, Low 4%, Very low 3%\par Hypoglycemia: Minimal\par Variable patterns with hyperglycemia. Night time hyperglycemia, postprandial hyperglycemia. \par \par Comorbidities: HTN, HLD\par Statin: Rosuvastatin 5 mg daily\par ACE/ARB: Enalapril 5 mg daily\par Vitamin D deficiency: vitamin D 50,000 units x 12 weeks, taking currently \par \par Works as a pharmacy tech.\par \par PERTINENT LABS: \par \par 10/25/2022:\par \par A1c 9.5%\par \par 7/21/2022\par Urine \par Creatinine 0.67\par eGFR 119\par Triglyceride 116\par Total cholesterol 343\par \par \par Vitamin B12 360\par 25 OH vitamin D 16.8\par TSH 0.69\par Total T3 118\par Free T4 1.4\par A1c 10.8%

## 2023-02-28 NOTE — ASSESSMENT
[FreeTextEntry1] : 1. Diabetes Mellitus \par Type: 1\par A1c: 8.5%\par Current Regimen: Basaglar 16 units at bedtime, Admelog 4/5/6 with meals, SSI 2:50>200\par \par Restarted Omnipod 5 and DEXCOM G6 at the office today with CDE assistance, Maria A Vazquez. If any issues arise now, she will call Omnipod again to ask for assistance. Will also schedule her for CDE appt at our clinic soon. \par \par PLAN: \par - Regimen: \par     Continue Omnipod 5/Dexcom G6\par \par Settings: \par Basal rate: \par MN-6am: 0.65\par 6am-MN: 0.55\par ICR 14 (previously 15)\par ISF 55\par \par - BG monitoring: Dexcom G6\par \par - Labs: Urine ACR, CMP, Lipid panel, TSH, 25OH vit D\par \par - Preventive: \par        Nephropathy screenin2022, positive\par        Retinopathy screening: More than 1 year ago, referred to ophthalmology today\par        Foot exam/podiatrist: Foot exam within normal limits, 10/25/2022\par \par - Counseling: We discussed diabetes foot care, long term complications of diabetes including but not limited to neuropathy, nephropathy, retinopathy and cardiovascular disease and the benefits of good glycemic control in preventing said complications. We discussed the risks and benefits of diabetes medications and/or insulin as relevant for today, prevention and management of hypoglycemia, importance of medication compliance and blood glucose monitoring.\par \par 2. Diabetic nephropathy\par BP goal <130/90\par Current medications: Enalapril 5 mg daily \par -Continue enalapril 5 mg daily, she has no current plans of pregnancy, has both her fallopian tubes surgically removed because of ectopic pregnancies.\par \par 3. Hyperlipidemia\par Last LDL: 210 in 2022\par LDL goal: <100\par -Continue rosuvastatin 5 mg daily, she has no current plans of pregnancy, has both fallopian tubes surgically removed because of ectopic pregnancies.\par \par 4.  Vitamin D deficiency\par 25 OH vitamin D 16.8 in 2022\par – currently taking vitamin D 50,000 IU once a week, check level today \par \par Return to clinic in 3 months.  Return to clinic with diabetes educator for pump education next available.\par \par Xiao Ken MD\par [facility]\par Endocrinology at [place] \par [address], [address]\par Ph: [phone]\par Fax: [phone]\par \par \par

## 2023-03-01 ENCOUNTER — NON-APPOINTMENT (OUTPATIENT)
Age: 33
End: 2023-03-01

## 2023-03-01 LAB
25(OH)D3 SERPL-MCNC: 16 NG/ML
ALBUMIN SERPL ELPH-MCNC: 3.7 G/DL
ALP BLD-CCNC: 88 U/L
ALT SERPL-CCNC: 11 U/L
ANION GAP SERPL CALC-SCNC: 12 MMOL/L
AST SERPL-CCNC: 18 U/L
BILIRUB SERPL-MCNC: 0.2 MG/DL
BUN SERPL-MCNC: 17 MG/DL
CALCIUM SERPL-MCNC: 9.7 MG/DL
CHLORIDE SERPL-SCNC: 106 MMOL/L
CHOLEST SERPL-MCNC: 257 MG/DL
CO2 SERPL-SCNC: 22 MMOL/L
CREAT SERPL-MCNC: 1.05 MG/DL
CREAT SPEC-SCNC: 193 MG/DL
EGFR: 72 ML/MIN/1.73M2
GLUCOSE SERPL-MCNC: 85 MG/DL
HBA1C MFR BLD HPLC: 8.5
HDLC SERPL-MCNC: 100 MG/DL
LDLC SERPL CALC-MCNC: 136 MG/DL
MICROALBUMIN 24H UR DL<=1MG/L-MCNC: 153.4 MG/DL
MICROALBUMIN/CREAT 24H UR-RTO: 796 MG/G
NONHDLC SERPL-MCNC: 156 MG/DL
POTASSIUM SERPL-SCNC: 4.7 MMOL/L
PROT SERPL-MCNC: 6.3 G/DL
SODIUM SERPL-SCNC: 139 MMOL/L
TRIGL SERPL-MCNC: 105 MG/DL
TSH SERPL-ACNC: 0.54 UIU/ML

## 2023-03-15 ENCOUNTER — APPOINTMENT (OUTPATIENT)
Dept: ENDOCRINOLOGY | Facility: CLINIC | Age: 33
End: 2023-03-15

## 2023-04-17 RX ORDER — BLOOD-GLUCOSE,RECEIVER,CONT
EACH MISCELLANEOUS
Qty: 1 | Refills: 1 | Status: ACTIVE | COMMUNITY
Start: 2020-03-04 | End: 1900-01-01

## 2023-05-11 NOTE — PROGRESS NOTE ADULT - SUBJECTIVE AND OBJECTIVE BOX
[facility] Division of Hospital Medicine  Jess Lopez MD  Pager (RAUL-F, 8A-5P): [phone]  Other Times:  [phone]      SUBJECTIVE / OVERNIGHT EVENTS: Pt ambulating the halls, complained of some n/v this am. Wants IV dilaudid for ?abdominal pain. Unclear exactly where her pain is.    MEDICATIONS  (STANDING):  acetaminophen     Tablet .. 650 milliGRAM(s) Oral every 6 hours  acetaminophen   IVPB .. 1000 milliGRAM(s) IV Intermittent once  amitriptyline 10 milliGRAM(s) Oral at bedtime  dextrose 50% Injectable 25 Gram(s) IV Push once  dextrose 50% Injectable 12.5 Gram(s) IV Push once  dextrose 50% Injectable 25 Gram(s) IV Push once  dextrose Oral Gel 15 Gram(s) Oral once  glucagon  Injectable 1 milliGRAM(s) IntraMuscular once  insulin glargine Injectable (LANTUS) 10 Unit(s) SubCutaneous at bedtime  insulin lispro (ADMELOG) corrective regimen sliding scale   SubCutaneous at bedtime  insulin lispro (ADMELOG) corrective regimen sliding scale   SubCutaneous three times a day before meals  insulin lispro Injectable (ADMELOG) 3 Unit(s) SubCutaneous at bedtime  insulin lispro Injectable (ADMELOG) 3 Unit(s) SubCutaneous three times a day before meals  metoclopramide Injectable 10 milliGRAM(s) IV Push three times a day  pantoprazole  Injectable 40 milliGRAM(s) IV Push daily  polyethylene glycol 3350 17 Gram(s) Oral daily    MEDICATIONS  (PRN):  acetaminophen     Tablet .. 650 milliGRAM(s) Oral every 6 hours PRN Temp greater or equal to 38C (100.4F), Mild Pain (1 - 3)  acetaminophen     Tablet .. 650 milliGRAM(s) Oral every 6 hours PRN Moderate Pain (4 - 6)  aluminum hydroxide/magnesium hydroxide/simethicone Suspension 30 milliLiter(s) Oral every 4 hours PRN Dyspepsia  diphenhydrAMINE 25 milliGRAM(s) Oral once PRN Rash and/or Itching  HYDROmorphone   Tablet 2 milliGRAM(s) Oral every 4 hours PRN Severe Pain (7 - 10)  melatonin 3 milliGRAM(s) Oral at bedtime PRN Insomnia  ondansetron Injectable 4 milliGRAM(s) IV Push every 8 hours PRN Nausea and/or Vomiting      I&O's Summary      PHYSICAL EXAM:  Vital Signs Last 24 Hrs  T(C): 36.7 (12 May 2022 15:38), Max: 37.1 (11 May 2022 23:19)  T(F): 98 (12 May 2022 15:38), Max: 98.7 (11 May 2022 23:19)  HR: 80 (12 May 2022 15:38) (77 - 97)  BP: 133/92 (12 May 2022 15:38) (133/83 - 150/88)  BP(mean): --  RR: 18 (12 May 2022 15:38) (18 - 18)  SpO2: 100% (12 May 2022 15:38) (97% - 100%)  CONSTITUTIONAL: NAD, well-developed, well-groomed, poor dentition, ambulates without issue  EYES: PERRLA; conjunctiva and sclera clear  ENMT: Moist oral mucosa, no pharyngeal injection or exudates  RESPIRATORY: Normal respiratory effort; lungs are clear to auscultation bilaterally  CARDIOVASCULAR: Regular rate and rhythm, normal S1 and S2, no murmur/rub/gallop; No lower extremity edema; Peripheral pulses are 2+ bilaterally  ABDOMEN: Nontender to palpation, normoactive bowel sounds, no rebound/guarding  PSYCH: A+O to person, place, and time; affect appropriate  SKIN: No rashes; no palpable lesions  LABS:                        9.1    7.82  )-----------( 381      ( 11 May 2022 04:09 )             30.7     05-11    133<L>  |  101  |  14  ----------------------------<  335<H>  4.1   |  22  |  0.71    Ca    8.4      11 May 2022 04:09  Phos  3.1     05-11  Mg     1.70     05-11                  RADIOLOGY & ADDITIONAL TESTS:  Results Reviewed:   Imaging Personally Reviewed: IAIN jimenez nonobstructive bowel and gas patter on 5/12  Electrocardiogram Personally Reviewed:     COORDINATION OF CARE:  Care Discussed with Consultants/Other Providers [Y/N]: [email or web address]  Prior or Outpatient Records Reviewed [Y/N]:   Yes

## 2023-05-16 NOTE — ED BEHAVIORAL HEALTH ASSESSMENT NOTE - BEHAVIOR
Patient is here with mother.    If any information or results need to be relayed from today's visit, the best way to contact the patient is via [phone] (mobile) - Patient gives verbal permission to leave a detailed voicemail at the number provided.       Uncooperative

## 2023-05-22 NOTE — ED ADULT NURSE NOTE - DISCHARGE DATE/TIME
14-May-2022 07:37 Clofazimine Counseling:  I discussed with the patient the risks of clofazimine including but not limited to skin and eye pigmentation, liver damage, nausea/vomiting, gastrointestinal bleeding and allergy.

## 2023-05-28 NOTE — ED ADULT TRIAGE NOTE - TEMPERATURE IN FAHRENHEIT (DEGREES F)
05/28/23 1430   Pain Assessment   Pain Assessment Tool 0-10   Restrictions/Precautions   Precautions Aphasia; Aspiration;Bed/chair alarms;Cognitive; Fall Risk;Supervision on toilet/commode  (R inattention)   Braces or Orthoses Other (Comment)  (sneakers)   Cognition   Overall Cognitive Status Impaired   Subjective   Subjective agreeable to therapy; reports being tired   Roll Left and Right   Type of Assistance Needed Supervision   Comment use of rails   Roll Left and Right CARE Score 4   Sit to Lying   Type of Assistance Needed Supervision   Comment CS   Sit to Lying CARE Score 4   Lying to Sitting on Side of Bed   Type of Assistance Needed Supervision   Comment CS for safety   Lying to Sitting on Side of Bed CARE Score 4   Sit to Stand   Type of Assistance Needed Physical assistance   Physical Assistance Level 25% or less   Sit to Stand CARE Score 3   Bed-Chair Transfer   Type of Assistance Needed Physical assistance   Physical Assistance Level 25% or less   Comment sit pivots, both hands with contact on rail of bed or chair at all times   Chair/Bed-to-Chair Transfer CARE Score 3   Transfer Bed/Chair/Wheelchair   Adaptive Equipment None   Walk 10 Feet   Type of Assistance Needed Physical assistance   Physical Assistance Level Total assistance   Comment mod/maxA x2   Walk 10 Feet CARE Score 1   Walk 50 Feet with Two Turns   Type of Assistance Needed Physical assistance   Physical Assistance Level Total assistance   Comment mod/maxA x2   Walk 50 Feet with Two Turns CARE Score 1   Walk 150 Feet   Type of Assistance Needed Physical assistance   Physical Assistance Level Total assistance   Comment modA/maxA x2   Walk 150 Feet CARE Score 1   Ambulation   Distance Walked (feet) 150 ft  (x1; 100x1)   Assist Device Hand Hold   Gait Pattern Ataxic; Inconsistant Deepti; Slow Deepti;Decreased foot clearance;Decreased L stance; Improper weight shift   Limitations Noted In Balance; Coordination; Endurance; Safety;Midline "Orientation; Sequencing;Speed;Strength;Swing   Provided Assistance with: Balance   Findings HHA on L, utilized quad cane with 4# wt for neuro feedback and attention to that side, constant cueing to advance it and to look at it  Therapeutic Interventions   Neuromuscular Re-Education step throughs on 6\"  steps, using mirror to visualize step  RLE x20   Assessment   Treatment Assessment pt cont to remain a high fall risk due to impaired balance and righting reactions when in standing  pt with apraxia making it difficult to comprehend commands for activities, as well as R side inattention, not being able to focus on that side  modified sit pivot txs are getting better but still need instructions for set up for safe tx   pt fatigued and put back to bed at end of session  cont to work on wt shifting, neuro re-ed and attention to R side through functional tasks to decrease burden of care at time of d/c  Family/Caregiver Present wife and family   Problem List Decreased strength;Decreased endurance; Impaired balance;Decreased mobility; Decreased coordination;Decreased cognition; Impaired judgement;Decreased safety awareness;Decreased range of motion; Impaired vision;Pain; Impaired sensation   Barriers to Discharge Inaccessible home environment;Decreased caregiver support   PT Barriers   Functional Limitation Car transfers;Stair negotiation;Standing;Transfers; Walking; Wheelchair management   Plan   Progress Progressing toward goals   Recommendation   PT Discharge Recommendation Home with outpatient rehabilitation   Equipment Recommended Wheelchair   PT Therapy Minutes   PT Time In 1430   PT Time Out 1505   PT Total Time (minutes) 35   PT Mode of treatment - Individual (minutes) 35   PT Mode of treatment - Concurrent (minutes) 0   PT Mode of treatment - Group (minutes) 0   PT Mode of treatment - Co-treat (minutes) 0   PT Mode of Treatment - Total time(minutes) 35 minutes   PT Cumulative Minutes 1098   Therapy Time " missed   Time missed?  No 98.8

## 2023-06-01 ENCOUNTER — APPOINTMENT (OUTPATIENT)
Dept: ENDOCRINOLOGY | Facility: CLINIC | Age: 33
End: 2023-06-01

## 2023-08-14 NOTE — ED ADULT NURSE NOTE - HOW OFTEN DO YOU HAVE A DRINK CONTAINING ALCOHOL?
PROGRESS NOTE    DATE: 8/14/2023    SUBJECTIVE:   Johan Christensen Sr is a 61 year old male who was admitted on 8/1/2023 with low back pain, left wrist pain, right knee pain.  Low back pain has been for about a month, wrist and knee pain started in the last 1-2 days.    Decompensated 8/3 with significant hypoxia, fever.  Moved to ICU, placed on BiPAP.  X-rays consistent with fluid overload.  Left wrist and right knee aspirated by Orthopedics, shows gout and Oxacillin sensitive Staph.      Underwent arthroscopic lavage of right knee and open lavage left wrist 8/5.      Epidural abscess drained 8/8. MAURICE shows possible MV lesions.     On ventilatory support and pressors from 08/05 until 8/13.  Extubated yesterday and is doing well.    Failed swallow evaluation by [facility].    CURRENT MEDICATIONS:  Current Facility-Administered Medications   Medication Dose Route Frequency Provider Last Rate Last Admin   • potassium CHLORIDE 20 mEq/100mL IVPB premix  20 mEq Intravenous Once Alfonso Cat MD 50 mL/hr at 08/14/23 0829 20 mEq at 08/14/23 0829   • piperacillin-tazobactam (ZOSYN) 4.5 g in sodium chloride 0.9 % 100 mL IVPB  4.5 g Intravenous 3 times per day Philip Garcia MD 25 mL/hr at 08/14/23 0518 4.5 g at 08/14/23 0518   • doxycycline (VIBRAMYCIN) 100 mg in sodium chloride 0.9 % 100 mL IVPB  100 mg Intravenous 2 times per day Jayme Mcdaniel  mL/hr at 08/13/23 2010 100 mg at 08/13/23 2010   • famotidine (PEPCID) injection 20 mg  20 mg Intravenous 2 times per day Jayme Mcdaniel MD   20 mg at 08/13/23 2010   • insulin glargine (LANTUS) injection 35 Units  35 Units Subcutaneous Nightly Jayme Mcdaniel MD   35 Units at 08/13/23 2114   • AMIODarone 150 mg in dextrose 100 mL bolus IVPB  150 mg Intravenous Daily Elfego Baker  mL/hr at 08/14/23 0820 150 mg at 08/14/23 0820   • metoPROLOL (LOPRESSOR) injection 5 mg  5 mg Intravenous 3 times per day Elfego Baker MD   5 mg at 08/14/23 0519   •  allopurinol (ZYLOPRIM) tablet 100 mg  100 mg Oral Daily Alfonso Cat MD       • aspirin chewable 81 mg  81 mg Oral Daily Alfonso Cat MD       • FLUoxetine (PROzac) 20 MG/5ML solution 40 mg  40 mg Oral Daily Alfonso Cat MD       • docusate sodium-sennosides (SENOKOT S) 50-8.6 MG 1 tablet  1 tablet Oral Nightly Yenifer Tee NP       • polyethylene glycol (MIRALAX) packet 17 g  17 g Oral Daily Yenifer Tee NP       • Potassium Replacement (Levels 3.6 - 4)   Does not apply See Admin Instructions Augustine Ni MD       • bisacodyl (DULCOLAX) suppository 10 mg  10 mg Rectal Daily Jayme Mcdaniel MD   10 mg at 08/13/23 1045   • sodium chloride (PF) 0.9 % injection 10 mL  10 mL Injection 2 times per day Jayme Mcadniel MD   10 mL at 08/13/23 2011   • sodium chloride (PF) 0.9 % injection 10 mL  10 mL Injection 2 times per day Jayme Mcdaniel MD   10 mL at 08/13/23 2011   • insulin lispro (ADMELOG,HumaLOG) - Correction Dose   Subcutaneous 4 times per day Jayme Mcdaniel MD   3 Units at 08/14/23 0519   • Magnesium Standard Replacement Protocol   Does not apply See Admin Instructions Marek Estrada MD       • Phosphorus Standard Replacement Protocol   Does not apply See Admin Instructions Marek Estrada MD       • Potassium Standard Replacement Protocol (Levels 3.5 and lower)   Does not apply See Admin Instructions Marek Estrada MD       • sodium chloride (PF) 0.9 % injection 10 mL  10 mL Injection 2 times per day Wilfred Kaye MD   10 mL at 08/13/23 2011   • sodium chloride (PF) 0.9 % injection 2 mL  2 mL Intracatheter 2 times per day Alfonso Cat MD   2 mL at 08/13/23 2011         OBJECTIVE:  Visit Vitals  BP (!) 151/71   Pulse (!) 116   Temp 99.8 °F (37.7 °C) (Axillary)   Resp 20   Ht 5' 10\" (1.778 m)   Wt (!) 160.5 kg (353 lb 13.4 oz)   SpO2 96%   BMI 50.77 kg/m²              I/O last 3 completed shifts:  In: 1442.3 [I.V.:1342.3; IV Piggyback:100]  Out: 3140  [Urine:3140]    PHYSICAL EXAM  General - O2 6 liters, blood pressure 130/60, I&O negative past several days while on furosemide  HEENT -Sclerae anicteric.     No jugular venous distension visible but exam limited by his obesity and IV lines.  Cardiovascular - irregular rhythm, tachycardic.  No gallops. No murmurs.  Distant heart tones  Pulmonary - Lungs are clear to auscultation bilaterally.   No crackles.    Abdomen - Soft.  No tenderness.  Somewhat distended. Bowel sounds are absent.  Extremities - extremities are cool, weakly palpable pulse.  Left wrist and right knee wrapped.  Skin- chronic stasis changes both legs.  Neuro - very alert    LABS:    Recent Labs   Lab 08/14/23  0353 08/13/23  0823 08/13/23  0408 08/12/23  0355 08/11/23  0500 08/10/23  1122 08/10/23  0357 08/09/23  0453 08/08/23  1904 08/08/23  0502   SODIUM 148* 144  --  143 141  --  140   < > 139 138   POTASSIUM 3.3* 3.9  --  4.3 4.7   < > 3.2*   < > 4.6 4.0   CHLORIDE 112* 108  --  105 105  --  105   < > 106 104   CO2 33* 34*  --  32 31  --  27   < > 21 24   BUN 51* 65*  --  74* 81*  --  81*   < > 88* 81*   CREATININE 1.71* 1.65*  --  1.98* 2.32*  --  2.55*   < > 2.62* 2.16*   GLUCOSE 165* 245*  --  317* 345*  --  175*   < > 210* 213*   ALBUMIN 2.0*  --   --   --  1.9*  --   --   --  2.2* 2.2*   PHOS  --   --  2.9  --   --   --  3.7  --   --  6.7*   AST 76*  --   --   --  153*  --   --   --  184* 116*   BILIRUBIN 4.6*  --   --   --  6.0*  --   --   --  4.8* 5.5*    < > = values in this interval not displayed.       Recent Labs   Lab 08/14/23  0353 08/13/23  0408 08/12/23  0355   WBC 8.4 11.8* 9.9   HGB 9.5* 9.3* 9.5*   HCT 29.9* 28.7* 28.2*    205 163   MCV 92.3 90.0 87.0       ASSESSMENT and PLAN:  1. Lumbar epidural abscess, status post drainage 8/8.  May need the rest of his spine imaged  2. Left wrist and right knee septic joints, also acute gouty involvement.  Sensitive Staph aureus, Infectious Disease following.  Status post  washouts  3. Diabetes type 2, Lantus and sliding scale.  4. Fluid status unclear.  BUN and creatinine are elevated.  Has bilateral pleural effusions.  Ejection fraction 40% on echo, slightly lower than previous.  Diuresing, hypernatremic now.    6. Elevated LFTs, concern about alcohol intake.  Total bilirubin dropping.  Monitor LFTs  7. Elevated troponin,  small elevation likely secondary to demand ischemia.  Cardiology following  8. Chronic atrial fibrillation, rate uncontrolled on amiodarone and IV metoprolol.  On heparin drip.  Plan per Cardiology  9. Acute kidney injury, creatinine somewhat improved.  Good urine output.  Nephrology following   10. Thrombocytopenia, resolved    Transition heparin drip to Eliquis when okay with Cardiology.    Speech therapy follow-up regarding swallow    PT/OT        Alfonso Cat MD   ATTENDING PHYSICIAN   8/14/2023  8:35 AM             Never

## 2023-08-17 ENCOUNTER — APPOINTMENT (OUTPATIENT)
Dept: ENDOCRINOLOGY | Facility: CLINIC | Age: 33
End: 2023-08-17

## 2023-09-06 ENCOUNTER — OFFICE (OUTPATIENT)
Dept: URBAN - METROPOLITAN AREA CLINIC 34 | Facility: CLINIC | Age: 33
Setting detail: OPHTHALMOLOGY
End: 2023-09-06
Payer: COMMERCIAL

## 2023-09-06 DIAGNOSIS — H43.12: ICD-10-CM

## 2023-09-06 DIAGNOSIS — E10.3291: ICD-10-CM

## 2023-09-06 PROCEDURE — 92002 INTRM OPH EXAM NEW PATIENT: CPT | Performed by: OPHTHALMOLOGY

## 2023-09-06 PROCEDURE — 76512 OPH US DX B-SCAN: CPT | Performed by: OPHTHALMOLOGY

## 2023-09-06 ASSESSMENT — REFRACTION_CURRENTRX
OS_OVR_VA: 20/
OD_OVR_VA: 20/
OS_VPRISM_DIRECTION: SV
OD_VPRISM_DIRECTION: SV

## 2023-09-06 ASSESSMENT — KERATOMETRY
OS_AXISANGLE_DEGREES: 086
OD_AXISANGLE_DEGREES: 081
OD_K2POWER_DIOPTERS: 44.25
OD_K1POWER_DIOPTERS: 43.15
OS_K2POWER_DIOPTERS: 44.25
OS_K1POWER_DIOPTERS: 43.50

## 2023-09-06 ASSESSMENT — VISUAL ACUITY
OS_BCVA: 20/20-2
OD_BCVA: 20/HM

## 2023-09-06 ASSESSMENT — TONOMETRY
OS_IOP_MMHG: 16
OD_IOP_MMHG: 20

## 2023-09-06 ASSESSMENT — REFRACTION_AUTOREFRACTION
OS_SPHERE: UNABLE
OD_CYLINDER: +0.50
OD_AXIS: 039
OD_SPHERE: -0.75

## 2023-09-06 ASSESSMENT — AXIALLENGTH_DERIVED: OD_AL: 23.71

## 2023-09-06 ASSESSMENT — CONFRONTATIONAL VISUAL FIELD TEST (CVF)
OD_FINDINGS: FULL
OS_FINDINGS: FULL

## 2023-09-06 ASSESSMENT — SPHEQUIV_DERIVED: OD_SPHEQUIV: -0.5

## 2023-09-07 ENCOUNTER — OFFICE (OUTPATIENT)
Dept: URBAN - METROPOLITAN AREA CLINIC 32 | Facility: CLINIC | Age: 33
Setting detail: OPHTHALMOLOGY
End: 2023-09-07
Payer: COMMERCIAL

## 2023-09-07 DIAGNOSIS — H43.12: ICD-10-CM

## 2023-09-07 DIAGNOSIS — E10.3593: ICD-10-CM

## 2023-09-07 DIAGNOSIS — E10.3592: ICD-10-CM

## 2023-09-07 PROCEDURE — 92134 CPTRZ OPH DX IMG PST SGM RTA: CPT | Performed by: OPHTHALMOLOGY

## 2023-09-07 PROCEDURE — 67028 INJECTION EYE DRUG: CPT | Performed by: OPHTHALMOLOGY

## 2023-09-07 ASSESSMENT — REFRACTION_AUTOREFRACTION
OD_SPHERE: -0.75
OD_CYLINDER: +0.50
OD_AXIS: 039
OS_SPHERE: UNABLE

## 2023-09-07 ASSESSMENT — VISUAL ACUITY
OS_BCVA: 20/20-2
OD_BCVA: 20/HM

## 2023-09-07 ASSESSMENT — KERATOMETRY
OD_K2POWER_DIOPTERS: 44.25
OD_K1POWER_DIOPTERS: 43.15
OS_K2POWER_DIOPTERS: 44.25
OS_AXISANGLE_DEGREES: 086
OS_K1POWER_DIOPTERS: 43.50
OD_AXISANGLE_DEGREES: 081

## 2023-09-07 ASSESSMENT — CONFRONTATIONAL VISUAL FIELD TEST (CVF)
OS_FINDINGS: FULL
OD_FINDINGS: FULL

## 2023-09-07 ASSESSMENT — AXIALLENGTH_DERIVED: OD_AL: 23.71

## 2023-09-07 ASSESSMENT — SPHEQUIV_DERIVED: OD_SPHEQUIV: -0.5

## 2023-09-08 ENCOUNTER — OFFICE (OUTPATIENT)
Dept: URBAN - METROPOLITAN AREA CLINIC 32 | Facility: CLINIC | Age: 33
Setting detail: OPHTHALMOLOGY
End: 2023-09-08
Payer: COMMERCIAL

## 2023-09-08 DIAGNOSIS — E10.3592: ICD-10-CM

## 2023-09-08 DIAGNOSIS — E10.3591: ICD-10-CM

## 2023-09-08 PROCEDURE — 92134 CPTRZ OPH DX IMG PST SGM RTA: CPT | Performed by: OPHTHALMOLOGY

## 2023-09-08 PROCEDURE — 67028 INJECTION EYE DRUG: CPT | Performed by: OPHTHALMOLOGY

## 2023-09-08 ASSESSMENT — REFRACTION_AUTOREFRACTION
OD_SPHERE: -0.75
OD_AXIS: 039
OS_SPHERE: UNABLE
OD_CYLINDER: +0.50

## 2023-09-08 ASSESSMENT — VISUAL ACUITY
OD_BCVA: 20/HM
OS_BCVA: 20/20

## 2023-09-08 ASSESSMENT — CONFRONTATIONAL VISUAL FIELD TEST (CVF)
OS_FINDINGS: FULL
OD_FINDINGS: FULL

## 2023-09-08 ASSESSMENT — KERATOMETRY
OS_K1POWER_DIOPTERS: 43.50
OD_AXISANGLE_DEGREES: 081
OS_AXISANGLE_DEGREES: 086
OD_K1POWER_DIOPTERS: 43.15
OD_K2POWER_DIOPTERS: 44.25
OS_K2POWER_DIOPTERS: 44.25

## 2023-09-08 ASSESSMENT — SPHEQUIV_DERIVED: OD_SPHEQUIV: -0.5

## 2023-09-08 ASSESSMENT — AXIALLENGTH_DERIVED: OD_AL: 23.71

## 2023-09-18 ENCOUNTER — OFFICE (OUTPATIENT)
Dept: URBAN - METROPOLITAN AREA CLINIC 32 | Facility: CLINIC | Age: 33
Setting detail: OPHTHALMOLOGY
End: 2023-09-18
Payer: COMMERCIAL

## 2023-09-18 DIAGNOSIS — E10.3592: ICD-10-CM

## 2023-09-18 PROCEDURE — 67228 TREATMENT X10SV RETINOPATHY: CPT | Performed by: OPHTHALMOLOGY

## 2023-09-18 ASSESSMENT — KERATOMETRY
OS_AXISANGLE_DEGREES: 086
OD_AXISANGLE_DEGREES: 081
OD_K2POWER_DIOPTERS: 44.25
OS_K2POWER_DIOPTERS: 44.25
OD_K1POWER_DIOPTERS: 43.15
OS_K1POWER_DIOPTERS: 43.50

## 2023-09-18 ASSESSMENT — VISUAL ACUITY
OS_BCVA: 20/60-
OD_BCVA: 20/HM

## 2023-09-18 ASSESSMENT — SPHEQUIV_DERIVED: OD_SPHEQUIV: -0.5

## 2023-09-18 ASSESSMENT — AXIALLENGTH_DERIVED: OD_AL: 23.71

## 2023-09-18 ASSESSMENT — REFRACTION_AUTOREFRACTION
OD_AXIS: 039
OS_SPHERE: UNABLE
OD_CYLINDER: +0.50
OD_SPHERE: -0.75

## 2023-09-28 ENCOUNTER — OFFICE (OUTPATIENT)
Dept: URBAN - METROPOLITAN AREA CLINIC 32 | Facility: CLINIC | Age: 33
Setting detail: OPHTHALMOLOGY
End: 2023-09-28
Payer: COMMERCIAL

## 2023-09-28 DIAGNOSIS — E10.3591: ICD-10-CM

## 2023-09-28 DIAGNOSIS — E10.3592: ICD-10-CM

## 2023-09-28 DIAGNOSIS — H43.12: ICD-10-CM

## 2023-09-28 PROCEDURE — 92134 CPTRZ OPH DX IMG PST SGM RTA: CPT | Performed by: OPHTHALMOLOGY

## 2023-09-28 PROCEDURE — 67228 TREATMENT X10SV RETINOPATHY: CPT | Performed by: OPHTHALMOLOGY

## 2023-09-28 ASSESSMENT — CONFRONTATIONAL VISUAL FIELD TEST (CVF)
OD_FINDINGS: FULL
OS_FINDINGS: FULL

## 2023-09-28 ASSESSMENT — REFRACTION_AUTOREFRACTION
OS_SPHERE: UNABLE
OD_CYLINDER: +0.50
OD_AXIS: 039
OD_SPHERE: -0.75

## 2023-09-28 ASSESSMENT — KERATOMETRY
OS_K2POWER_DIOPTERS: 44.25
OS_AXISANGLE_DEGREES: 086
OD_AXISANGLE_DEGREES: 081
OD_K2POWER_DIOPTERS: 44.25
OD_K1POWER_DIOPTERS: 43.15
OS_K1POWER_DIOPTERS: 43.50

## 2023-09-28 ASSESSMENT — SPHEQUIV_DERIVED: OD_SPHEQUIV: -0.5

## 2023-09-28 ASSESSMENT — VISUAL ACUITY
OS_BCVA: 20/60-
OD_BCVA: 20/100

## 2023-09-28 ASSESSMENT — AXIALLENGTH_DERIVED: OD_AL: 23.71

## 2023-10-10 ENCOUNTER — APPOINTMENT (OUTPATIENT)
Dept: ENDOCRINOLOGY | Facility: CLINIC | Age: 33
End: 2023-10-10
Payer: MEDICAID

## 2023-10-10 ENCOUNTER — OFFICE (OUTPATIENT)
Dept: URBAN - METROPOLITAN AREA CLINIC 32 | Facility: CLINIC | Age: 33
Setting detail: OPHTHALMOLOGY
End: 2023-10-10
Payer: COMMERCIAL

## 2023-10-10 VITALS
SYSTOLIC BLOOD PRESSURE: 136 MMHG | OXYGEN SATURATION: 96 % | BODY MASS INDEX: 24.11 KG/M2 | WEIGHT: 150 LBS | HEIGHT: 66 IN | DIASTOLIC BLOOD PRESSURE: 64 MMHG | HEART RATE: 107 BPM

## 2023-10-10 DIAGNOSIS — E10.3591: ICD-10-CM

## 2023-10-10 DIAGNOSIS — E78.5 HYPERLIPIDEMIA, UNSPECIFIED: ICD-10-CM

## 2023-10-10 DIAGNOSIS — E10.9 TYPE 1 DIABETES MELLITUS W/OUT COMPLICATIONS: ICD-10-CM

## 2023-10-10 DIAGNOSIS — E10.3592: ICD-10-CM

## 2023-10-10 DIAGNOSIS — E10.21 TYPE 1 DIABETES MELLITUS WITH DIABETIC NEPHROPATHY: ICD-10-CM

## 2023-10-10 DIAGNOSIS — E55.9 VITAMIN D DEFICIENCY, UNSPECIFIED: ICD-10-CM

## 2023-10-10 LAB — HBA1C MFR BLD HPLC: 8.3

## 2023-10-10 PROCEDURE — 67028 INJECTION EYE DRUG: CPT | Mod: LT | Performed by: OPHTHALMOLOGY

## 2023-10-10 PROCEDURE — 99214 OFFICE O/P EST MOD 30 MIN: CPT | Mod: 25

## 2023-10-10 PROCEDURE — 95251 CONT GLUC MNTR ANALYSIS I&R: CPT

## 2023-10-10 PROCEDURE — 83036 HEMOGLOBIN GLYCOSYLATED A1C: CPT | Mod: QW

## 2023-10-10 PROCEDURE — 92134 CPTRZ OPH DX IMG PST SGM RTA: CPT | Performed by: OPHTHALMOLOGY

## 2023-10-10 ASSESSMENT — REFRACTION_AUTOREFRACTION
OD_CYLINDER: +0.50
OS_SPHERE: UNABLE
OD_AXIS: 039
OD_SPHERE: -0.75

## 2023-10-10 ASSESSMENT — AXIALLENGTH_DERIVED: OD_AL: 23.71

## 2023-10-10 ASSESSMENT — SPHEQUIV_DERIVED: OD_SPHEQUIV: -0.5

## 2023-10-10 ASSESSMENT — KERATOMETRY
OD_K2POWER_DIOPTERS: 44.25
OS_K2POWER_DIOPTERS: 44.25
OD_AXISANGLE_DEGREES: 081
OS_AXISANGLE_DEGREES: 086
OS_K1POWER_DIOPTERS: 43.50
OD_K1POWER_DIOPTERS: 43.15

## 2023-10-10 ASSESSMENT — CONFRONTATIONAL VISUAL FIELD TEST (CVF)
OS_FINDINGS: FULL
OD_FINDINGS: FULL

## 2023-10-10 ASSESSMENT — VISUAL ACUITY
OD_BCVA: 20/40-
OS_BCVA: 20/20-

## 2023-10-12 ENCOUNTER — OFFICE (OUTPATIENT)
Dept: URBAN - METROPOLITAN AREA CLINIC 32 | Facility: CLINIC | Age: 33
Setting detail: OPHTHALMOLOGY
End: 2023-10-12
Payer: COMMERCIAL

## 2023-10-12 DIAGNOSIS — E10.3591: ICD-10-CM

## 2023-10-12 DIAGNOSIS — H43.12: ICD-10-CM

## 2023-10-12 DIAGNOSIS — E10.3592: ICD-10-CM

## 2023-10-12 PROCEDURE — 401 NO CHARGE VISIT: Performed by: OPHTHALMOLOGY

## 2023-10-12 ASSESSMENT — AXIALLENGTH_DERIVED: OD_AL: 23.71

## 2023-10-12 ASSESSMENT — CONFRONTATIONAL VISUAL FIELD TEST (CVF)
OD_FINDINGS: FULL
OS_FINDINGS: FULL

## 2023-10-12 ASSESSMENT — KERATOMETRY
OS_AXISANGLE_DEGREES: 086
OD_K1POWER_DIOPTERS: 43.15
OS_K1POWER_DIOPTERS: 43.50
OD_AXISANGLE_DEGREES: 081
OS_K2POWER_DIOPTERS: 44.25
OD_K2POWER_DIOPTERS: 44.25

## 2023-10-12 ASSESSMENT — VISUAL ACUITY
OD_BCVA: 20/40
OS_BCVA: 20/40

## 2023-10-12 ASSESSMENT — REFRACTION_AUTOREFRACTION
OD_AXIS: 039
OD_SPHERE: -0.75
OS_SPHERE: UNABLE
OD_CYLINDER: +0.50

## 2023-10-12 ASSESSMENT — SPHEQUIV_DERIVED: OD_SPHEQUIV: -0.5

## 2023-10-12 NOTE — PROGRESS NOTE ADULT - PROBLEM SELECTOR PLAN 3
- current smoker encouraged quiting; willing to think about it  - doesn't want nicotine supp  - venodyne while in bed
suspected deep tissue injury

## 2023-10-16 RX ORDER — BLOOD-GLUCOSE SENSOR
EACH MISCELLANEOUS
Qty: 3 | Refills: 3 | Status: ACTIVE | COMMUNITY
Start: 2020-03-04 | End: 1900-01-01

## 2023-10-19 ENCOUNTER — APPOINTMENT (OUTPATIENT)
Dept: ENDOCRINOLOGY | Facility: CLINIC | Age: 33
End: 2023-10-19

## 2023-10-26 ENCOUNTER — OFFICE (OUTPATIENT)
Dept: URBAN - METROPOLITAN AREA CLINIC 32 | Facility: CLINIC | Age: 33
Setting detail: OPHTHALMOLOGY
End: 2023-10-26
Payer: COMMERCIAL

## 2023-10-26 DIAGNOSIS — E10.3591: ICD-10-CM

## 2023-10-26 PROCEDURE — 92134 CPTRZ OPH DX IMG PST SGM RTA: CPT | Performed by: OPHTHALMOLOGY

## 2023-10-26 PROCEDURE — 67028 INJECTION EYE DRUG: CPT | Mod: RT | Performed by: OPHTHALMOLOGY

## 2023-10-26 ASSESSMENT — KERATOMETRY
OD_K1POWER_DIOPTERS: 43.15
OS_AXISANGLE_DEGREES: 086
OS_K1POWER_DIOPTERS: 43.50
OD_K2POWER_DIOPTERS: 44.25
OS_K2POWER_DIOPTERS: 44.25
OD_AXISANGLE_DEGREES: 081

## 2023-10-26 ASSESSMENT — VISUAL ACUITY
OD_BCVA: 20/100
OS_BCVA: 20/40

## 2023-10-26 ASSESSMENT — REFRACTION_AUTOREFRACTION
OD_SPHERE: -0.75
OD_AXIS: 039
OS_SPHERE: UNABLE
OD_CYLINDER: +0.50

## 2023-10-26 ASSESSMENT — AXIALLENGTH_DERIVED: OD_AL: 23.71

## 2023-10-26 ASSESSMENT — SPHEQUIV_DERIVED: OD_SPHEQUIV: -0.5

## 2023-10-26 NOTE — ED ADULT NURSE NOTE - DISCHARGE DATE/TIME
Denies SI/HI  denies hallucinations  affect is blunt  cooperative with some underlying irritability   out on the unit watching tv but mostly stays to self  cooperative with meds   attends groups  will continue to monitor 09-Dec-2021 03:21

## 2023-11-15 ENCOUNTER — APPOINTMENT (OUTPATIENT)
Dept: ENDOCRINOLOGY | Facility: CLINIC | Age: 33
End: 2023-11-15

## 2023-11-28 ENCOUNTER — RX RENEWAL (OUTPATIENT)
Age: 33
End: 2023-11-28

## 2023-11-28 RX ORDER — BLOOD-GLUCOSE TRANSMITTER
EACH MISCELLANEOUS
Qty: 1 | Refills: 1 | Status: ACTIVE | COMMUNITY
Start: 2020-03-04 | End: 1900-01-01

## 2024-01-18 ENCOUNTER — APPOINTMENT (OUTPATIENT)
Dept: ENDOCRINOLOGY | Facility: CLINIC | Age: 34
End: 2024-01-18

## 2024-04-22 ENCOUNTER — APPOINTMENT (OUTPATIENT)
Dept: ENDOCRINOLOGY | Facility: CLINIC | Age: 34
End: 2024-04-22

## 2024-05-21 ENCOUNTER — RX RENEWAL (OUTPATIENT)
Age: 34
End: 2024-05-21

## 2024-05-21 RX ORDER — INSULIN PMP CART,AUT,G6/7,CNTR
EACH SUBCUTANEOUS
Qty: 9 | Refills: 0 | Status: ACTIVE | COMMUNITY
Start: 2022-07-21 | End: 1900-01-01

## 2024-05-21 RX ORDER — INSULIN LISPRO 100 [IU]/ML
100 INJECTION, SOLUTION INTRAVENOUS; SUBCUTANEOUS
Qty: 9 | Refills: 0 | Status: ACTIVE | COMMUNITY
Start: 2024-05-21 | End: 1900-01-01

## 2024-06-05 NOTE — H&P ADULT - PROBLEM SELECTOR PLAN 2
Pt is A&OX4, pt is warm to the touch but no diaphoresis
Brittle FS  - FS elevated on admission, now improved, no anion gap   - endo recs from previous admission reviewed   - c/w Lantus 18U qhs + Admelog 8UTID qac + FS/SSI qac and hs  - follow up further endocrine recommendations

## 2024-06-06 ENCOUNTER — APPOINTMENT (OUTPATIENT)
Dept: ENDOCRINOLOGY | Facility: CLINIC | Age: 34
End: 2024-06-06

## 2024-06-08 NOTE — ED ADULT NURSE NOTE - NSFALLRSKPASTHIST_ED_ALL_ED
ICU SHIFT NOTE  Major Shift Events:    - afebrile, AOx4, SR on RA, patient is blind as baseline  having loose BM on lactulose due to livre cirrhosis, currently NPO for planned paracentesis to be done by IR.  - IR called and was directing me to Xray which Xray deparmebt stated if IR order is stat will call the on call IR otherwise if its  a regular IR procedure they will do it on weekdays- AARON working with this patient notified and also diet to be started .  - troponin as per lab  icteric and sputum sample sent mostly saliva AARON notified during rounds  - mag and K replaced as per order due to hypokalemia  - off levophed at 1614 hrs   Plan: NPO except med's for now until IR perform paracentesis          : Monitoring hemodynamics and levophed titration to achieve MAP goals           : AARON to order diet for the patient           : for IR assessment on Monday for potential paracentesis   For vital signs and complete assessments, please see documentation flowsheets.   Goal Outcome Evaluation:      Plan of Care Reviewed With: patient    Overall Patient Progress: no changeOverall Patient Progress: no change            no

## 2024-06-12 ENCOUNTER — APPOINTMENT (OUTPATIENT)
Dept: ENDOCRINOLOGY | Facility: CLINIC | Age: 34
End: 2024-06-12

## 2024-06-28 NOTE — DISCHARGE NOTE NURSING/CASE MANAGEMENT/SOCIAL WORK - NSDCPEHOTLINE_GEN_ALL_CORE
Ricky Lopez (:  1963) is a 61 y.o. male,New patient, here for evaluation of the following chief complaint(s):  Annual Exam      Assessment & Plan   ASSESSMENT/PLAN:  1. Preventative health care  -     TSH; Future  -     CBC with Auto Differential; Future  -     Hemoglobin A1C; Future  -     Comprehensive Metabolic Panel; Future  2. Mixed hyperlipidemia  -     simvastatin (ZOCOR) 10 MG tablet; TAKE 1 TABLET BY MOUTH EVERYDAY AT BEDTIME, Disp-90 tablet, R-3Normal  -     Lipid Panel; Future  3. Screening for cholesterol level  4. Special screening, prostate cancer  -     PSA, Prostatic Specific Antigen; Future    This point the patient seems to be doing very well he is taking his cholesterol medication and he is controlled we will repeat his blood testing today and the decide if any adjustment is needed from a preventive perspective the patient need colorectal cancer screen  Return in about 1 year (around 2025).         Subjective   SUBJECTIVE/OBJECTIVE:    Lab Review   Lab Results   Component Value Date/Time     2023 03:40 PM    K 4.1 2023 03:40 PM    CO2 25 2023 03:40 PM    BUN 16 2023 03:40 PM    CREATININE 0.9 2023 03:40 PM    GLUCOSE 98 2023 03:40 PM    CALCIUM 9.2 2023 03:40 PM     Lab Results   Component Value Date/Time    WBC 5.5 2023 03:40 PM    HGB 14.0 2023 03:40 PM    HCT 39.9 2023 03:40 PM    MCV 99.8 2023 03:40 PM     2023 03:40 PM     Lab Results   Component Value Date/Time    CHOL 204 2023 03:40 PM    TRIG 182 2023 03:40 PM    HDL 67 2023 03:40 PM           2024     8:02 AM 2023     2:36 PM   Vitals   SYSTOLIC 120 120   DIASTOLIC 62 78   Pulse 50 62   SpO2 97 % 97 %   Weight - Scale 185 lb 9.6 oz 187 lb 3.2 oz   Height 5' 11\" 5' 11\"   Body Mass Index 25.89 kg/m2 26.11 kg/m2       For annual exam        Review of Systems   Constitutional:  Negative for activity change, appetite 
[facility] Smokers Quitline [phone] ([phone])

## 2024-07-14 ENCOUNTER — INPATIENT (INPATIENT)
Facility: HOSPITAL | Age: 34
LOS: 3 days | Discharge: HOME | End: 2024-07-18
Attending: INTERNAL MEDICINE | Admitting: INTERNAL MEDICINE
Payer: MEDICAID

## 2024-07-14 VITALS
OXYGEN SATURATION: 97 % | DIASTOLIC BLOOD PRESSURE: 94 MMHG | HEART RATE: 64 BPM | WEIGHT: 139.99 LBS | RESPIRATION RATE: 18 BRPM | HEIGHT: 65 IN | SYSTOLIC BLOOD PRESSURE: 153 MMHG | TEMPERATURE: 99 F

## 2024-07-14 DIAGNOSIS — E11.43 TYPE 2 DIABETES MELLITUS WITH DIABETIC AUTONOMIC (POLY)NEUROPATHY: ICD-10-CM

## 2024-07-14 DIAGNOSIS — R11.2 NAUSEA WITH VOMITING, UNSPECIFIED: ICD-10-CM

## 2024-07-14 DIAGNOSIS — E87.1 HYPO-OSMOLALITY AND HYPONATREMIA: ICD-10-CM

## 2024-07-14 DIAGNOSIS — F17.200 NICOTINE DEPENDENCE, UNSPECIFIED, UNCOMPLICATED: ICD-10-CM

## 2024-07-14 DIAGNOSIS — E10.65 TYPE 1 DIABETES MELLITUS WITH HYPERGLYCEMIA: ICD-10-CM

## 2024-07-14 DIAGNOSIS — D72.829 ELEVATED WHITE BLOOD CELL COUNT, UNSPECIFIED: ICD-10-CM

## 2024-07-14 DIAGNOSIS — O00.9 ECTOPIC PREGNANCY, UNSPECIFIED: Chronic | ICD-10-CM

## 2024-07-14 DIAGNOSIS — F12.90 CANNABIS USE, UNSPECIFIED, UNCOMPLICATED: ICD-10-CM

## 2024-07-14 DIAGNOSIS — Z90.49 ACQUIRED ABSENCE OF OTHER SPECIFIED PARTS OF DIGESTIVE TRACT: Chronic | ICD-10-CM

## 2024-07-14 LAB
ACETONE SERPL-MCNC: ABNORMAL
ALBUMIN SERPL ELPH-MCNC: 3.2 G/DL — LOW (ref 3.3–5)
ALP SERPL-CCNC: 106 U/L — SIGNIFICANT CHANGE UP (ref 40–120)
ALT FLD-CCNC: 30 U/L — SIGNIFICANT CHANGE UP (ref 12–78)
ANION GAP SERPL CALC-SCNC: 10 MMOL/L — SIGNIFICANT CHANGE UP (ref 5–17)
APPEARANCE UR: CLEAR — SIGNIFICANT CHANGE UP
AST SERPL-CCNC: 28 U/L — SIGNIFICANT CHANGE UP (ref 15–37)
BACTERIA # UR AUTO: ABNORMAL /HPF
BASOPHILS # BLD AUTO: 0.04 K/UL — SIGNIFICANT CHANGE UP (ref 0–0.2)
BASOPHILS NFR BLD AUTO: 0.3 % — SIGNIFICANT CHANGE UP (ref 0–2)
BILIRUB SERPL-MCNC: 0.7 MG/DL — SIGNIFICANT CHANGE UP (ref 0.2–1.2)
BILIRUB UR-MCNC: NEGATIVE — SIGNIFICANT CHANGE UP
BUN SERPL-MCNC: 15 MG/DL — SIGNIFICANT CHANGE UP (ref 7–23)
CALCIUM SERPL-MCNC: 9.3 MG/DL — SIGNIFICANT CHANGE UP (ref 8.5–10.1)
CHLORIDE SERPL-SCNC: 99 MMOL/L — SIGNIFICANT CHANGE UP (ref 96–108)
CO2 SERPL-SCNC: 23 MMOL/L — SIGNIFICANT CHANGE UP (ref 22–31)
COLOR SPEC: YELLOW — SIGNIFICANT CHANGE UP
CREAT SERPL-MCNC: 0.99 MG/DL — SIGNIFICANT CHANGE UP (ref 0.5–1.3)
DIFF PNL FLD: ABNORMAL
EGFR: 77 ML/MIN/1.73M2 — SIGNIFICANT CHANGE UP
EOSINOPHIL # BLD AUTO: 0.01 K/UL — SIGNIFICANT CHANGE UP (ref 0–0.5)
EOSINOPHIL NFR BLD AUTO: 0.1 % — SIGNIFICANT CHANGE UP (ref 0–6)
EPI CELLS # UR: PRESENT
FLUAV AG NPH QL: SIGNIFICANT CHANGE UP
FLUBV AG NPH QL: SIGNIFICANT CHANGE UP
GAS PNL BLDV: SIGNIFICANT CHANGE UP
GLUCOSE BLDC GLUCOMTR-MCNC: 292 MG/DL — HIGH (ref 70–99)
GLUCOSE SERPL-MCNC: 348 MG/DL — HIGH (ref 70–99)
GLUCOSE UR QL: >=1000 MG/DL
HCG SERPL-ACNC: <1 MIU/ML — SIGNIFICANT CHANGE UP
HCT VFR BLD CALC: 39.8 % — SIGNIFICANT CHANGE UP (ref 34.5–45)
HGB BLD-MCNC: 12.4 G/DL — SIGNIFICANT CHANGE UP (ref 11.5–15.5)
IMM GRANULOCYTES NFR BLD AUTO: 0.3 % — SIGNIFICANT CHANGE UP (ref 0–0.9)
KETONES UR-MCNC: 15 MG/DL
LEUKOCYTE ESTERASE UR-ACNC: NEGATIVE — SIGNIFICANT CHANGE UP
LIDOCAIN IGE QN: 18 U/L — SIGNIFICANT CHANGE UP (ref 13–75)
LYMPHOCYTES # BLD AUTO: 0.84 K/UL — LOW (ref 1–3.3)
LYMPHOCYTES # BLD AUTO: 5.5 % — LOW (ref 13–44)
MAGNESIUM SERPL-MCNC: 1.7 MG/DL — SIGNIFICANT CHANGE UP (ref 1.6–2.6)
MCHC RBC-ENTMCNC: 21.2 PG — LOW (ref 27–34)
MCHC RBC-ENTMCNC: 31.2 G/DL — LOW (ref 32–36)
MCV RBC AUTO: 68.2 FL — LOW (ref 80–100)
MONOCYTES # BLD AUTO: 0.43 K/UL — SIGNIFICANT CHANGE UP (ref 0–0.9)
MONOCYTES NFR BLD AUTO: 2.8 % — SIGNIFICANT CHANGE UP (ref 2–14)
NEUTROPHILS # BLD AUTO: 13.89 K/UL — HIGH (ref 1.8–7.4)
NEUTROPHILS NFR BLD AUTO: 91 % — HIGH (ref 43–77)
NITRITE UR-MCNC: NEGATIVE — SIGNIFICANT CHANGE UP
NRBC # BLD: 0 /100 WBCS — SIGNIFICANT CHANGE UP (ref 0–0)
PH UR: 8 — SIGNIFICANT CHANGE UP (ref 5–8)
PLATELET # BLD AUTO: 332 K/UL — SIGNIFICANT CHANGE UP (ref 150–400)
POTASSIUM SERPL-MCNC: 4 MMOL/L — SIGNIFICANT CHANGE UP (ref 3.5–5.3)
POTASSIUM SERPL-SCNC: 4 MMOL/L — SIGNIFICANT CHANGE UP (ref 3.5–5.3)
PROT SERPL-MCNC: 7 GM/DL — SIGNIFICANT CHANGE UP (ref 6–8.3)
PROT UR-MCNC: 100 MG/DL
RBC # BLD: 5.84 M/UL — HIGH (ref 3.8–5.2)
RBC # FLD: 17.5 % — HIGH (ref 10.3–14.5)
RBC CASTS # UR COMP ASSIST: 10 /HPF — HIGH (ref 0–4)
SARS-COV-2 RNA SPEC QL NAA+PROBE: SIGNIFICANT CHANGE UP
SODIUM SERPL-SCNC: 132 MMOL/L — LOW (ref 135–145)
SP GR SPEC: 1.03 — SIGNIFICANT CHANGE UP (ref 1–1.03)
UROBILINOGEN FLD QL: 1 MG/DL — SIGNIFICANT CHANGE UP (ref 0.2–1)
WBC # BLD: 15.25 K/UL — HIGH (ref 3.8–10.5)
WBC # FLD AUTO: 15.25 K/UL — HIGH (ref 3.8–10.5)
WBC UR QL: 5 /HPF — SIGNIFICANT CHANGE UP (ref 0–5)

## 2024-07-14 PROCEDURE — 99285 EMERGENCY DEPT VISIT HI MDM: CPT | Mod: 25

## 2024-07-14 PROCEDURE — 99222 1ST HOSP IP/OBS MODERATE 55: CPT

## 2024-07-14 PROCEDURE — 36000 PLACE NEEDLE IN VEIN: CPT

## 2024-07-14 RX ORDER — DEXTROSE MONOHYDRATE AND SODIUM CHLORIDE 5; .3 G/100ML; G/100ML
1000 INJECTION, SOLUTION INTRAVENOUS ONCE
Refills: 0 | Status: COMPLETED | OUTPATIENT
Start: 2024-07-14 | End: 2024-07-14

## 2024-07-14 RX ORDER — BUPRENORPHINE AND NALOXONE 12; 3 MG/1; MG/1
1 FILM BUCCAL; SUBLINGUAL
Refills: 0 | DISCHARGE

## 2024-07-14 RX ORDER — METOCLOPRAMIDE 5 MG/5ML
10 SOLUTION ORAL ONCE
Refills: 0 | Status: COMPLETED | OUTPATIENT
Start: 2024-07-14 | End: 2024-07-14

## 2024-07-14 RX ORDER — MAGNESIUM, ALUMINUM HYDROXIDE 400-400
30 TABLET,CHEWABLE ORAL EVERY 4 HOURS
Refills: 0 | Status: DISCONTINUED | OUTPATIENT
Start: 2024-07-14 | End: 2024-07-18

## 2024-07-14 RX ORDER — INSULIN LISPRO 100 [IU]/ML
INJECTION, SOLUTION SUBCUTANEOUS EVERY 6 HOURS
Refills: 0 | Status: DISCONTINUED | OUTPATIENT
Start: 2024-07-14 | End: 2024-07-17

## 2024-07-14 RX ORDER — GLUCAGON HYDROCHLORIDE 1 MG/ML
1 INJECTION, POWDER, FOR SOLUTION INTRAMUSCULAR; INTRAVENOUS; SUBCUTANEOUS ONCE
Refills: 0 | Status: DISCONTINUED | OUTPATIENT
Start: 2024-07-14 | End: 2024-07-18

## 2024-07-14 RX ORDER — ACETAMINOPHEN 325 MG
1000 TABLET ORAL ONCE
Refills: 0 | Status: COMPLETED | OUTPATIENT
Start: 2024-07-14 | End: 2024-07-14

## 2024-07-14 RX ORDER — DIPHENHYDRAMINE HCL 12.5MG/5ML
25 ELIXIR ORAL ONCE
Refills: 0 | Status: COMPLETED | OUTPATIENT
Start: 2024-07-14 | End: 2024-07-14

## 2024-07-14 RX ORDER — INSULIN REGULAR, HUMAN 100/ML
10 VIAL (ML) INJECTION ONCE
Refills: 0 | Status: COMPLETED | OUTPATIENT
Start: 2024-07-14 | End: 2024-07-14

## 2024-07-14 RX ORDER — ACETAMINOPHEN 325 MG
650 TABLET ORAL EVERY 6 HOURS
Refills: 0 | Status: DISCONTINUED | OUTPATIENT
Start: 2024-07-14 | End: 2024-07-18

## 2024-07-14 RX ORDER — BUPRENORPHINE AND NALOXONE 12; 3 MG/1; MG/1
2 FILM BUCCAL; SUBLINGUAL DAILY
Refills: 0 | Status: DISCONTINUED | OUTPATIENT
Start: 2024-07-14 | End: 2024-07-18

## 2024-07-14 RX ORDER — DEXTROSE 30 % IN WATER 30 %
12.5 VIAL (ML) INTRAVENOUS ONCE
Refills: 0 | Status: DISCONTINUED | OUTPATIENT
Start: 2024-07-14 | End: 2024-07-18

## 2024-07-14 RX ORDER — ONDANSETRON HYDROCHLORIDE 2 MG/ML
4 INJECTION INTRAMUSCULAR; INTRAVENOUS ONCE
Refills: 0 | Status: COMPLETED | OUTPATIENT
Start: 2024-07-14 | End: 2024-07-14

## 2024-07-14 RX ORDER — FAMOTIDINE 40 MG
20 TABLET ORAL ONCE
Refills: 0 | Status: COMPLETED | OUTPATIENT
Start: 2024-07-14 | End: 2024-07-14

## 2024-07-14 RX ORDER — DEXTROSE 30 % IN WATER 30 %
25 VIAL (ML) INTRAVENOUS ONCE
Refills: 0 | Status: DISCONTINUED | OUTPATIENT
Start: 2024-07-14 | End: 2024-07-18

## 2024-07-14 RX ORDER — METOCLOPRAMIDE 5 MG/5ML
10 SOLUTION ORAL ONCE
Refills: 0 | Status: DISCONTINUED | OUTPATIENT
Start: 2024-07-14 | End: 2024-07-14

## 2024-07-14 RX ORDER — DEXTROSE MONOHYDRATE AND SODIUM CHLORIDE 5; .3 G/100ML; G/100ML
1000 INJECTION, SOLUTION INTRAVENOUS
Refills: 0 | Status: DISCONTINUED | OUTPATIENT
Start: 2024-07-14 | End: 2024-07-18

## 2024-07-14 RX ORDER — INSULIN REGULAR, HUMAN 100/ML
6 VIAL (ML) INJECTION ONCE
Refills: 0 | Status: COMPLETED | OUTPATIENT
Start: 2024-07-14 | End: 2024-07-14

## 2024-07-14 RX ORDER — METOCLOPRAMIDE 5 MG/5ML
5 SOLUTION ORAL EVERY 8 HOURS
Refills: 0 | Status: DISCONTINUED | OUTPATIENT
Start: 2024-07-14 | End: 2024-07-18

## 2024-07-14 RX ORDER — DEXTROSE 30 % IN WATER 30 %
15 VIAL (ML) INTRAVENOUS ONCE
Refills: 0 | Status: DISCONTINUED | OUTPATIENT
Start: 2024-07-14 | End: 2024-07-18

## 2024-07-14 RX ORDER — MORPHINE SULFATE 100 MG/1
4 TABLET, EXTENDED RELEASE ORAL ONCE
Refills: 0 | Status: DISCONTINUED | OUTPATIENT
Start: 2024-07-14 | End: 2024-07-14

## 2024-07-14 RX ORDER — MORPHINE SULFATE 100 MG/1
2 TABLET, EXTENDED RELEASE ORAL ONCE
Refills: 0 | Status: DISCONTINUED | OUTPATIENT
Start: 2024-07-14 | End: 2024-07-14

## 2024-07-14 RX ORDER — HALOPERIDOL DECANOATE 100 MG/ML
2.5 VIAL (ML) INTRAMUSCULAR ONCE
Refills: 0 | Status: DISCONTINUED | OUTPATIENT
Start: 2024-07-14 | End: 2024-07-14

## 2024-07-14 RX ADMIN — Medication 1000 MILLIGRAM(S): at 19:36

## 2024-07-14 RX ADMIN — DEXTROSE MONOHYDRATE AND SODIUM CHLORIDE 1000 MILLILITER(S): 5; .3 INJECTION, SOLUTION INTRAVENOUS at 19:37

## 2024-07-14 RX ADMIN — Medication 25 MILLIGRAM(S): at 17:11

## 2024-07-14 RX ADMIN — Medication 20 MILLIGRAM(S): at 15:15

## 2024-07-14 RX ADMIN — MORPHINE SULFATE 4 MILLIGRAM(S): 100 TABLET, EXTENDED RELEASE ORAL at 19:36

## 2024-07-14 RX ADMIN — Medication 25 MILLIGRAM(S): at 20:15

## 2024-07-14 RX ADMIN — METOCLOPRAMIDE 10 MILLIGRAM(S): 5 SOLUTION ORAL at 15:42

## 2024-07-14 RX ADMIN — DEXTROSE MONOHYDRATE AND SODIUM CHLORIDE 1000 MILLILITER(S): 5; .3 INJECTION, SOLUTION INTRAVENOUS at 15:31

## 2024-07-14 RX ADMIN — MORPHINE SULFATE 4 MILLIGRAM(S): 100 TABLET, EXTENDED RELEASE ORAL at 17:11

## 2024-07-14 RX ADMIN — METOCLOPRAMIDE 5 MILLIGRAM(S): 5 SOLUTION ORAL at 23:31

## 2024-07-14 RX ADMIN — Medication 400 MILLIGRAM(S): at 15:21

## 2024-07-14 RX ADMIN — Medication 6 UNIT(S): at 20:10

## 2024-07-14 RX ADMIN — MORPHINE SULFATE 2 MILLIGRAM(S): 100 TABLET, EXTENDED RELEASE ORAL at 20:17

## 2024-07-14 RX ADMIN — METOCLOPRAMIDE 10 MILLIGRAM(S): 5 SOLUTION ORAL at 20:21

## 2024-07-14 RX ADMIN — DEXTROSE MONOHYDRATE AND SODIUM CHLORIDE 1000 MILLILITER(S): 5; .3 INJECTION, SOLUTION INTRAVENOUS at 20:10

## 2024-07-14 RX ADMIN — DEXTROSE MONOHYDRATE AND SODIUM CHLORIDE 1000 MILLILITER(S): 5; .3 INJECTION, SOLUTION INTRAVENOUS at 16:37

## 2024-07-14 RX ADMIN — DEXTROSE MONOHYDRATE AND SODIUM CHLORIDE 1000 MILLILITER(S): 5; .3 INJECTION, SOLUTION INTRAVENOUS at 16:54

## 2024-07-14 RX ADMIN — Medication 1000 MILLIGRAM(S): at 15:36

## 2024-07-14 RX ADMIN — INSULIN LISPRO 6: 100 INJECTION, SOLUTION SUBCUTANEOUS at 21:25

## 2024-07-14 NOTE — H&P ADULT - HISTORY OF PRESENT ILLNESS
Nissa Rodriges is a 34 year old female with PMHx of IDDM1, gastroparesis, tobacco use, and marijuana use who presented to the ED on 7/14/24 for complaints of nausea and vomiting.    History is very limited as patient is repeatedly falling asleep at the time of my interview. Patient reports she started having nausea and bilious emesis today which started about 30 minutes prior to arriving to the ER. Associated epigastric pain. Unable to elaborate further. States her insulin pump has been off since last week as she lost the transmitter so she is waiting for a new one to be mailed to her. So she has not been taking any insulin. Recent admission at [facility] for DKA and discharged three days ago as per ER physician.    In the ED, VSS except HR as elevated as 121 and BP as elevated as 175/97. WBC 15.25K, MCV 68.2, sodium 132, blood glucose 354. Small acetone. VBG 7.39 / 46 / 38 / 28. U/A with ketonuria and glucosuria. COVID/influenza negative. Received 3L LR bolus, insulin 6 U IV, acetaminophen 1 g IV, diphenhydramine 50 mg IV, famotidine 20 mg IV, metoclopramide 20 mg IV, ondansetron 4 mg IV, and morphine 6 mg IV.

## 2024-07-14 NOTE — ED PROVIDER NOTE - NS ED MD DISPO ISOLATION TYPES
----- Message from Sharonda Brown sent at 2/13/2020  8:11 AM CST -----  Pt is requesting a call back from [facility], Pt states the Cialis is not working and she would like to speak with someone.    Pt states she is in a lot of pain.    Please call and advise          Thank you   None

## 2024-07-14 NOTE — ED PROVIDER NOTE - CLINICAL SUMMARY MEDICAL DECISION MAKING FREE TEXT BOX
33 yo female PMH gastroparesis, DM1, HTN presenting to ED c/o 1 day nausea, vomiting, 8/10 sharp nonradiating epigastric pain. States recently DC from [facility] for DKA3 days ago. Denies fevers/chills, urinary symptoms, vaginal bleeding/discharge, chest pain/palpitations, SOB/cough, diarrhea last BM this morning, lightheadedness/dizziness. On exam 35 yo female PMH gastroparesis, DM1, HTN presenting to ED c/o 1 day nausea, vomiting, 8/10 sharp nonradiating epigastric pain. States recently DC from [facility] for DKA3 days ago. Denies fevers/chills, urinary symptoms, vaginal bleeding/discharge, chest pain/palpitations, SOB/cough, diarrhea last BM this morning, lightheadedness/dizziness. On exam abd soft, not distended, nontender on palpation, no palpable masses, S1S2 present RRR, radial pulse +2, l/s equal and clr bilat. Most likely DKA vs gastroparesis. Will order cmp to assess electrolyte imbalance/anion gap, VBG to assess pH, serum ketones. Will order IVF fro hydration, Zofran for nausea, Ofirmev for pain. Will send lipase to assess pancreatitis. Low suspicion for appendicitis /diverticulitis/cholecystitis given benign abd exam. Will send urine to assess UTI, HCG to assess pregnancy. Dispo pending results and reassessment. 33 yo female PMH gastroparesis, DM1, HTN presenting to ED c/o 1 day nausea, vomiting, 8/10 sharp nonradiating epigastric pain. States recently DC from [facility] for DKA3 days ago. Denies fevers/chills, urinary symptoms, vaginal bleeding/discharge, chest pain/palpitations, SOB/cough, diarrhea last BM this morning, lightheadedness/dizziness. On exam abd soft, not distended, nontender on palpation, no palpable masses, S1S2 present RRR, radial pulse +2, l/s equal and clr bilat. Most likely DKA vs gastroparesis. Will order cmp to assess electrolyte imbalance/anion gap, VBG to assess pH, serum ketones. Will order IVF fro hydration, Zofran for nausea, Ofirmev for pain. Will send lipase to assess pancreatitis. Low suspicion for appendicitis /diverticulitis/cholecystitis given benign abd exam. Will send urine to assess UTI, HCG to assess pregnancy. Dispo pending results and reassessment.    labs reviewed, ag normal, small ketones, pt is not in DKA, swab negative  pt medicated for her pain and vomiting, however her symptoms still persist, pt admitted for treatment

## 2024-07-14 NOTE — ED PROVIDER NOTE - NSCAREINITIATED _GEN_ER
[facility] EMERGENCY DEPARTMENT  EMERGENCY DEPARTMENT HISTORY AND PHYSICAL EXAM      Date: 8/29/2023  Patient Name: Khalif Overton  MRN: [id number]  [address] [address]  Date of evaluation: 8/29/2023  Provider: Britany Zayas MD   Note Started: 1:42 AM EDT 8/30/23    HISTORY OF PRESENT ILLNESS     Chief Complaint   Patient presents with    Headache    Abdominal Pain       History Provided By: Patient    HPI: Khalif Overton is a 21 y.o. female with past medical history as reviewed below presents for evaluation of headache and generalized abdominal pain. Symptoms present for the last 3 days. No known sick contacts. No associated vomiting, no fevers, normal p.o. intake. No other complaints. PAST MEDICAL HISTORY   Past Medical History:  Past Medical History:   Diagnosis Date    Chlamydial infection 07/15/2020    Depression     Genital herpes simplex 07/15/2020    Herpes     LGSIL on Pap smear of cervix 08/22/2021    Morbid obesity ([address]) 07/15/2020    Trauma     Vaginal irritation 05/02/2022    Vaginal irritation 2022    Vaginal pain 07/15/2020    *Chronic       Past Surgical History:  No past surgical history on file. Family History:  Family History   Family history unknown: Yes       Social History:  Social History     Tobacco Use    Smoking status: Former    Smokeless tobacco: Never   Vaping Use    Vaping Use: Never used   Substance Use Topics    Alcohol use: Not Currently    Drug use: Never       Allergies:  No Known Allergies    PCP: Chris Valle MD    Current Meds:   No current facility-administered medications for this encounter.      Current Outpatient Medications   Medication Sig Dispense Refill    ESTARYLLA 0.25-35 MG-MCG per tablet Take 1 tablet by mouth daily (Patient not taking: Reported on 6/5/2023)      buPROPion (WELLBUTRIN XL) 150 MG extended release tablet TAKE 1 TABLET BY MOUTH EVERY MORNING FOR MAJOR DEPRESSION (Patient not taking: Reported on 6/5/2023)      lidocaine (LIDODERM) 5 %
Octavio Mani(NP)

## 2024-07-14 NOTE — ED ADULT NURSE NOTE - OBJECTIVE STATEMENT
as per patient " I was discharged from [facility] this morning for DKA, around 11am started vomiting again along with mid abdominal pain" as per patient " I was discharged from [facility] Thursday for DKA, and today around 11am started vomiting again along with mid abdominal pain"

## 2024-07-14 NOTE — ED PROVIDER NOTE - PHYSICAL EXAMINATION
CONSTITUTIONAL: actively vomiting   HEAD: Normocephalic, no obvious signs of trauma  EENT: PERRL, EOMI w/o pain, no nystagmus, nares patent no drainage, no pharyngeal erythema, swelling, or exudates  NECK: Trachea midline, no goiter  RESP: L/S equal clr, bilat, apices and bases, no accessory muscle use, speaking full sentences  CARDIC: RRR, +S1/S2, no peripheral edema  GI: ABD soft, nondistended, nontender on palpation, no palpable masses  : No CVA Tenderness  MSK: 5/5 strength extremities x 4, full ROM without pain, no midline spinal tenderness on palpation  SKIN: No rashes, normal color/condition   NEURO: A&OX4, CN intact, No focal motor deficits/weakness, no sensory deficits, no dysmetria, no slurred speech, no facial droop, normal gait

## 2024-07-14 NOTE — ED PROVIDER NOTE - NS ED MD DISPO ADMITTING SERVICE
CARDIOVASCULAR - ADULT     Maintains optimal cardiac output and hemodynamic stability Progressing     Absence of cardiac dysrhythmias or at baseline rhythm Progressing        DISCHARGE PLANNING     Discharge to home or other facility with appropriate resources Progressing        DISCHARGE PLANNING - CARE MANAGEMENT     Discharge to post-acute care or home with appropriate resources Progressing        Knowledge Deficit     Patient/family/caregiver demonstrates understanding of disease process, treatment plan, medications, and discharge instructions Progressing        METABOLIC, FLUID AND ELECTROLYTES - ADULT     Electrolytes maintained within normal limits Progressing     Fluid balance maintained Progressing        PAIN - ADULT     Verbalizes/displays adequate comfort level or baseline comfort level Progressing        Potential for Falls     Patient will remain free of falls Progressing        SAFETY ADULT     Maintain or return to baseline ADL function Progressing     Maintain or return mobility status to optimal level Progressing MEDICINE

## 2024-07-14 NOTE — H&P ADULT - NSHPLABSRESULTS_GEN_ALL_CORE
12.4   15.25 )-----------( 332      ( 2024 16:20 )             39.8     132<L>  |  99  |  15  ----------------------------<  348<H>       4.0   |  23  |  0.99    Ca    9.3      2024 15:09  Mg     1.7         TPro  7.0  /  Alb  3.2<L>  /  TBili  0.7  /  DBili  x   /  AST  28  /  ALT  30  /  AlkPhos  106      15:13 - VBG - pH: 7.39  | pCO2: 46    | pO2: 38    | Lactate: 1.30     Urinalysis Basic - ( 2024 15:09 )  Color: Yellow / Appearance: Clear / S.026 / pH: 8.0  Gluc: >=1000 mg/dL / Ketone: 15 mg/dL  / Bili: Negative / Urobili: 1.0 mg/dL   Blood: Non Hemolyzed Trace / Protein: 100 mg/dL / Nitrite: Negative   Leuk Esterase: Negative / RBC: 10 /HPF / WBC 5 /HPF   Sq Epi: x / Bacteria: Moderate /HPF  Hyaline Casts: x/WBC Casts: x

## 2024-07-14 NOTE — H&P ADULT - NSHPPHYSICALEXAM_GEN_ALL_CORE
T(C): 37.3 (07-14-24 @ 19:53), Max: 37.3 (07-14-24 @ 19:53)  HR: 121 (07-14-24 @ 19:53) (64 - 121)  BP: 175/97 (07-14-24 @ 19:53) (153/94 - 175/97)  RR: 18 (07-14-24 @ 19:53) (18 - 18)  SpO2: 100% (07-14-24 @ 19:53) (97% - 100%)    CONSTITUTIONAL: Well groomed  RESP: No respiratory distress, no use of accessory muscles; CTA b/l  CV: tachycardic, regular rhythm  GI: Soft, ND, tender to palpation in epigastric region

## 2024-07-14 NOTE — PATIENT PROFILE ADULT - PATIENT'S PREFERRED PRONOUN
General


Chief Complaint:  Wound Recheck/Suture Removal


Stated Complaint:  SUTURE REMOVAL


Time seen by MD:  11:11


Source:  patient


Exam Limitations:  no limitations





History of Present Illness


Treated In Another ED/Practice:  No


Previous ED Treatment:  laceration repair


Symptoms Since Procedure:  no complaints


Allergies:  


Coded Allergies:  


     No Known Allergies (Unverified , 11/27/17)


Home Meds


Active Scripts


Aspirin (ASPIRIN EC) 81 Mg Tablet.dr, 162 MG PO DAILY for 30 Days, #30 TAB.CHEW


   Prov:TRIPP GRIMES MD         11/30/17


Nicotine (NICOTINE PATCH) 1 Each Patch.td24, 1 EACH TD DAILY for 30 Days, #30 

TR.DRM.PAT


   Prov:TRIPP GRIMES MD         11/30/17


Reported Medications


Hydrocodone Bit/Acetaminophen (NORCO ) 1 Each Tablet, 1 TAB PO Q6 Y for 

PAIN, #60 TAB


   11/28/17


Lisinopril/Hydrochlorothiazide (LISINOPRIL-HCTZ 10-12.5 MG TAB) 1 Each Tablet, 

1 TAB PO DAILY, #30 TAB 5 Refills


   11/28/17





Past Medical History


Medical History:  COPD


Surgical History:  other





Social History


Smoking:  non-smoker


Alcohol Use:  none


Drug Use:  none





All Other Systems:  Reviewed and Negative





Physical Exam


General Appearance:  alert, no distress


Neuro/Vascular/Tendon:  no vascular compromise, sensation nml, no tendon injury

, nml ROM


Skin:  no infection, healing wound


Head/ENT:  nml inspection, pharynx nml


Neck/Back:  nml inspection, non-tender, painless ROM


CVS:  reg. rate & rhythm, heart sounds nml


Abdomen:  non-tender, no organomegaly





Progress


Progress


SUTURES REMOVED





Departure


Time of Disposition:  11:33


Disposition:  01 HOME, SELF-CARE


Impression:  


 Primary Impression:  


 Suture check


Condition:  Improved


Patient Instructions:  Suture Removal-Brief


Referrals:  


TONY RIVERO (PCP)


PRIMARY CARE PROVIDER





Additional Instructions:  


AT HOME: FOLLOW UP WITH YOUR PRIMARY DOCTOR





IN ER: EXAM, REMOVAL OF SUTURES


Duration or Time Spent with Pa:  WANDER SUAZO MD Apr 27, 2018 15:19
Her/She

## 2024-07-14 NOTE — ED PROVIDER NOTE - ATTENDING CONTRIBUTION TO CARE
I performed a history and physical examination of the patient and discussed his management with the NP.  I reviewed the NPt's note and agree with the documented findings and plan of care.

## 2024-07-14 NOTE — ED ADULT NURSE NOTE - CHIEF COMPLAINT QUOTE
as per ems, pt c/o vomiting, abdominal pain started at 11am. seen at [facility] for DKA.

## 2024-07-14 NOTE — H&P ADULT - ASSESSMENT
Nissa Rodriges is a 34 year old female with PMHx of IDDM1, gastroparesis, tobacco use, and marijuana use who presented to the ED on 7/14/24 for complaints of nausea and vomiting and admitted for intractable nausea and vomiting and IDDM1 with hyperglycemia.    Intractable nausea and vomiting  Complaints of nausea and bilious emesis that started about 30 minutes prior to arriving to ER, associated epigastric pain  Lipase WNL  S/p acetaminophen 1 g IV, diphenhydramine 50 mg IV, famotidine 20 mg IV, metoclopramide 20 mg IV, ondansetron 4 mg IV, and morphine 6 mg IV in the ED   Antiemetics with metoclopramide 5 mg TID PRN given hx of gastroparesis  NPO for now, advance diet as tolerated, IVF hydration with  cc/hr started    IDDM1 with hyperglycemia  Reports insulin pump has been off since last week as she lost the transmitter so she is waiting for a new one to be mailed to her so she has not been taking any insulin  Last known A1c 8.3 (on 10/10/23), blood glucose as elevated as 464 on admission, U/A with ketonuria and glucosuria, small acetone, VBG 7.39 / 46 / 38 / 28  POC q6  S/p 3L LR bolus, insulin 6 IV in the ED  Additional insulin 10 U IV x 1 ordered, moderate dose SSI q6 started after discussion with pharmacist  Blood glucose goal < 180, f/u A1c  Will need diabetes education    Hypertonic hyponatremia secondary to hyperglycemia  Corrected sodium 136 on admission  Anticipate improvement of sodium with tight glycemic control    Leukocytosis, likely reactive  Afebrile, WBC 15.25K on admission  No signs of infection  Monitor WBC trend    Medication reconciliation completed using recent prescription fill history from [facility] as patient is unable to provide med list. Please confirm med list with her pharmacy in AM. Nissa Rodriges is a 34 year old female with PMHx of IDDM1, gastroparesis, tobacco use, and marijuana use who presented to the ED on 7/14/24 for complaints of nausea and vomiting and admitted for intractable nausea and vomiting and IDDM1 with hyperglycemia.    Intractable nausea and vomiting, suspect multifactorial due to worsening gastroparesis due to DM vs. hyperemesis cannabinoid syndrome   Complaints of nausea and bilious emesis that started about 30 minutes prior to arriving to ER, associated epigastric pain  Lipase WNL  S/p acetaminophen 1 g IV, diphenhydramine 50 mg IV, famotidine 20 mg IV, metoclopramide 20 mg IV, ondansetron 4 mg IV, and morphine 6 mg IV in the ED   Antiemetics with metoclopramide 5 mg TID PRN given hx of gastroparesis  NPO for now, advance diet as tolerated, IVF hydration with  cc/hr started    IDDM1 with hyperglycemia  Reports insulin pump has been off since last week as she lost the transmitter so she is waiting for a new one to be mailed to her so she has not been taking any insulin  Last known A1c 8.3 (on 10/10/23), blood glucose as elevated as 464 on admission, U/A with ketonuria and glucosuria, small acetone, VBG 7.39 / 46 / 38 / 28  POC q6  S/p 3L LR bolus, insulin 6 IV in the ED  Additional insulin 10 U IV x 1 ordered, moderate dose SSI q6 started after discussion with pharmacist  Blood glucose goal < 180, f/u A1c  Will need diabetes education    Hypertonic hyponatremia secondary to hyperglycemia  Corrected sodium 136 on admission  Anticipate improvement of sodium with tight glycemic control    Leukocytosis, likely reactive  Afebrile, WBC 15.25K on admission  No signs of infection  Monitor WBC trend      Chronic medical conditions:  Tobacco use: smokes 1/2 ppd x 8 years, unable to provide counseling on smoking cessation, declined nicotine patch and nicorette gum    Medication reconciliation completed using recent prescription fill history from [facility] as patient is unable to provide med list. Please confirm med list with her pharmacy in AM. Nissa Rodriges is a 34 year old female with PMHx of IDDM1, gastroparesis, tobacco use, and marijuana use who presented to the ED on 7/14/24 for complaints of nausea and vomiting and admitted for intractable nausea and vomiting and IDDM1 with hyperglycemia.    Intractable nausea and vomiting, suspect multifactorial due to worsening gastroparesis due to DM vs. hyperemesis cannabinoid syndrome   Complaints of nausea and bilious emesis that started about 30 minutes prior to arriving to ER, associated epigastric pain  Lipase WNL  S/p acetaminophen 1 g IV, diphenhydramine 50 mg IV, famotidine 20 mg IV, metoclopramide 20 mg IV, ondansetron 4 mg IV, and morphine 6 mg IV in the ED   Antiemetics with metoclopramide 5 mg TID PRN given hx of gastroparesis  NPO for now, advance diet as tolerated, IVF hydration with  cc/hr started    IDDM1 with hyperglycemia  Reports insulin pump has been off since last week as she lost the transmitter so she is waiting for a new one to be mailed to her so she has not been taking any insulin  Last known A1c 8.3 (on 10/10/23), blood glucose as elevated as 464 on admission, U/A with ketonuria and glucosuria, small acetone, VBG 7.39 / 46 / 38 / 28  POC q6  S/p 3L LR bolus, insulin 6 IV in the ED  Additional insulin 10 U IV x 1 ordered, moderate dose SSI q6 started after discussion with pharmacist  Blood glucose goal < 180, f/u A1c  Will need diabetes education    Hypertonic hyponatremia secondary to hyperglycemia  Corrected sodium 136 on admission  Anticipate improvement of sodium with tight glycemic control    Leukocytosis, likely reactive  Afebrile, WBC 15.25K on admission  No signs of infection  Monitor WBC trend      Chronic medical conditions:  Tobacco use: smokes 1/2 ppd x 8 years, unable to provide counseling on smoking cessation, declined nicotine patch and nicorette gum    Unclear why patient takes buprenorphine / naloxone 4 / 1 mg SL but continued. [place].    Medication reconciliation completed using recent prescription fill history from [facility] as patient is unable to provide med list. Please confirm med list with her pharmacy in AM.

## 2024-07-14 NOTE — ED PROVIDER NOTE - OBJECTIVE STATEMENT
35 yo female PMH gastroparesis, DM1, HTN presenting to ED c/o 1 day nausea, vomiting, 8/10 sharp nonradiating epigastric pain. States recently DC from [facility] for DKA3 days ago. Denies fevers/chills, urinary symptoms, vaginal bleeding/discharge, chest pain/palpitations, SOB/cough, diarrhea last BM this morning 35 yo female PMH gastroparesis, DM1, HTN presenting to ED c/o 1 day nausea, vomiting, 8/10 sharp nonradiating epigastric pain. States recently DC from [facility] for DKA3 days ago. Denies fevers/chills, urinary symptoms, vaginal bleeding/discharge, chest pain/palpitations, SOB/cough, diarrhea last BM this morning, lightheadedness/dizziness.

## 2024-07-14 NOTE — ED PROVIDER NOTE - NS ED ATTENDING STATEMENT MOD
I have seen and examined this patient and fully participated in the care of this patient as the teaching attending.  The service was shared with the ARNOLDO.  I reviewed and verified the documentation.

## 2024-07-14 NOTE — ED ADULT TRIAGE NOTE - CHIEF COMPLAINT QUOTE
as per ems, pt c/o vomiting, abdominal pain started at 11am. seen at [facility] for DKA.

## 2024-07-14 NOTE — H&P ADULT - NSICDXPASTMEDICALHX_GEN_ALL_CORE_FT
PAST MEDICAL HISTORY:  Gastroparesis due to DM     Insulin dependent type 1 diabetes mellitus     Marijuana use, continuous     Tobacco dependence with current use

## 2024-07-14 NOTE — H&P ADULT - TIME BILLING
coordination of care with ER physician, obtaining history, performing a physical examination, reviewing and interpreting labs and imaging, ordering further studies and tests, explaining the diagnosis and treatment plan to patient, completing medication reconciliation to the best of my ability, and documentation as above. coordination of care with ER physician and pharmacist, obtaining history, performing a physical examination, reviewing and interpreting labs and imaging, ordering further studies and tests, explaining the diagnosis and treatment plan to patient, completing medication reconciliation to the best of my ability, and documentation as above.

## 2024-07-15 DIAGNOSIS — F41.9 ANXIETY DISORDER, UNSPECIFIED: ICD-10-CM

## 2024-07-15 PROBLEM — E11.9 TYPE 2 DIABETES MELLITUS WITHOUT COMPLICATIONS: Chronic | Status: INACTIVE | Noted: 2022-05-19 | Resolved: 2024-07-14

## 2024-07-15 PROBLEM — K31.84 GASTROPARESIS: Chronic | Status: INACTIVE | Noted: 2022-05-19 | Resolved: 2024-07-14

## 2024-07-15 LAB
A1C WITH ESTIMATED AVERAGE GLUCOSE RESULT: 10.5 % — HIGH (ref 4–5.6)
ANION GAP SERPL CALC-SCNC: 14 MMOL/L — SIGNIFICANT CHANGE UP (ref 5–17)
BUN SERPL-MCNC: 17 MG/DL — SIGNIFICANT CHANGE UP (ref 7–23)
CALCIUM SERPL-MCNC: 9.2 MG/DL — SIGNIFICANT CHANGE UP (ref 8.5–10.1)
CHLORIDE SERPL-SCNC: 99 MMOL/L — SIGNIFICANT CHANGE UP (ref 96–108)
CO2 SERPL-SCNC: 22 MMOL/L — SIGNIFICANT CHANGE UP (ref 22–31)
CREAT SERPL-MCNC: 0.95 MG/DL — SIGNIFICANT CHANGE UP (ref 0.5–1.3)
EGFR: 81 ML/MIN/1.73M2 — SIGNIFICANT CHANGE UP
ESTIMATED AVERAGE GLUCOSE: 255 MG/DL — HIGH (ref 68–114)
GLUCOSE BLDC GLUCOMTR-MCNC: 178 MG/DL — HIGH (ref 70–99)
GLUCOSE BLDC GLUCOMTR-MCNC: 229 MG/DL — HIGH (ref 70–99)
GLUCOSE BLDC GLUCOMTR-MCNC: 315 MG/DL — HIGH (ref 70–99)
GLUCOSE BLDC GLUCOMTR-MCNC: 367 MG/DL — HIGH (ref 70–99)
GLUCOSE BLDC GLUCOMTR-MCNC: 442 MG/DL — HIGH (ref 70–99)
GLUCOSE BLDC GLUCOMTR-MCNC: 473 MG/DL — CRITICAL HIGH (ref 70–99)
GLUCOSE SERPL-MCNC: 349 MG/DL — HIGH (ref 70–99)
HCT VFR BLD CALC: 35.5 % — SIGNIFICANT CHANGE UP (ref 34.5–45)
HGB BLD-MCNC: 11.5 G/DL — SIGNIFICANT CHANGE UP (ref 11.5–15.5)
MCHC RBC-ENTMCNC: 21.6 PG — LOW (ref 27–34)
MCHC RBC-ENTMCNC: 32.4 G/DL — SIGNIFICANT CHANGE UP (ref 32–36)
MCV RBC AUTO: 66.6 FL — LOW (ref 80–100)
NRBC # BLD: 0 /100 WBCS — SIGNIFICANT CHANGE UP (ref 0–0)
PLATELET # BLD AUTO: 303 K/UL — SIGNIFICANT CHANGE UP (ref 150–400)
POTASSIUM SERPL-MCNC: 3.3 MMOL/L — LOW (ref 3.5–5.3)
POTASSIUM SERPL-SCNC: 3.3 MMOL/L — LOW (ref 3.5–5.3)
RBC # BLD: 5.33 M/UL — HIGH (ref 3.8–5.2)
RBC # FLD: 16.6 % — HIGH (ref 10.3–14.5)
SODIUM SERPL-SCNC: 135 MMOL/L — SIGNIFICANT CHANGE UP (ref 135–145)
WBC # BLD: 14.52 K/UL — HIGH (ref 3.8–10.5)
WBC # FLD AUTO: 14.52 K/UL — HIGH (ref 3.8–10.5)

## 2024-07-15 PROCEDURE — 99252 IP/OBS CONSLTJ NEW/EST SF 35: CPT

## 2024-07-15 PROCEDURE — 99291 CRITICAL CARE FIRST HOUR: CPT

## 2024-07-15 PROCEDURE — 99232 SBSQ HOSP IP/OBS MODERATE 35: CPT

## 2024-07-15 RX ORDER — HYDROMORPHONE HCL 0.2 MG/ML
1 INJECTION, SOLUTION INTRAVENOUS EVERY 4 HOURS
Refills: 0 | Status: DISCONTINUED | OUTPATIENT
Start: 2024-07-15 | End: 2024-07-15

## 2024-07-15 RX ORDER — DIPHENHYDRAMINE HCL 12.5MG/5ML
25 ELIXIR ORAL ONCE
Refills: 0 | Status: COMPLETED | OUTPATIENT
Start: 2024-07-15 | End: 2024-07-15

## 2024-07-15 RX ORDER — MORPHINE SULFATE 100 MG/1
1 TABLET, EXTENDED RELEASE ORAL ONCE
Refills: 0 | Status: DISCONTINUED | OUTPATIENT
Start: 2024-07-15 | End: 2024-07-15

## 2024-07-15 RX ORDER — MORPHINE SULFATE 100 MG/1
2 TABLET, EXTENDED RELEASE ORAL ONCE
Refills: 0 | Status: DISCONTINUED | OUTPATIENT
Start: 2024-07-15 | End: 2024-07-15

## 2024-07-15 RX ORDER — DIPHENHYDRAMINE HCL 12.5MG/5ML
25 ELIXIR ORAL EVERY 4 HOURS
Refills: 0 | Status: DISCONTINUED | OUTPATIENT
Start: 2024-07-15 | End: 2024-07-18

## 2024-07-15 RX ORDER — NICOTINE POLACRILEX 2 MG/1
1 LOZENGE ORAL DAILY
Refills: 0 | Status: DISCONTINUED | OUTPATIENT
Start: 2024-07-15 | End: 2024-07-18

## 2024-07-15 RX ORDER — METOCLOPRAMIDE 5 MG/5ML
10 SOLUTION ORAL ONCE
Refills: 0 | Status: COMPLETED | OUTPATIENT
Start: 2024-07-15 | End: 2024-07-15

## 2024-07-15 RX ORDER — PANTOPRAZOLE SODIUM 40 MG/10ML
40 INJECTION, POWDER, FOR SOLUTION INTRAVENOUS DAILY
Refills: 0 | Status: DISCONTINUED | OUTPATIENT
Start: 2024-07-15 | End: 2024-07-18

## 2024-07-15 RX ORDER — SODIUM CHLORIDE 0.9 % (FLUSH) 0.9 %
1000 SYRINGE (ML) INJECTION
Refills: 0 | Status: DISCONTINUED | OUTPATIENT
Start: 2024-07-15 | End: 2024-07-18

## 2024-07-15 RX ORDER — MORPHINE SULFATE 100 MG/1
2 TABLET, EXTENDED RELEASE ORAL EVERY 4 HOURS
Refills: 0 | Status: DISCONTINUED | OUTPATIENT
Start: 2024-07-15 | End: 2024-07-15

## 2024-07-15 RX ORDER — HYDROMORPHONE HCL 0.2 MG/ML
2 INJECTION, SOLUTION INTRAVENOUS EVERY 4 HOURS
Refills: 0 | Status: DISCONTINUED | OUTPATIENT
Start: 2024-07-15 | End: 2024-07-18

## 2024-07-15 RX ORDER — HYDROMORPHONE HCL 0.2 MG/ML
2 INJECTION, SOLUTION INTRAVENOUS ONCE
Refills: 0 | Status: DISCONTINUED | OUTPATIENT
Start: 2024-07-15 | End: 2024-07-15

## 2024-07-15 RX ORDER — ACETAMINOPHEN 325 MG
1000 TABLET ORAL ONCE
Refills: 0 | Status: DISCONTINUED | OUTPATIENT
Start: 2024-07-15 | End: 2024-07-15

## 2024-07-15 RX ADMIN — Medication 25 MILLIGRAM(S): at 12:43

## 2024-07-15 RX ADMIN — HYDROMORPHONE HCL 2 MILLIGRAM(S): 0.2 INJECTION, SOLUTION INTRAVENOUS at 22:01

## 2024-07-15 RX ADMIN — MORPHINE SULFATE 2 MILLIGRAM(S): 100 TABLET, EXTENDED RELEASE ORAL at 12:43

## 2024-07-15 RX ADMIN — BUPRENORPHINE AND NALOXONE 2 FILM(S): 12; 3 FILM BUCCAL; SUBLINGUAL at 14:14

## 2024-07-15 RX ADMIN — MORPHINE SULFATE 2 MILLIGRAM(S): 100 TABLET, EXTENDED RELEASE ORAL at 09:24

## 2024-07-15 RX ADMIN — INSULIN LISPRO 4: 100 INJECTION, SOLUTION SUBCUTANEOUS at 00:16

## 2024-07-15 RX ADMIN — HYDROMORPHONE HCL 2 MILLIGRAM(S): 0.2 INJECTION, SOLUTION INTRAVENOUS at 15:27

## 2024-07-15 RX ADMIN — HYDROMORPHONE HCL 2 MILLIGRAM(S): 0.2 INJECTION, SOLUTION INTRAVENOUS at 18:48

## 2024-07-15 RX ADMIN — INSULIN LISPRO 8: 100 INJECTION, SOLUTION SUBCUTANEOUS at 05:11

## 2024-07-15 RX ADMIN — INSULIN LISPRO 10: 100 INJECTION, SOLUTION SUBCUTANEOUS at 17:57

## 2024-07-15 RX ADMIN — Medication 25 MILLIGRAM(S): at 01:49

## 2024-07-15 RX ADMIN — Medication 25 MILLIGRAM(S): at 09:24

## 2024-07-15 RX ADMIN — DEXTROSE MONOHYDRATE AND SODIUM CHLORIDE 100 MILLILITER(S): 5; .3 INJECTION, SOLUTION INTRAVENOUS at 05:03

## 2024-07-15 RX ADMIN — DEXTROSE MONOHYDRATE AND SODIUM CHLORIDE 100 MILLILITER(S): 5; .3 INJECTION, SOLUTION INTRAVENOUS at 00:12

## 2024-07-15 RX ADMIN — MORPHINE SULFATE 2 MILLIGRAM(S): 100 TABLET, EXTENDED RELEASE ORAL at 05:02

## 2024-07-15 RX ADMIN — Medication 650 MILLIGRAM(S): at 03:44

## 2024-07-15 RX ADMIN — MORPHINE SULFATE 2 MILLIGRAM(S): 100 TABLET, EXTENDED RELEASE ORAL at 05:54

## 2024-07-15 RX ADMIN — MORPHINE SULFATE 2 MILLIGRAM(S): 100 TABLET, EXTENDED RELEASE ORAL at 11:13

## 2024-07-15 RX ADMIN — MORPHINE SULFATE 2 MILLIGRAM(S): 100 TABLET, EXTENDED RELEASE ORAL at 12:45

## 2024-07-15 RX ADMIN — Medication 25 MILLIGRAM(S): at 05:02

## 2024-07-15 RX ADMIN — Medication 400 MILLIGRAM(S): at 00:12

## 2024-07-15 RX ADMIN — MORPHINE SULFATE 2 MILLIGRAM(S): 100 TABLET, EXTENDED RELEASE ORAL at 10:13

## 2024-07-15 RX ADMIN — HYDROMORPHONE HCL 2 MILLIGRAM(S): 0.2 INJECTION, SOLUTION INTRAVENOUS at 22:46

## 2024-07-15 RX ADMIN — Medication 25 MILLIGRAM(S): at 22:02

## 2024-07-15 RX ADMIN — PANTOPRAZOLE SODIUM 40 MILLIGRAM(S): 40 INJECTION, POWDER, FOR SOLUTION INTRAVENOUS at 13:06

## 2024-07-15 RX ADMIN — Medication 25 MILLIGRAM(S): at 15:27

## 2024-07-15 RX ADMIN — MORPHINE SULFATE 1 MILLIGRAM(S): 100 TABLET, EXTENDED RELEASE ORAL at 01:33

## 2024-07-15 RX ADMIN — Medication 25 MILLIGRAM(S): at 17:47

## 2024-07-15 RX ADMIN — Medication 1000 MILLIGRAM(S): at 00:55

## 2024-07-15 RX ADMIN — INSULIN LISPRO 12: 100 INJECTION, SOLUTION SUBCUTANEOUS at 12:48

## 2024-07-15 RX ADMIN — METOCLOPRAMIDE 10 MILLIGRAM(S): 5 SOLUTION ORAL at 12:38

## 2024-07-15 RX ADMIN — METOCLOPRAMIDE 5 MILLIGRAM(S): 5 SOLUTION ORAL at 08:01

## 2024-07-15 RX ADMIN — MORPHINE SULFATE 1 MILLIGRAM(S): 100 TABLET, EXTENDED RELEASE ORAL at 02:14

## 2024-07-15 RX ADMIN — Medication 100 MILLILITER(S): at 12:37

## 2024-07-15 RX ADMIN — Medication 100 MILLILITER(S): at 22:02

## 2024-07-15 RX ADMIN — HYDROMORPHONE HCL 2 MILLIGRAM(S): 0.2 INJECTION, SOLUTION INTRAVENOUS at 16:27

## 2024-07-15 RX ADMIN — HYDROMORPHONE HCL 2 MILLIGRAM(S): 0.2 INJECTION, SOLUTION INTRAVENOUS at 17:48

## 2024-07-15 RX ADMIN — METOCLOPRAMIDE 5 MILLIGRAM(S): 5 SOLUTION ORAL at 17:47

## 2024-07-15 NOTE — RAPID RESPONSE TEAM SUMMARY - NSSITUATIONBACKGROUNDRRT_GEN_ALL_CORE
Responded to RRT called for pt being very anxious. Pt on constant observation for suicide attempt this am, observed by staff wrapping the telephone cord around her neck and pulling the cord. Pt walked out of her room accompanied by the 1:1, sat down on floor at the nurses station. Stated she was very anxious and her knees buckled.   HPI:  Nissa Rodriges is a 34 year old female with PMHx of IDDM1, gastroparesis, tobacco use, and marijuana use who presented to the ED on 7/14/24 for complaints of nausea and vomiting.    History is very limited as patient is repeatedly falling asleep at the time of my interview. Patient reports she started having nausea and bilious emesis today which started about 30 minutes prior to arriving to the ER. Associated epigastric pain. Unable to elaborate further. States her insulin pump has been off since last week as she lost the transmitter so she is waiting for a new one to be mailed to her. So she has not been taking any insulin. Recent admission at [facility] for DKA and discharged three days ago as per ER physician.    In the ED, VSS except HR as elevated as 121 and BP as elevated as 175/97. WBC 15.25K, MCV 68.2, sodium 132, blood glucose 354. Small acetone. VBG 7.39 / 46 / 38 / 28. U/A with ketonuria and glucosuria. COVID/influenza negative. Received 3L LR bolus, insulin 6 U IV, acetaminophen 1 g IV, diphenhydramine 50 mg IV, famotidine 20 mg IV, metoclopramide 20 mg IV, ondansetron 4 mg IV, and morphine 6 mg IV.  (14 Jul 2024 20:54)   Responded to RRT called for pt being very anxious. Pt on constant observation for suicide attempt this am, observed by staff wrapping the telephone cord around her neck and pulling the cord. Pt walked out of her room accompanied by the 1:1, sat down on floor at the nurses station. Stated she was very anxious and her knees buckled. She also stated she is in pain and nauseas.  HPI:  Nissa Rodriges is a 34 year old female with PMHx of IDDM1, gastroparesis, tobacco use, and marijuana use who presented to the ED on 7/14/24 for complaints of nausea and vomiting.    History is very limited as patient is repeatedly falling asleep at the time of my interview. Patient reports she started having nausea and bilious emesis today which started about 30 minutes prior to arriving to the ER. Associated epigastric pain. Unable to elaborate further. States her insulin pump has been off since last week as she lost the transmitter so she is waiting for a new one to be mailed to her. So she has not been taking any insulin. Recent admission at [facility] for DKA and discharged three days ago as per ER physician.    In the ED, VSS except HR as elevated as 121 and BP as elevated as 175/97. WBC 15.25K, MCV 68.2, sodium 132, blood glucose 354. Small acetone. VBG 7.39 / 46 / 38 / 28. U/A with ketonuria and glucosuria. COVID/influenza negative. Received 3L LR bolus, insulin 6 U IV, acetaminophen 1 g IV, diphenhydramine 50 mg IV, famotidine 20 mg IV, metoclopramide 20 mg IV, ondansetron 4 mg IV, and morphine 6 mg IV.  (14 Jul 2024 20:54)

## 2024-07-15 NOTE — BH CONSULTATION LIAISON ASSESSMENT NOTE - NSBHCHARTREVIEWVS_PSY_A_CORE FT
Vital Signs Last 24 Hrs  T(C): 36.4 (15 Jul 2024 12:09), Max: 37.8 (14 Jul 2024 23:56)  T(F): 97.5 (15 Jul 2024 12:09), Max: 100.1 (14 Jul 2024 23:56)  HR: 150 (15 Jul 2024 12:09) (64 - 150)  BP: 137/111 (15 Jul 2024 12:09) (137/111 - 180/89)  BP(mean): --  RR: 26 (15 Jul 2024 12:09) (16 - 26)  SpO2: 99% (15 Jul 2024 12:09) (97% - 100%)    Parameters below as of 15 Jul 2024 12:09  Patient On (Oxygen Delivery Method): room air

## 2024-07-15 NOTE — BH CONSULTATION LIAISON ASSESSMENT NOTE - NSBHCONSULTRECOMMENDOTHER_PSY_A_CORE FT
Dilaudid 2mg IVP q4hrs with IV Benadryl; Pt on Suboxone which sits on some % of the opiate receptors making Dilaudid binding compromised  - has capacity to make her medical decisions and can leave AMA

## 2024-07-15 NOTE — RAPID RESPONSE TEAM SUMMARY - NSSITUATIONBACKGROUNDRRT_GEN_ALL_CORE
RRT called by primary RN for HR in 120's with severe epigastric pain. Upon arrival, pt c/o 10/10 severe epigastric pain and reports IV Tylenol doesn't work only Morphine IVP and Benadryl works for her.     HPI: Nissa Rodriges is a 34 year old female with PMHx of IDDM1, gastroparesis, tobacco use, and marijuana use who presented to the ED on 7/14/24 for complaints of nausea and vomiting.    History is very limited as patient is repeatedly falling asleep at the time of my interview. Patient reports she started having nausea and bilious emesis today which started about 30 minutes prior to arriving to the ER. Associated epigastric pain. Unable to elaborate further. States her insulin pump has been off since last week as she lost the transmitter so she is waiting for a new one to be mailed to her. So she has not been taking any insulin. Recent admission at [facility] for DKA and discharged three days ago as per ER physician.    In the ED, VSS except HR as elevated as 121 and BP as elevated as 175/97. WBC 15.25K, MCV 68.2, sodium 132, blood glucose 354. Small acetone. VBG 7.39 / 46 / 38 / 28. U/A with ketonuria and glucosuria. COVID/influenza negative. Received 3L LR bolus, insulin 6 U IV, acetaminophen 1 g IV, diphenhydramine 50 mg IV, famotidine 20 mg IV, metoclopramide 20 mg IV, ondansetron 4 mg IV, and morphine 6 mg IV.         HPI: Nissa Rodriges is a 34 year old female with PMHx of IDDM1, gastroparesis, tobacco use, and marijuana use who presented to the ED on 7/14/24 for complaints of nausea and vomiting.    History is very limited as patient is repeatedly falling asleep at the time of my interview. Patient reports she started having nausea and bilious emesis today which started about 30 minutes prior to arriving to the ER. Associated epigastric pain. Unable to elaborate further. States her insulin pump has been off since last week as she lost the transmitter so she is waiting for a new one to be mailed to her. So she has not been taking any insulin. Recent admission at [facility] for DKA and discharged three days ago as per ER physician.    In the ED, VSS except HR as elevated as 121 and BP as elevated as 175/97. WBC 15.25K, MCV 68.2, sodium 132, blood glucose 354. Small acetone. VBG 7.39 / 46 / 38 / 28. U/A with ketonuria and glucosuria. COVID/influenza negative. Received 3L LR bolus, insulin 6 U IV, acetaminophen 1 g IV, diphenhydramine 50 mg IV, famotidine 20 mg IV, metoclopramide 20 mg IV, ondansetron 4 mg IV, and morphine 6 mg IV.     RRT called by primary RN for HR in 120's accompanied by severe epigastric pain. Upon arrival, pt c/o 10/10 severe epigastric pain and reports IV Tylenol doesn't relieve her pain and that only Morphine IVP/Benadryl works for her. Positive BS noted upon auscultation and had one BM today. Morphine 2mg IVP X1 and Benadryl 25 mg IVP ordered. Discuss case with Dr. Pleitez  /90  Temp 98.4, O2 sat 99% RA            HPI: Nissa Rodriges is a 34 year old female with PMHx of IDDM1, gastroparesis, tobacco use, and marijuana use who presented to the ED on 7/14/24 for complaints of nausea and vomiting and admitted for intractable nausea and vomiting and IDDM1 with hyperglycemia. Intractable nausea and vomiting, suspect multifactorial due to worsening gastroparesis due to DM vs. hyperemesis cannabinoid syndrome   Complaints of nausea and bilious emesis that started about 30 minutes prior to arriving to ER, associated epigastric pain  Lipase WNL  S/p acetaminophen 1 g IV, diphenhydramine 50 mg IV, famotidine 20 mg IV, metoclopramide 20 mg IV, ondansetron 4 mg IV, and morphine 6 mg IV in the ED   Antiemetics with metoclopramide 5 mg TID PRN given hx of gastroparesis  History is very limited as patient is repeatedly falling asleep at the time of my interview. Patient reports she started having nausea and bilious emesis today which started about 30 minutes prior to arriving to the ER. Associated epigastric pain. Unable to elaborate further. States her insulin pump has been off since last week as she lost the transmitter so she is waiting for a new one to be mailed to her. So she has not been taking any insulin. Recent admission at [facility] for DKA and discharged three days ago as per ER physician.  In the ED, VSS except HR as elevated as 121 and BP as elevated as 175/97. WBC 15.25K, MCV 68.2, sodium 132, blood glucose 354. Small acetone. VBG 7.39 / 46 / 38 / 28. U/A with ketonuria and glucosuria. COVID/influenza negative. Received 3L LR bolus, insulin 6 U IV, acetaminophen 1 g IV, diphenhydramine 50 mg IV, famotidine 20 mg IV, metoclopramide 20 mg IV, ondansetron 4 mg IV, and morphine 6 mg IV.     RRT called by primary RN for HR in 120's accompanied by severe epigastric pain. Upon arrival, pt c/o 10/10 severe epigastric pain and reports IV Tylenol doesn't relieve her pain and that only Morphine IVP/Benadryl works for her. Positive BS noted upon auscultation and had one BM today. Morphine 2mg IVP X1 and Benadryl 25 mg IVP ordered. Discuss case with Dr. Pleitez  /90  Temp 98.4, O2 sat 99% RA            HPI: Nissa Rodriges is a 34 year old female with PMHx of IDDM1, gastroparesis, tobacco use, and marijuana use who presented to the ED on 7/14/24 for complaints of nausea and vomiting and admitted for intractable nausea and vomiting and IDDM1 with hyperglycemia. Intractable nausea and vomiting, suspect multifactorial due to worsening gastroparesis due to DM vs. hyperemesis cannabinoid syndrome   Complaints of nausea and bilious emesis that started about 30 minutes prior to arriving to ER, associated epigastric pain  Lipase WNL  S/p acetaminophen 1 g IV, diphenhydramine 50 mg IV, famotidine 20 mg IV, metoclopramide 20 mg IV, ondansetron 4 mg IV, and morphine 6 mg IV in the ED   Antiemetics with metoclopramide 5 mg TID PRN given hx of gastroparesis  History is very limited as patient is repeatedly falling asleep at the time of my interview. Patient reports she started having nausea and bilious emesis today which started about 30 minutes prior to arriving to the ER. Associated epigastric pain. Unable to elaborate further. States her insulin pump has been off since last week as she lost the transmitter so she is waiting for a new one to be mailed to her. So she has not been taking any insulin. Recent admission at [facility] for DKA and discharged three days ago as per ER physician.  In the ED, VSS except HR as elevated as 121 and BP as elevated as 175/97. WBC 15.25K, MCV 68.2, sodium 132, blood glucose 354. Small acetone. VBG 7.39 / 46 / 38 / 28. U/A with ketonuria and glucosuria. COVID/influenza negative. Received 3L LR bolus, insulin 6 U IV, acetaminophen 1 g IV, diphenhydramine 50 mg IV, famotidine 20 mg IV, metoclopramide 20 mg IV, ondansetron 4 mg IV, and morphine 6 mg IV.     RRT called by primary RN for HR in 120's accompanied by severe epigastric pain. Upon arrival, pt c/o 10/10 severe epigastric pain and reports IV Tylenol doesn't relieve her pain and that only Morphine IVP/Benadryl works for her. Positive BS noted upon auscultation and had one BM today. Denies any CP, SOB, palpitation, HA and N/V.  Morphine 2mg IVP X1 and Benadryl 25 mg IVP ordered. Discuss case with Dr. Pleitez  /90  Temp 98.4, O2 sat 99% RA

## 2024-07-15 NOTE — BH CONSULTATION LIAISON ASSESSMENT NOTE - HPI (INCLUDE ILLNESS QUALITY, SEVERITY, DURATION, TIMING, CONTEXT, MODIFYING FACTORS, ASSOCIATED SIGNS AND SYMPTOMS)
opioid dependence (Dilaudid up to 26mg / day), history of prior completed rehab in [facility] (2014-45 days), with h/o prior incomplete rehab in [facility] last year; +history of suicidal gestures in hospital setting in setting of wanting pain meds, no psychiatric hospitalizations, no known SAs with intent, no outpatient psychiatric care, PMHX HTN, DMI on insulin, gastroparesis with hx of pain and vomiting, ectopic pregnancy with b/l tubal ligation, cholecystectomy, presenting to ED c/o 1 day nausea, vomiting, 8/10 sharp nonradiating epigastric pain. States recently DC from [facility] for DKA3 days ago. UTOX not done on admission, glucose > 473 today, WBC > 14    CVM: [facility] intake 11/4/2021 - no show; no [facility] admissions   ISTOP: Reference #: [id number]  7/03/2024	07/07/2024	buprenorphine-naloxone 4-1 mg sl film	10	10	Laverne Cruz	[id number]	Medicaid	[facility] #[id number]  06/06/2024	06/07/2024	buprenorphine-naloxone 4-1 mg sl film	30	30	Margot Aguillon	[id number]	[facility] #[id number]  05/31/2024	05/31/2024	buprenorphine-naloxone 4-1 mg sl film	7	7	Isidro Mccormick (PA)	[id number]	Medicaid	[facility]   04/23/2024	04/25/2024	buprenorphine-naloxone 4-1 mg sl film	30	30	Laverne Cruz	[id number]	Medicaid	[facility] #[id number]  -on it since 7/25/2023    SUNRISE: Seen at [facility] 05/22 by [facility] for agitation, self-harming gestures secondary to physically distressing opiate withdrawal which was not effectively medically managed/it was under treated  33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, pharmacy tech and certified PCA, charted hx of opioid dependence (Dilaudid up to 26mg / day; self reported physiological tolerance after months of ICU/medical stay at [facility] for DKA with multi organ failure, intubation, "lung biopsy?" may have been a chest tube with pain mgt; discharged on minimal pain agents resulting in opiate withdrawal and return to hospital), history of prior completed rehab in [facility] (2014-45 days), with h/o prior incomplete rehab in [facility] last year; +history of suicidal gestures in hospital setting in setting of wanting pain meds, no psychiatric hospitalizations, no known SAs with intent, no outpatient psychiatric care, PMHX HTN, DMI on insulin, gastroparesis with hx of pain and vomiting, ectopic pregnancy with b/l tubal ligation, cholecystectomy, presenting to ED c/o 1 day nausea, vomiting, 8/10 sharp non-radiating epigastric pain. States recently DC from [facility] for DKA3 days ago. UTOX not done on admission, glucose > 473 today, WBC > 14.     EXAM: calm, cooperative, dry heaving at times, speech at times halts for what appears nausea, no vomiting. Patient reports that she was in physical distress earlier, in pain, in withdrawal and felt no one was paying attention, acting out to express her frustration. DENIES actual suicidal ideation, intent or plan. Endorses stable euthymic mood, regular sleep / appetite / energy level / concentration otherwise. Denies and does not manifest any symptoms of hypomania/pee/psychosis/major depression/ anxiety/panic. Denies any active or passive suicidal or homicidal ideation. Names protective factors (lynn; family; hope for future). Endorses medication compliance. Denies adverse medication side effects. Denies substance use, denies illicit substance use, denies street-bought opiate use, denies RX misuse. Would like Dilaudid IV with benadryl IV as she had rash before from it.     CVM: [facility] intake 11/4/2021 - no show; no [facility] admissions   ISTOP: Reference #: [id number]  7/03/2024	07/07/2024	buprenorphine-naloxone 4-1 mg sl film	10	10	Laverne Cruz	[id number]	Medicaid	[facility] #[id number]  06/06/2024	06/07/2024	buprenorphine-naloxone 4-1 mg sl film	30	30	Margot Aguillon	[id number]	[facility] #[id number]  05/31/2024	05/31/2024	buprenorphine-naloxone 4-1 mg sl film	7	7	Isidro Mccormick (PA)	[id number]	Medicaid	[facility]   04/23/2024	04/25/2024	buprenorphine-naloxone 4-1 mg sl film	30	30	Laverne Cruz	[id number]	Medicaid	[facility] #[id number]  -on it since 7/25/2023    SUNRISE: Seen at [facility] 05/22 by [facility] for agitation, self-harming gestures secondary to physically distressing opiate withdrawal which was not effectively medically managed/it was under treated

## 2024-07-15 NOTE — BH CONSULTATION LIAISON ASSESSMENT NOTE - PREPARATORY ACTS:
Health Maintenance   Topic Date Due    HIV screen  01/18/2001    Cervical cancer screen  01/18/2007    Flu vaccine (1) 09/01/2017    DTaP/Tdap/Td vaccine (2 - Td) 10/09/2022       No results found for: LABA1C          ( goal A1C is < 7)   No results found for: LABMICR  No results found for: LDLCHOLESTEROL, LDLCALC    (goal LDL is <100)   No results found for: AST, ALT, BUN  BP Readings from Last 3 Encounters:   01/05/17 116/80   09/15/15 (!) 136/100   03/28/13 104/76          (goal 120/80)    All Future Testing planned in [facility]  Lab Frequency Next Occurrence       Next Visit Date:  No future appointments.          Patient Active Problem List:     Anxiety     Depression None known

## 2024-07-15 NOTE — BH CONSULTATION LIAISON ASSESSMENT NOTE - NSBHCHARTREVIEWLAB_PSY_A_CORE FT
07-15    135  |  99  |  17  ----------------------------<  349<H>  3.3<L>   |  22  |  0.95    Ca    9.2      15 Jul 2024 05:40  Mg     1.7     07-14    TPro  7.0  /  Alb  3.2<L>  /  TBili  0.7  /  DBili  x   /  AST  28  /  ALT  30  /  AlkPhos  106  07-14

## 2024-07-15 NOTE — BH CONSULTATION LIAISON ASSESSMENT NOTE - RISK ASSESSMENT
Chronic risk factors: serious medical issues at young age impairing daily life, noncompliant with medications/FS/diabetic management, hx of substance misuse; hx of acting out behaviors.  Protective factors: young; no formal psychiatric hx, no suicide attempts; no self-injurious behavior; no hx of aggression/violence; no legal issues; motivated for help; articulate; strong family support; access to health services. No acute risk factors identified

## 2024-07-15 NOTE — RAPID RESPONSE TEAM SUMMARY - NSMEDICATIONSRRT_GEN_ALL_CORE
morphine 2 mg IV prn  benadryl 25 mg prn  cont tito prn, reglan 10 mg IV x 1 additional dose  NS@100 ml/ hr   morphine 2 mg IV   benadryl 25 mg prn  cont tito prn, reglan 10 mg IV x 1 additional dose  NS@100 ml/ hr  protonix IV

## 2024-07-15 NOTE — RAPID RESPONSE TEAM SUMMARY - NSOTHERINTERVENTIONSRRT_GEN_ALL_CORE
Breathing spontaneous and unlabored. Breath sounds clear and equal bilaterally with regular rhythm. psych consult- Dr. Goldstein texted  Discussed with Dr. Camargo. At bedside to see pt.    Time spent 60 min psych consult- Dr. Goldstein texted  Discussed with Dr. Camargo. At bedside to see pt. morphine d/c'd, dilaudid IV prn     Time spent 60 min

## 2024-07-15 NOTE — BH CONSULTATION LIAISON ASSESSMENT NOTE - CURRENT MEDICATION
MEDICATIONS  (STANDING):  buprenorphine 2 mG/naloxone 0.5 mG SL Film 2 Film(s) SubLingual daily  dextrose 5%. 1000 milliLiter(s) (100 mL/Hr) IV Continuous <Continuous>  dextrose 5%. 1000 milliLiter(s) (50 mL/Hr) IV Continuous <Continuous>  dextrose 50% Injectable 25 Gram(s) IV Push once  dextrose 50% Injectable 12.5 Gram(s) IV Push once  dextrose 50% Injectable 25 Gram(s) IV Push once  glucagon  Injectable 1 milliGRAM(s) IntraMuscular once  insulin lispro (ADMELOG) corrective regimen sliding scale   SubCutaneous every 6 hours  lactated ringers. 1000 milliLiter(s) (100 mL/Hr) IV Continuous <Continuous>  pantoprazole  Injectable 40 milliGRAM(s) IV Push daily  sodium chloride 0.9%. 1000 milliLiter(s) (100 mL/Hr) IV Continuous <Continuous>    MEDICATIONS  (PRN):  acetaminophen     Tablet .. 650 milliGRAM(s) Oral every 6 hours PRN Temp greater or equal to 38C (100.4F), Mild Pain (1 - 3)  aluminum hydroxide/magnesium hydroxide/simethicone Suspension 30 milliLiter(s) Oral every 4 hours PRN Dyspepsia  dextrose Oral Gel 15 Gram(s) Oral once PRN Blood Glucose LESS THAN 70 milliGRAM(s)/deciliter  diphenhydrAMINE Injectable 25 milliGRAM(s) IV Push every 4 hours PRN Rash and/or Itching  HYDROmorphone  Injectable 1 milliGRAM(s) IV Push every 4 hours PRN Severe Pain (7 - 10)  melatonin 3 milliGRAM(s) Oral at bedtime PRN Insomnia  metoclopramide Injectable 5 milliGRAM(s) IV Push every 8 hours PRN nausea/vomiting

## 2024-07-16 LAB
ANION GAP SERPL CALC-SCNC: 12 MMOL/L — SIGNIFICANT CHANGE UP (ref 5–17)
BUN SERPL-MCNC: 21 MG/DL — SIGNIFICANT CHANGE UP (ref 7–23)
CALCIUM SERPL-MCNC: 8.6 MG/DL — SIGNIFICANT CHANGE UP (ref 8.5–10.1)
CHLORIDE SERPL-SCNC: 100 MMOL/L — SIGNIFICANT CHANGE UP (ref 96–108)
CO2 SERPL-SCNC: 21 MMOL/L — LOW (ref 22–31)
CREAT SERPL-MCNC: 1.29 MG/DL — SIGNIFICANT CHANGE UP (ref 0.5–1.3)
CULTURE RESULTS: SIGNIFICANT CHANGE UP
EGFR: 56 ML/MIN/1.73M2 — LOW
GLUCOSE BLDC GLUCOMTR-MCNC: 170 MG/DL — HIGH (ref 70–99)
GLUCOSE BLDC GLUCOMTR-MCNC: 220 MG/DL — HIGH (ref 70–99)
GLUCOSE BLDC GLUCOMTR-MCNC: 273 MG/DL — HIGH (ref 70–99)
GLUCOSE BLDC GLUCOMTR-MCNC: 380 MG/DL — HIGH (ref 70–99)
GLUCOSE BLDC GLUCOMTR-MCNC: 467 MG/DL — CRITICAL HIGH (ref 70–99)
GLUCOSE SERPL-MCNC: 166 MG/DL — HIGH (ref 70–99)
HCT VFR BLD CALC: 31.2 % — LOW (ref 34.5–45)
HGB BLD-MCNC: 9.7 G/DL — LOW (ref 11.5–15.5)
MAGNESIUM SERPL-MCNC: 1.8 MG/DL — SIGNIFICANT CHANGE UP (ref 1.6–2.6)
MCHC RBC-ENTMCNC: 21.1 PG — LOW (ref 27–34)
MCHC RBC-ENTMCNC: 31.1 G/DL — LOW (ref 32–36)
MCV RBC AUTO: 68 FL — LOW (ref 80–100)
NRBC # BLD: 0 /100 WBCS — SIGNIFICANT CHANGE UP (ref 0–0)
PLATELET # BLD AUTO: 236 K/UL — SIGNIFICANT CHANGE UP (ref 150–400)
POTASSIUM SERPL-MCNC: 3.4 MMOL/L — LOW (ref 3.5–5.3)
POTASSIUM SERPL-SCNC: 3.4 MMOL/L — LOW (ref 3.5–5.3)
RBC # BLD: 4.59 M/UL — SIGNIFICANT CHANGE UP (ref 3.8–5.2)
RBC # FLD: 17 % — HIGH (ref 10.3–14.5)
SODIUM SERPL-SCNC: 133 MMOL/L — LOW (ref 135–145)
SPECIMEN SOURCE: SIGNIFICANT CHANGE UP
WBC # BLD: 12.59 K/UL — HIGH (ref 3.8–10.5)
WBC # FLD AUTO: 12.59 K/UL — HIGH (ref 3.8–10.5)

## 2024-07-16 PROCEDURE — 93010 ELECTROCARDIOGRAM REPORT: CPT

## 2024-07-16 PROCEDURE — 99232 SBSQ HOSP IP/OBS MODERATE 35: CPT

## 2024-07-16 RX ORDER — DIPHENHYDRAMINE HCL 12.5MG/5ML
25 ELIXIR ORAL ONCE
Refills: 0 | Status: COMPLETED | OUTPATIENT
Start: 2024-07-16 | End: 2024-07-16

## 2024-07-16 RX ORDER — INSULIN LISPRO 100 [IU]/ML
12 INJECTION, SOLUTION SUBCUTANEOUS ONCE
Refills: 0 | Status: COMPLETED | OUTPATIENT
Start: 2024-07-16 | End: 2024-07-16

## 2024-07-16 RX ADMIN — METOCLOPRAMIDE 5 MILLIGRAM(S): 5 SOLUTION ORAL at 06:19

## 2024-07-16 RX ADMIN — INSULIN LISPRO 12 UNIT(S): 100 INJECTION, SOLUTION SUBCUTANEOUS at 19:53

## 2024-07-16 RX ADMIN — Medication 25 MILLIGRAM(S): at 06:19

## 2024-07-16 RX ADMIN — HYDROMORPHONE HCL 2 MILLIGRAM(S): 0.2 INJECTION, SOLUTION INTRAVENOUS at 18:50

## 2024-07-16 RX ADMIN — HYDROMORPHONE HCL 2 MILLIGRAM(S): 0.2 INJECTION, SOLUTION INTRAVENOUS at 19:44

## 2024-07-16 RX ADMIN — HYDROMORPHONE HCL 2 MILLIGRAM(S): 0.2 INJECTION, SOLUTION INTRAVENOUS at 07:05

## 2024-07-16 RX ADMIN — Medication 100 MILLILITER(S): at 18:50

## 2024-07-16 RX ADMIN — Medication 25 MILLIGRAM(S): at 02:11

## 2024-07-16 RX ADMIN — INSULIN LISPRO 2: 100 INJECTION, SOLUTION SUBCUTANEOUS at 06:19

## 2024-07-16 RX ADMIN — HYDROMORPHONE HCL 2 MILLIGRAM(S): 0.2 INJECTION, SOLUTION INTRAVENOUS at 15:28

## 2024-07-16 RX ADMIN — HYDROMORPHONE HCL 2 MILLIGRAM(S): 0.2 INJECTION, SOLUTION INTRAVENOUS at 02:55

## 2024-07-16 RX ADMIN — HYDROMORPHONE HCL 2 MILLIGRAM(S): 0.2 INJECTION, SOLUTION INTRAVENOUS at 14:42

## 2024-07-16 RX ADMIN — Medication 25 MILLIGRAM(S): at 14:42

## 2024-07-16 RX ADMIN — NICOTINE POLACRILEX 1 PATCH: 2 LOZENGE ORAL at 23:18

## 2024-07-16 RX ADMIN — HYDROMORPHONE HCL 2 MILLIGRAM(S): 0.2 INJECTION, SOLUTION INTRAVENOUS at 10:22

## 2024-07-16 RX ADMIN — Medication 25 MILLIGRAM(S): at 18:50

## 2024-07-16 RX ADMIN — Medication 25 MILLIGRAM(S): at 02:59

## 2024-07-16 RX ADMIN — Medication 25 MILLIGRAM(S): at 10:21

## 2024-07-16 RX ADMIN — Medication 3 MILLIGRAM(S): at 22:47

## 2024-07-16 RX ADMIN — HYDROMORPHONE HCL 2 MILLIGRAM(S): 0.2 INJECTION, SOLUTION INTRAVENOUS at 11:00

## 2024-07-16 RX ADMIN — NICOTINE POLACRILEX 1 PATCH: 2 LOZENGE ORAL at 11:55

## 2024-07-16 RX ADMIN — PANTOPRAZOLE SODIUM 40 MILLIGRAM(S): 40 INJECTION, POWDER, FOR SOLUTION INTRAVENOUS at 11:55

## 2024-07-16 RX ADMIN — Medication 100 MILLILITER(S): at 06:18

## 2024-07-16 RX ADMIN — HYDROMORPHONE HCL 2 MILLIGRAM(S): 0.2 INJECTION, SOLUTION INTRAVENOUS at 02:11

## 2024-07-16 RX ADMIN — Medication 25 MILLIGRAM(S): at 22:47

## 2024-07-16 RX ADMIN — INSULIN LISPRO 10: 100 INJECTION, SOLUTION SUBCUTANEOUS at 00:25

## 2024-07-16 RX ADMIN — INSULIN LISPRO 4: 100 INJECTION, SOLUTION SUBCUTANEOUS at 16:39

## 2024-07-16 RX ADMIN — INSULIN LISPRO 6: 100 INJECTION, SOLUTION SUBCUTANEOUS at 11:54

## 2024-07-16 RX ADMIN — HYDROMORPHONE HCL 2 MILLIGRAM(S): 0.2 INJECTION, SOLUTION INTRAVENOUS at 22:47

## 2024-07-16 RX ADMIN — HYDROMORPHONE HCL 2 MILLIGRAM(S): 0.2 INJECTION, SOLUTION INTRAVENOUS at 06:18

## 2024-07-16 RX ADMIN — BUPRENORPHINE AND NALOXONE 2 FILM(S): 12; 3 FILM BUCCAL; SUBLINGUAL at 14:43

## 2024-07-16 RX ADMIN — METOCLOPRAMIDE 5 MILLIGRAM(S): 5 SOLUTION ORAL at 14:42

## 2024-07-17 DIAGNOSIS — F11.20 OPIOID DEPENDENCE, UNCOMPLICATED: ICD-10-CM

## 2024-07-17 LAB
ANION GAP SERPL CALC-SCNC: 9 MMOL/L — SIGNIFICANT CHANGE UP (ref 5–17)
BUN SERPL-MCNC: 15 MG/DL — SIGNIFICANT CHANGE UP (ref 7–23)
CALCIUM SERPL-MCNC: 8.1 MG/DL — LOW (ref 8.5–10.1)
CHLORIDE SERPL-SCNC: 103 MMOL/L — SIGNIFICANT CHANGE UP (ref 96–108)
CO2 SERPL-SCNC: 24 MMOL/L — SIGNIFICANT CHANGE UP (ref 22–31)
CREAT SERPL-MCNC: 1.24 MG/DL — SIGNIFICANT CHANGE UP (ref 0.5–1.3)
EGFR: 59 ML/MIN/1.73M2 — LOW
GLUCOSE BLDC GLUCOMTR-MCNC: 134 MG/DL — HIGH (ref 70–99)
GLUCOSE BLDC GLUCOMTR-MCNC: 148 MG/DL — HIGH (ref 70–99)
GLUCOSE BLDC GLUCOMTR-MCNC: 230 MG/DL — HIGH (ref 70–99)
GLUCOSE BLDC GLUCOMTR-MCNC: 293 MG/DL — HIGH (ref 70–99)
GLUCOSE BLDC GLUCOMTR-MCNC: 297 MG/DL — HIGH (ref 70–99)
GLUCOSE BLDC GLUCOMTR-MCNC: 359 MG/DL — HIGH (ref 70–99)
GLUCOSE BLDC GLUCOMTR-MCNC: 75 MG/DL — SIGNIFICANT CHANGE UP (ref 70–99)
GLUCOSE SERPL-MCNC: 277 MG/DL — HIGH (ref 70–99)
HCT VFR BLD CALC: 31 % — LOW (ref 34.5–45)
HGB BLD-MCNC: 9.7 G/DL — LOW (ref 11.5–15.5)
MAGNESIUM SERPL-MCNC: 1.7 MG/DL — SIGNIFICANT CHANGE UP (ref 1.6–2.6)
MCHC RBC-ENTMCNC: 21.3 PG — LOW (ref 27–34)
MCHC RBC-ENTMCNC: 31.3 G/DL — LOW (ref 32–36)
MCV RBC AUTO: 68.1 FL — LOW (ref 80–100)
NRBC # BLD: 0 /100 WBCS — SIGNIFICANT CHANGE UP (ref 0–0)
PLATELET # BLD AUTO: 277 K/UL — SIGNIFICANT CHANGE UP (ref 150–400)
POTASSIUM SERPL-MCNC: 3.2 MMOL/L — LOW (ref 3.5–5.3)
POTASSIUM SERPL-SCNC: 3.2 MMOL/L — LOW (ref 3.5–5.3)
RBC # BLD: 4.55 M/UL — SIGNIFICANT CHANGE UP (ref 3.8–5.2)
RBC # FLD: 16.8 % — HIGH (ref 10.3–14.5)
SODIUM SERPL-SCNC: 136 MMOL/L — SIGNIFICANT CHANGE UP (ref 135–145)
WBC # BLD: 8.97 K/UL — SIGNIFICANT CHANGE UP (ref 3.8–10.5)
WBC # FLD AUTO: 8.97 K/UL — SIGNIFICANT CHANGE UP (ref 3.8–10.5)

## 2024-07-17 PROCEDURE — 99231 SBSQ HOSP IP/OBS SF/LOW 25: CPT

## 2024-07-17 PROCEDURE — 99232 SBSQ HOSP IP/OBS MODERATE 35: CPT

## 2024-07-17 RX ORDER — INSULIN LISPRO 100 [IU]/ML
INJECTION, SOLUTION SUBCUTANEOUS
Refills: 0 | Status: DISCONTINUED | OUTPATIENT
Start: 2024-07-17 | End: 2024-07-18

## 2024-07-17 RX ORDER — INSULIN LISPRO 100 [IU]/ML
INJECTION, SOLUTION SUBCUTANEOUS AT BEDTIME
Refills: 0 | Status: DISCONTINUED | OUTPATIENT
Start: 2024-07-17 | End: 2024-07-17

## 2024-07-17 RX ORDER — POTASSIUM CHLORIDE 600 MG/1
10 TABLET, FILM COATED, EXTENDED RELEASE ORAL
Refills: 0 | Status: COMPLETED | OUTPATIENT
Start: 2024-07-17 | End: 2024-07-17

## 2024-07-17 RX ORDER — POTASSIUM CHLORIDE 600 MG/1
20 TABLET, FILM COATED, EXTENDED RELEASE ORAL ONCE
Refills: 0 | Status: COMPLETED | OUTPATIENT
Start: 2024-07-17 | End: 2024-07-17

## 2024-07-17 RX ADMIN — POTASSIUM CHLORIDE 100 MILLIEQUIVALENT(S): 600 TABLET, FILM COATED, EXTENDED RELEASE ORAL at 13:03

## 2024-07-17 RX ADMIN — NICOTINE POLACRILEX 1 PATCH: 2 LOZENGE ORAL at 12:03

## 2024-07-17 RX ADMIN — HYDROMORPHONE HCL 2 MILLIGRAM(S): 0.2 INJECTION, SOLUTION INTRAVENOUS at 16:21

## 2024-07-17 RX ADMIN — HYDROMORPHONE HCL 2 MILLIGRAM(S): 0.2 INJECTION, SOLUTION INTRAVENOUS at 23:21

## 2024-07-17 RX ADMIN — HYDROMORPHONE HCL 2 MILLIGRAM(S): 0.2 INJECTION, SOLUTION INTRAVENOUS at 11:17

## 2024-07-17 RX ADMIN — Medication 25 MILLIGRAM(S): at 03:01

## 2024-07-17 RX ADMIN — INSULIN LISPRO 10: 100 INJECTION, SOLUTION SUBCUTANEOUS at 21:41

## 2024-07-17 RX ADMIN — POTASSIUM CHLORIDE 100 MILLIEQUIVALENT(S): 600 TABLET, FILM COATED, EXTENDED RELEASE ORAL at 10:04

## 2024-07-17 RX ADMIN — HYDROMORPHONE HCL 2 MILLIGRAM(S): 0.2 INJECTION, SOLUTION INTRAVENOUS at 15:17

## 2024-07-17 RX ADMIN — NICOTINE POLACRILEX 1 PATCH: 2 LOZENGE ORAL at 11:00

## 2024-07-17 RX ADMIN — METOCLOPRAMIDE 5 MILLIGRAM(S): 5 SOLUTION ORAL at 06:41

## 2024-07-17 RX ADMIN — HYDROMORPHONE HCL 2 MILLIGRAM(S): 0.2 INJECTION, SOLUTION INTRAVENOUS at 00:19

## 2024-07-17 RX ADMIN — Medication 25 MILLIGRAM(S): at 15:18

## 2024-07-17 RX ADMIN — PANTOPRAZOLE SODIUM 40 MILLIGRAM(S): 40 INJECTION, POWDER, FOR SOLUTION INTRAVENOUS at 12:05

## 2024-07-17 RX ADMIN — HYDROMORPHONE HCL 2 MILLIGRAM(S): 0.2 INJECTION, SOLUTION INTRAVENOUS at 07:01

## 2024-07-17 RX ADMIN — INSULIN LISPRO 4: 100 INJECTION, SOLUTION SUBCUTANEOUS at 12:05

## 2024-07-17 RX ADMIN — Medication 25 MILLIGRAM(S): at 23:22

## 2024-07-17 RX ADMIN — HYDROMORPHONE HCL 2 MILLIGRAM(S): 0.2 INJECTION, SOLUTION INTRAVENOUS at 19:21

## 2024-07-17 RX ADMIN — Medication 25 MILLIGRAM(S): at 19:20

## 2024-07-17 RX ADMIN — BUPRENORPHINE AND NALOXONE 2 FILM(S): 12; 3 FILM BUCCAL; SUBLINGUAL at 12:50

## 2024-07-17 RX ADMIN — INSULIN LISPRO 6: 100 INJECTION, SOLUTION SUBCUTANEOUS at 00:32

## 2024-07-17 RX ADMIN — HYDROMORPHONE HCL 2 MILLIGRAM(S): 0.2 INJECTION, SOLUTION INTRAVENOUS at 12:17

## 2024-07-17 RX ADMIN — POTASSIUM CHLORIDE 100 MILLIEQUIVALENT(S): 600 TABLET, FILM COATED, EXTENDED RELEASE ORAL at 11:15

## 2024-07-17 RX ADMIN — Medication 100 MILLILITER(S): at 20:59

## 2024-07-17 RX ADMIN — Medication 25 MILLIGRAM(S): at 11:16

## 2024-07-17 RX ADMIN — NICOTINE POLACRILEX 1 PATCH: 2 LOZENGE ORAL at 07:10

## 2024-07-17 RX ADMIN — POTASSIUM CHLORIDE 20 MILLIEQUIVALENT(S): 600 TABLET, FILM COATED, EXTENDED RELEASE ORAL at 15:43

## 2024-07-17 RX ADMIN — Medication 25 MILLIGRAM(S): at 07:00

## 2024-07-17 RX ADMIN — HYDROMORPHONE HCL 2 MILLIGRAM(S): 0.2 INJECTION, SOLUTION INTRAVENOUS at 02:59

## 2024-07-17 NOTE — BH CONSULTATION LIAISON PROGRESS NOTE - NSBHCHARTREVIEWLAB_PSY_A_CORE FT
07-17    136  |  103  |  15  ----------------------------<  277<H>  3.2<L>   |  24  |  1.24    Ca    8.1<L>      17 Jul 2024 06:10  Mg     1.7     07-17

## 2024-07-17 NOTE — BH CONSULTATION LIAISON PROGRESS NOTE - NSBHMSEATTEN_PSY_A_CORE
Daily Note     Today's date: 10/24/2018  Patient name: Akhil Strange  : 1953  MRN: [id number]  Referring provider: Brant Garcia MD  Dx:   Encounter Diagnosis     ICD-10-CM    1  Aftercare following surgery of the musculoskeletal system [id number] [id number]                   Subjective:  Pt states that his balance has never been good  Concerned about not being able to lift 60-90# bags of cement    PRECAUTIONS: DM, osteop  Objective: See treatment diary below  Manuals 10/15 10-17  10-19  10/22  10/24             Man ham str B  30"x3  x  KAB                                                                                 Exercise Diary                         bike x5 min   7'  9'  10'  10'              STS high/low  x10  2x10  x  2x10  22 4" 2x10             Standing hip abd R/L  3x10  x 2x15  x  2x15  1# 3x10             Standing hip ext R/L  3x10  x 2x15  x  x30  1# 3x10              standing BL HR x30  x  x  x30  x              mini squats   2x10  x  x  3x10  x             Standing marching  20x x x30 #1 x         SLB R/L         15" x3  x(one hand on rail)              bridges     20x  x  x20                SLR flexion     2x5  x  2x10 L                step up      4' 2x10  4' 2x10 up with L down with R  6" 10  4" 10              clams      10  x20  x              side steps          7 ft 5 laps                                                                                     Modalities                                                                                             X= performed        Assessment: He was able to decrease support with single leg stance to one hand hold  Does tend to graham through exercises  Pt would benefit from skilled outpatient PT to address pain, strength, cardiovascular endurance, and balance in order to maximize his level of function  Plan: Continue per plan of care 
Normal

## 2024-07-17 NOTE — BH CONSULTATION LIAISON PROGRESS NOTE - NSBHFUPINTERVALHXFT_PSY_A_CORE
No significant interval events since last seen. More comfortable on the increased dose of the IVP Dilaudid which Patient requested to be administered with IV Benadryl as she reported a rash before from the Dilaudid which has not returned if Benadryl is taken. No subsequent self injurious gestures. has remained calm, cooperative, has not needed any STAT medications for agitation. Opiate cravings/withdrawal sxs adequately managed on the current Suboxone 4mg/1mg qd dose - Patient declined to have any dose increases. Patient maintaining the same psychiatric clinical presentation which is consistent with baseline - Patient reports that she was in physical distress when RRT was called on 7/15/24, in withdrawal and felt no one was paying attention, acting out to express her frustration. Again Patient denied that the telephone cord around her neck was a suicide attempt with concurrent intent or plan; describes this as mean of expresing her frustration, physical distress and wanted to communicate all of that to staff with that action. Patient at this time reports stable euthymic mood, regular sleep / appetite / energy level / concentration otherwise. Denies and does not manifest any symptoms of hypomania/pee/psychosis/major depression/ anxiety/panic. Denies any active or passive suicidal or homicidal ideation. Names protective factors (lynn; family; hope for future). Endorses medication compliance. Denies adverse medication side effects. Denies substance use, denies illicit substance use, denies street-bought opiate use, denies RX misuse. Of note, discussed RRT finding on the scene with Hospitalist Dr Kinsey who responded when he heard a commotion and describes cord as loose and not tied snuggly around neck and Pt was awake, alert, talking.  Seen at bedside - grandparents visiting and Patient is having a nice time; talks to Pt privately in hallway. No significant interval events since last seen. More comfortable on the increased dose of the IVP Dilaudid which Patient requested to be administered with IV Benadryl as she reported a rash before from the Dilaudid which has not returned if Benadryl is taken. No subsequent self injurious gestures. has remained calm, cooperative, has not needed any STAT medications for agitation. Opiate cravings/withdrawal sxs adequately managed on the current Suboxone 4mg/1mg qd dose - Patient declined to have any dose increases. Patient maintaining the same psychiatric clinical presentation which is consistent with baseline - Patient reports that she was in physical distress when RRT was called on 7/15/24, in withdrawal and felt no one was paying attention, acting out to express her frustration. Again Patient denied that the telephone cord around her neck was a suicide attempt with concurrent intent or plan; describes this as mean of expresing her frustration, physical distress and wanted to communicate all of that to staff with that action. Patient at this time reports stable euthymic mood, regular sleep / appetite / energy level / concentration otherwise. Denies and does not manifest any symptoms of hypomania/pee/psychosis/major depression/ anxiety/panic. Denies any active or passive suicidal or homicidal ideation. Names protective factors (lynn; family; hope for future). Endorses medication compliance. Denies adverse medication side effects. Denies substance use, denies illicit substance use, denies street-bought opiate use, denies RX misuse. Of note, discussed RRT finding on the scene with Hospitalist Dr Kinsey who responded when he heard a commotion and describes cord as loose and not tied snuggly around neck and Pt was awake, alert, talking.

## 2024-07-17 NOTE — PROGRESS NOTE ADULT - ASSESSMENT
A/P    · Assessment	  Nissa Rodriges is a 34 year old female with PMHx of IDDM1, gastroparesis, tobacco use, and marijuana use who presented to the ED on 7/14/24 for complaints of nausea and vomiting and admitted for intractable nausea and vomiting and IDDM1 with hyperglycemia.    Intractable nausea and vomiting, suspect multifactorial due to worsening gastroparesis due to DM vs. hyperemesis cannabinoid syndrome   Complaints of nausea and bilious emesis that started about 30 minutes prior to arriving to ER, associated epigastric pain  Lipase WNL  S/p acetaminophen 1 g IV, diphenhydramine 50 mg IV, famotidine 20 mg IV, metoclopramide 20 mg IV, ondansetron 4 mg IV, and morphine 6 mg IV in the ED   Antiemetics with metoclopramide 5 mg TID PRN given hx of gastroparesis  NPO for now, advance diet as tolerated, IVF hydration with  cc/hr to continue    IDDM1 with hyperglycemia  Reports insulin pump has been off since last week as she lost the transmitter so she is waiting for a new one to be mailed to her so she has not been taking any insulin  Last known A1c 8.3 (on 10/10/23), blood glucose as elevated as 464 on admission, U/A with ketonuria and glucosuria, small acetone, VBG 7.39 / 46 / 38 / 28  POC q6  S/p 3L LR bolus, insulin 6 IV in the ED  diabetes education    Hypertonic hyponatremia secondary to hyperglycemia  Corrected sodium 136 on admission  Anticipate improvement of sodium with tight glycemic control    Leukocytosis, likely reactive  Afebrile, WBC 15.25K on admission  No signs of infection  Monitor WBC trend    Abdominal pain:  - IV dilaudid, IV PPI  - Reglan    Suicide attempt:  - Cleared by psych      Chronic medical conditions:  Tobacco use: smokes 1/2 ppd x 8 years, unable to provide counseling on smoking cessation, declined nicotine patch and nicorette gum    Unclear why patient takes buprenorphine / naloxone 4 / 1 mg SL but continued.    Start [facility] tomorrow if improved.     Will follow.      Louisa Camargo MD  
A/P    · Assessment	  Nissa Rodriges is a 34 year old female with PMHx of IDDM1, gastroparesis, tobacco use, and marijuana use who presented to the ED on 7/14/24 for complaints of nausea and vomiting and admitted for intractable nausea and vomiting and IDDM1 with hyperglycemia.    Intractable nausea and vomiting, suspect multifactorial due to worsening gastroparesis due to DM vs. hyperemesis cannabinoid syndrome   Complaints of nausea and bilious emesis that started about 30 minutes prior to arriving to ER, associated epigastric pain  Lipase WNL  S/p acetaminophen 1 g IV, diphenhydramine 50 mg IV, famotidine 20 mg IV, metoclopramide 20 mg IV, ondansetron 4 mg IV, and morphine 6 mg IV in the ED   Antiemetics with metoclopramide 5 mg TID PRN given hx of gastroparesis  NPO for now, advance diet as tolerated, IVF hydration with  cc/hr to continue    IDDM1 with hyperglycemia  Reports insulin pump has been off since last week as she lost the transmitter so she is waiting for a new one to be mailed to her so she has not been taking any insulin  Last known A1c 8.3 (on 10/10/23), blood glucose as elevated as 464 on admission, U/A with ketonuria and glucosuria, small acetone, VBG 7.39 / 46 / 38 / 28  POC q6  S/p 3L LR bolus, insulin 6 IV in the ED  Additional insulin 10 U IV x 1 ordered, moderate dose SSI q6 started   diabetes education    Hypertonic hyponatremia secondary to hyperglycemia  Corrected sodium 136 on admission  Anticipate improvement of sodium with tight glycemic control    Leukocytosis, likely reactive  Afebrile, WBC 15.25K on admission  No signs of infection  Monitor WBC trend    Abdominal pain:  - IV dilaudid, IV PPI  - Reglan    Suicide attempt:  - Wrapped cord in room around neck this AM  - 1:1 sitter  - Psych consult      Chronic medical conditions:  Tobacco use: smokes 1/2 ppd x 8 years, unable to provide counseling on smoking cessation, declined nicotine patch and nicorette gum    Unclear why patient takes buprenorphine / naloxone 4 / 1 mg SL but continued.    Will follow.      Louisa Camargo MD
A/P    · Assessment	  Nissa Rodriges is a 34 year old female with PMHx of IDDM1, gastroparesis, tobacco use, and marijuana use who presented to the ED on 7/14/24 for complaints of nausea and vomiting and admitted for intractable nausea and vomiting and IDDM1 with hyperglycemia.    Intractable nausea and vomiting, suspect multifactorial due to worsening gastroparesis due to DM vs. hyperemesis cannabinoid syndrome   Complaints of nausea and bilious emesis that started about 30 minutes prior to arriving to ER, associated epigastric pain  Lipase WNL  S/p acetaminophen 1 g IV, diphenhydramine 50 mg IV, famotidine 20 mg IV, metoclopramide 20 mg IV, ondansetron 4 mg IV, and morphine 6 mg IV in the ED   Antiemetics with metoclopramide 5 mg TID PRN given hx of gastroparesis  NPO for now, advance diet as tolerated, IVF hydration with  cc/hr to continue    IDDM1 with hyperglycemia  Reports insulin pump has been off since last week as she lost the transmitter so she is waiting for a new one to be mailed to her so she has not been taking any insulin  Last known A1c 8.3 (on 10/10/23), blood glucose as elevated as 464 on admission, U/A with ketonuria and glucosuria, small acetone, VBG 7.39 / 46 / 38 / 28  POC q6  S/p 3L LR bolus, insulin 6 IV in the ED  diabetes education    Hypertonic hyponatremia secondary to hyperglycemia  Corrected sodium 136 on admission  Anticipate improvement of sodium with tight glycemic control    Leukocytosis, likely reactive  Afebrile, WBC 15.25K on admission  No signs of infection  Monitor WBC trend    Abdominal pain:  - IV dilaudid, IV PPI  - Reglan    Suicide attempt:  - Cleared by psych    Advancing diet as tolerated.      Chronic medical conditions:  Tobacco use: smokes 1/2 ppd x 8 years, unable to provide counseling on smoking cessation, declined nicotine patch and nicorette gum    Unclear why patient takes buprenorphine / naloxone 4 / 1 mg SL but continued.    Start [facility] tomorrow if improved.     Will follow.      Louisa Camargo MD

## 2024-07-17 NOTE — PROGRESS NOTE ADULT - REASON FOR ADMISSION
Intractable nausea and vomiting, IDDM1 with hyperglycemia

## 2024-07-17 NOTE — BH CONSULTATION LIAISON PROGRESS NOTE - CURRENT MEDICATION
MEDICATIONS  (STANDING):  buprenorphine 2 mG/naloxone 0.5 mG SL Film 2 Film(s) SubLingual daily  dextrose 5%. 1000 milliLiter(s) (100 mL/Hr) IV Continuous <Continuous>  dextrose 5%. 1000 milliLiter(s) (50 mL/Hr) IV Continuous <Continuous>  dextrose 50% Injectable 25 Gram(s) IV Push once  dextrose 50% Injectable 25 Gram(s) IV Push once  dextrose 50% Injectable 12.5 Gram(s) IV Push once  glucagon  Injectable 1 milliGRAM(s) IntraMuscular once  insulin lispro (ADMELOG) corrective regimen sliding scale   SubCutaneous every 6 hours  lactated ringers. 1000 milliLiter(s) (100 mL/Hr) IV Continuous <Continuous>  nicotine - 21 mG/24Hr(s) Patch 1 Patch Transdermal daily  pantoprazole  Injectable 40 milliGRAM(s) IV Push daily  sodium chloride 0.9%. 1000 milliLiter(s) (100 mL/Hr) IV Continuous <Continuous>    MEDICATIONS  (PRN):  acetaminophen     Tablet .. 650 milliGRAM(s) Oral every 6 hours PRN Temp greater or equal to 38C (100.4F), Mild Pain (1 - 3)  aluminum hydroxide/magnesium hydroxide/simethicone Suspension 30 milliLiter(s) Oral every 4 hours PRN Dyspepsia  dextrose Oral Gel 15 Gram(s) Oral once PRN Blood Glucose LESS THAN 70 milliGRAM(s)/deciliter  diphenhydrAMINE Injectable 25 milliGRAM(s) IV Push every 4 hours PRN Rash and/or Itching  HYDROmorphone  Injectable 2 milliGRAM(s) IV Push every 4 hours PRN Severe Pain (7 - 10)  melatonin 3 milliGRAM(s) Oral at bedtime PRN Insomnia  metoclopramide Injectable 5 milliGRAM(s) IV Push every 8 hours PRN nausea/vomiting

## 2024-07-17 NOTE — PROGRESS NOTE ADULT - SUBJECTIVE AND OBJECTIVE BOX
Patient is a 34y old  Female who presents with a chief complaint of Intractable nausea and vomiting, IDDM1 with hyperglycemia (15 Jul 2024 14:00)      INTERVAL HPI/OVERNIGHT EVENTS: No acute events overnight, slightly better today, still vomiting.     MEDICATIONS  (STANDING):  buprenorphine 2 mG/naloxone 0.5 mG SL Film 2 Film(s) SubLingual daily  dextrose 5%. 1000 milliLiter(s) (50 mL/Hr) IV Continuous <Continuous>  dextrose 5%. 1000 milliLiter(s) (100 mL/Hr) IV Continuous <Continuous>  dextrose 50% Injectable 25 Gram(s) IV Push once  dextrose 50% Injectable 12.5 Gram(s) IV Push once  dextrose 50% Injectable 25 Gram(s) IV Push once  glucagon  Injectable 1 milliGRAM(s) IntraMuscular once  insulin lispro (ADMELOG) corrective regimen sliding scale   SubCutaneous every 6 hours  lactated ringers. 1000 milliLiter(s) (100 mL/Hr) IV Continuous <Continuous>  nicotine - 21 mG/24Hr(s) Patch 1 Patch Transdermal daily  pantoprazole  Injectable 40 milliGRAM(s) IV Push daily  sodium chloride 0.9%. 1000 milliLiter(s) (100 mL/Hr) IV Continuous <Continuous>    MEDICATIONS  (PRN):  acetaminophen     Tablet .. 650 milliGRAM(s) Oral every 6 hours PRN Temp greater or equal to 38C (100.4F), Mild Pain (1 - 3)  aluminum hydroxide/magnesium hydroxide/simethicone Suspension 30 milliLiter(s) Oral every 4 hours PRN Dyspepsia  dextrose Oral Gel 15 Gram(s) Oral once PRN Blood Glucose LESS THAN 70 milliGRAM(s)/deciliter  diphenhydrAMINE Injectable 25 milliGRAM(s) IV Push every 4 hours PRN Rash and/or Itching  HYDROmorphone  Injectable 2 milliGRAM(s) IV Push every 4 hours PRN Severe Pain (7 - 10)  melatonin 3 milliGRAM(s) Oral at bedtime PRN Insomnia  metoclopramide Injectable 5 milliGRAM(s) IV Push every 8 hours PRN nausea/vomiting      Allergies    Toradol (Pruritus)  Zofran (Urticaria)    Intolerances        REVIEW OF SYSTEMS:  CONSTITUTIONAL: +ve for fatigue  EYES: No eye pain, visual disturbances, or discharge  ENMT:  No difficulty hearing, tinnitus, vertigo; No sinus or throat pain  NECK: No pain or stiffness      Vital Signs Last 24 Hrs  T(C): 36.7 (16 Jul 2024 12:02), Max: 36.9 (16 Jul 2024 05:01)  T(F): 98 (16 Jul 2024 12:02), Max: 98.4 (16 Jul 2024 05:01)  HR: 109 (16 Jul 2024 12:02) (98 - 110)  BP: 140/73 (16 Jul 2024 12:02) (115/66 - 140/73)  BP(mean): --  RR: 20 (16 Jul 2024 12:02) (18 - 20)  SpO2: 94% (16 Jul 2024 12:02) (94% - 98%)    Parameters below as of 16 Jul 2024 12:02  Patient On (Oxygen Delivery Method): room air        PHYSICAL EXAM:    HEAD:  Atraumatic, Normocephalic  EYES: EOMI, PERRLA, conjunctiva and sclera clear  ENMT: No tonsillar erythema, exudates, or enlargement; Moist mucous membranes, Good dentition, No lesions  NECK: Supple, No JVD, Normal thyroid  NERVOUS SYSTEM:  Alert & Oriented X3    LABS:                        9.7    12.59 )-----------( 236      ( 16 Jul 2024 06:55 )             31.2     07-16    133<L>  |  100  |  21  ----------------------------<  166<H>  3.4<L>   |  21<L>  |  1.29    Ca    8.6      16 Jul 2024 06:55  Mg     1.8     07-16    TPro  7.0  /  Alb  3.2<L>  /  TBili  0.7  /  DBili  x   /  AST  28  /  ALT  30  /  AlkPhos  106  07-14      Urinalysis Basic - ( 16 Jul 2024 06:55 )    Color: x / Appearance: x / SG: x / pH: x  Gluc: 166 mg/dL / Ketone: x  / Bili: x / Urobili: x   Blood: x / Protein: x / Nitrite: x   Leuk Esterase: x / RBC: x / WBC x   Sq Epi: x / Non Sq Epi: x / Bacteria: x      CAPILLARY BLOOD GLUCOSE      POCT Blood Glucose.: 273 mg/dL (16 Jul 2024 11:27)  POCT Blood Glucose.: 170 mg/dL (16 Jul 2024 06:02)  POCT Blood Glucose.: 380 mg/dL (16 Jul 2024 00:17)  POCT Blood Glucose.: 367 mg/dL (15 Jul 2024 17:52)    
Patient is a 34y old  Female who presents with a chief complaint of Intractable nausea and vomiting, IDDM1 with hyperglycemia (14 Jul 2024 20:54)      INTERVAL HPI/OVERNIGHT EVENTS: Patient is still complaining of abdominal pain and nonbloody vomiting. Asking for dilaudid. Also had a panic attack this AM.     MEDICATIONS  (STANDING):  buprenorphine 2 mG/naloxone 0.5 mG SL Film 2 Film(s) SubLingual daily  dextrose 5%. 1000 milliLiter(s) (100 mL/Hr) IV Continuous <Continuous>  dextrose 5%. 1000 milliLiter(s) (50 mL/Hr) IV Continuous <Continuous>  dextrose 50% Injectable 25 Gram(s) IV Push once  dextrose 50% Injectable 12.5 Gram(s) IV Push once  dextrose 50% Injectable 25 Gram(s) IV Push once  glucagon  Injectable 1 milliGRAM(s) IntraMuscular once  insulin lispro (ADMELOG) corrective regimen sliding scale   SubCutaneous every 6 hours  lactated ringers. 1000 milliLiter(s) (100 mL/Hr) IV Continuous <Continuous>  pantoprazole  Injectable 40 milliGRAM(s) IV Push daily  sodium chloride 0.9%. 1000 milliLiter(s) (100 mL/Hr) IV Continuous <Continuous>    MEDICATIONS  (PRN):  acetaminophen     Tablet .. 650 milliGRAM(s) Oral every 6 hours PRN Temp greater or equal to 38C (100.4F), Mild Pain (1 - 3)  aluminum hydroxide/magnesium hydroxide/simethicone Suspension 30 milliLiter(s) Oral every 4 hours PRN Dyspepsia  dextrose Oral Gel 15 Gram(s) Oral once PRN Blood Glucose LESS THAN 70 milliGRAM(s)/deciliter  diphenhydrAMINE Injectable 25 milliGRAM(s) IV Push every 4 hours PRN Rash and/or Itching  HYDROmorphone  Injectable 1 milliGRAM(s) IV Push every 4 hours PRN Severe Pain (7 - 10)  melatonin 3 milliGRAM(s) Oral at bedtime PRN Insomnia  metoclopramide Injectable 5 milliGRAM(s) IV Push every 8 hours PRN nausea/vomiting      Allergies    Toradol (Pruritus)  Zofran (Urticaria)    Intolerances        REVIEW OF SYSTEMS:  CONSTITUTIONAL: No fever, weight loss, or fatigue  EYES: No eye pain, visual disturbances, or discharge  ENMT:  No difficulty hearing, tinnitus, vertigo; No sinus or throat pain  NECK: No pain or stiffness  RESPIRATORY: No cough, wheezing, chills or hemoptysis; No shortness of breath  CARDIOVASCULAR: No chest pain, palpitations, dizziness, or leg swelling  GASTROINTESTINAL: +ve for epigastric pain. nausea, vomiting.    Vital Signs Last 24 Hrs  T(C): 36.3 (15 Jul 2024 12:43), Max: 37.8 (14 Jul 2024 23:56)  T(F): 97.4 (15 Jul 2024 12:43), Max: 100.1 (14 Jul 2024 23:56)  HR: 136 (15 Jul 2024 12:43) (78 - 150)  BP: 140/73 (15 Jul 2024 12:43) (137/111 - 180/89)  BP(mean): --  RR: 18 (15 Jul 2024 12:43) (16 - 26)  SpO2: 100% (15 Jul 2024 12:43) (98% - 100%)    Parameters below as of 15 Jul 2024 12:43  Patient On (Oxygen Delivery Method): room air        PHYSICAL EXAM:    HEAD:  Atraumatic, Normocephalic  EYES: EOMI, PERRLA, conjunctiva and sclera clear  ENMT: No tonsillar erythema, exudates, or enlargement; Moist mucous membranes, Good dentition, No lesions  NECK: Supple, No JVD, Normal thyroid  NERVOUS SYSTEM:  Alert & Oriented X3    LABS:                        11.5   14.52 )-----------( 303      ( 15 Jul 2024 05:40 )             35.5     07-15    135  |  99  |  17  ----------------------------<  349<H>  3.3<L>   |  22  |  0.95    Ca    9.2      15 Jul 2024 05:40  Mg     1.7     07-14    TPro  7.0  /  Alb  3.2<L>  /  TBili  0.7  /  DBili  x   /  AST  28  /  ALT  30  /  AlkPhos  106  07-14      Urinalysis Basic - ( 15 Jul 2024 05:40 )    Color: x / Appearance: x / SG: x / pH: x  Gluc: 349 mg/dL / Ketone: x  / Bili: x / Urobili: x   Blood: x / Protein: x / Nitrite: x   Leuk Esterase: x / RBC: x / WBC x   Sq Epi: x / Non Sq Epi: x / Bacteria: x      CAPILLARY BLOOD GLUCOSE      POCT Blood Glucose.: 442 mg/dL (15 Jul 2024 12:12)  POCT Blood Glucose.: 473 mg/dL (15 Jul 2024 12:10)  POCT Blood Glucose.: 178 mg/dL (15 Jul 2024 07:56)  POCT Blood Glucose.: 315 mg/dL (15 Jul 2024 04:47)  POCT Blood Glucose.: 229 mg/dL (15 Jul 2024 00:12)  POCT Blood Glucose.: 292 mg/dL (14 Jul 2024 21:18)  POCT Blood Glucose.: 464 mg/dL (14 Jul 2024 19:51)  POCT Blood Glucose.: 354 mg/dL (14 Jul 2024 14:29)              
Patient is a 34y old  Female who presents with a chief complaint of Intractable nausea and vomiting, IDDM1 with hyperglycemia (16 Jul 2024 13:38)      INTERVAL HPI/OVERNIGHT EVENTS: No acute events overnight. Feels better today.     MEDICATIONS  (STANDING):  buprenorphine 2 mG/naloxone 0.5 mG SL Film 2 Film(s) SubLingual daily  dextrose 5%. 1000 milliLiter(s) (100 mL/Hr) IV Continuous <Continuous>  dextrose 5%. 1000 milliLiter(s) (50 mL/Hr) IV Continuous <Continuous>  dextrose 50% Injectable 25 Gram(s) IV Push once  dextrose 50% Injectable 25 Gram(s) IV Push once  dextrose 50% Injectable 12.5 Gram(s) IV Push once  glucagon  Injectable 1 milliGRAM(s) IntraMuscular once  insulin lispro (ADMELOG) corrective regimen sliding scale   SubCutaneous every 6 hours  lactated ringers. 1000 milliLiter(s) (100 mL/Hr) IV Continuous <Continuous>  nicotine - 21 mG/24Hr(s) Patch 1 Patch Transdermal daily  pantoprazole  Injectable 40 milliGRAM(s) IV Push daily  sodium chloride 0.9%. 1000 milliLiter(s) (100 mL/Hr) IV Continuous <Continuous>    MEDICATIONS  (PRN):  acetaminophen     Tablet .. 650 milliGRAM(s) Oral every 6 hours PRN Temp greater or equal to 38C (100.4F), Mild Pain (1 - 3)  aluminum hydroxide/magnesium hydroxide/simethicone Suspension 30 milliLiter(s) Oral every 4 hours PRN Dyspepsia  dextrose Oral Gel 15 Gram(s) Oral once PRN Blood Glucose LESS THAN 70 milliGRAM(s)/deciliter  diphenhydrAMINE Injectable 25 milliGRAM(s) IV Push every 4 hours PRN Rash and/or Itching  HYDROmorphone  Injectable 2 milliGRAM(s) IV Push every 4 hours PRN Severe Pain (7 - 10)  melatonin 3 milliGRAM(s) Oral at bedtime PRN Insomnia  metoclopramide Injectable 5 milliGRAM(s) IV Push every 8 hours PRN nausea/vomiting      Allergies    Toradol (Pruritus)  Zofran (Urticaria)    Intolerances        REVIEW OF SYSTEMS:  CONSTITUTIONAL: +ve for fatigue  EYES: No eye pain, visual disturbances, or discharge  ENMT:  No difficulty hearing, tinnitus, vertigo; No sinus or throat pain  NECK: No pain or stiffness      Vital Signs Last 24 Hrs  T(C): 36.7 (17 Jul 2024 10:36), Max: 36.8 (16 Jul 2024 23:39)  T(F): 98.1 (17 Jul 2024 10:36), Max: 98.2 (16 Jul 2024 23:39)  HR: 98 (17 Jul 2024 10:36) (90 - 108)  BP: 130/77 (17 Jul 2024 10:36) (108/66 - 130/77)  BP(mean): --  RR: 18 (17 Jul 2024 10:36) (18 - 19)  SpO2: 95% (17 Jul 2024 10:36) (92% - 98%)    Parameters below as of 17 Jul 2024 10:36  Patient On (Oxygen Delivery Method): room air        PHYSICAL EXAM:    HEAD:  Atraumatic, Normocephalic  EYES: EOMI, PERRLA, conjunctiva and sclera clear  ENMT: No tonsillar erythema, exudates, or enlargement; Moist mucous membranes, Good dentition, No lesions  NECK: Supple, No JVD, Normal thyroid  NERVOUS SYSTEM:  Alert & Oriented X3    LABS:                        9.7    8.97  )-----------( 277      ( 17 Jul 2024 06:10 )             31.0     07-17    136  |  103  |  15  ----------------------------<  277<H>  3.2<L>   |  24  |  1.24    Ca    8.1<L>      17 Jul 2024 06:10  Mg     1.7     07-17        Urinalysis Basic - ( 17 Jul 2024 06:10 )    Color: x / Appearance: x / SG: x / pH: x  Gluc: 277 mg/dL / Ketone: x  / Bili: x / Urobili: x   Blood: x / Protein: x / Nitrite: x   Leuk Esterase: x / RBC: x / WBC x   Sq Epi: x / Non Sq Epi: x / Bacteria: x      CAPILLARY BLOOD GLUCOSE      POCT Blood Glucose.: 230 mg/dL (17 Jul 2024 11:26)  POCT Blood Glucose.: 75 mg/dL (17 Jul 2024 07:49)  POCT Blood Glucose.: 148 mg/dL (17 Jul 2024 06:39)  POCT Blood Glucose.: 293 mg/dL (17 Jul 2024 00:25)  POCT Blood Glucose.: 467 mg/dL (16 Jul 2024 19:31)

## 2024-07-17 NOTE — BH CONSULTATION LIAISON PROGRESS NOTE - NSBHCHARTREVIEWVS_PSY_A_CORE FT
Vital Signs Last 24 Hrs  T(C): 36.7 (17 Jul 2024 10:36), Max: 36.8 (16 Jul 2024 23:39)  T(F): 98.1 (17 Jul 2024 10:36), Max: 98.2 (16 Jul 2024 23:39)  HR: 98 (17 Jul 2024 10:36) (90 - 108)  BP: 130/77 (17 Jul 2024 10:36) (108/66 - 130/77)  BP(mean): --  RR: 18 (17 Jul 2024 10:36) (18 - 19)  SpO2: 95% (17 Jul 2024 10:36) (92% - 98%)    Parameters below as of 17 Jul 2024 10:36  Patient On (Oxygen Delivery Method): room air

## 2024-07-17 NOTE — BH CONSULTATION LIAISON PROGRESS NOTE - NSBHCONSULTRECOMMENDOTHER_PSY_A_CORE FT
7/15/24: Dilaudid 2mg IVP q4hrs with IV Benadryl; Pt on Suboxone which sits on some % of the opiate receptors making Dilaudid binding compromised  - has capacity to make her medical decisions and can leave AMA  7/17/24: if closer to discharge, start changing IVP Dilaudid to PO; continue Suboxone 4mg/1mg anne PO qd as confirmed per [facility]STOP  -Pt has capacity to make her medical decisions and can leave AMA  - [facility] signing off

## 2024-07-18 ENCOUNTER — TRANSCRIPTION ENCOUNTER (OUTPATIENT)
Age: 34
End: 2024-07-18

## 2024-07-18 VITALS — SYSTOLIC BLOOD PRESSURE: 136 MMHG | HEART RATE: 90 BPM | DIASTOLIC BLOOD PRESSURE: 84 MMHG

## 2024-07-18 LAB
GLUCOSE BLDC GLUCOMTR-MCNC: 283 MG/DL — HIGH (ref 70–99)
GLUCOSE BLDC GLUCOMTR-MCNC: 72 MG/DL — SIGNIFICANT CHANGE UP (ref 70–99)

## 2024-07-18 PROCEDURE — 99239 HOSP IP/OBS DSCHRG MGMT >30: CPT

## 2024-07-18 RX ORDER — INSULIN LISPRO 100 [IU]/ML
6 INJECTION, SOLUTION SUBCUTANEOUS
Qty: 0 | Refills: 0 | DISCHARGE

## 2024-07-18 RX ORDER — INSULIN GLARGINE 100 [IU]/ML
16 INJECTION, SOLUTION SUBCUTANEOUS
Qty: 0 | Refills: 0 | DISCHARGE

## 2024-07-18 RX ADMIN — HYDROMORPHONE HCL 2 MILLIGRAM(S): 0.2 INJECTION, SOLUTION INTRAVENOUS at 12:07

## 2024-07-18 RX ADMIN — Medication 25 MILLIGRAM(S): at 11:53

## 2024-07-18 RX ADMIN — HYDROMORPHONE HCL 2 MILLIGRAM(S): 0.2 INJECTION, SOLUTION INTRAVENOUS at 08:46

## 2024-07-18 RX ADMIN — PANTOPRAZOLE SODIUM 40 MILLIGRAM(S): 40 INJECTION, POWDER, FOR SOLUTION INTRAVENOUS at 11:43

## 2024-07-18 RX ADMIN — NICOTINE POLACRILEX 1 PATCH: 2 LOZENGE ORAL at 11:44

## 2024-07-18 RX ADMIN — NICOTINE POLACRILEX 1 PATCH: 2 LOZENGE ORAL at 07:18

## 2024-07-18 RX ADMIN — NICOTINE POLACRILEX 1 PATCH: 2 LOZENGE ORAL at 11:42

## 2024-07-18 RX ADMIN — INSULIN LISPRO 6: 100 INJECTION, SOLUTION SUBCUTANEOUS at 11:45

## 2024-07-18 RX ADMIN — HYDROMORPHONE HCL 2 MILLIGRAM(S): 0.2 INJECTION, SOLUTION INTRAVENOUS at 07:51

## 2024-07-18 RX ADMIN — Medication 25 MILLIGRAM(S): at 07:51

## 2024-07-18 RX ADMIN — Medication 25 MILLIGRAM(S): at 03:26

## 2024-07-18 RX ADMIN — HYDROMORPHONE HCL 2 MILLIGRAM(S): 0.2 INJECTION, SOLUTION INTRAVENOUS at 11:54

## 2024-07-18 RX ADMIN — HYDROMORPHONE HCL 2 MILLIGRAM(S): 0.2 INJECTION, SOLUTION INTRAVENOUS at 04:20

## 2024-07-18 RX ADMIN — HYDROMORPHONE HCL 2 MILLIGRAM(S): 0.2 INJECTION, SOLUTION INTRAVENOUS at 03:25

## 2024-07-18 RX ADMIN — HYDROMORPHONE HCL 2 MILLIGRAM(S): 0.2 INJECTION, SOLUTION INTRAVENOUS at 00:38

## 2024-07-18 NOTE — DISCHARGE NOTE PROVIDER - NSDCMRMEDTOKEN_GEN_ALL_CORE_FT
Admelog SoloStar 100 units/mL injectable solution: 6 unit(s) injectable 3 times a day (before meals)  buprenorphine-naloxone 4 mg-1 mg sublingual film: 1 film(s) sublingually once a day  Lantus Solostar Pen 100 units/mL subcutaneous solution: 18 unit(s) subcutaneous once a day (at bedtime)   Admelog SoloStar 100 units/mL injectable solution: 6 unit(s) injectable 3 times a day (before meals)  buprenorphine-naloxone 4 mg-1 mg sublingual film: 1 film(s) sublingually once a day  Lantus Solostar Pen 100 units/mL subcutaneous solution: 16 unit(s) subcutaneous once a day (at bedtime)

## 2024-07-18 NOTE — DISCHARGE NOTE PROVIDER - NSDCCPCAREPLAN_GEN_ALL_CORE_FT
PRINCIPAL DISCHARGE DIAGNOSIS  Diagnosis: Intractable vomiting  Assessment and Plan of Treatment: Please follow up with PCP after discharge for continue care      SECONDARY DISCHARGE DIAGNOSES  Diagnosis: Diabetes  Assessment and Plan of Treatment:

## 2024-07-18 NOTE — DISCHARGE NOTE PROVIDER - HOSPITAL COURSE
Nissa Rodriges is a 34 year old female with PMHx of IDDM1, gastroparesis, tobacco use, and marijuana use who presented to the ED on 7/14/24 for complaints of nausea and vomiting and admitted for intractable nausea and vomiting and IDDM1 with hyperglycemia.    #Intractable nausea and vomiting, suspect multifactorial due to worsening gastroparesis due to DM vs. hyperemesis cannabinoid syndrome   -Complaints of nausea and bilious emesis that started about 30 minutes prior to arriving to ER, associated epigastric pain  -Lipase WNL  -S/p acetaminophen 1 g IV, diphenhydramine 50 mg IV, famotidine 20 mg IV, metoclopramide 20 mg IV, ondansetron 4 mg IV, and morphine 6 mg IV in the ED   -Antiemetics with metoclopramide 5 mg TID PRN given hx of gastroparesis  -Diet slowly advanced with good tolerance    #IDDM1 with hyperglycemia  -Reports insulin pump has been off since last week as she lost the transmitter so she is waiting for a new one to be mailed to her so she has not been taking any insulin  -Last known A1c 8.3 (on 10/10/23), blood glucose as elevated as 464 on admission, U/A with ketonuria and glucosuria, small acetone, VBG 7.39 / 46 / 38 / 28  - S/p 3L LR bolus, insulin 6 IV in the ED  - Diabetes education provided    #Hypertonic hyponatremia secondary to hyperglycemia, resolved  -Corrected sodium 136 on admission  -Anticipate improvement of sodium with tight glycemic control    #Leukocytosis, likely reactive, resolved  -Afebrile, WBC 15.25K on admission  -No signs of infection  -WBC 8.97 on discharge    #Abdominal pain  - s/p IV Dilaudid, IV PPI  - Reglan    #Suicide attempt  - Cleared by psych    Chronic medical conditions:  #Tobacco use: smokes 1/2 ppd x 8 years, unable to provide counseling on smoking cessation, declined nicotine patch and nicotine gum    Unclear why patient takes buprenorphine / naloxone 4 / 1 mg SL but continued per Psych.  Case reviewed by attending, Dr. Camargo, patient deemed clinically stable for discharge home.      Nissa Rodriges is a 34 year old female with PMHx of IDDM1, gastroparesis, tobacco use, and marijuana use who presented to the ED on 7/14/24 for complaints of nausea and vomiting and admitted for intractable nausea and vomiting and IDDM1 with hyperglycemia.    #Intractable nausea and vomiting, suspect multifactorial due to worsening gastroparesis due to DM vs. hyperemesis cannabinoid syndrome   -Complaints of nausea and bilious emesis that started about 30 minutes prior to arriving to ER, associated epigastric pain  -Lipase WNL  -S/p acetaminophen 1 g IV, diphenhydramine 50 mg IV, famotidine 20 mg IV, metoclopramide 20 mg IV, ondansetron 4 mg IV, and morphine 6 mg IV in the ED   -Antiemetics with metoclopramide 5 mg TID PRN given hx of gastroparesis  -Diet slowly advanced with good tolerance    #IDDM1 with hyperglycemia  -Reports insulin pump has been off since last week as she lost the transmitter so she is waiting for a new one to be mailed to her so she has not been taking any insulin  -Last known A1c 8.3 (on 10/10/23), blood glucose as elevated as 464 on admission, U/A with ketonuria and glucosuria, small acetone, VBG 7.39 / 46 / 38 / 28  - S/p 3L LR bolus, insulin 6 IV in the ED  - Diabetes education provided    #Hypertonic hyponatremia secondary to hyperglycemia, resolved  -Corrected sodium 136 on admission  -Anticipate improvement of sodium with tight glycemic control    #Leukocytosis, likely reactive, resolved  -Afebrile, WBC 15.25K on admission  -No signs of infection  -WBC 8.97 on discharge    #Abdominal pain  - s/p IV Dilaudid, IV PPI  - Reglan    #Suicide attempt  - Cleared by psych    Chronic medical conditions:  #Tobacco use: smokes 1/2 ppd x 8 years, unable to provide counseling on smoking cessation, declined nicotine patch and nicotine gum    Unclear why patient takes buprenorphine / naloxone 4 / 1 mg SL but continued per Psych.      Patient is tolerating diet, abdominal pain resolved. She has an insulin pump at home and is waiting for a part to be delivered to her by the company before she can start using it again. Until then, she will use lantus 18 units at bedtime and sliding scale insulin.     Time spent on discharge: 32 minutes

## 2024-07-18 NOTE — DISCHARGE NOTE NURSING/CASE MANAGEMENT/SOCIAL WORK - PATIENT PORTAL LINK FT
You can access the FollowMyHealth Patient Portal offered by [facility] by registering at the following website: [email or web address]. By joining [facility]’s FollowMyHealth portal, you will also be able to view your health information using other applications (apps) compatible with our system.

## 2024-07-20 ENCOUNTER — RX RENEWAL (OUTPATIENT)
Age: 34
End: 2024-07-20

## 2024-07-22 ENCOUNTER — OFFICE (OUTPATIENT)
Dept: URBAN - METROPOLITAN AREA CLINIC 88 | Facility: CLINIC | Age: 34
Setting detail: OPHTHALMOLOGY
End: 2024-07-22
Payer: COMMERCIAL

## 2024-07-22 DIAGNOSIS — E10.3592: ICD-10-CM

## 2024-07-22 PROCEDURE — 67028 INJECTION EYE DRUG: CPT | Mod: LT | Performed by: OPHTHALMOLOGY

## 2024-07-24 DIAGNOSIS — I10 ESSENTIAL (PRIMARY) HYPERTENSION: ICD-10-CM

## 2024-07-24 DIAGNOSIS — K31.84 GASTROPARESIS: ICD-10-CM

## 2024-07-24 DIAGNOSIS — F12.99 CANNABIS USE, UNSPECIFIED WITH UNSPECIFIED CANNABIS-INDUCED DISORDER: ICD-10-CM

## 2024-07-24 DIAGNOSIS — E87.1 HYPO-OSMOLALITY AND HYPONATREMIA: ICD-10-CM

## 2024-07-24 DIAGNOSIS — Z79.4 LONG TERM (CURRENT) USE OF INSULIN: ICD-10-CM

## 2024-07-24 DIAGNOSIS — T85.614A BREAKDOWN (MECHANICAL) OF INSULIN PUMP, INITIAL ENCOUNTER: ICD-10-CM

## 2024-07-24 DIAGNOSIS — G89.29 OTHER CHRONIC PAIN: ICD-10-CM

## 2024-07-24 DIAGNOSIS — E10.43 TYPE 1 DIABETES MELLITUS WITH DIABETIC AUTONOMIC (POLY)NEUROPATHY: ICD-10-CM

## 2024-07-24 DIAGNOSIS — Z96.41 PRESENCE OF INSULIN PUMP (EXTERNAL) (INTERNAL): ICD-10-CM

## 2024-07-24 DIAGNOSIS — F17.210 NICOTINE DEPENDENCE, CIGARETTES, UNCOMPLICATED: ICD-10-CM

## 2024-07-24 DIAGNOSIS — E10.65 TYPE 1 DIABETES MELLITUS WITH HYPERGLYCEMIA: ICD-10-CM

## 2024-07-29 ENCOUNTER — RX RENEWAL (OUTPATIENT)
Age: 34
End: 2024-07-29

## 2024-08-07 NOTE — PATIENT PROFILE ADULT - NSTOBACCONEVERSMOKERY/N_GEN_A
Patient have 4-5 episodes of diarrhea since 4 days ago      Plan  -Monitor electrolytes  -Replenish electrolytes         Yes

## 2024-08-09 PROBLEM — F17.200 NICOTINE DEPENDENCE, UNSPECIFIED, UNCOMPLICATED: Chronic | Status: ACTIVE | Noted: 2024-07-14

## 2024-08-09 PROBLEM — E10.9 TYPE 1 DIABETES MELLITUS WITHOUT COMPLICATIONS: Chronic | Status: ACTIVE | Noted: 2024-07-14

## 2024-08-09 PROBLEM — E11.43 TYPE 2 DIABETES MELLITUS WITH DIABETIC AUTONOMIC (POLY)NEUROPATHY: Chronic | Status: ACTIVE | Noted: 2024-07-14

## 2024-08-09 PROBLEM — F12.90 CANNABIS USE, UNSPECIFIED, UNCOMPLICATED: Chronic | Status: ACTIVE | Noted: 2024-07-14

## 2024-08-14 ENCOUNTER — TRANSCRIPTION ENCOUNTER (OUTPATIENT)
Age: 34
End: 2024-08-14

## 2024-08-29 ENCOUNTER — DOCTOR'S OFFICE (OUTPATIENT)
Age: 34
Setting detail: OPHTHALMOLOGY
End: 2024-08-29
Payer: COMMERCIAL

## 2024-08-29 DIAGNOSIS — E10.3592: ICD-10-CM

## 2024-08-29 DIAGNOSIS — E10.3591: ICD-10-CM

## 2024-08-29 DIAGNOSIS — H43.12: ICD-10-CM

## 2024-08-29 PROCEDURE — 92201 OPSCPY EXTND RTA DRAW UNI/BI: CPT | Performed by: OPHTHALMOLOGY

## 2024-08-29 PROCEDURE — 92134 CPTRZ OPH DX IMG PST SGM RTA: CPT | Performed by: OPHTHALMOLOGY

## 2024-08-29 PROCEDURE — 92235 FLUORESCEIN ANGRPH MLTIFRAME: CPT | Performed by: OPHTHALMOLOGY

## 2024-08-29 PROCEDURE — 92014 COMPRE OPH EXAM EST PT 1/>: CPT | Performed by: OPHTHALMOLOGY

## 2024-09-05 ENCOUNTER — OFFICE (OUTPATIENT)
Dept: URBAN - METROPOLITAN AREA CLINIC 35 | Facility: CLINIC | Age: 34
Setting detail: OPHTHALMOLOGY
End: 2024-09-05
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
## 2024-09-05 PROCEDURE — NO SHOW FE NO SHOW FEE: Performed by: OPHTHALMOLOGY

## 2024-09-06 NOTE — ED ADULT NURSE NOTE - NS TRANSFER PATIENT BELONGINGS
Controlled Substance Refill Request for: Vyvanse 40mg-3 month panel  Last refill: 8/11/24  Last clinic visit: virtual visit 6/10/24   Clinic visit frequency required: Q 6  months  Next appt: due December 2024  Controlled substance agreement on file: Yes:  Date 7/5/23.  Tox screen done yes, 7/5/23    [place]  checked, fill history below. Refill routed to Kimberlee Paula CNP for review.      
Clothing

## 2024-09-19 ENCOUNTER — DOCTOR'S OFFICE (OUTPATIENT)
Facility: LOCATION | Age: 34
Setting detail: OPHTHALMOLOGY
End: 2024-09-19
Payer: COMMERCIAL

## 2024-09-19 DIAGNOSIS — E10.3592: ICD-10-CM

## 2024-09-19 DIAGNOSIS — H43.12: ICD-10-CM

## 2024-09-19 DIAGNOSIS — E10.3591: ICD-10-CM

## 2024-09-19 PROCEDURE — 67210 TREATMENT OF RETINAL LESION: CPT | Mod: RT | Performed by: OPHTHALMOLOGY

## 2024-09-19 PROCEDURE — 92134 CPTRZ OPH DX IMG PST SGM RTA: CPT | Performed by: OPHTHALMOLOGY

## 2024-10-09 NOTE — H&P ADULT - NSCORESITESY/N_GEN_A_CORE_RD
Pharmacy calling to discuss order received for compounded medication.    Returned call. Confirmed prescription for progesterone suppositories as RX was transferred to [facility]. Pharmacy aware patient needs by 10/14.   Yes

## 2024-10-18 ENCOUNTER — APPOINTMENT (OUTPATIENT)
Dept: ENDOCRINOLOGY | Facility: CLINIC | Age: 34
End: 2024-10-18

## 2024-10-22 NOTE — ED ADULT NURSE NOTE - NSHOSCREENINGQ1_ED_ALL_ED
Problem: Discharge Planning  Goal: Discharge to home or other facility with appropriate resources  10/22/2024 0113 by Brina Haro, RN  Outcome: HH/\[facility] [place] [facility]\""C Not Progressing  Flowsheets (Taken 10/21/2024 2115)  Discharge to home or other facility with appropriate resources:   Identify barriers to discharge with patient and caregiver   Arrange for needed discharge resources and transportation as appropriate   Identify discharge learning needs (meds, wound care, etc)   Arrange for interpreters to assist at discharge as needed  10/21/2024 1130 by Gretchen Scott RN  Outcome: Progressing  Flowsheets (Taken 10/21/2024 1130)  Discharge to home or other facility with appropriate resources:   Identify barriers to discharge with patient and caregiver   Arrange for needed discharge resources and transportation as appropriate   Identify discharge learning needs (meds, wound care, etc)     Problem: Skin/Tissue Integrity  Goal: Absence of new skin breakdown  Description: 1.  Monitor for areas of redness and/or skin breakdown  2.  Assess vascular access sites hourly  3.  Every 4-6 hours minimum:  Change oxygen saturation probe site  4.  Every 4-6 hours:  If on nasal continuous positive airway pressure, respiratory therapy assess nares and determine need for appliance change or resting period.  10/22/2024 0113 by Brina Haro RN  Outcome: /\[facility] [place] [facility]\""C Not Progressing  10/21/2024 1130 by Gretchen Scott RN  Outcome: Progressing  Note: The pt will remain free from developing further skin breakdown by turning and repositioning Q2 hrs.     Problem: Safety - Adult  Goal: Free from fall injury  10/22/2024 0113 by Brina Haro RN  Outcome: HH/HSPC Not Progressing  Flowsheets (Taken 10/21/2024 2305)  Free From Fall Injury:   Instruct family/caregiver on patient safety   Based on caregiver fall risk screen, instruct family/caregiver to ask for assistance with transferring infant if caregiver noted to have fall risk factors  10/21/2024  1130 by Sonia, Gretchen, RN  Outcome: Progressing  Flowsheets (Taken 10/21/2024 1130)  Free From Fall Injury:   Instruct family/caregiver on patient safety   Based on caregiver fall risk screen, instruct family/caregiver to ask for assistance with transferring infant if caregiver noted to have fall risk factors     Problem: ABCDS Injury Assessment  Goal: Absence of physical injury  10/22/2024 0113 by Brina Haro RN  Outcome: HH/HSPC Not Progressing  10/21/2024 1130 by Gretchen Scott RN  Outcome: Progressing  Flowsheets (Taken 10/21/2024 1130)  Absence of Physical Injury: Implement safety measures based on patient assessment     Problem: Chronic Conditions and Co-morbidities  Goal: Patient's chronic conditions and co-morbidity symptoms are monitored and maintained or improved  10/22/2024 0113 by Brina Haro RN  Outcome: HH/HSPC Not Progressing  Flowsheets (Taken 10/21/2024 2115)  Care Plan - Patient's Chronic Conditions and Co-Morbidity Symptoms are Monitored and Maintained or Improved:   Monitor and assess patient's chronic conditions and comorbid symptoms for stability, deterioration, or improvement   Collaborate with multidisciplinary team to address chronic and comorbid conditions and prevent exacerbation or deterioration   Update acute care plan with appropriate goals if chronic or comorbid symptoms are exacerbated and prevent overall improvement and discharge  10/21/2024 1130 by Gretchen Scott RN  Outcome: Progressing  Flowsheets (Taken 10/21/2024 1130)  Care Plan - Patient's Chronic Conditions and Co-Morbidity Symptoms are Monitored and Maintained or Improved: Monitor and assess patient's chronic conditions and comorbid symptoms for stability, deterioration, or improvement     Problem: Respiratory - Adult  Goal: Achieves optimal ventilation and oxygenation  Outcome: HH/HSPC Not Progressing  Flowsheets (Taken 10/21/2024 2115)  Achieves optimal ventilation and oxygenation:   Assess for changes in  No

## 2024-11-01 ENCOUNTER — HOSPITAL ENCOUNTER (OUTPATIENT)
Dept: HOSPITAL 74 - JER | Age: 34
Setting detail: OBSERVATION
LOS: 3 days | Discharge: LEFT BEFORE DISCHARGE | End: 2024-11-04
Attending: HOSPITALIST | Admitting: HOSPITALIST
Payer: COMMERCIAL

## 2024-11-01 VITALS — BODY MASS INDEX: 22.4 KG/M2

## 2024-11-01 DIAGNOSIS — Z79.891: ICD-10-CM

## 2024-11-01 DIAGNOSIS — F17.200: ICD-10-CM

## 2024-11-01 DIAGNOSIS — Z90.49: ICD-10-CM

## 2024-11-01 DIAGNOSIS — F13.980: ICD-10-CM

## 2024-11-01 DIAGNOSIS — E10.9: ICD-10-CM

## 2024-11-01 DIAGNOSIS — K31.84: Primary | ICD-10-CM

## 2024-11-01 DIAGNOSIS — R01.1: ICD-10-CM

## 2024-11-01 DIAGNOSIS — R10.9: ICD-10-CM

## 2024-11-01 DIAGNOSIS — D50.9: ICD-10-CM

## 2024-11-01 LAB
ALBUMIN SERPL-MCNC: 3.9 G/DL (ref 3.4–5)
ALP SERPL-CCNC: 93 U/L (ref 45–117)
ALT SERPL-CCNC: 34 U/L (ref 13–61)
AMPHET UR-MCNC: NEGATIVE NG/ML
ANION GAP SERPL CALC-SCNC: 9 MMOL/L (ref 4–13)
ANISOCYTOSIS BLD QL: (no result)
APPEARANCE UR: CLEAR
AST SERPL-CCNC: 27 U/L (ref 15–37)
BACTERIA # UR AUTO: 499 /UL (ref 0–1359)
BARBITURATES UR-MCNC: NEGATIVE UG/ML
BASE EXCESS BLDV CALC-SCNC: 1.2 MMOL/L (ref -2–2)
BASOPHILS # BLD: 0.5 % (ref 0–2)
BENZODIAZ UR SCN-MCNC: NEGATIVE NG/ML
BILIRUB SERPL-MCNC: 0.8 MG/DL (ref 0.2–1)
BILIRUB UR STRIP.AUTO-MCNC: NEGATIVE MG/DL
BUN SERPL-MCNC: 15.1 MG/DL (ref 7–18)
CALCIUM SERPL-MCNC: 9.7 MG/DL (ref 8.5–10.1)
CASTS URNS QL MICRO: 0 /UL (ref 0–3.1)
CHLORIDE SERPL-SCNC: 98 MMOL/L (ref 98–107)
CHOLEST SERPL-MCNC: 234 MG/DL (ref 50–200)
CO2 SERPL-SCNC: 26 MMOL/L (ref 21–32)
COCAINE UR-MCNC: NEGATIVE NG/ML
COLOR UR: YELLOW
CREAT SERPL-MCNC: 1.1 MG/DL (ref 0.55–1.3)
DEPRECATED RDW RBC AUTO: 16 % (ref 11.6–15.6)
EOSINOPHIL # BLD: 0 % (ref 0–4.5)
EPITH CASTS URNS QL MICRO: 1 /UL (ref 0–25.1)
GLUCOSE SERPL-MCNC: 478 MG/DL (ref 74–106)
HCT VFR BLD CALC: 36.4 % (ref 32.4–45.2)
HCT VFR BLDV CALC: 38 % (ref 32.4–45.2)
HDLC SERPL-MCNC: 116 MG/DL (ref 40–60)
HGB BLD-MCNC: 11.5 GM/DL (ref 10.7–15.3)
KETONES UR QL STRIP: (no result)
LDLC SERPL CALC-MCNC: 100 MG/DL (ref 5–100)
LEUKOCYTE ESTERASE UR QL STRIP.AUTO: NEGATIVE
LYMPHOCYTES # BLD: 8.8 % (ref 8–40)
MACROCYTES BLD QL: 0
MAGNESIUM SERPL-MCNC: 1.9 MG/DL (ref 1.8–2.4)
MCH RBC QN AUTO: 21.5 PG (ref 25.7–33.7)
MCHC RBC AUTO-ENTMCNC: 31.5 G/DL (ref 32–36)
MCV RBC: 68.3 FL (ref 80–96)
METHADONE UR-MCNC: NEGATIVE UG/L
MONOCYTES # BLD AUTO: 2.4 % (ref 3.8–10.2)
NEUTROPHILS # BLD: 88.3 % (ref 42.8–82.8)
NITRITE UR QL STRIP: NEGATIVE
OPIATES UR QL SCN: NEGATIVE
PCO2 BLDV: 46.4 MMHG (ref 38–52)
PCP UR QL SCN: NEGATIVE
PH BLDV: 7.38 [PH] (ref 7.31–7.41)
PH UR: 7.5 [PH] (ref 5–8)
PLATELET # BLD AUTO: 305 10^3/UL (ref 134–434)
PMV BLD: 9.2 FL (ref 7.5–11.1)
POTASSIUM SERPLBLD-SCNC: 4.2 MMOL/L (ref 3.5–5.1)
PROT SERPL-MCNC: 7.5 G/DL (ref 6.4–8.2)
PROT UR QL STRIP: (no result)
PROT UR QL STRIP: (no result)
RBC # BLD AUTO: 5.34 M/MM3 (ref 3.6–5.2)
RBC # BLD AUTO: 67 /UL (ref 0–23.9)
SAO2 % BLDV: 67 % (ref 70–80)
SODIUM SERPL-SCNC: 133 MMOL/L (ref 136–145)
SP GR UR: 1.02 (ref 1.01–1.03)
UROBILINOGEN UR STRIP-MCNC: 0.2 MG/DL (ref 0.2–1)
WBC # BLD AUTO: 9.8 K/MM3 (ref 4–10)
WBC # UR AUTO: 1 /UL (ref 0–25.8)

## 2024-11-01 PROCEDURE — 3E023GC INTRODUCTION OF OTHER THERAPEUTIC SUBSTANCE INTO MUSCLE, PERCUTANEOUS APPROACH: ICD-10-PCS | Performed by: HOSPITALIST

## 2024-11-01 PROCEDURE — 3E0233Z INTRODUCTION OF ANTI-INFLAMMATORY INTO MUSCLE, PERCUTANEOUS APPROACH: ICD-10-PCS | Performed by: HOSPITALIST

## 2024-11-01 PROCEDURE — 3E0337Z INTRODUCTION OF ELECTROLYTIC AND WATER BALANCE SUBSTANCE INTO PERIPHERAL VEIN, PERCUTANEOUS APPROACH: ICD-10-PCS | Performed by: HOSPITALIST

## 2024-11-01 PROCEDURE — 3E013VG INTRODUCTION OF INSULIN INTO SUBCUTANEOUS TISSUE, PERCUTANEOUS APPROACH: ICD-10-PCS | Performed by: HOSPITALIST

## 2024-11-01 PROCEDURE — 3E033NZ INTRODUCTION OF ANALGESICS, HYPNOTICS, SEDATIVES INTO PERIPHERAL VEIN, PERCUTANEOUS APPROACH: ICD-10-PCS | Performed by: HOSPITALIST

## 2024-11-01 PROCEDURE — 3E023NZ INTRODUCTION OF ANALGESICS, HYPNOTICS, SEDATIVES INTO MUSCLE, PERCUTANEOUS APPROACH: ICD-10-PCS | Performed by: HOSPITALIST

## 2024-11-01 PROCEDURE — G0378 HOSPITAL OBSERVATION PER HR: HCPCS

## 2024-11-01 PROCEDURE — 3E03329 INTRODUCTION OF OTHER ANTI-INFECTIVE INTO PERIPHERAL VEIN, PERCUTANEOUS APPROACH: ICD-10-PCS | Performed by: HOSPITALIST

## 2024-11-01 RX ADMIN — LORAZEPAM ONE: 2 INJECTION INTRAMUSCULAR; INTRAVENOUS at 11:31

## 2024-11-01 RX ADMIN — SODIUM CHLORIDE SCH MLS/HR: 9 INJECTION, SOLUTION INTRAVENOUS at 18:11

## 2024-11-01 RX ADMIN — FAMOTIDINE ONE MLS/HR: 20 INJECTION, SOLUTION INTRAVENOUS at 11:16

## 2024-11-01 RX ADMIN — ONDANSETRON ONE MG: 2 INJECTION INTRAMUSCULAR; INTRAVENOUS at 10:00

## 2024-11-01 RX ADMIN — ACETAMINOPHEN PRN MG: 10 INJECTION, SOLUTION INTRAVENOUS at 19:51

## 2024-11-01 RX ADMIN — INSULIN ASPART ONE UNITS: 100 INJECTION, SOLUTION INTRAVENOUS; SUBCUTANEOUS at 12:04

## 2024-11-01 RX ADMIN — INSULIN ASPART SCH UNITS: 100 INJECTION, SOLUTION INTRAVENOUS; SUBCUTANEOUS at 21:38

## 2024-11-01 RX ADMIN — HALOPERIDOL LACTATE ONE MG: 5 INJECTION, SOLUTION INTRAMUSCULAR at 10:35

## 2024-11-01 RX ADMIN — HUMAN INSULIN ONE: 100 INJECTION, SOLUTION SUBCUTANEOUS at 12:05

## 2024-11-01 RX ADMIN — SODIUM CHLORIDE ONE ML: 9 INJECTION, SOLUTION INTRAVENOUS at 11:16

## 2024-11-01 RX ADMIN — ACETAMINOPHEN ONE MG: 10 INJECTION, SOLUTION INTRAVENOUS at 11:16

## 2024-11-01 RX ADMIN — INSULIN ASPART ONE UNITS: 100 INJECTION, SOLUTION INTRAVENOUS; SUBCUTANEOUS at 16:59

## 2024-11-01 RX ADMIN — SODIUM CHLORIDE ONE ML: 9 INJECTION, SOLUTION INTRAVENOUS at 16:43

## 2024-11-01 RX ADMIN — INSULIN ASPART ONE UNITS: 100 INJECTION, SOLUTION INTRAVENOUS; SUBCUTANEOUS at 12:10

## 2024-11-01 RX ADMIN — ONDANSETRON PRN MG: 2 INJECTION INTRAMUSCULAR; INTRAVENOUS at 19:52

## 2024-11-01 RX ADMIN — INSULIN DETEMIR SCH UNITS: 100 INJECTION, SOLUTION SUBCUTANEOUS at 21:39

## 2024-11-01 RX ADMIN — LORAZEPAM ONE MG: 2 INJECTION INTRAMUSCULAR; INTRAVENOUS at 10:43

## 2024-11-02 LAB
ALBUMIN SERPL-MCNC: 3.2 G/DL (ref 3.4–5)
ALP SERPL-CCNC: 78 U/L (ref 45–117)
ALT SERPL-CCNC: 24 U/L (ref 13–61)
ANION GAP SERPL CALC-SCNC: 13 MMOL/L (ref 4–13)
AST SERPL-CCNC: 11 U/L (ref 15–37)
BASOPHILS # BLD: 0.1 % (ref 0–2)
BILIRUB SERPL-MCNC: 1.1 MG/DL (ref 0.2–1)
BUN SERPL-MCNC: 14 MG/DL (ref 7–18)
CALCIUM SERPL-MCNC: 8.6 MG/DL (ref 8.5–10.1)
CHLORIDE SERPL-SCNC: 104 MMOL/L (ref 98–107)
CO2 SERPL-SCNC: 19 MMOL/L (ref 21–32)
CREAT SERPL-MCNC: 1 MG/DL (ref 0.55–1.3)
DEPRECATED RDW RBC AUTO: 16.4 % (ref 11.6–15.6)
EOSINOPHIL # BLD: 0 % (ref 0–4.5)
GLUCOSE SERPL-MCNC: 245 MG/DL (ref 74–106)
HCT VFR BLD CALC: 36.4 % (ref 32.4–45.2)
HGB BLD-MCNC: 11.4 GM/DL (ref 10.7–15.3)
LYMPHOCYTES # BLD: 8.5 % (ref 8–40)
MAGNESIUM SERPL-MCNC: 1.8 MG/DL (ref 1.8–2.4)
MCH RBC QN AUTO: 21 PG (ref 25.7–33.7)
MCHC RBC AUTO-ENTMCNC: 31.3 G/DL (ref 32–36)
MCV RBC: 67.3 FL (ref 80–96)
MONOCYTES # BLD AUTO: 5.9 % (ref 3.8–10.2)
NEUTROPHILS # BLD: 85.5 % (ref 42.8–82.8)
PHOSPHATE SERPL-MCNC: 3 MG/DL (ref 2.5–4.9)
PLATELET # BLD AUTO: 301 10^3/UL (ref 134–434)
PMV BLD: 8.7 FL (ref 7.5–11.1)
POTASSIUM SERPLBLD-SCNC: 3.2 MMOL/L (ref 3.5–5.1)
PROT SERPL-MCNC: 6.5 G/DL (ref 6.4–8.2)
RBC # BLD AUTO: 5.4 M/MM3 (ref 3.6–5.2)
SODIUM SERPL-SCNC: 136 MMOL/L (ref 136–145)
WBC # BLD AUTO: 18.1 K/MM3 (ref 4–10)

## 2024-11-02 RX ADMIN — LORAZEPAM ONE MG: 2 INJECTION INTRAMUSCULAR; INTRAVENOUS at 16:30

## 2024-11-02 RX ADMIN — CEFTRIAXONE SCH MLS/HR: 1 INJECTION, SOLUTION INTRAVENOUS at 05:16

## 2024-11-02 RX ADMIN — ENOXAPARIN SODIUM SCH: 40 INJECTION SUBCUTANEOUS at 09:35

## 2024-11-02 RX ADMIN — LORAZEPAM ONE MG: 2 INJECTION INTRAMUSCULAR; INTRAVENOUS at 22:58

## 2024-11-02 RX ADMIN — CEFPODOXIME PROXETIL SCH MG: 200 TABLET, FILM COATED ORAL at 14:57

## 2024-11-03 LAB
ANION GAP SERPL CALC-SCNC: 9 MMOL/L (ref 4–13)
BASOPHILS # BLD: 0.3 % (ref 0–2)
BUN SERPL-MCNC: 15.3 MG/DL (ref 7–18)
CALCIUM SERPL-MCNC: 9 MG/DL (ref 8.5–10.1)
CHLORIDE SERPL-SCNC: 100 MMOL/L (ref 98–107)
CO2 SERPL-SCNC: 25 MMOL/L (ref 21–32)
CREAT SERPL-MCNC: 0.9 MG/DL (ref 0.55–1.3)
DEPRECATED RDW RBC AUTO: 16.2 % (ref 11.6–15.6)
EOSINOPHIL # BLD: 0.1 % (ref 0–4.5)
GLUCOSE SERPL-MCNC: 264 MG/DL (ref 74–106)
HCT VFR BLD CALC: 38.4 % (ref 32.4–45.2)
HGB BLD-MCNC: 11.9 GM/DL (ref 10.7–15.3)
LYMPHOCYTES # BLD: 11.4 % (ref 8–40)
MCH RBC QN AUTO: 21.4 PG (ref 25.7–33.7)
MCHC RBC AUTO-ENTMCNC: 31 G/DL (ref 32–36)
MCV RBC: 68.9 FL (ref 80–96)
MONOCYTES # BLD AUTO: 6.6 % (ref 3.8–10.2)
NEUTROPHILS # BLD: 81.6 % (ref 42.8–82.8)
PLATELET # BLD AUTO: 303 10^3/UL (ref 134–434)
PMV BLD: 8.7 FL (ref 7.5–11.1)
POTASSIUM SERPLBLD-SCNC: 3.5 MMOL/L (ref 3.5–5.1)
RBC # BLD AUTO: 5.57 M/MM3 (ref 3.6–5.2)
SODIUM SERPL-SCNC: 134 MMOL/L (ref 136–145)
WBC # BLD AUTO: 11.4 K/MM3 (ref 4–10)

## 2024-11-03 RX ADMIN — KETOROLAC TROMETHAMINE ONE MG: 15 INJECTION, SOLUTION INTRAMUSCULAR; INTRAVENOUS at 18:08

## 2024-11-03 RX ADMIN — PANTOPRAZOLE SODIUM ONE MG: 40 INJECTION, POWDER, FOR SOLUTION INTRAVENOUS at 18:07

## 2024-11-03 RX ADMIN — ONDANSETRON PRN MG: 2 INJECTION INTRAMUSCULAR; INTRAVENOUS at 15:31

## 2024-11-03 RX ADMIN — LORAZEPAM PRN MG: 2 INJECTION INTRAMUSCULAR; INTRAVENOUS at 10:11

## 2024-11-03 RX ADMIN — PANTOPRAZOLE SODIUM SCH: 40 INJECTION, POWDER, FOR SOLUTION INTRAVENOUS at 21:37

## 2024-11-03 RX ADMIN — LORAZEPAM PRN MG: 2 INJECTION INTRAMUSCULAR; INTRAVENOUS at 21:24

## 2024-11-04 VITALS
DIASTOLIC BLOOD PRESSURE: 81 MMHG | HEART RATE: 83 BPM | RESPIRATION RATE: 18 BRPM | SYSTOLIC BLOOD PRESSURE: 150 MMHG | TEMPERATURE: 98.1 F

## 2024-11-04 RX ADMIN — CEFTRIAXONE SCH: 1 INJECTION, SOLUTION INTRAVENOUS at 11:01

## 2024-11-04 RX ADMIN — CEFTRIAXONE SCH: 1 INJECTION, POWDER, FOR SOLUTION INTRAMUSCULAR; INTRAVENOUS at 11:02

## 2024-11-13 NOTE — ED ADULT NURSE NOTE - CAS EDN DISCHARGE ASSESSMENT
Patient is cleared for evaluation & will access [facility].  Liver transplant services are approved but patient is OON for all other services.  This is an exchange plan.    Alert and oriented to person, place and time/Patient baseline mental status/Awake/No adverse reaction to first time med in ED

## 2024-11-25 NOTE — DISCHARGE NOTE PROVIDER - NSDCADMDATE_GEN_ALL_CORE_FT
----- Message from Eddie Delgado MD sent at 11/24/2024 10:23 PM CST -----  Clindagel  
Writer called [facility] pharmacy and they don't have Clindagel 5% and only have 1%.   
28-Apr-2022 04:21

## 2024-12-09 NOTE — PATIENT PROFILE ADULT - CONTRAINDICATIONS & PRECAUTIONS (SELECT ALL THAT APPLY)
[facility] – [facility] of the [facility] - 2412  [facility] - [facility]  [address]  [facility] [address]  Dept: [phone]           [address]  [place] [address]  Tel.  [phone]  Fax. [phone] [address]  [place] [address]  Tel.  [phone]  Fax. [phone] [address] [place] [address]  Tel.  [phone]  Fax. [phone]       Chief Complaint       Chief Complaint   Patient presents with    Sleep Problem     NP referred by Dr. Deja Spears for sleep apnea. Insurance wants her to try CPAP to continue medication.        KATELIN Field is 49 y.o. female seen for evaluation of a sleep disorder.     The patient reports she has experienced snoring described as loud associate with apparent apnea, sleep talking, head rocking, leg twitching.  Normally retires at 10: 30 and will get out of bed at 5: 30 AM.  May awaken several times during the night.      May doze if seated and inactive such when reading, watching TV, in a public place, as a passenger.  Jerome Sleepiness Scale: 17     She states that she was seen approximate 20 [facility] ago [facility] at which point she was diagnosed with narcolepsy; initially started on Ritalin and then Provigil and Nuvigil were prescribed; not authorized.    She underwent a reevaluation [facility] in 2018.  At that time she states she was diagnosed with sleep apnea for which she was started on CPAP.  She reported that she would \"panic\" with CPAP mask and was not using it.    Has lost weight since the initial evaluation          12/9/2024    10:20 AM 12/9/2024     8:51 AM   Sleep Medicine   Sitting and reading  3   Watching TV  3   Sitting, inactive in a public place (e.g. a theatre or a meeting)  2   As a passenger in a car for an hour without a break  3   Lying down to rest in the afternoon when circumstances permit  3   Sitting and talking to someone  1   Sitting quietly after a 
Patient/surrogate refused vaccine...

## 2024-12-17 NOTE — ED ADULT TRIAGE NOTE - HEIGHT IN FEET
PROGRESS NOTE    Subjective   Chief complaint: Johanne Acosta is a 67 y.o. female who is a long term care patient . being seen and evaluated for follow up.     HPI:  HPI Pt seen today for h and p needed for upcomming surgery   Objective   Vital signs: 146/75, 97.8, 75, 16, 105.0, 98%    Physical Exam  Constitutional:       General: She is not in acute distress.  Eyes:      Extraocular Movements: Extraocular movements intact.   Pulmonary:      Effort: Pulmonary effort is normal.   Musculoskeletal:      Cervical back: Neck supple.   Neurological:      Mental Status: She is alert.   Psychiatric:         Mood and Affect: Mood normal.         Behavior: Behavior is cooperative.       Assessment/Plan   Problem List Items Addressed This Visit       Hypertension     Continue current medications         Low back pain     Continue current medication   Pain mgmt          Dementia with mood disturbance, unspecified dementia severity, unspecified dementia type (Multi) - Primary     Continue current medications  Supportive care          Medications, treatments, and labs reviewed  Continue medications and treatments as listed in [facility]    Scribe Attestation  IGabby Scribe   attest that this documentation has been prepared under the direction and in the presence of LANDY Paredes.    Provider Attestation - Scribe documentation  All medical record entries made by the Scribe were at my direction and personally dictated by me. I have reviewed the chart and agree that the record accurately reflects my personal performance of the history, physical exam, discussion and plan.    LANDY Paredes        
5

## 2025-01-23 ENCOUNTER — INPATIENT (INPATIENT)
Facility: HOSPITAL | Age: 35
LOS: 4 days | Discharge: HOME | End: 2025-01-28
Attending: HOSPITALIST | Admitting: HOSPITALIST
Payer: COMMERCIAL

## 2025-01-23 VITALS
DIASTOLIC BLOOD PRESSURE: 95 MMHG | TEMPERATURE: 100 F | HEART RATE: 104 BPM | WEIGHT: 139.99 LBS | RESPIRATION RATE: 19 BRPM | OXYGEN SATURATION: 98 % | SYSTOLIC BLOOD PRESSURE: 167 MMHG | HEIGHT: 63 IN

## 2025-01-23 DIAGNOSIS — R11.2 NAUSEA WITH VOMITING, UNSPECIFIED: ICD-10-CM

## 2025-01-23 DIAGNOSIS — F43.20 ADJUSTMENT DISORDER, UNSPECIFIED: ICD-10-CM

## 2025-01-23 DIAGNOSIS — O00.9 ECTOPIC PREGNANCY, UNSPECIFIED: Chronic | ICD-10-CM

## 2025-01-23 DIAGNOSIS — Z90.49 ACQUIRED ABSENCE OF OTHER SPECIFIED PARTS OF DIGESTIVE TRACT: Chronic | ICD-10-CM

## 2025-01-23 DIAGNOSIS — E10.8 TYPE 1 DIABETES MELLITUS WITH UNSPECIFIED COMPLICATIONS: ICD-10-CM

## 2025-01-23 DIAGNOSIS — Z87.898 PERSONAL HISTORY OF OTHER SPECIFIED CONDITIONS: ICD-10-CM

## 2025-01-23 LAB
ALBUMIN SERPL ELPH-MCNC: 3.7 G/DL — SIGNIFICANT CHANGE UP (ref 3.3–5)
ALP SERPL-CCNC: 110 U/L — SIGNIFICANT CHANGE UP (ref 40–120)
ALT FLD-CCNC: 49 U/L — SIGNIFICANT CHANGE UP (ref 12–78)
AMPHET UR-MCNC: NEGATIVE — SIGNIFICANT CHANGE UP
ANION GAP SERPL CALC-SCNC: 12 MMOL/L — SIGNIFICANT CHANGE UP (ref 5–17)
ANISOCYTOSIS BLD QL: SLIGHT — SIGNIFICANT CHANGE UP
APAP SERPL-MCNC: < 2 UG/ML (ref 10–30)
AST SERPL-CCNC: 34 U/L — SIGNIFICANT CHANGE UP (ref 15–37)
BARBITURATES UR SCN-MCNC: NEGATIVE — SIGNIFICANT CHANGE UP
BASE EXCESS BLDV CALC-SCNC: -1.8 MMOL/L — SIGNIFICANT CHANGE UP (ref -2–3)
BASOPHILS # BLD AUTO: 0.05 K/UL — SIGNIFICANT CHANGE UP (ref 0–0.2)
BASOPHILS NFR BLD AUTO: 0.5 % — SIGNIFICANT CHANGE UP (ref 0–2)
BENZODIAZ UR-MCNC: NEGATIVE — SIGNIFICANT CHANGE UP
BILIRUB SERPL-MCNC: 0.9 MG/DL — SIGNIFICANT CHANGE UP (ref 0.2–1.2)
BLOOD GAS COMMENTS, VENOUS: SIGNIFICANT CHANGE UP
BUN SERPL-MCNC: 13 MG/DL — SIGNIFICANT CHANGE UP (ref 7–23)
CALCIUM SERPL-MCNC: 9.4 MG/DL — SIGNIFICANT CHANGE UP (ref 8.5–10.1)
CHLORIDE BLDV-SCNC: 101 MMOL/L — SIGNIFICANT CHANGE UP (ref 98–107)
CHLORIDE SERPL-SCNC: 103 MMOL/L — SIGNIFICANT CHANGE UP (ref 96–108)
CO2 BLDV-SCNC: 24 MMOL/L — SIGNIFICANT CHANGE UP (ref 22–26)
CO2 SERPL-SCNC: 20 MMOL/L — LOW (ref 22–31)
COCAINE METAB.OTHER UR-MCNC: NEGATIVE — SIGNIFICANT CHANGE UP
CREAT SERPL-MCNC: 0.76 MG/DL — SIGNIFICANT CHANGE UP (ref 0.5–1.3)
EGFR: 105 ML/MIN/1.73M2 — SIGNIFICANT CHANGE UP
EOSINOPHIL # BLD AUTO: 0.01 K/UL — SIGNIFICANT CHANGE UP (ref 0–0.5)
EOSINOPHIL NFR BLD AUTO: 0.1 % — SIGNIFICANT CHANGE UP (ref 0–6)
ETHANOL SERPL-MCNC: <10 MG/DL — SIGNIFICANT CHANGE UP (ref 0–10)
GAS PNL BLDV: 134 MMOL/L — LOW (ref 136–145)
GAS PNL BLDV: SIGNIFICANT CHANGE UP
GAS PNL BLDV: SIGNIFICANT CHANGE UP
GLUCOSE BLDC GLUCOMTR-MCNC: 259 MG/DL — HIGH (ref 70–99)
GLUCOSE BLDC GLUCOMTR-MCNC: 361 MG/DL — HIGH (ref 70–99)
GLUCOSE BLDC GLUCOMTR-MCNC: 521 MG/DL — CRITICAL HIGH (ref 70–99)
GLUCOSE BLDV-MCNC: 407 MG/DL — HIGH (ref 65–95)
GLUCOSE SERPL-MCNC: 361 MG/DL — HIGH (ref 70–99)
HCG SERPL-ACNC: <1 MIU/ML — SIGNIFICANT CHANGE UP
HCO3 BLDV-SCNC: 23 MMOL/L — SIGNIFICANT CHANGE UP (ref 22–28)
HCT VFR BLD CALC: 42.5 % — SIGNIFICANT CHANGE UP (ref 34.5–45)
HCT VFR BLDA CALC: 36 % — LOW (ref 37–47)
HGB BLD CALC-MCNC: 12.1 G/DL — SIGNIFICANT CHANGE UP (ref 11.7–16.1)
HGB BLD-MCNC: 13 G/DL — SIGNIFICANT CHANGE UP (ref 11.5–15.5)
HOROWITZ INDEX BLDV+IHG-RTO: 21 — SIGNIFICANT CHANGE UP
HYPOCHROMIA BLD QL: SLIGHT — SIGNIFICANT CHANGE UP
IMM GRANULOCYTES NFR BLD AUTO: 0.4 % — SIGNIFICANT CHANGE UP (ref 0–0.9)
LACTATE BLDV-MCNC: 1.6 MMOL/L — HIGH (ref 0.56–1.39)
LACTATE SERPL-SCNC: 1.5 MMOL/L — SIGNIFICANT CHANGE UP (ref 0.7–2)
LIDOCAIN IGE QN: 10 U/L — LOW (ref 13–75)
LYMPHOCYTES # BLD AUTO: 1.17 K/UL — SIGNIFICANT CHANGE UP (ref 1–3.3)
LYMPHOCYTES # BLD AUTO: 10.9 % — LOW (ref 13–44)
MACROCYTES BLD QL: SLIGHT — SIGNIFICANT CHANGE UP
MANUAL SMEAR VERIFICATION: SIGNIFICANT CHANGE UP
MCHC RBC-ENTMCNC: 21.5 PG — LOW (ref 27–34)
MCHC RBC-ENTMCNC: 30.6 G/DL — LOW (ref 32–36)
MCV RBC AUTO: 70.2 FL — LOW (ref 80–100)
METHADONE UR-MCNC: NEGATIVE — SIGNIFICANT CHANGE UP
MONOCYTES # BLD AUTO: 0.81 K/UL — SIGNIFICANT CHANGE UP (ref 0–0.9)
MONOCYTES NFR BLD AUTO: 7.6 % — SIGNIFICANT CHANGE UP (ref 2–14)
NEUTROPHILS # BLD AUTO: 8.64 K/UL — HIGH (ref 1.8–7.4)
NEUTROPHILS NFR BLD AUTO: 80.5 % — HIGH (ref 43–77)
NRBC # BLD: 0 /100 WBCS — SIGNIFICANT CHANGE UP (ref 0–0)
NRBC BLD-RTO: 0 /100 WBCS — SIGNIFICANT CHANGE UP (ref 0–0)
OPIATES UR-MCNC: NEGATIVE — SIGNIFICANT CHANGE UP
PCO2 BLDV: 39 MMHG — LOW (ref 42–55)
PCP SPEC-MCNC: SIGNIFICANT CHANGE UP
PCP UR-MCNC: NEGATIVE — SIGNIFICANT CHANGE UP
PH BLDV: 7.38 — SIGNIFICANT CHANGE UP (ref 7.32–7.43)
PLAT MORPH BLD: NORMAL — SIGNIFICANT CHANGE UP
PLATELET # BLD AUTO: 322 K/UL — SIGNIFICANT CHANGE UP (ref 150–400)
PO2 BLDV: 60 MMHG — HIGH (ref 25–45)
POTASSIUM BLDV-SCNC: 4.1 MMOL/L — SIGNIFICANT CHANGE UP (ref 3.5–5.1)
POTASSIUM SERPL-MCNC: 3.7 MMOL/L — SIGNIFICANT CHANGE UP (ref 3.5–5.3)
POTASSIUM SERPL-SCNC: 3.7 MMOL/L — SIGNIFICANT CHANGE UP (ref 3.5–5.3)
PROT SERPL-MCNC: 7.9 GM/DL — SIGNIFICANT CHANGE UP (ref 6–8.3)
RBC # BLD: 6.05 M/UL — HIGH (ref 3.8–5.2)
RBC # FLD: 18.6 % — HIGH (ref 10.3–14.5)
RBC BLD AUTO: ABNORMAL
SALICYLATES SERPL-MCNC: <1.7 MG/DL — LOW (ref 2.8–20)
SAO2 % BLDV: 90.6 % — LOW (ref 94–98)
SODIUM SERPL-SCNC: 135 MMOL/L — SIGNIFICANT CHANGE UP (ref 135–145)
TARGETS BLD QL SMEAR: SLIGHT — SIGNIFICANT CHANGE UP
THC UR QL: POSITIVE — SIGNIFICANT CHANGE UP
WBC # BLD: 10.72 K/UL — HIGH (ref 3.8–10.5)
WBC # FLD AUTO: 10.72 K/UL — HIGH (ref 3.8–10.5)

## 2025-01-23 PROCEDURE — 99254 IP/OBS CNSLTJ NEW/EST MOD 60: CPT

## 2025-01-23 PROCEDURE — 99285 EMERGENCY DEPT VISIT HI MDM: CPT

## 2025-01-23 PROCEDURE — 90792 PSYCH DIAG EVAL W/MED SRVCS: CPT | Mod: 95

## 2025-01-23 PROCEDURE — 93010 ELECTROCARDIOGRAM REPORT: CPT

## 2025-01-23 PROCEDURE — 99222 1ST HOSP IP/OBS MODERATE 55: CPT

## 2025-01-23 PROCEDURE — 99255 IP/OBS CONSLTJ NEW/EST HI 80: CPT

## 2025-01-23 RX ORDER — SODIUM CHLORIDE 9 G/ML
1000 INJECTION, SOLUTION INTRAVENOUS
Refills: 0 | Status: DISCONTINUED | OUTPATIENT
Start: 2025-01-23 | End: 2025-01-28

## 2025-01-23 RX ORDER — DM/PSEUDOEPHED/ACETAMINOPHEN 10-30-250
25 CAPSULE ORAL ONCE
Refills: 0 | Status: DISCONTINUED | OUTPATIENT
Start: 2025-01-23 | End: 2025-01-28

## 2025-01-23 RX ORDER — INSULIN LISPRO 100/ML
VIAL (ML) SUBCUTANEOUS AT BEDTIME
Refills: 0 | Status: DISCONTINUED | OUTPATIENT
Start: 2025-01-23 | End: 2025-01-28

## 2025-01-23 RX ORDER — DIPHENHYDRAMINE HCL 25 MG
25 CAPSULE ORAL EVERY 4 HOURS
Refills: 0 | Status: DISCONTINUED | OUTPATIENT
Start: 2025-01-23 | End: 2025-01-23

## 2025-01-23 RX ORDER — OXYCODONE HYDROCHLORIDE 30 MG/1
5 TABLET ORAL EVERY 6 HOURS
Refills: 0 | Status: DISCONTINUED | OUTPATIENT
Start: 2025-01-23 | End: 2025-01-28

## 2025-01-23 RX ORDER — ACETAMINOPHEN 160 MG/5ML
650 SUSPENSION ORAL EVERY 6 HOURS
Refills: 0 | Status: DISCONTINUED | OUTPATIENT
Start: 2025-01-23 | End: 2025-01-28

## 2025-01-23 RX ORDER — INSULIN LISPRO 100/ML
5 VIAL (ML) SUBCUTANEOUS
Refills: 0 | Status: DISCONTINUED | OUTPATIENT
Start: 2025-01-23 | End: 2025-01-24

## 2025-01-23 RX ORDER — TRIMETHOBENZAMIDE HCL 250 MG
200 CAPSULE ORAL ONCE
Refills: 0 | Status: COMPLETED | OUTPATIENT
Start: 2025-01-23 | End: 2025-01-23

## 2025-01-23 RX ORDER — HALOPERIDOL 10 MG/1
5 TABLET ORAL ONCE
Refills: 0 | Status: DISCONTINUED | OUTPATIENT
Start: 2025-01-23 | End: 2025-01-23

## 2025-01-23 RX ORDER — HALOPERIDOL 10 MG/1
5 TABLET ORAL ONCE
Refills: 0 | Status: COMPLETED | OUTPATIENT
Start: 2025-01-23 | End: 2025-01-23

## 2025-01-23 RX ORDER — HYDROMORPHONE HYDROCHLORIDE 4 MG/ML
2 INJECTION, SOLUTION INTRAMUSCULAR; INTRAVENOUS; SUBCUTANEOUS EVERY 4 HOURS
Refills: 0 | Status: DISCONTINUED | OUTPATIENT
Start: 2025-01-23 | End: 2025-01-27

## 2025-01-23 RX ORDER — INSULIN GLARGINE-YFGN 100 [IU]/ML
10 INJECTION, SOLUTION SUBCUTANEOUS AT BEDTIME
Refills: 0 | Status: DISCONTINUED | OUTPATIENT
Start: 2025-01-23 | End: 2025-01-23

## 2025-01-23 RX ORDER — SODIUM CHLORIDE 9 G/ML
1000 INJECTION, SOLUTION INTRAVENOUS
Refills: 0 | Status: DISCONTINUED | OUTPATIENT
Start: 2025-01-23 | End: 2025-01-24

## 2025-01-23 RX ORDER — DM/PSEUDOEPHED/ACETAMINOPHEN 10-30-250
15 CAPSULE ORAL ONCE
Refills: 0 | Status: DISCONTINUED | OUTPATIENT
Start: 2025-01-23 | End: 2025-01-28

## 2025-01-23 RX ORDER — ACETAMINOPHEN 160 MG/5ML
1000 SUSPENSION ORAL ONCE
Refills: 0 | Status: COMPLETED | OUTPATIENT
Start: 2025-01-23 | End: 2025-01-23

## 2025-01-23 RX ORDER — DM/PSEUDOEPHED/ACETAMINOPHEN 10-30-250
12.5 CAPSULE ORAL ONCE
Refills: 0 | Status: DISCONTINUED | OUTPATIENT
Start: 2025-01-23 | End: 2025-01-28

## 2025-01-23 RX ORDER — PROMETHAZINE HCL 25 MG
25 SUPPOSITORY, RECTAL RECTAL ONCE
Refills: 0 | Status: DISCONTINUED | OUTPATIENT
Start: 2025-01-23 | End: 2025-01-23

## 2025-01-23 RX ORDER — INSULIN LISPRO 100/ML
VIAL (ML) SUBCUTANEOUS
Refills: 0 | Status: DISCONTINUED | OUTPATIENT
Start: 2025-01-23 | End: 2025-01-28

## 2025-01-23 RX ORDER — BACTERIOSTATIC SODIUM CHLORIDE 0.9 %
1000 VIAL (ML) INJECTION ONCE
Refills: 0 | Status: COMPLETED | OUTPATIENT
Start: 2025-01-23 | End: 2025-01-23

## 2025-01-23 RX ORDER — HYDROMORPHONE HYDROCHLORIDE 4 MG/ML
2 INJECTION, SOLUTION INTRAMUSCULAR; INTRAVENOUS; SUBCUTANEOUS ONCE
Refills: 0 | Status: DISCONTINUED | OUTPATIENT
Start: 2025-01-23 | End: 2025-01-23

## 2025-01-23 RX ORDER — GLUCAGON 3 MG/1
1 POWDER NASAL ONCE
Refills: 0 | Status: DISCONTINUED | OUTPATIENT
Start: 2025-01-23 | End: 2025-01-28

## 2025-01-23 RX ORDER — MAGNESIUM, ALUMINUM HYDROXIDE 200-225/5
30 SUSPENSION, ORAL (FINAL DOSE FORM) ORAL EVERY 4 HOURS
Refills: 0 | Status: DISCONTINUED | OUTPATIENT
Start: 2025-01-23 | End: 2025-01-28

## 2025-01-23 RX ORDER — BUPRENORPHINE AND NALOXONE 8; 2 MG/1; MG/1
1 FILM BUCCAL; SUBLINGUAL
Refills: 0 | Status: DISCONTINUED | OUTPATIENT
Start: 2025-01-23 | End: 2025-01-28

## 2025-01-23 RX ORDER — PANTOPRAZOLE 20 MG/1
40 TABLET, DELAYED RELEASE ORAL ONCE
Refills: 0 | Status: COMPLETED | OUTPATIENT
Start: 2025-01-23 | End: 2025-01-23

## 2025-01-23 RX ORDER — INSULIN LISPRO 100/ML
3 VIAL (ML) SUBCUTANEOUS
Refills: 0 | Status: DISCONTINUED | OUTPATIENT
Start: 2025-01-23 | End: 2025-01-23

## 2025-01-23 RX ORDER — BUPRENORPHINE AND NALOXONE 8; 2 MG/1; MG/1
1 FILM BUCCAL; SUBLINGUAL DAILY
Refills: 0 | Status: DISCONTINUED | OUTPATIENT
Start: 2025-01-23 | End: 2025-01-23

## 2025-01-23 RX ORDER — HYDROMORPHONE HYDROCHLORIDE 4 MG/ML
2 INJECTION, SOLUTION INTRAMUSCULAR; INTRAVENOUS; SUBCUTANEOUS EVERY 4 HOURS
Refills: 0 | Status: DISCONTINUED | OUTPATIENT
Start: 2025-01-23 | End: 2025-01-23

## 2025-01-23 RX ORDER — INSULIN GLARGINE-YFGN 100 [IU]/ML
15 INJECTION, SOLUTION SUBCUTANEOUS AT BEDTIME
Refills: 0 | Status: DISCONTINUED | OUTPATIENT
Start: 2025-01-23 | End: 2025-01-24

## 2025-01-23 RX ORDER — PANTOPRAZOLE 20 MG/1
40 TABLET, DELAYED RELEASE ORAL
Refills: 0 | Status: DISCONTINUED | OUTPATIENT
Start: 2025-01-24 | End: 2025-01-28

## 2025-01-23 RX ORDER — DIPHENHYDRAMINE HCL 25 MG
25 CAPSULE ORAL EVERY 4 HOURS
Refills: 0 | Status: DISCONTINUED | OUTPATIENT
Start: 2025-01-23 | End: 2025-01-27

## 2025-01-23 RX ORDER — DIPHENHYDRAMINE HCL 25 MG
25 CAPSULE ORAL ONCE
Refills: 0 | Status: COMPLETED | OUTPATIENT
Start: 2025-01-23 | End: 2025-01-23

## 2025-01-23 RX ORDER — PROCHLORPERAZINE MALEATE 5 MG/1
10 TABLET ORAL EVERY 6 HOURS
Refills: 0 | Status: DISCONTINUED | OUTPATIENT
Start: 2025-01-23 | End: 2025-01-28

## 2025-01-23 RX ORDER — METOCLOPRAMIDE 10 MG/1
10 TABLET ORAL ONCE
Refills: 0 | Status: COMPLETED | OUTPATIENT
Start: 2025-01-23 | End: 2025-01-23

## 2025-01-23 RX ORDER — ACETAMINOPHEN, DIPHENHYDRAMINE HCL, PHENYLEPHRINE HCL 325; 25; 5 MG/1; MG/1; MG/1
3 TABLET ORAL AT BEDTIME
Refills: 0 | Status: DISCONTINUED | OUTPATIENT
Start: 2025-01-23 | End: 2025-01-28

## 2025-01-23 RX ADMIN — ACETAMINOPHEN 1000 MILLIGRAM(S): 160 SUSPENSION ORAL at 10:05

## 2025-01-23 RX ADMIN — PANTOPRAZOLE 40 MILLIGRAM(S): 20 TABLET, DELAYED RELEASE ORAL at 14:31

## 2025-01-23 RX ADMIN — Medication 1000 MILLILITER(S): at 08:15

## 2025-01-23 RX ADMIN — Medication 5: at 17:07

## 2025-01-23 RX ADMIN — Medication 1000 MILLILITER(S): at 06:23

## 2025-01-23 RX ADMIN — HYDROMORPHONE HYDROCHLORIDE 2 MILLIGRAM(S): 4 INJECTION, SOLUTION INTRAMUSCULAR; INTRAVENOUS; SUBCUTANEOUS at 15:14

## 2025-01-23 RX ADMIN — PROCHLORPERAZINE MALEATE 10 MILLIGRAM(S): 5 TABLET ORAL at 20:01

## 2025-01-23 RX ADMIN — HYDROMORPHONE HYDROCHLORIDE 2 MILLIGRAM(S): 4 INJECTION, SOLUTION INTRAMUSCULAR; INTRAVENOUS; SUBCUTANEOUS at 20:01

## 2025-01-23 RX ADMIN — Medication 25 MILLIGRAM(S): at 20:00

## 2025-01-23 RX ADMIN — ACETAMINOPHEN 400 MILLIGRAM(S): 160 SUSPENSION ORAL at 09:50

## 2025-01-23 RX ADMIN — HYDROMORPHONE HYDROCHLORIDE 2 MILLIGRAM(S): 4 INJECTION, SOLUTION INTRAMUSCULAR; INTRAVENOUS; SUBCUTANEOUS at 21:01

## 2025-01-23 RX ADMIN — HALOPERIDOL 5 MILLIGRAM(S): 10 TABLET ORAL at 04:42

## 2025-01-23 RX ADMIN — Medication 1: at 22:42

## 2025-01-23 RX ADMIN — SODIUM CHLORIDE 125 MILLILITER(S): 9 INJECTION, SOLUTION INTRAVENOUS at 14:32

## 2025-01-23 RX ADMIN — ACETAMINOPHEN 1000 MILLIGRAM(S): 160 SUSPENSION ORAL at 10:20

## 2025-01-23 RX ADMIN — Medication 200 MILLIGRAM(S): at 12:56

## 2025-01-23 RX ADMIN — Medication 5 UNIT(S): at 17:08

## 2025-01-23 RX ADMIN — Medication 25 MILLIGRAM(S): at 15:13

## 2025-01-23 RX ADMIN — INSULIN GLARGINE-YFGN 15 UNIT(S): 100 INJECTION, SOLUTION SUBCUTANEOUS at 22:41

## 2025-01-23 RX ADMIN — METOCLOPRAMIDE 10 MILLIGRAM(S): 10 TABLET ORAL at 09:51

## 2025-01-23 NOTE — BH CONSULTATION LIAISON ASSESSMENT NOTE - NSBHCHARTREVIEWVS_PSY_A_CORE FT
Vital Signs Last 24 Hrs  T(C): 36.8 (23 Jan 2025 11:10), Max: 37.5 (23 Jan 2025 03:04)  T(F): 98.3 (23 Jan 2025 11:10), Max: 99.5 (23 Jan 2025 03:04)  HR: 98 (23 Jan 2025 11:10) (95 - 104)  BP: 167/83 (23 Jan 2025 11:10) (157/63 - 167/95)  BP(mean): --  RR: 19 (23 Jan 2025 11:10) (19 - 19)  SpO2: 100% (23 Jan 2025 11:10) (98% - 100%)    Parameters below as of 23 Jan 2025 11:10  Patient On (Oxygen Delivery Method): room air

## 2025-01-23 NOTE — CHART NOTE - NSCHARTNOTEFT_GEN_A_CORE
Emely Knapp | Reference #: [id number]    Practitioner Count: 2  Pharmacy Count: 1  Current Opioid Prescriptions: 1  Current Benzodiazepine Prescriptions: 0  Current Stimulant Prescriptions: 0      Patient Demographic Information (PDI)       PDI	First Name	Last Name	Birth Date	Gender	Street Address	[facility] Code  EMMANUELLE Rodriges	1990	Female	[address] [facility]	[address]    Prescription Information      PDI Filter:    PDI	My Rx	Current Rx	Drug Type	Rx Written	Rx Dispensed	Drug	Quantity	Days Supply	Prescriber Name	Prescriber NOEMÍ #	Payment Method	Dispenser  A	N	Y	O	01/18/2025	01/22/2025	buprenorphine-naloxone 2-0.5 mg sl film	25	25	Yuliana Figueroa	[id number]	Medicaid	[id number]  A	N	N	O	01/04/2025	01/06/2025	buprenorphine-naloxone 2-0.5 mg sl film	11	11	Yuliana Figueroa	[id number]	Medicaid	[id number]  A	N	N	O	12/04/2024	12/05/2024	buprenorphine-naloxone 2-0.5 mg sl film	28	28	Yuliana Figueroa	[id number]	Medicaid	[id number]  A	N	N	O	11/06/2024	11/07/2024	buprenorphine-naloxone 2-0.5 mg sl film	30	30	Laverne Cruz	[id number]	Medicaid	[id number]  A	N	N	O	10/18/2024	10/19/2024	buprenorphine-naloxone 4-1 mg sl film	14	14	Liu Vazquez	[id number]	Medicaid	[id number]  A	N	N	O	10/03/2024	10/03/2024	buprenorphine-naloxone 4-1 mg sl film	14	14	Yuliana Figueroa	[id number]	Medicaid	[id number]  A	N	N	O	09/04/2024	09/04/2024	buprenorphine-naloxone 4-1 mg sl film	30	30	Margot Aguillon	[id number]	Medicaid	[id number]  A	N	N	O	07/31/2024	07/31/2024	buprenorphine-naloxone 4-1 mg sl film	30	30	Margot Aguillon	[id number]	Medicaid	[id number]  A	N	N	O	07/03/2024	07/15/2024	buprenorphine-naloxone 4-1 mg sl film	10	10	Laverne Cruz	[id number]	Medicaid	[id number]  A	N	N	O	07/03/2024	07/07/2024	buprenorphine-naloxone 4-1 mg sl film	10	10	Laverne Cruz	[id number]	Medicaid	[id number]  A	N	N	O	06/06/2024	06/07/2024	buprenorphine-naloxone 4-1 mg sl film	30	30	Margot Aguillon	[id number]	[id number]  A	N	N	O	05/31/2024	05/31/2024	buprenorphine-naloxone 4-1 mg sl film	7	7	Isidro Mccormick ([place])	[id number]	Medicaid	[id number]  A	N	N	O	04/23/2024	04/25/2024	buprenorphine-naloxone 4-1 mg sl film	30	30	Lisanti, Laverne	[id number]	Medicaid	[id number]  A	N	N	O	03/26/2024	03/27/2024	buprenorphine-naloxone 4-1 mg sl film	30	30	Lisanti, Laverne	[id number]	Medicaid	[id number]  A	N	N	O	02/27/2024	02/28/2024	buprenorphine-naloxone 4-1 mg sl film	30	30	LisantiLaverne	[id number]	Medicaid	[id number]  A	N	N	O	01/30/2024	01/30/2024	buprenorphine-naloxone 4-1 mg sl film	30	30	Lisanti, Laverne	[id number]	Medicaid	[id number]

## 2025-01-23 NOTE — ED PROVIDER NOTE - PROGRESS NOTE DETAILS
This patient was signed out to me pending remaining lab work and urinalysis.  Lab work shows mild elevation of leukocytosis.  White blood cell count noted to be 10.72.  Likely reactive.  No anemia.  No thrombocytopenia.  CMP shows no significant electrolyte abnormalities.  No KERRI.  No significantly elevated LFTs.  She is positive for marijuana on her urine drug screen.  Negative for alcohol.    On reevaluation, the patient is resting comfortably in the room at this time.  We will discuss this case with psychiatry as the patient is medically cleared.    I discussed this case with telepsychiatry who stated that they would come and evaluate the patient. Patient Nausea and vomiting was not able to be controlled. She required multiple rounds of medications for pain and nausea. The patient will require admission to the hospital for medical management. I spoke with the hospitalist service who accepted admission for the patient.

## 2025-01-23 NOTE — ED BEHAVIORAL HEALTH ASSESSMENT NOTE - HPI (INCLUDE ILLNESS QUALITY, SEVERITY, DURATION, TIMING, CONTEXT, MODIFYING FACTORS, ASSOCIATED SIGNS AND SYMPTOMS)
Nissa Rodriges is a 33yo woman, no dependents, domiciled with grandparents, employed as pharmacy tech, PPHx of opioid use disorder (on Suboxone), no past IPP,  2 prior rehab admissions, hx of suicidal gestures in hospital settings in setting of pain, no hx NSSIB, occasional alcohol use, PMH HTN and T1DM since age 12 with numerous hospitalizations for DKA and gastroparesis/nausea/vomitting. BIB EMS due to abdominal pain, nausea and vomiting. Psych consulted because whlie in the ED she wrapped the monitor cords around her neck while the ED attending was in the room in the context of worsening pain. She became irritable and she received Haldol 5mg Im at 4:42am with good effect. Per ED there was no injury from patient's self harm actions.     Patient was seen in bed and was calm, though was grimacing. She stated that her stomach hurts a lot and she is "tired of being sick." She was tired appearing and her speech was soft and hard to hear as she was curled up in bed. She denied past suicide attempts. She stated that she is not suicidal. She affirmed that she will be able to be more participatory in the evaluation when her pain is improved. Interview was then terminated secondary to pain as patient was not responsive to my questions.     Of note, patient has a history of wrapping cords around neck in the ED. Most recently, in or about August and July 2024, the pt tried to wrap a cord around her neck without intent to die but told the provider that she hoped that it would have the team take her pain seriously.     MSW spoke to patient's mother who stated that patient has a history of putting things around her neck in order to get help faster/get medications. She does not have acute safety concerns, but they do not live together. At baseline when patient is not in pain, she works and is very functional per mom. Mom recommended for patient not to be given Dilaudid, though only stated that this is the medication the patient usually comes into the hospital to ask for. Chart review does indicate a history of opioid use disorder and rehab.     ISTOP: Reference #: [id number] Buprenorphine-naloxone 2-0.5mg sl film (25 quantity, 25 days supply RX by Yuliana Figueroa picked up 1/22/2025), (11 quantity, 11 day supply, picked up 01/06/2025).

## 2025-01-23 NOTE — BH CONSULTATION LIAISON ASSESSMENT NOTE - RISK ASSESSMENT
Chronic risk factors: serious medical issues at young age impairing daily life, semi-noncompliant with medications/FS/diabetic management, hx of substance misuse; hx of acting out behaviors/suspected Cluster B traits.  Protective factors: young; female gender, no formal psychiatric hx, no hx of actual suicide attempts; no self-injurious behavior; no hx of aggression/violence towards others; no legal issues; motivated for help; articulate; strong family support; access to health services, engaged with a career, stable domicile. No acute risk factors identified

## 2025-01-23 NOTE — ED ADULT TRIAGE NOTE - HOW PATIENT ADDRESSED, PROFILE
"Subjective   Olivia Ma is a 69 y.o. female.     History of Present Illness   Chief Complaint:   Chief Complaint   Patient presents with   • Hypertension   • Hypothyroidism   • Med Refill       Olivia Ma 69 y.o. female who presents today for f/u for HTN, Thyroid and  medication refills.  she has a problem list of   Patient Active Problem List   Diagnosis   • Anxiety   • Hypothyroidism   • Essential hypertension   • Hyperthyroidism   • Moderate osteopenia   • Osteoporosis   • Asthma   .  Since the last visit, she has overall felt well.  she has been compliant with   Current Outpatient Prescriptions:   •  hydrochlorothiazide (HYDRODIURIL) 12.5 MG tablet, TAKE ONE TABLET BY MOUTH ONCE DAILY, Disp: 30 tablet, Rfl: 0  •  meloxicam (MOBIC) 15 MG tablet, , Disp: , Rfl:   •  SYNTHROID 125 MCG tablet, Take 1 tablet by mouth Daily., Disp: 90 tablet, Rfl: 1  •  XOLAIR 150 MG injection, 300 mg Every 28 (Twenty-Eight) Days., Disp: , Rfl: .  she denies medication side effects.    All of the chronic condition(s) listed above are stable w/o issues.    /88   Pulse 80   Temp 97.1 °F (36.2 °C)   Ht 163.8 cm (64.49\")   Wt 74.8 kg (165 lb)   SpO2 98%   BMI 27.90 kg/m²               The following portions of the patient's history were reviewed and updated as appropriate: allergies, current medications, past family history, past medical history, past social history, past surgical history and problem list.    Review of Systems   Constitutional: Negative for fatigue.   HENT: Negative for sinus pain.    Eyes: Negative for pain.   Respiratory: Negative for apnea and chest tightness.    Cardiovascular: Negative for chest pain.   Gastrointestinal: Positive for abdominal pain.   Neurological: Negative for dizziness.   Psychiatric/Behavioral: Negative.        Objective   Physical Exam   Constitutional: She is oriented to person, place, and time. She appears well-developed and well-nourished.   HENT:   Right Ear: " External ear normal.   Left Ear: External ear normal.   Mouth/Throat: Oropharynx is clear and moist.   Eyes: Pupils are equal, round, and reactive to light.   Cardiovascular: Normal rate and regular rhythm.    Pulmonary/Chest: Effort normal and breath sounds normal.   Abdominal: Soft. Bowel sounds are normal.   Neurological: She is alert and oriented to person, place, and time.   Psychiatric: She has a normal mood and affect. Her behavior is normal.   Nursing note and vitals reviewed.      Assessment/Plan   Olivia was seen today for hypertension, hypothyroidism and med refill.    Diagnoses and all orders for this visit:    Hypothyroidism, unspecified type  -     SYNTHROID 125 MCG tablet; Take 1 tablet by mouth Daily.  -     Comprehensive metabolic panel; Future  -     Lipid panel; Future  -     CBC and Differential; Future  -     TSH; Future    Essential hypertension  -     hydrochlorothiazide (HYDRODIURIL) 12.5 MG tablet; Take 1 tablet by mouth Daily.  -     Comprehensive metabolic panel; Future  -     Lipid panel; Future  -     CBC and Differential; Future  -     TSH; Future                DOMINIQUE

## 2025-01-23 NOTE — CONSULT NOTE ADULT - SUBJECTIVE AND OBJECTIVE BOX
34F with hx T1DM and reportedly diagnosed with gastroparesis (several years ago with GET at [facility]), hx of cannabis use, presenting for evaluation of intractable N/V and epigastric abdominal pain. CT negative for source. GI consulted for further evaluation.     Patient reports she had been doing well for a few months. Is not taking anything routinely for gastroparesis - tapered off metoclopramide. Managing with diet only. Back to work.   Patient reports her family members - nieces and nephews - were sick with a GI bug, which she thinks she got, and that may have precipitated this flare. When she flares, abdominal pain is so bad that she can consider self harm.     When seen on the floor today, she was resting comfortably. Tolerating PO liquids. Is hungry but currently afraid to eat. Abdominal pain is a 4/10.     PMH/PSH--  Hypertension  Insulin dependent type 1 diabetes mellitus  Gastroparesis due to DM  Marijuana use, continuous  Tobacco dependence with current use  Ectopic pregnancy  History of cholecystectomy    Allergies--  Toradol (Pruritus)  Zofran (Urticaria)    Medications--  Other: acetaminophen     Tablet .. PRN  aluminum hydroxide/magnesium hydroxide/simethicone Suspension PRN  buprenorphine 2 mG/naloxone 0.5 mG SL Film  dextrose 5%.  dextrose 5%.  dextrose 50% Injectable  dextrose 50% Injectable  dextrose 50% Injectable  dextrose Oral Gel PRN  diphenhydrAMINE Injectable PRN  glucagon  Injectable  HYDROmorphone  Injectable PRN  insulin glargine Injectable (LANTUS)  insulin lispro (ADMELOG) corrective regimen sliding scale  insulin lispro (ADMELOG) corrective regimen sliding scale  insulin lispro Injectable (ADMELOG)  lactated ringers.  melatonin PRN  oxyCODONE    IR PRN  prochlorperazine   Injectable PRN    Social History--  EtOH: denies   Tobacco: denies   Drug Use: denies     Family/Marital History--  Family history of type 1 diabetes mellitus (Grandparent)  FH: HTN (hypertension) (Father)  FHx: asthma    Review of Systems:  A >=10-point review of systems was obtained.     Pertinent positives and negatives--  Constitutional: No fevers. No Chills. No Rigors.   Eyes: No visual sx or eye pain  ENMT: No hearing trouble or throat pain.  Cardiovascular: No chest pain. No palpitations.  Respiratory: No shortness of breath. No cough.  Gastrointestinal: +nausea or vomiting. +abdominal pain.   Musculoskeletal: No arthralgias or myalgias  Skin: No rashes or lesions  Neurologic: No weakness or disorientation  Psychiatric: Pleasant. Appropriate affect.  Endocrine: No polyuria or polydipsia  Heme/Lymphatic: No easy bruising or immune issues    Review of systems otherwise negative except as previously noted.    Physical Exam--  Vital Signs: T(F): 98.2 (01-23-25 @ 16:01), Max: 99.5 (01-23-25 @ 03:04)  HR: 115 (01-23-25 @ 16:01)  BP: 107/67 (01-23-25 @ 16:01)  RR: 20 (01-23-25 @ 16:01)  SpO2: 100% (01-23-25 @ 16:01)    General: Nontoxic-appearing in no acute distress.  HEENT: AT/NC. Anicteric. Conjunctiva pink and moist.  Neck: Not rigid. No sense of mass.  Nodes: None palpable.  Lungs: Clear bilaterally without rales  Heart: Regular rate and rhythm. No Murmur  Abdomen:  Soft. Nondistended. Nontender.   Extremities: No cyanosis or clubbing.   Skin: Warm. Dry. Good turgor. No rash.   Psychiatric: Appropriate affect and mood for situation.     Laboratory & Imaging Data--  CBC                        13.0   10.72 )-----------( 322      ( 23 Jan 2025 05:30 )             42.5       Chemistries  01-23    135  |  103  |  13  ----------------------------<  361[H]  3.7   |  20[L]  |  0.76    Ca    9.4      23 Jan 2025 07:45    TPro  7.9  /  Alb  3.7  /  TBili  0.9  /  DBili  x   /  AST  34  /  ALT  49  /  AlkPhos  110  01-23    Impression: Gastroparesis flare 2/2 viral gastroenteritis vs other virus. Seems to be clinically improving.     Recommend:  - glycemic control  - PRN zofran - she states she can tolerate  - check QTc  - can advance diet tomorrow if clinically well  - if QTc is normal, would discharge her with small supply of zofran ODT - in case of flares  - patient mentioned she hit her L pinky and is having difficulty bending it. Relayed message to hospitalist to check

## 2025-01-23 NOTE — ED ADULT NURSE REASSESSMENT NOTE - NS ED NURSE REASSESS COMMENT FT1
attempted suicide in ER.  pt found with cardiac monitor lines wrapped her neck multiple times and pulling on them.  Luiz Castrejon called.  RN's, CNA's were able to get lines removed from her neck after a struggle. ANM in ER at time of attempt. attempted suicide in ER.  pt found with cardiac monitor lines wrapped her neck multiple times and pulling on them.  Luiz Castrejon called.  RN's, CNA's were able to get lines removed from her neck after a struggle. ANM in ER at time of attempt.  pt placed on 1:1, restraints applied for pt safety.

## 2025-01-23 NOTE — ED BEHAVIORAL HEALTH ASSESSMENT NOTE - TIME CONSULT PERFORMED
23-Jan-2025 10:52
General Sunscreen Counseling: I recommended a broad spectrum sunscreen with a SPF of 30 or higher. I explained that SPF 30 sunscreens block approximately 97 percent of the sun's harmful rays. Sunscreens should be applied at least 15 minutes prior to expected sun exposure and then every 2 hours after that as long as sun exposure continues. If swimming or exercising sunscreen should be reapplied every 45 minutes to an hour after getting wet or sweating. One ounce, or the equivalent of a shot glass full of sunscreen, is adequate to protect the skin not covered by a bathing suit. I also recommended a lip balm with a sunscreen as well. Sun protective clothing can be used in lieu of sunscreen but must be worn the entire time you are exposed to the sun's rays.
Detail Level: Detailed

## 2025-01-23 NOTE — BH CONSULTATION LIAISON ASSESSMENT NOTE - SUMMARY
Patient is well known to  Psychiatry and is presenting at her well known baseline which includes acting out due to being upset as she felt staff ws not listening to her after she told them to "read the chart for treatment I had last time" which helped Patient and she was able to leave after a few days.

## 2025-01-23 NOTE — ED ADULT NURSE REASSESSMENT NOTE - NS ED NURSE REASSESS COMMENT FT1
pt in 4pt restraints and started hitting her head on the side rails, security at bedside.  pt medicated as ordered by Dr. Hinds.

## 2025-01-23 NOTE — ED ADULT NURSE REASSESSMENT NOTE - NS ED NURSE REASSESS COMMENT FT1
Dr. Goldstein saw pt and DC CO. EDUARDO valiente EO in progress. Meds ordered at this time and will be given

## 2025-01-23 NOTE — BH CONSULTATION LIAISON ASSESSMENT NOTE - NSBHCHARTREVIEWLAB_PSY_A_CORE FT
01-23    135  |  103  |  13  ----------------------------<  361[H]  3.7   |  20[L]  |  0.76    Ca    9.4      23 Jan 2025 07:45    TPro  7.9  /  Alb  3.7  /  TBili  0.9  /  DBili  x   /  AST  34  /  ALT  49  /  AlkPhos  110  01-23

## 2025-01-23 NOTE — BH CONSULTATION LIAISON ASSESSMENT NOTE - CURRENT MEDICATION
MEDICATIONS  (STANDING):  buprenorphine 2 mG/naloxone 0.5 mG SL  Tablet 1 Tablet(s) SubLingual daily  dextrose 5%. 1000 milliLiter(s) (50 mL/Hr) IV Continuous <Continuous>  dextrose 5%. 1000 milliLiter(s) (100 mL/Hr) IV Continuous <Continuous>  dextrose 50% Injectable 25 Gram(s) IV Push once  dextrose 50% Injectable 12.5 Gram(s) IV Push once  dextrose 50% Injectable 25 Gram(s) IV Push once  glucagon  Injectable 1 milliGRAM(s) IntraMuscular once  insulin glargine Injectable (LANTUS) 10 Unit(s) SubCutaneous at bedtime  insulin lispro (ADMELOG) corrective regimen sliding scale   SubCutaneous three times a day before meals  insulin lispro (ADMELOG) corrective regimen sliding scale   SubCutaneous at bedtime  insulin lispro Injectable (ADMELOG) 3 Unit(s) SubCutaneous three times a day before meals  lactated ringers. 1000 milliLiter(s) (125 mL/Hr) IV Continuous <Continuous>  pantoprazole  Injectable 40 milliGRAM(s) IV Push once    MEDICATIONS  (PRN):  acetaminophen     Tablet .. 650 milliGRAM(s) Oral every 6 hours PRN Mild Pain (1 - 3)  aluminum hydroxide/magnesium hydroxide/simethicone Suspension 30 milliLiter(s) Oral every 4 hours PRN Dyspepsia  dextrose Oral Gel 15 Gram(s) Oral once PRN Blood Glucose LESS THAN 70 milliGRAM(s)/deciliter  melatonin 3 milliGRAM(s) Oral at bedtime PRN Insomnia  oxyCODONE    IR 5 milliGRAM(s) Oral every 6 hours PRN Moderate Pain (4 - 6)  prochlorperazine   Injectable 10 milliGRAM(s) IV Push every 6 hours PRN nausea/vomiting

## 2025-01-23 NOTE — ED BEHAVIORAL HEALTH ASSESSMENT NOTE - RISK ASSESSMENT
Patient is at chronically elevated risk of self-harm/suicide given history of suicidal gestures, polysubstance use history, chronic medical illness. Acute risk of grievous self-harm or suicide cannot be determined at this time because patient did not participate in evaluation, however, her risk of acute self harm is increased by current acute pain and prior agitation in the ED. Pt also has risk factors of impulsivity, low frustration tolerance, and maladaptive coping skills which increases her chronic risk of self harm and suicide.  Her risk is mitigated by several protective factors including no prior hx of SA with clear intent to die, no hx of violence, engaged in work, and denied suicidal ideation.

## 2025-01-23 NOTE — ED ADULT NURSE NOTE - NSFALLHARMRISKINTERV_ED_ALL_ED
Assistance OOB with selected safe patient handling equipment if applicable/Communicate risk of Fall with Harm to all staff, patient, and family/Provide visual cue: red socks, yellow wristband, yellow gown, etc/Reinforce activity limits and safety measures with patient and family/Bed in lowest position, wheels locked, appropriate side rails in place/Call bell, personal items and telephone in reach/Instruct patient to call for assistance before getting out of bed/chair/stretcher/Non-slip footwear applied when patient is off stretcher/[place] to call system/Physically safe environment - no spills, clutter or unnecessary equipment/Purposeful Proactive Rounding/Room/bathroom lighting operational, light cord in reach

## 2025-01-23 NOTE — CHART NOTE - NSCHARTNOTEFT_GEN_A_CORE
Pt with recent history of many presentations due to sx gastroparesis with vomiting and abd pain.  BH C/L note from 12/21/21 is very helpful in understanding background.  Consider risk / benefit in the setting of gastroparesis and motility effects when determining treatment regimen

## 2025-01-23 NOTE — H&P ADULT - PROBLEM SELECTOR PLAN 2
continue with basal, premeal insulin, ISS, hypoglycemia protocol  Monitor glucose and titrate insulin regimens  Check A1c

## 2025-01-23 NOTE — ED BEHAVIORAL HEALTH ASSESSMENT NOTE - DIFFERENTIAL
adjustment disorder, r/o axis 2, complex PTSD, r/o cannabinoid hyperemesis syndrome, MDD much less likely

## 2025-01-23 NOTE — H&P ADULT - PROBLEM SELECTOR PLAN 1
present with intractable nausea, vomiting and unable to tolerate oral intake, associated with upper abdominal pain  EKG  ( I personally review) NSR, QTc 457  Utox positive for THC  Compazine prn for N/V  IV fluid  IV pantoprazole x 1 and continue with oral PPI  IV dilaudid prn for severe pain  Clear liquid diet and advance as tolerate   Psych consulted  GI consulted  BUNNY checked, on Suboxone present with intractable nausea, vomiting and unable to tolerate oral intake, associated with upper abdominal pain  EKG  ( I personally review) NSR, QTc 457  Utox positive for THC  gastroparesis flare, likely exacerbated by marijuana use  Compazine prn for N/V  IV fluid, check lactate  IV pantoprazole x 1 and continue with oral PPI  IV dilaudid prn for severe pain  Clear liquid diet and advance as tolerate   Psych consulted  GI consulted  BUNNY checked, on Suboxone

## 2025-01-23 NOTE — ED PROVIDER NOTE - CHRONIC CONDITION OTHER
Terri Phelan  [id number]  [place] [id number]  [place] [id number]      Dear Terri Phelan,     I am writing from the Outpatient Care Management Department at [facility]. I have been unsuccessful at reaching you since we spoke on 8/7/2024.  I hope you found the assistance previously provided to you helpful.     Please contact me at [phone] from 8:00AM to 4:30 PM on Monday thru Friday.     As you know, [facility] also has a program with a nurse available 24/7 to answer questions or provide medical advice.  [facility] can be reached at [phone].    Sincerely,       Dixie Childs LMSW  Outpatient Care Management      cyclic vomiting

## 2025-01-23 NOTE — BH CONSULTATION LIAISON ASSESSMENT NOTE - NSBHCONSULTRECOMMENDOTHER_PSY_A_CORE FT
SAME RECOMMENDATION AS 1 MONTH AGO: Dilaudid 2mg IVP q4hrs with IV Benadryl as before (Pt has itching from IV Dilaudid thus uses Benadryl); Pt on Suboxone which sits on some 90% of the opiate receptors making Dilaudid binding compromised. Ergo 2mg Dilaudid in actuality is about max 0.75mg in this case.   - mechanism of action of medications of salient point in this case  - even if there is in part drug-seeking behavior in this case, it is not possible to tease out from physiological tolerance. Noting that previous treatment of Dilaudid/benadryl IVP combination managed to cut down on Pt's length of stay and she was able to feel ready in 2 days and be discharged.   - has capacity to leave AMA

## 2025-01-23 NOTE — BH CONSULTATION LIAISON ASSESSMENT NOTE - HPI (INCLUDE ILLNESS QUALITY, SEVERITY, DURATION, TIMING, CONTEXT, MODIFYING FACTORS, ASSOCIATED SIGNS AND SYMPTOMS)
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, pharmacy tech and certified PCA, charted hx of opioid dependence (Dilaudid up to 26mg / day; self reported physiological tolerance after months of ICU/medical stay at [facility] for DKA with multi organ failure, intubation, "lung biopsy?" may have been a chest tube with pain mgt; discharged on minimal pain agents resulting in opiate withdrawal and return to hospital), history of prior completed rehab in [facility] (2014-45 days), with h/o prior incomplete rehab in [facility] last year; smokes cannabis daily (3g every week), in outpatient care at [facility] on suboxone 2/1mg daily (previously was at 4 mg), no psych admissions, 2 prior rehab admissions. Pt has history of suicidal gestures in hospital setting in setting of wanting pain meds, no psychiatric hospitalizations, no known SAs with intent, no outpatient psychiatric care, PMHX HTN, DMI since age 12 on insulin, chronic gastroparesis, hx of ectopic pregnancy with b/l tubal ligation, cholecystectomy, with numerous hospitalizations for DKA and intractable vomiting with hospital medical admissions significant for acting out behaviors including making para-suicidal gestures and statements, when medications are delayed/needs not met in a timely manner/inadequate pain treatment who was BIBEMS from home for  abdominal pain and vomiting. In the ED: "Psych consulted because while in the ED she wrapped the monitor cords around her neck while the ED attending was in the room in the context of worsening pain. She became irritable and she received Haldol 5mg Im at 4:42am with good effect. Per ED there was no injury from patient's self harm actions." Seen by Telepsychiatry - recommended medical admission and CL follow up; deemed it baseline presentation otherwise.     ISTOP Reference #: [id number]  01/18/2025	01/22/2025	buprenorphine-naloxone 2-0.5 mg sl film	25	25	Yuliana Figueroa	[id number]	Medicaid	[facility] 01/04/2025	01/06/2025	buprenorphine-naloxone 2-0.5 mg sl film	11	11	Yuliana Figueroa	[id number]	Medicaid	[facility] 12/04/2024	12/05/2024	buprenorphine-naloxone 2-0.5 mg sl film	28	28	Yuliana Figueroa	[id number]	Medicaid	[facility] 11/06/2024	11/07/2024	buprenorphine-naloxone 2-0.5 mg sl film	30	30	Laverne Cruz	[id number]	Medicaid	[facility] 10/18/2024	10/19/2024	buprenorphine-naloxone 4-1 mg sl film	14	14	Liu Vazquez	[id number]	Medicaid	[id number]  10/03/2024	10/03/2024	buprenorphine-naloxone 4-1 mg sl film	14	14	Yuliana Figueroa	[id number]	Medicaid	[facility] 09/04/2024	09/04/2024	buprenorphine-naloxone 4-1 mg sl film	30	30	Margot Aguillon	[id number]	Medicaid	[facility]   - started in 01/2024   PATIENT KNOWN TO WRITER (LAST SEEN 08/24); PATIENT ALSO SEEN BY TELEPSYCHIATRY IN THE ED EARLIER TODAY: 33 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, pharmacy tech and certified PCA, charted hx of opioid dependence (Dilaudid up to 26mg / day; self reported physiological tolerance after months of ICU/medical stay at [facility] for DKA with multi organ failure, intubation, "lung biopsy?" may have been a chest tube with pain mgt; discharged on minimal pain agents resulting in opiate withdrawal and return to hospital), history of prior completed rehab in [facility] (2014-45 days), with h/o prior incomplete rehab in [facility] last year; smokes cannabis daily (3g every week), in outpatient care at [facility] on suboxone 2/1mg daily (previously was at 4 mg), no psych admissions, 2 prior rehab admissions. Pt has history of suicidal gestures in hospital setting in setting of wanting pain meds, no psychiatric hospitalizations, no known SAs with intent, no outpatient psychiatric care, PMHX HTN, DMI since age 12 on insulin, chronic gastroparesis, hx of ectopic pregnancy with b/l tubal ligation, cholecystectomy, with numerous hospitalizations for DKA and intractable vomiting with hospital medical admissions significant for acting out behaviors including making para-suicidal gestures and statements, when medications are delayed/needs not met in a timely manner/inadequate pain treatment who was BIBEMS from home for  abdominal pain and vomiting. In the ED: "Psych consulted because while in the ED she wrapped the monitor cords around her neck while the ED attending was in the room in the context of worsening pain. She became irritable and she received Haldol 5mg Im at 4:42am with good effect. Per ED there was no injury from patient's self harm actions." Seen by Telepsychiatry - recommended medical admission and CL follow up; deemed it baseline presentation otherwise.     ISTOP Reference #: [id number]  01/18/2025	01/22/2025	buprenorphine-naloxone 2-0.5 mg sl film	25	25	Yuliana Figueroa	[id number]	Medicaid	[facility] 01/04/2025	01/06/2025	buprenorphine-naloxone 2-0.5 mg sl film	11	11	Yuliana Figueroa	[id number]	Medicaid	[facility] 12/04/2024	12/05/2024	buprenorphine-naloxone 2-0.5 mg sl film	28	28	Yuliana Figueroa	[id number]	Medicaid	[facility] 11/06/2024	11/07/2024	buprenorphine-naloxone 2-0.5 mg sl film	30	30	Laverne Cruz	[id number]	Medicaid	[facility] 10/18/2024	10/19/2024	buprenorphine-naloxone 4-1 mg sl film	14	14	Liu Vazquez	[id number]	Medicaid	[id number]  10/03/2024	10/03/2024	buprenorphine-naloxone 4-1 mg sl film	14	14	Yuliana Figueroa	[id number]	Medicaid	[facility] 09/04/2024	09/04/2024	buprenorphine-naloxone 4-1 mg sl film	30	30	Margot Aguillon	[id number]	Medicaid	[facility]   - started in 01/2024    EXAM: calm, cooperative, looks tired and physically unwell, dry heaving at times, speech at times halts for what appears nausea, + vomitus with dark flecks that looks like residual food particles in basin next to Patient. Reports she has another gastroparesis flare up. Patient reports that she was in physical distress earlier, in pain, and felt no one was paying attention, acting out to express her frustration. DENIES actual suicidal ideation, intent or plan. Endorses stable euthymic mood, regular sleep / appetite / energy level / concentration otherwise. Denies and does not manifest any symptoms of hypomania/pee/psychosis/major depression/ anxiety/panic. Denies any active or passive suicidal or homicidal ideation. Names protective factors (lynn; family; hope for future). Endorses medication compliance. Denies adverse medication side effects. Denies substance use, denies illicit substance use, denies street-bought opiate use, denies RX misuse. Would like Dilaudid IV with benadryl IV as she had rash before from it. Has self reduced her Suboxone from 4 to 2 - would like to keep it at lowered dose.

## 2025-01-23 NOTE — ED CLERICAL - DIVISION
DME order faxed to [facility] urolog. Confirmation of transmission received.    [facility]...

## 2025-01-23 NOTE — PATIENT PROFILE ADULT - FALL HARM RISK - UNIVERSAL INTERVENTIONS
Bed in lowest position, wheels locked, appropriate side rails in place/Call bell, personal items and telephone in reach/Instruct patient to call for assistance before getting out of bed or chair/Non-slip footwear when patient is out of bed/[place] to call system/Physically safe environment - no spills, clutter or unnecessary equipment/Purposeful Proactive Rounding/Room/bathroom lighting operational, light cord in reach

## 2025-01-23 NOTE — BH CONSULTATION LIAISON ASSESSMENT NOTE - NSSUICPROTFACT_PSY_ALL_CORE
Responsibility to children, family, or others/Identifies reasons for living/Supportive social network of family or friends/Engaged in work or school/Positive therapeutic relationships/Unable to assess

## 2025-01-23 NOTE — H&P ADULT - NSHPPHYSICALEXAM_GEN_ALL_CORE
CONSTITUTIONAL: alert and cooperative,   EYES: PERRL,  no scleral icterus  ENT: Mucosa moist, tongue normal.  NECK: Neck supple, trachea midline, non-tender  CARDIAC: Normal S1 and S2. Regular rate and rhythms. No Pedal edema  LUNGS: Equal air entry both lungs. No rales, rhonchi, wheezing. Normal respiratory effort.   ABDOMEN: Mild upper abdominal tenderness+. No guarding or rebound tenderness. No hepatomegaly or splenomegaly. Bowel sound normal. No hernia noted  MUSCULOSKELETAL: Normocephalic, atraumatic.  No significant deformity or joint abnormality.   NEUROLOGICAL: No gross motor or sensory deficits  PSYCHIATRIC: A&O x 3, appropriate mood and affect

## 2025-01-23 NOTE — ED BEHAVIORAL HEALTH ASSESSMENT NOTE - SUMMARY
Nissa Rodriges is a 33yo woman, no dependents, domiciled with grandparents, employed as pharmacy tech, PPHx of opioid use disorder (on Suboxone), no past IPP,  2 prior rehab admissions, hx of suicidal gestures in hospital settings in setting of pain, no hx NSSIB, occasional alcohol use, PMH HTN and T1DM since age 12 with numerous hospitalizations for DKA and gastroparesis/nausea/vomitting. BIB EMS due to dominal pain, nausea and vomiting. Psych consulted because whlie in the ED she wrapped the monitor cords around her neck while the ED attending was in the room in the context of worsening pain. She became irritable and she received Haldol 5mg Im at 4:42am with good effect.     On evaluation the patient denies suicidal ideation, intent, and plan. She otherwise was not participative in the evaluation as she had abdominal pain and had received Haldol due to agitation i/s/o putting something around her neck.  Pt has history of wrapping stuff around neck in ED, in past it has been in hopes of escalating assessment/treatment of pain. Unclear what her motivation was today as patient did not participate in a full evaluation, but suspect similar presentation as patient has had chronic illness since age 12 with multiple hospitalizations; she appears to struggle with poor distress tolerance and inappropriate coping skills. Impression is adjustment disorder, r/o character pathology, r/o cannabinoid hyperemesis syndrome, MDD much less likely. Patient's mother corroborates that patient has similar behaviors in past when she is in pain, but at baseline when she is not experiencing pain she is stable at baseline. However, at this time, due to patient's ongoing pain and fatigue from Haldol, she did not participate in a full evaluation. When her pain is under control and patient is more alert, psychiatric will reevaluate to determine if patient needs further psychiatric management (IPP vs continuing outpatient treatment) and for safe disposition.    Plan:   - continue 1:1 in the due to patient's recent parasuicidal behavior  -  If patient is medically admitted, C/L psychiatry at [facility] should be contacted by medical team for evaluation and safe disposition   - if patient is medically cleared while in the ED please recontact telepsychiatry to complete evaluation.   - PRNS for agitation: Haldol 5/Ativan 2 po/IM for agitation/severe agitation   - Pt on Suboxone at home, defer treatment for pain to ED attending  - discussed plan with ED attending who is in agreement

## 2025-01-23 NOTE — ED ADULT NURSE REASSESSMENT NOTE - NS ED NURSE REASSESS COMMENT FT1
Pt resting comfortably at this time. No s/s of pain noted. Awaiting to be seen by TP when feels better

## 2025-01-23 NOTE — H&P ADULT - ASSESSMENT
34 years old female with h/o DM 1, gastroparesis, marijuana use present to ED with complain of nausea, vomiting, upper abdominal pain, similar to prior episode of gastroparesis flare. Patient reported that she was doing well for last few months until yesterday. Due to vomiting, patient is not able to tolerate oral intake. Denied any fever, chills, diarrhea.   Tachycardic, afebrile, sat well at RA. WBC 10.7, plt 322, K 3.7, Cr 0.76, glucose 361. Utox positive for THC. EKG with NSR, left atrial enlargement. QTc 457

## 2025-01-23 NOTE — ED PROVIDER NOTE - PHYSICAL EXAMINATION
General: Well appearing female in no acute distress  HEENT: Normocephalic, atraumatic. Moist mucous membranes. Oropharynx clear. No lymphadenopathy.  Eyes: No scleral icterus. EOMI. ESTEVAN.  Neck:. Soft and supple. Full ROM without pain. No midline tenderness  Cardiac: Regular rate and regular rhythm. No murmurs, rubs, gallops. Peripheral pulses 2+ and symmetric. No LE edema.  Resp: Lungs CTAB. Speaking in full sentences. No wheezes, rales or rhonchi.  Abd: Soft, non-tender, non-distended. No guarding or rebound.  Back: Spine midline and non-tender. No CVA tenderness.    Skin: No rashes, abrasions, or lacerations.  Neuro: AO x 3. Moves all extremities symmetrically. Motor strength and sensation grossly intact. ambulatory  Psych: anxious mood

## 2025-01-23 NOTE — BH CONSULTATION LIAISON ASSESSMENT NOTE - OTHER PAST PSYCHIATRIC HISTORY (INCLUDE DETAILS REGARDING ONSET, COURSE OF ILLNESS, INPATIENT/OUTPATIENT TREATMENT)
per HPI  [place]: Seen at [facility] 05/22 by [facility] Psychiatry for agitation, self-harming gestures secondary to physically distressing opiate withdrawal which was not effectively medically managed/it was under treated

## 2025-01-23 NOTE — ED PROVIDER NOTE - CLINICAL SUMMARY MEDICAL DECISION MAKING FREE TEXT BOX
34 F pmh T1DM, gastroparesis presenting to the ED for abdominal pain and vomiting. Pt states that symptoms are similar to prior gastroparesis pain    while being evaluated in the ED patient attempted to strangle herself with monitor wires  Pt states that she is tired of living with her constant pain and vomiting and she wants to end things    Patient initially cooperative but unable to wait for pain medications. After multiple attempts to strangle herself, patient was not able to be calmed with verbal de-escalation. Patient remained agitated necessitating chemical sedation    IM haldol  labs, EKG  IV fluids, pain control, FS  psych consult  admit, dispo medicine vs psych pending lab results and re-evaluation 34 F pmh T1DM, gastroparesis presenting to the ED for abdominal pain and vomiting. Pt states that symptoms are similar to prior gastroparesis pain    while being evaluated in the ED patient attempted to strangle herself with monitor wires  Pt states that she is tired of living with her constant pain and vomiting and she wants to end things    Patient initially cooperative but unable to wait for pain medications. After multiple attempts to strangle herself, patient was not able to be calmed with verbal de-escalation. Patient remained agitated necessitating chemical sedation    IM haldol    r/o DKA, intra-abd pathology  labs, EKG  IV fluids, pain control, FS  psych consult  admit, dispo medicine vs psych pending lab results and re-evaluation

## 2025-01-23 NOTE — ED BEHAVIORAL HEALTH ASSESSMENT NOTE - DESCRIPTION
Pt reporting abdominal pain, vomiting, became irritable after wrapping monitor cords around neck in front of ED attending, received Haldol 5mg IM at 4:42am with good effect    T(C): 36.8 (01-23-25 @ 07:44)  T(F): 98.2 (01-23-25 @ 07:44), Max: 99.5 (01-23-25 @ 03:04)  HR: 95 (01-23-25 @ 07:44) (95 - 104)  BP: 157/63 (01-23-25 @ 07:44) (157/63 - 167/95)  RR:  (19 - 19)  SpO2:  (98% - 99%)  Wt(kg): -- per HPI T1DM on insulin, HTN

## 2025-01-23 NOTE — ED ADULT TRIAGE NOTE - CHIEF COMPLAINT QUOTE
BIBEMS from home. Pt c/o abdominal pain and vomiting. Pmhx Gastroparesis, DM HTN. Allergy Zofran and toradol

## 2025-01-24 LAB
A1C WITH ESTIMATED AVERAGE GLUCOSE RESULT: 11.3 % — HIGH (ref 4–5.6)
ALBUMIN SERPL ELPH-MCNC: 3.2 G/DL — LOW (ref 3.3–5)
ALP SERPL-CCNC: 93 U/L — SIGNIFICANT CHANGE UP (ref 40–120)
ALT FLD-CCNC: 45 U/L — SIGNIFICANT CHANGE UP (ref 12–78)
ANION GAP SERPL CALC-SCNC: 10 MMOL/L — SIGNIFICANT CHANGE UP (ref 5–17)
AST SERPL-CCNC: 39 U/L — HIGH (ref 15–37)
BILIRUB SERPL-MCNC: 1.3 MG/DL — HIGH (ref 0.2–1.2)
BUN SERPL-MCNC: 19 MG/DL — SIGNIFICANT CHANGE UP (ref 7–23)
CALCIUM SERPL-MCNC: 8.8 MG/DL — SIGNIFICANT CHANGE UP (ref 8.5–10.1)
CHLORIDE SERPL-SCNC: 99 MMOL/L — SIGNIFICANT CHANGE UP (ref 96–108)
CO2 SERPL-SCNC: 22 MMOL/L — SIGNIFICANT CHANGE UP (ref 22–31)
CREAT SERPL-MCNC: 1.34 MG/DL — HIGH (ref 0.5–1.3)
EGFR: 53 ML/MIN/1.73M2 — LOW
ESTIMATED AVERAGE GLUCOSE: 278 MG/DL — HIGH (ref 68–114)
GLUCOSE BLDC GLUCOMTR-MCNC: 120 MG/DL — HIGH (ref 70–99)
GLUCOSE BLDC GLUCOMTR-MCNC: 151 MG/DL — HIGH (ref 70–99)
GLUCOSE BLDC GLUCOMTR-MCNC: 170 MG/DL — HIGH (ref 70–99)
GLUCOSE BLDC GLUCOMTR-MCNC: 171 MG/DL — HIGH (ref 70–99)
GLUCOSE BLDC GLUCOMTR-MCNC: 24 MG/DL — CRITICAL LOW (ref 70–99)
GLUCOSE BLDC GLUCOMTR-MCNC: 34 MG/DL — CRITICAL LOW (ref 70–99)
GLUCOSE BLDC GLUCOMTR-MCNC: 353 MG/DL — HIGH (ref 70–99)
GLUCOSE BLDC GLUCOMTR-MCNC: 38 MG/DL — CRITICAL LOW (ref 70–99)
GLUCOSE SERPL-MCNC: 400 MG/DL — HIGH (ref 70–99)
HCT VFR BLD CALC: 36.2 % — SIGNIFICANT CHANGE UP (ref 34.5–45)
HGB BLD-MCNC: 11.4 G/DL — LOW (ref 11.5–15.5)
MAGNESIUM SERPL-MCNC: 1.6 MG/DL — SIGNIFICANT CHANGE UP (ref 1.6–2.6)
MCHC RBC-ENTMCNC: 21.5 PG — LOW (ref 27–34)
MCHC RBC-ENTMCNC: 31.5 G/DL — LOW (ref 32–36)
MCV RBC AUTO: 68.2 FL — LOW (ref 80–100)
NRBC # BLD: 0 /100 WBCS — SIGNIFICANT CHANGE UP (ref 0–0)
NRBC BLD-RTO: 0 /100 WBCS — SIGNIFICANT CHANGE UP (ref 0–0)
PHOSPHATE SERPL-MCNC: 2.5 MG/DL — SIGNIFICANT CHANGE UP (ref 2.5–4.5)
PLATELET # BLD AUTO: 317 K/UL — SIGNIFICANT CHANGE UP (ref 150–400)
POTASSIUM SERPL-MCNC: 3.4 MMOL/L — LOW (ref 3.5–5.3)
POTASSIUM SERPL-SCNC: 3.4 MMOL/L — LOW (ref 3.5–5.3)
PROT SERPL-MCNC: 6.9 GM/DL — SIGNIFICANT CHANGE UP (ref 6–8.3)
RBC # BLD: 5.31 M/UL — HIGH (ref 3.8–5.2)
RBC # FLD: 17.3 % — HIGH (ref 10.3–14.5)
SODIUM SERPL-SCNC: 131 MMOL/L — LOW (ref 135–145)
WBC # BLD: 12.86 K/UL — HIGH (ref 3.8–10.5)
WBC # FLD AUTO: 12.86 K/UL — HIGH (ref 3.8–10.5)

## 2025-01-24 PROCEDURE — 73130 X-RAY EXAM OF HAND: CPT | Mod: 26,LT

## 2025-01-24 PROCEDURE — 99232 SBSQ HOSP IP/OBS MODERATE 35: CPT

## 2025-01-24 PROCEDURE — 99231 SBSQ HOSP IP/OBS SF/LOW 25: CPT

## 2025-01-24 PROCEDURE — 36000 PLACE NEEDLE IN VEIN: CPT

## 2025-01-24 PROCEDURE — 76937 US GUIDE VASCULAR ACCESS: CPT | Mod: 26

## 2025-01-24 RX ORDER — DM/PSEUDOEPHED/ACETAMINOPHEN 10-30-250
15 CAPSULE ORAL ONCE
Refills: 0 | Status: COMPLETED | OUTPATIENT
Start: 2025-01-24 | End: 2025-01-24

## 2025-01-24 RX ORDER — DM/PSEUDOEPHED/ACETAMINOPHEN 10-30-250
15 CAPSULE ORAL ONCE
Refills: 0 | Status: DISCONTINUED | OUTPATIENT
Start: 2025-01-24 | End: 2025-01-24

## 2025-01-24 RX ORDER — POTASSIUM CHLORIDE 750 MG/1
40 TABLET, EXTENDED RELEASE ORAL ONCE
Refills: 0 | Status: COMPLETED | OUTPATIENT
Start: 2025-01-24 | End: 2025-01-24

## 2025-01-24 RX ORDER — SODIUM CHLORIDE 9 G/ML
1000 INJECTION, SOLUTION INTRAVENOUS
Refills: 0 | Status: DISCONTINUED | OUTPATIENT
Start: 2025-01-24 | End: 2025-01-25

## 2025-01-24 RX ORDER — SODIUM CHLORIDE 9 G/ML
1000 INJECTION, SOLUTION INTRAVENOUS
Refills: 0 | Status: DISCONTINUED | OUTPATIENT
Start: 2025-01-24 | End: 2025-01-28

## 2025-01-24 RX ORDER — DM/PSEUDOEPHED/ACETAMINOPHEN 10-30-250
25 CAPSULE ORAL ONCE
Refills: 0 | Status: COMPLETED | OUTPATIENT
Start: 2025-01-24 | End: 2025-01-24

## 2025-01-24 RX ORDER — INSULIN GLARGINE-YFGN 100 [IU]/ML
10 INJECTION, SOLUTION SUBCUTANEOUS AT BEDTIME
Refills: 0 | Status: DISCONTINUED | OUTPATIENT
Start: 2025-01-24 | End: 2025-01-26

## 2025-01-24 RX ADMIN — HYDROMORPHONE HYDROCHLORIDE 2 MILLIGRAM(S): 4 INJECTION, SOLUTION INTRAMUSCULAR; INTRAVENOUS; SUBCUTANEOUS at 01:10

## 2025-01-24 RX ADMIN — Medication 1: at 17:20

## 2025-01-24 RX ADMIN — Medication 25 MILLIGRAM(S): at 08:33

## 2025-01-24 RX ADMIN — SODIUM CHLORIDE 100 MILLILITER(S): 9 INJECTION, SOLUTION INTRAVENOUS at 11:31

## 2025-01-24 RX ADMIN — HYDROMORPHONE HYDROCHLORIDE 2 MILLIGRAM(S): 4 INJECTION, SOLUTION INTRAMUSCULAR; INTRAVENOUS; SUBCUTANEOUS at 04:24

## 2025-01-24 RX ADMIN — Medication 25 MILLIGRAM(S): at 00:09

## 2025-01-24 RX ADMIN — Medication 25 MILLIGRAM(S): at 17:19

## 2025-01-24 RX ADMIN — Medication 15 GRAM(S): at 11:32

## 2025-01-24 RX ADMIN — HYDROMORPHONE HYDROCHLORIDE 2 MILLIGRAM(S): 4 INJECTION, SOLUTION INTRAMUSCULAR; INTRAVENOUS; SUBCUTANEOUS at 10:00

## 2025-01-24 RX ADMIN — HYDROMORPHONE HYDROCHLORIDE 2 MILLIGRAM(S): 4 INJECTION, SOLUTION INTRAMUSCULAR; INTRAVENOUS; SUBCUTANEOUS at 17:20

## 2025-01-24 RX ADMIN — Medication 15 GRAM(S): at 18:23

## 2025-01-24 RX ADMIN — Medication 25 MILLIGRAM(S): at 12:43

## 2025-01-24 RX ADMIN — Medication 25 MILLIGRAM(S): at 04:24

## 2025-01-24 RX ADMIN — HYDROMORPHONE HYDROCHLORIDE 2 MILLIGRAM(S): 4 INJECTION, SOLUTION INTRAMUSCULAR; INTRAVENOUS; SUBCUTANEOUS at 00:09

## 2025-01-24 RX ADMIN — SODIUM CHLORIDE 125 MILLILITER(S): 9 INJECTION, SOLUTION INTRAVENOUS at 06:20

## 2025-01-24 RX ADMIN — HYDROMORPHONE HYDROCHLORIDE 2 MILLIGRAM(S): 4 INJECTION, SOLUTION INTRAMUSCULAR; INTRAVENOUS; SUBCUTANEOUS at 14:00

## 2025-01-24 RX ADMIN — PANTOPRAZOLE 40 MILLIGRAM(S): 20 TABLET, DELAYED RELEASE ORAL at 08:31

## 2025-01-24 RX ADMIN — Medication 5: at 08:32

## 2025-01-24 RX ADMIN — Medication 5 UNIT(S): at 08:33

## 2025-01-24 RX ADMIN — HYDROMORPHONE HYDROCHLORIDE 2 MILLIGRAM(S): 4 INJECTION, SOLUTION INTRAMUSCULAR; INTRAVENOUS; SUBCUTANEOUS at 22:30

## 2025-01-24 RX ADMIN — HYDROMORPHONE HYDROCHLORIDE 2 MILLIGRAM(S): 4 INJECTION, SOLUTION INTRAMUSCULAR; INTRAVENOUS; SUBCUTANEOUS at 21:30

## 2025-01-24 RX ADMIN — Medication 25 MILLIGRAM(S): at 21:31

## 2025-01-24 RX ADMIN — HYDROMORPHONE HYDROCHLORIDE 2 MILLIGRAM(S): 4 INJECTION, SOLUTION INTRAMUSCULAR; INTRAVENOUS; SUBCUTANEOUS at 12:43

## 2025-01-24 RX ADMIN — INSULIN GLARGINE-YFGN 10 UNIT(S): 100 INJECTION, SOLUTION SUBCUTANEOUS at 21:38

## 2025-01-24 RX ADMIN — Medication 25 GRAM(S): at 18:22

## 2025-01-24 RX ADMIN — BUPRENORPHINE AND NALOXONE 1 FILM(S): 8; 2 FILM BUCCAL; SUBLINGUAL at 08:31

## 2025-01-24 RX ADMIN — HYDROMORPHONE HYDROCHLORIDE 2 MILLIGRAM(S): 4 INJECTION, SOLUTION INTRAMUSCULAR; INTRAVENOUS; SUBCUTANEOUS at 05:24

## 2025-01-24 RX ADMIN — HYDROMORPHONE HYDROCHLORIDE 2 MILLIGRAM(S): 4 INJECTION, SOLUTION INTRAMUSCULAR; INTRAVENOUS; SUBCUTANEOUS at 08:33

## 2025-01-24 RX ADMIN — POTASSIUM CHLORIDE 40 MILLIEQUIVALENT(S): 750 TABLET, EXTENDED RELEASE ORAL at 12:43

## 2025-01-24 RX ADMIN — PROCHLORPERAZINE MALEATE 10 MILLIGRAM(S): 5 TABLET ORAL at 08:32

## 2025-01-24 RX ADMIN — Medication 5 UNIT(S): at 17:20

## 2025-01-24 NOTE — PROVIDER CONTACT NOTE (HYPOGLYCEMIA EVENT) - NS PROVIDER CONTACT NOTE-TREATMENT-HYPO
Glucose gel 1 tube/Dextrose 50% 25 Grams IV Push
D5 100mL/hr given per order; pt refused dextrose iv push. GORDON Ramirez convinced pt to take glucose gel/Glucose gel 1 tube/Other (Specify)/4 oz Fruit Juice...
Walk in

## 2025-01-24 NOTE — PROVIDER CONTACT NOTE (HYPOGLYCEMIA EVENT) - NS PROVIDER CONTACT CONTRIBUTING FACTORS OF EPISODE
Patient would not eat after insulin was administered  despite saying she would eat./Poor oral intake within the last 24 hours/Other (Specify)

## 2025-01-24 NOTE — PROGRESS NOTE ADULT - PROBLEM SELECTOR PLAN 2
continue with basal, premeal insulin, ISS, hypoglycemia protocol  Monitor glucose and titrate insulin regimens  Check A1c  -had multiple episodes of hypoglycemia will decrease glargine dose and start D5LR given n/v and decreased PO

## 2025-01-24 NOTE — PROVIDER CONTACT NOTE (HYPOGLYCEMIA EVENT) - NS PROVIDER CONTACT BACKGROUND-HYPO
Age: 34y    Gender: Female    POCT Blood Glucose:  171 mg/dL (01-24-25 @ 18:39)  24 mg/dL (01-24-25 @ 18:11)  170 mg/dL (01-24-25 @ 16:37)  151 mg/dL (01-24-25 @ 12:20)  38 mg/dL (01-24-25 @ 11:16)  34 mg/dL (01-24-25 @ 11:13)  353 mg/dL (01-24-25 @ 07:52)  259 mg/dL (01-23-25 @ 22:02)      eMAR:  dextrose 50% Injectable   25 Gram(s) IV Push (01-24-25 @ 18:22)    dextrose Oral Gel   15 Gram(s) Oral (01-24-25 @ 18:23)    dextrose Oral Gel   15 Gram(s) Oral (01-24-25 @ 11:32)    insulin glargine Injectable (LANTUS)   15 Unit(s) SubCutaneous (01-23-25 @ 22:41)    insulin lispro (ADMELOG) corrective regimen sliding scale   1 Unit(s) SubCutaneous (01-24-25 @ 17:20)   5 Unit(s) SubCutaneous (01-24-25 @ 08:32)    insulin lispro (ADMELOG) corrective regimen sliding scale   1 Unit(s) SubCutaneous (01-23-25 @ 22:42)    insulin lispro Injectable (ADMELOG)   5 Unit(s) SubCutaneous (01-24-25 @ 17:20)   5 Unit(s) SubCutaneous (01-24-25 @ 08:33)    
Age: 34y    Gender: Female    POCT Blood Glucose:  38 mg/dL (01-24-25 @ 11:16)  34 mg/dL (01-24-25 @ 11:13)  353 mg/dL (01-24-25 @ 07:52)  259 mg/dL (01-23-25 @ 22:02)  361 mg/dL (01-23-25 @ 16:52)  521 mg/dL (01-23-25 @ 16:49)      eMAR:  dextrose Oral Gel   15 Gram(s) Oral (01-24-25 @ 11:32)    insulin glargine Injectable (LANTUS)   15 Unit(s) SubCutaneous (01-23-25 @ 22:41)    insulin lispro (ADMELOG) corrective regimen sliding scale   5 Unit(s) SubCutaneous (01-24-25 @ 08:32)   5 Unit(s) SubCutaneous (01-23-25 @ 17:07)    insulin lispro (ADMELOG) corrective regimen sliding scale   1 Unit(s) SubCutaneous (01-23-25 @ 22:42)    insulin lispro Injectable (ADMELOG)   5 Unit(s) SubCutaneous (01-24-25 @ 08:33)   5 Unit(s) SubCutaneous (01-23-25 @ 17:08)

## 2025-01-24 NOTE — PROGRESS NOTE ADULT - PROBLEM SELECTOR PLAN 1
present with intractable nausea, vomiting and unable to tolerate oral intake, associated with upper abdominal pain  EKG  ( I personally review) NSR, QTc 457  Utox positive for THC  gastroparesis flare, likely exacerbated by marijuana use  Compazine prn for N/V  IV pantoprazole x 1 and continue with oral PPI  IV dilaudid prn for severe pain  Clear liquid diet and advance as tolerate   Psych consulted recommended IV dilaudid with IV benadryl as per prior hospitalization   GI consulted  [facility] checked, on Suboxone  -patient wants to advance to minced diet will try tonight

## 2025-01-24 NOTE — PROGRESS NOTE ADULT - SUBJECTIVE AND OBJECTIVE BOX
Patient is a 34y old  Female who presents with a chief complaint of intractable nausea and vomiting (23 Jan 2025 20:26)      INTERVAL HPI/OVERNIGHT EVENTS:  -multiple episodes of hypoglycemia today patient is asymptomatic  -seen and examined at bedside     MEDICATIONS  (STANDING):  buprenorphine 2 mG/naloxone 0.5 mG SL Film 1 Film(s) SubLingual <User Schedule>  dextrose 5% + lactated ringers. 1000 milliLiter(s) (100 mL/Hr) IV Continuous <Continuous>  dextrose 5%. 1000 milliLiter(s) (100 mL/Hr) IV Continuous <Continuous>  dextrose 5%. 1000 milliLiter(s) (100 mL/Hr) IV Continuous <Continuous>  dextrose 5%. 1000 milliLiter(s) (50 mL/Hr) IV Continuous <Continuous>  dextrose 50% Injectable 25 Gram(s) IV Push once  dextrose 50% Injectable 12.5 Gram(s) IV Push once  dextrose 50% Injectable 25 Gram(s) IV Push once  glucagon  Injectable 1 milliGRAM(s) IntraMuscular once  influenza   Vaccine 0.5 milliLiter(s) IntraMuscular once  insulin glargine Injectable (LANTUS) 10 Unit(s) SubCutaneous at bedtime  insulin lispro (ADMELOG) corrective regimen sliding scale   SubCutaneous three times a day before meals  insulin lispro (ADMELOG) corrective regimen sliding scale   SubCutaneous at bedtime  pantoprazole    Tablet 40 milliGRAM(s) Oral before breakfast    MEDICATIONS  (PRN):  acetaminophen     Tablet .. 650 milliGRAM(s) Oral every 6 hours PRN Mild Pain (1 - 3)  aluminum hydroxide/magnesium hydroxide/simethicone Suspension 30 milliLiter(s) Oral every 4 hours PRN Dyspepsia  dextrose Oral Gel 15 Gram(s) Oral once PRN Blood Glucose LESS THAN 70 milliGRAM(s)/deciliter  diphenhydrAMINE Injectable 25 milliGRAM(s) IV Push every 4 hours PRN itching from IV Dilaudid  HYDROmorphone  Injectable 2 milliGRAM(s) IV Push every 4 hours PRN Severe Pain (7 - 10)  melatonin 3 milliGRAM(s) Oral at bedtime PRN Insomnia  oxyCODONE    IR 5 milliGRAM(s) Oral every 6 hours PRN Moderate Pain (4 - 6)  prochlorperazine   Injectable 10 milliGRAM(s) IV Push every 6 hours PRN nausea/vomiting      Allergies    Toradol (Pruritus)  Zofran (Urticaria)    Intolerances        REVIEW OF SYSTEMS:  +L hand pain     Vital Signs Last 24 Hrs  T(C): 36.7 (24 Jan 2025 17:02), Max: 36.8 (24 Jan 2025 11:00)  T(F): 98.1 (24 Jan 2025 17:02), Max: 98.3 (24 Jan 2025 11:00)  HR: 98 (24 Jan 2025 17:02) (98 - 124)  BP: 121/79 (24 Jan 2025 17:02) (112/74 - 121/79)  BP(mean): --  RR: 17 (24 Jan 2025 17:02) (17 - 19)  SpO2: 98% (24 Jan 2025 17:02) (97% - 100%)    Parameters below as of 24 Jan 2025 11:00  Patient On (Oxygen Delivery Method): room air        PHYSICAL EXAM:  GENERAL: NAD, well-groomed, well-developed  HEAD:  Atraumatic, Normocephalic  EYES: EOMI, sclera non-icteric  ENMT:  Moist mucous membranes, Good dentition,  NERVOUS SYSTEM:  Alert & Oriented X3, Good concentration;  CHEST/LUNG: Clear to percussion bilaterally; No rales, rhonchi, wheezing, or rubs  HEART: Regular rate and rhythm; No murmurs, rubs, or gallops  ABDOMEN: Soft, Nontender, Nondistended; Bowel sounds present  EXTREMITIES:  2+ Peripheral Pulses, No clubbing, cyanosis, or edema +L 5th finger tenderness with ROM and swelling   SKIN: No rashes or lesions      LABS:                        11.4   12.86 )-----------( 317      ( 24 Jan 2025 08:35 )             36.2     01-24    131[L]  |  99  |  19  ----------------------------<  400[H]  3.4[L]   |  22  |  1.34[H]    Ca    8.8      24 Jan 2025 08:35  Phos  2.5     01-24  Mg     1.6     01-24    TPro  6.9  /  Alb  3.2[L]  /  TBili  1.3[H]  /  DBili  x   /  AST  39[H]  /  ALT  45  /  AlkPhos  93  01-24      Urinalysis Basic - ( 24 Jan 2025 08:35 )    Color: x / Appearance: x / SG: x / pH: x  Gluc: 400 mg/dL / Ketone: x  / Bili: x / Urobili: x   Blood: x / Protein: x / Nitrite: x   Leuk Esterase: x / RBC: x / WBC x   Sq Epi: x / Non Sq Epi: x / Bacteria: x      CAPILLARY BLOOD GLUCOSE      POCT Blood Glucose.: 171 mg/dL (24 Jan 2025 18:39)  POCT Blood Glucose.: 24 mg/dL (24 Jan 2025 18:11)  POCT Blood Glucose.: 170 mg/dL (24 Jan 2025 16:37)  POCT Blood Glucose.: 151 mg/dL (24 Jan 2025 12:20)  POCT Blood Glucose.: 38 mg/dL (24 Jan 2025 11:16)  POCT Blood Glucose.: 34 mg/dL (24 Jan 2025 11:13)  POCT Blood Glucose.: 353 mg/dL (24 Jan 2025 07:52)  POCT Blood Glucose.: 259 mg/dL (23 Jan 2025 22:02)      RADIOLOGY & ADDITIONAL TESTS:    Imaging Personally Reviewed:  [ X] YES  [ ] NO    Consultant(s) Notes Reviewed:  [ X] YES  [ ] NO    Care Discussed with Consultants/Other Providers [X ] YES  [ ] NO

## 2025-01-24 NOTE — BH CONSULTATION LIAISON PROGRESS NOTE - NSBHCONSULTRECOMMENDOTHER_PSY_A_CORE FT
1/23/25: SAME RECOMMENDATION AS 1 MONTH AGO: Dilaudid 2mg IVP q4hrs with IV Benadryl as before (Pt has itching from IV Dilaudid thus uses Benadryl); Pt on Suboxone which sits on some 90% of the opiate receptors making Dilaudid binding compromised. Ergo 2mg Dilaudid in actuality is about max 0.75mg in this case.   - mechanism of action of medications of salient point in this case  - even if there is in part drug-seeking behavior in this case, it is not possible to tease out from physiological tolerance. Noting that previous treatment of Dilaudid/benadryl IVP combination managed to cut down on Pt's length of stay and she was able to feel ready in 2 days and be discharged.   - has capacity to leave AMA  1/24/25: feels better, less GI complaints. Asked for diet to be advanced to soft/bite sized to try  - pain well controlled with usual combo of Dilaudid and benadryl  - continue Suboxone 2-1mg SL film qam   - ice pack to left hand; awaiting xray read  - - has capacity to make medical decisions including leaving AMA   1/23/25: SAME RECOMMENDATION AS 1 MONTH AGO: Dilaudid 2mg IVP q4hrs with IV Benadryl as before (Pt has itching from IV Dilaudid thus uses Benadryl); Pt on Suboxone which sits on some 90% of the opiate receptors making Dilaudid binding compromised. Ergo 2mg Dilaudid in actuality is about max 0.75mg in this case.   - mechanism of action of medications of salient point in this case  - even if there is in part drug-seeking behavior in this case, it is not possible to tease out from physiological tolerance. Noting that previous treatment of Dilaudid/benadryl IVP combination managed to cut down on Pt's length of stay and she was able to feel ready in 2 days and be discharged.   - has capacity to leave AMA  1/24/25: feels better, less GI complaints. Asked for diet to be advanced to soft/bite sized to try  - pain well controlled with usual combo of Dilaudid and benadryl  - continue Suboxone 2-0.5mg SL film qam   - ice pack to left hand; awaiting xray read  - - has capacity to make medical decisions including leaving AMA

## 2025-01-25 LAB
ANION GAP SERPL CALC-SCNC: 7 MMOL/L — SIGNIFICANT CHANGE UP (ref 5–17)
BUN SERPL-MCNC: 14 MG/DL — SIGNIFICANT CHANGE UP (ref 7–23)
CALCIUM SERPL-MCNC: 8.6 MG/DL — SIGNIFICANT CHANGE UP (ref 8.5–10.1)
CHLORIDE SERPL-SCNC: 104 MMOL/L — SIGNIFICANT CHANGE UP (ref 96–108)
CO2 SERPL-SCNC: 25 MMOL/L — SIGNIFICANT CHANGE UP (ref 22–31)
CREAT SERPL-MCNC: 1.02 MG/DL — SIGNIFICANT CHANGE UP (ref 0.5–1.3)
EGFR: 74 ML/MIN/1.73M2 — SIGNIFICANT CHANGE UP
GLUCOSE BLDC GLUCOMTR-MCNC: 132 MG/DL — HIGH (ref 70–99)
GLUCOSE BLDC GLUCOMTR-MCNC: 255 MG/DL — HIGH (ref 70–99)
GLUCOSE BLDC GLUCOMTR-MCNC: 280 MG/DL — HIGH (ref 70–99)
GLUCOSE BLDC GLUCOMTR-MCNC: 341 MG/DL — HIGH (ref 70–99)
GLUCOSE BLDC GLUCOMTR-MCNC: 409 MG/DL — HIGH (ref 70–99)
GLUCOSE BLDC GLUCOMTR-MCNC: 52 MG/DL — CRITICAL LOW (ref 70–99)
GLUCOSE BLDC GLUCOMTR-MCNC: 57 MG/DL — LOW (ref 70–99)
GLUCOSE BLDC GLUCOMTR-MCNC: 70 MG/DL — SIGNIFICANT CHANGE UP (ref 70–99)
GLUCOSE SERPL-MCNC: 158 MG/DL — HIGH (ref 70–99)
HCT VFR BLD CALC: 33.7 % — LOW (ref 34.5–45)
HGB BLD-MCNC: 10.5 G/DL — LOW (ref 11.5–15.5)
MAGNESIUM SERPL-MCNC: 1.8 MG/DL — SIGNIFICANT CHANGE UP (ref 1.6–2.6)
MCHC RBC-ENTMCNC: 21.4 PG — LOW (ref 27–34)
MCHC RBC-ENTMCNC: 31.2 G/DL — LOW (ref 32–36)
MCV RBC AUTO: 68.8 FL — LOW (ref 80–100)
NRBC # BLD: 0 /100 WBCS — SIGNIFICANT CHANGE UP (ref 0–0)
NRBC BLD-RTO: 0 /100 WBCS — SIGNIFICANT CHANGE UP (ref 0–0)
PHOSPHATE SERPL-MCNC: 2.6 MG/DL — SIGNIFICANT CHANGE UP (ref 2.5–4.5)
PLATELET # BLD AUTO: 287 K/UL — SIGNIFICANT CHANGE UP (ref 150–400)
POTASSIUM SERPL-MCNC: 3.8 MMOL/L — SIGNIFICANT CHANGE UP (ref 3.5–5.3)
POTASSIUM SERPL-SCNC: 3.8 MMOL/L — SIGNIFICANT CHANGE UP (ref 3.5–5.3)
RBC # BLD: 4.9 M/UL — SIGNIFICANT CHANGE UP (ref 3.8–5.2)
RBC # FLD: 16.8 % — HIGH (ref 10.3–14.5)
SODIUM SERPL-SCNC: 136 MMOL/L — SIGNIFICANT CHANGE UP (ref 135–145)
WBC # BLD: 9.97 K/UL — SIGNIFICANT CHANGE UP (ref 3.8–10.5)
WBC # FLD AUTO: 9.97 K/UL — SIGNIFICANT CHANGE UP (ref 3.8–10.5)

## 2025-01-25 PROCEDURE — 99232 SBSQ HOSP IP/OBS MODERATE 35: CPT

## 2025-01-25 RX ORDER — SODIUM CHLORIDE 9 G/ML
1000 INJECTION, SOLUTION INTRAVENOUS
Refills: 0 | Status: DISCONTINUED | OUTPATIENT
Start: 2025-01-25 | End: 2025-01-26

## 2025-01-25 RX ORDER — INSULIN LISPRO 100/ML
3 VIAL (ML) SUBCUTANEOUS ONCE
Refills: 0 | Status: COMPLETED | OUTPATIENT
Start: 2025-01-25 | End: 2025-01-25

## 2025-01-25 RX ADMIN — HYDROMORPHONE HYDROCHLORIDE 2 MILLIGRAM(S): 4 INJECTION, SOLUTION INTRAMUSCULAR; INTRAVENOUS; SUBCUTANEOUS at 05:49

## 2025-01-25 RX ADMIN — HYDROMORPHONE HYDROCHLORIDE 2 MILLIGRAM(S): 4 INJECTION, SOLUTION INTRAMUSCULAR; INTRAVENOUS; SUBCUTANEOUS at 02:34

## 2025-01-25 RX ADMIN — Medication 25 MILLIGRAM(S): at 21:45

## 2025-01-25 RX ADMIN — Medication 3 UNIT(S): at 03:31

## 2025-01-25 RX ADMIN — PROCHLORPERAZINE MALEATE 10 MILLIGRAM(S): 5 TABLET ORAL at 08:33

## 2025-01-25 RX ADMIN — HYDROMORPHONE HYDROCHLORIDE 2 MILLIGRAM(S): 4 INJECTION, SOLUTION INTRAMUSCULAR; INTRAVENOUS; SUBCUTANEOUS at 01:34

## 2025-01-25 RX ADMIN — Medication 3: at 11:29

## 2025-01-25 RX ADMIN — Medication 25 MILLIGRAM(S): at 17:39

## 2025-01-25 RX ADMIN — BUPRENORPHINE AND NALOXONE 1 FILM(S): 8; 2 FILM BUCCAL; SUBLINGUAL at 08:31

## 2025-01-25 RX ADMIN — HYDROMORPHONE HYDROCHLORIDE 2 MILLIGRAM(S): 4 INJECTION, SOLUTION INTRAMUSCULAR; INTRAVENOUS; SUBCUTANEOUS at 06:19

## 2025-01-25 RX ADMIN — HYDROMORPHONE HYDROCHLORIDE 2 MILLIGRAM(S): 4 INJECTION, SOLUTION INTRAMUSCULAR; INTRAVENOUS; SUBCUTANEOUS at 11:58

## 2025-01-25 RX ADMIN — Medication 25 MILLIGRAM(S): at 09:54

## 2025-01-25 RX ADMIN — HYDROMORPHONE HYDROCHLORIDE 2 MILLIGRAM(S): 4 INJECTION, SOLUTION INTRAMUSCULAR; INTRAVENOUS; SUBCUTANEOUS at 21:45

## 2025-01-25 RX ADMIN — Medication 25 MILLIGRAM(S): at 13:27

## 2025-01-25 RX ADMIN — HYDROMORPHONE HYDROCHLORIDE 2 MILLIGRAM(S): 4 INJECTION, SOLUTION INTRAMUSCULAR; INTRAVENOUS; SUBCUTANEOUS at 13:27

## 2025-01-25 RX ADMIN — HYDROMORPHONE HYDROCHLORIDE 2 MILLIGRAM(S): 4 INJECTION, SOLUTION INTRAMUSCULAR; INTRAVENOUS; SUBCUTANEOUS at 17:31

## 2025-01-25 RX ADMIN — HYDROMORPHONE HYDROCHLORIDE 2 MILLIGRAM(S): 4 INJECTION, SOLUTION INTRAMUSCULAR; INTRAVENOUS; SUBCUTANEOUS at 14:33

## 2025-01-25 RX ADMIN — HYDROMORPHONE HYDROCHLORIDE 2 MILLIGRAM(S): 4 INJECTION, SOLUTION INTRAMUSCULAR; INTRAVENOUS; SUBCUTANEOUS at 18:42

## 2025-01-25 RX ADMIN — HYDROMORPHONE HYDROCHLORIDE 2 MILLIGRAM(S): 4 INJECTION, SOLUTION INTRAMUSCULAR; INTRAVENOUS; SUBCUTANEOUS at 22:15

## 2025-01-25 RX ADMIN — Medication 25 MILLIGRAM(S): at 01:34

## 2025-01-25 RX ADMIN — Medication 25 MILLIGRAM(S): at 05:49

## 2025-01-25 RX ADMIN — PANTOPRAZOLE 40 MILLIGRAM(S): 20 TABLET, DELAYED RELEASE ORAL at 07:54

## 2025-01-25 RX ADMIN — INSULIN GLARGINE-YFGN 10 UNIT(S): 100 INJECTION, SOLUTION SUBCUTANEOUS at 21:42

## 2025-01-25 RX ADMIN — HYDROMORPHONE HYDROCHLORIDE 2 MILLIGRAM(S): 4 INJECTION, SOLUTION INTRAMUSCULAR; INTRAVENOUS; SUBCUTANEOUS at 09:54

## 2025-01-25 NOTE — PROGRESS NOTE ADULT - SUBJECTIVE AND OBJECTIVE BOX
Patient is a 34y old  Female who presents with a chief complaint of intractable nausea and vomiting (24 Jan 2025 19:51)      INTERVAL HPI/OVERNIGHT EVENTS:  -afebrile vitals stable overnight   -no hypoglycemia overnight     MEDICATIONS  (STANDING):  buprenorphine 2 mG/naloxone 0.5 mG SL Film 1 Film(s) SubLingual <User Schedule>  dextrose 5% + lactated ringers. 1000 milliLiter(s) (75 mL/Hr) IV Continuous <Continuous>  dextrose 5%. 1000 milliLiter(s) (100 mL/Hr) IV Continuous <Continuous>  dextrose 5%. 1000 milliLiter(s) (100 mL/Hr) IV Continuous <Continuous>  dextrose 5%. 1000 milliLiter(s) (50 mL/Hr) IV Continuous <Continuous>  dextrose 50% Injectable 25 Gram(s) IV Push once  dextrose 50% Injectable 12.5 Gram(s) IV Push once  dextrose 50% Injectable 25 Gram(s) IV Push once  glucagon  Injectable 1 milliGRAM(s) IntraMuscular once  influenza   Vaccine 0.5 milliLiter(s) IntraMuscular once  insulin glargine Injectable (LANTUS) 10 Unit(s) SubCutaneous at bedtime  insulin lispro (ADMELOG) corrective regimen sliding scale   SubCutaneous three times a day before meals  insulin lispro (ADMELOG) corrective regimen sliding scale   SubCutaneous at bedtime  pantoprazole    Tablet 40 milliGRAM(s) Oral before breakfast    MEDICATIONS  (PRN):  acetaminophen     Tablet .. 650 milliGRAM(s) Oral every 6 hours PRN Mild Pain (1 - 3)  aluminum hydroxide/magnesium hydroxide/simethicone Suspension 30 milliLiter(s) Oral every 4 hours PRN Dyspepsia  dextrose Oral Gel 15 Gram(s) Oral once PRN Blood Glucose LESS THAN 70 milliGRAM(s)/deciliter  diphenhydrAMINE Injectable 25 milliGRAM(s) IV Push every 4 hours PRN itching from IV Dilaudid  HYDROmorphone  Injectable 2 milliGRAM(s) IV Push every 4 hours PRN Severe Pain (7 - 10)  melatonin 3 milliGRAM(s) Oral at bedtime PRN Insomnia  oxyCODONE    IR 5 milliGRAM(s) Oral every 6 hours PRN Moderate Pain (4 - 6)  prochlorperazine   Injectable 10 milliGRAM(s) IV Push every 6 hours PRN nausea/vomiting      Allergies    Toradol (Pruritus)  Zofran (Urticaria)    Intolerances        REVIEW OF SYSTEMS:  CONSTITUTIONAL: No fever, weight loss, or fatigue  EYES: No eye pain, visual disturbances, or discharge  ENMT:  No difficulty hearing, tinnitus, vertigo; No sinus or throat pain  NECK: No pain or stiffness  BREASTS: No pain, masses, or nipple discharge  RESPIRATORY: No cough, wheezing, chills or hemoptysis; No shortness of breath  CARDIOVASCULAR: No chest pain, palpitations, dizziness, or leg swelling  GASTROINTESTINAL: No abdominal or epigastric pain. No nausea, vomiting, or hematemesis; No diarrhea or constipation. No melena or hematochezia.  GENITOURINARY: No dysuria, frequency, hematuria, or incontinence  NEUROLOGICAL: No headaches, memory loss, loss of strength, numbness, or tremors  SKIN: No itching, burning, rashes, or lesions   LYMPH NODES: No enlarged glands  ENDOCRINE: No heat or cold intolerance; No hair loss  MUSCULOSKELETAL: No joint pain or swelling; No muscle, back, or extremity pain  PSYCHIATRIC: No depression, anxiety, mood swings, or difficulty sleeping  HEME/LYMPH: No easy bruising, or bleeding gums  ALLERGY AND IMMUNOLOGIC: No hives or eczema    Vital Signs Last 24 Hrs  T(C): 36.8 (25 Jan 2025 05:07), Max: 36.9 (24 Jan 2025 23:45)  T(F): 98.2 (25 Jan 2025 05:07), Max: 98.4 (24 Jan 2025 23:45)  HR: 86 (25 Jan 2025 05:07) (86 - 124)  BP: 122/88 (25 Jan 2025 05:07) (118/80 - 123/83)  BP(mean): --  RR: 18 (25 Jan 2025 05:07) (17 - 18)  SpO2: 100% (25 Jan 2025 05:07) (97% - 100%)    Parameters below as of 25 Jan 2025 05:07  Patient On (Oxygen Delivery Method): room air        PHYSICAL EXAM:  GENERAL: NAD, well-groomed, well-developed  HEAD:  Atraumatic, Normocephalic  EYES: EOMI, sclera non-icteric  ENMT:  Moist mucous membranes, Good dentition,  NERVOUS SYSTEM:  Alert & Oriented X3, Good concentration;  CHEST/LUNG: Clear to percussion bilaterally; No rales, rhonchi, wheezing, or rubs  HEART: Regular rate and rhythm; No murmurs, rubs, or gallops  ABDOMEN: Soft, Nontender, Nondistended; Bowel sounds present  EXTREMITIES:  2+ Peripheral Pulses, No clubbing, cyanosis, or edema +L 5th finger tenderness with active ROM and swelling   SKIN: No rashes or lesions      LABS:                        10.5   9.97  )-----------( 287      ( 25 Jan 2025 07:44 )             33.7     01-25    136  |  104  |  14  ----------------------------<  158[H]  3.8   |  25  |  1.02    Ca    8.6      25 Jan 2025 07:44  Phos  2.6     01-25  Mg     1.8     01-25    TPro  6.9  /  Alb  3.2[L]  /  TBili  1.3[H]  /  DBili  x   /  AST  39[H]  /  ALT  45  /  AlkPhos  93  01-24      Urinalysis Basic - ( 25 Jan 2025 07:44 )    Color: x / Appearance: x / SG: x / pH: x  Gluc: 158 mg/dL / Ketone: x  / Bili: x / Urobili: x   Blood: x / Protein: x / Nitrite: x   Leuk Esterase: x / RBC: x / WBC x   Sq Epi: x / Non Sq Epi: x / Bacteria: x      CAPILLARY BLOOD GLUCOSE      POCT Blood Glucose.: 132 mg/dL (25 Jan 2025 08:04)  POCT Blood Glucose.: 409 mg/dL (25 Jan 2025 03:10)  POCT Blood Glucose.: 341 mg/dL (25 Jan 2025 01:16)  POCT Blood Glucose.: 120 mg/dL (24 Jan 2025 21:19)  POCT Blood Glucose.: 171 mg/dL (24 Jan 2025 18:39)  POCT Blood Glucose.: 24 mg/dL (24 Jan 2025 18:11)  POCT Blood Glucose.: 170 mg/dL (24 Jan 2025 16:37)  POCT Blood Glucose.: 151 mg/dL (24 Jan 2025 12:20)  POCT Blood Glucose.: 38 mg/dL (24 Jan 2025 11:16)  POCT Blood Glucose.: 34 mg/dL (24 Jan 2025 11:13)      RADIOLOGY & ADDITIONAL TESTS:    Imaging Personally Reviewed:  [ X] YES  [ ] NO    Consultant(s) Notes Reviewed:  [ X] YES  [ ] NO    Care Discussed with Consultants/Other Providers [X ] YES  [ ] NO Patient is a 34y old  Female who presents with a chief complaint of intractable nausea and vomiting (24 Jan 2025 19:51)      INTERVAL HPI/OVERNIGHT EVENTS:  -afebrile vitals stable overnight   -no hypoglycemia overnight   -seen and examined at bedside   -tolerated PO wants to try regular diet     MEDICATIONS  (STANDING):  buprenorphine 2 mG/naloxone 0.5 mG SL Film 1 Film(s) SubLingual <User Schedule>  dextrose 5% + lactated ringers. 1000 milliLiter(s) (75 mL/Hr) IV Continuous <Continuous>  dextrose 5%. 1000 milliLiter(s) (100 mL/Hr) IV Continuous <Continuous>  dextrose 5%. 1000 milliLiter(s) (100 mL/Hr) IV Continuous <Continuous>  dextrose 5%. 1000 milliLiter(s) (50 mL/Hr) IV Continuous <Continuous>  dextrose 50% Injectable 25 Gram(s) IV Push once  dextrose 50% Injectable 12.5 Gram(s) IV Push once  dextrose 50% Injectable 25 Gram(s) IV Push once  glucagon  Injectable 1 milliGRAM(s) IntraMuscular once  influenza   Vaccine 0.5 milliLiter(s) IntraMuscular once  insulin glargine Injectable (LANTUS) 10 Unit(s) SubCutaneous at bedtime  insulin lispro (ADMELOG) corrective regimen sliding scale   SubCutaneous three times a day before meals  insulin lispro (ADMELOG) corrective regimen sliding scale   SubCutaneous at bedtime  pantoprazole    Tablet 40 milliGRAM(s) Oral before breakfast    MEDICATIONS  (PRN):  acetaminophen     Tablet .. 650 milliGRAM(s) Oral every 6 hours PRN Mild Pain (1 - 3)  aluminum hydroxide/magnesium hydroxide/simethicone Suspension 30 milliLiter(s) Oral every 4 hours PRN Dyspepsia  dextrose Oral Gel 15 Gram(s) Oral once PRN Blood Glucose LESS THAN 70 milliGRAM(s)/deciliter  diphenhydrAMINE Injectable 25 milliGRAM(s) IV Push every 4 hours PRN itching from IV Dilaudid  HYDROmorphone  Injectable 2 milliGRAM(s) IV Push every 4 hours PRN Severe Pain (7 - 10)  melatonin 3 milliGRAM(s) Oral at bedtime PRN Insomnia  oxyCODONE    IR 5 milliGRAM(s) Oral every 6 hours PRN Moderate Pain (4 - 6)  prochlorperazine   Injectable 10 milliGRAM(s) IV Push every 6 hours PRN nausea/vomiting      Allergies    Toradol (Pruritus)  Zofran (Urticaria)    Intolerances        REVIEW OF SYSTEMS:  no abdominal pain n/v/d    Vital Signs Last 24 Hrs  T(C): 36.8 (25 Jan 2025 05:07), Max: 36.9 (24 Jan 2025 23:45)  T(F): 98.2 (25 Jan 2025 05:07), Max: 98.4 (24 Jan 2025 23:45)  HR: 86 (25 Jan 2025 05:07) (86 - 124)  BP: 122/88 (25 Jan 2025 05:07) (118/80 - 123/83)  BP(mean): --  RR: 18 (25 Jan 2025 05:07) (17 - 18)  SpO2: 100% (25 Jan 2025 05:07) (97% - 100%)    Parameters below as of 25 Jan 2025 05:07  Patient On (Oxygen Delivery Method): room air        PHYSICAL EXAM:  GENERAL: NAD, well-groomed, well-developed  HEAD:  Atraumatic, Normocephalic  EYES: EOMI, sclera non-icteric  ENMT:  Moist mucous membranes, Good dentition,  NERVOUS SYSTEM:  Alert & Oriented X3, Good concentration;  CHEST/LUNG: Clear to percussion bilaterally; No rales, rhonchi, wheezing, or rubs  HEART: Regular rate and rhythm; No murmurs, rubs, or gallops  ABDOMEN: Soft, Nontender, Nondistended; Bowel sounds present  EXTREMITIES:  2+ Peripheral Pulses, No clubbing, cyanosis, or edema +L 5th finger tenderness with active ROM and swelling   SKIN: No rashes or lesions      LABS:                        10.5   9.97  )-----------( 287      ( 25 Jan 2025 07:44 )             33.7     01-25    136  |  104  |  14  ----------------------------<  158[H]  3.8   |  25  |  1.02    Ca    8.6      25 Jan 2025 07:44  Phos  2.6     01-25  Mg     1.8     01-25    TPro  6.9  /  Alb  3.2[L]  /  TBili  1.3[H]  /  DBili  x   /  AST  39[H]  /  ALT  45  /  AlkPhos  93  01-24      Urinalysis Basic - ( 25 Jan 2025 07:44 )    Color: x / Appearance: x / SG: x / pH: x  Gluc: 158 mg/dL / Ketone: x  / Bili: x / Urobili: x   Blood: x / Protein: x / Nitrite: x   Leuk Esterase: x / RBC: x / WBC x   Sq Epi: x / Non Sq Epi: x / Bacteria: x      CAPILLARY BLOOD GLUCOSE      POCT Blood Glucose.: 132 mg/dL (25 Jan 2025 08:04)  POCT Blood Glucose.: 409 mg/dL (25 Jan 2025 03:10)  POCT Blood Glucose.: 341 mg/dL (25 Jan 2025 01:16)  POCT Blood Glucose.: 120 mg/dL (24 Jan 2025 21:19)  POCT Blood Glucose.: 171 mg/dL (24 Jan 2025 18:39)  POCT Blood Glucose.: 24 mg/dL (24 Jan 2025 18:11)  POCT Blood Glucose.: 170 mg/dL (24 Jan 2025 16:37)  POCT Blood Glucose.: 151 mg/dL (24 Jan 2025 12:20)  POCT Blood Glucose.: 38 mg/dL (24 Jan 2025 11:16)  POCT Blood Glucose.: 34 mg/dL (24 Jan 2025 11:13)      RADIOLOGY & ADDITIONAL TESTS:    Imaging Personally Reviewed:  [ X] YES  [ ] NO    Consultant(s) Notes Reviewed:  [ X] YES  [ ] NO    Care Discussed with Consultants/Other Providers [X ] YES  [ ] NO

## 2025-01-25 NOTE — PROGRESS NOTE ADULT - PROBLEM SELECTOR PLAN 1
present with intractable nausea, vomiting and unable to tolerate oral intake, associated with upper abdominal pain  EKG  ( I personally review) NSR, QTc 457  Utox positive for THC  gastroparesis flare, likely exacerbated by marijuana use  Compazine prn for N/V  IV pantoprazole x 1 and continue with oral PPI  IV dilaudid prn for severe pain  Clear liquid diet and advance as tolerate   Psych consulted recommended IV dilaudid with IV benadryl as per prior hospitalization   GI consulted  [facility] checked, on Suboxone  -patient wants to advance to minced diet will try tonight present with intractable nausea, vomiting and unable to tolerate oral intake, associated with upper abdominal pain  EKG  ( I personally review) NSR, QTc 457  Utox positive for THC  gastroparesis flare, likely exacerbated by marijuana use  Compazine prn for N/V  IV pantoprazole x 1 and continue with oral PPI  IV dilaudid prn for severe pain  Clear liquid diet and advance as tolerate   Psych consulted recommended IV dilaudid with IV benadryl as per prior hospitalization   GI consulted  [facility] checked, on Suboxone  -patient wants to advance to carb consistent diet 1/25

## 2025-01-25 NOTE — PROGRESS NOTE ADULT - PROBLEM SELECTOR PLAN 2
continue with basal, premeal insulin, ISS, hypoglycemia protocol  Monitor glucose and titrate insulin regimens  Check A1c  -had multiple episodes of hypoglycemia will decrease glargine dose and start D5LR given n/v and decreased PO continue with basal, premeal insulin, ISS, hypoglycemia protocol  Monitor glucose and titrate insulin regimens  Check A1c  -no longer hypoglycemic will dc D5LR

## 2025-01-26 LAB
ANION GAP SERPL CALC-SCNC: 6 MMOL/L — SIGNIFICANT CHANGE UP (ref 5–17)
BUN SERPL-MCNC: 17 MG/DL — SIGNIFICANT CHANGE UP (ref 7–23)
CALCIUM SERPL-MCNC: 8.5 MG/DL — SIGNIFICANT CHANGE UP (ref 8.5–10.1)
CHLORIDE SERPL-SCNC: 107 MMOL/L — SIGNIFICANT CHANGE UP (ref 96–108)
CO2 SERPL-SCNC: 26 MMOL/L — SIGNIFICANT CHANGE UP (ref 22–31)
CREAT SERPL-MCNC: 1.23 MG/DL — SIGNIFICANT CHANGE UP (ref 0.5–1.3)
EGFR: 59 ML/MIN/1.73M2 — LOW
GLUCOSE BLDC GLUCOMTR-MCNC: 100 MG/DL — HIGH (ref 70–99)
GLUCOSE BLDC GLUCOMTR-MCNC: 336 MG/DL — HIGH (ref 70–99)
GLUCOSE BLDC GLUCOMTR-MCNC: 448 MG/DL — HIGH (ref 70–99)
GLUCOSE BLDC GLUCOMTR-MCNC: 469 MG/DL — CRITICAL HIGH (ref 70–99)
GLUCOSE BLDC GLUCOMTR-MCNC: 481 MG/DL — CRITICAL HIGH (ref 70–99)
GLUCOSE BLDC GLUCOMTR-MCNC: 51 MG/DL — CRITICAL LOW (ref 70–99)
GLUCOSE BLDC GLUCOMTR-MCNC: 55 MG/DL — LOW (ref 70–99)
GLUCOSE BLDC GLUCOMTR-MCNC: 62 MG/DL — LOW (ref 70–99)
GLUCOSE BLDC GLUCOMTR-MCNC: 84 MG/DL — SIGNIFICANT CHANGE UP (ref 70–99)
GLUCOSE SERPL-MCNC: 59 MG/DL — LOW (ref 70–99)
HCT VFR BLD CALC: 33.4 % — LOW (ref 34.5–45)
HGB BLD-MCNC: 10.4 G/DL — LOW (ref 11.5–15.5)
MAGNESIUM SERPL-MCNC: 1.7 MG/DL — SIGNIFICANT CHANGE UP (ref 1.6–2.6)
MCHC RBC-ENTMCNC: 21.3 PG — LOW (ref 27–34)
MCHC RBC-ENTMCNC: 31.1 G/DL — LOW (ref 32–36)
MCV RBC AUTO: 68.4 FL — LOW (ref 80–100)
NRBC # BLD: 0 /100 WBCS — SIGNIFICANT CHANGE UP (ref 0–0)
NRBC BLD-RTO: 0 /100 WBCS — SIGNIFICANT CHANGE UP (ref 0–0)
PHOSPHATE SERPL-MCNC: 2.9 MG/DL — SIGNIFICANT CHANGE UP (ref 2.5–4.5)
PLATELET # BLD AUTO: 297 K/UL — SIGNIFICANT CHANGE UP (ref 150–400)
POTASSIUM SERPL-MCNC: 4 MMOL/L — SIGNIFICANT CHANGE UP (ref 3.5–5.3)
POTASSIUM SERPL-SCNC: 4 MMOL/L — SIGNIFICANT CHANGE UP (ref 3.5–5.3)
RBC # BLD: 4.88 M/UL — SIGNIFICANT CHANGE UP (ref 3.8–5.2)
RBC # FLD: 17 % — HIGH (ref 10.3–14.5)
SODIUM SERPL-SCNC: 139 MMOL/L — SIGNIFICANT CHANGE UP (ref 135–145)
WBC # BLD: 10.37 K/UL — SIGNIFICANT CHANGE UP (ref 3.8–10.5)
WBC # FLD AUTO: 10.37 K/UL — SIGNIFICANT CHANGE UP (ref 3.8–10.5)

## 2025-01-26 PROCEDURE — 73200 CT UPPER EXTREMITY W/O DYE: CPT | Mod: 26,LT

## 2025-01-26 PROCEDURE — 99232 SBSQ HOSP IP/OBS MODERATE 35: CPT

## 2025-01-26 RX ORDER — INSULIN GLARGINE-YFGN 100 [IU]/ML
7 INJECTION, SOLUTION SUBCUTANEOUS AT BEDTIME
Refills: 0 | Status: DISCONTINUED | OUTPATIENT
Start: 2025-01-26 | End: 2025-01-28

## 2025-01-26 RX ORDER — DM/PSEUDOEPHED/ACETAMINOPHEN 10-30-250
15 CAPSULE ORAL ONCE
Refills: 0 | Status: COMPLETED | OUTPATIENT
Start: 2025-01-26 | End: 2025-01-26

## 2025-01-26 RX ADMIN — HYDROMORPHONE HYDROCHLORIDE 2 MILLIGRAM(S): 4 INJECTION, SOLUTION INTRAMUSCULAR; INTRAVENOUS; SUBCUTANEOUS at 18:15

## 2025-01-26 RX ADMIN — HYDROMORPHONE HYDROCHLORIDE 2 MILLIGRAM(S): 4 INJECTION, SOLUTION INTRAMUSCULAR; INTRAVENOUS; SUBCUTANEOUS at 14:17

## 2025-01-26 RX ADMIN — HYDROMORPHONE HYDROCHLORIDE 2 MILLIGRAM(S): 4 INJECTION, SOLUTION INTRAMUSCULAR; INTRAVENOUS; SUBCUTANEOUS at 05:48

## 2025-01-26 RX ADMIN — Medication 25 MILLIGRAM(S): at 14:17

## 2025-01-26 RX ADMIN — HYDROMORPHONE HYDROCHLORIDE 2 MILLIGRAM(S): 4 INJECTION, SOLUTION INTRAMUSCULAR; INTRAVENOUS; SUBCUTANEOUS at 02:16

## 2025-01-26 RX ADMIN — Medication 25 MILLIGRAM(S): at 18:25

## 2025-01-26 RX ADMIN — Medication 4: at 21:17

## 2025-01-26 RX ADMIN — Medication 25 MILLIGRAM(S): at 01:47

## 2025-01-26 RX ADMIN — ACETAMINOPHEN, DIPHENHYDRAMINE HCL, PHENYLEPHRINE HCL 3 MILLIGRAM(S): 325; 25; 5 TABLET ORAL at 06:30

## 2025-01-26 RX ADMIN — HYDROMORPHONE HYDROCHLORIDE 2 MILLIGRAM(S): 4 INJECTION, SOLUTION INTRAMUSCULAR; INTRAVENOUS; SUBCUTANEOUS at 15:57

## 2025-01-26 RX ADMIN — Medication 4: at 16:24

## 2025-01-26 RX ADMIN — Medication 25 MILLIGRAM(S): at 05:49

## 2025-01-26 RX ADMIN — HYDROMORPHONE HYDROCHLORIDE 2 MILLIGRAM(S): 4 INJECTION, SOLUTION INTRAMUSCULAR; INTRAVENOUS; SUBCUTANEOUS at 11:29

## 2025-01-26 RX ADMIN — HYDROMORPHONE HYDROCHLORIDE 2 MILLIGRAM(S): 4 INJECTION, SOLUTION INTRAMUSCULAR; INTRAVENOUS; SUBCUTANEOUS at 09:43

## 2025-01-26 RX ADMIN — Medication 25 MILLIGRAM(S): at 09:44

## 2025-01-26 RX ADMIN — INSULIN GLARGINE-YFGN 7 UNIT(S): 100 INJECTION, SOLUTION SUBCUTANEOUS at 21:16

## 2025-01-26 RX ADMIN — PANTOPRAZOLE 40 MILLIGRAM(S): 20 TABLET, DELAYED RELEASE ORAL at 06:13

## 2025-01-26 RX ADMIN — HYDROMORPHONE HYDROCHLORIDE 2 MILLIGRAM(S): 4 INJECTION, SOLUTION INTRAMUSCULAR; INTRAVENOUS; SUBCUTANEOUS at 01:46

## 2025-01-26 RX ADMIN — HYDROMORPHONE HYDROCHLORIDE 2 MILLIGRAM(S): 4 INJECTION, SOLUTION INTRAMUSCULAR; INTRAVENOUS; SUBCUTANEOUS at 06:18

## 2025-01-26 RX ADMIN — HYDROMORPHONE HYDROCHLORIDE 2 MILLIGRAM(S): 4 INJECTION, SOLUTION INTRAMUSCULAR; INTRAVENOUS; SUBCUTANEOUS at 22:30

## 2025-01-26 RX ADMIN — SODIUM CHLORIDE 75 MILLILITER(S): 9 INJECTION, SOLUTION INTRAVENOUS at 12:45

## 2025-01-26 RX ADMIN — Medication 15 GRAM(S): at 12:52

## 2025-01-26 RX ADMIN — HYDROMORPHONE HYDROCHLORIDE 2 MILLIGRAM(S): 4 INJECTION, SOLUTION INTRAMUSCULAR; INTRAVENOUS; SUBCUTANEOUS at 23:00

## 2025-01-26 RX ADMIN — BUPRENORPHINE AND NALOXONE 1 FILM(S): 8; 2 FILM BUCCAL; SUBLINGUAL at 06:20

## 2025-01-26 RX ADMIN — Medication 25 MILLIGRAM(S): at 22:30

## 2025-01-26 NOTE — PROGRESS NOTE ADULT - PROBLEM SELECTOR PLAN 2
continue with basal, premeal insulin, ISS, hypoglycemia protocol  Monitor glucose and titrate insulin regimens  Check A1c  -will decrease glargine to 7u qhs   -D5LR resumed given episodes of hypoglycemia

## 2025-01-26 NOTE — PROGRESS NOTE ADULT - SUBJECTIVE AND OBJECTIVE BOX
Patient is a 34y old  Female who presents with a chief complaint of intractable nausea and vomiting (25 Jan 2025 10:04)      INTERVAL HPI/OVERNIGHT EVENTS:  -hypoglycemia this AM but without symptoms   -D5LR was not started yesterday patient states she is amenable to IVF  -seen and examined at bedside no complaints     MEDICATIONS  (STANDING):  buprenorphine 2 mG/naloxone 0.5 mG SL Film 1 Film(s) SubLingual <User Schedule>  dextrose 5% + lactated ringers. 1000 milliLiter(s) (75 mL/Hr) IV Continuous <Continuous>  dextrose 5%. 1000 milliLiter(s) (100 mL/Hr) IV Continuous <Continuous>  dextrose 5%. 1000 milliLiter(s) (100 mL/Hr) IV Continuous <Continuous>  dextrose 5%. 1000 milliLiter(s) (50 mL/Hr) IV Continuous <Continuous>  dextrose 50% Injectable 25 Gram(s) IV Push once  dextrose 50% Injectable 12.5 Gram(s) IV Push once  dextrose 50% Injectable 25 Gram(s) IV Push once  glucagon  Injectable 1 milliGRAM(s) IntraMuscular once  influenza   Vaccine 0.5 milliLiter(s) IntraMuscular once  insulin glargine Injectable (LANTUS) 7 Unit(s) SubCutaneous at bedtime  insulin lispro (ADMELOG) corrective regimen sliding scale   SubCutaneous three times a day before meals  insulin lispro (ADMELOG) corrective regimen sliding scale   SubCutaneous at bedtime  pantoprazole    Tablet 40 milliGRAM(s) Oral before breakfast    MEDICATIONS  (PRN):  acetaminophen     Tablet .. 650 milliGRAM(s) Oral every 6 hours PRN Mild Pain (1 - 3)  aluminum hydroxide/magnesium hydroxide/simethicone Suspension 30 milliLiter(s) Oral every 4 hours PRN Dyspepsia  dextrose Oral Gel 15 Gram(s) Oral once PRN Blood Glucose LESS THAN 70 milliGRAM(s)/deciliter  diphenhydrAMINE Injectable 25 milliGRAM(s) IV Push every 4 hours PRN itching from IV Dilaudid  HYDROmorphone  Injectable 2 milliGRAM(s) IV Push every 4 hours PRN Severe Pain (7 - 10)  melatonin 3 milliGRAM(s) Oral at bedtime PRN Insomnia  oxyCODONE    IR 5 milliGRAM(s) Oral every 6 hours PRN Moderate Pain (4 - 6)  prochlorperazine   Injectable 10 milliGRAM(s) IV Push every 6 hours PRN nausea/vomiting      Allergies    Toradol (Pruritus)  Zofran (Urticaria)    Intolerances        REVIEW OF SYSTEMS:  no n/v/d HA vision changes abdominal pain     Vital Signs Last 24 Hrs  T(C): 36.6 (26 Jan 2025 12:41), Max: 36.9 (25 Jan 2025 23:47)  T(F): 97.8 (26 Jan 2025 12:41), Max: 98.5 (25 Jan 2025 23:47)  HR: 87 (26 Jan 2025 12:41) (87 - 109)  BP: 133/84 (26 Jan 2025 12:41) (117/76 - 133/84)  BP(mean): --  RR: 17 (26 Jan 2025 12:41) (17 - 18)  SpO2: 99% (26 Jan 2025 12:41) (97% - 99%)    Parameters below as of 26 Jan 2025 12:41  Patient On (Oxygen Delivery Method): room air        PHYSICAL EXAM:  GENERAL: NAD, well-groomed, well-developed  HEAD:  Atraumatic, Normocephalic  EYES: EOMI, sclera non-icteric  ENMT:  Moist mucous membranes, Good dentition,  NERVOUS SYSTEM:  Alert & Oriented X3, Good concentration;  CHEST/LUNG: Clear to percussion bilaterally; No rales, rhonchi, wheezing, or rubs  HEART: Regular rate and rhythm; No murmurs, rubs, or gallops  ABDOMEN: Soft, Nontender, Nondistended; Bowel sounds present  EXTREMITIES:  2+ Peripheral Pulses, No clubbing, cyanosis, or edema +L 5th finger tenderness with active ROM, swelling decreased  SKIN: No rashes or lesions    LABS:                        10.4   10.37 )-----------( 297      ( 26 Jan 2025 07:55 )             33.4     01-26    139  |  107  |  17  ----------------------------<  59[L]  4.0   |  26  |  1.23    Ca    8.5      26 Jan 2025 07:55  Phos  2.9     01-26  Mg     1.7     01-26        Urinalysis Basic - ( 26 Jan 2025 07:55 )    Color: x / Appearance: x / SG: x / pH: x  Gluc: 59 mg/dL / Ketone: x  / Bili: x / Urobili: x   Blood: x / Protein: x / Nitrite: x   Leuk Esterase: x / RBC: x / WBC x   Sq Epi: x / Non Sq Epi: x / Bacteria: x      CAPILLARY BLOOD GLUCOSE      POCT Blood Glucose.: 55 mg/dL (26 Jan 2025 12:39)  POCT Blood Glucose.: 51 mg/dL (26 Jan 2025 11:50)  POCT Blood Glucose.: 84 mg/dL (26 Jan 2025 08:31)  POCT Blood Glucose.: 62 mg/dL (26 Jan 2025 08:00)  POCT Blood Glucose.: 255 mg/dL (25 Jan 2025 21:41)  POCT Blood Glucose.: 70 mg/dL (25 Jan 2025 16:53)  POCT Blood Glucose.: 52 mg/dL (25 Jan 2025 16:13)  POCT Blood Glucose.: 57 mg/dL (25 Jan 2025 16:12)      RADIOLOGY & ADDITIONAL TESTS:    Imaging Personally Reviewed:  [ X] YES  [ ] NO    Consultant(s) Notes Reviewed:  [ X] YES  [ ] NO    Care Discussed with Consultants/Other Providers [X ] YES  [ ] NO

## 2025-01-26 NOTE — PROGRESS NOTE ADULT - PROBLEM SELECTOR PLAN 1
present with intractable nausea, vomiting and unable to tolerate oral intake, associated with upper abdominal pain  EKG  ( I personally review) NSR, QTc 457  Utox positive for THC  gastroparesis flare, likely exacerbated by marijuana use  Compazine prn for N/V  IV pantoprazole x 1 and continue with oral PPI  IV dilaudid prn for severe pain  Clear liquid diet and advance as tolerate   Psych consulted recommended IV dilaudid with IV benadryl as per prior hospitalization   GI consulted  [facility] checked, on Suboxone  -patient wants to advance to carb consistent diet 1/25

## 2025-01-27 LAB
ANION GAP SERPL CALC-SCNC: 8 MMOL/L — SIGNIFICANT CHANGE UP (ref 5–17)
BUN SERPL-MCNC: 28 MG/DL — HIGH (ref 7–23)
CALCIUM SERPL-MCNC: 9.3 MG/DL — SIGNIFICANT CHANGE UP (ref 8.5–10.1)
CHLORIDE SERPL-SCNC: 103 MMOL/L — SIGNIFICANT CHANGE UP (ref 96–108)
CO2 SERPL-SCNC: 24 MMOL/L — SIGNIFICANT CHANGE UP (ref 22–31)
CREAT SERPL-MCNC: 0.99 MG/DL — SIGNIFICANT CHANGE UP (ref 0.5–1.3)
EGFR: 77 ML/MIN/1.73M2 — SIGNIFICANT CHANGE UP
GLUCOSE BLDC GLUCOMTR-MCNC: 150 MG/DL — HIGH (ref 70–99)
GLUCOSE BLDC GLUCOMTR-MCNC: 281 MG/DL — HIGH (ref 70–99)
GLUCOSE BLDC GLUCOMTR-MCNC: 333 MG/DL — HIGH (ref 70–99)
GLUCOSE SERPL-MCNC: 148 MG/DL — HIGH (ref 70–99)
HCT VFR BLD CALC: 36.2 % — SIGNIFICANT CHANGE UP (ref 34.5–45)
HGB BLD-MCNC: 11.3 G/DL — LOW (ref 11.5–15.5)
MAGNESIUM SERPL-MCNC: 1.9 MG/DL — SIGNIFICANT CHANGE UP (ref 1.6–2.6)
MCHC RBC-ENTMCNC: 21.5 PG — LOW (ref 27–34)
MCHC RBC-ENTMCNC: 31.2 G/DL — LOW (ref 32–36)
MCV RBC AUTO: 68.8 FL — LOW (ref 80–100)
NRBC # BLD: 0 /100 WBCS — SIGNIFICANT CHANGE UP (ref 0–0)
NRBC BLD-RTO: 0 /100 WBCS — SIGNIFICANT CHANGE UP (ref 0–0)
PHOSPHATE SERPL-MCNC: 3.6 MG/DL — SIGNIFICANT CHANGE UP (ref 2.5–4.5)
PLATELET # BLD AUTO: 329 K/UL — SIGNIFICANT CHANGE UP (ref 150–400)
POTASSIUM SERPL-MCNC: 4.2 MMOL/L — SIGNIFICANT CHANGE UP (ref 3.5–5.3)
POTASSIUM SERPL-SCNC: 4.2 MMOL/L — SIGNIFICANT CHANGE UP (ref 3.5–5.3)
RBC # BLD: 5.26 M/UL — HIGH (ref 3.8–5.2)
RBC # FLD: 17.2 % — HIGH (ref 10.3–14.5)
SODIUM SERPL-SCNC: 135 MMOL/L — SIGNIFICANT CHANGE UP (ref 135–145)
WBC # BLD: 11.55 K/UL — HIGH (ref 3.8–10.5)
WBC # FLD AUTO: 11.55 K/UL — HIGH (ref 3.8–10.5)

## 2025-01-27 PROCEDURE — 99231 SBSQ HOSP IP/OBS SF/LOW 25: CPT

## 2025-01-27 PROCEDURE — 99232 SBSQ HOSP IP/OBS MODERATE 35: CPT

## 2025-01-27 RX ORDER — DIPHENHYDRAMINE HCL 25 MG
25 CAPSULE ORAL EVERY 6 HOURS
Refills: 0 | Status: COMPLETED | OUTPATIENT
Start: 2025-01-27 | End: 2025-01-28

## 2025-01-27 RX ORDER — HYDROMORPHONE HYDROCHLORIDE 4 MG/ML
2 INJECTION, SOLUTION INTRAMUSCULAR; INTRAVENOUS; SUBCUTANEOUS EVERY 6 HOURS
Refills: 0 | Status: DISCONTINUED | OUTPATIENT
Start: 2025-01-27 | End: 2025-01-28

## 2025-01-27 RX ADMIN — HYDROMORPHONE HYDROCHLORIDE 2 MILLIGRAM(S): 4 INJECTION, SOLUTION INTRAMUSCULAR; INTRAVENOUS; SUBCUTANEOUS at 15:03

## 2025-01-27 RX ADMIN — INSULIN GLARGINE-YFGN 7 UNIT(S): 100 INJECTION, SOLUTION SUBCUTANEOUS at 22:34

## 2025-01-27 RX ADMIN — HYDROMORPHONE HYDROCHLORIDE 2 MILLIGRAM(S): 4 INJECTION, SOLUTION INTRAMUSCULAR; INTRAVENOUS; SUBCUTANEOUS at 06:33

## 2025-01-27 RX ADMIN — HYDROMORPHONE HYDROCHLORIDE 2 MILLIGRAM(S): 4 INJECTION, SOLUTION INTRAMUSCULAR; INTRAVENOUS; SUBCUTANEOUS at 11:22

## 2025-01-27 RX ADMIN — PANTOPRAZOLE 40 MILLIGRAM(S): 20 TABLET, DELAYED RELEASE ORAL at 06:34

## 2025-01-27 RX ADMIN — HYDROMORPHONE HYDROCHLORIDE 2 MILLIGRAM(S): 4 INJECTION, SOLUTION INTRAMUSCULAR; INTRAVENOUS; SUBCUTANEOUS at 03:01

## 2025-01-27 RX ADMIN — Medication 25 MILLIGRAM(S): at 10:51

## 2025-01-27 RX ADMIN — HYDROMORPHONE HYDROCHLORIDE 2 MILLIGRAM(S): 4 INJECTION, SOLUTION INTRAMUSCULAR; INTRAVENOUS; SUBCUTANEOUS at 07:03

## 2025-01-27 RX ADMIN — Medication 25 MILLIGRAM(S): at 02:33

## 2025-01-27 RX ADMIN — Medication 1: at 22:35

## 2025-01-27 RX ADMIN — HYDROMORPHONE HYDROCHLORIDE 2 MILLIGRAM(S): 4 INJECTION, SOLUTION INTRAMUSCULAR; INTRAVENOUS; SUBCUTANEOUS at 15:33

## 2025-01-27 RX ADMIN — Medication 25 MILLIGRAM(S): at 06:33

## 2025-01-27 RX ADMIN — Medication 4: at 12:27

## 2025-01-27 RX ADMIN — BUPRENORPHINE AND NALOXONE 1 FILM(S): 8; 2 FILM BUCCAL; SUBLINGUAL at 06:31

## 2025-01-27 RX ADMIN — Medication 25 MILLIGRAM(S): at 15:03

## 2025-01-27 RX ADMIN — HYDROMORPHONE HYDROCHLORIDE 2 MILLIGRAM(S): 4 INJECTION, SOLUTION INTRAMUSCULAR; INTRAVENOUS; SUBCUTANEOUS at 10:52

## 2025-01-27 RX ADMIN — HYDROMORPHONE HYDROCHLORIDE 2 MILLIGRAM(S): 4 INJECTION, SOLUTION INTRAMUSCULAR; INTRAVENOUS; SUBCUTANEOUS at 02:31

## 2025-01-27 RX ADMIN — Medication 25 MILLIGRAM(S): at 21:11

## 2025-01-27 RX ADMIN — HYDROMORPHONE HYDROCHLORIDE 2 MILLIGRAM(S): 4 INJECTION, SOLUTION INTRAMUSCULAR; INTRAVENOUS; SUBCUTANEOUS at 21:11

## 2025-01-27 NOTE — PROGRESS NOTE ADULT - PROBLEM SELECTOR PROBLEM 2
Type 1 diabetes mellitus with unspecified complications

## 2025-01-27 NOTE — PROGRESS NOTE ADULT - REASON FOR ADMISSION
intractable nausea and vomiting

## 2025-01-27 NOTE — BH CONSULTATION LIAISON PROGRESS NOTE - NSBHCHARTREVIEWVS_PSY_A_CORE FT
Vital Signs Last 24 Hrs  T(C): 36.8 (24 Jan 2025 11:00), Max: 36.8 (24 Jan 2025 11:00)  T(F): 98.3 (24 Jan 2025 11:00), Max: 98.3 (24 Jan 2025 11:00)  HR: 124 (24 Jan 2025 11:00) (105 - 124)  BP: 118/80 (24 Jan 2025 11:00) (112/74 - 118/80)  BP(mean): --  RR: 17 (24 Jan 2025 11:00) (17 - 19)  SpO2: 97% (24 Jan 2025 11:00) (97% - 100%)    Parameters below as of 24 Jan 2025 11:00  Patient On (Oxygen Delivery Method): room air    
Vital Signs Last 24 Hrs  T(C): 36.7 (27 Jan 2025 12:12), Max: 36.7 (26 Jan 2025 16:19)  T(F): 98 (27 Jan 2025 12:12), Max: 98.1 (26 Jan 2025 16:19)  HR: 106 (27 Jan 2025 12:12) (86 - 106)  BP: 117/78 (27 Jan 2025 12:12) (117/75 - 143/92)  BP(mean): 109 (27 Jan 2025 10:56) (109 - 109)  RR: 17 (27 Jan 2025 12:12) (17 - 18)  SpO2: 98% (27 Jan 2025 12:12) (98% - 99%)    Parameters below as of 27 Jan 2025 12:12  Patient On (Oxygen Delivery Method): room air    
Vital Signs Last 24 Hrs  T(C): 36.7 (27 Jan 2025 12:12), Max: 36.7 (26 Jan 2025 16:19)  T(F): 98 (27 Jan 2025 12:12), Max: 98.1 (26 Jan 2025 16:19)  HR: 106 (27 Jan 2025 12:12) (86 - 106)  BP: 117/78 (27 Jan 2025 12:12) (117/75 - 143/92)  BP(mean): 109 (27 Jan 2025 10:56) (109 - 109)  RR: 17 (27 Jan 2025 12:12) (17 - 18)  SpO2: 98% (27 Jan 2025 12:12) (98% - 99%)    Parameters below as of 27 Jan 2025 12:12  Patient On (Oxygen Delivery Method): room air
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
## 2025-01-27 NOTE — BH CONSULTATION LIAISON PROGRESS NOTE - NSBHCHARTREVIEWLAB_PSY_A_CORE FT
01-24    131[L]  |  99  |  19  ----------------------------<  400[H]  3.4[L]   |  22  |  1.34[H]    Ca    8.8      24 Jan 2025 08:35  Phos  2.5     01-24  Mg     1.6     01-24    TPro  6.9  /  Alb  3.2[L]  /  TBili  1.3[H]  /  DBili  x   /  AST  39[H]  /  ALT  45  /  AlkPhos  93  01-24  
01-27    135  |  103  |  28[H]  ----------------------------<  148[H]  4.2   |  24  |  0.99    Ca    9.3      27 Jan 2025 07:43  Phos  3.6     01-27  Mg     1.9     01-27    
01-27    135  |  103  |  28[H]  ----------------------------<  148[H]  4.2   |  24  |  0.99    Ca    9.3      27 Jan 2025 07:43  Phos  3.6     01-27  Mg     1.9     01-27

## 2025-01-27 NOTE — BH CONSULTATION LIAISON PROGRESS NOTE - CURRENT MEDICATION
MEDICATIONS  (STANDING):  buprenorphine 2 mG/naloxone 0.5 mG SL Film 1 Film(s) SubLingual <User Schedule>  dextrose 5%. 1000 milliLiter(s) (100 mL/Hr) IV Continuous <Continuous>  dextrose 5%. 1000 milliLiter(s) (50 mL/Hr) IV Continuous <Continuous>  dextrose 5%. 1000 milliLiter(s) (100 mL/Hr) IV Continuous <Continuous>  dextrose 50% Injectable 25 Gram(s) IV Push once  dextrose 50% Injectable 12.5 Gram(s) IV Push once  dextrose 50% Injectable 25 Gram(s) IV Push once  glucagon  Injectable 1 milliGRAM(s) IntraMuscular once  influenza   Vaccine 0.5 milliLiter(s) IntraMuscular once  insulin glargine Injectable (LANTUS) 15 Unit(s) SubCutaneous at bedtime  insulin lispro (ADMELOG) corrective regimen sliding scale   SubCutaneous three times a day before meals  insulin lispro (ADMELOG) corrective regimen sliding scale   SubCutaneous at bedtime  insulin lispro Injectable (ADMELOG) 5 Unit(s) SubCutaneous three times a day before meals  lactated ringers. 1000 milliLiter(s) (125 mL/Hr) IV Continuous <Continuous>  pantoprazole    Tablet 40 milliGRAM(s) Oral before breakfast    MEDICATIONS  (PRN):  acetaminophen     Tablet .. 650 milliGRAM(s) Oral every 6 hours PRN Mild Pain (1 - 3)  aluminum hydroxide/magnesium hydroxide/simethicone Suspension 30 milliLiter(s) Oral every 4 hours PRN Dyspepsia  dextrose Oral Gel 15 Gram(s) Oral once PRN Blood Glucose LESS THAN 70 milliGRAM(s)/deciliter  diphenhydrAMINE Injectable 25 milliGRAM(s) IV Push every 4 hours PRN itching from IV Dilaudid  HYDROmorphone  Injectable 2 milliGRAM(s) IV Push every 4 hours PRN Severe Pain (7 - 10)  melatonin 3 milliGRAM(s) Oral at bedtime PRN Insomnia  oxyCODONE    IR 5 milliGRAM(s) Oral every 6 hours PRN Moderate Pain (4 - 6)  prochlorperazine   Injectable 10 milliGRAM(s) IV Push every 6 hours PRN nausea/vomiting  
MEDICATIONS  (STANDING):  buprenorphine 2 mG/naloxone 0.5 mG SL Film 1 Film(s) SubLingual <User Schedule>  dextrose 5%. 1000 milliLiter(s) (100 mL/Hr) IV Continuous <Continuous>  dextrose 5%. 1000 milliLiter(s) (100 mL/Hr) IV Continuous <Continuous>  dextrose 5%. 1000 milliLiter(s) (50 mL/Hr) IV Continuous <Continuous>  dextrose 50% Injectable 25 Gram(s) IV Push once  dextrose 50% Injectable 12.5 Gram(s) IV Push once  dextrose 50% Injectable 25 Gram(s) IV Push once  glucagon  Injectable 1 milliGRAM(s) IntraMuscular once  influenza   Vaccine 0.5 milliLiter(s) IntraMuscular once  insulin glargine Injectable (LANTUS) 7 Unit(s) SubCutaneous at bedtime  insulin lispro (ADMELOG) corrective regimen sliding scale   SubCutaneous three times a day before meals  insulin lispro (ADMELOG) corrective regimen sliding scale   SubCutaneous at bedtime  pantoprazole    Tablet 40 milliGRAM(s) Oral before breakfast    MEDICATIONS  (PRN):  acetaminophen     Tablet .. 650 milliGRAM(s) Oral every 6 hours PRN Mild Pain (1 - 3)  aluminum hydroxide/magnesium hydroxide/simethicone Suspension 30 milliLiter(s) Oral every 4 hours PRN Dyspepsia  dextrose Oral Gel 15 Gram(s) Oral once PRN Blood Glucose LESS THAN 70 milliGRAM(s)/deciliter  diphenhydrAMINE Injectable 25 milliGRAM(s) IV Push every 4 hours PRN itching from IV Dilaudid  HYDROmorphone  Injectable 2 milliGRAM(s) IV Push every 4 hours PRN Severe Pain (7 - 10)  melatonin 3 milliGRAM(s) Oral at bedtime PRN Insomnia  oxyCODONE    IR 5 milliGRAM(s) Oral every 6 hours PRN Moderate Pain (4 - 6)  prochlorperazine   Injectable 10 milliGRAM(s) IV Push every 6 hours PRN nausea/vomiting  
MEDICATIONS  (STANDING):  buprenorphine 2 mG/naloxone 0.5 mG SL Film 1 Film(s) SubLingual <User Schedule>  dextrose 5%. 1000 milliLiter(s) (100 mL/Hr) IV Continuous <Continuous>  dextrose 5%. 1000 milliLiter(s) (100 mL/Hr) IV Continuous <Continuous>  dextrose 5%. 1000 milliLiter(s) (50 mL/Hr) IV Continuous <Continuous>  dextrose 50% Injectable 25 Gram(s) IV Push once  dextrose 50% Injectable 12.5 Gram(s) IV Push once  dextrose 50% Injectable 25 Gram(s) IV Push once  glucagon  Injectable 1 milliGRAM(s) IntraMuscular once  influenza   Vaccine 0.5 milliLiter(s) IntraMuscular once  insulin glargine Injectable (LANTUS) 7 Unit(s) SubCutaneous at bedtime  insulin lispro (ADMELOG) corrective regimen sliding scale   SubCutaneous three times a day before meals  insulin lispro (ADMELOG) corrective regimen sliding scale   SubCutaneous at bedtime  pantoprazole    Tablet 40 milliGRAM(s) Oral before breakfast    MEDICATIONS  (PRN):  acetaminophen     Tablet .. 650 milliGRAM(s) Oral every 6 hours PRN Mild Pain (1 - 3)  aluminum hydroxide/magnesium hydroxide/simethicone Suspension 30 milliLiter(s) Oral every 4 hours PRN Dyspepsia  dextrose Oral Gel 15 Gram(s) Oral once PRN Blood Glucose LESS THAN 70 milliGRAM(s)/deciliter  diphenhydrAMINE Injectable 25 milliGRAM(s) IV Push every 6 hours PRN itching from IV Dilaudid  HYDROmorphone  Injectable 2 milliGRAM(s) IV Push every 6 hours PRN Severe Pain (7 - 10)  melatonin 3 milliGRAM(s) Oral at bedtime PRN Insomnia  oxyCODONE    IR 5 milliGRAM(s) Oral every 6 hours PRN Moderate Pain (4 - 6)  prochlorperazine   Injectable 10 milliGRAM(s) IV Push every 6 hours PRN nausea/vomiting

## 2025-01-27 NOTE — BH CONSULTATION LIAISON PROGRESS NOTE - NSBHATTESTBILLING_PSY_A_CORE
76725-Fimaqrxvbo OBS or IP - low complexity OR 25-34 mins
85408-Ysyeeztxaw OBS or IP - low complexity OR 25-34 mins
[id number]-Uqrcpqbgeu OBS or IP - low complexity OR 25-34 mins

## 2025-01-27 NOTE — BH CONSULTATION LIAISON PROGRESS NOTE - NSBHCONSULTRECOMMENDOTHER_PSY_A_CORE FT
1/23/25: SAME RECOMMENDATION AS 1 MONTH AGO: Dilaudid 2mg IVP q4hrs with IV Benadryl as before (Pt has itching from IV Dilaudid thus uses Benadryl); Pt on Suboxone which sits on some 90% of the opiate receptors making Dilaudid binding compromised. Ergo 2mg Dilaudid in actuality is about max 0.75mg in this case.   - mechanism of action of medications of salient point in this case  - even if there is in part drug-seeking behavior in this case, it is not possible to tease out from physiological tolerance. Noting that previous treatment of Dilaudid/benadryl IVP combination managed to cut down on Pt's length of stay and she was able to feel ready in 2 days and be discharged.   - has capacity to leave AMA  1/24/25: feels better, less GI complaints. Asked for diet to be advanced to soft/bite sized to try  - pain well controlled with usual combo of Dilaudid and benadryl  - continue Suboxone 2-0.5mg SL film qam   - ice pack to left hand; awaiting xray read  - - has capacity to make medical decisions including leaving AMA

## 2025-01-27 NOTE — BH CONSULTATION LIAISON PROGRESS NOTE - NSICDXBHSECONDARYDX_PSY_ALL_CORE
History of chronic pain   Z87.894  
History of chronic pain   Z87.897  
History of chronic pain   Z87.895

## 2025-01-27 NOTE — BH CONSULTATION LIAISON PROGRESS NOTE - NSBHFUPINTERVALHXFT_PSY_A_CORE
No significant interval events over the weekend. Patient has remained calm, cooperative, polite, medication and treatment compliant with improvement in GI sxs and pain. Patient looks much better today without acute distress. Patient tolerated full liquid diet and is ready to start trying soft/bite sized diet (ordered). + left hand swelling and pain around the base of th thumb and forefinger; tender to touch, + swelling noted, ROM dorsiflexion impaired. Hand xray done and awaiting read. + ice pack ad mathew ordered to reduce swelling. Otherwise no complaints. Endorses stable euthymic mood, regular sleep / appetite / energy level / concentration otherwise. Denies and does not manifest any symptoms of hypomania/pee/psychosis/major depression/ anxiety/panic. Denies any active or passive suicidal or homicidal ideation. Names protective factors (lynn; family; hope for future). Endorses medication compliance. Denies adverse medication side effects. Denies substance use, denies illicit substance use, denies street-bought opiate use, denies RX misuse. 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 
No significant interval events over the weekend. Patient has remained calm, cooperative, polite, medication and treatment compliant with improvement in GI sxs and pain. Patient continuing to maintain her clinical improvement, not in acute distress, pain well controlled, tolerating advancement in the diet. Remains for low FS at times. + left hand swelling and pain better 9CT scan rule out fx). In preparation to discharge, discussed weaning off pain meds IVP. Patient said "going cold turkey" as last time was unremarkable for her, no withdrawals as she is on Suboxone. Aware and in agreement for frequency decrease. Otherwise no complaints. Endorses stable euthymic mood, regular sleep / appetite / energy level / concentration otherwise. Denies and does not manifest any symptoms of hypomania/pee/psychosis/major depression/ anxiety/panic. Denies any active or passive suicidal or homicidal ideation. Names protective factors (lynn; family; hope for future). Endorses medication compliance. Denies adverse medication side effects. Denies substance use, denies illicit substance use, denies street-bought opiate use, denies RX misuse. 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 
No subsequent behavioral outbursts. Patient has remained calm, cooperative, polite, medication and treatment compliant with improvement in GI sxs and pain. Patient looks much better today without acute distress. Patient tolerated full liquid diet and is ready to start trying soft/bite sized diet (ordered). + left hand swelling and pain around the base of th thumb and forefinger; tender to touch, + swelling noted, ROM dorsiflexion impaired. Hand xray done and awaiting read. + ice pack ad mathew ordered to reduce swelling. Otherwise no complaints. Endorses stable euthymic mood, regular sleep / appetite / energy level / concentration otherwise. Denies and does not manifest any symptoms of hypomania/pee/psychosis/major depression/ anxiety/panic. Denies any active or passive suicidal or homicidal ideation. Names protective factors (lynn; family; hope for future). Endorses medication compliance. Denies adverse medication side effects. Denies substance use, denies illicit substance use, denies street-bought opiate use, denies RX misuse. 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

## 2025-01-27 NOTE — PROGRESS NOTE ADULT - PROBLEM SELECTOR PLAN 1
present with intractable nausea, vomiting and unable to tolerate oral intake, associated with upper abdominal pain  EKG  ( I personally review) NSR, QTc 457  Utox positive for THC  gastroparesis flare, likely exacerbated by marijuana use  Compazine prn for N/V  IV pantoprazole x 1 and continue with oral PPI  IV dilaudid prn for severe pain  Clear liquid diet and advance as tolerate   Psych consulted recommended IV dilaudid with IV benadryl as per prior hospitalization   GI consulted  [facility] checked, on Suboxone  -patient wants to advance to carb consistent diet 1/25

## 2025-01-27 NOTE — PROGRESS NOTE ADULT - SUBJECTIVE AND OBJECTIVE BOX
Patient is a 34y old  Female who presents with a chief complaint of intractable nausea and vomiting (26 Jan 2025 13:12)      INTERVAL HPI/OVERNIGHT EVENTS:  -hypoglycemic yesterday morning, elevated today   -seen and examined at bedside   -CT without fracture     MEDICATIONS  (STANDING):  buprenorphine 2 mG/naloxone 0.5 mG SL Film 1 Film(s) SubLingual <User Schedule>  dextrose 5%. 1000 milliLiter(s) (100 mL/Hr) IV Continuous <Continuous>  dextrose 5%. 1000 milliLiter(s) (100 mL/Hr) IV Continuous <Continuous>  dextrose 5%. 1000 milliLiter(s) (50 mL/Hr) IV Continuous <Continuous>  dextrose 50% Injectable 25 Gram(s) IV Push once  dextrose 50% Injectable 12.5 Gram(s) IV Push once  dextrose 50% Injectable 25 Gram(s) IV Push once  glucagon  Injectable 1 milliGRAM(s) IntraMuscular once  influenza   Vaccine 0.5 milliLiter(s) IntraMuscular once  insulin glargine Injectable (LANTUS) 7 Unit(s) SubCutaneous at bedtime  insulin lispro (ADMELOG) corrective regimen sliding scale   SubCutaneous three times a day before meals  insulin lispro (ADMELOG) corrective regimen sliding scale   SubCutaneous at bedtime  pantoprazole    Tablet 40 milliGRAM(s) Oral before breakfast    MEDICATIONS  (PRN):  acetaminophen     Tablet .. 650 milliGRAM(s) Oral every 6 hours PRN Mild Pain (1 - 3)  aluminum hydroxide/magnesium hydroxide/simethicone Suspension 30 milliLiter(s) Oral every 4 hours PRN Dyspepsia  dextrose Oral Gel 15 Gram(s) Oral once PRN Blood Glucose LESS THAN 70 milliGRAM(s)/deciliter  diphenhydrAMINE Injectable 25 milliGRAM(s) IV Push every 6 hours PRN itching from IV Dilaudid  HYDROmorphone  Injectable 2 milliGRAM(s) IV Push every 6 hours PRN Severe Pain (7 - 10)  melatonin 3 milliGRAM(s) Oral at bedtime PRN Insomnia  oxyCODONE    IR 5 milliGRAM(s) Oral every 6 hours PRN Moderate Pain (4 - 6)  prochlorperazine   Injectable 10 milliGRAM(s) IV Push every 6 hours PRN nausea/vomiting      Allergies    Toradol (Pruritus)  Zofran (Urticaria)    Intolerances        REVIEW OF SYSTEMS:  No CP, SOB, abdominal pain, constipation, nausea, vomiting, LE edema, orthopnea    Vital Signs Last 24 Hrs  T(C): 36.7 (27 Jan 2025 16:57), Max: 36.7 (27 Jan 2025 04:58)  T(F): 98.1 (27 Jan 2025 16:57), Max: 98.1 (27 Jan 2025 04:58)  HR: 91 (27 Jan 2025 16:57) (88 - 106)  BP: 117/83 (27 Jan 2025 16:57) (117/78 - 143/92)  BP(mean): 109 (27 Jan 2025 10:56) (109 - 109)  RR: 18 (27 Jan 2025 16:57) (17 - 18)  SpO2: 97% (27 Jan 2025 16:57) (97% - 99%)    Parameters below as of 27 Jan 2025 12:12  Patient On (Oxygen Delivery Method): room air        PHYSICAL EXAM:  GENERAL: NAD, well-groomed, well-developed  HEAD:  Atraumatic, Normocephalic  EYES: EOMI, sclera non-icteric  ENMT:  Moist mucous membranes, Good dentition,  NERVOUS SYSTEM:  Alert & Oriented X3, Good concentration;  CHEST/LUNG: Clear to percussion bilaterally; No rales, rhonchi, wheezing, or rubs  HEART: Regular rate and rhythm; No murmurs, rubs, or gallops  ABDOMEN: Soft, Nontender, Nondistended; Bowel sounds present  EXTREMITIES:  2+ Peripheral Pulses, No clubbing, cyanosis, or edema +L 5th finger tenderness with active ROM, swelling decreased  SKIN: No rashes or lesions    LABS:                        11.3   11.55 )-----------( 329      ( 27 Jan 2025 07:43 )             36.2     01-27    135  |  103  |  28[H]  ----------------------------<  148[H]  4.2   |  24  |  0.99    Ca    9.3      27 Jan 2025 07:43  Phos  3.6     01-27  Mg     1.9     01-27        Urinalysis Basic - ( 27 Jan 2025 07:43 )    Color: x / Appearance: x / SG: x / pH: x  Gluc: 148 mg/dL / Ketone: x  / Bili: x / Urobili: x   Blood: x / Protein: x / Nitrite: x   Leuk Esterase: x / RBC: x / WBC x   Sq Epi: x / Non Sq Epi: x / Bacteria: x      CAPILLARY BLOOD GLUCOSE      POCT Blood Glucose.: 333 mg/dL (27 Jan 2025 11:36)  POCT Blood Glucose.: 150 mg/dL (27 Jan 2025 07:21)  POCT Blood Glucose.: 469 mg/dL (26 Jan 2025 22:32)  POCT Blood Glucose.: 481 mg/dL (26 Jan 2025 21:02)  POCT Blood Glucose.: 448 mg/dL (26 Jan 2025 21:01)      RADIOLOGY & ADDITIONAL TESTS:    Imaging Personally Reviewed:  [ X] YES  [ ] NO    Consultant(s) Notes Reviewed:  [ X] YES  [ ] NO    Care Discussed with Consultants/Other Providers [X ] YES  [ ] NO

## 2025-01-27 NOTE — BH CONSULTATION LIAISON PROGRESS NOTE - NSBHCONSULTRECOMMENDOTHER_PSY_A_CORE FT
1/23/25: SAME RECOMMENDATION AS 1 MONTH AGO: Dilaudid 2mg IVP q4hrs with IV Benadryl as before (Pt has itching from IV Dilaudid thus uses Benadryl); Pt on Suboxone which sits on some 90% of the opiate receptors making Dilaudid binding compromised. Ergo 2mg Dilaudid in actuality is about max 0.75mg in this case.   - mechanism of action of medications of salient point in this case  - even if there is in part drug-seeking behavior in this case, it is not possible to tease out from physiological tolerance. Noting that previous treatment of Dilaudid/benadryl IVP combination managed to cut down on Pt's length of stay and she was able to feel ready in 2 days and be discharged.   - has capacity to leave AMA  1/24/25: feels better, less GI complaints. Asked for diet to be advanced to soft/bite sized to try  - pain well controlled with usual combo of Dilaudid and benadryl  - continue Suboxone 2-0.5mg SL film qam   - ice pack to left hand; awaiting xray read  - - has capacity to make medical decisions including leaving AMA  1/27/25: doing well; left hand imaging unremarkable  - potential DC; start reducing IVP frequency PRN from q4 to q6 hrs; x 1 day then stop both IVP benadryl and Dilaudid   - does not need to wean off IVP Dilaudid/Benadryl; can go to rapid stop; no RX for PO on discharge needed   - has capacity to make her medical decisions

## 2025-01-27 NOTE — PROGRESS NOTE ADULT - PROBLEM SELECTOR PROBLEM 1
Intractable nausea and vomiting

## 2025-01-27 NOTE — PROGRESS NOTE ADULT - PROBLEM SELECTOR PLAN 2
continue with basal, premeal insulin, ISS, hypoglycemia protocol  Monitor glucose and titrate insulin regimens  Check A1c  -will decrease glargine to 7u qhs   -spoke with patient if she had no episodes of hypoglycemia today/overnight then we can dc tomorrow. May still have hyperglycemia episodes while inpatient because it is difficult to titrate with subq insulin when compared to her pump. Suspect hyperglycemia will be better controlled with home pump. Patient verbalized understanding and is amenable to this plan.

## 2025-01-27 NOTE — BH CONSULTATION LIAISON PROGRESS NOTE - NSBHCHARTREVIEWINVESTIGATE_PSY_A_CORE FT
CT Wrist No Cont, Left (01.26.25) CT of the left wrist without acute fracture or dislocation.

## 2025-01-28 ENCOUNTER — TRANSCRIPTION ENCOUNTER (OUTPATIENT)
Age: 35
End: 2025-01-28

## 2025-01-28 VITALS
HEART RATE: 99 BPM | DIASTOLIC BLOOD PRESSURE: 80 MMHG | RESPIRATION RATE: 18 BRPM | OXYGEN SATURATION: 98 % | SYSTOLIC BLOOD PRESSURE: 123 MMHG | TEMPERATURE: 98 F

## 2025-01-28 LAB
ANION GAP SERPL CALC-SCNC: 6 MMOL/L — SIGNIFICANT CHANGE UP (ref 5–17)
BUN SERPL-MCNC: 28 MG/DL — HIGH (ref 7–23)
CALCIUM SERPL-MCNC: 9.8 MG/DL — SIGNIFICANT CHANGE UP (ref 8.5–10.1)
CHLORIDE SERPL-SCNC: 102 MMOL/L — SIGNIFICANT CHANGE UP (ref 96–108)
CO2 SERPL-SCNC: 26 MMOL/L — SIGNIFICANT CHANGE UP (ref 22–31)
CREAT SERPL-MCNC: 1.03 MG/DL — SIGNIFICANT CHANGE UP (ref 0.5–1.3)
EGFR: 73 ML/MIN/1.73M2 — SIGNIFICANT CHANGE UP
GLUCOSE BLDC GLUCOMTR-MCNC: 130 MG/DL — HIGH (ref 70–99)
GLUCOSE BLDC GLUCOMTR-MCNC: 130 MG/DL — HIGH (ref 70–99)
GLUCOSE BLDC GLUCOMTR-MCNC: 238 MG/DL — HIGH (ref 70–99)
GLUCOSE SERPL-MCNC: 72 MG/DL — SIGNIFICANT CHANGE UP (ref 70–99)
HCT VFR BLD CALC: 40.2 % — SIGNIFICANT CHANGE UP (ref 34.5–45)
HGB BLD-MCNC: 12.6 G/DL — SIGNIFICANT CHANGE UP (ref 11.5–15.5)
MAGNESIUM SERPL-MCNC: 2 MG/DL — SIGNIFICANT CHANGE UP (ref 1.6–2.6)
MCHC RBC-ENTMCNC: 21.4 PG — LOW (ref 27–34)
MCHC RBC-ENTMCNC: 31.3 G/DL — LOW (ref 32–36)
MCV RBC AUTO: 68.3 FL — LOW (ref 80–100)
NRBC # BLD: 0 /100 WBCS — SIGNIFICANT CHANGE UP (ref 0–0)
NRBC BLD-RTO: 0 /100 WBCS — SIGNIFICANT CHANGE UP (ref 0–0)
PHOSPHATE SERPL-MCNC: 4.1 MG/DL — SIGNIFICANT CHANGE UP (ref 2.5–4.5)
PLATELET # BLD AUTO: 386 K/UL — SIGNIFICANT CHANGE UP (ref 150–400)
POTASSIUM SERPL-MCNC: 3.8 MMOL/L — SIGNIFICANT CHANGE UP (ref 3.5–5.3)
POTASSIUM SERPL-SCNC: 3.8 MMOL/L — SIGNIFICANT CHANGE UP (ref 3.5–5.3)
RBC # BLD: 5.89 M/UL — HIGH (ref 3.8–5.2)
RBC # FLD: 17.7 % — HIGH (ref 10.3–14.5)
SODIUM SERPL-SCNC: 134 MMOL/L — LOW (ref 135–145)
WBC # BLD: 12.01 K/UL — HIGH (ref 3.8–10.5)
WBC # FLD AUTO: 12.01 K/UL — HIGH (ref 3.8–10.5)

## 2025-01-28 PROCEDURE — 99239 HOSP IP/OBS DSCHRG MGMT >30: CPT

## 2025-01-28 RX ORDER — INSULIN LISPRO 100/ML
1 VIAL (ML) SUBCUTANEOUS
Qty: 2 | Refills: 0
Start: 2025-01-28

## 2025-01-28 RX ORDER — INSULIN LISPRO 100/ML
1 VIAL (ML) SUBCUTANEOUS
Qty: 0 | Refills: 0 | DISCHARGE
Start: 2025-01-28

## 2025-01-28 RX ORDER — INSULIN GLARGINE-YFGN 100 [IU]/ML
8 INJECTION, SOLUTION SUBCUTANEOUS
Qty: 2 | Refills: 0
Start: 2025-01-28

## 2025-01-28 RX ADMIN — HYDROMORPHONE HYDROCHLORIDE 2 MILLIGRAM(S): 4 INJECTION, SOLUTION INTRAMUSCULAR; INTRAVENOUS; SUBCUTANEOUS at 10:12

## 2025-01-28 RX ADMIN — Medication 25 MILLIGRAM(S): at 10:12

## 2025-01-28 RX ADMIN — Medication 2: at 08:26

## 2025-01-28 RX ADMIN — Medication 25 MILLIGRAM(S): at 03:11

## 2025-01-28 RX ADMIN — HYDROMORPHONE HYDROCHLORIDE 2 MILLIGRAM(S): 4 INJECTION, SOLUTION INTRAMUSCULAR; INTRAVENOUS; SUBCUTANEOUS at 11:00

## 2025-01-28 RX ADMIN — PANTOPRAZOLE 40 MILLIGRAM(S): 20 TABLET, DELAYED RELEASE ORAL at 06:10

## 2025-01-28 RX ADMIN — ACETAMINOPHEN 650 MILLIGRAM(S): 160 SUSPENSION ORAL at 14:58

## 2025-01-28 RX ADMIN — HYDROMORPHONE HYDROCHLORIDE 2 MILLIGRAM(S): 4 INJECTION, SOLUTION INTRAMUSCULAR; INTRAVENOUS; SUBCUTANEOUS at 03:13

## 2025-01-28 RX ADMIN — HYDROMORPHONE HYDROCHLORIDE 2 MILLIGRAM(S): 4 INJECTION, SOLUTION INTRAMUSCULAR; INTRAVENOUS; SUBCUTANEOUS at 03:43

## 2025-01-28 RX ADMIN — BUPRENORPHINE AND NALOXONE 1 FILM(S): 8; 2 FILM BUCCAL; SUBLINGUAL at 06:10

## 2025-01-28 NOTE — DISCHARGE NOTE PROVIDER - NSDCMRMEDTOKEN_GEN_ALL_CORE_FT
buprenorphine-naloxone 4 mg-1 mg sublingual film: 1 film(s) sublingually once a day  insulin lispro 100 units/mL injectable solution: 1 unit(s) injectable 3 times a day (with meals) Please take 1 unit for blood glucose 151-200, 2 units if 201-250, 3 units if 251-300, 4 units if 301-350, 5 units if 351-400, 6 units if &gt;400  Lantus Solostar Pen 100 units/mL subcutaneous solution: 8 unit(s) subcutaneous once a day (at bedtime)

## 2025-01-28 NOTE — DISCHARGE NOTE PROVIDER - ATTENDING DISCHARGE PHYSICAL EXAMINATION:
Vital Signs Last 24 Hrs  T(C): 36.6 (28 Jan 2025 10:26), Max: 36.9 (27 Jan 2025 23:58)  T(F): 97.9 (28 Jan 2025 10:26), Max: 98.5 (27 Jan 2025 23:58)  HR: 99 (28 Jan 2025 10:26) (82 - 99)  BP: 123/80 (28 Jan 2025 10:26) (117/83 - 142/85)  BP(mean): 104 (27 Jan 2025 21:21) (104 - 104)  RR: 18 (28 Jan 2025 10:26) (17 - 18)  SpO2: 98% (28 Jan 2025 10:26) (97% - 99%)    Parameters below as of 28 Jan 2025 10:26  Patient On (Oxygen Delivery Method): room air    GENERAL: NAD, well-groomed, well-developed  HEAD:  Atraumatic, Normocephalic  EYES: EOMI, sclera non-icteric  ENMT:  Moist mucous membranes, Good dentition,  NERVOUS SYSTEM:  Alert & Oriented X3, Good concentration;  CHEST/LUNG: Clear to percussion bilaterally; No rales, rhonchi, wheezing, or rubs  HEART: Regular rate and rhythm; No murmurs, rubs, or gallops  ABDOMEN: Soft, Nontender, Nondistended; Bowel sounds present  EXTREMITIES:  2+ Peripheral Pulses, No clubbing, cyanosis, or edema +L 5th finger tenderness with active ROM, swelling decreased  SKIN: No rashes or lesions

## 2025-01-28 NOTE — DISCHARGE NOTE NURSING/CASE MANAGEMENT/SOCIAL WORK - PATIENT PORTAL LINK FT
You can access the FollowMyHealth Patient Portal offered by [facility] by registering at the following website: [email or web address]. By joining [email or web address]’s FollowMyHealth portal, you will also be able to view your health information using other applications (apps) compatible with our system.
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
## 2025-01-28 NOTE — DISCHARGE NOTE PROVIDER - CARE PROVIDER_API CALL
Your PCP,   Phone: (   )    -  Fax: (   )    -  Follow Up Time: 1 week    Your endocrinologist,   Phone: (   )    -  Fax: (   )    -  Follow Up Time: 2 weeks

## 2025-01-28 NOTE — DISCHARGE NOTE NURSING/CASE MANAGEMENT/SOCIAL WORK - FINANCIAL ASSISTANCE
[facility] provides services at a reduced cost to those who are determined to be eligible through [facility]’s financial assistance program. Information regarding [facility]’s financial assistance program can be found by going to [email or web address] or by calling [phone].

## 2025-01-28 NOTE — DISCHARGE NOTE PROVIDER - PROVIDER TOKENS
FREE:[LAST:[Your PCP],PHONE:[(   )    -],FAX:[(   )    -],FOLLOWUP:[1 week]],FREE:[LAST:[Your endocrinologist],PHONE:[(   )    -],FAX:[(   )    -],FOLLOWUP:[2 weeks]]

## 2025-01-28 NOTE — DISCHARGE NOTE PROVIDER - NSDCFUADDAPPT_GEN_ALL_CORE_FT
APPTS ARE READY TO BE MADE: [X] YES    Best Family or Patient Contact (if needed):    Additional Information about above appointments (if needed):    1: PCP  2: endocrinology   3:     Other comments or requests:

## 2025-01-28 NOTE — DISCHARGE NOTE PROVIDER - NSCORESITESY/N_GEN_A_CORE_RD
60 y/o female hx uterine cancer s/p hysterectomy 4/2018, s/p radiation that finished in august and currently on chemo with 2 more rounds to go c/o 2 days of malodorous vaginal discharge with vaginal pain/discomfort. States feels like "needles" deep inside vagina, even approaching rectum internally. No symptoms of this prior to 2 days ago. No f/c/dysuria/frequency. States has some occasional nausea typical of her nausea from chemo. No cp/sob, abd pain or other symptoms.     ROS: No fever/chills. No eye pain/changes in vision, No ear pain/sore throat/dysphagia, No chest pain/palpitations. No SOB/cough/. No abdominal pain, N/V/D, no black/bloody bm. No dysuria/frequencyNo headache. No Dizziness.    No rashes or breaks in skin. No numbness/tingling/weakness. Yes

## 2025-01-28 NOTE — DISCHARGE NOTE PROVIDER - NSDCFUADDINST_GEN_ALL_CORE_FT
Please see your primary care provider within the next 1-2 weeks for a post hospital follow up appointment   IF your blood sugar remains elevated at home please contact your primary care provider for further guidance

## 2025-02-04 DIAGNOSIS — Z88.6 ALLERGY STATUS TO ANALGESIC AGENT: ICD-10-CM

## 2025-02-04 DIAGNOSIS — E10.65 TYPE 1 DIABETES MELLITUS WITH HYPERGLYCEMIA: ICD-10-CM

## 2025-02-04 DIAGNOSIS — F43.20 ADJUSTMENT DISORDER, UNSPECIFIED: ICD-10-CM
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
## 2025-02-04 DIAGNOSIS — M79.645 PAIN IN LEFT FINGER(S): ICD-10-CM

## 2025-02-04 DIAGNOSIS — R11.15 CYCLICAL VOMITING SYNDROME UNRELATED TO MIGRAINE: ICD-10-CM
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
## 2025-02-04 DIAGNOSIS — I10 ESSENTIAL (PRIMARY) HYPERTENSION: ICD-10-CM

## 2025-02-04 DIAGNOSIS — Z79.4 LONG TERM (CURRENT) USE OF INSULIN: ICD-10-CM
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
## 2025-02-04 DIAGNOSIS — Z87.898 PERSONAL HISTORY OF OTHER SPECIFIED CONDITIONS: ICD-10-CM

## 2025-02-04 DIAGNOSIS — E10.649 TYPE 1 DIABETES MELLITUS WITH HYPOGLYCEMIA WITHOUT COMA: ICD-10-CM

## 2025-02-04 DIAGNOSIS — F17.210 NICOTINE DEPENDENCE, CIGARETTES, UNCOMPLICATED: ICD-10-CM

## 2025-02-04 DIAGNOSIS — Z88.8 ALLERGY STATUS TO OTHER DRUGS, MEDICAMENTS AND BIOLOGICAL SUBSTANCES: ICD-10-CM
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
## 2025-02-04 DIAGNOSIS — K31.84 GASTROPARESIS: ICD-10-CM

## 2025-02-04 DIAGNOSIS — T14.91XA SUICIDE ATTEMPT, INITIAL ENCOUNTER: ICD-10-CM

## 2025-02-04 DIAGNOSIS — E16.A2 HYPOGLYCEMIA LEVEL 2: ICD-10-CM

## 2025-02-04 DIAGNOSIS — E10.43 TYPE 1 DIABETES MELLITUS WITH DIABETIC AUTONOMIC (POLY)NEUROPATHY: ICD-10-CM

## 2025-02-04 DIAGNOSIS — F12.90 CANNABIS USE, UNSPECIFIED, UNCOMPLICATED: ICD-10-CM

## 2025-02-11 NOTE — H&P ADULT - NSICDXFAMILYHX_GEN_ALL_CORE_FT
family/friend provides transportation
FAMILY HISTORY:  FHx: asthma, Brother    Father  Still living? Unknown  FH: HTN (hypertension), Age at diagnosis: Age Unknown    Grandparent  Still living? Unknown  Family history of type 1 diabetes mellitus, Age at diagnosis: Age Unknown

## 2025-03-04 ENCOUNTER — OFFICE (OUTPATIENT)
Facility: LOCATION | Age: 35
Setting detail: OPHTHALMOLOGY
End: 2025-03-04
Payer: COMMERCIAL

## 2025-03-04 DIAGNOSIS — E10.3593: ICD-10-CM

## 2025-03-04 DIAGNOSIS — E10.3591: ICD-10-CM

## 2025-03-04 PROCEDURE — 92134 CPTRZ OPH DX IMG PST SGM RTA: CPT | Performed by: OPHTHALMOLOGY

## 2025-03-04 PROCEDURE — 67028 INJECTION EYE DRUG: CPT | Mod: RT | Performed by: OPHTHALMOLOGY

## 2025-03-04 ASSESSMENT — KERATOMETRY
OS_K2POWER_DIOPTERS: 44.25
OD_K2POWER_DIOPTERS: 44.25
OD_K1POWER_DIOPTERS: 43.15
OS_K1POWER_DIOPTERS: 43.50
OD_AXISANGLE_DEGREES: 081
OS_AXISANGLE_DEGREES: 086

## 2025-03-04 ASSESSMENT — REFRACTION_AUTOREFRACTION
OS_SPHERE: UNABLE
OD_CYLINDER: +0.50
OD_SPHERE: -0.75
OD_AXIS: 039

## 2025-03-04 ASSESSMENT — VISUAL ACUITY
OD_BCVA: 20/40-2
OS_BCVA: 20/80-

## 2025-03-07 ENCOUNTER — APPOINTMENT (OUTPATIENT)
Dept: ENDOCRINOLOGY | Facility: CLINIC | Age: 35
End: 2025-03-07
Payer: COMMERCIAL

## 2025-03-07 VITALS
SYSTOLIC BLOOD PRESSURE: 132 MMHG | DIASTOLIC BLOOD PRESSURE: 70 MMHG | HEIGHT: 66 IN | WEIGHT: 151 LBS | BODY MASS INDEX: 24.27 KG/M2

## 2025-03-07 DIAGNOSIS — E78.5 HYPERLIPIDEMIA, UNSPECIFIED: ICD-10-CM

## 2025-03-07 DIAGNOSIS — E10.9 TYPE 1 DIABETES MELLITUS W/OUT COMPLICATIONS: ICD-10-CM

## 2025-03-07 DIAGNOSIS — E10.21 TYPE 1 DIABETES MELLITUS WITH DIABETIC NEPHROPATHY: ICD-10-CM

## 2025-03-07 LAB — HBA1C MFR BLD HPLC: 11.9

## 2025-03-07 PROCEDURE — 83036 HEMOGLOBIN GLYCOSYLATED A1C: CPT | Mod: QW

## 2025-03-07 PROCEDURE — 99214 OFFICE O/P EST MOD 30 MIN: CPT

## 2025-03-07 RX ORDER — INSULIN PMP CART,AUT,G6/7,CNTR
EACH SUBCUTANEOUS
Qty: 1 | Refills: 0 | Status: ACTIVE | COMMUNITY
Start: 2025-03-07 | End: 1900-01-01

## 2025-03-07 RX ORDER — INSULIN PMP CART,AUT,G6/7,CNTR
EACH SUBCUTANEOUS
Qty: 9 | Refills: 3 | Status: ACTIVE | COMMUNITY
Start: 2025-03-07 | End: 1900-01-01

## 2025-03-07 NOTE — HISTORY OF PRESENT ILLNESS
[FreeTextEntry1] : CHIEF COMPLAINT: Type 1 diabetes Patient has not been seen in the last 1.5 years.  HISTORY OF PRESENTING ILLNESS: The patient is a 34-year-old female here for follow-up of type 1 diabetes.  She reports to me that she has frequent episodes of DKA. She reports that she has a history of ectopic pregnancies, both fallopian tubes were removed.  No further plans of pregnancy.  Current smoker.  She was using Omnipod and Dexcom until she ran out of supplies a few months ago. Now on basal bolus insulin.  DIABETES HPI:  Type of Diabetes: 1 Duration of Diabetes: Diagnosed at age 12  A1c: 11.9% today 3/7/2025 8.3% 10/10/2023  Previously on OmniPod and Dexcom G6 and with better control A1c tween 8 and 9%.  Currently on Current regimen: Lantus 18 units at bedtime, lispro 3 units with breakfast, 4 units with lunch, 5 units with dinner.  Takes correction with 2 units for every 50 points above 200.  Diabetes complications:  Microvascular: [-] neuropathy, [+] nephropathy, [+] retinopathy  Macrovascular: No history of CAD, CVA or PAD History of DKA/hospitalization due to Diabetes: Frequent episodes of DKA.  Reports that she has had DKA within the last year.  Last retinopathy screening: She has diabetic retinopathy, saw the retina specialist last week and had injections in her eye Last nephropathy screening (urine ACR): 2/2023, positive  Blood glucose monitoring: She is not using CGM at this time.  Reports that she has been checking blood sugars 2-3 times per day, but did not bring her meter. Reports that fasting sugars are between 100-180. Reports that she has frequent hypoglycemia around lunchtime, sometimes between 20 and 60 as she does not eat much for lunch, tries to drink sodas all day to keep her sugars up. At dinnertime, blood sugars are high around 230-250.  Comorbidities: HTN, HLD Statin: Previously on atorvastatin, not taking currently. ACE/ARB: Previously on enalapril 5 mg daily, not taking currently.  Works as a pharmacy tech.

## 2025-03-07 NOTE — ASSESSMENT
[FreeTextEntry1] : 1. Diabetes Mellitus -very poorly controlled A1c 11.9%, high risk of DKA.  Had DKA within the last year. Type: 1 A1c: 11.9% Current Regimen: Lantus 18 units at bedtime, lispro 3/4/5 units with meals, 2 units for every 50 units above 200 for correction.  PLAN: - Regimen: Continue current insulin regimen for now, she did not bring her meter and is not using CGM, no blood glucoses available to adjust doses.  Reporting frequent hyperglycemia and hypoglycemia. Recommend restarting OmniPod and Dexcom, message sent to CDE Myra Anna to assist with getting her supplies and restarted on pump.  - BG monitoring: She has type 1 diabetes with frequent hyperglycemia and hypoglycemia, her high risk for DKA and also noticing severe hypoglycemia.  Absolutely needs CGM, recommend restarting Dexcom G7.  - Labs: Urine ACR, CMP, Lipid panel, TSH  - Preventive:  Nephropathy screenin2023, positive.  Repeat today.  Retinopathy screening: Early DR, following with ophthalmologist.  Continue close follow-up.  2. Diabetic nephropathy BP goal <130/90 She was previously on enalapril 5 mg daily, now stopped. -Check urine ACR.  3. Hyperlipidemia LDL goal: <100 -Previously on atorvastatin, now self discontinued. -Check lipid panel  Needs close follow-up, recommend follow-up with Silvina COSTA in 2 to 3 months. Recommend switching to Dr. Davis for continued insulin pump management. Follow-up with CDE as needed.  Cassius Goss MD [facility] at [address], [address] Ph: [phone] Fax: [phone]

## 2025-03-08 LAB
ALBUMIN SERPL ELPH-MCNC: 4.3 G/DL
ALP BLD-CCNC: 123 U/L
ALT SERPL-CCNC: 33 U/L
ANION GAP SERPL CALC-SCNC: 18 MMOL/L
AST SERPL-CCNC: 28 U/L
BILIRUB SERPL-MCNC: 0.2 MG/DL
BUN SERPL-MCNC: 19 MG/DL
CALCIUM SERPL-MCNC: 10.2 MG/DL
CHLORIDE SERPL-SCNC: 104 MMOL/L
CHOLEST SERPL-MCNC: 292 MG/DL
CO2 SERPL-SCNC: 18 MMOL/L
CREAT SERPL-MCNC: 0.8 MG/DL
CREAT SPEC-SCNC: 96 MG/DL
EGFRCR SERPLBLD CKD-EPI 2021: 99 ML/MIN/1.73M2
GLUCOSE SERPL-MCNC: 118 MG/DL
HDLC SERPL-MCNC: 139 MG/DL
LDLC SERPL CALC-MCNC: 135 MG/DL
MICROALBUMIN 24H UR DL<=1MG/L-MCNC: 17.6 MG/DL
MICROALBUMIN/CREAT 24H UR-RTO: 183 MG/G
NONHDLC SERPL-MCNC: 153 MG/DL
POTASSIUM SERPL-SCNC: 4.3 MMOL/L
PROT SERPL-MCNC: 7.1 G/DL
SODIUM SERPL-SCNC: 140 MMOL/L
TRIGL SERPL-MCNC: 112 MG/DL
TSH SERPL-ACNC: 0.48 UIU/ML

## 2025-03-10 RX ORDER — BLOOD-GLUCOSE SENSOR
EACH MISCELLANEOUS
Qty: 9 | Refills: 3 | Status: ACTIVE | COMMUNITY
Start: 2025-03-10 | End: 1900-01-01

## 2025-03-10 RX ORDER — INSULIN PMP CART,AUT,G6/7,CNTR
EACH SUBCUTANEOUS
Qty: 9 | Refills: 3 | Status: ACTIVE | COMMUNITY
Start: 2025-03-10 | End: 1900-01-01

## 2025-03-10 RX ORDER — INSULIN PMP CART,AUT,G6/7,CNTR
EACH SUBCUTANEOUS
Qty: 1 | Refills: 0 | Status: ACTIVE | COMMUNITY
Start: 2025-03-10 | End: 1900-01-01

## 2025-03-13 ENCOUNTER — DOCTOR'S OFFICE (OUTPATIENT)
Facility: LOCATION | Age: 35
Setting detail: OPHTHALMOLOGY
End: 2025-03-13
Payer: COMMERCIAL

## 2025-03-13 DIAGNOSIS — E10.3593: ICD-10-CM

## 2025-03-13 DIAGNOSIS — E10.3591: ICD-10-CM

## 2025-03-13 DIAGNOSIS — H43.12: ICD-10-CM

## 2025-03-13 DIAGNOSIS — E10.3592: ICD-10-CM

## 2025-03-13 PROCEDURE — 92235 FLUORESCEIN ANGRPH MLTIFRAME: CPT | Performed by: OPHTHALMOLOGY

## 2025-03-13 PROCEDURE — 92134 CPTRZ OPH DX IMG PST SGM RTA: CPT | Mod: 25 | Performed by: OPHTHALMOLOGY

## 2025-03-13 PROCEDURE — 67028 INJECTION EYE DRUG: CPT | Mod: LT | Performed by: OPHTHALMOLOGY

## 2025-03-13 ASSESSMENT — KERATOMETRY
OS_K1POWER_DIOPTERS: 43.50
OD_K1POWER_DIOPTERS: 43.15
OS_K2POWER_DIOPTERS: 44.25
OD_K2POWER_DIOPTERS: 44.25
OS_AXISANGLE_DEGREES: 086
OD_AXISANGLE_DEGREES: 081

## 2025-03-13 ASSESSMENT — REFRACTION_AUTOREFRACTION
OD_AXIS: 039
OD_CYLINDER: +0.50
OS_SPHERE: UNABLE
OD_SPHERE: -0.75

## 2025-03-13 ASSESSMENT — CONFRONTATIONAL VISUAL FIELD TEST (CVF)
OD_FINDINGS: FULL
OS_FINDINGS: FULL

## 2025-03-13 ASSESSMENT — VISUAL ACUITY
OD_BCVA: 20/60-1
OS_BCVA: 20/20-2

## 2025-04-09 NOTE — PROGRESS NOTE ADULT - PROBLEM SELECTOR PLAN 1
Cause not yet elucidated--> medication nonadherence vs ? infection (poor dentition)  - DKA ( AG 20 and BBOH 2.9) with gastroparesis  - istop Reference #: [id number] no recent opoid rx  - A1c 10.4  - UA neg   - Lantus 18 U sq qhs; hold mealtime for now given hypoglycemia; ISS  - Diabetic deit with low fiber and low fat - encouraged small frequent meals  - reglan 10 IV TID with meals  - pain control dilaudid 0.5 mg IV q6  - PPI   - repeat BMP ordered @ 1700 ensure eval AG   - pt denies recent opioid use until hospitalization yesterday in past 2 weeks check urine tox and serum tox   - check lipase and AXR  - endocrine c/s  - will obtain panorex/CT maxillofacial to further assess dentition
Cause not yet elucidated--> medication nonadherence vs ? infection (poor dentition) vs intrabdominal pathology (she reports abd pain)  - DKA ( AG 20 and BBOH 2.9) with gastroparesis? pt states this diagnosis has never been confirmed-- no hx of gastric emptying study  - istop Reference #: [id number] no recent opoid rx  - A1c 10.4  - UA neg   - Lantus 18 U sq qhs; hold mealtime for now given hypoglycemia; ISS  - Diabetic deit with low fiber and low fat - encouraged small frequent meals  - reglan 10 IV TID with meals  - pain control, consult pain mgmt, avoid narcotics given her hx  - PPI   - repeat BMP ordered @ 1700 ensure eval AG   - pt denies recent opioid use until hospitalization yesterday in past 2 weeks check urine tox and serum tox   - check lipase and AXR to w/u for constipation--> no signs of obstruction  - miralax daily  - endocrine c/s  - will obtain CT maxillofacial to further assess dentition--> multiple molar impactions, dental rec's outpt follow up
Cause not yet elucidated--> medication nonadherence vs ? infection (poor dentition) vs intrabdominal pathology (she reports abd pain)  - DKA ( AG 20 and BBOH 2.9) with gastroparesis  - istop Reference #: [id number] no recent opoid rx  - A1c 10.4  - UA neg   - Lantus 18 U sq qhs; hold mealtime for now given hypoglycemia; ISS  - Diabetic deit with low fiber and low fat - encouraged small frequent meals  - reglan 10 IV TID with meals  - pain control, consult pain mgmt, avoid narcotics given her hx  - PPI   - repeat BMP ordered @ 1700 ensure eval AG   - pt denies recent opioid use until hospitalization yesterday in past 2 weeks check urine tox and serum tox   - check lipase and AXR to w/u for constipation  - miralax daily  - endocrine c/s  - will obtain CT maxillofacial to further assess dentition
Not Done

## 2025-05-22 ENCOUNTER — RX RENEWAL (OUTPATIENT)
Age: 35
End: 2025-05-22

## 2025-05-28 ENCOUNTER — EMERGENCY (EMERGENCY)
Facility: HOSPITAL | Age: 35
LOS: 0 days | Discharge: HOME | End: 2025-05-28
Attending: EMERGENCY MEDICINE
Payer: COMMERCIAL

## 2025-05-28 VITALS
OXYGEN SATURATION: 98 % | SYSTOLIC BLOOD PRESSURE: 108 MMHG | DIASTOLIC BLOOD PRESSURE: 69 MMHG | HEART RATE: 75 BPM | TEMPERATURE: 98 F | RESPIRATION RATE: 14 BRPM

## 2025-05-28 VITALS
OXYGEN SATURATION: 100 % | RESPIRATION RATE: 18 BRPM | HEIGHT: 66 IN | DIASTOLIC BLOOD PRESSURE: 92 MMHG | WEIGHT: 141.1 LBS | HEART RATE: 85 BPM | TEMPERATURE: 98 F | SYSTOLIC BLOOD PRESSURE: 149 MMHG

## 2025-05-28 DIAGNOSIS — O00.9 ECTOPIC PREGNANCY, UNSPECIFIED: Chronic | ICD-10-CM

## 2025-05-28 DIAGNOSIS — Z88.6 ALLERGY STATUS TO ANALGESIC AGENT: ICD-10-CM

## 2025-05-28 DIAGNOSIS — I10 ESSENTIAL (PRIMARY) HYPERTENSION: ICD-10-CM

## 2025-05-28 DIAGNOSIS — K04.7 PERIAPICAL ABSCESS WITHOUT SINUS: ICD-10-CM

## 2025-05-28 DIAGNOSIS — Z90.49 ACQUIRED ABSENCE OF OTHER SPECIFIED PARTS OF DIGESTIVE TRACT: Chronic | ICD-10-CM

## 2025-05-28 DIAGNOSIS — R68.84 JAW PAIN: ICD-10-CM

## 2025-05-28 LAB — HCG UR QL: NEGATIVE — SIGNIFICANT CHANGE UP

## 2025-05-28 PROCEDURE — 99284 EMERGENCY DEPT VISIT MOD MDM: CPT

## 2025-05-28 RX ORDER — OXYCODONE HYDROCHLORIDE AND ACETAMINOPHEN 10; 325 MG/1; MG/1
1 TABLET ORAL
Qty: 6 | Refills: 0
Start: 2025-05-28 | End: 2025-05-29

## 2025-05-28 RX ORDER — PENICILLIN V POTASSIUM 500 MG
1 TABLET ORAL
Qty: 14 | Refills: 0
Start: 2025-05-28 | End: 2025-06-03

## 2025-05-28 RX ORDER — OXYCODONE HYDROCHLORIDE AND ACETAMINOPHEN 10; 325 MG/1; MG/1
2 TABLET ORAL
Qty: 32 | Refills: 0
Start: 2025-05-28 | End: 2025-05-31

## 2025-05-28 RX ORDER — PENICILLIN V POTASSIUM 500 MG
500 TABLET ORAL ONCE
Refills: 0 | Status: COMPLETED | OUTPATIENT
Start: 2025-05-28 | End: 2025-05-28

## 2025-05-28 RX ORDER — ACETAMINOPHEN/DIPHENHYDRAMINE 500MG-25MG
1 TABLET ORAL
Qty: 12 | Refills: 0
Start: 2025-05-28 | End: 2025-05-31

## 2025-05-28 RX ADMIN — Medication 500 MILLIGRAM(S): at 04:56

## 2025-05-28 NOTE — ED PROVIDER NOTE - OBJECTIVE STATEMENT
34 yo F with days of RU jaw pain, focused at molar tooth in upper R jaw.  Pt. says tooth needs to be pulled, she has a dental ppt. next week, requesting meds stronger than ibuprofen for pain at this time.  NO other complaints.   ROS: negative for fever, cough, headache, chest pain, shortness of breath, abd pain, nausea, vomiting, diarrhea, rash, paresthesia, and weakness--all other systems reviewed are negative.   PMH: DM 1, gastroparesis, marijuana use; Meds: Denies; SH: Denies smoking/drinking/drug use

## 2025-05-28 NOTE — ED ADULT NURSE NOTE - DISCHARGE DATE/TIME
-- DO NOT REPLY / DO NOT REPLY ALL --  -- Message is from Engagement Center Operations (ECO) --    ONLY TO BE USED WITHIN A REFILL MEDICATION ENCOUNTER    Med Refill  Is the patient currently having any symptoms?: No/Non-Emergent symptoms    Name of medication requested: metronidazole .75% vaginal gel     Has patient contacted the pharmacy? Not Applicable-Patient states reason is out of refills, new this through dr plunkett    Is this the first request for the medication in the last 48 hours?: Yes      Patient is requesting a medication refill - medication is on active list      Full name of the provider who ordered the medication: [facility] site name / Account # for provider: [place]    Preferred Pharmacy: Pharmacy  "[id number] [address]    Patient confirmed the above pharmacy as correct?  Yes          Alternative phone number: na    Can a detailed message be left?: Yes    Patient is completely out of medication: Verify if patient is currently experiencing symptoms. If patient is symptomatic, proceed with front end triage instead of medication refill. If patient is not symptomatic but is completely out of medication, [place]o as High priority when routing. Inform patient: “Please call back with any questions or concerns and if your condition becomes life threatening, you should seek immediate medical assistance by calling 911 or going to the Emergency Department for evaluation.”    Inform all patients: \"If the clinical team needs to contact you regarding this refill, please be aware the return phone call may come from an unidentified or out of state phone number and your refill request will be addressed as soon as the clinical team reviews your message.\"   28-May-2025 05:27

## 2025-05-28 NOTE — ED PROVIDER NOTE - CLINICAL SUMMARY MEDICAL DECISION MAKING FREE TEXT BOX
34 yo F with R upper dental infection likely, needs root canal  -antbx to cover infection, d/c with urgenty dental f/u

## 2025-05-28 NOTE — ED ADULT NURSE NOTE - OBJECTIVE STATEMENT
35Y A&OX3 F PMH DM. HLD c/o R upper mouth/tooth pain r/t head x 3 days. pt was told by dentist that she needs to get her tooth taken out about 2 months ago but states she hasn't had the time to f/ u. ibuprofen taken last night to no relief

## 2025-05-28 NOTE — ED PROVIDER NOTE - NSFOLLOWUPCLINICS_GEN_ALL_ED_FT
Oral & Maxillofacial Surgery  Department of Dental Medicine  [phone] [place] [address]  Phone: [phone]  Fax: [phone]  Follow Up Time: Urgent

## 2025-05-28 NOTE — ED PROVIDER NOTE - HIV OFFER
Noted     
Patient called asking if we have received any medical records via email or [facility].     Please call to discuss.   
Records have been faxed over, please review then call the patient to discuss.   
Previously Declined (within the last year)

## 2025-05-28 NOTE — ED PROVIDER NOTE - PATIENT PORTAL LINK FT
You can access the FollowMyHealth Patient Portal offered by [facility] by registering at the following website: [email or web address]. By joining [employer]’s FollowMyHealth portal, you will also be able to view your health information using other applications (apps) compatible with our system.

## 2025-05-28 NOTE — ED PROVIDER NOTE - PHYSICAL EXAMINATION
Vitals: HTN at 149/90, otherwise WNL  Gen: AAOx3, NAD, sitting uncomfortably in stretcher, calm, non-toxic  Head: ncat, perrla, eomi b/l  dental: tooth number 3 is cracked, local erythema of gums, no fluctuance or mass, patent airway  Neck: supple, no lymphadenopathy, no midline deviation  Heart: rrr, no m/r/g  Lungs: CTA b/l, no rales/ronchi/wheezes  Abd: soft, nontender, non-distended, no rebound or guarding  Ext: no clubbing/cyanosis/edema  Neuro: sensation and muscle strength intact b/l, steady gait

## 2025-06-03 ENCOUNTER — APPOINTMENT (OUTPATIENT)
Dept: ENDOCRINOLOGY | Facility: CLINIC | Age: 35
End: 2025-06-03

## 2025-06-03 NOTE — ASSESSMENT
[FreeTextEntry1] : 1. Diabetes Mellitus -very poorly controlled A1c 11.9%, high risk of DKA.  Had DKA within the last year. Type: 1 A1c: 11.9% Current Regimen: Lantus 18 units at bedtime, lispro 3/4/5 units with meals, 2 units for every 50 units above 200 for correction.  PLAN: - Regimen: Continue current insulin regimen for now, she did not bring her meter and is not using CGM, no blood glucoses available to adjust doses.  Reporting frequent hyperglycemia and hypoglycemia. Recommend restarting OmniPod and Dexcom, message sent to CDE Myra Anna to assist with getting her supplies and restarted on pump.  - BG monitoring: She has type 1 diabetes with frequent hyperglycemia and hypoglycemia, her high risk for DKA and also noticing severe hypoglycemia.  Absolutely needs CGM, recommend restarting Dexcom G7.  - Preventive:  Nephropathy screening: UACr 183 (H) in 3/2025  Retinopathy screening: Early DR, following with ophthalmologist.  Continue close follow-up.  2. Diabetic nephropathy BP goal <130/90 She was previously on enalapril 5 mg daily, now stopped. UACR 183 (H) on 3/2025  3. Hyperlipidemia LDL goal: <100 -Previously on atorvastatin, now self discontinued. -  (H) in 3/2025  Recommend close follow up with CDE in 4-6 weeks RTC in 3 months

## 2025-06-03 NOTE — HISTORY OF PRESENT ILLNESS
[FreeTextEntry1] : CHIEF COMPLAINT: Type 1 diabetes Last visit with Dr. Goss in 3/2025  HISTORY OF PRESENTING ILLNESS: The patient is a 35-year-old female here for follow-up of type 1 diabetes.  She reports to me that she has frequent episodes of DKA. She reports that she has a history of ectopic pregnancies, both fallopian tubes were removed.  No further plans of pregnancy.  Current smoker.  She was using Omnipod and Dexcom until she ran out of supplies a few months ago. Now on basal bolus insulin.  DIABETES HPI:  Type of Diabetes: 1 Duration of Diabetes: Diagnosed at age 12  A1c: 11.9%  3/7/2025 8.3% 10/10/2023  Previously on OmniPod and Dexcom G6 and with better control A1c tween 8 and 9%.  Currently on Current regimen: Lantus 18 units at bedtime, lispro 3 units with breakfast, 4 units with lunch, 5 units with dinner.  Takes correction with 2 units for every 50 points above 200.  Diabetes complications:  Microvascular: [-] neuropathy, [+] nephropathy, [+] retinopathy  Macrovascular: No history of CAD, CVA or PAD History of DKA/hospitalization due to Diabetes: Frequent episodes of DKA.  Reports that she has had DKA within the last year.  Last retinopathy screening: She has diabetic retinopathy, saw the retina specialist in March 2025 and had injections in her eye Last nephropathy screening (urine ACR): 3/2025- 183 (H), positive  Blood glucose monitoring: Using Dexcom Reviewed dates:   Comorbidities: HTN, HLD Statin: Previously on atorvastatin, not taking currently. ACE/ARB: Previously on enalapril 5 mg daily, not taking currently.  Works as a pharmacy tech.

## 2025-06-07 ENCOUNTER — EMERGENCY (EMERGENCY)
Facility: HOSPITAL | Age: 35
LOS: 0 days | Discharge: HOME | End: 2025-06-08
Attending: STUDENT IN AN ORGANIZED HEALTH CARE EDUCATION/TRAINING PROGRAM
Payer: COMMERCIAL

## 2025-06-07 VITALS
HEART RATE: 108 BPM | HEIGHT: 66 IN | DIASTOLIC BLOOD PRESSURE: 94 MMHG | RESPIRATION RATE: 18 BRPM | TEMPERATURE: 98 F | WEIGHT: 125 LBS | SYSTOLIC BLOOD PRESSURE: 173 MMHG | OXYGEN SATURATION: 97 %

## 2025-06-07 DIAGNOSIS — O00.9 ECTOPIC PREGNANCY, UNSPECIFIED: Chronic | ICD-10-CM

## 2025-06-07 DIAGNOSIS — R10.816 EPIGASTRIC ABDOMINAL TENDERNESS: ICD-10-CM

## 2025-06-07 DIAGNOSIS — Z88.6 ALLERGY STATUS TO ANALGESIC AGENT: ICD-10-CM

## 2025-06-07 DIAGNOSIS — E11.43 TYPE 2 DIABETES MELLITUS WITH DIABETIC AUTONOMIC (POLY)NEUROPATHY: ICD-10-CM

## 2025-06-07 DIAGNOSIS — R11.10 VOMITING, UNSPECIFIED: ICD-10-CM

## 2025-06-07 DIAGNOSIS — Z88.8 ALLERGY STATUS TO OTHER DRUGS, MEDICAMENTS AND BIOLOGICAL SUBSTANCES: ICD-10-CM

## 2025-06-07 DIAGNOSIS — K31.84 GASTROPARESIS: ICD-10-CM

## 2025-06-07 DIAGNOSIS — Z90.49 ACQUIRED ABSENCE OF OTHER SPECIFIED PARTS OF DIGESTIVE TRACT: Chronic | ICD-10-CM

## 2025-06-07 LAB
ALBUMIN SERPL ELPH-MCNC: 3.6 G/DL — SIGNIFICANT CHANGE UP (ref 3.3–5)
ALP SERPL-CCNC: 106 U/L — SIGNIFICANT CHANGE UP (ref 40–120)
ALT FLD-CCNC: 28 U/L — SIGNIFICANT CHANGE UP (ref 12–78)
ANION GAP SERPL CALC-SCNC: 9 MMOL/L — SIGNIFICANT CHANGE UP (ref 5–17)
ANISOCYTOSIS BLD QL: SLIGHT — SIGNIFICANT CHANGE UP
AST SERPL-CCNC: 17 U/L — SIGNIFICANT CHANGE UP (ref 15–37)
BASOPHILS # BLD AUTO: 0.05 K/UL — SIGNIFICANT CHANGE UP (ref 0–0.2)
BASOPHILS NFR BLD AUTO: 0.4 % — SIGNIFICANT CHANGE UP (ref 0–2)
BILIRUB SERPL-MCNC: 0.7 MG/DL — SIGNIFICANT CHANGE UP (ref 0.2–1.2)
BUN SERPL-MCNC: 16 MG/DL — SIGNIFICANT CHANGE UP (ref 7–23)
CALCIUM SERPL-MCNC: 9.5 MG/DL — SIGNIFICANT CHANGE UP (ref 8.5–10.1)
CHLORIDE SERPL-SCNC: 106 MMOL/L — SIGNIFICANT CHANGE UP (ref 96–108)
CO2 SERPL-SCNC: 25 MMOL/L — SIGNIFICANT CHANGE UP (ref 22–31)
CREAT SERPL-MCNC: 1.15 MG/DL — SIGNIFICANT CHANGE UP (ref 0.5–1.3)
EGFR: 64 ML/MIN/1.73M2 — SIGNIFICANT CHANGE UP
EGFR: 64 ML/MIN/1.73M2 — SIGNIFICANT CHANGE UP
EOSINOPHIL # BLD AUTO: 0.05 K/UL — SIGNIFICANT CHANGE UP (ref 0–0.5)
EOSINOPHIL NFR BLD AUTO: 0.4 % — SIGNIFICANT CHANGE UP (ref 0–6)
GLUCOSE SERPL-MCNC: 210 MG/DL — HIGH (ref 70–99)
HCG SERPL-ACNC: <1 MIU/ML — SIGNIFICANT CHANGE UP
HCT VFR BLD CALC: 41.1 % — SIGNIFICANT CHANGE UP (ref 34.5–45)
HGB BLD-MCNC: 12.8 G/DL — SIGNIFICANT CHANGE UP (ref 11.5–15.5)
IMM GRANULOCYTES NFR BLD AUTO: 0.4 % — SIGNIFICANT CHANGE UP (ref 0–0.9)
LIDOCAIN IGE QN: 13 U/L — SIGNIFICANT CHANGE UP (ref 13–75)
LYMPHOCYTES # BLD AUTO: 1.49 K/UL — SIGNIFICANT CHANGE UP (ref 1–3.3)
LYMPHOCYTES # BLD AUTO: 11.9 % — LOW (ref 13–44)
MANUAL SMEAR VERIFICATION: SIGNIFICANT CHANGE UP
MCHC RBC-ENTMCNC: 21.6 PG — LOW (ref 27–34)
MCHC RBC-ENTMCNC: 31.1 G/DL — LOW (ref 32–36)
MCV RBC AUTO: 69.4 FL — LOW (ref 80–100)
MICROCYTES BLD QL: SLIGHT — SIGNIFICANT CHANGE UP
MONOCYTES # BLD AUTO: 0.79 K/UL — SIGNIFICANT CHANGE UP (ref 0–0.9)
MONOCYTES NFR BLD AUTO: 6.3 % — SIGNIFICANT CHANGE UP (ref 2–14)
NEUTROPHILS # BLD AUTO: 10.12 K/UL — HIGH (ref 1.8–7.4)
NEUTROPHILS NFR BLD AUTO: 80.6 % — HIGH (ref 43–77)
NRBC BLD AUTO-RTO: 0 /100 WBCS — SIGNIFICANT CHANGE UP (ref 0–0)
PLAT MORPH BLD: NORMAL — SIGNIFICANT CHANGE UP
PLATELET # BLD AUTO: 427 K/UL — HIGH (ref 150–400)
POIKILOCYTOSIS BLD QL AUTO: SLIGHT — SIGNIFICANT CHANGE UP
POTASSIUM SERPL-MCNC: 3.4 MMOL/L — LOW (ref 3.5–5.3)
POTASSIUM SERPL-SCNC: 3.4 MMOL/L — LOW (ref 3.5–5.3)
PROT SERPL-MCNC: 8.1 GM/DL — SIGNIFICANT CHANGE UP (ref 6–8.3)
RBC # BLD: 5.92 M/UL — HIGH (ref 3.8–5.2)
RBC # FLD: 15 % — HIGH (ref 10.3–14.5)
RBC BLD AUTO: ABNORMAL
SODIUM SERPL-SCNC: 140 MMOL/L — SIGNIFICANT CHANGE UP (ref 135–145)
WBC # BLD: 12.55 K/UL — HIGH (ref 3.8–10.5)
WBC # FLD AUTO: 12.55 K/UL — HIGH (ref 3.8–10.5)

## 2025-06-07 PROCEDURE — 99285 EMERGENCY DEPT VISIT HI MDM: CPT

## 2025-06-07 RX ORDER — DIPHENHYDRAMINE HCL 12.5MG/5ML
25 ELIXIR ORAL ONCE
Refills: 0 | Status: COMPLETED | OUTPATIENT
Start: 2025-06-07 | End: 2025-06-07

## 2025-06-07 RX ORDER — HALOPERIDOL 10 MG/1
5 TABLET ORAL ONCE
Refills: 0 | Status: COMPLETED | OUTPATIENT
Start: 2025-06-07 | End: 2025-06-07

## 2025-06-07 RX ORDER — HYDROMORPHONE/SOD CHLOR,ISO/PF 2 MG/10 ML
1 SYRINGE (ML) INJECTION ONCE
Refills: 0 | Status: DISCONTINUED | OUTPATIENT
Start: 2025-06-07 | End: 2025-06-07

## 2025-06-07 RX ORDER — INSULIN GLARGINE-YFGN 100 [IU]/ML
12 INJECTION, SOLUTION SUBCUTANEOUS ONCE
Refills: 0 | Status: COMPLETED | OUTPATIENT
Start: 2025-06-07 | End: 2025-06-07

## 2025-06-07 RX ADMIN — HALOPERIDOL 5 MILLIGRAM(S): 10 TABLET ORAL at 21:32

## 2025-06-07 RX ADMIN — Medication 25 MILLIGRAM(S): at 21:32

## 2025-06-07 RX ADMIN — Medication 1 MILLIGRAM(S): at 21:31

## 2025-06-07 RX ADMIN — INSULIN GLARGINE-YFGN 12 UNIT(S): 100 INJECTION, SOLUTION SUBCUTANEOUS at 22:39

## 2025-06-07 RX ADMIN — Medication 1000 MILLILITER(S): at 21:31

## 2025-06-08 VITALS
HEART RATE: 88 BPM | DIASTOLIC BLOOD PRESSURE: 88 MMHG | RESPIRATION RATE: 17 BRPM | TEMPERATURE: 98 F | OXYGEN SATURATION: 99 % | SYSTOLIC BLOOD PRESSURE: 154 MMHG

## 2025-06-08 RX ORDER — BUPRENORPHINE HYDROCHLORIDE, NALOXONE HYDROCHLORIDE 4; 1 MG/1; MG/1
1 FILM, SOLUBLE BUCCAL; SUBLINGUAL
Qty: 1 | Refills: 0
Start: 2025-06-08

## 2025-06-08 RX ORDER — MAGNESIUM, ALUMINUM HYDROXIDE 200-200 MG
30 TABLET,CHEWABLE ORAL ONCE
Refills: 0 | Status: COMPLETED | OUTPATIENT
Start: 2025-06-08 | End: 2025-06-08

## 2025-06-08 RX ORDER — ACETAMINOPHEN 500 MG/5ML
1000 LIQUID (ML) ORAL ONCE
Refills: 0 | Status: COMPLETED | OUTPATIENT
Start: 2025-06-08 | End: 2025-06-08

## 2025-06-08 RX ADMIN — Medication 30 MILLILITER(S): at 01:06

## 2025-06-08 RX ADMIN — Medication 20 MILLIGRAM(S): at 01:06

## 2025-06-09 ENCOUNTER — INPATIENT (INPATIENT)
Facility: HOSPITAL | Age: 35
LOS: 4 days | Discharge: HOME | End: 2025-06-14
Attending: HOSPITALIST | Admitting: HOSPITALIST
Payer: COMMERCIAL

## 2025-06-09 VITALS — HEART RATE: 122 BPM | OXYGEN SATURATION: 99 % | HEIGHT: 66 IN

## 2025-06-09 DIAGNOSIS — O00.9 ECTOPIC PREGNANCY, UNSPECIFIED: Chronic | ICD-10-CM

## 2025-06-09 DIAGNOSIS — F11.20 OPIOID DEPENDENCE, UNCOMPLICATED: ICD-10-CM

## 2025-06-09 DIAGNOSIS — F43.20 ADJUSTMENT DISORDER, UNSPECIFIED: ICD-10-CM

## 2025-06-09 DIAGNOSIS — Z90.49 ACQUIRED ABSENCE OF OTHER SPECIFIED PARTS OF DIGESTIVE TRACT: Chronic | ICD-10-CM

## 2025-06-09 LAB
ALBUMIN SERPL ELPH-MCNC: 3.6 G/DL — SIGNIFICANT CHANGE UP (ref 3.3–5)
ALP SERPL-CCNC: 103 U/L — SIGNIFICANT CHANGE UP (ref 40–120)
ALT FLD-CCNC: 31 U/L — SIGNIFICANT CHANGE UP (ref 12–78)
ANION GAP SERPL CALC-SCNC: 3 MMOL/L — LOW (ref 5–17)
AST SERPL-CCNC: 17 U/L — SIGNIFICANT CHANGE UP (ref 15–37)
BASE EXCESS BLDV CALC-SCNC: 5.5 MMOL/L — HIGH (ref -2–3)
BASOPHILS # BLD AUTO: 0.07 K/UL — SIGNIFICANT CHANGE UP (ref 0–0.2)
BASOPHILS NFR BLD AUTO: 0.5 % — SIGNIFICANT CHANGE UP (ref 0–2)
BILIRUB SERPL-MCNC: 0.4 MG/DL — SIGNIFICANT CHANGE UP (ref 0.2–1.2)
BLOOD GAS COMMENTS, VENOUS: SIGNIFICANT CHANGE UP
BUN SERPL-MCNC: 13 MG/DL — SIGNIFICANT CHANGE UP (ref 7–23)
CALCIUM SERPL-MCNC: 9.8 MG/DL — SIGNIFICANT CHANGE UP (ref 8.5–10.1)
CHLORIDE BLDV-SCNC: 107 MMOL/L — SIGNIFICANT CHANGE UP (ref 98–107)
CHLORIDE SERPL-SCNC: 110 MMOL/L — HIGH (ref 96–108)
CO2 BLDV-SCNC: 33 MMOL/L — HIGH (ref 22–26)
CO2 SERPL-SCNC: 27 MMOL/L — SIGNIFICANT CHANGE UP (ref 22–31)
CREAT SERPL-MCNC: 1.05 MG/DL — SIGNIFICANT CHANGE UP (ref 0.5–1.3)
EGFR: 71 ML/MIN/1.73M2 — SIGNIFICANT CHANGE UP
EGFR: 71 ML/MIN/1.73M2 — SIGNIFICANT CHANGE UP
EOSINOPHIL # BLD AUTO: 0.44 K/UL — SIGNIFICANT CHANGE UP (ref 0–0.5)
EOSINOPHIL NFR BLD AUTO: 3 % — SIGNIFICANT CHANGE UP (ref 0–6)
GAS PNL BLDV: 141 MMOL/L — SIGNIFICANT CHANGE UP (ref 136–145)
GAS PNL BLDV: SIGNIFICANT CHANGE UP
GAS PNL BLDV: SIGNIFICANT CHANGE UP
GLUCOSE BLDV-MCNC: 42 MG/DL — CRITICAL LOW (ref 65–95)
GLUCOSE SERPL-MCNC: 42 MG/DL — CRITICAL LOW (ref 70–99)
HCO3 BLDV-SCNC: 31 MMOL/L — HIGH (ref 22–28)
HCT VFR BLD CALC: 41.5 % — SIGNIFICANT CHANGE UP (ref 34.5–45)
HCT VFR BLDA CALC: 39 % — SIGNIFICANT CHANGE UP (ref 37–47)
HGB BLD CALC-MCNC: 13.1 G/DL — SIGNIFICANT CHANGE UP (ref 11.7–16.1)
HGB BLD-MCNC: 12.8 G/DL — SIGNIFICANT CHANGE UP (ref 11.5–15.5)
HOROWITZ INDEX BLDV+IHG-RTO: 21 — SIGNIFICANT CHANGE UP
IMM GRANULOCYTES NFR BLD AUTO: 0.3 % — SIGNIFICANT CHANGE UP (ref 0–0.9)
INR BLD: 0.85 RATIO — SIGNIFICANT CHANGE UP (ref 0.85–1.16)
LACTATE BLDV-MCNC: 2.2 MMOL/L — HIGH (ref 0.56–1.39)
LACTATE SERPL-SCNC: 2.4 MMOL/L — HIGH (ref 0.7–2)
LYMPHOCYTES # BLD AUTO: 25.8 % — SIGNIFICANT CHANGE UP (ref 13–44)
LYMPHOCYTES # BLD AUTO: 3.78 K/UL — HIGH (ref 1–3.3)
MCHC RBC-ENTMCNC: 21.2 PG — LOW (ref 27–34)
MCHC RBC-ENTMCNC: 30.8 G/DL — LOW (ref 32–36)
MCV RBC AUTO: 68.6 FL — LOW (ref 80–100)
MONOCYTES # BLD AUTO: 1.1 K/UL — HIGH (ref 0–0.9)
MONOCYTES NFR BLD AUTO: 7.5 % — SIGNIFICANT CHANGE UP (ref 2–14)
NEUTROPHILS # BLD AUTO: 9.24 K/UL — HIGH (ref 1.8–7.4)
NEUTROPHILS NFR BLD AUTO: 62.9 % — SIGNIFICANT CHANGE UP (ref 43–77)
NRBC BLD AUTO-RTO: 0 /100 WBCS — SIGNIFICANT CHANGE UP (ref 0–0)
PCO2 BLDV: 48 MMHG — SIGNIFICANT CHANGE UP (ref 42–55)
PH BLDV: 7.42 — SIGNIFICANT CHANGE UP (ref 7.32–7.43)
PLATELET # BLD AUTO: 470 K/UL — HIGH (ref 150–400)
PO2 BLDV: 30 MMHG — SIGNIFICANT CHANGE UP (ref 25–45)
POTASSIUM BLDV-SCNC: 3.3 MMOL/L — LOW (ref 3.5–5.1)
POTASSIUM SERPL-MCNC: 3.1 MMOL/L — LOW (ref 3.5–5.3)
POTASSIUM SERPL-SCNC: 3.1 MMOL/L — LOW (ref 3.5–5.3)
PROT SERPL-MCNC: 8.3 GM/DL — SIGNIFICANT CHANGE UP (ref 6–8.3)
PROTHROM AB SERPL-ACNC: 9.9 SEC — SIGNIFICANT CHANGE UP (ref 9.9–13.4)
RBC # BLD: 6.05 M/UL — HIGH (ref 3.8–5.2)
RBC # FLD: 15.3 % — HIGH (ref 10.3–14.5)
SALICYLATES SERPL-MCNC: <1.7 MG/DL — LOW (ref 2.8–20)
SAO2 % BLDV: 56.3 % — LOW (ref 94–98)
SODIUM SERPL-SCNC: 140 MMOL/L — SIGNIFICANT CHANGE UP (ref 135–145)
WBC # BLD: 14.67 K/UL — HIGH (ref 3.8–10.5)
WBC # FLD AUTO: 14.67 K/UL — HIGH (ref 3.8–10.5)

## 2025-06-09 PROCEDURE — 90792 PSYCH DIAG EVAL W/MED SRVCS: CPT | Mod: 95

## 2025-06-09 PROCEDURE — 74177 CT ABD & PELVIS W/CONTRAST: CPT | Mod: 26

## 2025-06-09 PROCEDURE — 99222 1ST HOSP IP/OBS MODERATE 55: CPT

## 2025-06-09 PROCEDURE — 93010 ELECTROCARDIOGRAM REPORT: CPT

## 2025-06-09 PROCEDURE — 99285 EMERGENCY DEPT VISIT HI MDM: CPT

## 2025-06-09 RX ORDER — DIPHENHYDRAMINE HCL 12.5MG/5ML
0.25 ELIXIR ORAL ONCE
Refills: 0 | Status: COMPLETED | OUTPATIENT
Start: 2025-06-09 | End: 2025-06-09

## 2025-06-09 RX ORDER — ONDANSETRON HCL/PF 4 MG/2 ML
4 VIAL (ML) INJECTION ONCE
Refills: 0 | Status: COMPLETED | OUTPATIENT
Start: 2025-06-09 | End: 2025-06-09

## 2025-06-09 RX ORDER — SODIUM CHLORIDE 9 G/1000ML
1000 INJECTION, SOLUTION INTRAVENOUS
Refills: 0 | Status: DISCONTINUED | OUTPATIENT
Start: 2025-06-09 | End: 2025-06-12

## 2025-06-09 RX ORDER — DEXTROSE 50 % IN WATER 50 %
50 SYRINGE (ML) INTRAVENOUS ONCE
Refills: 0 | Status: COMPLETED | OUTPATIENT
Start: 2025-06-09 | End: 2025-06-09

## 2025-06-09 RX ORDER — INSULIN LISPRO 100 U/ML
6 INJECTION, SOLUTION INTRAVENOUS; SUBCUTANEOUS ONCE
Refills: 0 | Status: DISCONTINUED | OUTPATIENT
Start: 2025-06-09 | End: 2025-06-09

## 2025-06-09 RX ORDER — MELATONIN 5 MG
3 TABLET ORAL AT BEDTIME
Refills: 0 | Status: DISCONTINUED | OUTPATIENT
Start: 2025-06-09 | End: 2025-06-14

## 2025-06-09 RX ORDER — ACETAMINOPHEN 500 MG/5ML
650 LIQUID (ML) ORAL EVERY 6 HOURS
Refills: 0 | Status: DISCONTINUED | OUTPATIENT
Start: 2025-06-09 | End: 2025-06-14

## 2025-06-09 RX ORDER — INSULIN GLARGINE-YFGN 100 [IU]/ML
8 INJECTION, SOLUTION SUBCUTANEOUS ONCE
Refills: 0 | Status: COMPLETED | OUTPATIENT
Start: 2025-06-09 | End: 2025-06-09

## 2025-06-09 RX ORDER — METOCLOPRAMIDE HCL 10 MG
10 TABLET ORAL ONCE
Refills: 0 | Status: COMPLETED | OUTPATIENT
Start: 2025-06-09 | End: 2025-06-09

## 2025-06-09 RX ORDER — HYDROMORPHONE/SOD CHLOR,ISO/PF 2 MG/10 ML
0.5 SYRINGE (ML) INJECTION ONCE
Refills: 0 | Status: DISCONTINUED | OUTPATIENT
Start: 2025-06-09 | End: 2025-06-09

## 2025-06-09 RX ORDER — MAGNESIUM, ALUMINUM HYDROXIDE 200-200 MG
30 TABLET,CHEWABLE ORAL EVERY 4 HOURS
Refills: 0 | Status: DISCONTINUED | OUTPATIENT
Start: 2025-06-09 | End: 2025-06-10

## 2025-06-09 RX ORDER — INSULIN LISPRO 100 U/ML
2 INJECTION, SOLUTION INTRAVENOUS; SUBCUTANEOUS ONCE
Refills: 0 | Status: COMPLETED | OUTPATIENT
Start: 2025-06-09 | End: 2025-06-09

## 2025-06-09 RX ORDER — DIPHENHYDRAMINE HCL 12.5MG/5ML
25 ELIXIR ORAL ONCE
Refills: 0 | Status: COMPLETED | OUTPATIENT
Start: 2025-06-09 | End: 2025-06-09

## 2025-06-09 RX ORDER — HYDROMORPHONE/SOD CHLOR,ISO/PF 2 MG/10 ML
1 SYRINGE (ML) INJECTION ONCE
Refills: 0 | Status: DISCONTINUED | OUTPATIENT
Start: 2025-06-09 | End: 2025-06-09

## 2025-06-09 RX ORDER — HALOPERIDOL 10 MG/1
5 TABLET ORAL ONCE
Refills: 0 | Status: COMPLETED | OUTPATIENT
Start: 2025-06-09 | End: 2025-06-09

## 2025-06-09 RX ORDER — SODIUM CHLORIDE 9 G/1000ML
1000 INJECTION, SOLUTION INTRAVENOUS ONCE
Refills: 0 | Status: COMPLETED | OUTPATIENT
Start: 2025-06-09 | End: 2025-06-09

## 2025-06-09 RX ADMIN — Medication 25 MILLIGRAM(S): at 11:21

## 2025-06-09 RX ADMIN — HALOPERIDOL 5 MILLIGRAM(S): 10 TABLET ORAL at 11:51

## 2025-06-09 RX ADMIN — Medication 1 MILLIGRAM(S): at 15:50

## 2025-06-09 RX ADMIN — Medication 20 MILLIGRAM(S): at 14:55

## 2025-06-09 RX ADMIN — Medication 4 MILLIGRAM(S): at 20:06

## 2025-06-09 RX ADMIN — Medication 0.5 MILLIGRAM(S): at 11:21

## 2025-06-09 RX ADMIN — HALOPERIDOL 5 MILLIGRAM(S): 10 TABLET ORAL at 20:22

## 2025-06-09 RX ADMIN — Medication 0.5 MILLIGRAM(S): at 11:45

## 2025-06-09 RX ADMIN — Medication 40 MILLIEQUIVALENT(S): at 14:58

## 2025-06-09 RX ADMIN — Medication 0.25 MILLIGRAM(S): at 14:54

## 2025-06-09 RX ADMIN — Medication 10 MILLIGRAM(S): at 11:21

## 2025-06-09 RX ADMIN — INSULIN LISPRO 2 UNIT(S): 100 INJECTION, SOLUTION INTRAVENOUS; SUBCUTANEOUS at 17:41

## 2025-06-09 RX ADMIN — SODIUM CHLORIDE 1000 MILLILITER(S): 9 INJECTION, SOLUTION INTRAVENOUS at 11:21

## 2025-06-09 RX ADMIN — Medication 0.5 MILLIGRAM(S): at 12:45

## 2025-06-09 RX ADMIN — Medication 1 MILLIGRAM(S): at 14:55

## 2025-06-09 RX ADMIN — Medication 50 MILLILITER(S): at 11:27

## 2025-06-09 RX ADMIN — Medication 100 MILLIEQUIVALENT(S): at 14:57

## 2025-06-09 RX ADMIN — Medication 0.5 MILLIGRAM(S): at 11:46

## 2025-06-09 RX ADMIN — INSULIN GLARGINE-YFGN 8 UNIT(S): 100 INJECTION, SOLUTION SUBCUTANEOUS at 20:41

## 2025-06-10 DIAGNOSIS — Z29.9 ENCOUNTER FOR PROPHYLACTIC MEASURES, UNSPECIFIED: ICD-10-CM

## 2025-06-10 DIAGNOSIS — I16.0 HYPERTENSIVE URGENCY: ICD-10-CM

## 2025-06-10 DIAGNOSIS — N94.9 UNSPECIFIED CONDITION ASSOCIATED WITH FEMALE GENITAL ORGANS AND MENSTRUAL CYCLE: ICD-10-CM

## 2025-06-10 DIAGNOSIS — R91.8 OTHER NONSPECIFIC ABNORMAL FINDING OF LUNG FIELD: ICD-10-CM

## 2025-06-10 DIAGNOSIS — D72.829 ELEVATED WHITE BLOOD CELL COUNT, UNSPECIFIED: ICD-10-CM

## 2025-06-10 DIAGNOSIS — E16.2 HYPOGLYCEMIA, UNSPECIFIED: ICD-10-CM

## 2025-06-10 DIAGNOSIS — E87.6 HYPOKALEMIA: ICD-10-CM

## 2025-06-10 DIAGNOSIS — F11.20 OPIOID DEPENDENCE, UNCOMPLICATED: ICD-10-CM

## 2025-06-10 DIAGNOSIS — T14.91XA SUICIDE ATTEMPT, INITIAL ENCOUNTER: ICD-10-CM

## 2025-06-10 DIAGNOSIS — R11.2 NAUSEA WITH VOMITING, UNSPECIFIED: ICD-10-CM

## 2025-06-10 DIAGNOSIS — E10.9 TYPE 1 DIABETES MELLITUS WITHOUT COMPLICATIONS: ICD-10-CM

## 2025-06-10 LAB
ALBUMIN SERPL ELPH-MCNC: 2.6 G/DL — LOW (ref 3.3–5)
ALP SERPL-CCNC: 88 U/L — SIGNIFICANT CHANGE UP (ref 40–120)
ALT FLD-CCNC: 27 U/L — SIGNIFICANT CHANGE UP (ref 12–78)
AMPHET UR-MCNC: NEGATIVE — SIGNIFICANT CHANGE UP
ANION GAP SERPL CALC-SCNC: 13 MMOL/L — SIGNIFICANT CHANGE UP (ref 5–17)
APPEARANCE UR: CLEAR — SIGNIFICANT CHANGE UP
AST SERPL-CCNC: 36 U/L — SIGNIFICANT CHANGE UP (ref 15–37)
BACTERIA # UR AUTO: ABNORMAL /HPF
BARBITURATES UR SCN-MCNC: NEGATIVE — SIGNIFICANT CHANGE UP
BASOPHILS # BLD AUTO: 0.05 K/UL — SIGNIFICANT CHANGE UP (ref 0–0.2)
BASOPHILS NFR BLD AUTO: 0.3 % — SIGNIFICANT CHANGE UP (ref 0–2)
BENZODIAZ UR-MCNC: NEGATIVE — SIGNIFICANT CHANGE UP
BILIRUB SERPL-MCNC: 1.3 MG/DL — HIGH (ref 0.2–1.2)
BILIRUB UR-MCNC: NEGATIVE — SIGNIFICANT CHANGE UP
BUN SERPL-MCNC: 19 MG/DL — SIGNIFICANT CHANGE UP (ref 7–23)
CALCIUM SERPL-MCNC: 8.1 MG/DL — LOW (ref 8.5–10.1)
CHLORIDE SERPL-SCNC: 100 MMOL/L — SIGNIFICANT CHANGE UP (ref 96–108)
CO2 SERPL-SCNC: 13 MMOL/L — LOW (ref 22–31)
COCAINE METAB.OTHER UR-MCNC: NEGATIVE — SIGNIFICANT CHANGE UP
COLOR SPEC: YELLOW — SIGNIFICANT CHANGE UP
CREAT SERPL-MCNC: 1.31 MG/DL — HIGH (ref 0.5–1.3)
DIFF PNL FLD: ABNORMAL
EGFR: 54 ML/MIN/1.73M2 — LOW
EGFR: 54 ML/MIN/1.73M2 — LOW
EOSINOPHIL # BLD AUTO: 0.01 K/UL — SIGNIFICANT CHANGE UP (ref 0–0.5)
EOSINOPHIL NFR BLD AUTO: 0.1 % — SIGNIFICANT CHANGE UP (ref 0–6)
EPI CELLS # UR: PRESENT
GLUCOSE BLDC GLUCOMTR-MCNC: 123 MG/DL — HIGH (ref 70–99)
GLUCOSE BLDC GLUCOMTR-MCNC: 196 MG/DL — HIGH (ref 70–99)
GLUCOSE BLDC GLUCOMTR-MCNC: 302 MG/DL — HIGH (ref 70–99)
GLUCOSE BLDC GLUCOMTR-MCNC: 464 MG/DL — CRITICAL HIGH (ref 70–99)
GLUCOSE BLDC GLUCOMTR-MCNC: 480 MG/DL — CRITICAL HIGH (ref 70–99)
GLUCOSE BLDC GLUCOMTR-MCNC: 504 MG/DL — CRITICAL HIGH (ref 70–99)
GLUCOSE BLDC GLUCOMTR-MCNC: 550 MG/DL — CRITICAL HIGH (ref 70–99)
GLUCOSE SERPL-MCNC: 378 MG/DL — HIGH (ref 70–99)
GLUCOSE UR QL: >=1000 MG/DL
HCT VFR BLD CALC: 37.1 % — SIGNIFICANT CHANGE UP (ref 34.5–45)
HGB BLD-MCNC: 11.3 G/DL — LOW (ref 11.5–15.5)
IMM GRANULOCYTES NFR BLD AUTO: 0.8 % — SIGNIFICANT CHANGE UP (ref 0–0.9)
KETONES UR QL: 80 MG/DL
LEUKOCYTE ESTERASE UR-ACNC: NEGATIVE — SIGNIFICANT CHANGE UP
LYMPHOCYTES # BLD AUTO: 1.85 K/UL — SIGNIFICANT CHANGE UP (ref 1–3.3)
LYMPHOCYTES # BLD AUTO: 10 % — LOW (ref 13–44)
MCHC RBC-ENTMCNC: 21.9 PG — LOW (ref 27–34)
MCHC RBC-ENTMCNC: 30.5 G/DL — LOW (ref 32–36)
MCV RBC AUTO: 71.8 FL — LOW (ref 80–100)
METHADONE UR-MCNC: NEGATIVE — SIGNIFICANT CHANGE UP
MONOCYTES # BLD AUTO: 1.17 K/UL — HIGH (ref 0–0.9)
MONOCYTES NFR BLD AUTO: 6.4 % — SIGNIFICANT CHANGE UP (ref 2–14)
NEUTROPHILS # BLD AUTO: 15.2 K/UL — HIGH (ref 1.8–7.4)
NEUTROPHILS NFR BLD AUTO: 82.4 % — HIGH (ref 43–77)
NITRITE UR-MCNC: NEGATIVE — SIGNIFICANT CHANGE UP
NRBC BLD AUTO-RTO: 0 /100 WBCS — SIGNIFICANT CHANGE UP (ref 0–0)
OPIATES UR-MCNC: POSITIVE — SIGNIFICANT CHANGE UP
PCP SPEC-MCNC: SIGNIFICANT CHANGE UP
PCP UR-MCNC: NEGATIVE — SIGNIFICANT CHANGE UP
PH UR: 5.5 — SIGNIFICANT CHANGE UP (ref 5–8)
PLATELET # BLD AUTO: 283 K/UL — SIGNIFICANT CHANGE UP (ref 150–400)
POTASSIUM SERPL-MCNC: 5.3 MMOL/L — SIGNIFICANT CHANGE UP (ref 3.5–5.3)
POTASSIUM SERPL-SCNC: 5.3 MMOL/L — SIGNIFICANT CHANGE UP (ref 3.5–5.3)
PROT SERPL-MCNC: 6.6 GM/DL — SIGNIFICANT CHANGE UP (ref 6–8.3)
PROT UR-MCNC: 100 MG/DL
RBC # BLD: 5.17 M/UL — SIGNIFICANT CHANGE UP (ref 3.8–5.2)
RBC # FLD: 15.3 % — HIGH (ref 10.3–14.5)
RBC CASTS # UR COMP ASSIST: 5 /HPF — HIGH (ref 0–4)
SODIUM SERPL-SCNC: 126 MMOL/L — LOW (ref 135–145)
SP GR SPEC: >1.03 — HIGH (ref 1–1.03)
THC UR QL: POSITIVE — SIGNIFICANT CHANGE UP
UROBILINOGEN FLD QL: 0.2 MG/DL — SIGNIFICANT CHANGE UP (ref 0.2–1)
WBC # BLD: 18.42 K/UL — HIGH (ref 3.8–10.5)
WBC # FLD AUTO: 18.42 K/UL — HIGH (ref 3.8–10.5)
WBC UR QL: 3 /HPF — SIGNIFICANT CHANGE UP (ref 0–5)

## 2025-06-10 PROCEDURE — 99222 1ST HOSP IP/OBS MODERATE 55: CPT

## 2025-06-10 PROCEDURE — G0545: CPT

## 2025-06-10 RX ORDER — DIPHENHYDRAMINE HCL 12.5MG/5ML
50 ELIXIR ORAL ONCE
Refills: 0 | Status: COMPLETED | OUTPATIENT
Start: 2025-06-10 | End: 2025-06-10

## 2025-06-10 RX ORDER — HYDROMORPHONE/SOD CHLOR,ISO/PF 2 MG/10 ML
1 SYRINGE (ML) INJECTION EVERY 6 HOURS
Refills: 0 | Status: DISCONTINUED | OUTPATIENT
Start: 2025-06-10 | End: 2025-06-10

## 2025-06-10 RX ORDER — DEXTROSE 50 % IN WATER 50 %
25 SYRINGE (ML) INTRAVENOUS ONCE
Refills: 0 | Status: DISCONTINUED | OUTPATIENT
Start: 2025-06-10 | End: 2025-06-14

## 2025-06-10 RX ORDER — INSULIN LISPRO 100 U/ML
INJECTION, SOLUTION INTRAVENOUS; SUBCUTANEOUS AT BEDTIME
Refills: 0 | Status: DISCONTINUED | OUTPATIENT
Start: 2025-06-10 | End: 2025-06-10

## 2025-06-10 RX ORDER — INSULIN LISPRO 100 U/ML
INJECTION, SOLUTION INTRAVENOUS; SUBCUTANEOUS
Refills: 0 | Status: DISCONTINUED | OUTPATIENT
Start: 2025-06-10 | End: 2025-06-10

## 2025-06-10 RX ORDER — HYDROMORPHONE/SOD CHLOR,ISO/PF 2 MG/10 ML
2 SYRINGE (ML) INJECTION EVERY 4 HOURS
Refills: 0 | Status: DISCONTINUED | OUTPATIENT
Start: 2025-06-10 | End: 2025-06-10

## 2025-06-10 RX ORDER — DIPHENHYDRAMINE HCL 12.5MG/5ML
25 ELIXIR ORAL EVERY 4 HOURS
Refills: 0 | Status: DISCONTINUED | OUTPATIENT
Start: 2025-06-10 | End: 2025-06-10

## 2025-06-10 RX ORDER — DEXTROSE 50 % IN WATER 50 %
25 SYRINGE (ML) INTRAVENOUS ONCE
Refills: 0 | Status: DISCONTINUED | OUTPATIENT
Start: 2025-06-10 | End: 2025-06-10

## 2025-06-10 RX ORDER — METOCLOPRAMIDE HCL 10 MG
10 TABLET ORAL EVERY 6 HOURS
Refills: 0 | Status: DISCONTINUED | OUTPATIENT
Start: 2025-06-10 | End: 2025-06-14

## 2025-06-10 RX ORDER — INSULIN GLARGINE-YFGN 100 [IU]/ML
6 INJECTION, SOLUTION SUBCUTANEOUS AT BEDTIME
Refills: 0 | Status: DISCONTINUED | OUTPATIENT
Start: 2025-06-10 | End: 2025-06-10

## 2025-06-10 RX ORDER — INSULIN GLARGINE-YFGN 100 [IU]/ML
14 INJECTION, SOLUTION SUBCUTANEOUS AT BEDTIME
Refills: 0 | Status: DISCONTINUED | OUTPATIENT
Start: 2025-06-10 | End: 2025-06-10

## 2025-06-10 RX ORDER — HYDROMORPHONE/SOD CHLOR,ISO/PF 2 MG/10 ML
2 SYRINGE (ML) INJECTION EVERY 4 HOURS
Refills: 0 | Status: DISCONTINUED | OUTPATIENT
Start: 2025-06-10 | End: 2025-06-12

## 2025-06-10 RX ORDER — INSULIN LISPRO 100 U/ML
4 INJECTION, SOLUTION INTRAVENOUS; SUBCUTANEOUS
Refills: 0 | Status: DISCONTINUED | OUTPATIENT
Start: 2025-06-10 | End: 2025-06-10

## 2025-06-10 RX ORDER — INSULIN LISPRO 100 U/ML
INJECTION, SOLUTION INTRAVENOUS; SUBCUTANEOUS
Refills: 0 | Status: DISCONTINUED | OUTPATIENT
Start: 2025-06-10 | End: 2025-06-14

## 2025-06-10 RX ORDER — DEXTROSE 50 % IN WATER 50 %
15 SYRINGE (ML) INTRAVENOUS ONCE
Refills: 0 | Status: DISCONTINUED | OUTPATIENT
Start: 2025-06-10 | End: 2025-06-10

## 2025-06-10 RX ORDER — DIPHENHYDRAMINE HCL 12.5MG/5ML
25 ELIXIR ORAL EVERY 4 HOURS
Refills: 0 | Status: COMPLETED | OUTPATIENT
Start: 2025-06-10 | End: 2025-06-12

## 2025-06-10 RX ORDER — DIPHENHYDRAMINE HCL 12.5MG/5ML
25 ELIXIR ORAL EVERY 6 HOURS
Refills: 0 | Status: DISCONTINUED | OUTPATIENT
Start: 2025-06-10 | End: 2025-06-10

## 2025-06-10 RX ORDER — HYDROMORPHONE/SOD CHLOR,ISO/PF 2 MG/10 ML
0.5 SYRINGE (ML) INJECTION EVERY 6 HOURS
Refills: 0 | Status: DISCONTINUED | OUTPATIENT
Start: 2025-06-10 | End: 2025-06-10

## 2025-06-10 RX ORDER — ATORVASTATIN CALCIUM 80 MG/1
20 TABLET, FILM COATED ORAL AT BEDTIME
Refills: 0 | Status: DISCONTINUED | OUTPATIENT
Start: 2025-06-10 | End: 2025-06-14

## 2025-06-10 RX ORDER — ACETAMINOPHEN 500 MG/5ML
1000 LIQUID (ML) ORAL ONCE
Refills: 0 | Status: COMPLETED | OUTPATIENT
Start: 2025-06-10 | End: 2025-06-10

## 2025-06-10 RX ORDER — INSULIN GLARGINE-YFGN 100 [IU]/ML
10 INJECTION, SOLUTION SUBCUTANEOUS AT BEDTIME
Refills: 0 | Status: DISCONTINUED | OUTPATIENT
Start: 2025-06-10 | End: 2025-06-14

## 2025-06-10 RX ORDER — INSULIN LISPRO 100 U/ML
2 INJECTION, SOLUTION INTRAVENOUS; SUBCUTANEOUS
Refills: 0 | Status: DISCONTINUED | OUTPATIENT
Start: 2025-06-10 | End: 2025-06-14

## 2025-06-10 RX ORDER — SODIUM CHLORIDE 9 G/1000ML
1000 INJECTION, SOLUTION INTRAVENOUS
Refills: 0 | Status: DISCONTINUED | OUTPATIENT
Start: 2025-06-10 | End: 2025-06-10

## 2025-06-10 RX ORDER — SODIUM CHLORIDE 9 G/1000ML
1000 INJECTION, SOLUTION INTRAVENOUS ONCE
Refills: 0 | Status: COMPLETED | OUTPATIENT
Start: 2025-06-10 | End: 2025-06-10

## 2025-06-10 RX ORDER — SODIUM CHLORIDE 9 G/1000ML
1000 INJECTION, SOLUTION INTRAVENOUS
Refills: 0 | Status: DISCONTINUED | OUTPATIENT
Start: 2025-06-10 | End: 2025-06-14

## 2025-06-10 RX ORDER — DIPHENHYDRAMINE HCL 12.5MG/5ML
25 ELIXIR ORAL ONCE
Refills: 0 | Status: COMPLETED | OUTPATIENT
Start: 2025-06-10 | End: 2025-06-10

## 2025-06-10 RX ORDER — LABETALOL HYDROCHLORIDE 200 MG/1
10 TABLET, FILM COATED ORAL ONCE
Refills: 0 | Status: COMPLETED | OUTPATIENT
Start: 2025-06-10 | End: 2025-06-10

## 2025-06-10 RX ORDER — GLUCAGON 3 MG/1
1 POWDER NASAL ONCE
Refills: 0 | Status: DISCONTINUED | OUTPATIENT
Start: 2025-06-10 | End: 2025-06-14

## 2025-06-10 RX ORDER — DEXTROSE 50 % IN WATER 50 %
12.5 SYRINGE (ML) INTRAVENOUS ONCE
Refills: 0 | Status: DISCONTINUED | OUTPATIENT
Start: 2025-06-10 | End: 2025-06-14

## 2025-06-10 RX ORDER — HYDROMORPHONE/SOD CHLOR,ISO/PF 2 MG/10 ML
0.2 SYRINGE (ML) INJECTION EVERY 8 HOURS
Refills: 0 | Status: DISCONTINUED | OUTPATIENT
Start: 2025-06-10 | End: 2025-06-10

## 2025-06-10 RX ORDER — CEFTRIAXONE 500 MG/1
1000 INJECTION, POWDER, FOR SOLUTION INTRAMUSCULAR; INTRAVENOUS EVERY 24 HOURS
Refills: 0 | Status: DISCONTINUED | OUTPATIENT
Start: 2025-06-10 | End: 2025-06-12

## 2025-06-10 RX ORDER — BUPRENORPHINE HYDROCHLORIDE, NALOXONE HYDROCHLORIDE 4; 1 MG/1; MG/1
1 FILM, SOLUBLE BUCCAL; SUBLINGUAL DAILY
Refills: 0 | Status: DISCONTINUED | OUTPATIENT
Start: 2025-06-10 | End: 2025-06-14

## 2025-06-10 RX ORDER — DEXTROSE 50 % IN WATER 50 %
15 SYRINGE (ML) INTRAVENOUS ONCE
Refills: 0 | Status: DISCONTINUED | OUTPATIENT
Start: 2025-06-10 | End: 2025-06-14

## 2025-06-10 RX ORDER — DEXTROSE 50 % IN WATER 50 %
12.5 SYRINGE (ML) INTRAVENOUS ONCE
Refills: 0 | Status: DISCONTINUED | OUTPATIENT
Start: 2025-06-10 | End: 2025-06-10

## 2025-06-10 RX ADMIN — Medication 10 MILLIGRAM(S): at 06:04

## 2025-06-10 RX ADMIN — Medication 50 MILLIGRAM(S): at 20:41

## 2025-06-10 RX ADMIN — Medication 25 MILLIGRAM(S): at 18:47

## 2025-06-10 RX ADMIN — INSULIN LISPRO 6: 100 INJECTION, SOLUTION INTRAVENOUS; SUBCUTANEOUS at 08:42

## 2025-06-10 RX ADMIN — INSULIN GLARGINE-YFGN 10 UNIT(S): 100 INJECTION, SOLUTION SUBCUTANEOUS at 22:17

## 2025-06-10 RX ADMIN — Medication 1 PATCH: at 08:22

## 2025-06-10 RX ADMIN — LABETALOL HYDROCHLORIDE 10 MILLIGRAM(S): 200 TABLET, FILM COATED ORAL at 03:33

## 2025-06-10 RX ADMIN — BUPRENORPHINE HYDROCHLORIDE, NALOXONE HYDROCHLORIDE 1 FILM(S): 4; 1 FILM, SOLUBLE BUCCAL; SUBLINGUAL at 11:44

## 2025-06-10 RX ADMIN — SODIUM CHLORIDE 1000 MILLILITER(S): 9 INJECTION, SOLUTION INTRAVENOUS at 09:17

## 2025-06-10 RX ADMIN — Medication 25 MILLIGRAM(S): at 01:00

## 2025-06-10 RX ADMIN — Medication 1 PATCH: at 19:50

## 2025-06-10 RX ADMIN — Medication 25 MILLIGRAM(S): at 15:24

## 2025-06-10 RX ADMIN — Medication 1 MILLIGRAM(S): at 02:23

## 2025-06-10 RX ADMIN — SODIUM CHLORIDE 100 MILLILITER(S): 9 INJECTION, SOLUTION INTRAVENOUS at 02:23

## 2025-06-10 RX ADMIN — INSULIN LISPRO 8: 100 INJECTION, SOLUTION INTRAVENOUS; SUBCUTANEOUS at 12:52

## 2025-06-10 RX ADMIN — Medication 1 PATCH: at 03:33

## 2025-06-10 RX ADMIN — Medication 400 MILLIGRAM(S): at 07:15

## 2025-06-10 RX ADMIN — Medication 2 MILLIGRAM(S): at 16:12

## 2025-06-10 RX ADMIN — Medication 25 MILLIGRAM(S): at 08:50

## 2025-06-10 RX ADMIN — Medication 2 MILLIGRAM(S): at 18:46

## 2025-06-10 RX ADMIN — Medication 2 MILLIGRAM(S): at 15:22

## 2025-06-10 RX ADMIN — Medication 1 MILLIGRAM(S): at 01:01

## 2025-06-10 RX ADMIN — ATORVASTATIN CALCIUM 20 MILLIGRAM(S): 80 TABLET, FILM COATED ORAL at 22:09

## 2025-06-10 RX ADMIN — Medication 2 MILLIGRAM(S): at 19:46

## 2025-06-10 RX ADMIN — SODIUM CHLORIDE 100 MILLILITER(S): 9 INJECTION, SOLUTION INTRAVENOUS at 15:27

## 2025-06-10 RX ADMIN — Medication 2 MILLIGRAM(S): at 08:37

## 2025-06-10 RX ADMIN — SODIUM CHLORIDE 100 MILLILITER(S): 9 INJECTION, SOLUTION INTRAVENOUS at 18:47

## 2025-06-10 RX ADMIN — CEFTRIAXONE 100 MILLIGRAM(S): 500 INJECTION, POWDER, FOR SOLUTION INTRAMUSCULAR; INTRAVENOUS at 18:47

## 2025-06-10 RX ADMIN — INSULIN LISPRO 12: 100 INJECTION, SOLUTION INTRAVENOUS; SUBCUTANEOUS at 10:05

## 2025-06-10 RX ADMIN — Medication 100 MILLIEQUIVALENT(S): at 11:13

## 2025-06-10 RX ADMIN — Medication 40 MILLIGRAM(S): at 07:08

## 2025-06-10 RX ADMIN — Medication 2 MILLIGRAM(S): at 09:37

## 2025-06-11 LAB
A1C WITH ESTIMATED AVERAGE GLUCOSE RESULT: 10.1 % — HIGH (ref 4–5.6)
ANION GAP SERPL CALC-SCNC: 9 MMOL/L — SIGNIFICANT CHANGE UP (ref 5–17)
B-OH-BUTYR SERPL-SCNC: 0.3 MMOL/L — SIGNIFICANT CHANGE UP
BUN SERPL-MCNC: 26 MG/DL — HIGH (ref 7–23)
CALCIUM SERPL-MCNC: 9.1 MG/DL — SIGNIFICANT CHANGE UP (ref 8.5–10.1)
CHLORIDE SERPL-SCNC: 103 MMOL/L — SIGNIFICANT CHANGE UP (ref 96–108)
CO2 SERPL-SCNC: 24 MMOL/L — SIGNIFICANT CHANGE UP (ref 22–31)
CREAT SERPL-MCNC: 1.23 MG/DL — SIGNIFICANT CHANGE UP (ref 0.5–1.3)
EGFR: 59 ML/MIN/1.73M2 — LOW
EGFR: 59 ML/MIN/1.73M2 — LOW
ESTIMATED AVERAGE GLUCOSE: 243 MG/DL — HIGH (ref 68–114)
GLUCOSE BLDC GLUCOMTR-MCNC: 110 MG/DL — HIGH (ref 70–99)
GLUCOSE BLDC GLUCOMTR-MCNC: 119 MG/DL — HIGH (ref 70–99)
GLUCOSE BLDC GLUCOMTR-MCNC: 188 MG/DL — HIGH (ref 70–99)
GLUCOSE BLDC GLUCOMTR-MCNC: 294 MG/DL — HIGH (ref 70–99)
GLUCOSE SERPL-MCNC: 136 MG/DL — HIGH (ref 70–99)
HCT VFR BLD CALC: 36.8 % — SIGNIFICANT CHANGE UP (ref 34.5–45)
HGB BLD-MCNC: 11.4 G/DL — LOW (ref 11.5–15.5)
HIV 1+2 AB+HIV1 P24 AG SERPL QL IA: SIGNIFICANT CHANGE UP
MCHC RBC-ENTMCNC: 21.5 PG — LOW (ref 27–34)
MCHC RBC-ENTMCNC: 31 G/DL — LOW (ref 32–36)
MCV RBC AUTO: 69.4 FL — LOW (ref 80–100)
NRBC BLD AUTO-RTO: 0 /100 WBCS — SIGNIFICANT CHANGE UP (ref 0–0)
PLATELET # BLD AUTO: 364 K/UL — SIGNIFICANT CHANGE UP (ref 150–400)
POTASSIUM SERPL-MCNC: 3.8 MMOL/L — SIGNIFICANT CHANGE UP (ref 3.5–5.3)
POTASSIUM SERPL-SCNC: 3.8 MMOL/L — SIGNIFICANT CHANGE UP (ref 3.5–5.3)
RBC # BLD: 5.3 M/UL — HIGH (ref 3.8–5.2)
RBC # FLD: 15.2 % — HIGH (ref 10.3–14.5)
SODIUM SERPL-SCNC: 136 MMOL/L — SIGNIFICANT CHANGE UP (ref 135–145)
T PALLIDUM AB TITR SER: NEGATIVE — SIGNIFICANT CHANGE UP
WBC # BLD: 13.79 K/UL — HIGH (ref 3.8–10.5)
WBC # FLD AUTO: 13.79 K/UL — HIGH (ref 3.8–10.5)

## 2025-06-11 PROCEDURE — G0545: CPT

## 2025-06-11 PROCEDURE — 99232 SBSQ HOSP IP/OBS MODERATE 35: CPT

## 2025-06-11 PROCEDURE — 99231 SBSQ HOSP IP/OBS SF/LOW 25: CPT

## 2025-06-11 RX ORDER — FLUCONAZOLE 150 MG
150 TABLET ORAL ONCE
Refills: 0 | Status: COMPLETED | OUTPATIENT
Start: 2025-06-11 | End: 2025-06-11

## 2025-06-11 RX ADMIN — Medication 25 MILLIGRAM(S): at 00:03

## 2025-06-11 RX ADMIN — Medication 1 PATCH: at 07:00

## 2025-06-11 RX ADMIN — Medication 2 MILLIGRAM(S): at 23:57

## 2025-06-11 RX ADMIN — Medication 2 MILLIGRAM(S): at 09:05

## 2025-06-11 RX ADMIN — Medication 2 MILLIGRAM(S): at 12:05

## 2025-06-11 RX ADMIN — Medication 1 PATCH: at 19:27

## 2025-06-11 RX ADMIN — CEFTRIAXONE 100 MILLIGRAM(S): 500 INJECTION, POWDER, FOR SOLUTION INTRAMUSCULAR; INTRAVENOUS at 17:22

## 2025-06-11 RX ADMIN — Medication 150 MILLIGRAM(S): at 12:28

## 2025-06-11 RX ADMIN — INSULIN LISPRO 2 UNIT(S): 100 INJECTION, SOLUTION INTRAVENOUS; SUBCUTANEOUS at 08:06

## 2025-06-11 RX ADMIN — Medication 2 MILLIGRAM(S): at 03:57

## 2025-06-11 RX ADMIN — Medication 25 MILLIGRAM(S): at 20:03

## 2025-06-11 RX ADMIN — Medication 2 MILLIGRAM(S): at 20:03

## 2025-06-11 RX ADMIN — ATORVASTATIN CALCIUM 20 MILLIGRAM(S): 80 TABLET, FILM COATED ORAL at 21:41

## 2025-06-11 RX ADMIN — Medication 2 MILLIGRAM(S): at 01:03

## 2025-06-11 RX ADMIN — Medication 25 MILLIGRAM(S): at 03:57

## 2025-06-11 RX ADMIN — INSULIN LISPRO 2 UNIT(S): 100 INJECTION, SOLUTION INTRAVENOUS; SUBCUTANEOUS at 12:27

## 2025-06-11 RX ADMIN — INSULIN LISPRO 3: 100 INJECTION, SOLUTION INTRAVENOUS; SUBCUTANEOUS at 17:18

## 2025-06-11 RX ADMIN — Medication 25 MILLIGRAM(S): at 23:57

## 2025-06-11 RX ADMIN — Medication 2 MILLIGRAM(S): at 04:57

## 2025-06-11 RX ADMIN — INSULIN LISPRO 1: 100 INJECTION, SOLUTION INTRAVENOUS; SUBCUTANEOUS at 08:06

## 2025-06-11 RX ADMIN — Medication 2 MILLIGRAM(S): at 17:12

## 2025-06-11 RX ADMIN — Medication 2 MILLIGRAM(S): at 21:03

## 2025-06-11 RX ADMIN — Medication 25 MILLIGRAM(S): at 16:04

## 2025-06-11 RX ADMIN — Medication 2 MILLIGRAM(S): at 08:05

## 2025-06-11 RX ADMIN — Medication 2 MILLIGRAM(S): at 13:05

## 2025-06-11 RX ADMIN — Medication 40 MILLIGRAM(S): at 16:33

## 2025-06-11 RX ADMIN — BUPRENORPHINE HYDROCHLORIDE, NALOXONE HYDROCHLORIDE 1 FILM(S): 4; 1 FILM, SOLUBLE BUCCAL; SUBLINGUAL at 12:30

## 2025-06-11 RX ADMIN — Medication 25 MILLIGRAM(S): at 12:04

## 2025-06-11 RX ADMIN — Medication 25 MILLIGRAM(S): at 08:04

## 2025-06-11 RX ADMIN — Medication 2 MILLIGRAM(S): at 00:03

## 2025-06-11 RX ADMIN — Medication 2 MILLIGRAM(S): at 16:04

## 2025-06-11 RX ADMIN — INSULIN LISPRO 2 UNIT(S): 100 INJECTION, SOLUTION INTRAVENOUS; SUBCUTANEOUS at 17:19

## 2025-06-11 RX ADMIN — INSULIN GLARGINE-YFGN 10 UNIT(S): 100 INJECTION, SOLUTION SUBCUTANEOUS at 21:46

## 2025-06-12 DIAGNOSIS — Z87.898 PERSONAL HISTORY OF OTHER SPECIFIED CONDITIONS: ICD-10-CM

## 2025-06-12 DIAGNOSIS — F11.20 OPIOID DEPENDENCE, UNCOMPLICATED: ICD-10-CM

## 2025-06-12 DIAGNOSIS — Z86.39 PERSONAL HISTORY OF OTHER ENDOCRINE, NUTRITIONAL AND METABOLIC DISEASE: ICD-10-CM

## 2025-06-12 LAB
C TRACH RRNA SPEC QL NAA+PROBE: SIGNIFICANT CHANGE UP
GLUCOSE BLDC GLUCOMTR-MCNC: 143 MG/DL — HIGH (ref 70–99)
GLUCOSE BLDC GLUCOMTR-MCNC: 346 MG/DL — HIGH (ref 70–99)
GLUCOSE BLDC GLUCOMTR-MCNC: 355 MG/DL — HIGH (ref 70–99)
GLUCOSE BLDC GLUCOMTR-MCNC: 55 MG/DL — LOW (ref 70–99)
GLUCOSE BLDC GLUCOMTR-MCNC: 56 MG/DL — LOW (ref 70–99)
GLUCOSE BLDC GLUCOMTR-MCNC: 99 MG/DL — SIGNIFICANT CHANGE UP (ref 70–99)
N GONORRHOEA RRNA SPEC QL NAA+PROBE: SIGNIFICANT CHANGE UP
SPECIMEN SOURCE: SIGNIFICANT CHANGE UP

## 2025-06-12 PROCEDURE — 99231 SBSQ HOSP IP/OBS SF/LOW 25: CPT

## 2025-06-12 PROCEDURE — 99232 SBSQ HOSP IP/OBS MODERATE 35: CPT

## 2025-06-12 PROCEDURE — 36410 VNPNXR 3YR/> PHY/QHP DX/THER: CPT

## 2025-06-12 PROCEDURE — 76937 US GUIDE VASCULAR ACCESS: CPT | Mod: 26,77

## 2025-06-12 PROCEDURE — 36000 PLACE NEEDLE IN VEIN: CPT

## 2025-06-12 RX ORDER — DIPHENHYDRAMINE HCL 12.5MG/5ML
25 ELIXIR ORAL ONCE
Refills: 0 | Status: COMPLETED | OUTPATIENT
Start: 2025-06-12 | End: 2025-06-12

## 2025-06-12 RX ORDER — AMOXICILLIN AND CLAVULANATE POTASSIUM 500; 125 MG/1; MG/1
1 TABLET, FILM COATED ORAL
Refills: 0 | Status: DISCONTINUED | OUTPATIENT
Start: 2025-06-12 | End: 2025-06-14

## 2025-06-12 RX ORDER — HYDROMORPHONE/SOD CHLOR,ISO/PF 2 MG/10 ML
2 SYRINGE (ML) INJECTION ONCE
Refills: 0 | Status: DISCONTINUED | OUTPATIENT
Start: 2025-06-12 | End: 2025-06-12

## 2025-06-12 RX ORDER — DIPHENHYDRAMINE HCL 12.5MG/5ML
25 ELIXIR ORAL EVERY 6 HOURS
Refills: 0 | Status: DISCONTINUED | OUTPATIENT
Start: 2025-06-12 | End: 2025-06-14

## 2025-06-12 RX ORDER — HYDROMORPHONE/SOD CHLOR,ISO/PF 2 MG/10 ML
2 SYRINGE (ML) INJECTION EVERY 6 HOURS
Refills: 0 | Status: DISCONTINUED | OUTPATIENT
Start: 2025-06-12 | End: 2025-06-14

## 2025-06-12 RX ORDER — DIPHENHYDRAMINE HCL 12.5MG/5ML
25 ELIXIR ORAL EVERY 6 HOURS
Refills: 0 | Status: DISCONTINUED | OUTPATIENT
Start: 2025-06-12 | End: 2025-06-12

## 2025-06-12 RX ADMIN — Medication 2 MILLIGRAM(S): at 09:17

## 2025-06-12 RX ADMIN — INSULIN LISPRO 4: 100 INJECTION, SOLUTION INTRAVENOUS; SUBCUTANEOUS at 16:43

## 2025-06-12 RX ADMIN — Medication 2 MILLIGRAM(S): at 19:17

## 2025-06-12 RX ADMIN — Medication 20 MILLIGRAM(S): at 12:11

## 2025-06-12 RX ADMIN — INSULIN LISPRO 2 UNIT(S): 100 INJECTION, SOLUTION INTRAVENOUS; SUBCUTANEOUS at 16:43

## 2025-06-12 RX ADMIN — Medication 25 MILLIGRAM(S): at 08:16

## 2025-06-12 RX ADMIN — Medication 2 MILLIGRAM(S): at 20:17

## 2025-06-12 RX ADMIN — BUPRENORPHINE HYDROCHLORIDE, NALOXONE HYDROCHLORIDE 1 FILM(S): 4; 1 FILM, SOLUBLE BUCCAL; SUBLINGUAL at 13:37

## 2025-06-12 RX ADMIN — INSULIN GLARGINE-YFGN 10 UNIT(S): 100 INJECTION, SOLUTION SUBCUTANEOUS at 21:37

## 2025-06-12 RX ADMIN — Medication 2 MILLIGRAM(S): at 08:16

## 2025-06-12 RX ADMIN — AMOXICILLIN AND CLAVULANATE POTASSIUM 1 TABLET(S): 500; 125 TABLET, FILM COATED ORAL at 17:56

## 2025-06-12 RX ADMIN — Medication 2 MILLIGRAM(S): at 12:32

## 2025-06-12 RX ADMIN — ATORVASTATIN CALCIUM 20 MILLIGRAM(S): 80 TABLET, FILM COATED ORAL at 21:37

## 2025-06-12 RX ADMIN — INSULIN LISPRO 2 UNIT(S): 100 INJECTION, SOLUTION INTRAVENOUS; SUBCUTANEOUS at 08:18

## 2025-06-12 RX ADMIN — Medication 2 MILLIGRAM(S): at 11:32

## 2025-06-12 RX ADMIN — Medication 2 MILLIGRAM(S): at 00:57

## 2025-06-12 RX ADMIN — Medication 40 MILLIGRAM(S): at 08:16

## 2025-06-12 RX ADMIN — Medication 1 PATCH: at 07:24

## 2025-06-12 RX ADMIN — Medication 25 MILLIGRAM(S): at 11:33

## 2025-06-12 RX ADMIN — Medication 25 MILLIGRAM(S): at 17:57

## 2025-06-12 RX ADMIN — Medication 2 MILLIGRAM(S): at 05:03

## 2025-06-12 RX ADMIN — Medication 2 MILLIGRAM(S): at 04:03

## 2025-06-12 RX ADMIN — Medication 1 PATCH: at 19:43

## 2025-06-12 RX ADMIN — Medication 2 MILLIGRAM(S): at 18:57

## 2025-06-12 RX ADMIN — SODIUM CHLORIDE 100 MILLILITER(S): 9 INJECTION, SOLUTION INTRAVENOUS at 04:57

## 2025-06-12 RX ADMIN — Medication 2 MILLIGRAM(S): at 17:57

## 2025-06-12 RX ADMIN — Medication 25 MILLIGRAM(S): at 19:17

## 2025-06-12 RX ADMIN — Medication 25 MILLIGRAM(S): at 04:03

## 2025-06-12 RX ADMIN — INSULIN LISPRO 5: 100 INJECTION, SOLUTION INTRAVENOUS; SUBCUTANEOUS at 08:17

## 2025-06-13 ENCOUNTER — TRANSCRIPTION ENCOUNTER (OUTPATIENT)
Age: 35
End: 2025-06-13

## 2025-06-13 LAB
ANION GAP SERPL CALC-SCNC: 9 MMOL/L — SIGNIFICANT CHANGE UP (ref 5–17)
BUN SERPL-MCNC: 12 MG/DL — SIGNIFICANT CHANGE UP (ref 7–23)
CALCIUM SERPL-MCNC: 8.4 MG/DL — LOW (ref 8.5–10.1)
CHLORIDE SERPL-SCNC: 102 MMOL/L — SIGNIFICANT CHANGE UP (ref 96–108)
CO2 SERPL-SCNC: 26 MMOL/L — SIGNIFICANT CHANGE UP (ref 22–31)
CREAT SERPL-MCNC: 1.15 MG/DL — SIGNIFICANT CHANGE UP (ref 0.5–1.3)
EGFR: 64 ML/MIN/1.73M2 — SIGNIFICANT CHANGE UP
EGFR: 64 ML/MIN/1.73M2 — SIGNIFICANT CHANGE UP
GLUCOSE BLDC GLUCOMTR-MCNC: 104 MG/DL — HIGH (ref 70–99)
GLUCOSE BLDC GLUCOMTR-MCNC: 106 MG/DL — HIGH (ref 70–99)
GLUCOSE BLDC GLUCOMTR-MCNC: 190 MG/DL — HIGH (ref 70–99)
GLUCOSE BLDC GLUCOMTR-MCNC: 214 MG/DL — HIGH (ref 70–99)
GLUCOSE BLDC GLUCOMTR-MCNC: 30 MG/DL — CRITICAL LOW (ref 70–99)
GLUCOSE BLDC GLUCOMTR-MCNC: 31 MG/DL — CRITICAL LOW (ref 70–99)
GLUCOSE BLDC GLUCOMTR-MCNC: 322 MG/DL — HIGH (ref 70–99)
GLUCOSE BLDC GLUCOMTR-MCNC: 373 MG/DL — HIGH (ref 70–99)
GLUCOSE SERPL-MCNC: 130 MG/DL — HIGH (ref 70–99)
HCT VFR BLD CALC: 34.6 % — SIGNIFICANT CHANGE UP (ref 34.5–45)
HGB BLD-MCNC: 10.6 G/DL — LOW (ref 11.5–15.5)
MAGNESIUM SERPL-MCNC: 2.1 MG/DL — SIGNIFICANT CHANGE UP (ref 1.6–2.6)
MCHC RBC-ENTMCNC: 21.8 PG — LOW (ref 27–34)
MCHC RBC-ENTMCNC: 30.6 G/DL — LOW (ref 32–36)
MCV RBC AUTO: 71.2 FL — LOW (ref 80–100)
NRBC BLD AUTO-RTO: 0 /100 WBCS — SIGNIFICANT CHANGE UP (ref 0–0)
PHOSPHATE SERPL-MCNC: 2.7 MG/DL — SIGNIFICANT CHANGE UP (ref 2.5–4.5)
PLATELET # BLD AUTO: 344 K/UL — SIGNIFICANT CHANGE UP (ref 150–400)
POTASSIUM SERPL-MCNC: 3.7 MMOL/L — SIGNIFICANT CHANGE UP (ref 3.5–5.3)
POTASSIUM SERPL-SCNC: 3.7 MMOL/L — SIGNIFICANT CHANGE UP (ref 3.5–5.3)
RBC # BLD: 4.86 M/UL — SIGNIFICANT CHANGE UP (ref 3.8–5.2)
RBC # FLD: 15.5 % — HIGH (ref 10.3–14.5)
SODIUM SERPL-SCNC: 137 MMOL/L — SIGNIFICANT CHANGE UP (ref 135–145)
WBC # BLD: 11.85 K/UL — HIGH (ref 3.8–10.5)
WBC # FLD AUTO: 11.85 K/UL — HIGH (ref 3.8–10.5)

## 2025-06-13 PROCEDURE — 99231 SBSQ HOSP IP/OBS SF/LOW 25: CPT

## 2025-06-13 PROCEDURE — 99232 SBSQ HOSP IP/OBS MODERATE 35: CPT

## 2025-06-13 PROCEDURE — 99291 CRITICAL CARE FIRST HOUR: CPT

## 2025-06-13 RX ORDER — BUPRENORPHINE HYDROCHLORIDE, NALOXONE HYDROCHLORIDE 4; 1 MG/1; MG/1
0 FILM, SOLUBLE BUCCAL; SUBLINGUAL
Qty: 0 | Refills: 0 | DISCHARGE

## 2025-06-13 RX ORDER — DEXTROSE 50 % IN WATER 50 %
25 SYRINGE (ML) INTRAVENOUS ONCE
Refills: 0 | Status: COMPLETED | OUTPATIENT
Start: 2025-06-13 | End: 2025-06-13

## 2025-06-13 RX ORDER — BUPRENORPHINE HYDROCHLORIDE, NALOXONE HYDROCHLORIDE 4; 1 MG/1; MG/1
1 FILM, SOLUBLE BUCCAL; SUBLINGUAL
Qty: 1 | Refills: 0
Start: 2025-06-13 | End: 2025-06-22

## 2025-06-13 RX ADMIN — AMOXICILLIN AND CLAVULANATE POTASSIUM 1 TABLET(S): 500; 125 TABLET, FILM COATED ORAL at 18:05

## 2025-06-13 RX ADMIN — INSULIN LISPRO 1: 100 INJECTION, SOLUTION INTRAVENOUS; SUBCUTANEOUS at 08:27

## 2025-06-13 RX ADMIN — Medication 20 MILLIGRAM(S): at 12:11

## 2025-06-13 RX ADMIN — Medication 2 MILLIGRAM(S): at 12:11

## 2025-06-13 RX ADMIN — BUPRENORPHINE HYDROCHLORIDE, NALOXONE HYDROCHLORIDE 1 FILM(S): 4; 1 FILM, SOLUBLE BUCCAL; SUBLINGUAL at 14:29

## 2025-06-13 RX ADMIN — Medication 2 MILLIGRAM(S): at 01:01

## 2025-06-13 RX ADMIN — Medication 25 MILLIGRAM(S): at 06:04

## 2025-06-13 RX ADMIN — Medication 2 MILLIGRAM(S): at 07:04

## 2025-06-13 RX ADMIN — Medication 25 GRAM(S): at 09:20

## 2025-06-13 RX ADMIN — Medication 1 PATCH: at 19:53

## 2025-06-13 RX ADMIN — Medication 40 MILLIGRAM(S): at 08:28

## 2025-06-13 RX ADMIN — Medication 25 MILLIGRAM(S): at 00:01

## 2025-06-13 RX ADMIN — Medication 1 PATCH: at 08:19

## 2025-06-13 RX ADMIN — Medication 2 MILLIGRAM(S): at 19:05

## 2025-06-13 RX ADMIN — AMOXICILLIN AND CLAVULANATE POTASSIUM 1 TABLET(S): 500; 125 TABLET, FILM COATED ORAL at 06:04

## 2025-06-13 RX ADMIN — INSULIN LISPRO 2: 100 INJECTION, SOLUTION INTRAVENOUS; SUBCUTANEOUS at 17:19

## 2025-06-13 RX ADMIN — INSULIN LISPRO 2 UNIT(S): 100 INJECTION, SOLUTION INTRAVENOUS; SUBCUTANEOUS at 08:28

## 2025-06-13 RX ADMIN — Medication 25 MILLIGRAM(S): at 12:11

## 2025-06-13 RX ADMIN — INSULIN LISPRO 2 UNIT(S): 100 INJECTION, SOLUTION INTRAVENOUS; SUBCUTANEOUS at 17:19

## 2025-06-13 RX ADMIN — Medication 2 MILLIGRAM(S): at 06:04

## 2025-06-13 RX ADMIN — ATORVASTATIN CALCIUM 20 MILLIGRAM(S): 80 TABLET, FILM COATED ORAL at 21:47

## 2025-06-13 RX ADMIN — Medication 2 MILLIGRAM(S): at 18:05

## 2025-06-13 RX ADMIN — Medication 2 MILLIGRAM(S): at 13:11

## 2025-06-13 RX ADMIN — Medication 25 MILLIGRAM(S): at 18:05

## 2025-06-13 RX ADMIN — INSULIN GLARGINE-YFGN 10 UNIT(S): 100 INJECTION, SOLUTION SUBCUTANEOUS at 21:47

## 2025-06-13 RX ADMIN — Medication 2 MILLIGRAM(S): at 00:01

## 2025-06-14 ENCOUNTER — TRANSCRIPTION ENCOUNTER (OUTPATIENT)
Age: 35
End: 2025-06-14

## 2025-06-14 VITALS
DIASTOLIC BLOOD PRESSURE: 92 MMHG | OXYGEN SATURATION: 100 % | HEART RATE: 85 BPM | SYSTOLIC BLOOD PRESSURE: 142 MMHG | RESPIRATION RATE: 18 BRPM | TEMPERATURE: 98 F

## 2025-06-14 LAB
GLUCOSE BLDC GLUCOMTR-MCNC: 140 MG/DL — HIGH (ref 70–99)
GLUCOSE BLDC GLUCOMTR-MCNC: 312 MG/DL — HIGH (ref 70–99)
GLUCOSE BLDC GLUCOMTR-MCNC: 65 MG/DL — LOW (ref 70–99)
GLUCOSE BLDC GLUCOMTR-MCNC: 68 MG/DL — LOW (ref 70–99)
GLUCOSE BLDC GLUCOMTR-MCNC: 93 MG/DL — SIGNIFICANT CHANGE UP (ref 70–99)

## 2025-06-14 PROCEDURE — 99239 HOSP IP/OBS DSCHRG MGMT >30: CPT

## 2025-06-14 RX ORDER — INSULIN LISPRO 100 U/ML
0 INJECTION, SOLUTION INTRAVENOUS; SUBCUTANEOUS
Qty: 0 | Refills: 0 | DISCHARGE

## 2025-06-14 RX ORDER — ATORVASTATIN CALCIUM 80 MG/1
1 TABLET, FILM COATED ORAL
Qty: 0 | Refills: 0 | DISCHARGE

## 2025-06-14 RX ORDER — AMOXICILLIN AND CLAVULANATE POTASSIUM 500; 125 MG/1; MG/1
1 TABLET, FILM COATED ORAL
Qty: 6 | Refills: 0
Start: 2025-06-14

## 2025-06-14 RX ADMIN — BUPRENORPHINE HYDROCHLORIDE, NALOXONE HYDROCHLORIDE 1 FILM(S): 4; 1 FILM, SOLUBLE BUCCAL; SUBLINGUAL at 16:08

## 2025-06-14 RX ADMIN — Medication 2 MILLIGRAM(S): at 06:12

## 2025-06-14 RX ADMIN — Medication 2 MILLIGRAM(S): at 00:09

## 2025-06-14 RX ADMIN — Medication 25 MILLIGRAM(S): at 12:02

## 2025-06-14 RX ADMIN — Medication 20 MILLIGRAM(S): at 12:02

## 2025-06-14 RX ADMIN — AMOXICILLIN AND CLAVULANATE POTASSIUM 1 TABLET(S): 500; 125 TABLET, FILM COATED ORAL at 18:38

## 2025-06-14 RX ADMIN — Medication 2 MILLIGRAM(S): at 01:09

## 2025-06-14 RX ADMIN — INSULIN LISPRO 4: 100 INJECTION, SOLUTION INTRAVENOUS; SUBCUTANEOUS at 16:30

## 2025-06-14 RX ADMIN — Medication 2 MILLIGRAM(S): at 12:02

## 2025-06-14 RX ADMIN — Medication 25 MILLIGRAM(S): at 18:38

## 2025-06-14 RX ADMIN — INSULIN LISPRO 2 UNIT(S): 100 INJECTION, SOLUTION INTRAVENOUS; SUBCUTANEOUS at 16:39

## 2025-06-14 RX ADMIN — Medication 25 MILLIGRAM(S): at 00:08

## 2025-06-14 RX ADMIN — Medication 2 MILLIGRAM(S): at 18:37

## 2025-06-14 RX ADMIN — Medication 25 MILLIGRAM(S): at 06:12

## 2025-06-14 RX ADMIN — AMOXICILLIN AND CLAVULANATE POTASSIUM 1 TABLET(S): 500; 125 TABLET, FILM COATED ORAL at 05:37

## 2025-06-15 LAB
CULTURE RESULTS: SIGNIFICANT CHANGE UP
SPECIMEN SOURCE: SIGNIFICANT CHANGE UP

## 2025-06-16 ENCOUNTER — INPATIENT (INPATIENT)
Facility: HOSPITAL | Age: 35
LOS: 0 days | Discharge: LEFT BEFORE DISCHARGE | End: 2025-06-16
Attending: INTERNAL MEDICINE | Admitting: INTERNAL MEDICINE
Payer: COMMERCIAL

## 2025-06-16 VITALS
DIASTOLIC BLOOD PRESSURE: 96 MMHG | HEART RATE: 111 BPM | OXYGEN SATURATION: 98 % | TEMPERATURE: 98 F | SYSTOLIC BLOOD PRESSURE: 178 MMHG | RESPIRATION RATE: 20 BRPM

## 2025-06-16 VITALS
OXYGEN SATURATION: 98 % | HEIGHT: 66 IN | HEART RATE: 132 BPM | WEIGHT: 139.99 LBS | SYSTOLIC BLOOD PRESSURE: 169 MMHG | RESPIRATION RATE: 17 BRPM | TEMPERATURE: 100 F | DIASTOLIC BLOOD PRESSURE: 98 MMHG

## 2025-06-16 DIAGNOSIS — N17.9 ACUTE KIDNEY FAILURE, UNSPECIFIED: ICD-10-CM

## 2025-06-16 DIAGNOSIS — R93.89 ABNORMAL FINDINGS ON DIAGNOSTIC IMAGING OF OTHER SPECIFIED BODY STRUCTURES: ICD-10-CM

## 2025-06-16 DIAGNOSIS — Z90.49 ACQUIRED ABSENCE OF OTHER SPECIFIED PARTS OF DIGESTIVE TRACT: Chronic | ICD-10-CM

## 2025-06-16 DIAGNOSIS — O00.9 ECTOPIC PREGNANCY, UNSPECIFIED: Chronic | ICD-10-CM

## 2025-06-16 DIAGNOSIS — D72.829 ELEVATED WHITE BLOOD CELL COUNT, UNSPECIFIED: ICD-10-CM

## 2025-06-16 DIAGNOSIS — F11.20 OPIOID DEPENDENCE, UNCOMPLICATED: ICD-10-CM

## 2025-06-16 DIAGNOSIS — R11.2 NAUSEA WITH VOMITING, UNSPECIFIED: ICD-10-CM

## 2025-06-16 DIAGNOSIS — E10.65 TYPE 1 DIABETES MELLITUS WITH HYPERGLYCEMIA: ICD-10-CM

## 2025-06-16 LAB
ALBUMIN SERPL ELPH-MCNC: 3.7 G/DL — SIGNIFICANT CHANGE UP (ref 3.3–5)
ALP SERPL-CCNC: 136 U/L — HIGH (ref 40–120)
ALT FLD-CCNC: 35 U/L — SIGNIFICANT CHANGE UP (ref 12–78)
ANION GAP SERPL CALC-SCNC: 12 MMOL/L — SIGNIFICANT CHANGE UP (ref 5–17)
APPEARANCE UR: CLEAR — SIGNIFICANT CHANGE UP
AST SERPL-CCNC: 34 U/L — SIGNIFICANT CHANGE UP (ref 15–37)
BASE EXCESS BLDV CALC-SCNC: -1.3 MMOL/L — SIGNIFICANT CHANGE UP (ref -2–3)
BASOPHILS # BLD AUTO: 0.05 K/UL — SIGNIFICANT CHANGE UP (ref 0–0.2)
BASOPHILS NFR BLD AUTO: 0.3 % — SIGNIFICANT CHANGE UP (ref 0–2)
BILIRUB SERPL-MCNC: 1 MG/DL — SIGNIFICANT CHANGE UP (ref 0.2–1.2)
BILIRUB UR-MCNC: NEGATIVE — SIGNIFICANT CHANGE UP
BLOOD GAS COMMENTS, VENOUS: SIGNIFICANT CHANGE UP
BUN SERPL-MCNC: 29 MG/DL — HIGH (ref 7–23)
CALCIUM SERPL-MCNC: 10 MG/DL — SIGNIFICANT CHANGE UP (ref 8.5–10.1)
CHLORIDE SERPL-SCNC: 94 MMOL/L — LOW (ref 96–108)
CO2 BLDV-SCNC: 26 MMOL/L — SIGNIFICANT CHANGE UP (ref 22–26)
CO2 SERPL-SCNC: 24 MMOL/L — SIGNIFICANT CHANGE UP (ref 22–31)
COLOR SPEC: YELLOW — SIGNIFICANT CHANGE UP
CREAT SERPL-MCNC: 1.83 MG/DL — HIGH (ref 0.5–1.3)
CULTURE RESULTS: SIGNIFICANT CHANGE UP
CULTURE RESULTS: SIGNIFICANT CHANGE UP
DIFF PNL FLD: ABNORMAL
EGFR: 36 ML/MIN/1.73M2 — LOW
EGFR: 36 ML/MIN/1.73M2 — LOW
EOSINOPHIL # BLD AUTO: 0.05 K/UL — SIGNIFICANT CHANGE UP (ref 0–0.5)
EOSINOPHIL NFR BLD AUTO: 0.3 % — SIGNIFICANT CHANGE UP (ref 0–6)
FLUAV AG NPH QL: SIGNIFICANT CHANGE UP
FLUBV AG NPH QL: SIGNIFICANT CHANGE UP
GAS PNL BLDV: SIGNIFICANT CHANGE UP
GLUCOSE BLDC GLUCOMTR-MCNC: 385 MG/DL — HIGH (ref 70–99)
GLUCOSE SERPL-MCNC: 631 MG/DL — CRITICAL HIGH (ref 70–99)
GLUCOSE UR QL: >=1000 MG/DL
HCG SERPL-ACNC: <1 MIU/ML — SIGNIFICANT CHANGE UP
HCO3 BLDV-SCNC: 24 MMOL/L — SIGNIFICANT CHANGE UP (ref 22–28)
HCT VFR BLD CALC: 41.4 % — SIGNIFICANT CHANGE UP (ref 34.5–45)
HGB BLD-MCNC: 12.9 G/DL — SIGNIFICANT CHANGE UP (ref 11.5–15.5)
IMM GRANULOCYTES NFR BLD AUTO: 0.3 % — SIGNIFICANT CHANGE UP (ref 0–0.9)
KETONES UR QL: 15 MG/DL
LACTATE SERPL-SCNC: 1.9 MMOL/L — SIGNIFICANT CHANGE UP (ref 0.7–2)
LACTATE SERPL-SCNC: 4.9 MMOL/L — CRITICAL HIGH (ref 0.7–2)
LEUKOCYTE ESTERASE UR-ACNC: NEGATIVE — SIGNIFICANT CHANGE UP
LIDOCAIN IGE QN: 14 U/L — SIGNIFICANT CHANGE UP (ref 13–75)
LYMPHOCYTES # BLD AUTO: 16.8 % — SIGNIFICANT CHANGE UP (ref 13–44)
LYMPHOCYTES # BLD AUTO: 2.4 K/UL — SIGNIFICANT CHANGE UP (ref 1–3.3)
MACROCYTES BLD QL: SLIGHT — SIGNIFICANT CHANGE UP
MANUAL SMEAR VERIFICATION: SIGNIFICANT CHANGE UP
MCHC RBC-ENTMCNC: 21 PG — LOW (ref 27–34)
MCHC RBC-ENTMCNC: 31.2 G/DL — LOW (ref 32–36)
MCV RBC AUTO: 67.4 FL — LOW (ref 80–100)
MONOCYTES # BLD AUTO: 1.27 K/UL — HIGH (ref 0–0.9)
MONOCYTES NFR BLD AUTO: 8.9 % — SIGNIFICANT CHANGE UP (ref 2–14)
NEUTROPHILS # BLD AUTO: 10.5 K/UL — HIGH (ref 1.8–7.4)
NEUTROPHILS NFR BLD AUTO: 73.4 % — SIGNIFICANT CHANGE UP (ref 43–77)
NITRITE UR-MCNC: NEGATIVE — SIGNIFICANT CHANGE UP
NRBC BLD AUTO-RTO: 0 /100 WBCS — SIGNIFICANT CHANGE UP (ref 0–0)
PCO2 BLDV: 43 MMHG — SIGNIFICANT CHANGE UP (ref 42–55)
PH BLDV: 7.36 — SIGNIFICANT CHANGE UP (ref 7.32–7.43)
PH UR: 6 — SIGNIFICANT CHANGE UP (ref 5–8)
PLAT MORPH BLD: NORMAL — SIGNIFICANT CHANGE UP
PLATELET # BLD AUTO: 451 K/UL — HIGH (ref 150–400)
PO2 BLDV: 27 MMHG — SIGNIFICANT CHANGE UP (ref 25–45)
POIKILOCYTOSIS BLD QL AUTO: SLIGHT — SIGNIFICANT CHANGE UP
POTASSIUM SERPL-MCNC: 4.8 MMOL/L — SIGNIFICANT CHANGE UP (ref 3.5–5.3)
POTASSIUM SERPL-SCNC: 4.8 MMOL/L — SIGNIFICANT CHANGE UP (ref 3.5–5.3)
PROT SERPL-MCNC: 8.6 GM/DL — HIGH (ref 6–8.3)
PROT UR-MCNC: 100 MG/DL
RBC # BLD: 6.14 M/UL — HIGH (ref 3.8–5.2)
RBC # FLD: 15.2 % — HIGH (ref 10.3–14.5)
RBC BLD AUTO: ABNORMAL
RBC CASTS # UR COMP ASSIST: 5 /HPF — HIGH (ref 0–4)
RSV RNA NPH QL NAA+NON-PROBE: SIGNIFICANT CHANGE UP
SAO2 % BLDV: 37.9 % — LOW (ref 94–98)
SARS-COV-2 RNA SPEC QL NAA+PROBE: SIGNIFICANT CHANGE UP
SODIUM SERPL-SCNC: 130 MMOL/L — LOW (ref 135–145)
SOURCE RESPIRATORY: SIGNIFICANT CHANGE UP
SP GR SPEC: >1.03 — HIGH (ref 1–1.03)
SPECIMEN SOURCE: SIGNIFICANT CHANGE UP
SPECIMEN SOURCE: SIGNIFICANT CHANGE UP
UROBILINOGEN FLD QL: 0.2 MG/DL — SIGNIFICANT CHANGE UP (ref 0.2–1)
WBC # BLD: 14.31 K/UL — HIGH (ref 3.8–10.5)
WBC # FLD AUTO: 14.31 K/UL — HIGH (ref 3.8–10.5)
WBC UR QL: 2 /HPF — SIGNIFICANT CHANGE UP (ref 0–5)

## 2025-06-16 PROCEDURE — 99291 CRITICAL CARE FIRST HOUR: CPT

## 2025-06-16 PROCEDURE — 71250 CT THORAX DX C-: CPT | Mod: 26

## 2025-06-16 PROCEDURE — 99222 1ST HOSP IP/OBS MODERATE 55: CPT

## 2025-06-16 PROCEDURE — 93010 ELECTROCARDIOGRAM REPORT: CPT

## 2025-06-16 PROCEDURE — 71045 X-RAY EXAM CHEST 1 VIEW: CPT | Mod: 26

## 2025-06-16 PROCEDURE — 99223 1ST HOSP IP/OBS HIGH 75: CPT

## 2025-06-16 RX ORDER — HYDROMORPHONE/SOD CHLOR,ISO/PF 2 MG/10 ML
2 SYRINGE (ML) INJECTION EVERY 4 HOURS
Refills: 0 | Status: DISCONTINUED | OUTPATIENT
Start: 2025-06-16 | End: 2025-06-16

## 2025-06-16 RX ORDER — DEXTROSE 50 % IN WATER 50 %
12.5 SYRINGE (ML) INTRAVENOUS ONCE
Refills: 0 | Status: DISCONTINUED | OUTPATIENT
Start: 2025-06-16 | End: 2025-06-16

## 2025-06-16 RX ORDER — HYDROMORPHONE/SOD CHLOR,ISO/PF 2 MG/10 ML
1 SYRINGE (ML) INJECTION ONCE
Refills: 0 | Status: DISCONTINUED | OUTPATIENT
Start: 2025-06-16 | End: 2025-06-16

## 2025-06-16 RX ORDER — DEXTROSE 50 % IN WATER 50 %
25 SYRINGE (ML) INTRAVENOUS ONCE
Refills: 0 | Status: DISCONTINUED | OUTPATIENT
Start: 2025-06-16 | End: 2025-06-16

## 2025-06-16 RX ORDER — SODIUM CHLORIDE 9 G/1000ML
1000 INJECTION, SOLUTION INTRAVENOUS
Refills: 0 | Status: DISCONTINUED | OUTPATIENT
Start: 2025-06-16 | End: 2025-06-16

## 2025-06-16 RX ORDER — INSULIN GLARGINE-YFGN 100 [IU]/ML
8 INJECTION, SOLUTION SUBCUTANEOUS AT BEDTIME
Refills: 0 | Status: DISCONTINUED | OUTPATIENT
Start: 2025-06-16 | End: 2025-06-16

## 2025-06-16 RX ORDER — DIPHENHYDRAMINE HCL 12.5MG/5ML
25 ELIXIR ORAL ONCE
Refills: 0 | Status: COMPLETED | OUTPATIENT
Start: 2025-06-16 | End: 2025-06-16

## 2025-06-16 RX ORDER — MAGNESIUM, ALUMINUM HYDROXIDE 200-200 MG
30 TABLET,CHEWABLE ORAL EVERY 4 HOURS
Refills: 0 | Status: DISCONTINUED | OUTPATIENT
Start: 2025-06-16 | End: 2025-06-16

## 2025-06-16 RX ORDER — DEXTROSE 50 % IN WATER 50 %
15 SYRINGE (ML) INTRAVENOUS ONCE
Refills: 0 | Status: DISCONTINUED | OUTPATIENT
Start: 2025-06-16 | End: 2025-06-16

## 2025-06-16 RX ORDER — INSULIN LISPRO 100 U/ML
2 INJECTION, SOLUTION INTRAVENOUS; SUBCUTANEOUS
Refills: 0 | Status: DISCONTINUED | OUTPATIENT
Start: 2025-06-16 | End: 2025-06-16

## 2025-06-16 RX ORDER — INSULIN LISPRO 100 U/ML
INJECTION, SOLUTION INTRAVENOUS; SUBCUTANEOUS AT BEDTIME
Refills: 0 | Status: DISCONTINUED | OUTPATIENT
Start: 2025-06-16 | End: 2025-06-16

## 2025-06-16 RX ORDER — DIPHENHYDRAMINE HCL 12.5MG/5ML
25 ELIXIR ORAL EVERY 4 HOURS
Refills: 0 | Status: DISCONTINUED | OUTPATIENT
Start: 2025-06-16 | End: 2025-06-16

## 2025-06-16 RX ORDER — METOCLOPRAMIDE HCL 10 MG
10 TABLET ORAL EVERY 8 HOURS
Refills: 0 | Status: DISCONTINUED | OUTPATIENT
Start: 2025-06-16 | End: 2025-06-16

## 2025-06-16 RX ORDER — INSULIN LISPRO 100 U/ML
INJECTION, SOLUTION INTRAVENOUS; SUBCUTANEOUS
Refills: 0 | Status: DISCONTINUED | OUTPATIENT
Start: 2025-06-16 | End: 2025-06-16

## 2025-06-16 RX ORDER — MELATONIN 5 MG
3 TABLET ORAL AT BEDTIME
Refills: 0 | Status: DISCONTINUED | OUTPATIENT
Start: 2025-06-16 | End: 2025-06-16

## 2025-06-16 RX ORDER — GLUCAGON 3 MG/1
1 POWDER NASAL ONCE
Refills: 0 | Status: DISCONTINUED | OUTPATIENT
Start: 2025-06-16 | End: 2025-06-16

## 2025-06-16 RX ORDER — BUPRENORPHINE HYDROCHLORIDE, NALOXONE HYDROCHLORIDE 4; 1 MG/1; MG/1
1 FILM, SOLUBLE BUCCAL; SUBLINGUAL DAILY
Refills: 0 | Status: DISCONTINUED | OUTPATIENT
Start: 2025-06-16 | End: 2025-06-16

## 2025-06-16 RX ORDER — AMOXICILLIN AND CLAVULANATE POTASSIUM 500; 125 MG/1; MG/1
1 TABLET, FILM COATED ORAL
Refills: 0 | Status: DISCONTINUED | OUTPATIENT
Start: 2025-06-16 | End: 2025-06-16

## 2025-06-16 RX ORDER — METOCLOPRAMIDE HCL 10 MG
10 TABLET ORAL ONCE
Refills: 0 | Status: COMPLETED | OUTPATIENT
Start: 2025-06-16 | End: 2025-06-16

## 2025-06-16 RX ORDER — OXYCODONE HYDROCHLORIDE 30 MG/1
10 TABLET ORAL EVERY 6 HOURS
Refills: 0 | Status: DISCONTINUED | OUTPATIENT
Start: 2025-06-16 | End: 2025-06-16

## 2025-06-16 RX ORDER — ACETAMINOPHEN 500 MG/5ML
650 LIQUID (ML) ORAL EVERY 6 HOURS
Refills: 0 | Status: DISCONTINUED | OUTPATIENT
Start: 2025-06-16 | End: 2025-06-16

## 2025-06-16 RX ORDER — ATORVASTATIN CALCIUM 80 MG/1
20 TABLET, FILM COATED ORAL AT BEDTIME
Refills: 0 | Status: DISCONTINUED | OUTPATIENT
Start: 2025-06-16 | End: 2025-06-16

## 2025-06-16 RX ADMIN — Medication 25 MILLIGRAM(S): at 13:01

## 2025-06-16 RX ADMIN — Medication 1 MILLIGRAM(S): at 13:28

## 2025-06-16 RX ADMIN — Medication 10 MILLIGRAM(S): at 16:45

## 2025-06-16 RX ADMIN — Medication 7 UNIT(S): at 10:38

## 2025-06-16 RX ADMIN — Medication 2 MILLIGRAM(S): at 17:08

## 2025-06-16 RX ADMIN — Medication 3000 MILLILITER(S): at 08:46

## 2025-06-16 RX ADMIN — Medication 10 MILLIGRAM(S): at 08:39

## 2025-06-16 RX ADMIN — Medication 1 MILLIGRAM(S): at 09:41

## 2025-06-16 RX ADMIN — Medication 25 MILLIGRAM(S): at 10:17

## 2025-06-16 RX ADMIN — Medication 1 MILLIGRAM(S): at 11:05

## 2025-06-16 RX ADMIN — Medication 25 MILLIGRAM(S): at 08:40

## 2025-06-16 RX ADMIN — Medication 1 MILLIGRAM(S): at 10:17

## 2025-06-16 RX ADMIN — Medication 25 MILLIGRAM(S): at 17:08

## 2025-06-16 RX ADMIN — Medication 40 MILLIGRAM(S): at 12:14

## 2025-06-16 RX ADMIN — Medication 1 MILLIGRAM(S): at 13:03

## 2025-06-16 RX ADMIN — Medication 1 MILLIGRAM(S): at 08:41

## 2025-06-16 RX ADMIN — BUPRENORPHINE HYDROCHLORIDE, NALOXONE HYDROCHLORIDE 1 FILM(S): 4; 1 FILM, SOLUBLE BUCCAL; SUBLINGUAL at 14:12

## 2025-06-16 RX ADMIN — AMOXICILLIN AND CLAVULANATE POTASSIUM 1 TABLET(S): 500; 125 TABLET, FILM COATED ORAL at 15:56

## 2025-06-16 RX ADMIN — INSULIN LISPRO 5: 100 INJECTION, SOLUTION INTRAVENOUS; SUBCUTANEOUS at 17:55

## 2025-06-16 RX ADMIN — INSULIN LISPRO 2 UNIT(S): 100 INJECTION, SOLUTION INTRAVENOUS; SUBCUTANEOUS at 17:56

## 2025-06-16 NOTE — ED PROVIDER NOTE - OBJECTIVE STATEMENT
35-year-old female with history of type 1 diabetes, with gastroparesis and previous opioid dependence on Suboxone presenting to the ED due to lower abdominal pain with associated nausea vomiting and was recently hospitalized for a week and was discharged 2 to 3 days ago.

## 2025-06-16 NOTE — H&P ADULT - NSHPPHYSICALEXAM_GEN_ALL_CORE
CONSTITUTIONAL: alert and cooperative, no acute distress  EYES: PERRL, no scleral icterus  ENT: Mucosa moist, tongue normal  NECK: Neck supple, trachea midline, non-tender  CARDIAC: Normal S1 and S2. Regular rate and rhythms. No Pedal edema  LUNGS: Equal air entry both lungs. No rales, rhonchi, wheezing. Normal respiratory effort.   ABDOMEN: Epigastric tenderness. No guarding or rebound tenderness. No hepatomegaly or splenomegaly. Bowel sound normal.   MUSCULOSKELETAL: Normocephalic, atraumatic. No significant deformity or joint abnormality.  NEUROLOGICAL: No gross motor or sensory deficits.  SKIN: no lesions or eruptions. Normal turgor  PSYCHIATRIC: A&O x 3, appropriate mood and affect

## 2025-06-16 NOTE — H&P ADULT - PROBLEM SELECTOR PLAN 5
WBC 14.31, seen by ID during recent admission. Cultures and HIV negative  To compete 2 more days of Augmentin  Trend WBC count

## 2025-06-16 NOTE — BH CONSULTATION LIAISON ASSESSMENT NOTE - NSBHCHARTREVIEWVS_PSY_A_CORE FT
Vital Signs Last 24 Hrs  T(C): 36.9 (16 Jun 2025 16:24), Max: 37.7 (16 Jun 2025 07:49)  T(F): 98.4 (16 Jun 2025 16:24), Max: 99.9 (16 Jun 2025 07:49)  HR: 111 (16 Jun 2025 16:24) (110 - 132)  BP: 178/96 (16 Jun 2025 16:24) (165/90 - 178/96)  BP(mean): --  RR: 20 (16 Jun 2025 16:24) (17 - 20)  SpO2: 98% (16 Jun 2025 16:24) (98% - 100%)    Parameters below as of 16 Jun 2025 16:24  Patient On (Oxygen Delivery Method): room air

## 2025-06-16 NOTE — ED PROVIDER NOTE - PROGRESS NOTE DETAILS
Pt with episode of wrapping cord around her neck - states she felt like she did not want to continue living like this, pain resumed and still with nausea, will medicate further, pt has been admitted to medicine team - will inform of this event.

## 2025-06-16 NOTE — BH CONSULTATION LIAISON ASSESSMENT NOTE - HPI (INCLUDE ILLNESS QUALITY, SEVERITY, DURATION, TIMING, CONTEXT, MODIFYING FACTORS, ASSOCIATED SIGNS AND SYMPTOMS)
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, pharmacy tech and certified PCA, currently in school, charted hx of opioid dependence (Dilaudid up to 26mg / day; self reported physiological tolerance after months of ICU/medical stay at [facility] for DKA with multi organ failure, intubation, "lung biopsy?" may have been a chest tube with pain mgt; discharged on minimal pain agents resulting in opiate withdrawal and return to hospital), history of prior completed rehab in [facility] (2014-45 days), with h/o prior incomplete rehab in [facility] last year; smokes cannabis daily (3g every week), in outpatient care at [facility] on suboxone 2/1mg daily (previously was at 4 mg), no psych admissions, 2 prior rehab admissions. Pt has history of suicidal gestures in hospital setting in setting of wanting pain meds, no psychiatric hospitalizations, no known SAs with intent, no outpatient psychiatric care, PMHX HTN, DMI since age 12 on insulin, chronic gastroparesis, hx of ectopic pregnancy with b/l tubal ligation, cholecystectomy, with numerous hospitalizations for DKA and intractable vomiting with hospital medical admissions significant for acting out behaviors including making para-suicidal gestures and statements, when medications are delayed/needs not met in a timely manner/inadequate pain treatment who was BIBEMS from home for  abdominal pain and vomiting. In the ED, Patient again asked for her usual pain regimen, did not get what she requested and again acted out and was "observed by staff to wrap monitor cords around her neck and required several staff to remove them." BIBE today from home and noted to be tachycardic, afebrile, hypertensive, sat well at RA. WBC 14.31, plt 451, lactate 4.9---> 1.9, Na 130, Cl 94, Cr 1.83 ( 1.15 on 6/13/25), glucose 631, lipase 14. CXR with bilateral perihilar/upper lobe interstitial/airspace opacities, infectious vs chronic interstitial lung disease    ISTOP Reference #: [id number]  06/08/2025	06/09/2025	buprenorphine-naloxone 2-0.5 mg sl tablet	1	1	Mike Huynh	[id number]	Medicaid	[facility]/28/20[facility]	05/28/2025	oxycodone-acetaminophen 5-325 mg tablet	6	2	Pollice Gaby HANDLEY (M S , PAMarlaC)	[id number]	Medicaid  04/29/2025	05/05/2025	buprenorphine-naloxone 2-0.5 mg sl film	28	28	Yuliana Figueroa	[id number]	Medicaid	[facility] 04/16/2025	04/17/2025	buprenorphine-naloxone 2-0.5 mg sl film	14	14	Yuliana Figueroa	[id number]	Medicaid	[facility] 04/02/2025	04/02/2025	buprenorphine-naloxone 2-0.5 mg sl film	14	14	Ana Mcgovern	[id number]	Medicaid	[facility] 03/05/2025	03/06/2025	buprenorphine-naloxone 2-0.5 mg sl film	28	28	Ana Mcgovern	[id number]	Medicaid	[facility] 02/19/2025	02/19/2025	buprenorphine-naloxone 2-0.5 mg sl film	14	14	Laverne Cruz	[id number]	Medicaid	[facility] - on it since 01/2024    COLLATERAL FROM Pt's Mother Flower [phone]: at work so she had limited time to talk. Said that Patient does not live with her, suspecting she is not doing what she needs to be doing health wife )diabetes management), is on Suboxone and she was doing ok as far as she knows. She expressed concern she is getting Dilaudid here and that was the medication Patient was addicted to - does not know if she buys any agents on the street etc. Reports Patient comes to [facility] because "you guys give her the Dilaudid"     COLLATERAL FROM ACP WHO LAST FOLLOWED PATIENT AT [facility]: : As per Medical staff who followed Patient on prior admissions, they report that they cannot rule out an element of intentional noncompliance to impact blood sugar to get medical admission and get IV opiates.     EXAM: calm, cooperative, looks tired and physically unwell, dry heaving at times, speech at times halts for what appears nausea, + vomitus with dark flecks that looks like residual food particles in basin next to Patient. Reports she has another gastroparesis flare up. Patient reports that she was in physical distress earlier, in pain, and felt no one was paying attention, acting out to express her frustration. DENIES actual suicidal ideation, intent or plan. Endorses stable euthymic mood, regular sleep / appetite / energy level / concentration otherwise. Denies and does not manifest any symptoms of hypomania/pee/psychosis/major depression/ anxiety/panic. Denies any active or passive suicidal or homicidal ideation. Names protective factors (lynn; family; hope for future). Endorses medication compliance. Denies adverse medication side effects. Denies substance use, denies illicit substance use, denies street-bought opiate use, denies RX misuse. Would like Dilaudid IV with benadryl IV as she had rash before from it. Has self reduced her Suboxone from 4 to 2 - would like to keep it at lowered dose.      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, pharmacy tech and certified PCA, currently in school, charted hx of opioid dependence (Dilaudid up to 26mg / day; self reported physiological tolerance after months of ICU/medical stay at [facility] for DKA with multi organ failure, intubation, "lung biopsy?" may have been a chest tube with pain mgt; discharged on minimal pain agents resulting in opiate withdrawal and return to hospital), history of prior completed rehab in [facility] (2014-45 days), with h/o prior incomplete rehab in [facility] last year; smokes cannabis daily (3g every week), in outpatient care at [facility] on suboxone 2/1mg daily (previously was at 4 mg), no psych admissions, 2 prior rehab admissions. Pt has history of suicidal gestures in hospital setting in setting of wanting pain meds, no psychiatric hospitalizations, no known SAs with intent, no outpatient psychiatric care, PMHX HTN, DMI since age 12 on insulin, chronic gastroparesis, hx of ectopic pregnancy with b/l tubal ligation, cholecystectomy, with numerous hospitalizations for DKA and intractable vomiting with hospital medical admissions significant for acting out behaviors including making para-suicidal gestures and statements, when medications are delayed/needs not met in a timely manner/inadequate pain treatment who was BIBEMS from home for  abdominal pain and vomiting. In the ED, Patient again asked for her usual pain regimen, did not get what she requested and again acted out and was "observed by staff to wrap monitor cords around her neck and required several staff to remove them." BIBE today from home and noted to be tachycardic, afebrile, hypertensive, sat well at RA. WBC 14.31, plt 451, lactate 4.9---> 1.9, Na 130, Cl 94, Cr 1.83 ( 1.15 on 6/13/25), glucose 631, lipase 14. CXR with bilateral perihilar/upper lobe interstitial/airspace opacities, infectious vs chronic interstitial lung disease    EXAM seen at bedside, Patient reports that she took out her father for Father's Day. all was well, then she went out with her friends but did  not drink anything and there was some issue with her grandfather not picking up her Dexcom reader and she did not have an insulin pen with her after she checked her FS and it was 500, went home and took 10 units of insulin and her FS went up to 10 units, and her old Endocrinologist left and she is supposed to see a new one.  Open and honest conversation was held with Patient expressing concern for such a fast readmission and continued reliance on IVP Dilaudid when there is no current pain episode or component to presentation but hyperglycemia and Pt was doing well < 2 days prior with no pain and s/p dilaudid taper. Patient initially said "ok," then became upset and argumentative saying that she does not ask for the Dilaudid to get high as she can't experience the "high" due to Suboxone's action. Patient was offered increase in the Suboxone as an alternative plan, as well as reminded that she has other pain agents ordered including PO PRN. Patient asked if this was her "punishment" for acting out - Patient was told that it is not punishment but a concern that given her history of being dependent on Dilaudid after5 months of getting it often IVP in a hospital years prior, the risks > benefits at this time as there seems to be off baseline functioning such as this readmission with inconsistent narration. NOTE: collateral from Pt's mother was not disclosed to patient as not to cause disruption in the mother-daughter relationship and because mother reached out to staff (unknown to Writer until today) during most recent admission expressing concern. Patient subsequently asked who she cansue (ie names who made this decision) and requested several times to leave AMA "letme just sign iout" with plan to go to another hospital and "ask for a second opinion." No suicidal ideation elicited or reported.     ISTOP Reference #: [id number]  06/08/2025	06/09/2025	buprenorphine-naloxone 2-0.5 mg sl tablet	1	1	Mike Huynh	[id number]	Medicaid	[facility]/28/20[facility]	05/28/2025	oxycodone-acetaminophen 5-325 mg tablet	6	2	Gaby Zavala , PA-C)	[id number]	Medicaid  04/29/2025	05/05/2025	buprenorphine-naloxone 2-0.5 mg sl film	28	28	Yuliana Figueroa	[id number]	Medicaid	[facility]/16/[facility]	04/17/2025	buprenorphine-naloxone 2-0.5 mg sl film	14	14	Yuliana Figueroa [id number]	Medicaid	[facility] 04/02/[facility]	04/02/2025	buprenorphine-naloxone 2-0.5 mg sl film	14	14	Ana Mcgovern	[id number]	Medicaid	[facility] 03/05/2025	03/06/2025	buprenorphine-naloxone 2-0.5 mg sl film	28	28	Ana Mcgovern	[id number]	Medicaid	[facility] 02/19/2025	02/19/2025	buprenorphine-naloxone 2-0.5 mg sl film	14	14	Laverne Cruz	[id number]	Medicaid	[facility] - on it since 01/2024    COLLATERAL FROM Pt's Mother Flower [phone]: at work so she had limited time to talk. Said that Patient does not live with her, suspecting she is not doing what she needs to be doing health wife )diabetes management), is on Suboxone and she was doing ok as far as she knows. She expressed concern she is getting Dilaudid here and that was the medication Patient was addicted to - does not know if she buys any agents on the street etc. Reports Patient comes to [facility] because "you guys give her the Dilaudid"     COLLATERAL FROM ACP WHO LAST FOLLOWED PATIENT AT [facility]: : As per Medical staff who followed Patient on prior admissions, they report that they cannot rule out an element of intentional noncompliance to impact blood sugar to get medical admission and get IV opiates.     EXAM: calm, cooperative, looks tired and physically unwell, dry heaving at times, speech at times halts for what appears nausea, + vomitus with dark flecks that looks like residual food particles in basin next to Patient. Reports she has another gastroparesis flare up. Patient reports that she was in physical distress earlier, in pain, and felt no one was paying attention, acting out to express her frustration. DENIES actual suicidal ideation, intent or plan. Endorses stable euthymic mood, regular sleep / appetite / energy level / concentration otherwise. Denies and does not manifest any symptoms of hypomania/pee/psychosis/major depression/ anxiety/panic. Denies any active or passive suicidal or homicidal ideation. Names protective factors (lynn; family; hope for future). Endorses medication compliance. Denies adverse medication side effects. Denies substance use, denies illicit substance use, denies street-bought opiate use, denies RX misuse. Would like Dilaudid IV with benadryl IV as she had rash before from it. Has self reduced her Suboxone from 4 to 2 - would like to keep it at lowered dose.

## 2025-06-16 NOTE — ED PROVIDER NOTE - NEUROLOGICAL, MLM
AAA (abdominal aortic aneurysm, ruptured)
Alert and oriented, no focal deficits, no motor or sensory deficits.

## 2025-06-16 NOTE — ED PROVIDER NOTE - NS_EDPROVIDERDISPOUSERTYPE_ED_A_ED
pt states he woke up with left sided facial swelling
Attending Attestation (For Attendings USE Only)...

## 2025-06-16 NOTE — H&P ADULT - NSHPREVIEWOFSYSTEMS_GEN_ALL_CORE
All ROS were negative except intractable vomiting, upper abdominal pain, unable to tolerate oral intake

## 2025-06-16 NOTE — H&P ADULT - PROBLEM SELECTOR PLAN 1
intractable nausea, vomiting and unable to tolerate oral intake  Labs with KERRI, elevated BUN and low Na  Likely due to marijuana use  Reglan prn  IV fluid, clear liquid and advance as tolerate  IV pantoprazole 40mg x 1, then continue with oral PPI  Check Utox  Patient on Suboxone. Recent psych consult note reviewed. IV dilaudid 2mg q4hr prn for severe pain. Monitor for sedation  Psych consulted, discussed with Dr Goldstein  Elevated lactate 4.9---> 1.9 after IV fluid. Likely due to dehydration

## 2025-06-16 NOTE — BH CONSULTATION LIAISON ASSESSMENT NOTE - NSBHCONSULTRECOMMENDOTHER_PSY_A_CORE FT
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 As per Medical staff who followed Patient on prior admissions, they report that they cannot rule out an element of intentional noncompliance to impact blood sugar to get medical admission and get IV opiates.   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 As per Medical staff who followed Patient on prior admissions, they report that they cannot rule out an element of intentional noncompliance to impact blood sugar to get medical admission and get IV opiates.   - offered Patient increase in her Suboxone to address any opiate cravings/withdrawal at this time, also has other PRNs for pain   - NO MORE IV DILAUDID to be given in this case   - continue CO 1:1 as Patient has a long hx of acting out when not given IV Dilaudid   - has capacity to leave AMA (if Pt leaves, does not need RX for Suboxone as she was given a 10 day RX for it 2 days ago on discharge)

## 2025-06-16 NOTE — BH CONSULTATION LIAISON ASSESSMENT NOTE - NSBHCHARTREVIEWLAB_PSY_A_CORE FT
06-16    130[L]  |  94[L]  |  29[H]  ----------------------------<  631[HH]  4.8   |  24  |  1.83[H]    Ca    10.0      16 Jun 2025 08:25    TPro  8.6[H]  /  Alb  3.7  /  TBili  1.0  /  DBili  x   /  AST  34  /  ALT  35  /  AlkPhos  136[H]  06-16

## 2025-06-16 NOTE — ED ADULT NURSE NOTE - NSICDXPASTMEDICALHX_GEN_ALL_CORE_FT
- - -
PAST MEDICAL HISTORY:  Gastroparesis due to DM     Insulin dependent type 1 diabetes mellitus     Marijuana use, continuous     Opioid dependence     Tobacco dependence with current use

## 2025-06-16 NOTE — CHART NOTE - NSCHARTNOTEFT_GEN_A_CORE
Medicine Hospitalist PA    Pt leaving AMA. Explained to pt the risks of leaving against medical advice. Pt is upset that her doctor discontinued the dilaudid. Many attempts have been made to reason with pt to remain in hospital however pt refusing to cooperate and wants to leave AMA. Pt aware of consequences of leaving against medical advice including harm, injury or death. Pt fully understands, all question have been answered. Suboxone sent to the pharmacy by Psych. Pt signed AMA form, witnessed by RN at 17:55. D/w Dr. Dyer

## 2025-06-16 NOTE — BH CONSULTATION LIAISON ASSESSMENT NOTE - ADDITIONAL DETAILS ALL
Patient has many episodes of para-suicidal gestures made in front of hospital staff when she feels like her requests and needs are not heard; she has NO history of concurrent intent / plan to actually cintron or severely injure herself

## 2025-06-16 NOTE — ED PROVIDER NOTE - AVIAN FLU SYMPTOMS
Increase walking and physical activity. Start going to the gym again when possible. Eat smaller portions at mealtime. Avoid snack foods and fast foods. Weigh once a week at home to monitor progress. He has lost 10 pounds over the last 3 months.   No

## 2025-06-16 NOTE — BH CONSULTATION LIAISON ASSESSMENT NOTE - OTHER PAST PSYCHIATRIC HISTORY (INCLUDE DETAILS REGARDING ONSET, COURSE OF ILLNESS, INPATIENT/OUTPATIENT TREATMENT)
low mood secondary to chronic medical conditions impairing everyday functioning and lowering quality of life with outpatient medication management   - no psychiatric hospitalizations,  no illicit substance use, no suicide attempts; no hx of aggression/violence; no legal issues

## 2025-06-16 NOTE — ED PROVIDER NOTE - BREATH SOUNDS
Bilateral Helical Rim Advancement Flap Text: The defect edges were debeveled with a #15 blade scalpel.  Given the location of the defect and the proximity to free margins (helical rim) a bilateral helical rim advancement flap was deemed most appropriate.  Using a sterile surgical marker, the appropriate advancement flaps were drawn incorporating the defect and placing the expected incisions between the helical rim and antihelix where possible.  The area thus outlined was incised through and through with a #15 scalpel blade.  With a skin hook and iris scissors, the flaps were gently and sharply undermined and freed up. tachypnea, Kussmaul breathing noted

## 2025-06-16 NOTE — ED ADULT TRIAGE NOTE - CHIEF COMPLAINT QUOTE
Patient BIBA presents to ED c/o generalized abdominal pain started this morning, nausea and vomiting. On ABT for toothache BGL HI X 2 Actively vomiting in triage    hx DM, HLD, HTN on Suboxone

## 2025-06-16 NOTE — H&P ADULT - ASSESSMENT
35 years old female with h/o HTN, DM1, cyclic vomiting syndrome, marijuana use, h/o opioid dependence on Suboxone present to ED with intractable nausea, vomiting associated with upper abdominal pain. Patient reported symptoms for 1 day and unable to tolerate oral intake due to vomiting. No fever, chills, cough, SOB, diarrhea or urinary symptoms  Tachycardic, afebrile, hypertensive, sat well at RA. WBC 14.31, plt 451, lactate 4.9---> 1.9, Na 130, Cl 94, Cr 1.83 ( 1.15 on 6/13/25), glucose 631, lipase 14. CXR with bilateral perihilar/upper lobe interstitial/airspace opacities, infectious vs chronic interstitial lung disease

## 2025-06-16 NOTE — ED ADULT NURSE NOTE - OBJECTIVE STATEMENT
Patient is a 35 F AOx4 BIBA for abdominal pain that started since ysterday, stated she was here last week Mon to Sat for same issue and being treated for DKA, pt also complains of N/V, denies CP, SOB, dizziness and drinking, reports smoking marijuana occasionally

## 2025-06-16 NOTE — ED PROVIDER NOTE - CARE PLAN
Size Of Lesion After Curettage: 1.6 Principal Discharge DX:	Hyperglycemia  Secondary Diagnosis:	Nausea and vomiting   1

## 2025-06-16 NOTE — BH CONSULTATION LIAISON ASSESSMENT NOTE - SUMMARY
Patient is well known to  Psychiatry and is presenting at her well known baseline which includes acting out due to being upset as she felt staff ws not listening to her with readmission 2 days after discharge with concern for increasing opiate seeking behavior.  Patient is well known to  Psychiatry and is presenting at her well known baseline which includes acting out due to being upset as she felt staff ws not listening to her with readmission 2 days after discharge with concern for increasing opiate seeking behavior as per collateral from family, staff and change in usual HPI.

## 2025-06-16 NOTE — ED PROVIDER NOTE - CLINICAL SUMMARY MEDICAL DECISION MAKING FREE TEXT BOX
Patient VBG appears to be within normal limits however noted Kussmaul breathing and lactic acidosis along with hyperglycemia likely representing patient in early DKA prior to any decompensation.

## 2025-06-16 NOTE — BH CONSULTATION LIAISON ASSESSMENT NOTE - PERSONAL COLLATERAL NAME
Impression: Dry eye syndrome of bilateral lacrimal glands: H04.123. Plan: Patient's complaint is consistent with dry eye syndrome. There is no evidence of permanent changes to the cornea. Explained there are ways to improve the symptoms but no permanent fix for the symptoms. Reviewed options warm scrubs QAM and  artificial tears PRN OU.
Impression: Open angle with borderline findings, low risk, bilateral: H40.013. Plan: ++FHX glc; Mother and Aunt. OCT RNFL wnl. Pachs Thick OD -2mm and avg OS; IOP high wnl OU. No glc. Monitor due to family hx.
Pt's Mother Flower [phone]

## 2025-06-16 NOTE — H&P ADULT - PROBLEM SELECTOR PLAN 3
hyperglycemic to glucose 631  Restart basal and premeal insulin. Monitor glucose and titrate insulin regimens  Monitor for hypoglycemia

## 2025-06-16 NOTE — PROVIDER CONTACT NOTE (CRITICAL VALUE NOTIFICATION) - BACKGROUND
ED c/o generalized abdominal pain started this morning, nausea and vomiting. presents with Hyperglycemia

## 2025-06-16 NOTE — BH CONSULTATION LIAISON ASSESSMENT NOTE - CURRENT MEDICATION
MEDICATIONS  (STANDING):  amoxicillin  875 milliGRAM(s)/clavulanate 1 Tablet(s) Oral two times a day  atorvastatin 20 milliGRAM(s) Oral at bedtime  buprenorphine 2 mG/naloxone 0.5 mG SL Film 1 Film(s) SubLingual daily  dextrose 5%. 1000 milliLiter(s) (50 mL/Hr) IV Continuous <Continuous>  dextrose 5%. 1000 milliLiter(s) (100 mL/Hr) IV Continuous <Continuous>  dextrose 50% Injectable 25 Gram(s) IV Push once  dextrose 50% Injectable 12.5 Gram(s) IV Push once  dextrose 50% Injectable 25 Gram(s) IV Push once  glucagon  Injectable 1 milliGRAM(s) IntraMuscular once  insulin glargine Injectable (LANTUS) 8 Unit(s) SubCutaneous at bedtime  insulin lispro (ADMELOG) corrective regimen sliding scale   SubCutaneous three times a day before meals  insulin lispro (ADMELOG) corrective regimen sliding scale   SubCutaneous at bedtime  insulin lispro Injectable (ADMELOG) 2 Unit(s) SubCutaneous three times a day before meals  pantoprazole    Tablet 40 milliGRAM(s) Oral before breakfast  sodium chloride 0.9%. 1000 milliLiter(s) (125 mL/Hr) IV Continuous <Continuous>    MEDICATIONS  (PRN):  acetaminophen     Tablet .. 650 milliGRAM(s) Oral every 6 hours PRN Temp greater or equal to 38C (100.4F), Mild Pain (1 - 3)  aluminum hydroxide/magnesium hydroxide/simethicone Suspension 30 milliLiter(s) Oral every 4 hours PRN Dyspepsia  dextrose Oral Gel 15 Gram(s) Oral once PRN Blood Glucose LESS THAN 70 milliGRAM(s)/deciliter  diphenhydrAMINE Injectable 25 milliGRAM(s) IV Push every 4 hours PRN for itchiness  HYDROmorphone  Injectable 2 milliGRAM(s) IV Push every 4 hours PRN Severe Pain (7 - 10)  melatonin 3 milliGRAM(s) Oral at bedtime PRN Insomnia  metoclopramide Injectable 10 milliGRAM(s) IV Push every 8 hours PRN vomiting  oxyCODONE    IR 10 milliGRAM(s) Oral every 6 hours PRN Moderate Pain (4 - 6)

## 2025-06-16 NOTE — ED PROVIDER NOTE - CRITICAL CARE ATTENDING CONTRIBUTION TO CARE
Patient otherwise evaluated for hyperglycemia with nausea vomiting as well as  small breathing noted on exam with tachycardia and tachypnea will otherwise admit for further evaluation and care of likely early DKA with medicine team.

## 2025-06-16 NOTE — H&P ADULT - PROBLEM SELECTOR PLAN 2
Cr 1.83 ( 1.15 on 6/13/25)  IV fluid, closely monitor renal function  Recheck labs in AM  Low Na 130 with low Cl, hypovolemic hyponatremia. Continue IV NS. Monitor serum Na. Repeat BMP in PM

## 2025-06-16 NOTE — ED PROVIDER NOTE - NSICDXPASTMEDICALHX_GEN_ALL_CORE_FT
PAST MEDICAL HISTORY:  Gastroparesis due to DM     Insulin dependent type 1 diabetes mellitus     Marijuana use, continuous     Opioid dependence     Tobacco dependence with current use

## 2025-06-20 DIAGNOSIS — Z88.8 ALLERGY STATUS TO OTHER DRUGS, MEDICAMENTS AND BIOLOGICAL SUBSTANCES: ICD-10-CM

## 2025-06-20 DIAGNOSIS — N83.202 UNSPECIFIED OVARIAN CYST, LEFT SIDE: ICD-10-CM

## 2025-06-20 DIAGNOSIS — Z90.49 ACQUIRED ABSENCE OF OTHER SPECIFIED PARTS OF DIGESTIVE TRACT: ICD-10-CM

## 2025-06-20 DIAGNOSIS — F43.20 ADJUSTMENT DISORDER, UNSPECIFIED: ICD-10-CM

## 2025-06-20 DIAGNOSIS — T71.162A ASPHYXIATION DUE TO HANGING, INTENTIONAL SELF-HARM, INITIAL ENCOUNTER: ICD-10-CM

## 2025-06-20 DIAGNOSIS — E10.43 TYPE 1 DIABETES MELLITUS WITH DIABETIC AUTONOMIC (POLY)NEUROPATHY: ICD-10-CM

## 2025-06-20 DIAGNOSIS — I10 ESSENTIAL (PRIMARY) HYPERTENSION: ICD-10-CM

## 2025-06-20 DIAGNOSIS — E87.6 HYPOKALEMIA: ICD-10-CM

## 2025-06-20 DIAGNOSIS — F11.23 OPIOID DEPENDENCE WITH WITHDRAWAL: ICD-10-CM

## 2025-06-20 DIAGNOSIS — K21.9 GASTRO-ESOPHAGEAL REFLUX DISEASE WITHOUT ESOPHAGITIS: ICD-10-CM

## 2025-06-20 DIAGNOSIS — Z79.4 LONG TERM (CURRENT) USE OF INSULIN: ICD-10-CM

## 2025-06-20 DIAGNOSIS — K31.84 GASTROPARESIS: ICD-10-CM

## 2025-06-20 DIAGNOSIS — F12.90 CANNABIS USE, UNSPECIFIED, UNCOMPLICATED: ICD-10-CM

## 2025-06-20 DIAGNOSIS — I16.0 HYPERTENSIVE URGENCY: ICD-10-CM

## 2025-06-20 DIAGNOSIS — F17.210 NICOTINE DEPENDENCE, CIGARETTES, UNCOMPLICATED: ICD-10-CM

## 2025-06-20 DIAGNOSIS — E10.65 TYPE 1 DIABETES MELLITUS WITH HYPERGLYCEMIA: ICD-10-CM

## 2025-06-20 DIAGNOSIS — E10.649 TYPE 1 DIABETES MELLITUS WITH HYPOGLYCEMIA WITHOUT COMA: ICD-10-CM

## 2025-06-20 DIAGNOSIS — R11.2 NAUSEA WITH VOMITING, UNSPECIFIED: ICD-10-CM

## 2025-06-20 DIAGNOSIS — R11.15 CYCLICAL VOMITING SYNDROME UNRELATED TO MIGRAINE: ICD-10-CM
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
## 2025-06-23 DIAGNOSIS — D72.829 ELEVATED WHITE BLOOD CELL COUNT, UNSPECIFIED: ICD-10-CM

## 2025-06-23 DIAGNOSIS — Z11.52 ENCOUNTER FOR SCREENING FOR COVID-19: ICD-10-CM

## 2025-06-23 DIAGNOSIS — Z53.29 PROCEDURE AND TREATMENT NOT CARRIED OUT BECAUSE OF PATIENT'S DECISION FOR OTHER REASONS: ICD-10-CM

## 2025-06-23 DIAGNOSIS — F11.20 OPIOID DEPENDENCE, UNCOMPLICATED: ICD-10-CM

## 2025-06-23 DIAGNOSIS — Z79.4 LONG TERM (CURRENT) USE OF INSULIN: ICD-10-CM

## 2025-06-23 DIAGNOSIS — Z88.6 ALLERGY STATUS TO ANALGESIC AGENT: ICD-10-CM

## 2025-06-23 DIAGNOSIS — R11.2 NAUSEA WITH VOMITING, UNSPECIFIED: ICD-10-CM

## 2025-06-23 DIAGNOSIS — F12.10 CANNABIS ABUSE, UNCOMPLICATED: ICD-10-CM

## 2025-06-23 DIAGNOSIS — E10.65 TYPE 1 DIABETES MELLITUS WITH HYPERGLYCEMIA: ICD-10-CM

## 2025-06-23 DIAGNOSIS — Z88.8 ALLERGY STATUS TO OTHER DRUGS, MEDICAMENTS AND BIOLOGICAL SUBSTANCES: ICD-10-CM

## 2025-06-23 DIAGNOSIS — R73.9 HYPERGLYCEMIA, UNSPECIFIED: ICD-10-CM

## 2025-06-23 DIAGNOSIS — N17.9 ACUTE KIDNEY FAILURE, UNSPECIFIED: ICD-10-CM

## 2025-09-02 ENCOUNTER — APPOINTMENT (OUTPATIENT)
Dept: ENDOCRINOLOGY | Facility: CLINIC | Age: 35
End: 2025-09-02